# Patient Record
Sex: MALE | Race: WHITE | NOT HISPANIC OR LATINO | Employment: OTHER | URBAN - METROPOLITAN AREA
[De-identification: names, ages, dates, MRNs, and addresses within clinical notes are randomized per-mention and may not be internally consistent; named-entity substitution may affect disease eponyms.]

---

## 2020-02-18 ENCOUNTER — APPOINTMENT (EMERGENCY)
Dept: RADIOLOGY | Facility: HOSPITAL | Age: 65
DRG: 190 | End: 2020-02-18
Payer: MEDICARE

## 2020-02-18 ENCOUNTER — HOSPITAL ENCOUNTER (INPATIENT)
Facility: HOSPITAL | Age: 65
LOS: 6 days | Discharge: HOME | DRG: 190 | End: 2020-02-24
Attending: EMERGENCY MEDICINE | Admitting: INTERNAL MEDICINE
Payer: MEDICARE

## 2020-02-18 DIAGNOSIS — E78.5 HLD (HYPERLIPIDEMIA): ICD-10-CM

## 2020-02-18 DIAGNOSIS — J44.1 COPD WITH ACUTE EXACERBATION (HCC): Primary | ICD-10-CM

## 2020-02-18 PROBLEM — R07.9 CHEST PAIN: Status: ACTIVE | Noted: 2020-02-18

## 2020-02-18 PROBLEM — J20.9 ACUTE TRACHEOBRONCHITIS: Status: ACTIVE | Noted: 2020-02-18

## 2020-02-18 LAB
ALBUMIN SERPL BCP-MCNC: 3.8 G/DL (ref 3.5–5)
ALP SERPL-CCNC: 66 U/L (ref 46–116)
ALT SERPL W P-5'-P-CCNC: 29 U/L (ref 12–78)
ANION GAP SERPL CALCULATED.3IONS-SCNC: 3 MMOL/L (ref 4–13)
AST SERPL W P-5'-P-CCNC: 20 U/L (ref 5–45)
ATRIAL RATE: 99 BPM
BASOPHILS # BLD AUTO: 0.06 THOUSANDS/ΜL (ref 0–0.1)
BASOPHILS NFR BLD AUTO: 1 % (ref 0–1)
BILIRUB SERPL-MCNC: 0.4 MG/DL (ref 0.2–1)
BUN SERPL-MCNC: 10 MG/DL (ref 5–25)
CALCIUM SERPL-MCNC: 9 MG/DL (ref 8.3–10.1)
CHLORIDE SERPL-SCNC: 102 MMOL/L (ref 100–108)
CO2 SERPL-SCNC: 33 MMOL/L (ref 21–32)
CREAT SERPL-MCNC: 0.76 MG/DL (ref 0.6–1.3)
EOSINOPHIL # BLD AUTO: 0.03 THOUSAND/ΜL (ref 0–0.61)
EOSINOPHIL NFR BLD AUTO: 1 % (ref 0–6)
ERYTHROCYTE [DISTWIDTH] IN BLOOD BY AUTOMATED COUNT: 12.9 % (ref 11.6–15.1)
GFR SERPL CREATININE-BSD FRML MDRD: 96 ML/MIN/1.73SQ M
GLUCOSE SERPL-MCNC: 124 MG/DL (ref 65–140)
HCT VFR BLD AUTO: 46.5 % (ref 36.5–49.3)
HGB BLD-MCNC: 15.3 G/DL (ref 12–17)
IMM GRANULOCYTES # BLD AUTO: 0.03 THOUSAND/UL (ref 0–0.2)
IMM GRANULOCYTES NFR BLD AUTO: 1 % (ref 0–2)
LYMPHOCYTES # BLD AUTO: 0.66 THOUSANDS/ΜL (ref 0.6–4.47)
LYMPHOCYTES NFR BLD AUTO: 10 % (ref 14–44)
MCH RBC QN AUTO: 32.7 PG (ref 26.8–34.3)
MCHC RBC AUTO-ENTMCNC: 32.9 G/DL (ref 31.4–37.4)
MCV RBC AUTO: 99 FL (ref 82–98)
MONOCYTES # BLD AUTO: 0.68 THOUSAND/ΜL (ref 0.17–1.22)
MONOCYTES NFR BLD AUTO: 11 % (ref 4–12)
NEUTROPHILS # BLD AUTO: 5.03 THOUSANDS/ΜL (ref 1.85–7.62)
NEUTS SEG NFR BLD AUTO: 76 % (ref 43–75)
NRBC BLD AUTO-RTO: 0 /100 WBCS
P AXIS: 89 DEGREES
PLATELET # BLD AUTO: 327 THOUSANDS/UL (ref 149–390)
PMV BLD AUTO: 8.7 FL (ref 8.9–12.7)
POTASSIUM SERPL-SCNC: 4.3 MMOL/L (ref 3.5–5.3)
PR INTERVAL: 140 MS
PROT SERPL-MCNC: 7.3 G/DL (ref 6.4–8.2)
QRS AXIS: 100 DEGREES
QRSD INTERVAL: 76 MS
QT INTERVAL: 330 MS
QTC INTERVAL: 423 MS
RBC # BLD AUTO: 4.68 MILLION/UL (ref 3.88–5.62)
SODIUM SERPL-SCNC: 138 MMOL/L (ref 136–145)
T WAVE AXIS: 84 DEGREES
TROPONIN I SERPL-MCNC: <0.02 NG/ML
VENTRICULAR RATE: 99 BPM
WBC # BLD AUTO: 6.49 THOUSAND/UL (ref 4.31–10.16)

## 2020-02-18 PROCEDURE — 99223 1ST HOSP IP/OBS HIGH 75: CPT | Performed by: INTERNAL MEDICINE

## 2020-02-18 PROCEDURE — 94640 AIRWAY INHALATION TREATMENT: CPT | Performed by: EMERGENCY MEDICINE

## 2020-02-18 PROCEDURE — 99291 CRITICAL CARE FIRST HOUR: CPT | Performed by: EMERGENCY MEDICINE

## 2020-02-18 PROCEDURE — 94640 AIRWAY INHALATION TREATMENT: CPT

## 2020-02-18 PROCEDURE — 99285 EMERGENCY DEPT VISIT HI MDM: CPT

## 2020-02-18 PROCEDURE — 93005 ELECTROCARDIOGRAM TRACING: CPT

## 2020-02-18 PROCEDURE — 71046 X-RAY EXAM CHEST 2 VIEWS: CPT

## 2020-02-18 PROCEDURE — 94760 N-INVAS EAR/PLS OXIMETRY 1: CPT

## 2020-02-18 PROCEDURE — 93010 ELECTROCARDIOGRAM REPORT: CPT | Performed by: INTERNAL MEDICINE

## 2020-02-18 PROCEDURE — 96365 THER/PROPH/DIAG IV INF INIT: CPT

## 2020-02-18 PROCEDURE — 84484 ASSAY OF TROPONIN QUANT: CPT | Performed by: EMERGENCY MEDICINE

## 2020-02-18 PROCEDURE — 96375 TX/PRO/DX INJ NEW DRUG ADDON: CPT

## 2020-02-18 PROCEDURE — 85025 COMPLETE CBC W/AUTO DIFF WBC: CPT | Performed by: EMERGENCY MEDICINE

## 2020-02-18 PROCEDURE — 84484 ASSAY OF TROPONIN QUANT: CPT | Performed by: INTERNAL MEDICINE

## 2020-02-18 PROCEDURE — 80053 COMPREHEN METABOLIC PANEL: CPT | Performed by: EMERGENCY MEDICINE

## 2020-02-18 PROCEDURE — 36415 COLL VENOUS BLD VENIPUNCTURE: CPT

## 2020-02-18 RX ORDER — ALBUTEROL SULFATE 90 UG/1
2 AEROSOL, METERED RESPIRATORY (INHALATION) 4 TIMES DAILY
Status: DISCONTINUED | OUTPATIENT
Start: 2020-02-18 | End: 2020-02-19

## 2020-02-18 RX ORDER — ONDANSETRON 2 MG/ML
4 INJECTION INTRAMUSCULAR; INTRAVENOUS ONCE
Status: COMPLETED | OUTPATIENT
Start: 2020-02-18 | End: 2020-02-18

## 2020-02-18 RX ORDER — IPRATROPIUM BROMIDE AND ALBUTEROL SULFATE 2.5; .5 MG/3ML; MG/3ML
3 SOLUTION RESPIRATORY (INHALATION)
Status: DISCONTINUED | OUTPATIENT
Start: 2020-02-18 | End: 2020-02-24 | Stop reason: HOSPADM

## 2020-02-18 RX ORDER — FLUTICASONE FUROATE AND VILANTEROL 200; 25 UG/1; UG/1
1 POWDER RESPIRATORY (INHALATION) DAILY
Status: DISCONTINUED | OUTPATIENT
Start: 2020-02-18 | End: 2020-02-24 | Stop reason: HOSPADM

## 2020-02-18 RX ORDER — ATORVASTATIN CALCIUM 40 MG/1
40 TABLET, FILM COATED ORAL
Status: DISCONTINUED | OUTPATIENT
Start: 2020-02-18 | End: 2020-02-24 | Stop reason: HOSPADM

## 2020-02-18 RX ORDER — ASPIRIN 325 MG
325 TABLET ORAL DAILY
Status: DISCONTINUED | OUTPATIENT
Start: 2020-02-19 | End: 2020-02-24 | Stop reason: HOSPADM

## 2020-02-18 RX ORDER — SODIUM CHLORIDE 9 MG/ML
75 INJECTION, SOLUTION INTRAVENOUS CONTINUOUS
Status: DISCONTINUED | OUTPATIENT
Start: 2020-02-18 | End: 2020-02-19

## 2020-02-18 RX ORDER — GUAIFENESIN 600 MG
600 TABLET, EXTENDED RELEASE 12 HR ORAL ONCE
Status: COMPLETED | OUTPATIENT
Start: 2020-02-18 | End: 2020-02-18

## 2020-02-18 RX ORDER — METHYLPREDNISOLONE SODIUM SUCCINATE 40 MG/ML
40 INJECTION, POWDER, LYOPHILIZED, FOR SOLUTION INTRAMUSCULAR; INTRAVENOUS EVERY 8 HOURS SCHEDULED
Status: DISCONTINUED | OUTPATIENT
Start: 2020-02-18 | End: 2020-02-19

## 2020-02-18 RX ORDER — SODIUM CHLORIDE FOR INHALATION 0.9 %
12 VIAL, NEBULIZER (ML) INHALATION ONCE
Status: COMPLETED | OUTPATIENT
Start: 2020-02-18 | End: 2020-02-18

## 2020-02-18 RX ORDER — ACETAMINOPHEN 325 MG/1
650 TABLET ORAL ONCE
Status: COMPLETED | OUTPATIENT
Start: 2020-02-18 | End: 2020-02-18

## 2020-02-18 RX ORDER — ONDANSETRON 2 MG/ML
4 INJECTION INTRAMUSCULAR; INTRAVENOUS EVERY 6 HOURS PRN
Status: DISCONTINUED | OUTPATIENT
Start: 2020-02-18 | End: 2020-02-24 | Stop reason: HOSPADM

## 2020-02-18 RX ORDER — IPRATROPIUM BROMIDE AND ALBUTEROL SULFATE 2.5; .5 MG/3ML; MG/3ML
3 SOLUTION RESPIRATORY (INHALATION) ONCE
Status: COMPLETED | OUTPATIENT
Start: 2020-02-18 | End: 2020-02-18

## 2020-02-18 RX ORDER — METHYLPREDNISOLONE SODIUM SUCCINATE 125 MG/2ML
80 INJECTION, POWDER, LYOPHILIZED, FOR SOLUTION INTRAMUSCULAR; INTRAVENOUS ONCE
Status: COMPLETED | OUTPATIENT
Start: 2020-02-18 | End: 2020-02-18

## 2020-02-18 RX ORDER — ACETAMINOPHEN 325 MG/1
650 TABLET ORAL EVERY 6 HOURS PRN
Status: DISCONTINUED | OUTPATIENT
Start: 2020-02-18 | End: 2020-02-24 | Stop reason: HOSPADM

## 2020-02-18 RX ADMIN — ISODIUM CHLORIDE 12 ML: 0.03 SOLUTION RESPIRATORY (INHALATION) at 10:01

## 2020-02-18 RX ADMIN — GUAIFENESIN 600 MG: 600 TABLET ORAL at 09:14

## 2020-02-18 RX ADMIN — IPRATROPIUM BROMIDE AND ALBUTEROL SULFATE 3 ML: 2.5; .5 SOLUTION RESPIRATORY (INHALATION) at 22:00

## 2020-02-18 RX ADMIN — METHYLPREDNISOLONE SODIUM SUCCINATE 80 MG: 125 INJECTION, POWDER, FOR SOLUTION INTRAMUSCULAR; INTRAVENOUS at 09:15

## 2020-02-18 RX ADMIN — IPRATROPIUM BROMIDE AND ALBUTEROL SULFATE 3 ML: .5; 3 SOLUTION RESPIRATORY (INHALATION) at 09:14

## 2020-02-18 RX ADMIN — METHYLPREDNISOLONE SODIUM SUCCINATE 40 MG: 40 INJECTION, POWDER, FOR SOLUTION INTRAMUSCULAR; INTRAVENOUS at 16:51

## 2020-02-18 RX ADMIN — ONDANSETRON 4 MG: 2 INJECTION INTRAMUSCULAR; INTRAVENOUS at 09:23

## 2020-02-18 RX ADMIN — ACETAMINOPHEN 650 MG: 325 TABLET, FILM COATED ORAL at 09:57

## 2020-02-18 RX ADMIN — IPRATROPIUM BROMIDE 0.5 MG: 0.5 SOLUTION RESPIRATORY (INHALATION) at 10:01

## 2020-02-18 RX ADMIN — IPRATROPIUM BROMIDE AND ALBUTEROL SULFATE 3 ML: 2.5; .5 SOLUTION RESPIRATORY (INHALATION) at 16:46

## 2020-02-18 RX ADMIN — METHYLPREDNISOLONE SODIUM SUCCINATE 40 MG: 40 INJECTION, POWDER, FOR SOLUTION INTRAMUSCULAR; INTRAVENOUS at 22:03

## 2020-02-18 RX ADMIN — ALBUTEROL SULFATE 10 MG: 2.5 SOLUTION RESPIRATORY (INHALATION) at 10:01

## 2020-02-18 RX ADMIN — SODIUM CHLORIDE 75 ML/HR: 0.9 INJECTION, SOLUTION INTRAVENOUS at 16:45

## 2020-02-18 RX ADMIN — ATORVASTATIN CALCIUM 40 MG: 40 TABLET, FILM COATED ORAL at 16:46

## 2020-02-18 RX ADMIN — FLUTICASONE FUROATE AND VILANTEROL TRIFENATATE 1 PUFF: 200; 25 POWDER RESPIRATORY (INHALATION) at 18:41

## 2020-02-18 RX ADMIN — AZITHROMYCIN MONOHYDRATE 500 MG: 500 INJECTION, POWDER, LYOPHILIZED, FOR SOLUTION INTRAVENOUS at 12:09

## 2020-02-18 RX ADMIN — ALBUTEROL SULFATE 2 PUFF: 90 AEROSOL, METERED RESPIRATORY (INHALATION) at 18:40

## 2020-02-18 NOTE — ED NOTES
Respiratory aware of need for heart neb     Armani Miramontes, BATOOL  02/18/20 32 William Puente RN  02/18/20 1497

## 2020-02-18 NOTE — ASSESSMENT & PLAN NOTE
Likely secondary to tracheobronchitis  Will start on Zithromax and Solu-Medrol  Oxygen supplementation and bronchodilators p r n    Trend procalcitonin  Will order sputum culture  Pulmonary consult  Encourage incentive spirometer use

## 2020-02-18 NOTE — ED PROVIDER NOTES
History  Chief Complaint   Patient presents with    Shortness of Breath     pt presents to ED states he has had cold symptoms for a few days but today is very SOB  58-year-old male presents for evaluation of shortness of breath that has been ongoing for several days  The patient describes associated cold/upper respiratory symptoms with congestion and cough however is unable to expectorates sputum  Patient denies any fevers  The patient has some associated sharp stabbing chest pain  His symptoms are worse with exertion  The patient denies any known sick contacts  None       Past Medical History:   Diagnosis Date    Emphysema lung ([address] )        Past Surgical History:   Procedure Laterality Date    APPENDECTOMY         History reviewed  No pertinent family history  I have reviewed and agree with the history as documented  Social History     Tobacco Use    Smoking status: Former Smoker    Smokeless tobacco: Never Used   Substance Use Topics    Alcohol use: Not Currently    Drug use: Never       Review of Systems   Constitutional: Positive for fatigue  Negative for chills and fever  HENT: Negative for sore throat  Respiratory: Positive for cough, chest tightness, shortness of breath and wheezing  Cardiovascular: Positive for chest pain  Negative for palpitations  Gastrointestinal: Negative for abdominal pain, constipation, diarrhea, nausea and vomiting  Genitourinary: Negative for difficulty urinating and dysuria  Musculoskeletal: Negative for back pain  Skin: Negative for rash  Neurological: Negative for dizziness, seizures, syncope, weakness and headaches  All other systems reviewed and are negative  Physical Exam  Physical Exam   Constitutional: He is oriented to person, place, and time  He appears well-developed and well-nourished  He appears cachectic  He appears distressed  HENT:   Head: Normocephalic and atraumatic     Right Ear: External ear normal    Left Ear: External ear normal    Mouth/Throat: No oropharyngeal exudate  Eyes: Pupils are equal, round, and reactive to light  EOM are normal  No scleral icterus  Neck: Normal range of motion  Neck supple  Cardiovascular: Regular rhythm and normal heart sounds  Tachycardia present  Pulmonary/Chest: Tachypnea noted  He is in respiratory distress  He has decreased breath sounds ( Diffuse)  He has wheezes (Diffuse)  Abdominal: Soft  Bowel sounds are normal  There is no tenderness  There is no rebound and no guarding  Musculoskeletal: Normal range of motion  Neurological: He is alert and oriented to person, place, and time  Skin: Skin is warm and dry  No rash noted  Psychiatric: He has a normal mood and affect  Nursing note and vitals reviewed        Vital Signs  ED Triage Vitals   Temperature Pulse Respirations Blood Pressure SpO2   02/18/20 0906 02/18/20 0851 02/18/20 0851 02/18/20 0851 02/18/20 0851   99 8 °F (37 7 °C) (!) 110 (!) 32 142/89 90 %      Temp Source Heart Rate Source Patient Position - Orthostatic VS BP Location FiO2 (%)   02/18/20 0906 02/18/20 0851 02/18/20 0851 02/18/20 0851 --   Tympanic Monitor Sitting Right arm       Pain Score       02/18/20 0957       7           Vitals:    02/18/20 1000 02/18/20 1030 02/18/20 1100 02/18/20 1130   BP: 108/65 111/66 98/61 108/58   Pulse: 94 93 93 81   Patient Position - Orthostatic VS:   Lying Lying         Visual Acuity      ED Medications  Medications   azithromycin (ZITHROMAX) 500 mg in sodium chloride 0 9% 250mL IVPB 500 mg (500 mg Intravenous New Bag 2/18/20 1209)   ipratropium-albuterol (DUO-NEB) 0 5-2 5 mg/3 mL inhalation solution 3 mL (3 mL Nebulization Given 2/18/20 0914)   methylPREDNISolone sodium succinate (Solu-MEDROL) injection 80 mg (80 mg Intravenous Given 2/18/20 0915)   guaiFENesin (MUCINEX) 12 hr tablet 600 mg (600 mg Oral Given 2/18/20 0914)   ondansetron (ZOFRAN) injection 4 mg (4 mg Intravenous Given 2/18/20 0923) acetaminophen (TYLENOL) tablet 650 mg (650 mg Oral Given 2/18/20 0957)   albuterol inhalation solution 10 mg (10 mg Nebulization Given 2/18/20 1001)   ipratropium (ATROVENT) 0 02 % inhalation solution 0 5 mg (0 5 mg Nebulization Given 2/18/20 1001)   sodium chloride 0 9 % inhalation solution 12 mL (12 mL Nebulization Given 2/18/20 1001)       Diagnostic Studies  Results Reviewed     Procedure Component Value Units Date/Time    Troponin I [[id number]]  (Normal) Collected:  02/18/20 0855    Lab Status:  Final result Specimen:  Blood from Arm, Right Updated:  02/18/20 0925     Troponin I <0 02 ng/mL     Comprehensive metabolic panel [[id number]]  (Abnormal) Collected:  02/18/20 0855    Lab Status:  Final result Specimen:  Blood from Arm, Right Updated:  02/18/20 0923     Sodium 138 mmol/L      Potassium 4 3 mmol/L      Chloride 102 mmol/L      CO2 33 mmol/L      ANION GAP 3 mmol/L      BUN 10 mg/dL      Creatinine 0 76 mg/dL      Glucose 124 mg/dL      Calcium 9 0 mg/dL      AST 20 U/L      ALT 29 U/L      Alkaline Phosphatase 66 U/L      Total Protein 7 3 g/dL      Albumin 3 8 g/dL      Total Bilirubin 0 40 mg/dL      eGFR 96 ml/min/1 73sq m     Narrative:       Ashley guidelines for Chronic Kidney Disease (CKD):     Stage 1 with normal or high GFR (GFR > 90 mL/min/1 73 square meters)    Stage 2 Mild CKD (GFR = 60-89 mL/min/1 73 square meters)    Stage 3A Moderate CKD (GFR = 45-59 mL/min/1 73 square meters)    Stage 3B Moderate CKD (GFR = 30-44 mL/min/1 73 square meters)    Stage 4 Severe CKD (GFR = 15-29 mL/min/1 73 square meters)    Stage 5 End Stage CKD (GFR <15 mL/min/1 73 square meters)  Note: GFR calculation is accurate only with a steady state creatinine    CBC and differential [[id number]]  (Abnormal) Collected:  02/18/20 0855    Lab Status:  Final result Specimen:  Blood from Arm, Right Updated:  02/18/20 0915     WBC 6 49 Thousand/uL      RBC 4 68 Million/uL      Hemoglobin 15 3 g/dL      Hematocrit 46 5 %      MCV 99 fL      MCH 32 7 pg      MCHC 32 9 g/dL      RDW 12 9 %      MPV 8 7 fL      Platelets 703 Thousands/uL      nRBC 0 /100 WBCs      Neutrophils Relative 76 %      Immat GRANS % 1 %      Lymphocytes Relative 10 %      Monocytes Relative 11 %      Eosinophils Relative 1 %      Basophils Relative 1 %      Neutrophils Absolute 5 03 Thousands/µL      Immature Grans Absolute 0 03 Thousand/uL      Lymphocytes Absolute 0 66 Thousands/µL      Monocytes Absolute 0 68 Thousand/µL      Eosinophils Absolute 0 03 Thousand/µL      Basophils Absolute 0 06 Thousands/µL                  XR chest 2 views   Final Result by Giana Hitchcock MD (02/18 2154)      Emphysematous changes are noted  No focal consolidation, pleural effusion, or pneumothorax  Workstation performed: [id number]                    Procedures  ECG 12 Lead Documentation Only  Date/Time: 2/18/2020 12:08 PM  Performed by: Luz Gonzalez DO  Authorized by: Luz Gonzalez DO     Indications / Diagnosis:  SOB  ECG reviewed by me, the ED Provider: yes    Patient location:  ED  Previous ECG:     Previous ECG:  Unavailable  Interpretation:     Interpretation: normal    Rate:     ECG rate:  99    ECG rate assessment: normal    Rhythm:     Rhythm: sinus rhythm    Ectopy:     Ectopy: none    QRS:     QRS axis:  Normal    QRS intervals:  Normal  Conduction:     Conduction: normal    ST segments:     ST segments:  Normal  T waves:     T waves: normal    CriticalCare Time  Performed by: Luz Gonzalez DO  Authorized by:  Luz Gonzalez DO     Critical care provider statement:     Critical care time (minutes):  33    Critical care time was exclusive of:  Separately billable procedures and treating other patients and teaching time    Critical care was necessary to treat or prevent imminent or life-threatening deterioration of the following conditions:  Respiratory failure    Critical care was time spent personally by me on the following activities:  Blood draw for specimens, obtaining history from patient or surrogate, development of treatment plan with patient or surrogate, discussions with consultants, evaluation of patient's response to treatment, examination of patient, ordering and performing treatments and interventions, ordering and review of laboratory studies, ordering and review of radiographic studies, re-evaluation of patient's condition, review of old charts and interpretation of cardiac output measurements    I assumed direction of critical care for this patient from another provider in my specialty: no    Comments:      IV steroids, IV antibiotics an hour long nebulization treatment for respiratory distress secondary to COPD exacerbation             ED Course  ED Course as of Feb 18 1231   Tue Feb 18, 2020   0946 Patient with continued work of breathing and diffuse wheezes and decreased air movement upon re-evaluation  The patient also complains of a headache at this time  The patient will give be given Tylenol  He states that nausea is improved after the Zofran  1155 Patient still with tachypnea, increased work of breathing and decreased air movement with wheezing after ALCAZAR neb  The plan is for admission      1228 I discussed the case with the hospitalist  We reviewed the HPI, pertinent PMH, ED course and workup   Hospitalist agreed with plan and will admit the patient to the hospital                                   MDM  Number of Diagnoses or Management Options  COPD with acute exacerbation [facility]): new and requires workup  Diagnosis management comments: Differential diagnosis:  COPD exacerbation, bronchitis, pneumonia, pleural effusion, anemia, myocardial infarction, dehydration, other  Plan is for nebulization, EKG, chest x-ray, laboratories       Amount and/or Complexity of Data Reviewed  Clinical lab tests: reviewed and ordered  Tests in the radiology section of CPT®: ordered and reviewed  Tests in the medicine section of CPT®: reviewed and ordered  Review and summarize past medical records: yes  Discuss the patient with other providers: yes  Independent visualization of images, tracings, or specimens: yes          Disposition  Final diagnoses:   COPD with acute exacerbation ([facility] [address] )     Time reflects when diagnosis was documented in both MDM as applicable and the Disposition within this note     Time User Action Codes Description Comment    2/18/2020 12:09 PM Osiel Braun Add [J44 1] COPD with acute exacerbation [facility])       ED Disposition     ED Disposition Condition Date/Time Comment    Admit Stable Tu Feb 18, 2020 12:30 PM Case was discussed with Zak Bose and the patient's admission status was agreed to be Admission Status: inpatient status to the service of Dr Zak Bose   Follow-up Information    None         Patient's Medications    No medications on file     No discharge procedures on file      PDMP Review     None          ED Provider  Electronically Signed by           Fabiana Burnett DO  02/18/20 9169

## 2020-02-18 NOTE — H&P
H&P- Lynwood Brunner 1955, 71 y o  male MRN: [id number]    Unit/Bed#: ED 03 Encounter: [id number]    Primary Care Provider: No primary care provider on file  Date and time admitted to hospital: 2/18/2020  8:46 AM        Chest pain  Assessment & Plan  Will trend troponins  Denies any chest pain at this time  Serial EKG  Start on aspirin and Lipitor  Lipid profile in a m  Acute tracheobronchitis  Assessment & Plan  Continue Zithromax  Trend procalcitonin  Gentle IV fluids  Monitor labs in a m     * COPD with acute exacerbation (HCC)  Assessment & Plan  Likely secondary to tracheobronchitis  Will start on Zithromax and Solu-Medrol  Oxygen supplementation and bronchodilators p r n  Trend procalcitonin  Will order sputum culture  Pulmonary consult  Encourage incentive spirometer use    Anticipated length of stay greater than 2 midnights    Chief Complaint   Patient presents with    Shortness of Breath     pt presents to ED states he has had cold symptoms for a few days but today is very SOB  HPI:  Lynwood Brunner is a 71 y o  male with history of COPD/emphysema presented to the emergency department with complain of worsening shortness of breath  Patient states that he has been having upper respiratory symptoms for the past 3 days  He has been having cough which is nonproductive associated with low-grade fever and shortness of breath  Patient has history of COPD and emphysema and uses albuterol and Symbicort inhaler at home  Patient has previous history of smoking and quit 6 years ago  Patient has been having worsening shortness of breath and associated wheezing since last evening  He also complains of having stabbing chest pain which is worse with breathing  As dizzy and exertion  Denies any dizziness, nausea, vomiting, abdominal pain, palpitations or any other complaint  In ER patient was given Solu-Medrol and Zithromax      Historical Information   Past Medical History: Diagnosis Date    Emphysema lung ([facility] [address] )      Past Surgical History:   Procedure Laterality Date    APPENDECTOMY       Social History   Social History     Substance and Sexual Activity   Alcohol Use Not Currently     Social History     Substance and Sexual Activity   Drug Use Never     Social History     Tobacco Use   Smoking Status Former Smoker   Smokeless Tobacco Never Used     History reviewed  No pertinent family history  Meds/Allergies   No Known Allergies    Meds:  No current facility-administered medications for this encounter  No current outpatient medications on file  (Not in a hospital admission)      Review of Systems   Constitutional: Positive for activity change and fatigue  HENT: Negative  Eyes: Negative  Respiratory: Positive for cough, chest tightness, shortness of breath and wheezing  Cardiovascular: Negative  Gastrointestinal: Negative  Endocrine: Negative  Genitourinary: Negative  Musculoskeletal: Negative  Skin: Negative  Allergic/Immunologic: Negative  Neurological: Negative  Hematological: Negative  Psychiatric/Behavioral: Negative  Current Vitals:   Blood Pressure: 102/62 (02/18/20 1430)  Pulse: 86 (02/18/20 1430)  Temperature: 99 8 °F (37 7 °C) (02/18/20 0906)  Temp Source: Tympanic (02/18/20 0906)  Respirations: (!) 26 (02/18/20 1430)  Height: 5' 8" (172 7 cm) (02/18/20 0851)  Weight - Scale: 49 9 kg (110 lb) (02/18/20 0851)  SpO2: 92 % (02/18/20 1430)  SPO2 RA Rest      ED from 2/18/2020 in  [address] Emergency Department   SpO2  92 %   SpO2 Activity  At Rest   O2 Device  Nasal cannula   O2 Flow Rate  3 L/min        No intake or output data in the 24 hours ending 02/18/20 1536  Body mass index is 16 73 kg/m²  Physical Exam   Constitutional: He is oriented to person, place, and time  He appears well-developed and well-nourished  No distress  HENT:   Head: Normocephalic and atraumatic     Mouth/Throat: Oropharynx is clear and moist    Eyes: Pupils are equal, round, and reactive to light  Conjunctivae and EOM are normal  No scleral icterus  Neck: Normal range of motion  Neck supple  No JVD present  Cardiovascular: Normal rate, regular rhythm, normal heart sounds and intact distal pulses  Pulmonary/Chest: Effort normal  He has wheezes  He has no rales  He exhibits no tenderness  Decreased breath sounds bilaterally with scattered expiratory wheezing   Abdominal: Soft  Bowel sounds are normal  He exhibits no mass  There is no tenderness  There is no rebound and no guarding  Musculoskeletal: Normal range of motion  He exhibits no edema, tenderness or deformity  Lymphadenopathy:     He has no cervical adenopathy  Neurological: He is alert and oriented to person, place, and time  No cranial nerve deficit  No focal deficits   Skin: Skin is warm  No rash noted  No erythema  No pallor  Psychiatric: He has a normal mood and affect  His behavior is normal  Judgment normal    Nursing note and vitals reviewed        Lab Results:   CBC:   Lab Results   Component Value Date    WBC 6 49 02/18/2020    HGB 15 3 02/18/2020    HCT 46 5 02/18/2020    MCV 99 (H) 02/18/2020     02/18/2020    MCH 32 7 02/18/2020    MCHC 32 9 02/18/2020    RDW 12 9 02/18/2020    MPV 8 7 (L) 02/18/2020    NRBC 0 02/18/2020     CMP:  Lab Results   Component Value Date     02/18/2020    CO2 33 (H) 02/18/2020    BUN 10 02/18/2020    CREATININE 0 76 02/18/2020    CALCIUM 9 0 02/18/2020    AST 20 02/18/2020    ALT 29 02/18/2020    ALKPHOS 66 02/18/2020    EGFR 96 02/18/2020     Lab Results   Component Value Date    TROPONINI <0 02 02/18/2020     Coagulation: No results found for: PT, INR, APTT Urinalysis:No results found for: [place], SPECGRAV, PHUR, LEUKOCYTESUR, NITRITE, PROTEINUA, GLUCOSEU, KETONESU, BILIRUBINUR, BLOODU   Amylase: No results found for: AMYLASE  Lipase: No results found for: LIPASE     Imaging: Xr Chest 2 Views    Result Date: 2/18/2020  Narrative: CHEST INDICATION:   Chest Pain  COMPARISON:  None EXAM PERFORMED/VIEWS:  XR CHEST PA & LATERAL FINDINGS: Cardiomediastinal silhouette appears unremarkable  Emphysematous changes are noted consistent with chronic obstructive pulmonary disease  No airspace opacity to suggest focal pneumonia  No pneumothorax or pleural effusion  Osseous structures appear within normal limits for patient age  Impression: Emphysematous changes are noted  No focal consolidation, pleural effusion, or pneumothorax  Workstation performed: [id number]     EKG, Pathology, and Other Studies: I have personally reviewed the results  VTE Pharmacologic Prophylaxis: Enoxaparin (Lovenox)  VTE Mechanical Prophylaxis: sequential compression device    Code Status: No Order    Counseling / Coordination of Care  Total floor / unit time spent today 75 minutes  Greater than 50% of total time was spent with the patient and / or family counseling and / or coordination of care       "This note has been constructed using a voice recognition system"      Bee Bermudez MD  2/18/2020, 3:36 PM

## 2020-02-18 NOTE — ASSESSMENT & PLAN NOTE
Will trend troponins  Denies any chest pain at this time  Serial EKG  Start on aspirin and Lipitor  Lipid profile in a m

## 2020-02-19 PROBLEM — R63.6 UNDERWEIGHT: Status: ACTIVE | Noted: 2020-02-19

## 2020-02-19 PROBLEM — J96.01 ACUTE RESPIRATORY FAILURE WITH HYPOXIA (HCC): Status: ACTIVE | Noted: 2020-02-19

## 2020-02-19 LAB
ALBUMIN SERPL BCP-MCNC: 3.1 G/DL (ref 3.5–5)
ALP SERPL-CCNC: 60 U/L (ref 46–116)
ALT SERPL W P-5'-P-CCNC: 23 U/L (ref 12–78)
ANION GAP SERPL CALCULATED.3IONS-SCNC: 1 MMOL/L (ref 4–13)
AST SERPL W P-5'-P-CCNC: 15 U/L (ref 5–45)
BASOPHILS # BLD AUTO: 0.01 THOUSANDS/ΜL (ref 0–0.1)
BASOPHILS NFR BLD AUTO: 0 % (ref 0–1)
BILIRUB SERPL-MCNC: 0.4 MG/DL (ref 0.2–1)
BUN SERPL-MCNC: 17 MG/DL (ref 5–25)
CALCIUM SERPL-MCNC: 8.5 MG/DL (ref 8.3–10.1)
CHLORIDE SERPL-SCNC: 104 MMOL/L (ref 100–108)
CHOLEST SERPL-MCNC: 171 MG/DL (ref 50–200)
CO2 SERPL-SCNC: 33 MMOL/L (ref 21–32)
CREAT SERPL-MCNC: 0.63 MG/DL (ref 0.6–1.3)
EOSINOPHIL # BLD AUTO: 0 THOUSAND/ΜL (ref 0–0.61)
EOSINOPHIL NFR BLD AUTO: 0 % (ref 0–6)
ERYTHROCYTE [DISTWIDTH] IN BLOOD BY AUTOMATED COUNT: 12.7 % (ref 11.6–15.1)
GFR SERPL CREATININE-BSD FRML MDRD: 103 ML/MIN/1.73SQ M
GLUCOSE SERPL-MCNC: 161 MG/DL (ref 65–140)
HCT VFR BLD AUTO: 40.3 % (ref 36.5–49.3)
HDLC SERPL-MCNC: 83 MG/DL
HGB BLD-MCNC: 13.2 G/DL (ref 12–17)
IMM GRANULOCYTES # BLD AUTO: 0.02 THOUSAND/UL (ref 0–0.2)
IMM GRANULOCYTES NFR BLD AUTO: 1 % (ref 0–2)
LDLC SERPL CALC-MCNC: 81 MG/DL (ref 0–100)
LYMPHOCYTES # BLD AUTO: 0.36 THOUSANDS/ΜL (ref 0.6–4.47)
LYMPHOCYTES NFR BLD AUTO: 9 % (ref 14–44)
MAGNESIUM SERPL-MCNC: 2.1 MG/DL (ref 1.6–2.6)
MCH RBC QN AUTO: 32.7 PG (ref 26.8–34.3)
MCHC RBC AUTO-ENTMCNC: 32.8 G/DL (ref 31.4–37.4)
MCV RBC AUTO: 100 FL (ref 82–98)
MONOCYTES # BLD AUTO: 0.26 THOUSAND/ΜL (ref 0.17–1.22)
MONOCYTES NFR BLD AUTO: 6 % (ref 4–12)
NEUTROPHILS # BLD AUTO: 3.47 THOUSANDS/ΜL (ref 1.85–7.62)
NEUTS SEG NFR BLD AUTO: 84 % (ref 43–75)
NONHDLC SERPL-MCNC: 88 MG/DL
NRBC BLD AUTO-RTO: 0 /100 WBCS
PHOSPHATE SERPL-MCNC: 3.9 MG/DL (ref 2.3–4.1)
PLATELET # BLD AUTO: 287 THOUSANDS/UL (ref 149–390)
PMV BLD AUTO: 8.8 FL (ref 8.9–12.7)
POTASSIUM SERPL-SCNC: 4.6 MMOL/L (ref 3.5–5.3)
PROCALCITONIN SERPL-MCNC: <0.05 NG/ML
PROT SERPL-MCNC: 6.3 G/DL (ref 6.4–8.2)
RBC # BLD AUTO: 4.04 MILLION/UL (ref 3.88–5.62)
SODIUM SERPL-SCNC: 138 MMOL/L (ref 136–145)
TRIGL SERPL-MCNC: 34 MG/DL
WBC # BLD AUTO: 4.12 THOUSAND/UL (ref 4.31–10.16)

## 2020-02-19 PROCEDURE — 99232 SBSQ HOSP IP/OBS MODERATE 35: CPT | Performed by: INTERNAL MEDICINE

## 2020-02-19 PROCEDURE — 87205 SMEAR GRAM STAIN: CPT | Performed by: INTERNAL MEDICINE

## 2020-02-19 PROCEDURE — 87070 CULTURE OTHR SPECIMN AEROBIC: CPT | Performed by: INTERNAL MEDICINE

## 2020-02-19 PROCEDURE — 80061 LIPID PANEL: CPT | Performed by: INTERNAL MEDICINE

## 2020-02-19 PROCEDURE — 84145 PROCALCITONIN (PCT): CPT | Performed by: INTERNAL MEDICINE

## 2020-02-19 PROCEDURE — 85025 COMPLETE CBC W/AUTO DIFF WBC: CPT | Performed by: INTERNAL MEDICINE

## 2020-02-19 PROCEDURE — 84100 ASSAY OF PHOSPHORUS: CPT | Performed by: INTERNAL MEDICINE

## 2020-02-19 PROCEDURE — 99221 1ST HOSP IP/OBS SF/LOW 40: CPT | Performed by: INTERNAL MEDICINE

## 2020-02-19 PROCEDURE — 83735 ASSAY OF MAGNESIUM: CPT | Performed by: INTERNAL MEDICINE

## 2020-02-19 PROCEDURE — 94640 AIRWAY INHALATION TREATMENT: CPT

## 2020-02-19 PROCEDURE — 94664 DEMO&/EVAL PT USE INHALER: CPT

## 2020-02-19 PROCEDURE — 36415 COLL VENOUS BLD VENIPUNCTURE: CPT | Performed by: INTERNAL MEDICINE

## 2020-02-19 PROCEDURE — 80053 COMPREHEN METABOLIC PANEL: CPT | Performed by: INTERNAL MEDICINE

## 2020-02-19 PROCEDURE — 94760 N-INVAS EAR/PLS OXIMETRY 1: CPT

## 2020-02-19 RX ORDER — METHYLPREDNISOLONE SODIUM SUCCINATE 40 MG/ML
40 INJECTION, POWDER, LYOPHILIZED, FOR SOLUTION INTRAMUSCULAR; INTRAVENOUS EVERY 12 HOURS SCHEDULED
Status: DISCONTINUED | OUTPATIENT
Start: 2020-02-19 | End: 2020-02-20

## 2020-02-19 RX ORDER — ALBUTEROL SULFATE 90 UG/1
2 AEROSOL, METERED RESPIRATORY (INHALATION) EVERY 4 HOURS PRN
Status: DISCONTINUED | OUTPATIENT
Start: 2020-02-19 | End: 2020-02-24 | Stop reason: HOSPADM

## 2020-02-19 RX ORDER — GUAIFENESIN 600 MG
600 TABLET, EXTENDED RELEASE 12 HR ORAL EVERY 12 HOURS SCHEDULED
Status: DISCONTINUED | OUTPATIENT
Start: 2020-02-19 | End: 2020-02-24 | Stop reason: HOSPADM

## 2020-02-19 RX ADMIN — ATORVASTATIN CALCIUM 40 MG: 40 TABLET, FILM COATED ORAL at 17:49

## 2020-02-19 RX ADMIN — ALBUTEROL SULFATE 2 PUFF: 90 AEROSOL, METERED RESPIRATORY (INHALATION) at 08:04

## 2020-02-19 RX ADMIN — GUAIFENESIN 600 MG: 600 TABLET ORAL at 11:23

## 2020-02-19 RX ADMIN — ASPIRIN 325 MG ORAL TABLET 325 MG: 325 PILL ORAL at 08:04

## 2020-02-19 RX ADMIN — FLUTICASONE FUROATE AND VILANTEROL TRIFENATATE 1 PUFF: 200; 25 POWDER RESPIRATORY (INHALATION) at 08:05

## 2020-02-19 RX ADMIN — IPRATROPIUM BROMIDE AND ALBUTEROL SULFATE 3 ML: 2.5; .5 SOLUTION RESPIRATORY (INHALATION) at 07:53

## 2020-02-19 RX ADMIN — METHYLPREDNISOLONE SODIUM SUCCINATE 40 MG: 40 INJECTION, POWDER, FOR SOLUTION INTRAMUSCULAR; INTRAVENOUS at 21:57

## 2020-02-19 RX ADMIN — AZITHROMYCIN MONOHYDRATE 500 MG: 500 INJECTION, POWDER, LYOPHILIZED, FOR SOLUTION INTRAVENOUS at 11:23

## 2020-02-19 RX ADMIN — METHYLPREDNISOLONE SODIUM SUCCINATE 40 MG: 40 INJECTION, POWDER, FOR SOLUTION INTRAMUSCULAR; INTRAVENOUS at 05:44

## 2020-02-19 RX ADMIN — GUAIFENESIN 600 MG: 600 TABLET ORAL at 21:57

## 2020-02-19 RX ADMIN — ALBUTEROL SULFATE 2 PUFF: 90 AEROSOL, METERED RESPIRATORY (INHALATION) at 11:23

## 2020-02-19 RX ADMIN — ALBUTEROL SULFATE 2 PUFF: 90 AEROSOL, METERED RESPIRATORY (INHALATION) at 01:22

## 2020-02-19 RX ADMIN — IPRATROPIUM BROMIDE AND ALBUTEROL SULFATE 3 ML: 2.5; .5 SOLUTION RESPIRATORY (INHALATION) at 15:13

## 2020-02-19 RX ADMIN — ALBUTEROL SULFATE 2 PUFF: 90 AEROSOL, METERED RESPIRATORY (INHALATION) at 22:03

## 2020-02-19 NOTE — PLAN OF CARE
Problem: PAIN - ADULT  Goal: Verbalizes/displays adequate comfort level or baseline comfort level  Description  Interventions:  - Encourage patient to monitor pain and request assistance  - Assess pain using appropriate pain scale  - Administer analgesics based on type and severity of pain and evaluate response  - Implement non-pharmacological measures as appropriate and evaluate response  - Consider cultural and social influences on pain and pain management  - Notify physician/advanced practitioner if interventions unsuccessful or patient reports new pain  Outcome: Progressing     Problem: INFECTION - ADULT  Goal: Absence or prevention of progression during hospitalization  Description  INTERVENTIONS:  - Assess and monitor for signs and symptoms of infection  - Monitor lab/diagnostic results  - Monitor all insertion sites, i e  indwelling lines, tubes, and drains  - Monitor endotracheal if appropriate and nasal secretions for changes in amount and color  - [place] appropriate cooling/warming therapies per order  - Administer medications as ordered  - Instruct and encourage patient and family to use good hand hygiene technique  - Identify and instruct in appropriate isolation precautions for identified infection/condition  Outcome: Progressing  Goal: Absence of fever/infection during neutropenic period  Description  INTERVENTIONS:  - Monitor WBC    Outcome: Progressing     Problem: SAFETY ADULT  Goal: Patient will remain free of falls  Description  INTERVENTIONS:  - Assess patient frequently for physical needs  -  Identify cognitive and physical deficits and behaviors that affect risk of falls    -  [place] fall precautions as indicated by assessment   - Educate patient/family on patient safety including physical limitations  - Instruct patient to call for assistance with activity based on assessment  - Modify environment to reduce risk of injury  - Consider OT/PT consult to assist with strengthening/mobility  Outcome: Progressing  Goal: Maintain or return to baseline ADL function  Description  INTERVENTIONS:  -  Assess patient's ability to carry out ADLs; assess patient's baseline for ADL function and identify physical deficits which impact ability to perform ADLs (bathing, care of mouth/teeth, toileting, grooming, dressing, etc )  - Assess/evaluate cause of self-care deficits   - Assess range of motion  - Assess patient's mobility; develop plan if impaired  - Assess patient's need for assistive devices and provide as appropriate  - Encourage maximum independence but intervene and supervise when necessary  - Involve family in performance of ADLs  - Assess for home care needs following discharge   - Consider OT consult to assist with ADL evaluation and planning for discharge  - Provide patient education as appropriate  Outcome: Progressing  Goal: Maintain or return mobility status to optimal level  Description  INTERVENTIONS:  - Assess patient's baseline mobility status (ambulation, transfers, stairs, etc )    - Identify cognitive and physical deficits and behaviors that affect mobility  - Identify mobility aids required to assist with transfers and/or ambulation (gait belt, sit-to-stand, lift, walker, cane, etc )  - [place] fall precautions as indicated by assessment  - Record patient progress and toleration of activity level on Mobility SBAR; progress patient to next Phase/Stage  - Instruct patient to call for assistance with activity based on assessment  - Consider rehabilitation consult to assist with strengthening/weightbearing, etc   Outcome: Progressing     Problem: DISCHARGE PLANNING  Goal: Discharge to home or other facility with appropriate resources  Description  INTERVENTIONS:  - Identify barriers to discharge w/patient and caregiver  - Arrange for needed discharge resources and transportation as appropriate  - Identify discharge learning needs (meds, wound care, etc )  - Arrange for interpretive services to assist at discharge as needed  - Refer to Case Management Department for coordinating discharge planning if the patient needs post-hospital services based on physician/advanced practitioner order or complex needs related to functional status, cognitive ability, or social support system  Outcome: Progressing     Problem: Knowledge Deficit  Goal: Patient/family/caregiver demonstrates understanding of disease process, treatment plan, medications, and discharge instructions  Description  Complete learning assessment and assess knowledge base    Interventions:  - Provide teaching at level of understanding  - Provide teaching via preferred learning methods  Outcome: Progressing

## 2020-02-19 NOTE — ASSESSMENT & PLAN NOTE
· Likely secondary to tracheobronchitis  · Continue Zithromax and Solu-Medrol  · Oxygen supplementation and bronchodilators p r n  · Procalcitonin less than 0 05  · Patient reports he is having difficulty expectorating sputum will order guaifenesin and keep sputum culture order and for now    · Pulmonary consult completed; input appreciated  · Encourage incentive spirometer use

## 2020-02-19 NOTE — RESPIRATORY THERAPY NOTE
RT Protocol Note  Kaylie Moura 71 y o  male MRN: [id number]  Unit/Bed#: -01 Encounter: [id number]    Assessment    Principal Problem:    COPD with acute exacerbation ([facility] )  Active Problems:    Acute tracheobronchitis    Chest pain    Acute respiratory failure with hypoxia (HCC)    Underweight      Home Pulmonary Medications:  symbicort and albuterol inhalers       Past Medical History:   Diagnosis Date    Emphysema lung ([facility] )      Social History     Socioeconomic History    Marital status: Single     Spouse name: None    Number of children: None    Years of education: None    Highest education level: None   Occupational History    None   Social Needs    Financial resource strain: None    Food insecurity:     Worry: None     Inability: None    Transportation needs:     Medical: None     Non-medical: None   Tobacco Use    Smoking status: Former Smoker    Smokeless tobacco: Never Used   Substance and Sexual Activity    Alcohol use: Not Currently    Drug use: Never    Sexual activity: None   Lifestyle    Physical activity:     Days per week: None     Minutes per session: None    Stress: None   Relationships    Social connections:     Talks on phone: None     Gets together: None     Attends Yazidi service: None     Active member of club or organization: None     Attends meetings of clubs or organizations: None     Relationship status: None    Intimate partner violence:     Fear of current or ex partner: None     Emotionally abused: None     Physically abused: None     Forced sexual activity: None   Other Topics Concern    None   Social History Narrative    None       Subjective     Pt states breathing is better, but still not neat baseline      Objective  Mild increase work of breathing, with coughing spells  Physical Exam:   Assessment Type: (P) Pre-treatment  General Appearance: (P) Alert, Awake  Respiratory Pattern: (P) Dyspnea at rest  Chest Assessment: (P) Chest expansion symmetrical  Bilateral Breath Sounds: (P) Expiratory wheezes  Cough: (P) Strong, Congested, Non-productive    Vitals:  Blood pressure 121/75, pulse 82, temperature 97 8 °F (36 6 °C), resp  rate 17, height 5' 8" (1 727 m), weight 49 9 kg (110 lb), SpO2 95 %  Imaging and other studies: I have personally reviewed pertinent reports  Plan    Respiratory Plan: (P) Mild Distress pathway        Will change inhalers to prn and keep nebs tid

## 2020-02-19 NOTE — ASSESSMENT & PLAN NOTE
· Troponins negative x3; Denies any chest pain at this time  He did initially report chest pain on admission but endorse that coincided when he would cough    · Continue aspirin and Lipitor  · Panel with cholesterol 177 triglycerides of 34 HDL of 83 and LDL of 81

## 2020-02-19 NOTE — OCCUPATIONAL THERAPY NOTE
Occupational Therapy Screen Note        Patient Name: Jerardo Chan  Today's Date: 2/19/2020      OT orders received and chart reviewed  Per RN Emelyn & pt, pt walking around the room independent and with no concerns regarding ADLs/mobility  Pt endorses some SOB  Pt currently on O2  No OT needs identified at this time; please re-consult if OT needs arise during remainder of hospital stay          Dameon Barakat, OTR/L

## 2020-02-19 NOTE — ASSESSMENT & PLAN NOTE
· In setting of COPD exacerbation evidence by respiratory distress, tachypnea room air with oxygen saturation of 89%  · Continue albuterol nebulizers  · Continue Solu-Medrol IV 40 mg b i d   · IV azithromycin  · See plan under primary problem for further plan and workup

## 2020-02-19 NOTE — ASSESSMENT & PLAN NOTE
· Continue Zithromax  · Procalcitonin less than 0 05  · Discontinue IV fluids  · Continue steroids as noted above  · Monitor labs in a m    · Pulmonary following; also recommending continued IV steroids

## 2020-02-19 NOTE — PROGRESS NOTES
Progress Note - Giacomo Ayala 1955, 71 y o  male MRN: [id number]    Unit/Bed#: ED 07 Encounter: [id number]    Primary Care Provider: No primary care provider on file  Date and time admitted to hospital: 2/18/2020  8:46 AM    * COPD with acute exacerbation (HCC)  Assessment & Plan  · Likely secondary to tracheobronchitis  · Continue Zithromax and Solu-Medrol  · Oxygen supplementation and bronchodilators p r n  · Procalcitonin less than 0 05  · Patient reports he is having difficulty expectorating sputum will order guaifenesin and keep sputum culture order and for now  · Pulmonary consult completed; input appreciated  · Encourage incentive spirometer use    Acute respiratory failure with hypoxia (HCC)  Assessment & Plan  · In setting of COPD exacerbation evidence by respiratory distress, tachypnea room air with oxygen saturation of 89%  · Continue albuterol nebulizers  · Continue Solu-Medrol IV 40 mg b i d   · IV azithromycin  · See plan under primary problem for further plan and workup    Acute tracheobronchitis  Assessment & Plan  · Continue Zithromax  · Procalcitonin less than 0 05  · Discontinue IV fluids  · Continue steroids as noted above  · Monitor labs in a m  · Pulmonary following; also recommending continued IV steroids    Chest pain  Assessment & Plan  · Troponins negative x3; Denies any chest pain at this time  He did initially report chest pain on admission but endorse that coincided when he would cough  · Continue aspirin and Lipitor  · Panel with cholesterol 177 triglycerides of 34 HDL of 83 and LDL of 81    Underweight  Assessment & Plan  · In the setting of COPD evidenced by BMI 16 73 requiring nutritional consult    VTE Pharmacologic Prophylaxis:   Pharmacologic: Enoxaparin (Lovenox)  Mechanical VTE Prophylaxis in Place: Yes    Patient Centered Rounds: I have performed bedside rounds with nursing staff today      Discussions with Specialists or Other Care Team Provider: pulmonary and nursing staff     Education and Discussions with Family / Patient: education provided at the bedside regarding plan of care as noted above  Time Spent for Care: 30 minutes  More than 50% of total time spent on counseling and coordination of care as described above  Current Length of Stay: 1 day(s)    Current Patient Status: Inpatient   Certification Statement: The patient will continue to require additional inpatient hospital stay due to IV steroids    Discharge Plan: pending response to therapy; likely 24-48 more hours    Code Status: Level 1 - Full Code      Subjective:   Patient reports overall he feels like he is improving    Objective:     Vitals:   Temp (24hrs), Av 6 °F (37 °C), Min:98 5 °F (36 9 °C), Max:98 7 °F (37 1 °C)    Temp:  [98 5 °F (36 9 °C)-98 7 °F (37 1 °C)] 98 7 °F (37 1 °C)  HR:  [65-96] 73  Resp:  [17-37] 18  BP: (102-152)/(59-95) 111/59  SpO2:  [89 %-98 %] 98 %  Body mass index is 16 73 kg/m²  Input and Output Summary (last 24 hours): Intake/Output Summary (Last 24 hours) at 2020 1144  Last data filed at 2020 1052  Gross per 24 hour   Intake 450 ml   Output 200 ml   Net 250 ml       Physical Exam:     Physical Exam   Constitutional: He is oriented to person, place, and time  He appears cachectic  He is cooperative  He appears distressed  Nasal cannula in place  Cardiovascular: Normal rate, regular rhythm, normal heart sounds and intact distal pulses  No murmur heard  Pulses:       Radial pulses are 2+ on the right side, and 2+ on the left side  Dorsalis pedis pulses are 2+ on the right side, and 2+ on the left side  Posterior tibial pulses are 2+ on the right side, and 2+ on the left side  No peripheral edema noted  Pulmonary/Chest: Effort normal  No respiratory distress  He has wheezes  Abdominal: Soft  Bowel sounds are normal  He exhibits no distension  There is no tenderness  Musculoskeletal: He exhibits no edema  Neurological: He is alert and oriented to person, place, and time  Skin: Skin is warm and dry  Capillary refill takes less than 2 seconds  He is not diaphoretic  Psychiatric: He has a normal mood and affect  His speech is normal and behavior is normal  Judgment normal    Vitals reviewed  Additional Data:     Labs:    Results from last 7 days   Lab Units 02/19/20  0517   WBC Thousand/uL 4 12*   HEMOGLOBIN g/dL 13 2   HEMATOCRIT % 40 3   PLATELETS Thousands/uL 287   NEUTROS PCT % 84*   LYMPHS PCT % 9*   MONOS PCT % 6   EOS PCT % 0     Results from last 7 days   Lab Units 02/19/20  0517   SODIUM mmol/L 138   POTASSIUM mmol/L 4 6   CHLORIDE mmol/L 104   CO2 mmol/L 33*   BUN mg/dL 17   CREATININE mg/dL 0 63   ANION GAP mmol/L 1*   CALCIUM mg/dL 8 5   ALBUMIN g/dL 3 1*   TOTAL BILIRUBIN mg/dL 0 40   ALK PHOS U/L 60   ALT U/L 23   AST U/L 15   GLUCOSE RANDOM mg/dL 161*                 Results from last 7 days   Lab Units 02/19/20  0517   PROCALCITONIN ng/ml <0 05           * I Have Reviewed All Lab Data Listed Above  * Additional Pertinent Lab Tests Reviewed:  [facility] Admission Reviewed    Imaging:    Imaging Reports Reviewed Today Include: CXR  Imaging Personally Reviewed by Myself Includes:  none    Recent Cultures (last 7 days):           Last 24 Hours Medication List:     Current Facility-Administered Medications:  acetaminophen 650 mg Oral Q6H PRN Alberto Schuler MD   albuterol 2 puff Inhalation 4x Daily Alberto Schuler MD   aspirin 325 mg Oral Daily Alberto Schuler MD   atorvastatin 40 mg Oral Daily With Tamiko Lara MD   azithromycin 500 mg Intravenous Q24H Alberto Schuler MD   enoxaparin 40 mg Subcutaneous Daily Alberto Schuler MD   fluticasone-vilanterol 1 puff Inhalation Daily Alberto Schuler MD   guaiFENesin 600 mg Oral Q12H [address] ISA Guerra   ipratropium-albuterol 3 mL Nebulization TID Alberto Schuler MD   methylPREDNISolone sodium succinate 40 mg Intravenous Q12H [address] ISA Guerra   ondansetron 4 mg Intravenous Q6H PRN Purvi Oliver MD        Today, Patient Was Seen By: Gini Bernheim, CRNP    ** Please Note: Dictation voice to text software may have been used in the creation of this document   **

## 2020-02-20 LAB
ANION GAP SERPL CALCULATED.3IONS-SCNC: -1 MMOL/L (ref 4–13)
BASOPHILS # BLD AUTO: 0 THOUSANDS/ΜL (ref 0–0.1)
BASOPHILS NFR BLD AUTO: 0 % (ref 0–1)
BUN SERPL-MCNC: 21 MG/DL (ref 5–25)
CALCIUM SERPL-MCNC: 8.3 MG/DL (ref 8.3–10.1)
CHLORIDE SERPL-SCNC: 107 MMOL/L (ref 100–108)
CO2 SERPL-SCNC: 34 MMOL/L (ref 21–32)
CREAT SERPL-MCNC: 0.64 MG/DL (ref 0.6–1.3)
EOSINOPHIL # BLD AUTO: 0 THOUSAND/ΜL (ref 0–0.61)
EOSINOPHIL NFR BLD AUTO: 0 % (ref 0–6)
ERYTHROCYTE [DISTWIDTH] IN BLOOD BY AUTOMATED COUNT: 12.7 % (ref 11.6–15.1)
FLUAV RNA NPH QL NAA+PROBE: ABNORMAL
FLUBV RNA NPH QL NAA+PROBE: ABNORMAL
GFR SERPL CREATININE-BSD FRML MDRD: 103 ML/MIN/1.73SQ M
GLUCOSE SERPL-MCNC: 163 MG/DL (ref 65–140)
HCT VFR BLD AUTO: 39.7 % (ref 36.5–49.3)
HGB BLD-MCNC: 12.9 G/DL (ref 12–17)
IMM GRANULOCYTES # BLD AUTO: 0.02 THOUSAND/UL (ref 0–0.2)
IMM GRANULOCYTES NFR BLD AUTO: 0 % (ref 0–2)
LYMPHOCYTES # BLD AUTO: 0.49 THOUSANDS/ΜL (ref 0.6–4.47)
LYMPHOCYTES NFR BLD AUTO: 8 % (ref 14–44)
MCH RBC QN AUTO: 32.7 PG (ref 26.8–34.3)
MCHC RBC AUTO-ENTMCNC: 32.5 G/DL (ref 31.4–37.4)
MCV RBC AUTO: 101 FL (ref 82–98)
MONOCYTES # BLD AUTO: 0.26 THOUSAND/ΜL (ref 0.17–1.22)
MONOCYTES NFR BLD AUTO: 4 % (ref 4–12)
NEUTROPHILS # BLD AUTO: 5.74 THOUSANDS/ΜL (ref 1.85–7.62)
NEUTS SEG NFR BLD AUTO: 88 % (ref 43–75)
NRBC BLD AUTO-RTO: 0 /100 WBCS
PLATELET # BLD AUTO: 296 THOUSANDS/UL (ref 149–390)
PMV BLD AUTO: 8.7 FL (ref 8.9–12.7)
POTASSIUM SERPL-SCNC: 5.1 MMOL/L (ref 3.5–5.3)
PROCALCITONIN SERPL-MCNC: <0.05 NG/ML
RBC # BLD AUTO: 3.94 MILLION/UL (ref 3.88–5.62)
RSV RNA NPH QL NAA+PROBE: DETECTED
SODIUM SERPL-SCNC: 140 MMOL/L (ref 136–145)
WBC # BLD AUTO: 6.51 THOUSAND/UL (ref 4.31–10.16)

## 2020-02-20 PROCEDURE — 94760 N-INVAS EAR/PLS OXIMETRY 1: CPT

## 2020-02-20 PROCEDURE — 99231 SBSQ HOSP IP/OBS SF/LOW 25: CPT | Performed by: INTERNAL MEDICINE

## 2020-02-20 PROCEDURE — 80048 BASIC METABOLIC PNL TOTAL CA: CPT | Performed by: NURSE PRACTITIONER

## 2020-02-20 PROCEDURE — 99232 SBSQ HOSP IP/OBS MODERATE 35: CPT | Performed by: INTERNAL MEDICINE

## 2020-02-20 PROCEDURE — 94640 AIRWAY INHALATION TREATMENT: CPT

## 2020-02-20 PROCEDURE — 84145 PROCALCITONIN (PCT): CPT | Performed by: INTERNAL MEDICINE

## 2020-02-20 PROCEDURE — 87631 RESP VIRUS 3-5 TARGETS: CPT | Performed by: INTERNAL MEDICINE

## 2020-02-20 PROCEDURE — 85025 COMPLETE CBC W/AUTO DIFF WBC: CPT | Performed by: NURSE PRACTITIONER

## 2020-02-20 RX ORDER — METHYLPREDNISOLONE SODIUM SUCCINATE 40 MG/ML
40 INJECTION, POWDER, LYOPHILIZED, FOR SOLUTION INTRAMUSCULAR; INTRAVENOUS EVERY 8 HOURS SCHEDULED
Status: DISCONTINUED | OUTPATIENT
Start: 2020-02-20 | End: 2020-02-22

## 2020-02-20 RX ADMIN — METHYLPREDNISOLONE SODIUM SUCCINATE 40 MG: 40 INJECTION, POWDER, FOR SOLUTION INTRAMUSCULAR; INTRAVENOUS at 10:02

## 2020-02-20 RX ADMIN — IPRATROPIUM BROMIDE AND ALBUTEROL SULFATE 3 ML: 2.5; .5 SOLUTION RESPIRATORY (INHALATION) at 20:12

## 2020-02-20 RX ADMIN — FLUTICASONE FUROATE AND VILANTEROL TRIFENATATE 1 PUFF: 200; 25 POWDER RESPIRATORY (INHALATION) at 08:03

## 2020-02-20 RX ADMIN — GUAIFENESIN 600 MG: 600 TABLET ORAL at 22:00

## 2020-02-20 RX ADMIN — IPRATROPIUM BROMIDE AND ALBUTEROL SULFATE 3 ML: 2.5; .5 SOLUTION RESPIRATORY (INHALATION) at 13:48

## 2020-02-20 RX ADMIN — METHYLPREDNISOLONE SODIUM SUCCINATE 40 MG: 40 INJECTION, POWDER, FOR SOLUTION INTRAMUSCULAR; INTRAVENOUS at 22:28

## 2020-02-20 RX ADMIN — GUAIFENESIN 600 MG: 600 TABLET ORAL at 10:02

## 2020-02-20 RX ADMIN — IPRATROPIUM BROMIDE AND ALBUTEROL SULFATE 3 ML: 2.5; .5 SOLUTION RESPIRATORY (INHALATION) at 08:03

## 2020-02-20 RX ADMIN — ASPIRIN 325 MG ORAL TABLET 325 MG: 325 PILL ORAL at 10:02

## 2020-02-20 RX ADMIN — AZITHROMYCIN MONOHYDRATE 500 MG: 500 INJECTION, POWDER, LYOPHILIZED, FOR SOLUTION INTRAVENOUS at 14:09

## 2020-02-20 RX ADMIN — ATORVASTATIN CALCIUM 40 MG: 40 TABLET, FILM COATED ORAL at 17:01

## 2020-02-20 NOTE — PROGRESS NOTES
Pastoral Care Progress Note    2020  Patient: Blanche Elliott : 1955  Admission Date & Time: 2020 0846  MRN: [id number] [facility]: [id number]                     Chaplaincy Interventions Utilized:   Empowerment: Clarified, confirmed, or reviewed information from treatment team , Provided chaplaincy education and Provided grief counseling    Exploration: Explored hope, Explored emotional needs & resources, Explored relational needs & resources, Explored spiritual needs & resources and Facilitated life review        Relationship Building: Cultivated a relationship of care and support, Listened empathically, Hospitality and Provided silent and supportive presence        Chaplaincy Outcomes Achieved:  Expressed gratitude, Identified meaningful connections and Identified priorities          Spiritual Coping Strategies Utilized:   Spiritual meaning       [address] [facility] Affiliation None at present   Spiritual Beliefs/Perceptions   Concept of God Other (Comment)  (Believes in Marcel as guidinf principle )   Stress Factors   Patient Stress Factors Health changes   Coping Responses   Patient Coping Accepting;Open/discussion   Patient Spiritual Assessment   Feelings of Well Being   ("I've been cody " )   Plan of Care   Assessment Completed by: Unit visit

## 2020-02-20 NOTE — NUTRITION
02/20/20 1105   Assessment   Timepoint Initial  (malnutrition consult)   Labs   List Completed Labs   (2/20/20 Glucose 163 meds: solumedrol, lipitor)   Feeding Route   PO Independent   Adequacy of Intake   Nutrition Modality PO   Intake Meals %;50-75%   Estimated Calorie Intake 50-75%   Estimated Protein Intake  50-75%   Estimated Fluid Intake %   Estimated calorie intake compared to estimated need po intake fair-good currently, likely suboptimal to meet estimated needs  Nutrition Prognosis   Nutrition Concerns   (skin care plan reviewed  )   Comorbid Concerns   (per chart review: acute respiratory insuffiency, acute broncitis, COPD  )   Nutrition Considerations   (diet ed: encouraged adequate po intake and po supplentation use at home  )   PES Statement   Problem Intake   Nutrient (5) Malnutrition NI-5 2   Related to Inadequate Intake   As evidenced by: BMI; Temporal Wasting; Loss of muscle mass   Recommendations/Interventions   Summary No wt encounters, no previous records  Pt reports highest wt in # and UBW of 102#  Says PCP has been monitoring wts and it has been going down, can't qauntify  Reports taking 1-3 Chocolate Protein shakes at home and increasing meats at home  He reports he gets his own food and denies food access as an issue  Reports not wanting to take any po supplements currently in hospital as he wants a break for now and will resume at home  Malnutrition/BMI Present Yes   Malnutrition type Chronic illness   Degree of Malnutrition Other severe protein calorie malnutrition   Malnutrition Characteristics Fat loss;Muscle loss  (Pt presents with severe malnutrition as evidenced by oribtals-hollow look with dark circles, temple hollowing, triceps with little space between the folds and BMI  15 5   Treat with diet and supplements TID, pt currently refusing supplements  )   BMI Classifications Underweight < 18 5   Interventions Diet: continued as ordered   Nutrition Recommendations Continue diet as ordered  (Consider Ensure Adamaris TID, pt currently refusing   Can consider Remeron as appetite stimulant  )   Nutrition Complexity Risk   Nutrition complexity level High risk   Follow up date 02/24/20  (po intake)

## 2020-02-20 NOTE — PROGRESS NOTES
Progress Note - Marianna Salinas 1955, 71 y o  male MRN: [id number]    Unit/Bed#: -01 Encounter: [id number]    Primary Care Provider: No primary care provider on file  Date and time admitted to hospital: 2/18/2020  8:46 AM        Underweight  Assessment & Plan  · In the setting of COPD evidenced by BMI 16 73 requiring nutritional consult    Acute respiratory failure with hypoxia (HCC)  Assessment & Plan  · In setting of COPD exacerbation evidence by respiratory distress, tachypnea room air with oxygen saturation of 89%  · Wean oxygen as tolerated  · Pulmonary follow-up  · Pulmonary increased patient Solu-Medrol to t i d   · Continue albuterol nebulizers  · IV azithromycin  · See plan under primary problem for further plan and workup    Chest pain  Assessment & Plan  · Troponins negative x3; Denies any chest pain at this time  He did initially report chest pain on admission but endorse that coincided when he would cough  · Continue aspirin and Lipitor  · Panel with cholesterol 177 triglycerides of 34 HDL of 83 and LDL of 81    Acute tracheobronchitis  Assessment & Plan  · Continue Zithromax  · Trend procalcitonin  · Continue steroids  · Monitor labs in a m  · Pulmonary follow-up noted    * COPD with acute exacerbation (HCC)  Assessment & Plan  · Likely secondary to tracheobronchitis  · Continue Zithromax and Solu-Medrol  · Oxygen supplementation and bronchodilators p r n  · Procalcitonin less than 0 05  · Patient reports he is having difficulty expectorating sputum  Will order guaifenesin and keep sputum culture order  · Pulmonary consult completed; input appreciated  · Encourage incentive spirometer use        Labs & Imaging: I have personally reviewed pertinent reports        VTE Pharmacologic Prophylaxis: Enoxaparin (Lovenox)  VTE Mechanical Prophylaxis: sequential compression device    Code Status:   Level 1 - Full Code    Patient Centered Rounds: I have performed bedside rounds with nursing staff today  Discussions with Specialists or Other Care Team Provider:  Pulmonary    Education and Discussions with Family / Patient:     Current Length of Stay: 2 day(s)    Current Patient Status: Inpatient   Certification Statement: The patient will continue to require additional inpatient hospital stay due to see my assessment and plan  Subjective:   Patient is seen and examined at bedside  Still complains of shortness of breath and wheezing  Afebrile  Denies nausea, vomiting, chest pain, abdominal pain or any other issues  All other ROS are negative  Objective:    Vitals: Blood pressure 111/65, pulse 61, temperature 98 2 °F (36 8 °C), resp  rate 18, height 5' 8" (1 727 m), weight 46 3 kg (102 lb), SpO2 92 %  ,Body mass index is 15 51 kg/m²  SPO2 RA Rest      ED to Hosp-Admission (Current) from 2/18/2020 in [address] Med Surg Unit   SpO2  92 %   SpO2 Activity  At Rest   O2 Device  Nasal cannula   O2 Flow Rate  2 L/min        I&O:     Intake/Output Summary (Last 24 hours) at 2/20/2020 1216  Last data filed at 2/19/2020 1227  Gross per 24 hour   Intake 250 ml   Output --   Net 250 ml       Physical Exam:    General- Alert, lying comfortably in bed  Not in any acute distress  HEENT- ROSA, EOM intact  Neck- Supple, No JVD  CVS- regular, S1 and S2 normal  Chest- Bilateral Air entry, bilateral expiratory wheezing  Abdomen- soft, nontender, not distended, no guarding or rigidity, BS+  Extremities-  No pedal edema, No calf tenderness                         Normal ROM in all extremities  CNS-   Alert, awake and orientedx3  No focal deficits present  Invasive Devices     Peripheral Intravenous Line            Peripheral IV 02/18/20 Right Antecubital 2 days                      Social History  reviewed  History reviewed  No pertinent family history   reviewed    Meds:  Current Facility-Administered Medications   Medication Dose Route Frequency Provider Last Rate Last Dose  acetaminophen (TYLENOL) tablet 650 mg  650 mg Oral Q6H PRN Abdiel Mccall MD        albuterol (PROVENTIL HFA,VENTOLIN HFA) inhaler 2 puff  2 puff Inhalation Q4H PRN Abdiel Mccall MD   2 puff at 02/19/20 2203    aspirin tablet 325 mg  325 mg Oral Daily Abdiel Mccall MD   325 mg at 02/20/20 1002    atorvastatin (LIPITOR) tablet 40 mg  40 mg Oral Daily With Stephanie Harrington MD   40 mg at 02/19/20 1749    azithromycin (ZITHROMAX) 500 mg in sodium chloride 0 9% 250mL IVPB 500 mg  500 mg Intravenous Q24H Abdiel Mccall MD   Stopped at 02/19/20 1227    enoxaparin (LOVENOX) subcutaneous injection 40 mg  40 mg Subcutaneous Daily Abdiel Mccall MD        fluticasone-vilanterol (BREO ELLIPTA) 200-25 MCG/INH inhaler 1 puff  1 puff Inhalation Daily Abdiel Mccall MD   1 puff at 02/20/20 0803    guaiFENesin (MUCINEX) 12 hr tablet 600 mg  600 mg Oral Q12H [address] [facility]   600 mg at 02/20/20 1002    ipratropium-albuterol (DUO-NEB) 0 5-2 5 mg/3 mL inhalation solution 3 mL  3 mL Nebulization TID Abdiel Mccall MD   3 mL at 02/20/20 0803    methylPREDNISolone sodium succinate (Solu-MEDROL) injection 40 mg  40 mg Intravenous Q8H [facility],         ondansetron (ZOFRAN) injection 4 mg  4 mg Intravenous Q6H PRN Abdiel Mccall MD          No medications prior to admission         Labs:  Results from last 7 days   Lab Units 02/20/20  0517 02/19/20  0517 02/18/20  0855   WBC Thousand/uL 6 51 4 12* 6 49   HEMOGLOBIN g/dL 12 9 13 2 15 3   HEMATOCRIT % 39 7 40 3 46 5   PLATELETS Thousands/uL 296 287 327   NEUTROS PCT % 88* 84* 76*   LYMPHS PCT % 8* 9* 10*   MONOS PCT % 4 6 11   EOS PCT % 0 0 1     Results from last 7 days   Lab Units 02/20/20  0517 02/19/20  0517 02/18/20  0855   POTASSIUM mmol/L 5 1 4 6 4 3   CHLORIDE mmol/L 107 104 102   CO2 mmol/L 34* 33* 33*   BUN mg/dL 21 17 10   CREATININE mg/dL 0 64 0 63 0 76   CALCIUM mg/dL 8 3 8 5 9 0   ALK PHOS U/L  --  60 66   ALT U/L  --  23 29   AST U/L  --  15 20     Lab Results   Component Value Date    TROPONINI <0 02 02/18/2020    TROPONINI <0 02 02/18/2020    TROPONINI <0 02 02/18/2020         Lab Results   Component Value Date    SPUTUMCULTUR Culture too young- will reincubate 02/19/2020         Imaging:  No results found for this or any previous visit  Results for orders placed during the hospital encounter of 02/18/20   XR chest 2 views    Narrative CHEST     INDICATION:   Chest Pain  COMPARISON:  None    EXAM PERFORMED/VIEWS:  XR CHEST PA & LATERAL      FINDINGS:    Cardiomediastinal silhouette appears unremarkable  Emphysematous changes are noted consistent with chronic obstructive pulmonary disease  No airspace opacity to suggest focal pneumonia  No pneumothorax or pleural effusion  Osseous structures appear within normal limits for patient age  Impression Emphysematous changes are noted  No focal consolidation, pleural effusion, or pneumothorax          Workstation performed: [id number]         Last 24 Hours Medication List:     Current Facility-Administered Medications:  acetaminophen 650 mg Oral Q6H PRN Gordo Barber MD    albuterol 2 puff Inhalation Q4H PRN Gordo Barber MD    aspirin 325 mg Oral Daily Gordo Barber MD    atorvastatin 40 mg Oral Daily With Selma Hanson MD    azithromycin 500 mg Intravenous Q24H Gordo Barber MD Last Rate: Stopped (02/19/20 1227)   enoxaparin 40 mg Subcutaneous Daily Gordo Barber MD    fluticasone-vilanterol 1 puff Inhalation Daily Gordo Barber MD    guaiFENesin 600 mg Oral Q12H [address] ISA Guerra    ipratropium-albuterol 3 mL Nebulization TID Gordo Barber MD    methylPREDNISolone sodium succinate 40 mg Intravenous Q8H [address] Rafiq Corral DO    ondansetron 4 mg Intravenous Q6H PRN Gordo Barber MD         Today, Patient Was Seen By: Gordo Barber MD    ** Please Note: Dictation voice to text software may have been used in the creation of this document   **

## 2020-02-20 NOTE — ASSESSMENT & PLAN NOTE
· Likely secondary to tracheobronchitis  · Continue Zithromax and Solu-Medrol  · Oxygen supplementation and bronchodilators p r n  · Procalcitonin less than 0 05  · Patient reports he is having difficulty expectorating sputum  Will order guaifenesin and keep sputum culture order    · Pulmonary consult completed; input appreciated  · Encourage incentive spirometer use

## 2020-02-20 NOTE — MALNUTRITION/BMI
This medical record reflects one or more clinical indicators suggestive of malnutrition and/or morbid obesity  Malnutrition Findings:   Malnutrition type: Chronic illness  Degree of Malnutrition: Other severe protein calorie malnutrition  Malnutrition Characteristics: Fat loss, Muscle loss(Pt presents with severe malnutrition as evidenced by oribtals-hollow look with dark circles, temple hollowing, triceps with little space between the folds and BMI  15 5  Treat with diet and supplements TID, pt currently refusing supplements  )    BMI Findings:  BMI Classifications: Underweight < 18 5     Body mass index is 15 51 kg/m²  See Nutrition note dated 2/20/2020 for additional details  Completed nutrition assessment is viewable in the nutrition documentation

## 2020-02-20 NOTE — ASSESSMENT & PLAN NOTE
· Continue Zithromax  · Trend procalcitonin  · Continue steroids  · Monitor labs in a m    · Pulmonary follow-up noted

## 2020-02-20 NOTE — PROGRESS NOTES
Very receptive to  and engaged in extensive processing of important life events  Reports having adequate support from family and friends  Presented in good spirits  Very active mind, "I like to read a lot " No distress or concerns related to admission       02/20/20 Maribeth Moreira Affiliation None at present   Spiritual Beliefs/Perceptions   Concept of God Other (Comment)  (Believes in Marcel as guidinf principle )   Stress Factors   Patient Stress Factors Health changes   Coping Responses   Patient Coping Accepting;Open/discussion   Patient Spiritual Assessment   Feelings of Well Being   ("I've been cody " )   Plan of Care   Assessment Completed by: Unit visit

## 2020-02-20 NOTE — ASSESSMENT & PLAN NOTE
· In setting of COPD exacerbation evidence by respiratory distress, tachypnea room air with oxygen saturation of 89%  · Wean oxygen as tolerated  · Pulmonary follow-up  · Pulmonary increased patient Solu-Medrol to t i d   · Continue albuterol nebulizers  · IV azithromycin  · See plan under primary problem for further plan and workup

## 2020-02-20 NOTE — PROGRESS NOTES
Progress Note - Pulmonary   Eleonore Neither 71 y o  male MRN: [id number]  Unit/Bed#: -01 Encounter: [id number]      Assessment:   1  Acute respiratory insufficiency  2  Acute exacerbation of COPD  3  Acute tracheobronchitis     Plan:  1  He is still not feeling well, will increase Solumedrol 40mg IV to q8  2  Continue duonebs TID  3  Continue Breo Daily  4  Continue Mucinex 600 mg q12  5  Continue Zithromax 500mg Daily  May need to consider broadening antibiotic coverage  Subjective:   Patient seen and examined  He states that he is still coughing and wheezing  He denies night sweats or fatigue  Objective:   Vitals: Blood pressure 111/65, pulse 61, temperature 98 2 °F (36 8 °C), resp  rate 18, height 5' 8" (1 727 m), weight 46 3 kg (102 lb), SpO2 92 %  , 2L NC , Body mass index is 15 51 kg/m²  Intake/Output Summary (Last 24 hours) at 2/20/2020 1128  Last data filed at 2/19/2020 1227  Gross per 24 hour   Intake 250 ml   Output --   Net 250 ml         Physical Exam  Gen: Awake, alert, oriented x 3, no acute distress  HEENT: Mucous membranes moist, no oral lesions, no thrush  NECK: No accessory muscle use, JVP not elevated  Cardiac: Regular, single S1, single S2, no murmurs, no rubs, no gallops  Lungs: Decreased breath sounds, mild expiratory wheezing  No rhonchi, rales   Abdomen: normoactive bowel sounds, soft nontender, nondistended, no rebound or rigidity, no guarding  Extremities: no cyanosis, no clubbing, no edema    Labs: I have personally reviewed pertinent lab results    Results from last 7 days   Lab Units 02/20/20 0517 02/19/20  0517 02/18/20  0855   WBC Thousand/uL 6 51 4 12* 6 49   HEMOGLOBIN g/dL 12 9 13 2 15 3   HEMATOCRIT % 39 7 40 3 46 5   PLATELETS Thousands/uL 296 287 327   NEUTROS PCT % 88* 84* 76*   MONOS PCT % 4 6 11      Results from last 7 days   Lab Units 02/20/20  0517 02/19/20  0517 02/18/20  0855   POTASSIUM mmol/L 5 1 4 6 4 3   CHLORIDE mmol/L 107 104 102 CO2 mmol/L 34* 33* 33*   BUN mg/dL 21 17 10   CREATININE mg/dL 0 64 0 63 0 76   CALCIUM mg/dL 8 3 8 5 9 0   ALK PHOS U/L  --  60 66   ALT U/L  --  23 29   AST U/L  --  15 20     Results from last 7 days   Lab Units 02/19/20  0517   MAGNESIUM mg/dL 2 1     Results from last 7 days   Lab Units 02/19/20  0517   PHOSPHORUS mg/dL 3 9              0   Lab Value Date/Time    TROPONINI <0 02 02/18/2020 1840    TROPONINI <0 02 02/18/2020 1550    TROPONINI <0 02 02/18/2020 0855         Meds/Allergies   Current Facility-Administered Medications   Medication Dose Route Frequency    acetaminophen (TYLENOL) tablet 650 mg  650 mg Oral Q6H PRN    albuterol (PROVENTIL HFA,VENTOLIN HFA) inhaler 2 puff  2 puff Inhalation Q4H PRN    aspirin tablet 325 mg  325 mg Oral Daily    atorvastatin (LIPITOR) tablet 40 mg  40 mg Oral Daily With Dinner    azithromycin (ZITHROMAX) 500 mg in sodium chloride 0 9% 250mL IVPB 500 mg  500 mg Intravenous Q24H    enoxaparin (LOVENOX) subcutaneous injection 40 mg  40 mg Subcutaneous Daily    fluticasone-vilanterol (BREO ELLIPTA) 200-25 MCG/INH inhaler 1 puff  1 puff Inhalation Daily    guaiFENesin (MUCINEX) 12 hr tablet 600 mg  600 mg Oral Q12H [address]    ipratropium-albuterol (DUO-NEB) 0 5-2 5 mg/3 mL inhalation solution 3 mL  3 mL Nebulization TID    methylPREDNISolone sodium succinate (Solu-MEDROL) injection 40 mg  40 mg Intravenous Q8H [address]    ondansetron (ZOFRAN) injection 4 mg  4 mg Intravenous Q6H PRN     No medications prior to admission  Microbiology:  Lab Results   Component Value Date    SPUTUMCULTUR Culture too young- will reincubate 02/19/2020       Imaging and other studies: I have personally reviewed pertinent reports      None    Triny Khan DO  [address] [place] [address] [facility]

## 2020-02-20 NOTE — CONSULTS
Pulmonary Consultation   Sunny Stein 71 y o  male MRN: [id number]  Unit/Bed#: -01 Encounter: [id number]      Reason for consultation: COPD exacerbation    Requesting physician: Dr Codey Colindres    Impressions:   1  Acute respiratory insufficiency  2  Acute exacerbation of COPD  3  Acute tracheobronchitis    Recommendations:  1  Continue Solumedrol 40mg IV q12  2  Continue duonebs TID  3  Continue Breo Daily  4  Continue Mucinex 600 mg q12  5  Continue Zithromax 500mg Daily    History of Present Illness   HPI:  Sunny Stein is a 71 y o  male who comes in for worsening shortness of breath that has bene occurring in the past 3 days  He notes a cough that is nonproductive  He also had low grade fever  Despite the use of Symbicort and albuterol twice daily, the symptoms progressed  He smoked for many years but quit  4 years ago  He had even used marijuana in the past but it too has been many years  He has not been hospitalized for breathing issues in nearly 3 years  However, he does not follow with a pulmonologist and has not had additional testing for his COPD  He denies fever, chills, night sweats, fatigue or generalized weakness  Prior to these 3 days, he had been doing well with his breathing  ROS:   Review of Systems   Constitutional: Negative for appetite change, diaphoresis and fatigue  HENT: Negative  Eyes: Negative  Respiratory: Positive for chest tightness, shortness of breath and wheezing  Cardiovascular: Negative  Gastrointestinal: Negative  Endocrine: Negative  Genitourinary: Negative  Musculoskeletal: Negative  Skin: Negative  Allergic/Immunologic: Negative  Neurological: Negative  Hematological: Negative  Psychiatric/Behavioral: Negative  Historical Information   Past Medical History:   Diagnosis Date    Emphysema lung ([facility] [address] )      Past Surgical History:   Procedure Laterality Date    APPENDECTOMY       History reviewed  No pertinent family history      Occupational History: Guitar repair/ processing    Social History     Socioeconomic History    Marital status: Single     Spouse name: None    Number of children: None    Years of education: None    Highest education level: None   Occupational History    None   Social Needs    Financial resource strain: None    Food insecurity:     Worry: None     Inability: None    Transportation needs:     Medical: None     Non-medical: None   Tobacco Use    Smoking status: Former Smoker    Smokeless tobacco: Never Used   Substance and Sexual Activity    Alcohol use: Not Currently    Drug use: Never    Sexual activity: None   Lifestyle    Physical activity:     Days per week: None     Minutes per session: None    Stress: None   Relationships    Social connections:     Talks on phone: None     Gets together: None     Attends [employer] service: None     Active member of club or organization: None     Attends meetings of clubs or organizations: None     Relationship status: None    Intimate partner violence:     Fear of current or ex partner: None     Emotionally abused: None     Physically abused: None     Forced sexual activity: None   Other Topics Concern    None   Social History Narrative    None         Meds/Allergies   Current Facility-Administered Medications   Medication Dose Route Frequency    acetaminophen (TYLENOL) tablet 650 mg  650 mg Oral Q6H PRN    albuterol (PROVENTIL HFA,VENTOLIN HFA) inhaler 2 puff  2 puff Inhalation Q4H PRN    aspirin tablet 325 mg  325 mg Oral Daily    atorvastatin (LIPITOR) tablet 40 mg  40 mg Oral Daily With Dinner    azithromycin (ZITHROMAX) 500 mg in sodium chloride 0 9% 250mL IVPB 500 mg  500 mg Intravenous Q24H    enoxaparin (LOVENOX) subcutaneous injection 40 mg  40 mg Subcutaneous Daily    fluticasone-vilanterol (BREO ELLIPTA) 200-25 MCG/INH inhaler 1 puff  1 puff Inhalation Daily    guaiFENesin (MUCINEX) 12 hr tablet 600 mg  600 mg Oral Q12H [facility]    ipratropium-albuterol (DUO-NEB) 0 5-2 5 mg/3 mL inhalation solution 3 mL  3 mL Nebulization TID    methylPREDNISolone sodium succinate (Solu-MEDROL) injection 40 mg  40 mg Intravenous Q12H [facility]    ondansetron (ZOFRAN) injection 4 mg  4 mg Intravenous Q6H PRN     No medications prior to admission  No Known Allergies    Home medications:  none          Vitals: Tmax Temp (24hrs), Av 4 °F (36 9 °C), Min:97 8 °F (36 6 °C), Max:98 7 °F (37 1 °C)    Blood pressure 121/75, pulse 82, temperature 97 8 °F (36 6 °C), resp  rate 17, height 5' 8" (1 727 m), weight 49 9 kg (110 lb), SpO2 95 %  ,2L NC , Body mass index is 16 73 kg/m²  Intake/Output Summary (Last 24 hours) at 2020 2233  Last data filed at 2020 1227  Gross per 24 hour   Intake 450 ml   Output 200 ml   Net 250 ml       Physical Exam  General: Tall, thin, disheveled, Awake alert and oriented x 3, conversant without conversational dyspnea, NAD, normal affect  HEENT:  PERRL, Sclera noninjected, nonicteric OU, Nares patent, no nasal flaring, no nasal drainage, Mucous membranes, moist, no oral lesions, normal dentition  NECK: Trachea midline, no accessory muscle use, no stridor, no cervical or supraclavicular adenopathy, JVP not elevated  CARDIAC: Reg, single s1/S2, no m/r/g  PULM: Decreased breath sounds, mild expiratory wheezing  CHEST: No gross deformities, equal chest expansion on inspiration bilaterally  ABD: Normoactive bowel sounds, soft nontender, nondistended, no rebound, no rigidity, no guarding  EXT: No cyanosis, no clubbing, no edema, normal capillary refill  SKIN:  No rashes, no lesions  NEURO: no focal neurologic deficits, AAOx3, moving all extremities appropriately    Labs: I have personally reviewed pertinent lab results    Results from last 7 days   Lab Units 20  0517 20  0855   WBC Thousand/uL 4 12* 6 49   HEMOGLOBIN g/dL 13 2 15 3   HEMATOCRIT % 40 3 46 5   PLATELETS Thousands/uL 287 327   NEUTROS PCT % 84* 76*   MONOS PCT % 6 11      Results from last 7 days   Lab Units 02/19/20  0517 02/18/20  0855   POTASSIUM mmol/L 4 6 4 3   CHLORIDE mmol/L 104 102   CO2 mmol/L 33* 33*   BUN mg/dL 17 10   CREATININE mg/dL 0 63 0 76   CALCIUM mg/dL 8 5 9 0   ALK PHOS U/L 60 66   ALT U/L 23 29   AST U/L 15 20     Results from last 7 days   Lab Units 02/19/20  0517   MAGNESIUM mg/dL 2 1     Results from last 7 days   Lab Units 02/19/20  0517   PHOSPHORUS mg/dL 3 9              0   Lab Value Date/Time    TROPONINI <0 02 02/18/2020 1840    TROPONINI <0 02 02/18/2020 1550    TROPONINI <0 02 02/18/2020 0855       Micro:  No results found for: Bess Dotter, SPUTUMCULTUR    Imaging and other studies: I have personally reviewed pertinent reports  CXR - IMPRESSION:  Emphysematous changes are noted  No focal consolidation, pleural effusion, or pneumothorax  Pulmonary function testing: None    EKG, Pathology, and Other Studies: I have personally reviewed pertinent reports      Normal sinus rhythm  Poor anterior R wave progression is noted  Abnormal ECG  No previous ECGs available    Code Status: Level 1 - Full Code      DO Lyn Tran [facility]

## 2020-02-21 LAB
ANION GAP SERPL CALCULATED.3IONS-SCNC: 3 MMOL/L (ref 4–13)
BACTERIA SPT RESP CULT: ABNORMAL
BACTERIA SPT RESP CULT: ABNORMAL
BASOPHILS # BLD AUTO: 0.01 THOUSANDS/ΜL (ref 0–0.1)
BASOPHILS NFR BLD AUTO: 0 % (ref 0–1)
BUN SERPL-MCNC: 15 MG/DL (ref 5–25)
CALCIUM SERPL-MCNC: 8.2 MG/DL (ref 8.3–10.1)
CHLORIDE SERPL-SCNC: 104 MMOL/L (ref 100–108)
CO2 SERPL-SCNC: 33 MMOL/L (ref 21–32)
CREAT SERPL-MCNC: 0.49 MG/DL (ref 0.6–1.3)
EOSINOPHIL # BLD AUTO: 0 THOUSAND/ΜL (ref 0–0.61)
EOSINOPHIL NFR BLD AUTO: 0 % (ref 0–6)
ERYTHROCYTE [DISTWIDTH] IN BLOOD BY AUTOMATED COUNT: 12.5 % (ref 11.6–15.1)
GFR SERPL CREATININE-BSD FRML MDRD: 115 ML/MIN/1.73SQ M
GLUCOSE SERPL-MCNC: 166 MG/DL (ref 65–140)
GRAM STN SPEC: ABNORMAL
HCT VFR BLD AUTO: 38.6 % (ref 36.5–49.3)
HGB BLD-MCNC: 12.9 G/DL (ref 12–17)
IMM GRANULOCYTES # BLD AUTO: 0.02 THOUSAND/UL (ref 0–0.2)
IMM GRANULOCYTES NFR BLD AUTO: 0 % (ref 0–2)
LYMPHOCYTES # BLD AUTO: 0.6 THOUSANDS/ΜL (ref 0.6–4.47)
LYMPHOCYTES NFR BLD AUTO: 10 % (ref 14–44)
MAGNESIUM SERPL-MCNC: 2.1 MG/DL (ref 1.6–2.6)
MCH RBC QN AUTO: 33.2 PG (ref 26.8–34.3)
MCHC RBC AUTO-ENTMCNC: 33.4 G/DL (ref 31.4–37.4)
MCV RBC AUTO: 100 FL (ref 82–98)
MONOCYTES # BLD AUTO: 0.29 THOUSAND/ΜL (ref 0.17–1.22)
MONOCYTES NFR BLD AUTO: 5 % (ref 4–12)
NEUTROPHILS # BLD AUTO: 5.22 THOUSANDS/ΜL (ref 1.85–7.62)
NEUTS SEG NFR BLD AUTO: 85 % (ref 43–75)
NRBC BLD AUTO-RTO: 0 /100 WBCS
PLATELET # BLD AUTO: 295 THOUSANDS/UL (ref 149–390)
PMV BLD AUTO: 8.9 FL (ref 8.9–12.7)
POTASSIUM SERPL-SCNC: 4.6 MMOL/L (ref 3.5–5.3)
PROCALCITONIN SERPL-MCNC: <0.05 NG/ML
RBC # BLD AUTO: 3.88 MILLION/UL (ref 3.88–5.62)
SODIUM SERPL-SCNC: 140 MMOL/L (ref 136–145)
WBC # BLD AUTO: 6.14 THOUSAND/UL (ref 4.31–10.16)

## 2020-02-21 PROCEDURE — 80048 BASIC METABOLIC PNL TOTAL CA: CPT | Performed by: INTERNAL MEDICINE

## 2020-02-21 PROCEDURE — 94640 AIRWAY INHALATION TREATMENT: CPT

## 2020-02-21 PROCEDURE — 99232 SBSQ HOSP IP/OBS MODERATE 35: CPT | Performed by: INTERNAL MEDICINE

## 2020-02-21 PROCEDURE — 99231 SBSQ HOSP IP/OBS SF/LOW 25: CPT | Performed by: INTERNAL MEDICINE

## 2020-02-21 PROCEDURE — 84145 PROCALCITONIN (PCT): CPT | Performed by: INTERNAL MEDICINE

## 2020-02-21 PROCEDURE — 94760 N-INVAS EAR/PLS OXIMETRY 1: CPT

## 2020-02-21 PROCEDURE — 85025 COMPLETE CBC W/AUTO DIFF WBC: CPT | Performed by: INTERNAL MEDICINE

## 2020-02-21 PROCEDURE — 83735 ASSAY OF MAGNESIUM: CPT | Performed by: INTERNAL MEDICINE

## 2020-02-21 RX ORDER — AMOXICILLIN AND CLAVULANATE POTASSIUM 875; 125 MG/1; MG/1
1 TABLET, FILM COATED ORAL EVERY 12 HOURS SCHEDULED
Status: DISCONTINUED | OUTPATIENT
Start: 2020-02-21 | End: 2020-02-24 | Stop reason: HOSPADM

## 2020-02-21 RX ADMIN — IPRATROPIUM BROMIDE AND ALBUTEROL SULFATE 3 ML: 2.5; .5 SOLUTION RESPIRATORY (INHALATION) at 21:32

## 2020-02-21 RX ADMIN — METHYLPREDNISOLONE SODIUM SUCCINATE 40 MG: 40 INJECTION, POWDER, FOR SOLUTION INTRAMUSCULAR; INTRAVENOUS at 23:09

## 2020-02-21 RX ADMIN — METHYLPREDNISOLONE SODIUM SUCCINATE 40 MG: 40 INJECTION, POWDER, FOR SOLUTION INTRAMUSCULAR; INTRAVENOUS at 06:44

## 2020-02-21 RX ADMIN — GUAIFENESIN 600 MG: 600 TABLET ORAL at 11:28

## 2020-02-21 RX ADMIN — ASPIRIN 325 MG ORAL TABLET 325 MG: 325 PILL ORAL at 11:28

## 2020-02-21 RX ADMIN — METHYLPREDNISOLONE SODIUM SUCCINATE 40 MG: 40 INJECTION, POWDER, FOR SOLUTION INTRAMUSCULAR; INTRAVENOUS at 15:13

## 2020-02-21 RX ADMIN — FLUTICASONE FUROATE AND VILANTEROL TRIFENATATE 1 PUFF: 200; 25 POWDER RESPIRATORY (INHALATION) at 07:47

## 2020-02-21 RX ADMIN — IPRATROPIUM BROMIDE AND ALBUTEROL SULFATE 3 ML: 2.5; .5 SOLUTION RESPIRATORY (INHALATION) at 14:06

## 2020-02-21 RX ADMIN — IPRATROPIUM BROMIDE AND ALBUTEROL SULFATE 3 ML: 2.5; .5 SOLUTION RESPIRATORY (INHALATION) at 07:48

## 2020-02-21 RX ADMIN — GUAIFENESIN 600 MG: 600 TABLET ORAL at 21:33

## 2020-02-21 RX ADMIN — AMOXICILLIN AND CLAVULANATE POTASSIUM 1 TABLET: 875; 125 TABLET, FILM COATED ORAL at 11:28

## 2020-02-21 RX ADMIN — ATORVASTATIN CALCIUM 40 MG: 40 TABLET, FILM COATED ORAL at 17:36

## 2020-02-21 RX ADMIN — AMOXICILLIN AND CLAVULANATE POTASSIUM 1 TABLET: 875; 125 TABLET, FILM COATED ORAL at 21:33

## 2020-02-21 NOTE — ASSESSMENT & PLAN NOTE
· Likely secondary to tracheobronchitis  · Continue Augmentin and Solu-Medrol  Taper steroids per Pulmonary  · Oxygen supplementation and bronchodilators p r n  · Procalcitonin less than 0 05  · Patient reports he is having difficulty expectorating sputum    Follow-up culture results  · Encourage incentive spirometer use

## 2020-02-21 NOTE — PROGRESS NOTES
Progress Note - Lonza Graver 1955, 71 y o  male MRN: [id number]    Unit/Bed#: -01 Encounter: [id number]    Primary Care Provider: No primary care provider on file  Date and time admitted to hospital: 2/18/2020  8:46 AM        Underweight  Assessment & Plan  · In the setting of COPD evidenced by BMI 16 73 requiring nutritional consult    Acute respiratory failure with hypoxia (HCC)  Assessment & Plan  · In setting of COPD exacerbation evidence by respiratory distress, tachypnea room air with oxygen saturation of 89%  · Patient is on 2 L of oxygen  · Wean oxygen as tolerated  · Pulmonary on board  · Continue on Solu-Medrol  · Continue albuterol nebulizers  · Pulmonologist switched patient to Augmentin from Zithromax  · See plan under primary problem for further plan and workup    Chest pain  Assessment & Plan  · Troponins negative x3; Denies any chest pain at this time  He did initially report chest pain on admission but endorse that coincided when he would cough  · Continue aspirin and Lipitor  · Panel with cholesterol 177 triglycerides of 34 HDL of 83 and LDL of 81    Acute tracheobronchitis  Assessment & Plan  · Switched to Augmentin  · Continue steroids  · Monitor labs in a m  · Pulmonary follow-up noted    * COPD with acute exacerbation (HCC)  Assessment & Plan  · Likely secondary to tracheobronchitis  · Continue Augmentin and Solu-Medrol  Taper steroids per Pulmonary  · Oxygen supplementation and bronchodilators p r n  · Procalcitonin less than 0 05  · Patient reports he is having difficulty expectorating sputum  Follow-up culture results  · Encourage incentive spirometer use        Labs & Imaging: I have personally reviewed pertinent reports        VTE Pharmacologic Prophylaxis: Enoxaparin (Lovenox)  VTE Mechanical Prophylaxis: sequential compression device    Code Status:   Level 1 - Full Code    Patient Centered Rounds: I have performed bedside rounds with nursing staff today     Discussions with Specialists or Other Care Team Provider:  Pulmonary    Education and Discussions with Family / Patient:  Patient states he is updating his family members    Current Length of Stay: 3 day(s)    Current Patient Status: Inpatient   Certification Statement: The patient will continue to require additional inpatient hospital stay due to see my assessment and plan  Subjective:   Patient is seen and examined at bedside  Still feels the same  Has cough and shortness of breath  Complains of sinus symptoms  Afebrile  All other ROS are negative  Objective:    Vitals: Blood pressure 108/65, pulse 90, temperature 97 8 °F (36 6 °C), resp  rate 18, height 5' 8" (1 727 m), weight 46 kg (101 lb 8 oz), SpO2 92 %  ,Body mass index is 15 43 kg/m²  SPO2 RA Rest      ED to Hosp-Admission (Current) from 2/18/2020 in [address] Med Surg [facility]   SpO2  92 %   SpO2 Activity  At Rest   O2 Device  Nasal cannula   O2 Flow Rate  2 L/min        I&O:     Intake/Output Summary (Last 24 hours) at 2/21/2020 1040  Last data filed at 2/21/2020 2507  Gross per 24 hour   Intake --   Output 900 ml   Net -900 ml       Physical Exam:    General- Alert, lying comfortably in bed  Not in any acute distress  HEENT- ROSA, EOM intact  Neck- Supple, No JVD  CVS- regular, S1 and S2 normal   Chest- Bilateral Air entry, decreased bilaterally with expiratory wheezing present  Abdomen- soft, nontender, not distended, no guarding or rigidity, BS+  Extremities-  No pedal edema, No calf tenderness                         Normal ROM in all extremities  CNS-   Alert, awake and orientedx3  No focal deficits present  Invasive Devices     Peripheral Intravenous Line            Peripheral IV 02/18/20 Right Antecubital 3 days                      Social History  reviewed  History reviewed  No pertinent family history   reviewed    Meds:  Current Facility-Administered Medications   Medication Dose Route Frequency Provider Last Rate Last Dose    acetaminophen (TYLENOL) tablet 650 mg  650 mg Oral Q6H PRN Sveta Mandujano MD        albuterol (PROVENTIL HFA,VENTOLIN HFA) inhaler 2 puff  2 puff Inhalation Q4H PRN Sveta Mandujano MD   2 puff at 02/19/20 2203    amoxicillin-clavulanate (AUGMENTIN) 875-125 mg per tablet 1 tablet  1 tablet Oral Q12H [facility]        aspirin tablet 325 mg  325 mg Oral Daily Sveta Mandujano MD   325 mg at 02/20/20 1002    atorvastatin (LIPITOR) tablet 40 mg  40 mg Oral Daily With Farideh Pepper MD   40 mg at 02/20/20 1701    enoxaparin (LOVENOX) subcutaneous injection 40 mg  40 mg Subcutaneous Daily Sveta Mandujano MD        fluticasone-vilanterol (BREO ELLIPTA) 200-25 MCG/INH inhaler 1 puff  1 puff Inhalation Daily Sveta Mandujano MD   1 puff at 02/21/20 0747    guaiFENesin (MUCINEX) 12 hr tablet 600 mg  600 mg Oral Q12H [address] [facility]   600 mg at 02/20/20 2200    ipratropium-albuterol (DUO-NEB) 0 5-2 5 mg/3 mL inhalation solution 3 mL  3 mL Nebulization TID Sveta Mandujano MD   3 mL at 02/21/20 0748    methylPREDNISolone sodium succinate (Solu-MEDROL) injection 40 mg  40 mg Intravenous Q8H [facility]   40 mg at 02/21/20 0644    ondansetron (ZOFRAN) injection 4 mg  4 mg Intravenous Q6H PRN Sveta Mandujano MD          No medications prior to admission         Labs:  Results from last 7 days   Lab Units 02/21/20  0459 02/20/20  0517 02/19/20  0517   WBC Thousand/uL 6 14 6 51 4 12*   HEMOGLOBIN g/dL 12 9 12 9 13 2   HEMATOCRIT % 38 6 39 7 40 3   PLATELETS Thousands/uL 295 296 287   NEUTROS PCT % 85* 88* 84*   LYMPHS PCT % 10* 8* 9*   MONOS PCT % 5 4 6   EOS PCT % 0 0 0     Results from last 7 days   Lab Units 02/21/20  0459 02/20/20  0517 02/19/20  0517 02/18/20  0855   POTASSIUM mmol/L 4 6 5 1 4 6 4 3   CHLORIDE mmol/L 104 107 104 102   CO2 mmol/L 33* 34* 33* 33*   BUN mg/dL 15 21 17 10   CREATININE mg/dL 0 49* 0 64 0 63 0 76 CALCIUM mg/dL 8 2* 8 3 8 5 9 0   ALK PHOS U/L  --   --  60 66   ALT U/L  --   --  23 29   AST U/L  --   --  15 20     Lab Results   Component Value Date    TROPONINI <0 02 02/18/2020    TROPONINI <0 02 02/18/2020    TROPONINI <0 02 02/18/2020         Lab Results   Component Value Date    SPUTUMCULTUR 3+ Growth of  02/19/2020    SPUTUMCULTUR  02/19/2020     Commensal respiratory yee only; No significant growth of Staph aureus/MRSA or Pseudomonas aeruginosa  Imaging:  No results found for this or any previous visit  Results for orders placed during the hospital encounter of 02/18/20   XR chest 2 views    Narrative CHEST     INDICATION:   Chest Pain  COMPARISON:  None    EXAM PERFORMED/VIEWS:  XR CHEST PA & LATERAL      FINDINGS:    Cardiomediastinal silhouette appears unremarkable  Emphysematous changes are noted consistent with chronic obstructive pulmonary disease  No airspace opacity to suggest focal pneumonia  No pneumothorax or pleural effusion  Osseous structures appear within normal limits for patient age  Impression Emphysematous changes are noted  No focal consolidation, pleural effusion, or pneumothorax          Workstation performed: [id number]         Last 24 Hours Medication List:     Current Facility-Administered Medications:  acetaminophen 650 mg Oral Q6H PRN Nancy Hill MD   albuterol 2 puff Inhalation Q4H PRN Nancy Hill MD   amoxicillin-clavulanate 1 tablet Oral Q12H [address] Nunu Tello DO   aspirin 325 mg Oral Daily Nancy Hill MD   atorvastatin 40 mg Oral Daily With Patty Flanagan MD   enoxaparin 40 mg Subcutaneous Daily Nancy Hill MD   fluticasone-vilanterol 1 puff Inhalation Daily Nancy Hill MD   guaiFENesin 600 mg Oral Q12H [address] ISA Guerra   ipratropium-albuterol 3 mL Nebulization TID Nancy Hill MD   methylPREDNISolone sodium succinate 40 mg Intravenous [facility] Nunu Tello DO ondansetron 4 mg Intravenous Q6H PRN Kenneth Kaba MD        Today, Patient Was Seen By: Kenneth Kaba MD    ** Please Note: Dictation voice to text software may have been used in the creation of this document   **

## 2020-02-21 NOTE — PROGRESS NOTES
Progress Note - Pulmonary   [place] Mao 71 y o  male MRN: [id number]  Unit/Bed#: -01 Encounter: [id number]      Assessment:  1   Acute respiratory insufficiency  2   Acute exacerbation of COPD  3   Acute tracheobronchitis  4  Possible bacterial sinusitis/URI    Plan:  1  Will switch Zithromax to Augmentin today to see if he feels any better  2  Continue Duonebs TID   3  Continue Breo Daily  4  Continue Mucinex 600mg q12  5  Continue solumedrol at 40mg IV q8    Subjective:   Patient seen and examined  He is still coughing and not feeling well overall  Objective:   Vitals: Blood pressure 108/65, pulse 90, temperature 97 8 °F (36 6 °C), resp  rate 18, height 5' 8" (1 727 m), weight 46 kg (101 lb 8 oz), SpO2 92 % , RA, Body mass index is 15 43 kg/m²  Intake/Output Summary (Last 24 hours) at 2/21/2020 1124  Last data filed at 2/21/2020 0339  Gross per 24 hour   Intake --   Output 900 ml   Net -900 ml     Physical Exam  Gen: Awake, alert, oriented x 3, no acute distress  HEENT: Mucous membranes moist, no oral lesions, no thrush  NECK: No accessory muscle use, JVP not elevated  Cardiac: Regular, single S1, single S2, no murmurs, no rubs, no gallops  Lungs: Decreased breath sounds, no wheezing or rhonchi  Abdomen: normoactive bowel sounds, soft nontender, nondistended, no rebound or rigidity, no guarding  Extremities: no cyanosis, no clubbing, no edema    Labs: I have personally reviewed pertinent lab results    Results from last 7 days   Lab Units 02/21/20 0459 02/20/20 0517 02/19/20  0517   WBC Thousand/uL 6 14 6 51 4 12*   HEMOGLOBIN g/dL 12 9 12 9 13 2   HEMATOCRIT % 38 6 39 7 40 3   PLATELETS Thousands/uL 295 296 287   NEUTROS PCT % 85* 88* 84*   MONOS PCT % 5 4 6      Results from last 7 days   Lab Units 02/21/20 0459 02/20/20  0517 02/19/20  0517 02/18/20  0855   POTASSIUM mmol/L 4 6 5 1 4 6 4 3   CHLORIDE mmol/L 104 107 104 102   CO2 mmol/L 33* 34* 33* 33*   BUN mg/dL 15 21 17 10 CREATININE mg/dL 0 49* 0 64 0 63 0 76   CALCIUM mg/dL 8 2* 8 3 8 5 9 0   ALK PHOS U/L  --   --  60 66   ALT U/L  --   --  23 29   AST U/L  --   --  15 20     Results from last 7 days   Lab Units 02/21/20  0459 02/19/20  0517   MAGNESIUM mg/dL 2 1 2 1     Results from last 7 days   Lab Units 02/19/20  0517   PHOSPHORUS mg/dL 3 9              0   Lab Value Date/Time    TROPONINI <0 02 02/18/2020 1840    TROPONINI <0 02 02/18/2020 1550    TROPONINI <0 02 02/18/2020 0855         Meds/Allergies   Current Facility-Administered Medications   Medication Dose Route Frequency    acetaminophen (TYLENOL) tablet 650 mg  650 mg Oral Q6H PRN    albuterol (PROVENTIL HFA,VENTOLIN HFA) inhaler 2 puff  2 puff Inhalation Q4H PRN    amoxicillin-clavulanate (AUGMENTIN) 875-125 mg per tablet 1 tablet  1 tablet Oral Q12H [address]    aspirin tablet 325 mg  325 mg Oral Daily    atorvastatin (LIPITOR) tablet 40 mg  40 mg Oral Daily With Dinner    enoxaparin (LOVENOX) subcutaneous injection 40 mg  40 mg Subcutaneous Daily    fluticasone-vilanterol (BREO ELLIPTA) 200-25 MCG/INH inhaler 1 puff  1 puff Inhalation Daily    guaiFENesin (MUCINEX) 12 hr tablet 600 mg  600 mg Oral Q12H [address]    ipratropium-albuterol (DUO-NEB) 0 5-2 5 mg/3 mL inhalation solution 3 mL  3 mL Nebulization TID    methylPREDNISolone sodium succinate (Solu-MEDROL) injection 40 mg  40 mg Intravenous Q8H [address]    ondansetron (ZOFRAN) injection 4 mg  4 mg Intravenous Q6H PRN     No medications prior to admission  Microbiology:  Lab Results   Component Value Date    SPUTUMCULTUR 3+ Growth of  02/19/2020    SPUTUMCULTUR  02/19/2020     Commensal respiratory eye only; No significant growth of Staph aureus/MRSA or Pseudomonas aeruginosa  Imaging and other studies: I have personally reviewed pertinent reports      None    Payton Bloch, DO  [address] [place] [address] [facility]

## 2020-02-21 NOTE — ASSESSMENT & PLAN NOTE
· In setting of COPD exacerbation evidence by respiratory distress, tachypnea room air with oxygen saturation of 89%  · Patient is on 2 L of oxygen  · Wean oxygen as tolerated  · Pulmonary on board  · Continue on Solu-Medrol  · Continue albuterol nebulizers  · Pulmonologist switched patient to Augmentin from Zithromax  · See plan under primary problem for further plan and workup

## 2020-02-22 LAB
ANION GAP SERPL CALCULATED.3IONS-SCNC: 3 MMOL/L (ref 4–13)
BUN SERPL-MCNC: 16 MG/DL (ref 5–25)
CALCIUM SERPL-MCNC: 8.4 MG/DL (ref 8.3–10.1)
CHLORIDE SERPL-SCNC: 104 MMOL/L (ref 100–108)
CO2 SERPL-SCNC: 34 MMOL/L (ref 21–32)
CREAT SERPL-MCNC: 0.61 MG/DL (ref 0.6–1.3)
GFR SERPL CREATININE-BSD FRML MDRD: 105 ML/MIN/1.73SQ M
GLUCOSE SERPL-MCNC: 162 MG/DL (ref 65–140)
POTASSIUM SERPL-SCNC: 4.8 MMOL/L (ref 3.5–5.3)
SODIUM SERPL-SCNC: 141 MMOL/L (ref 136–145)

## 2020-02-22 PROCEDURE — 99232 SBSQ HOSP IP/OBS MODERATE 35: CPT | Performed by: INTERNAL MEDICINE

## 2020-02-22 PROCEDURE — 94664 DEMO&/EVAL PT USE INHALER: CPT

## 2020-02-22 PROCEDURE — 94760 N-INVAS EAR/PLS OXIMETRY 1: CPT

## 2020-02-22 PROCEDURE — 99231 SBSQ HOSP IP/OBS SF/LOW 25: CPT | Performed by: INTERNAL MEDICINE

## 2020-02-22 PROCEDURE — 80048 BASIC METABOLIC PNL TOTAL CA: CPT | Performed by: INTERNAL MEDICINE

## 2020-02-22 PROCEDURE — 94640 AIRWAY INHALATION TREATMENT: CPT

## 2020-02-22 RX ORDER — METHYLPREDNISOLONE SODIUM SUCCINATE 40 MG/ML
40 INJECTION, POWDER, LYOPHILIZED, FOR SOLUTION INTRAMUSCULAR; INTRAVENOUS EVERY 12 HOURS SCHEDULED
Status: DISCONTINUED | OUTPATIENT
Start: 2020-02-22 | End: 2020-02-24 | Stop reason: HOSPADM

## 2020-02-22 RX ADMIN — IPRATROPIUM BROMIDE AND ALBUTEROL SULFATE 3 ML: 2.5; .5 SOLUTION RESPIRATORY (INHALATION) at 10:08

## 2020-02-22 RX ADMIN — AMOXICILLIN AND CLAVULANATE POTASSIUM 1 TABLET: 875; 125 TABLET, FILM COATED ORAL at 09:27

## 2020-02-22 RX ADMIN — FLUTICASONE FUROATE AND VILANTEROL TRIFENATATE 1 PUFF: 200; 25 POWDER RESPIRATORY (INHALATION) at 10:08

## 2020-02-22 RX ADMIN — ASPIRIN 325 MG ORAL TABLET 325 MG: 325 PILL ORAL at 09:27

## 2020-02-22 RX ADMIN — METHYLPREDNISOLONE SODIUM SUCCINATE 40 MG: 40 INJECTION, POWDER, FOR SOLUTION INTRAMUSCULAR; INTRAVENOUS at 05:47

## 2020-02-22 RX ADMIN — GUAIFENESIN 600 MG: 600 TABLET ORAL at 22:00

## 2020-02-22 RX ADMIN — METHYLPREDNISOLONE SODIUM SUCCINATE 40 MG: 40 INJECTION, POWDER, FOR SOLUTION INTRAMUSCULAR; INTRAVENOUS at 22:00

## 2020-02-22 RX ADMIN — ATORVASTATIN CALCIUM 40 MG: 40 TABLET, FILM COATED ORAL at 15:42

## 2020-02-22 RX ADMIN — IPRATROPIUM BROMIDE AND ALBUTEROL SULFATE 3 ML: 2.5; .5 SOLUTION RESPIRATORY (INHALATION) at 15:11

## 2020-02-22 RX ADMIN — IPRATROPIUM BROMIDE AND ALBUTEROL SULFATE 3 ML: 2.5; .5 SOLUTION RESPIRATORY (INHALATION) at 22:29

## 2020-02-22 RX ADMIN — AMOXICILLIN AND CLAVULANATE POTASSIUM 1 TABLET: 875; 125 TABLET, FILM COATED ORAL at 22:00

## 2020-02-22 RX ADMIN — GUAIFENESIN 600 MG: 600 TABLET ORAL at 09:27

## 2020-02-22 NOTE — PROGRESS NOTES
Progress Note - Nezperce Kris 1955, 71 y o  male MRN: [id number]    Unit/Bed#: -01 Encounter: [id number]    Primary Care Provider: No primary care provider on file  Date and time admitted to hospital: 2/18/2020  8:46 AM        Underweight  Assessment & Plan  · In the setting of COPD evidenced by BMI 16 73 requiring nutritional consult    Acute respiratory failure with hypoxia (HCC)  Assessment & Plan  · In setting of COPD exacerbation evidence by respiratory distress, tachypnea room air with oxygen saturation of 89%  · Patient is on 2 L of oxygen  · Wean oxygen as tolerated  · Pulmonary on board  · Continue on Solu-Medrol  Taper per pulmonology  · Continue albuterol nebulizers  · Continue on Augmentin  · See plan under primary problem for further plan and workup    Chest pain  Assessment & Plan  · Troponins negative x3; Denies any chest pain at this time  He did initially report chest pain on admission but endorse that coincided when he would cough  · Resolved  · Continue aspirin and Lipitor  · Panel with cholesterol 177 triglycerides of 34 HDL of 83 and LDL of 81    Acute tracheobronchitis  Assessment & Plan  · Patient is positive for RSV  · Continue on Augmentin  · Continue steroids  · Pulmonary on board  · See above    * COPD with acute exacerbation ([address] )  Assessment & Plan  · Likely secondary to tracheobronchitis  · Continue Augmentin and Solu-Medrol  Taper steroids per Pulmonary  · Oxygen supplementation and bronchodilators p r n  · Patient reports he is having difficulty expectorating sputum  Follow-up culture results  · Encourage incentive spirometer use  · Wean oxygen as tolerated  · Patient will need pulse ox with ambulation prior to discharge        Labs & Imaging: I have personally reviewed pertinent reports        VTE Pharmacologic Prophylaxis: Enoxaparin (Lovenox)  VTE Mechanical Prophylaxis: sequential compression device    Code Status:   Level 1 - Full Code    Patient Centered Rounds: I have performed bedside rounds with nursing staff today  Discussions with Specialists or Other Care Team Provider:  Pulmonary    Education and Discussions with Family / Patient:  Patient wants to update his family on his own  Current Length of Stay: 4 day(s)    Current Patient Status: Inpatient   Certification Statement: The patient will continue to require additional inpatient hospital stay due to see my assessment and plan  Subjective:   Patient is seen and examined at bedside  States he feels a little better  Still has chest tightness and wheezing  Denies any new complaints  Afebrile  All other ROS are negative  Objective:    Vitals: Blood pressure 99/59, pulse 66, temperature 97 7 °F (36 5 °C), resp  rate 20, height 5' 8" (1 727 m), weight 46 kg (101 lb 8 oz), SpO2 92 %  ,Body mass index is 15 43 kg/m²  SPO2 RA Rest      ED to Hosp-Admission (Current) from 2/18/2020 in [address] Med Surg Unit   SpO2  92 %   SpO2 Activity  At Rest   O2 Device  None (Room air)   O2 Flow Rate  0 L/min        I&O:     Intake/Output Summary (Last 24 hours) at 2/22/2020 0649  Last data filed at 2/21/2020 1722  Gross per 24 hour   Intake 480 ml   Output --   Net 480 ml       Physical Exam:    General- Alert, sitting comfortably in bed  Not in any acute distress  HEENT- ROSA, EOM intact  Neck- Supple, No JVD  CVS- regular, S1 and S2 normal   Chest- Bilateral Air entry, decreased bilaterally with expiratory wheezing present  Abdomen- soft, nontender, not distended, no guarding or rigidity, BS+  Extremities-  No pedal edema, No calf tenderness                         Normal ROM in all extremities  CNS-   Alert, awake and orientedx3  No focal deficits present  Invasive Devices     Peripheral Intravenous Line            Peripheral IV 02/22/20 Left;Upper Forearm less than 1 day                      Social History  reviewed  History reviewed  No pertinent family history  reviewed    Meds:  Current Facility-Administered Medications   Medication Dose Route Frequency Provider Last Rate Last Dose    acetaminophen (TYLENOL) tablet 650 mg  650 mg Oral Q6H PRN Rachel Cruz MD        albuterol (PROVENTIL HFA,VENTOLIN HFA) inhaler 2 puff  2 puff Inhalation Q4H PRN Rachel Cruz MD   2 puff at 02/19/20 2203    amoxicillin-clavulanate (AUGMENTIN) 875-125 mg per tablet 1 tablet  1 tablet Oral Q12H [facility], DO   1 tablet at 02/21/20 2133    aspirin tablet 325 mg  325 mg Oral Daily Rachel Cruz MD   325 mg at 02/21/20 1128    atorvastatin (LIPITOR) tablet 40 mg  40 mg Oral Daily With Yovana Olson MD   40 mg at 02/21/20 1736    enoxaparin (LOVENOX) subcutaneous injection 40 mg  40 mg Subcutaneous Daily Rachel Cruz MD        fluticasone-vilanterol (BREO ELLIPTA) 200-25 MCG/INH inhaler 1 puff  1 puff Inhalation Daily Rachel Cruz MD   1 puff at 02/21/20 0747    guaiFENesin (MUCINEX) 12 hr tablet 600 mg  600 mg Oral Q12H [facility] ISA Hopkins   600 mg at 02/21/20 2133    ipratropium-albuterol (DUO-NEB) 0 5-2 5 mg/3 mL inhalation solution 3 mL  3 mL Nebulization TID Rachel Cruz MD   3 mL at 02/21/20 2132    methylPREDNISolone sodium succinate (Solu-MEDROL) injection 40 mg  40 mg Intravenous [facility] Rafiq Corral,    40 mg at 02/22/20 0547    ondansetron (ZOFRAN) injection 4 mg  4 mg Intravenous Q6H PRN Rachel Cruz MD          No medications prior to admission         Labs:  Results from last 7 days   Lab Units 02/21/20  0459 02/20/20  0517 02/19/20  0517   WBC Thousand/uL 6 14 6 51 4 12*   HEMOGLOBIN g/dL 12 9 12 9 13 2   HEMATOCRIT % 38 6 39 7 40 3   PLATELETS Thousands/uL 295 296 287   NEUTROS PCT % 85* 88* 84*   LYMPHS PCT % 10* 8* 9*   MONOS PCT % 5 4 6   EOS PCT % 0 0 0     Results from last 7 days   Lab Units 02/22/20  0546 02/21/20  0459 02/20/20  0517 02/19/20  0517 02/18/20  0855   POTASSIUM mmol/L 4 8 4 6 5 1 4 6 4 3   CHLORIDE mmol/L 104 104 107 104 102   CO2 mmol/L 34* 33* 34* 33* 33*   BUN mg/dL 16 15 21 17 10   CREATININE mg/dL 0 61 0 49* 0 64 0 63 0 76   CALCIUM mg/dL 8 4 8 2* 8 3 8 5 9 0   ALK PHOS U/L  --   --   --  60 66   ALT U/L  --   --   --  23 29   AST U/L  --   --   --  15 20     Lab Results   Component Value Date    TROPONINI <0 02 02/18/2020    TROPONINI <0 02 02/18/2020    TROPONINI <0 02 02/18/2020         Lab Results   Component Value Date    SPUTUMCULTUR 3+ Growth of  02/19/2020    SPUTUMCULTUR  02/19/2020     Commensal respiratory yee only; No significant growth of Staph aureus/MRSA or Pseudomonas aeruginosa  Imaging:  No results found for this or any previous visit  Results for orders placed during the hospital encounter of 02/18/20   XR chest 2 views    Narrative CHEST     INDICATION:   Chest Pain  COMPARISON:  None    EXAM PERFORMED/VIEWS:  XR CHEST PA & LATERAL      FINDINGS:    Cardiomediastinal silhouette appears unremarkable  Emphysematous changes are noted consistent with chronic obstructive pulmonary disease  No airspace opacity to suggest focal pneumonia  No pneumothorax or pleural effusion  Osseous structures appear within normal limits for patient age  Impression Emphysematous changes are noted  No focal consolidation, pleural effusion, or pneumothorax          Workstation performed: [id number]         Last 24 Hours Medication List:     Current Facility-Administered Medications:  acetaminophen 650 mg Oral Q6H PRN Mayo Santana MD   albuterol 2 puff Inhalation Q4H PRN Mayo Santana MD   amoxicillin-clavulanate 1 tablet Oral Q12H [address] Dennys Mesa DO   aspirin 325 mg Oral Daily Mayo Santana MD   atorvastatin 40 mg Oral Daily With Arline Hardin MD   enoxaparin 40 mg Subcutaneous Daily Mayo Santana MD   fluticasone-vilanterol 1 puff Inhalation Daily Mayo Santana MD   guaiFENesin 600 mg Oral [address] ISA Hopkins   ipratropium-albuterol 3 mL Nebulization TID Shauna Raines MD   methylPREDNISolone sodium succinate 40 mg Intravenous Q8H [address] Rafiq Corral DO   ondansetron 4 mg Intravenous Q6H PRN Shauna Raines MD        Today, Patient Was Seen By: Shauna Raines MD    ** Please Note: Dictation voice to text software may have been used in the creation of this document   **

## 2020-02-22 NOTE — ASSESSMENT & PLAN NOTE
· Likely secondary to tracheobronchitis  · Continue Augmentin and Solu-Medrol  Taper steroids per Pulmonary  · Oxygen supplementation and bronchodilators p r n  · Patient reports he is having difficulty expectorating sputum    Follow-up culture results  · Encourage incentive spirometer use  · Wean oxygen as tolerated  · Patient will need pulse ox with ambulation prior to discharge

## 2020-02-22 NOTE — ASSESSMENT & PLAN NOTE
· In setting of COPD exacerbation evidence by respiratory distress, tachypnea room air with oxygen saturation of 89%  · Patient is on 2 L of oxygen  · Wean oxygen as tolerated  · Pulmonary on board  · Continue on Solu-Medrol    Taper per pulmonology  · Continue albuterol nebulizers  · Continue on Augmentin  · See plan under primary problem for further plan and workup

## 2020-02-22 NOTE — ASSESSMENT & PLAN NOTE
· Troponins negative x3; Denies any chest pain at this time  He did initially report chest pain on admission but endorse that coincided when he would cough    · Resolved  · Continue aspirin and Lipitor  · Panel with cholesterol 177 triglycerides of 34 HDL of 83 and LDL of 81

## 2020-02-22 NOTE — PROGRESS NOTES
Progress Note - Pulmonary   Ketane Bellis 71 y o  male MRN: [id number]  Unit/Bed#: -01 Encounter: [id number]      Assessment:  1   Acute respiratory insufficiency  2   Acute exacerbation of COPD  3   Acute tracheobronchitis  4  Possible bacterial sinusitis/URI     Plan:  1  Cointinue Augmentin today  2  Continue Duonebs TID   3  Continue Breo Daily  4  Continue Mucinex 600mg q12  5  Wean solumedrol at 40mg IV q12    Subjective:   Patient seen and examined  No new events overnight  He is feeling better today  Objective:   Vitals: Blood pressure 126/78, pulse 66, temperature 97 8 °F (36 6 °C), resp  rate 18, height 5' 8" (1 727 m), weight 46 kg (101 lb 6 6 oz), SpO2 93 % , RA, Body mass index is 15 42 kg/m²  Intake/Output Summary (Last 24 hours) at 2/22/2020 1246  Last data filed at 2/21/2020 1722  Gross per 24 hour   Intake 480 ml   Output --   Net 480 ml         Physical Exam  Gen: Awake, alert, oriented x 3, no acute distress  HEENT: Mucous membranes moist, no oral lesions, no thrush  NECK: No accessory muscle use, JVP not elevated  Cardiac: Regular, single S1, single S2, no murmurs, no rubs, no gallops  Lungs: Decreased breath sounds, no wheezing   Abdomen: normoactive bowel sounds, soft nontender, nondistended, no rebound or rigidity, no guarding  Extremities: no cyanosis, no clubbing, no edema    Labs: I have personally reviewed pertinent lab results    Results from last 7 days   Lab Units 02/21/20  0459 02/20/20  0517 02/19/20  0517   WBC Thousand/uL 6 14 6 51 4 12*   HEMOGLOBIN g/dL 12 9 12 9 13 2   HEMATOCRIT % 38 6 39 7 40 3   PLATELETS Thousands/uL 295 296 287   NEUTROS PCT % 85* 88* 84*   MONOS PCT % 5 4 6      Results from last 7 days   Lab Units 02/22/20  0546 02/21/20  0459 02/20/20  0517 02/19/20  0517 02/18/20  0855   POTASSIUM mmol/L 4 8 4 6 5 1 4 6 4 3   CHLORIDE mmol/L 104 104 107 104 102   CO2 mmol/L 34* 33* 34* 33* 33*   BUN mg/dL 16 15 21 17 10   CREATININE mg/dL 0  61 0 49* 0 64 0 63 0 76   CALCIUM mg/dL 8 4 8 2* 8 3 8 5 9 0   ALK PHOS U/L  --   --   --  60 66   ALT U/L  --   --   --  23 29   AST U/L  --   --   --  15 20     Results from last 7 days   Lab Units 02/21/20  0459 02/19/20  0517   MAGNESIUM mg/dL 2 1 2 1     Results from last 7 days   Lab Units 02/19/20  0517   PHOSPHORUS mg/dL 3 9              0   Lab Value Date/Time    TROPONINI <0 02 02/18/2020 1840    TROPONINI <0 02 02/18/2020 1550    TROPONINI <0 02 02/18/2020 0855         Meds/Allergies   Current Facility-Administered Medications   Medication Dose Route Frequency    acetaminophen (TYLENOL) tablet 650 mg  650 mg Oral Q6H PRN    albuterol (PROVENTIL HFA,VENTOLIN HFA) inhaler 2 puff  2 puff Inhalation Q4H PRN    amoxicillin-clavulanate (AUGMENTIN) 875-125 mg per tablet 1 tablet  1 tablet Oral Q12H [facility]    aspirin tablet 325 mg  325 mg Oral Daily    atorvastatin (LIPITOR) tablet 40 mg  40 mg Oral Daily With Dinner    enoxaparin (LOVENOX) subcutaneous injection 40 mg  40 mg Subcutaneous Daily    fluticasone-vilanterol (BREO ELLIPTA) 200-25 MCG/INH inhaler 1 puff  1 puff Inhalation Daily    guaiFENesin (MUCINEX) 12 hr tablet 600 mg  600 mg Oral Q12H [facility]    ipratropium-albuterol (DUO-NEB) 0 5-2 5 mg/3 mL inhalation solution 3 mL  3 mL Nebulization TID    methylPREDNISolone sodium succinate (Solu-MEDROL) injection 40 mg  40 mg Intravenous Q8H [facility]    ondansetron (ZOFRAN) injection 4 mg  4 mg Intravenous Q6H PRN     No medications prior to admission  Microbiology:  Lab Results   Component Value Date    SPUTUMCULTUR 3+ Growth of  02/19/2020    SPUTUMCULTUR  02/19/2020     Commensal respiratory yee only; No significant growth of Staph aureus/MRSA or Pseudomonas aeruginosa  Imaging and other studies: I have personally reviewed pertinent reports  IMPRESSION:  Emphysematous changes are noted    No focal consolidation, pleural effusion, or pneumothorax        DO Deonte Parrish  [facility]

## 2020-02-22 NOTE — ASSESSMENT & PLAN NOTE
· Patient is positive for RSV  · Continue on Augmentin  · Continue steroids  · Pulmonary on board    · See above

## 2020-02-23 LAB
ANION GAP SERPL CALCULATED.3IONS-SCNC: 6 MMOL/L (ref 4–13)
BUN SERPL-MCNC: 16 MG/DL (ref 5–25)
CALCIUM SERPL-MCNC: 8.4 MG/DL (ref 8.3–10.1)
CHLORIDE SERPL-SCNC: 104 MMOL/L (ref 100–108)
CO2 SERPL-SCNC: 30 MMOL/L (ref 21–32)
CREAT SERPL-MCNC: 0.55 MG/DL (ref 0.6–1.3)
GFR SERPL CREATININE-BSD FRML MDRD: 109 ML/MIN/1.73SQ M
GLUCOSE SERPL-MCNC: 157 MG/DL (ref 65–140)
POTASSIUM SERPL-SCNC: 4.2 MMOL/L (ref 3.5–5.3)
SODIUM SERPL-SCNC: 140 MMOL/L (ref 136–145)

## 2020-02-23 PROCEDURE — 94760 N-INVAS EAR/PLS OXIMETRY 1: CPT

## 2020-02-23 PROCEDURE — 99232 SBSQ HOSP IP/OBS MODERATE 35: CPT | Performed by: INTERNAL MEDICINE

## 2020-02-23 PROCEDURE — 94640 AIRWAY INHALATION TREATMENT: CPT

## 2020-02-23 PROCEDURE — 80048 BASIC METABOLIC PNL TOTAL CA: CPT | Performed by: INTERNAL MEDICINE

## 2020-02-23 RX ADMIN — ASPIRIN 325 MG ORAL TABLET 325 MG: 325 PILL ORAL at 09:12

## 2020-02-23 RX ADMIN — AMOXICILLIN AND CLAVULANATE POTASSIUM 1 TABLET: 875; 125 TABLET, FILM COATED ORAL at 09:12

## 2020-02-23 RX ADMIN — AMOXICILLIN AND CLAVULANATE POTASSIUM 1 TABLET: 875; 125 TABLET, FILM COATED ORAL at 21:34

## 2020-02-23 RX ADMIN — METHYLPREDNISOLONE SODIUM SUCCINATE 40 MG: 40 INJECTION, POWDER, FOR SOLUTION INTRAMUSCULAR; INTRAVENOUS at 21:33

## 2020-02-23 RX ADMIN — IPRATROPIUM BROMIDE AND ALBUTEROL SULFATE 3 ML: 2.5; .5 SOLUTION RESPIRATORY (INHALATION) at 20:08

## 2020-02-23 RX ADMIN — GUAIFENESIN 600 MG: 600 TABLET ORAL at 21:34

## 2020-02-23 RX ADMIN — FLUTICASONE FUROATE AND VILANTEROL TRIFENATATE 1 PUFF: 200; 25 POWDER RESPIRATORY (INHALATION) at 07:26

## 2020-02-23 RX ADMIN — IPRATROPIUM BROMIDE AND ALBUTEROL SULFATE 3 ML: 2.5; .5 SOLUTION RESPIRATORY (INHALATION) at 07:26

## 2020-02-23 RX ADMIN — GUAIFENESIN 600 MG: 600 TABLET ORAL at 09:12

## 2020-02-23 RX ADMIN — IPRATROPIUM BROMIDE AND ALBUTEROL SULFATE 3 ML: 2.5; .5 SOLUTION RESPIRATORY (INHALATION) at 14:15

## 2020-02-23 RX ADMIN — METHYLPREDNISOLONE SODIUM SUCCINATE 40 MG: 40 INJECTION, POWDER, FOR SOLUTION INTRAMUSCULAR; INTRAVENOUS at 09:12

## 2020-02-23 RX ADMIN — ATORVASTATIN CALCIUM 40 MG: 40 TABLET, FILM COATED ORAL at 16:55

## 2020-02-23 NOTE — ASSESSMENT & PLAN NOTE
· In setting of COPD exacerbation evidence by respiratory distress, tachypnea room air with oxygen saturation of 89%  · Oxygen supplementation p r n    · Pulmonary on board  · Continue on Solu-Medrol Q12 hrs  · Continue albuterol nebulizers  · Continue on Augmentin  · See plan under primary problem for further plan and workup

## 2020-02-23 NOTE — ASSESSMENT & PLAN NOTE
· Likely secondary to tracheobronchitis  · Continue Augmentin and Solu-Medrol  · Oxygen supplementation and bronchodilators p r n    · Cultures are negative to date  · Continue Breo  · Encourage incentive spirometer use  · Wean oxygen as tolerated  · Patient will need pulse ox with ambulation prior to discharge

## 2020-02-23 NOTE — PROGRESS NOTES
Progress Note - Kaylie Moura 1955, 71 y o  male MRN: [id number]    Unit/Bed#: -01 Encounter: [id number]    Primary Care Provider: No primary care provider on file  Date and time admitted to hospital: 2/18/2020  8:46 AM        Underweight  Assessment & Plan  · In the setting of COPD evidenced by BMI 16 73 requiring nutritional consult    Acute respiratory failure with hypoxia (HCC)  Assessment & Plan  · In setting of COPD exacerbation evidence by respiratory distress, tachypnea room air with oxygen saturation of 89%  · Oxygen supplementation p r n  · Pulmonary on board  · Continue on Solu-Medrol Q12 hrs  · Continue albuterol nebulizers  · Continue on Augmentin  · See plan under primary problem for further plan and workup    Chest pain  Assessment & Plan  · Troponins negative x3; Denies any chest pain at this time  He did initially report chest pain on admission but endorse that coincided when he would cough  · Resolved  · Continue aspirin and Lipitor  · Panel with cholesterol 177 triglycerides of 34 HDL of 83 and LDL of 81    Acute tracheobronchitis  Assessment & Plan  · Patient is positive for RSV  · Continue on Augmentin  · Continue steroids  · Pulmonary on board  · See above    * COPD with acute exacerbation ([address] )  Assessment & Plan  · Likely secondary to tracheobronchitis  · Continue Augmentin and Solu-Medrol  · Oxygen supplementation and bronchodilators p r n  · Cultures are negative to date  · Continue Breo  · Encourage incentive spirometer use  · Wean oxygen as tolerated  · Patient will need pulse ox with ambulation prior to discharge        Labs & Imaging: I have personally reviewed pertinent reports  VTE Pharmacologic Prophylaxis: Enoxaparin (Lovenox)  VTE Mechanical Prophylaxis: sequential compression device    Code Status:   Level 1 - Full Code    Patient Centered Rounds: I have performed bedside rounds with nursing staff today      Discussions with Specialists or Other Care Team Provider:  Pulmonary    Education and Discussions with Family / Patient:     Current Length of Stay: 5 day(s)    Current Patient Status: Inpatient   Certification Statement: The patient will continue to require additional inpatient hospital stay due to see my assessment and plan  Subjective:   Patient is seen and examined at bedside  Still feels the same  Has cough, shortness of breath, chest tightness and feels fatigued  Afebrile  All other ROS are negative  Objective:    Vitals: Blood pressure 109/67, pulse 85, temperature (!) 97 3 °F (36 3 °C), temperature source Oral, resp  rate 16, height 5' 8" (1 727 m), weight 45 5 kg (100 lb 5 oz), SpO2 95 %  ,Body mass index is 15 25 kg/m²  SPO2 RA Rest      ED to Hosp-Admission (Current) from 2/18/2020 in [address] Med Surg Unit   SpO2  95 %   SpO2 Activity  At Rest   O2 Device  None (Room air)   O2 Flow Rate  0 L/min        I&O: No intake or output data in the 24 hours ending 02/23/20 1125    Physical Exam:    General- Alert, sitting comfortably in bed  Not in any acute distress  HEENT- ROSA, EOM intact  Neck- Supple, No JVD  CVS- regular, S1 and S2 normal   Chest- Bilateral Air entry, decreased air entry, bilateral expiratory wheezing  Abdomen- soft, nontender, not distended, no guarding or rigidity, BS+  Extremities-  No pedal edema, No calf tenderness  CNS-   Alert, awake and orientedx3  No focal deficits present  Invasive Devices     Peripheral Intravenous Line            Peripheral IV 02/22/20 Left;Upper Forearm 1 day                      Social History  reviewed  History reviewed  No pertinent family history   reviewed    Meds:  Current Facility-Administered Medications   Medication Dose Route Frequency Provider Last Rate Last Dose    acetaminophen (TYLENOL) tablet 650 mg  650 mg Oral Q6H PRN Namita Childress MD        albuterol (PROVENTIL HFA,VENTOLIN HFA) inhaler 2 puff  2 puff Inhalation Q4H PRN Namita Childress MD 2 puff at 02/19/20 2203    amoxicillin-clavulanate (AUGMENTIN) 875-125 mg per tablet 1 tablet  1 tablet Oral Q12H [facility],    1 tablet at 02/23/20 4823    aspirin tablet 325 mg  325 mg Oral Daily Josue Soulier, MD   325 mg at 02/23/20 0912    atorvastatin (LIPITOR) tablet 40 mg  40 mg Oral Daily With Acosta Vaughan MD   40 mg at 02/22/20 1542    enoxaparin (LOVENOX) subcutaneous injection 40 mg  40 mg Subcutaneous Daily Josue Soulier, MD        fluticasone-vilanterol (BREO ELLIPTA) 200-25 MCG/INH inhaler 1 puff  1 puff Inhalation Daily Josue Soulier, MD   1 puff at 02/23/20 0726    guaiFENesin (MUCINEX) 12 hr tablet 600 mg  600 mg Oral Q12H [facility]   600 mg at 02/23/20 0912    ipratropium-albuterol (DUO-NEB) 0 5-2 5 mg/3 mL inhalation solution 3 mL  3 mL Nebulization TID Josue Soulier, MD   3 mL at 02/23/20 0726    methylPREDNISolone sodium succinate (Solu-MEDROL) injection 40 mg  40 mg Intravenous Q12H [facility] Rafiq GuDO becca   40 mg at 02/23/20 0912    ondansetron (ZOFRAN) injection 4 mg  4 mg Intravenous Q6H PRN Josue Soulier, MD          No medications prior to admission         Labs:  Results from last 7 days   Lab Units 02/21/20  0459 02/20/20  0517 02/19/20  0517   WBC Thousand/uL 6 14 6 51 4 12*   HEMOGLOBIN g/dL 12 9 12 9 13 2   HEMATOCRIT % 38 6 39 7 40 3   PLATELETS Thousands/uL 295 296 287   NEUTROS PCT % 85* 88* 84*   LYMPHS PCT % 10* 8* 9*   MONOS PCT % 5 4 6   EOS PCT % 0 0 0     Results from last 7 days   Lab Units 02/23/20  0512 02/22/20  0546 02/21/20  0459  02/19/20  0517 02/18/20  0855   POTASSIUM mmol/L 4 2 4 8 4 6   < > 4 6 4 3   CHLORIDE mmol/L 104 104 104   < > 104 102   CO2 mmol/L 30 34* 33*   < > 33* 33*   BUN mg/dL 16 16 15   < > 17 10   CREATININE mg/dL 0 55* 0 61 0 49*   < > 0 63 0 76   CALCIUM mg/dL 8 4 8 4 8 2*   < > 8 5 9 0   ALK PHOS U/L  --   --   --   --  60 66   ALT U/L  --   --   --   --  23 29 AST U/L  --   --   --   --  15 20    < > = values in this interval not displayed  Lab Results   Component Value Date    TROPONINI <0 02 02/18/2020    TROPONINI <0 02 02/18/2020    TROPONINI <0 02 02/18/2020         Lab Results   Component Value Date    SPUTUMCULTUR 3+ Growth of  02/19/2020    SPUTUMCULTUR  02/19/2020     Commensal respiratory yee only; No significant growth of Staph aureus/MRSA or Pseudomonas aeruginosa  Imaging:  No results found for this or any previous visit  Results for orders placed during the hospital encounter of 02/18/20   XR chest 2 views    Narrative CHEST     INDICATION:   Chest Pain  COMPARISON:  None    EXAM PERFORMED/VIEWS:  XR CHEST PA & LATERAL      FINDINGS:    Cardiomediastinal silhouette appears unremarkable  Emphysematous changes are noted consistent with chronic obstructive pulmonary disease  No airspace opacity to suggest focal pneumonia  No pneumothorax or pleural effusion  Osseous structures appear within normal limits for patient age  Impression Emphysematous changes are noted  No focal consolidation, pleural effusion, or pneumothorax          Workstation performed: [id number]         Last 24 Hours Medication List:     Current Facility-Administered Medications:  acetaminophen 650 mg Oral Q6H PRN Macie Carranza MD   albuterol 2 puff Inhalation Q4H PRN Macie Carranza MD   amoxicillin-clavulanate 1 tablet Oral Q12H [facility] Darolyn Dancer,    aspirin 325 mg Oral Daily Macie Carranza MD   atorvastatin 40 mg Oral Daily With Emerald Russ MD   enoxaparin 40 mg Subcutaneous Daily Macie Carranza MD   fluticasone-vilanterol 1 puff Inhalation Daily Macie Carranza MD   guaiFENesin 600 mg Oral Q12H [facility] ISA Hopkins   ipratropium-albuterol 3 mL Nebulization TID Macie Carranza MD   methylPREDNISolone sodium succinate 40 mg Intravenous Q12H [facility] Rafiq Corral,    ondansetron 4 mg Intravenous Q6H PRN Martina Olson MD        Today, Patient Was Seen By: Martina Olson MD    ** Please Note: Dictation voice to text software may have been used in the creation of this document   **

## 2020-02-24 VITALS
WEIGHT: 99.87 LBS | BODY MASS INDEX: 15.14 KG/M2 | DIASTOLIC BLOOD PRESSURE: 75 MMHG | TEMPERATURE: 98.7 F | HEIGHT: 68 IN | OXYGEN SATURATION: 97 % | RESPIRATION RATE: 18 BRPM | SYSTOLIC BLOOD PRESSURE: 124 MMHG | HEART RATE: 94 BPM

## 2020-02-24 PROBLEM — J96.01 ACUTE RESPIRATORY FAILURE WITH HYPOXIA (HCC): Status: RESOLVED | Noted: 2020-02-19 | Resolved: 2020-02-24

## 2020-02-24 PROBLEM — R07.9 CHEST PAIN: Status: RESOLVED | Noted: 2020-02-18 | Resolved: 2020-02-24

## 2020-02-24 PROBLEM — E78.5 HLD (HYPERLIPIDEMIA): Status: ACTIVE | Noted: 2020-02-24

## 2020-02-24 LAB
BASOPHILS # BLD AUTO: 0.01 THOUSANDS/ΜL (ref 0–0.1)
BASOPHILS NFR BLD AUTO: 0 % (ref 0–1)
EOSINOPHIL # BLD AUTO: 0 THOUSAND/ΜL (ref 0–0.61)
EOSINOPHIL NFR BLD AUTO: 0 % (ref 0–6)
ERYTHROCYTE [DISTWIDTH] IN BLOOD BY AUTOMATED COUNT: 12.5 % (ref 11.6–15.1)
HCT VFR BLD AUTO: 40.8 % (ref 36.5–49.3)
HGB BLD-MCNC: 13.6 G/DL (ref 12–17)
IMM GRANULOCYTES # BLD AUTO: 0.06 THOUSAND/UL (ref 0–0.2)
IMM GRANULOCYTES NFR BLD AUTO: 1 % (ref 0–2)
LYMPHOCYTES # BLD AUTO: 0.68 THOUSANDS/ΜL (ref 0.6–4.47)
LYMPHOCYTES NFR BLD AUTO: 9 % (ref 14–44)
MCH RBC QN AUTO: 32.6 PG (ref 26.8–34.3)
MCHC RBC AUTO-ENTMCNC: 33.3 G/DL (ref 31.4–37.4)
MCV RBC AUTO: 98 FL (ref 82–98)
MONOCYTES # BLD AUTO: 0.47 THOUSAND/ΜL (ref 0.17–1.22)
MONOCYTES NFR BLD AUTO: 6 % (ref 4–12)
NEUTROPHILS # BLD AUTO: 6.15 THOUSANDS/ΜL (ref 1.85–7.62)
NEUTS SEG NFR BLD AUTO: 84 % (ref 43–75)
NRBC BLD AUTO-RTO: 0 /100 WBCS
PLATELET # BLD AUTO: 367 THOUSANDS/UL (ref 149–390)
PMV BLD AUTO: 8.5 FL (ref 8.9–12.7)
RBC # BLD AUTO: 4.17 MILLION/UL (ref 3.88–5.62)
WBC # BLD AUTO: 7.37 THOUSAND/UL (ref 4.31–10.16)

## 2020-02-24 PROCEDURE — 94760 N-INVAS EAR/PLS OXIMETRY 1: CPT

## 2020-02-24 PROCEDURE — 99239 HOSP IP/OBS DSCHRG MGMT >30: CPT | Performed by: PHYSICIAN ASSISTANT

## 2020-02-24 PROCEDURE — 94664 DEMO&/EVAL PT USE INHALER: CPT

## 2020-02-24 PROCEDURE — 94640 AIRWAY INHALATION TREATMENT: CPT

## 2020-02-24 PROCEDURE — 85025 COMPLETE CBC W/AUTO DIFF WBC: CPT | Performed by: INTERNAL MEDICINE

## 2020-02-24 RX ORDER — AMOXICILLIN AND CLAVULANATE POTASSIUM 875; 125 MG/1; MG/1
1 TABLET, FILM COATED ORAL EVERY 12 HOURS SCHEDULED
Qty: 7 TABLET | Refills: 0 | Status: SHIPPED | OUTPATIENT
Start: 2020-02-24 | End: 2020-02-28

## 2020-02-24 RX ORDER — FLUTICASONE FUROATE AND VILANTEROL 200; 25 UG/1; UG/1
1 POWDER RESPIRATORY (INHALATION) DAILY
Qty: 1 INHALER | Refills: 0 | Status: SHIPPED | OUTPATIENT
Start: 2020-02-25 | End: 2022-05-09 | Stop reason: HOSPADM

## 2020-02-24 RX ORDER — ATORVASTATIN CALCIUM 40 MG/1
40 TABLET, FILM COATED ORAL
Qty: 30 TABLET | Refills: 0 | Status: SHIPPED | OUTPATIENT
Start: 2020-02-24

## 2020-02-24 RX ORDER — IPRATROPIUM BROMIDE AND ALBUTEROL SULFATE 2.5; .5 MG/3ML; MG/3ML
3 SOLUTION RESPIRATORY (INHALATION)
Qty: 60 VIAL | Refills: 0 | Status: SHIPPED | OUTPATIENT
Start: 2020-02-24 | End: 2022-05-09 | Stop reason: HOSPADM

## 2020-02-24 RX ORDER — ASPIRIN 325 MG
325 TABLET ORAL DAILY
Qty: 30 TABLET | Refills: 0 | Status: SHIPPED | OUTPATIENT
Start: 2020-02-25 | End: 2021-12-01

## 2020-02-24 RX ORDER — PREDNISONE 10 MG/1
TABLET ORAL
Qty: 30 TABLET | Refills: 0 | Status: SHIPPED | OUTPATIENT
Start: 2020-02-24 | End: 2020-03-07

## 2020-02-24 RX ORDER — ALBUTEROL SULFATE 90 UG/1
2 AEROSOL, METERED RESPIRATORY (INHALATION) EVERY 4 HOURS PRN
Qty: 1 INHALER | Refills: 0 | Status: SHIPPED | OUTPATIENT
Start: 2020-02-24 | End: 2022-04-28 | Stop reason: SDUPTHER

## 2020-02-24 RX ADMIN — FLUTICASONE FUROATE AND VILANTEROL TRIFENATATE 1 PUFF: 200; 25 POWDER RESPIRATORY (INHALATION) at 08:31

## 2020-02-24 RX ADMIN — AMOXICILLIN AND CLAVULANATE POTASSIUM 1 TABLET: 875; 125 TABLET, FILM COATED ORAL at 08:23

## 2020-02-24 RX ADMIN — ASPIRIN 325 MG ORAL TABLET 325 MG: 325 PILL ORAL at 08:23

## 2020-02-24 RX ADMIN — GUAIFENESIN 600 MG: 600 TABLET ORAL at 08:23

## 2020-02-24 RX ADMIN — METHYLPREDNISOLONE SODIUM SUCCINATE 40 MG: 40 INJECTION, POWDER, FOR SOLUTION INTRAMUSCULAR; INTRAVENOUS at 08:23

## 2020-02-24 RX ADMIN — IPRATROPIUM BROMIDE AND ALBUTEROL SULFATE 3 ML: 2.5; .5 SOLUTION RESPIRATORY (INHALATION) at 08:31

## 2020-02-24 NOTE — DISCHARGE SUMMARY
[facility]  Discharge- Bibi Andre 1955, 71 y o  male MRN: [id number]  Unit/Bed#: -Leah Encounter: [id number]  Primary Care Provider: No primary care provider on file  Date and time admitted to hospital: 2/18/2020  8:46 AM    * COPD with acute exacerbation (HCC)  Assessment & Plan  · Likely secondary to tracheobronchitis  · Continue Augmentin and Solu-Medrol  · Transition to prednisone taper on discharge  · Oxygen supplementation and bronchodilators p r n  · RSV positive, Sputum culture negative  · Continue Breo  · Encourage incentive spirometer use  · Wean oxygen as tolerated  · He has been saturating appropriately on room air, will defer ambulatory pulse ox    Severe protein-calorie malnutrition (HCC)  Assessment & Plan  · In the setting of COPD evidenced by BMI 15 5 requiring nutritional consult  · Evidenced by orbital is a-follow local dark circles, temple hauling, triceps with little space between the folds  · Patient refused nutrition supplements    Acute tracheobronchitis  Assessment & Plan  · Patient is positive for RSV  · Continue on Augmentin  · Continue steroids  · Pulmonary on board  · See above    Discharging Physician / Practitioner: Brown Michelle PA-C  PCP: No primary care provider on file  Admission Date:   Admission Orders (From admission, onward)     Ordered        02/18/20 1230  Inpatient Admission (expected length of stay for this patient Order details is greater than two midnights)  Once                   Discharge Date: 02/24/20    Resolved Problems  Date Reviewed: 2/24/2020          Resolved    Chest pain 2/24/2020     Resolved by  Brown Michelle PA-C    Acute respiratory failure with hypoxia ([address] ) 2/24/2020     Resolved by  Brown Michelle PA-C          Consultations During Hospital Stay:  · Pulmonary    Procedures Performed:   · None    Significant Findings / Test Results:   · CXR 2/18:  Emphysematous changes are noted    No focal consolidation, pleural effusion or pneumothorax  · Procalcitonin negative  · Flu negative, RSV positive    Incidental Findings:   · None    Test Results Pending at Discharge (will require follow up): · None     Outpatient Tests Requested:  · PFT with pulmonology    Complications:  None    Reason for Admission: SOB, COPD exacerbation    Hospital Course:     Marianela Vincent is a 71 y o  male patient with past medical history of COPD/emphysema who quit smoking years ago who originally presented to the hospital on 2/18/2020 due to cold symptoms with shortness of breath  Patient reports having upper respiratory symptoms x3 days prior to admission  He reported a nonproductive cough associated with low-grade fever as well  RSV positive swab  He has utilized his usual inhalers and nebulizer treatments at home without significant improvement which led to him coming to the emergency department  Suspect patient had acute exacerbation of COPD, acute tracheobronchitis and respiratory insufficiency secondary to RSV  Pulmonary was consulted  Patient was started on Solu-Medrol and Zithromax in the ED  Pulmonary medicine recommended continuing patient on Augmentin  Will discharge on DuoNebs t i d  And steroid taper  Patient is to follow-up with pulmonology in patient  Please see above list of diagnoses and related plan for additional information  Condition at Discharge: stable     Discharge Day Visit / Exam:     Subjective:  Patient reports he is very eager to go home  Discussed using DuoNebs at home as well as a steroid taper      Vitals: Blood Pressure: 124/75 (02/24/20 1332)  Pulse: 94 (02/23/20 1550)  Temperature: 98 7 °F (37 1 °C) (02/24/20 1332)  Temp Source: Oral (02/22/20 2304)  Respirations: 18 (02/24/20 1332)  Height: 5' 8" (172 7 cm) (02/18/20 1916)  Weight - Scale: 45 3 kg (99 lb 13 9 oz) (02/24/20 0600)  SpO2: 97 % (02/24/20 0900)  Exam:   Physical Exam   Constitutional: He appears well-developed and well-nourished  No distress  HENT:   Head: Normocephalic and atraumatic  Eyes: Conjunctivae are normal  No scleral icterus  Cardiovascular: Normal rate and regular rhythm  No murmur heard  Pulmonary/Chest: Effort normal  No respiratory distress  He has decreased breath sounds  He has no wheezes  He has no rales  Abdominal: Soft  He exhibits no distension  There is no tenderness  Musculoskeletal: He exhibits no edema  Neurological: He is alert  Skin: Skin is warm and dry  No erythema  Psychiatric: His mood appears anxious  Nursing note and vitals reviewed  Discussion with Family:  Discussed discharge plan with patient at bedside  Patient reported he will update his family and just wants to leave  Discharge instructions/Information to patient and family:   See after visit summary for information provided to patient and family  Provisions for Follow-Up Care:  See after visit summary for information related to follow-up care and any pertinent home health orders  Disposition:     Home    For Discharges to [facility]:   · Not Applicable to this Patient - Not Applicable to this Patient    Planned Readmission: None     Discharge Statement:  I spent 65 minutes discharging the patient  This time was spent on the day of discharge  I had direct contact with the patient on the day of discharge  Greater than 50% of the total time was spent examining patient, answering all patient questions, arranging and discussing plan of care with patient as well as directly providing post-discharge instructions  Additional time then spent on discharge activities  Discharge Medications:  See after visit summary for reconciled discharge medications provided to patient and family        ** Please Note: This note has been constructed using a voice recognition system **

## 2020-02-24 NOTE — DISCHARGE INSTR - AVS FIRST PAGE
Discharge Instructions    · Follow-up with primary care provider within 1 week  · Follow-up with pulmonary medicine for further testing  · Continue steroid taper  · DuoNebs as needed

## 2020-02-24 NOTE — ASSESSMENT & PLAN NOTE
· Likely secondary to tracheobronchitis  · Continue Augmentin and Solu-Medrol  · Transition to prednisone taper on discharge  · Oxygen supplementation and bronchodilators p r n    · RSV positive, Sputum culture negative  · Continue Breo  · Encourage incentive spirometer use  · Wean oxygen as tolerated  · He has been saturating appropriately on room air, will defer ambulatory pulse ox

## 2020-02-24 NOTE — ASSESSMENT & PLAN NOTE
· In setting of COPD exacerbation evidence by respiratory distress, tachypnea room air with oxygen saturation of 89%  · In setting of RSV  · Oxygen supplementation p r n    · Pulmonary on board  · Continue on Solu-Medrol Q12 hrs  · PO steroid taper on discharge  · Continue albuterol nebulizers  · Continue on Augmentin  · See plan under primary problem for further plan and workup

## 2020-02-24 NOTE — PLAN OF CARE
Problem: PAIN - ADULT  Goal: Verbalizes/displays adequate comfort level or baseline comfort level  Description  Interventions:  - Encourage patient to monitor pain and request assistance  - Assess pain using appropriate pain scale  - Administer analgesics based on type and severity of pain and evaluate response  - Implement non-pharmacological measures as appropriate and evaluate response  - Consider cultural and social influences on pain and pain management  - Notify physician/advanced practitioner if interventions unsuccessful or patient reports new pain  2/24/2020 1114 by Ana Cook RN  Outcome: Adequate for Discharge  2/24/2020 0736 by Ana Cook RN  Outcome: Progressing

## 2020-02-26 PROBLEM — E43 SEVERE PROTEIN-CALORIE MALNUTRITION (HCC): Status: ACTIVE | Noted: 2020-02-19

## 2020-02-26 NOTE — ASSESSMENT & PLAN NOTE
· In the setting of COPD evidenced by BMI 15 5 requiring nutritional consult  · Evidenced by orbital is a-follow local dark circles, temple hauling, triceps with little space between the folds  · Patient refused nutrition supplements

## 2020-03-16 DIAGNOSIS — E78.5 HLD (HYPERLIPIDEMIA): ICD-10-CM

## 2020-03-16 RX ORDER — ATORVASTATIN CALCIUM 40 MG/1
TABLET, FILM COATED ORAL
Qty: 30 TABLET | Refills: 0 | OUTPATIENT
Start: 2020-03-16

## 2020-03-16 RX ORDER — CALCIUM CARBONATE/VITAMIN D3 600 MG-20
TABLET ORAL
Qty: 30 TABLET | Refills: 0 | OUTPATIENT
Start: 2020-03-16

## 2020-04-18 DIAGNOSIS — E78.5 HLD (HYPERLIPIDEMIA): ICD-10-CM

## 2020-04-22 RX ORDER — ATORVASTATIN CALCIUM 40 MG/1
TABLET, FILM COATED ORAL
Qty: 30 TABLET | Refills: 0 | OUTPATIENT
Start: 2020-04-22

## 2020-09-07 ENCOUNTER — HOSPITAL ENCOUNTER (EMERGENCY)
Facility: HOSPITAL | Age: 65
Discharge: HOME | End: 2020-09-07
Attending: EMERGENCY MEDICINE | Admitting: EMERGENCY MEDICINE
Payer: MEDICARE

## 2020-09-07 VITALS
SYSTOLIC BLOOD PRESSURE: 135 MMHG | OXYGEN SATURATION: 100 % | DIASTOLIC BLOOD PRESSURE: 70 MMHG | BODY MASS INDEX: 15.05 KG/M2 | HEART RATE: 75 BPM | WEIGHT: 99 LBS | RESPIRATION RATE: 20 BRPM | TEMPERATURE: 97.3 F

## 2020-09-07 DIAGNOSIS — B02.9 HERPES ZOSTER: Primary | ICD-10-CM

## 2020-09-07 PROCEDURE — 99282 EMERGENCY DEPT VISIT SF MDM: CPT

## 2020-09-07 PROCEDURE — 99284 EMERGENCY DEPT VISIT MOD MDM: CPT | Performed by: PHYSICIAN ASSISTANT

## 2020-09-07 RX ORDER — VALACYCLOVIR HYDROCHLORIDE 1 G/1
1000 TABLET, FILM COATED ORAL 2 TIMES DAILY
COMMUNITY
End: 2022-05-09 | Stop reason: HOSPADM

## 2020-09-07 RX ADMIN — FLUORESCEIN SODIUM 1 STRIP: 1 STRIP OPHTHALMIC at 16:50

## 2020-09-07 NOTE — ED PROVIDER NOTES
History  Chief Complaint   Patient presents with    Rash     To ED with c/o right sided eye and ear irritation for 2 days  Was seen by Urgent care for same and DX shingles  Was instructed to go to ED  65-year-old male with history of COPD presents the emergency department for right eye examination  The patient states he developed painful rash to the right ear as well as the right forehead 2 days ago, yesterday was seen at urgent care at which time he was diagnosed with herpes zoster and started on valacyclovir  He states that he was advised to seek ED evaluation for formal eye exam to rule out ocular zoster  Patient does report mild eye discomfort but denies any blurry vision eye redness, tearing, or discharge  He denies photophobia  Rash has not spread since starting the antivirals          Prior to Admission Medications   Prescriptions Last Dose Informant Patient Reported? Taking? albuterol (PROVENTIL HFA,VENTOLIN HFA) 90 mcg/act inhaler   No No   Sig: Inhale 2 puffs every 4 (four) hours as needed for wheezing   aspirin 325 mg tablet   No No   Sig: Take 1 tablet (325 mg total) by mouth daily   atorvastatin (LIPITOR) 40 mg tablet   No No   Sig: Take 1 tablet (40 mg total) by mouth daily with dinner   fluticasone-vilanterol (BREO ELLIPTA) 200-25 MCG/INH inhaler   No No   Sig: Inhale 1 puff daily Rinse mouth after use  ipratropium-albuterol (DUO-NEB) 0 5-2 5 mg/3 mL nebulizer solution   No No   Sig: Take 1 vial (3 mL total) by nebulization 3 (three) times a day   valACYclovir (VALTREX) 1,000 mg tablet  Self Yes Yes   Sig: Take 1,000 mg by mouth 2 (two) times a day      Facility-Administered Medications: None       Past Medical History:   Diagnosis Date    Emphysema lung ([address] )        Past Surgical History:   Procedure Laterality Date    APPENDECTOMY         History reviewed  No pertinent family history  I have reviewed and agree with the history as documented      E-Cigarette/Vaping    E-Cigarette Use Never User      E-Cigarette/Vaping Substances    Nicotine No     Flavoring No      Social History     Tobacco Use    Smoking status: Former Smoker    Smokeless tobacco: Never Used   Substance Use Topics    Alcohol use: Not Currently    Drug use: Never       Review of Systems   Constitutional: Negative for chills, diaphoresis and fever  Eyes: Positive for pain  Negative for photophobia, redness and visual disturbance  Respiratory: Negative for shortness of breath  Cardiovascular: Negative for chest pain  Gastrointestinal: Negative for nausea and vomiting  Musculoskeletal: Negative for arthralgias and myalgias  Skin: Positive for rash  Negative for color change  Allergic/Immunologic: Negative for environmental allergies and immunocompromised state  Neurological: Negative for weakness and numbness  Physical Exam  Physical Exam  Vitals signs reviewed  Constitutional:       General: He is not in acute distress  Appearance: He is well-developed  He is not diaphoretic  HENT:      Head: Normocephalic and atraumatic  Eyes:      General: No scleral icterus  Pupils: Pupils are equal, round, and reactive to light  Comments: Fluorescein stain reveals no dye uptake of the right eye, negative Cristina sign, no hyphema or hypopyon   Neck:      Vascular: No JVD  Cardiovascular:      Rate and Rhythm: Normal rate and regular rhythm  Heart sounds: No murmur  No friction rub  No gallop  Pulmonary:      Effort: No respiratory distress  Breath sounds: No wheezing or rales  Skin:     General: Skin is warm and dry  Comments: Scant vesicular lesions to right eyebrow and right auricle, no lesions in external auditory canal or on tympanic membrane   Neurological:      Mental Status: He is alert and oriented to person, place, and time           Vital Signs  ED Triage Vitals [09/07/20 1643]   Temperature Pulse Respirations Blood Pressure SpO2   (!) 97 3 °F (36 3 °C) 75 20 135/70 100 %      Temp Source Heart Rate Source Patient Position - Orthostatic VS BP Location FiO2 (%)   Tympanic Monitor Sitting Right arm --      Pain Score       --           Vitals:    09/07/20 1643   BP: 135/70   Pulse: 75   Patient Position - Orthostatic VS: Sitting         Visual Acuity      ED Medications  Medications   fluorescein sodium sterile ophthalmic strip 1 strip (1 strip Right Eye Given 9/7/20 1650)       Diagnostic Studies  Results Reviewed     None                 No orders to display              Procedures  Procedures         ED Course       US AUDIT      Most Recent Value   Initial Alcohol Screen: US AUDIT-C    1  How often do you have a drink containing alcohol?  0 Filed at: 09/07/2020 1644   2  How many drinks containing alcohol do you have on a typical day you are drinking? 0 Filed at: 09/07/2020 1644   3a  Male UNDER 65: How often do you have five or more drinks on one occasion? 0 Filed at: 09/07/2020 1644   3b  FEMALE Any Age, or MALE 65+: How often do you have 4 or more drinks on one occassion? 0 Filed at: 09/07/2020 1644   Audit-C Score  0 Filed at: 09/07/2020 1644                  YOUSIF/DAST-10      Most Recent Value   How many times in the past year have you    Used an illegal drug or used a prescription medication for non-medical reasons? Never Filed at: 09/07/2020 1644                                [facility]  Number of Diagnoses or Management Options  Herpes zoster: new and does not require workup  Diagnosis management comments: Exam reveals no evidence of ocular zoster    Continue antivirals and follow up outpatient with Ophthalmology should symptoms worsen or should photophobia/blurry vision/severe eye pain develop       Amount and/or Complexity of Data Reviewed  Review and summarize past medical records: yes          Disposition  Final diagnoses:   Herpes zoster     Time reflects when diagnosis was documented in both MDM as applicable and the Disposition within this note     Time User Action Codes Description Comment    9/7/2020  4:54 PM Sintia Mcmahon Add [B02 9] Herpes zoster       ED Disposition     ED Disposition Condition Date/Time Comment    Discharge Stable Mon Sep 7, 2020  4:54 PM Blade Cates discharge to home/self care  Follow-up Information     Follow up With Specialties Details Why Vera Dailey MD Ophthalmology Schedule an appointment as soon as possible for a visit in 2 days If symptoms worsen [address]  [phone]            Discharge Medication List as of 9/7/2020  4:55 PM      CONTINUE these medications which have NOT CHANGED    Details   valACYclovir (VALTREX) 1,000 mg tablet Take 1,000 mg by mouth 2 (two) times a day, Historical Med      albuterol (PROVENTIL HFA,VENTOLIN HFA) 90 mcg/act inhaler Inhale 2 puffs every 4 (four) hours as needed for wheezing, Starting Mon 2/24/2020, Normal      aspirin 325 mg tablet Take 1 tablet (325 mg total) by mouth daily, Starting Tue 2/25/2020, Normal      atorvastatin (LIPITOR) 40 mg tablet Take 1 tablet (40 mg total) by mouth daily with dinner, Starting Mon 2/24/2020, Normal      fluticasone-vilanterol (BREO ELLIPTA) 200-25 MCG/INH inhaler Inhale 1 puff daily Rinse mouth after use , Starting Tue 2/25/2020, Normal      ipratropium-albuterol (DUO-NEB) 0 5-2 5 mg/3 mL nebulizer solution Take 1 vial (3 mL total) by nebulization 3 (three) times a day, Starting Mon 2/24/2020, Normal           No discharge procedures on file      PDMP Review     None          ED Provider  Electronically Signed by           Dale Bailey PA-C  09/07/20 1997

## 2020-09-09 ENCOUNTER — HOSPITAL ENCOUNTER (EMERGENCY)
Facility: HOSPITAL | Age: 65
Discharge: HOME | End: 2020-09-09
Attending: EMERGENCY MEDICINE | Admitting: EMERGENCY MEDICINE
Payer: MEDICARE

## 2020-09-09 VITALS
HEIGHT: 68 IN | RESPIRATION RATE: 18 BRPM | BODY MASS INDEX: 16.67 KG/M2 | WEIGHT: 110 LBS | DIASTOLIC BLOOD PRESSURE: 76 MMHG | SYSTOLIC BLOOD PRESSURE: 128 MMHG | OXYGEN SATURATION: 98 % | HEART RATE: 76 BPM

## 2020-09-09 DIAGNOSIS — H20.9 UVEITIS OF RIGHT EYE: ICD-10-CM

## 2020-09-09 DIAGNOSIS — B02.30 HERPES ZOSTER OPHTHALMICUS OF RIGHT EYE: Primary | ICD-10-CM

## 2020-09-09 LAB
ALBUMIN SERPL BCP-MCNC: 3.7 G/DL (ref 3.5–5)
ALP SERPL-CCNC: 62 U/L (ref 46–116)
ALT SERPL W P-5'-P-CCNC: 20 U/L (ref 12–78)
ANION GAP SERPL CALCULATED.3IONS-SCNC: 6 MMOL/L (ref 4–13)
APAP SERPL-MCNC: <2 UG/ML (ref 10–20)
AST SERPL W P-5'-P-CCNC: 21 U/L (ref 5–45)
BASOPHILS # BLD AUTO: 0.06 THOUSANDS/ΜL (ref 0–0.1)
BASOPHILS NFR BLD AUTO: 2 % (ref 0–1)
BILIRUB SERPL-MCNC: 0.2 MG/DL (ref 0.2–1)
BUN SERPL-MCNC: 11 MG/DL (ref 5–25)
CALCIUM SERPL-MCNC: 8.5 MG/DL (ref 8.3–10.1)
CHLORIDE SERPL-SCNC: 107 MMOL/L (ref 100–108)
CO2 SERPL-SCNC: 31 MMOL/L (ref 21–32)
CREAT SERPL-MCNC: 0.92 MG/DL (ref 0.6–1.3)
EOSINOPHIL # BLD AUTO: 0.08 THOUSAND/ΜL (ref 0–0.61)
EOSINOPHIL NFR BLD AUTO: 2 % (ref 0–6)
ERYTHROCYTE [DISTWIDTH] IN BLOOD BY AUTOMATED COUNT: 12.6 % (ref 11.6–15.1)
ETHANOL SERPL-MCNC: <3 MG/DL (ref 0–3)
GFR SERPL CREATININE-BSD FRML MDRD: 86 ML/MIN/1.73SQ M
GLUCOSE SERPL-MCNC: 84 MG/DL (ref 65–140)
HCT VFR BLD AUTO: 43.5 % (ref 36.5–49.3)
HGB BLD-MCNC: 14.6 G/DL (ref 12–17)
IMM GRANULOCYTES # BLD AUTO: 0.01 THOUSAND/UL (ref 0–0.2)
IMM GRANULOCYTES NFR BLD AUTO: 0 % (ref 0–2)
LYMPHOCYTES # BLD AUTO: 1 THOUSANDS/ΜL (ref 0.6–4.47)
LYMPHOCYTES NFR BLD AUTO: 25 % (ref 14–44)
MCH RBC QN AUTO: 32.9 PG (ref 26.8–34.3)
MCHC RBC AUTO-ENTMCNC: 33.6 G/DL (ref 31.4–37.4)
MCV RBC AUTO: 98 FL (ref 82–98)
MONOCYTES # BLD AUTO: 0.56 THOUSAND/ΜL (ref 0.17–1.22)
MONOCYTES NFR BLD AUTO: 14 % (ref 4–12)
NEUTROPHILS # BLD AUTO: 2.27 THOUSANDS/ΜL (ref 1.85–7.62)
NEUTS SEG NFR BLD AUTO: 57 % (ref 43–75)
NRBC BLD AUTO-RTO: 0 /100 WBCS
PLATELET # BLD AUTO: 288 THOUSANDS/UL (ref 149–390)
PMV BLD AUTO: 8.6 FL (ref 8.9–12.7)
POTASSIUM SERPL-SCNC: 3.9 MMOL/L (ref 3.5–5.3)
PROT SERPL-MCNC: 6.9 G/DL (ref 6.4–8.2)
RBC # BLD AUTO: 4.44 MILLION/UL (ref 3.88–5.62)
SALICYLATES SERPL-MCNC: <3 MG/DL (ref 3–20)
SODIUM SERPL-SCNC: 144 MMOL/L (ref 136–145)
WBC # BLD AUTO: 3.98 THOUSAND/UL (ref 4.31–10.16)

## 2020-09-09 PROCEDURE — 96374 THER/PROPH/DIAG INJ IV PUSH: CPT

## 2020-09-09 PROCEDURE — 96361 HYDRATE IV INFUSION ADD-ON: CPT

## 2020-09-09 PROCEDURE — 85025 COMPLETE CBC W/AUTO DIFF WBC: CPT | Performed by: EMERGENCY MEDICINE

## 2020-09-09 PROCEDURE — 99283 EMERGENCY DEPT VISIT LOW MDM: CPT

## 2020-09-09 PROCEDURE — 80320 DRUG SCREEN QUANTALCOHOLS: CPT | Performed by: EMERGENCY MEDICINE

## 2020-09-09 PROCEDURE — 36415 COLL VENOUS BLD VENIPUNCTURE: CPT | Performed by: EMERGENCY MEDICINE

## 2020-09-09 PROCEDURE — 80053 COMPREHEN METABOLIC PANEL: CPT | Performed by: EMERGENCY MEDICINE

## 2020-09-09 PROCEDURE — 99284 EMERGENCY DEPT VISIT MOD MDM: CPT | Performed by: EMERGENCY MEDICINE

## 2020-09-09 PROCEDURE — 80329 ANALGESICS NON-OPIOID 1 OR 2: CPT | Performed by: EMERGENCY MEDICINE

## 2020-09-09 RX ORDER — PHENYLEPHRINE HCL 2.5 %
1 DROPS OPHTHALMIC (EYE) ONCE
Status: COMPLETED | OUTPATIENT
Start: 2020-09-09 | End: 2020-09-09

## 2020-09-09 RX ORDER — TOBRAMYCIN AND DEXAMETHASONE 3; 1 MG/ML; MG/ML
1 SUSPENSION/ DROPS OPHTHALMIC ONCE
Status: COMPLETED | OUTPATIENT
Start: 2020-09-09 | End: 2020-09-09

## 2020-09-09 RX ORDER — TROPICAMIDE 10 MG/ML
1 SOLUTION/ DROPS OPHTHALMIC ONCE
Status: COMPLETED | OUTPATIENT
Start: 2020-09-09 | End: 2020-09-09

## 2020-09-09 RX ORDER — CYCLOPENTOLATE HYDROCHLORIDE 10 MG/ML
1 SOLUTION/ DROPS OPHTHALMIC 3 TIMES DAILY
Qty: 5 ML | Refills: 0 | Status: SHIPPED | OUTPATIENT
Start: 2020-09-09 | End: 2022-05-09 | Stop reason: HOSPADM

## 2020-09-09 RX ORDER — TETRACAINE HYDROCHLORIDE 5 MG/ML
1 SOLUTION OPHTHALMIC ONCE
Status: COMPLETED | OUTPATIENT
Start: 2020-09-09 | End: 2020-09-09

## 2020-09-09 RX ORDER — TOBRAMYCIN AND DEXAMETHASONE 3; 1 MG/ML; MG/ML
1 SUSPENSION/ DROPS OPHTHALMIC ONCE
Status: DISCONTINUED | OUTPATIENT
Start: 2020-09-09 | End: 2020-09-09

## 2020-09-09 RX ORDER — TOBRAMYCIN AND DEXAMETHASONE 3; 1 MG/ML; MG/ML
1 SUSPENSION/ DROPS OPHTHALMIC 4 TIMES DAILY
Qty: 5 ML | Refills: 0 | Status: SHIPPED | OUTPATIENT
Start: 2020-09-09 | End: 2022-05-09 | Stop reason: HOSPADM

## 2020-09-09 RX ORDER — GABAPENTIN 300 MG/1
300 CAPSULE ORAL 3 TIMES DAILY
Qty: 21 CAPSULE | Refills: 0 | Status: SHIPPED | OUTPATIENT
Start: 2020-09-09 | End: 2022-05-09 | Stop reason: HOSPADM

## 2020-09-09 RX ORDER — KETOROLAC TROMETHAMINE 30 MG/ML
15 INJECTION, SOLUTION INTRAMUSCULAR; INTRAVENOUS ONCE
Status: COMPLETED | OUTPATIENT
Start: 2020-09-09 | End: 2020-09-09

## 2020-09-09 RX ORDER — GABAPENTIN 300 MG/1
300 CAPSULE ORAL ONCE
Status: COMPLETED | OUTPATIENT
Start: 2020-09-09 | End: 2020-09-09

## 2020-09-09 RX ADMIN — PHENYLEPHRINE HYDROCHLORIDE 1 DROP: 25 SOLUTION/ DROPS OPHTHALMIC at 14:58

## 2020-09-09 RX ADMIN — FLUORESCEIN SODIUM 1 STRIP: 1 STRIP OPHTHALMIC at 13:40

## 2020-09-09 RX ADMIN — KETOROLAC TROMETHAMINE 15 MG: 30 INJECTION, SOLUTION INTRAMUSCULAR at 13:40

## 2020-09-09 RX ADMIN — GABAPENTIN 300 MG: 300 CAPSULE ORAL at 13:25

## 2020-09-09 RX ADMIN — TOBRAMYCIN AND DEXAMETHASONE 1 DROP: 3; 1 SUSPENSION/ DROPS OPHTHALMIC at 15:21

## 2020-09-09 RX ADMIN — TETRACAINE HYDROCHLORIDE 1 DROP: 5 SOLUTION OPHTHALMIC at 13:40

## 2020-09-09 RX ADMIN — TROPICAMIDE 1 DROP: 10 SOLUTION/ DROPS OPHTHALMIC at 14:58

## 2020-09-09 RX ADMIN — SODIUM CHLORIDE 1000 ML: 0.9 INJECTION, SOLUTION INTRAVENOUS at 13:36

## 2020-09-09 NOTE — DISCHARGE INSTRUCTIONS
Shingles   WHAT YOU NEED TO KNOW:   Shingles is a painful infection caused by the same virus that causes chickenpox (varicella-zoster virus)  After you get chickenpox, the virus stays in your body for several years without causing any symptoms  Shingles occurs when the virus becomes active again  Once active, the virus will travel along a nerve to your skin and cause a rash  DISCHARGE INSTRUCTIONS:   Return to the emergency department if:   · You have painful, red, warm skin around the blisters, or the blisters drain pus  · Your neck is stiff or you have trouble moving it  · You have trouble moving your arms, legs, or face  · You have a seizure  · You have weakness in an arm or leg  · You become confused, or have difficulty speaking  · You have dizziness, a severe headache, hearing or vision loss  Contact your healthcare provider if:   · You feel weak or have a headache  · You have a cough, chills, or a fever  · You have abdominal pain, nausea, or vomiting  · Your rash becomes more itchy or painful  · Your rash spreads to other parts of your body  · Your pain worsens and does not go away even after taking medicine  · You have questions or concerns about your condition or care  Medicines:   · Antiviral medicine  helps decrease symptoms and healing time  They may also decrease your risk of developing nerve pain  You will need to start taking them within 3 days of the start of symptoms to prevent nerve pain  · Pain medicine  may be prescribed or suggested by your healthcare provider  You may need NSAIDs, acetaminophen, or opioid medicine depending on how much pain you are in  Do not wait until the pain is severe before you take more pain medicine  · Topical anesthetics  are used to numb the skin and decrease pain  They can be a cream, gel, spray, or patch  · Anticonvulsants  decrease nerve pain and may help you sleep at night      · Antidepressants  may be used to decrease nerve pain  · Take your medicine as directed  Contact your healthcare provider if you think your medicine is not helping or if you have side effects  Tell him of her if you are allergic to any medicine  Keep a list of the medicines, vitamins, and herbs you take  Include the amounts, and when and why you take them  Bring the list or the pill bottles to follow-up visits  Carry your medicine list with you in case of an emergency  Follow up with your healthcare provider as directed:  Write down your questions so you remember to ask them during your visits  Self-care:  Keep your rash clean and dry  Cover your rash with a bandage or clothing  Do not use bandages that stick to your skin  The sticky part may irritate your skin and make your rash last longer  Prevent the spread of shingles: The virus can be passed to a person who has never had chickenpox  This person may get chickenpox, but not shingles  You may pass the virus to others as long as you have a rash  The virus is spread by direct contact with the fluid from the blisters  Usually, you cannot spread the virus once the blisters dry up  Prevent shingles or another shingles outbreak:  A vaccine may be given to help prevent shingles  Ask for more information about this vaccine  © 2017 [facility] Information is for End User's use only and may not be sold, redistributed or otherwise used for commercial purposes  All illustrations and images included in CareNotes® are the copyrighted property of [employer]  or [employer]  The above information is an  only  It is not intended as medical advice for individual conditions or treatments  Talk to your doctor, nurse or pharmacist before following any medical regimen to see if it is safe and effective for you

## 2020-09-09 NOTE — ED PROVIDER NOTES
History  Chief Complaint   Patient presents with    Eye Pain     Patient states that he has shingles and now has blasing right eye pain     76year old male presents for evaluation of uncontrolled pain from zoster effecting his right temple and right eye  Pain has been worsening for the past 3 days  He was started on valacyclovir by urgent care 2 days ago and was evaluated in the ED on 9/7/20  Per notes from his ED visit, there were no signs of herpes ophthalmicus on fluorescein exam  Patient had been advised to follow up with ophthalmology, but has not yet done so as he was informed that he would not be able to be seen for 2 weeks  Patient denies discharge from the eye or change in vision  No fevers or chills  He reports nausea with single episode of emesis last night due to the pain  Patient states he has been taking 1500 mg tylenol every 3 hours for 2 days up until last night with no relief of the pain; however states he believes he only took a total of 5 tablets of 650 mg  Patient states he realized this amount was potentially dangerous and stopped taking the tylenol last night  He has also been taking ibuprofen migraine with last dose last night with no improvement in the pain         History provided by:  Patient  Eye Pain   Location:  Right eye and temple  Quality:  "blasting"  Severity:  Severe  Onset quality:  Gradual  Duration:  3 days  Timing:  Constant  Progression:  Worsening  Chronicity:  New  Context:  Zoster  Relieved by:  Nothing  Worsened by:  Nothing  Ineffective treatments:  Tylenol, ibuprofen  Associated symptoms: headaches, nausea, rash and vomiting    Associated symptoms: no abdominal pain, no chest pain, no congestion, no cough, no diarrhea, no fatigue, no fever, no myalgias, no shortness of breath and no sore throat    Rash:     Location:  Face (right forehead, nose)    Quality: painful      Severity:  Moderate    Onset quality:  Gradual    Duration:  3 days    Timing:  Constant Progression:  Unchanged  Risk factors:  Zoster      Prior to Admission Medications   Prescriptions Last Dose Informant Patient Reported? Taking? albuterol (PROVENTIL HFA,VENTOLIN HFA) 90 mcg/act inhaler   No No   Sig: Inhale 2 puffs every 4 (four) hours as needed for wheezing   aspirin 325 mg tablet   No No   Sig: Take 1 tablet (325 mg total) by mouth daily   atorvastatin (LIPITOR) 40 mg tablet   No No   Sig: Take 1 tablet (40 mg total) by mouth daily with dinner   fluticasone-vilanterol (BREO ELLIPTA) 200-25 MCG/INH inhaler   No No   Sig: Inhale 1 puff daily Rinse mouth after use  ipratropium-albuterol (DUO-NEB) 0 5-2 5 mg/3 mL nebulizer solution   No No   Sig: Take 1 vial (3 mL total) by nebulization 3 (three) times a day   valACYclovir (VALTREX) 1,000 mg tablet  Self Yes No   Sig: Take 1,000 mg by mouth 2 (two) times a day      Facility-Administered Medications: None       Past Medical History:   Diagnosis Date    Emphysema lung ([facility] [address] )        Past Surgical History:   Procedure Laterality Date    APPENDECTOMY         History reviewed  No pertinent family history  I have reviewed and agree with the history as documented  E-Cigarette/Vaping    E-Cigarette Use Never User      E-Cigarette/Vaping Substances    Nicotine No     Flavoring No      Social History     Tobacco Use    Smoking status: Former Smoker    Smokeless tobacco: Never Used   Substance Use Topics    Alcohol use: Not Currently    Drug use: Never       Review of Systems   Constitutional: Negative for appetite change, diaphoresis, fatigue and fever  HENT: Negative for congestion and sore throat  Eyes: Positive for pain  Negative for discharge and visual disturbance  Respiratory: Negative for cough, chest tightness and shortness of breath  Cardiovascular: Negative for chest pain, palpitations and leg swelling  Gastrointestinal: Positive for nausea and vomiting  Negative for abdominal pain, constipation and diarrhea  Musculoskeletal: Negative for myalgias, neck pain and neck stiffness  Skin: Positive for rash  Negative for pallor  Neurological: Positive for headaches  Negative for syncope and weakness  All other systems reviewed and are negative  Physical Exam  Physical Exam  Vitals signs and nursing note reviewed  Constitutional:       General: He is not in acute distress  Appearance: He is well-developed  He is not toxic-appearing or diaphoretic  HENT:      Head: Normocephalic and atraumatic  Right Ear: Tympanic membrane normal       Left Ear: Tympanic membrane normal       Mouth/Throat:      Mouth: Mucous membranes are dry  Pharynx: Oropharynx is clear  Uvula midline  Eyes:      General:         Right eye: No discharge  Extraocular Movements: Extraocular movements intact  Conjunctiva/sclera:      Right eye: Right conjunctiva is injected  No chemosis or exudate  Pupils: Pupils are equal, round, and reactive to light  Right eye: Fluorescein uptake (pseudodendrites present) present  No corneal abrasion  Cristina exam negative  Comments: Mild periorbital edema right eye  Neck:      Musculoskeletal: Normal range of motion  Cardiovascular:      Rate and Rhythm: Normal rate and regular rhythm  Heart sounds: Normal heart sounds  Pulmonary:      Effort: Pulmonary effort is normal       Breath sounds: Normal breath sounds  Abdominal:      Palpations: Abdomen is soft  Tenderness: There is no abdominal tenderness  Lymphadenopathy:      Cervical: No cervical adenopathy  Skin:     General: Skin is warm and dry  Comments: Rash with ruptured vesicular lesions right forehead and nose           Vital Signs  ED Triage Vitals   Temp Pulse Respirations Blood Pressure SpO2   -- 09/09/20 1314 09/09/20 1314 09/09/20 1314 09/09/20 1314    (!) 111 20 140/85 94 %      Temp src Heart Rate Source Patient Position - Orthostatic VS BP Location FiO2 (%)   -- 09/09/20 1330 09/09/20 1314 09/09/20 1314 --    Monitor Sitting Left arm       Pain Score       09/09/20 1314       8           Vitals:    09/09/20 1314 09/09/20 1330 09/09/20 1400 09/09/20 1430   BP: 140/85 133/77 145/75 128/76   Pulse: (!) 111 100 87 76   Patient Position - Orthostatic VS: Sitting Sitting Sitting Sitting         Visual Acuity  Visual Acuity      Most Recent Value   Visual acuity R eye is  20/50   Visual acuity Left eye is  20/20   Wearing corrective eyewear/lenses?   No          ED Medications  Medications   gabapentin (NEURONTIN) capsule 300 mg (300 mg Oral Given 9/9/20 1325)   fluorescein sodium sterile ophthalmic strip 1 strip (1 strip Right Eye Given 9/9/20 1340)   tetracaine 0 5 % ophthalmic solution 1 drop (1 drop Right Eye Given 9/9/20 1340)   sodium chloride 0 9 % bolus 1,000 mL (0 mL Intravenous Stopped 9/9/20 1521)   ketorolac (TORADOL) injection 15 mg (15 mg Intravenous Given 9/9/20 1340)   tropicamide (MYDRIACYL) 1 % ophthalmic solution 1 drop (1 drop Ophthalmic Given 9/9/20 1458)   phenylephrine (RADHA-SYNEPHRINE) 2 5 % ophthalmic solution 1 drop (1 drop Right Eye Given 9/9/20 1458)   tobramycin-dexamethasone (TOBRADEX) 0 3-0 1 % ophthalmic suspension 1 drop (1 drop Right Eye Given 9/9/20 1521)       Diagnostic Studies  Results Reviewed     Procedure Component Value Units Date/Time    Comprehensive metabolic panel [[id number]] Collected:  09/09/20 1330    Lab Status:  Final result Specimen:  Blood from Arm, Right Updated:  09/09/20 1515     Sodium 144 mmol/L      Potassium 3 9 mmol/L      Chloride 107 mmol/L      CO2 31 mmol/L      ANION GAP 6 mmol/L      BUN 11 mg/dL      Creatinine 0 92 mg/dL      Glucose 84 mg/dL      Calcium 8 5 mg/dL      AST 21 U/L      ALT 20 U/L      Alkaline Phosphatase 62 U/L      Total Protein 6 9 g/dL      Albumin 3 7 g/dL      Total Bilirubin 0 20 mg/dL      eGFR 86 ml/min/1 73sq m     Narrative:       [place] guidelines for Chronic Kidney Disease (CKD):     Stage 1 with normal or high GFR (GFR > 90 mL/min/1 73 square meters)    Stage 2 Mild CKD (GFR = 60-89 mL/min/1 73 square meters)    Stage 3A Moderate CKD (GFR = 45-59 mL/min/1 73 square meters)    Stage 3B Moderate CKD (GFR = 30-44 mL/min/1 73 square meters)    Stage 4 Severe CKD (GFR = 15-29 mL/min/1 73 square meters)    Stage 5 End Stage CKD (GFR <15 mL/min/1 73 square meters)  Note: GFR calculation is accurate only with a steady state creatinine    Acetaminophen level-If concentration is detectable, please discuss with medical  on call   [[id number]]  (Abnormal) Collected:  09/09/20 1330    Lab Status:  Final result Specimen:  Blood from Arm, Right Updated:  09/09/20 1515     Acetaminophen Level <1 3 ug/mL     Salicylate level [[id number]]  (Abnormal) Collected:  09/09/20 1330    Lab Status:  Final result Specimen:  Blood from Arm, Right Updated:  92/12/47 5352     Salicylate Lvl <3 mg/dL     Ethanol [[id number]]  (Normal) Collected:  09/09/20 1330    Lab Status:  Final result Specimen:  Blood from Arm, Right Updated:  09/09/20 1417     Ethanol Lvl <3 mg/dL     CBC and differential [[id number]]  (Abnormal) Collected:  09/09/20 1330    Lab Status:  Final result Specimen:  Blood from Arm, Right Updated:  09/09/20 1352     WBC 3 98 Thousand/uL      RBC 4 44 Million/uL      Hemoglobin 14 6 g/dL      Hematocrit 43 5 %      MCV 98 fL      MCH 32 9 pg      MCHC 33 6 g/dL      RDW 12 6 %      MPV 8 6 fL      Platelets 092 Thousands/uL      nRBC 0 /100 WBCs      Neutrophils Relative 57 %      Immat GRANS % 0 %      Lymphocytes Relative 25 %      Monocytes Relative 14 %      Eosinophils Relative 2 %      Basophils Relative 2 %      Neutrophils Absolute 2 27 Thousands/µL      Immature Grans Absolute 0 01 Thousand/uL      Lymphocytes Absolute 1 00 Thousands/µL      Monocytes Absolute 0 56 Thousand/µL      Eosinophils Absolute 0 08 Thousand/µL      Basophils Absolute 0 06 Thousands/µL No orders to display              Procedures  Procedures         ED Course  ED Course as of Sep 09 1539   Wed Sep 09, 2020   1411 Case discussed with Dr Tony Hawkins from ophthalmology via [place] text who recommends dilating the eye with tropicamide 1% and neosynephrine 2 5%, 5 minutes apart, repeat x 1 which should help with the pain      1503 Tobradex QID, cyclogyl 1% TID  Follow up with Dr Tony Hawkins tomorrow or as soon as discharged if admission required  Uveitis                                           MDM  Number of Diagnoses or Management Options  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  Uveitis of right eye: new and requires workup  Diagnosis management comments: 76year old male presents for evaluation of uncontrolled pain due to zoster  Patient has been taking a large amount of tylenol for the pain, but does not believe he exceeded 3,250 mg in a 24 hour period  LFTs normal with undetectable tylenol level  pseudodentrites present on exam  Vision 20/50 in right eye which patient states is chronic after cataract surgery  Case discussed with Dr Tony Hawkins from ophthalmology  Pain improved with dilation of the eye which was performed under direction from ophthalmology  Given patient's improvement, suspect uveitis  Patient prescribed tobradex and cyclogyl with ophthalmology follow up tomorrow  Gabapentin prescribed for pain over the forehead and temple due to zoster  PCP follow up as needed  Discussed return precautions with patient          Amount and/or Complexity of Data Reviewed  Clinical lab tests: ordered and reviewed  Discuss the patient with other providers: yes    Patient Progress  Patient progress: stable      Disposition  Final diagnoses:   Herpes zoster ophthalmicus of right eye   Uveitis of right eye     Time reflects when diagnosis was documented in both MDM as applicable and the Disposition within this note     Time User Action Codes Description Comment    9/9/2020  1:56 PM Deboraha Cornea Add [B02 30] Herpes zoster ophthalmicus of right eye     9/9/2020  3:07 PM Sha BLAKE Add [H20 9] Uveitis of right eye       ED Disposition     ED Disposition Condition Date/Time Comment    Discharge Stable Wed Sep 9, 2020  3:18 PM Leon Velazquez discharge to home/self care  Follow-up Information     Follow up With Specialties Details Why Contact Info Additional Information    Jeremiah Hodgkin, MD Ophthalmology Schedule an appointment as soon as possible for a visit in 1 day for re-evaluation and further management [address]  [phone]       Warden Carly MD   As needed [address] [facility]  [phone]        [address] Emergency Department Emergency Medicine Go to  If symptoms worsen [address] [address]  [phone]  ED, [address] [facility], [facility] [address]          Discharge Medication List as of 9/9/2020  3:27 PM      START taking these medications    Details   cyclopentolate (Cyclogyl) 1 % ophthalmic solution Administer 1 drop to the right eye 3 (three) times a day, Starting Wed 9/9/2020, Normal      gabapentin (NEURONTIN) 300 mg capsule Take 1 capsule (300 mg total) by mouth 3 (three) times a day for 7 days For post-herpetic neuralgia:  Take 1 tablet on day 1,  Then take 2 tablets on day 2, Then take 3 tablets on day 3 and every day after that as instructed by your doctor , Starting Wed 9/9/2020, Until Wed 9/16/2020, Normal      tobramycin-dexamethasone (TOBRADEX) ophthalmic suspension Administer 1 drop to the right eye 4 (four) times a day, Starting Wed 9/9/2020, Print         CONTINUE these medications which have NOT CHANGED    Details   albuterol (PROVENTIL HFA,VENTOLIN HFA) 90 mcg/act inhaler Inhale 2 puffs every 4 (four) hours as needed for wheezing, Starting Mon 2/24/2020, Normal      aspirin 325 mg tablet Take 1 tablet (325 mg total) by mouth daily, Starting Tue 2/25/2020, Normal      atorvastatin (LIPITOR) 40 mg tablet Take 1 tablet (40 mg total) by mouth daily with dinner, Starting Mon 2/24/2020, Normal      fluticasone-vilanterol (BREO ELLIPTA) 200-25 MCG/INH inhaler Inhale 1 puff daily Rinse mouth after use , Starting Tue 2/25/2020, Normal      ipratropium-albuterol (DUO-NEB) 0 5-2 5 mg/3 mL nebulizer solution Take 1 vial (3 mL total) by nebulization 3 (three) times a day, Starting Mon 2/24/2020, Normal      valACYclovir (VALTREX) 1,000 mg tablet Take 1,000 mg by mouth 2 (two) times a day, Historical Med           No discharge procedures on file      PDMP Review     None          ED Provider  Electronically Signed by           Graciela Ramos MD  09/09/20 26 [id number]

## 2021-12-01 ENCOUNTER — LAB (OUTPATIENT)
Dept: LAB | Facility: HOSPITAL | Age: 66
End: 2021-12-01
Attending: INTERNAL MEDICINE
Payer: MEDICARE

## 2021-12-01 ENCOUNTER — HOSPITAL ENCOUNTER (OUTPATIENT)
Dept: CT IMAGING | Facility: HOSPITAL | Age: 66
Discharge: HOME | End: 2021-12-01
Attending: INTERNAL MEDICINE
Payer: MEDICARE

## 2021-12-01 ENCOUNTER — TELEPHONE (OUTPATIENT)
Dept: GASTROENTEROLOGY | Facility: CLINIC | Age: 66
End: 2021-12-01

## 2021-12-01 ENCOUNTER — OFFICE VISIT (OUTPATIENT)
Dept: GASTROENTEROLOGY | Facility: CLINIC | Age: 66
End: 2021-12-01
Payer: MEDICARE

## 2021-12-01 VITALS
DIASTOLIC BLOOD PRESSURE: 68 MMHG | SYSTOLIC BLOOD PRESSURE: 116 MMHG | BODY MASS INDEX: 16.97 KG/M2 | WEIGHT: 112 LBS | HEART RATE: 83 BPM | HEIGHT: 68 IN

## 2021-12-01 DIAGNOSIS — N42.9 PROSTATE IRREGULARITY: Primary | ICD-10-CM

## 2021-12-01 DIAGNOSIS — R10.32 LEFT LOWER QUADRANT ABDOMINAL PAIN: ICD-10-CM

## 2021-12-01 DIAGNOSIS — R93.5 ABNORMAL COMPUTED TOMOGRAPHY ANGIOGRAPHY (CTA) OF ABDOMEN AND PELVIS: ICD-10-CM

## 2021-12-01 DIAGNOSIS — J43.9 PULMONARY EMPHYSEMA, UNSPECIFIED EMPHYSEMA TYPE (HCC): ICD-10-CM

## 2021-12-01 DIAGNOSIS — Z79.82 ASPIRIN LONG-TERM USE: ICD-10-CM

## 2021-12-01 DIAGNOSIS — R10.13 DYSPEPSIA: ICD-10-CM

## 2021-12-01 DIAGNOSIS — R93.5 ABNORMAL CT OF THE ABDOMEN: ICD-10-CM

## 2021-12-01 DIAGNOSIS — Z86.010 PERSONAL HISTORY OF COLONIC POLYPS: ICD-10-CM

## 2021-12-01 DIAGNOSIS — R10.32 LEFT LOWER QUADRANT ABDOMINAL PAIN: Primary | ICD-10-CM

## 2021-12-01 PROBLEM — Z86.0100 PERSONAL HISTORY OF COLONIC POLYPS: Status: ACTIVE | Noted: 2021-12-01

## 2021-12-01 LAB
ALBUMIN SERPL BCP-MCNC: 3.8 G/DL (ref 3.5–5)
ALP SERPL-CCNC: 95 U/L (ref 46–116)
ALT SERPL W P-5'-P-CCNC: 27 U/L (ref 12–78)
ANION GAP SERPL CALCULATED.3IONS-SCNC: 9 MMOL/L (ref 4–13)
AST SERPL W P-5'-P-CCNC: 9 U/L (ref 5–45)
BASOPHILS # BLD AUTO: 0.08 THOUSANDS/ΜL (ref 0–0.1)
BASOPHILS NFR BLD AUTO: 2 % (ref 0–1)
BILIRUB SERPL-MCNC: 0.6 MG/DL (ref 0.2–1)
BILIRUB UR QL STRIP: NEGATIVE
BUN SERPL-MCNC: 12 MG/DL (ref 5–25)
CALCIUM SERPL-MCNC: 9.1 MG/DL (ref 8.3–10.1)
CHLORIDE SERPL-SCNC: 104 MMOL/L (ref 100–108)
CLARITY UR: CLEAR
CO2 SERPL-SCNC: 30 MMOL/L (ref 21–32)
COLOR UR: YELLOW
CREAT SERPL-MCNC: 0.77 MG/DL (ref 0.6–1.3)
CRP SERPL QL: <0.5 MG/L
EOSINOPHIL # BLD AUTO: 0.06 THOUSAND/ΜL (ref 0–0.61)
EOSINOPHIL NFR BLD AUTO: 1 % (ref 0–6)
ERYTHROCYTE [DISTWIDTH] IN BLOOD BY AUTOMATED COUNT: 13.2 % (ref 11.6–15.1)
GFR SERPL CREATININE-BSD FRML MDRD: 94 ML/MIN/1.73SQ M
GLUCOSE SERPL-MCNC: 104 MG/DL (ref 65–140)
GLUCOSE UR STRIP-MCNC: NEGATIVE MG/DL
HCT VFR BLD AUTO: 46.3 % (ref 36.5–49.3)
HGB BLD-MCNC: 15 G/DL (ref 12–17)
HGB UR QL STRIP.AUTO: NEGATIVE
IMM GRANULOCYTES # BLD AUTO: 0 THOUSAND/UL (ref 0–0.2)
IMM GRANULOCYTES NFR BLD AUTO: 0 % (ref 0–2)
KETONES UR STRIP-MCNC: ABNORMAL MG/DL
LEUKOCYTE ESTERASE UR QL STRIP: NEGATIVE
LYMPHOCYTES # BLD AUTO: 1.12 THOUSANDS/ΜL (ref 0.6–4.47)
LYMPHOCYTES NFR BLD AUTO: 22 % (ref 14–44)
MCH RBC QN AUTO: 32 PG (ref 26.8–34.3)
MCHC RBC AUTO-ENTMCNC: 32.4 G/DL (ref 31.4–37.4)
MCV RBC AUTO: 99 FL (ref 82–98)
MONOCYTES # BLD AUTO: 0.5 THOUSAND/ΜL (ref 0.17–1.22)
MONOCYTES NFR BLD AUTO: 10 % (ref 4–12)
NEUTROPHILS # BLD AUTO: 3.31 THOUSANDS/ΜL (ref 1.85–7.62)
NEUTS SEG NFR BLD AUTO: 65 % (ref 43–75)
NITRITE UR QL STRIP: NEGATIVE
PH UR STRIP.AUTO: 6 [PH]
PLATELET # BLD AUTO: 383 THOUSANDS/UL (ref 149–390)
PMV BLD AUTO: 8.6 FL (ref 8.9–12.7)
POTASSIUM SERPL-SCNC: 4.2 MMOL/L (ref 3.5–5.3)
PROT SERPL-MCNC: 6.9 G/DL (ref 6.4–8.2)
PROT UR STRIP-MCNC: NEGATIVE MG/DL
RBC # BLD AUTO: 4.69 MILLION/UL (ref 3.88–5.62)
SODIUM SERPL-SCNC: 143 MMOL/L (ref 136–145)
SP GR UR STRIP.AUTO: 1.02 (ref 1–1.03)
UROBILINOGEN UR QL STRIP.AUTO: 1 E.U./DL
WBC # BLD AUTO: 5.07 THOUSAND/UL (ref 4.31–10.16)

## 2021-12-01 PROCEDURE — 85025 COMPLETE CBC W/AUTO DIFF WBC: CPT

## 2021-12-01 PROCEDURE — 80053 COMPREHEN METABOLIC PANEL: CPT

## 2021-12-01 PROCEDURE — 86140 C-REACTIVE PROTEIN: CPT

## 2021-12-01 PROCEDURE — 99204 OFFICE O/P NEW MOD 45 MIN: CPT | Performed by: INTERNAL MEDICINE

## 2021-12-01 PROCEDURE — 81003 URINALYSIS AUTO W/O SCOPE: CPT | Performed by: INTERNAL MEDICINE

## 2021-12-01 PROCEDURE — 36415 COLL VENOUS BLD VENIPUNCTURE: CPT

## 2021-12-01 PROCEDURE — 74177 CT ABD & PELVIS W/CONTRAST: CPT

## 2021-12-01 RX ORDER — BUDESONIDE AND FORMOTEROL FUMARATE DIHYDRATE 160; 4.5 UG/1; UG/1
2 AEROSOL RESPIRATORY (INHALATION) 2 TIMES DAILY
COMMUNITY
End: 2022-06-08 | Stop reason: SDUPTHER

## 2021-12-01 RX ORDER — MONTELUKAST SODIUM 10 MG/1
10 TABLET ORAL
COMMUNITY
End: 2022-04-28 | Stop reason: SDUPTHER

## 2021-12-01 RX ORDER — PANTOPRAZOLE SODIUM 40 MG/1
40 TABLET, DELAYED RELEASE ORAL DAILY
Qty: 30 TABLET | Refills: 2 | Status: SHIPPED | OUTPATIENT
Start: 2021-12-01 | End: 2022-03-30

## 2021-12-01 RX ADMIN — IOHEXOL 100 ML: 350 INJECTION, SOLUTION INTRAVENOUS at 17:12

## 2021-12-01 RX ADMIN — IOHEXOL 50 ML: 240 INJECTION, SOLUTION INTRATHECAL; INTRAVASCULAR; INTRAVENOUS; ORAL at 17:12

## 2021-12-03 ENCOUNTER — LAB (OUTPATIENT)
Dept: LAB | Facility: HOSPITAL | Age: 66
End: 2021-12-03
Attending: INTERNAL MEDICINE
Payer: MEDICARE

## 2021-12-03 ENCOUNTER — TELEPHONE (OUTPATIENT)
Dept: GASTROENTEROLOGY | Facility: CLINIC | Age: 66
End: 2021-12-03

## 2021-12-03 DIAGNOSIS — N42.9 PROSTATE IRREGULARITY: ICD-10-CM

## 2021-12-03 PROCEDURE — 36415 COLL VENOUS BLD VENIPUNCTURE: CPT

## 2021-12-03 PROCEDURE — 84154 ASSAY OF PSA FREE: CPT

## 2021-12-03 PROCEDURE — 84153 ASSAY OF PSA TOTAL: CPT

## 2021-12-07 LAB
PSA FREE MFR SERPL: >5.9 %
PSA FREE SERPL-MCNC: >50 NG/ML
PSA SERPL-MCNC: 850 NG/ML (ref 0–4)

## 2021-12-09 ENCOUNTER — TELEPHONE (OUTPATIENT)
Dept: UROLOGY | Facility: CLINIC | Age: 66
End: 2021-12-09

## 2021-12-09 ENCOUNTER — OFFICE VISIT (OUTPATIENT)
Dept: UROLOGY | Facility: CLINIC | Age: 66
End: 2021-12-09
Payer: MEDICARE

## 2021-12-09 VITALS
SYSTOLIC BLOOD PRESSURE: 118 MMHG | HEIGHT: 68 IN | WEIGHT: 109 LBS | DIASTOLIC BLOOD PRESSURE: 70 MMHG | BODY MASS INDEX: 16.52 KG/M2 | HEART RATE: 82 BPM

## 2021-12-09 DIAGNOSIS — M89.9 BONE LESION: Primary | ICD-10-CM

## 2021-12-09 DIAGNOSIS — R97.20 ELEVATED PSA, GREATER THAN OR EQUAL TO 20 NG/ML: ICD-10-CM

## 2021-12-09 PROCEDURE — 99205 OFFICE O/P NEW HI 60 MIN: CPT | Performed by: UROLOGY

## 2021-12-10 ENCOUNTER — TELEPHONE (OUTPATIENT)
Dept: SURGERY | Facility: HOSPITAL | Age: 66
End: 2021-12-10

## 2021-12-10 DIAGNOSIS — C79.51 BONE METASTASES (HCC): Primary | ICD-10-CM

## 2021-12-10 RX ORDER — SODIUM CHLORIDE 9 MG/ML
75 INJECTION, SOLUTION INTRAVENOUS CONTINUOUS
Status: CANCELLED | OUTPATIENT
Start: 2021-12-10

## 2021-12-13 ENCOUNTER — ANESTHESIA (OUTPATIENT)
Dept: GASTROENTEROLOGY | Facility: HOSPITAL | Age: 66
End: 2021-12-13

## 2021-12-13 ENCOUNTER — HOSPITAL ENCOUNTER (OUTPATIENT)
Dept: GASTROENTEROLOGY | Facility: HOSPITAL | Age: 66
Setting detail: OUTPATIENT SURGERY
Discharge: HOME | End: 2021-12-13
Attending: INTERNAL MEDICINE | Admitting: INTERNAL MEDICINE
Payer: MEDICARE

## 2021-12-13 ENCOUNTER — TELEPHONE (OUTPATIENT)
Dept: HEMATOLOGY ONCOLOGY | Facility: CLINIC | Age: 66
End: 2021-12-13

## 2021-12-13 ENCOUNTER — ANESTHESIA EVENT (OUTPATIENT)
Dept: GASTROENTEROLOGY | Facility: HOSPITAL | Age: 66
End: 2021-12-13

## 2021-12-13 VITALS
TEMPERATURE: 97.9 F | RESPIRATION RATE: 18 BRPM | DIASTOLIC BLOOD PRESSURE: 72 MMHG | SYSTOLIC BLOOD PRESSURE: 118 MMHG | OXYGEN SATURATION: 97 % | HEART RATE: 72 BPM

## 2021-12-13 DIAGNOSIS — R10.32 LEFT LOWER QUADRANT ABDOMINAL PAIN: ICD-10-CM

## 2021-12-13 DIAGNOSIS — Z86.010 PERSONAL HISTORY OF COLONIC POLYPS: ICD-10-CM

## 2021-12-13 DIAGNOSIS — R93.5 ABNORMAL COMPUTED TOMOGRAPHY ANGIOGRAPHY (CTA) OF ABDOMEN AND PELVIS: ICD-10-CM

## 2021-12-13 PROCEDURE — 88305 TISSUE EXAM BY PATHOLOGIST: CPT | Performed by: PATHOLOGY

## 2021-12-13 PROCEDURE — 45385 COLONOSCOPY W/LESION REMOVAL: CPT | Performed by: INTERNAL MEDICINE

## 2021-12-13 PROCEDURE — 43239 EGD BIOPSY SINGLE/MULTIPLE: CPT | Performed by: INTERNAL MEDICINE

## 2021-12-13 RX ORDER — PROPOFOL 10 MG/ML
INJECTION, EMULSION INTRAVENOUS AS NEEDED
Status: DISCONTINUED | OUTPATIENT
Start: 2021-12-13 | End: 2021-12-13

## 2021-12-13 RX ORDER — SODIUM CHLORIDE 9 MG/ML
100 INJECTION, SOLUTION INTRAVENOUS CONTINUOUS
Status: CANCELLED | OUTPATIENT
Start: 2021-12-13

## 2021-12-13 RX ORDER — SODIUM CHLORIDE 9 MG/ML
INJECTION, SOLUTION INTRAVENOUS CONTINUOUS PRN
Status: DISCONTINUED | OUTPATIENT
Start: 2021-12-13 | End: 2021-12-13

## 2021-12-13 RX ORDER — LIDOCAINE HYDROCHLORIDE 10 MG/ML
INJECTION, SOLUTION EPIDURAL; INFILTRATION; INTRACAUDAL; PERINEURAL AS NEEDED
Status: DISCONTINUED | OUTPATIENT
Start: 2021-12-13 | End: 2021-12-13

## 2021-12-13 RX ADMIN — SODIUM CHLORIDE: 0.9 INJECTION, SOLUTION INTRAVENOUS at 12:08

## 2021-12-13 RX ADMIN — LIDOCAINE HYDROCHLORIDE 100 MG: 10 INJECTION, SOLUTION EPIDURAL; INFILTRATION; INTRACAUDAL; PERINEURAL at 12:41

## 2021-12-13 RX ADMIN — PROPOFOL 50 MG: 10 INJECTION, EMULSION INTRAVENOUS at 13:11

## 2021-12-13 RX ADMIN — PROPOFOL 50 MG: 10 INJECTION, EMULSION INTRAVENOUS at 12:50

## 2021-12-13 RX ADMIN — PROPOFOL 50 MG: 10 INJECTION, EMULSION INTRAVENOUS at 13:05

## 2021-12-13 RX ADMIN — PROPOFOL 50 MG: 10 INJECTION, EMULSION INTRAVENOUS at 12:45

## 2021-12-13 RX ADMIN — PROPOFOL 90 MG: 10 INJECTION, EMULSION INTRAVENOUS at 12:43

## 2021-12-13 RX ADMIN — PROPOFOL 50 MG: 10 INJECTION, EMULSION INTRAVENOUS at 12:53

## 2021-12-13 RX ADMIN — PROPOFOL 50 MG: 10 INJECTION, EMULSION INTRAVENOUS at 12:59

## 2021-12-13 RX ADMIN — SODIUM CHLORIDE: 0.9 INJECTION, SOLUTION INTRAVENOUS at 13:12

## 2021-12-13 RX ADMIN — PROPOFOL 110 MG: 10 INJECTION, EMULSION INTRAVENOUS at 12:41

## 2021-12-21 ENCOUNTER — HOSPITAL ENCOUNTER (OUTPATIENT)
Dept: CT IMAGING | Facility: HOSPITAL | Age: 66
Discharge: HOME | End: 2021-12-21
Payer: MEDICARE

## 2021-12-21 ENCOUNTER — HOSPITAL ENCOUNTER (OUTPATIENT)
Dept: NUCLEAR MEDICINE | Facility: HOSPITAL | Age: 66
Discharge: HOME | End: 2021-12-21
Attending: UROLOGY
Payer: MEDICARE

## 2021-12-21 DIAGNOSIS — M89.9 BONE LESION: ICD-10-CM

## 2021-12-21 DIAGNOSIS — R97.20 ELEVATED PSA, GREATER THAN OR EQUAL TO 20 NG/ML: ICD-10-CM

## 2021-12-21 PROCEDURE — A9503 TC99M MEDRONATE: HCPCS

## 2021-12-21 PROCEDURE — 71250 CT THORAX DX C-: CPT

## 2021-12-21 PROCEDURE — 78306 BONE IMAGING WHOLE BODY: CPT

## 2021-12-22 ENCOUNTER — TELEPHONE (OUTPATIENT)
Dept: UROLOGY | Facility: CLINIC | Age: 66
End: 2021-12-22

## 2021-12-27 ENCOUNTER — TELEPHONE (OUTPATIENT)
Dept: HEMATOLOGY ONCOLOGY | Facility: CLINIC | Age: 66
End: 2021-12-27

## 2021-12-28 ENCOUNTER — TELEPHONE (OUTPATIENT)
Dept: UROLOGY | Facility: AMBULATORY SURGERY CENTER | Age: 66
End: 2021-12-28

## 2021-12-30 ENCOUNTER — HOSPITAL ENCOUNTER (OUTPATIENT)
Dept: CT IMAGING | Facility: HOSPITAL | Age: 66
Discharge: HOME | End: 2021-12-30
Attending: RADIOLOGY | Admitting: INTERNAL MEDICINE
Payer: MEDICARE

## 2021-12-30 VITALS
OXYGEN SATURATION: 95 % | TEMPERATURE: 98 F | RESPIRATION RATE: 18 BRPM | HEART RATE: 66 BPM | DIASTOLIC BLOOD PRESSURE: 51 MMHG | SYSTOLIC BLOOD PRESSURE: 92 MMHG

## 2021-12-30 DIAGNOSIS — C79.51 BONE METASTASES (HCC): ICD-10-CM

## 2021-12-30 PROCEDURE — 99153 MOD SED SAME PHYS/QHP EA: CPT

## 2021-12-30 PROCEDURE — 88305 TISSUE EXAM BY PATHOLOGIST: CPT | Performed by: PATHOLOGY

## 2021-12-30 PROCEDURE — 99152 MOD SED SAME PHYS/QHP 5/>YRS: CPT | Performed by: INTERNAL MEDICINE

## 2021-12-30 PROCEDURE — 88341 IMHCHEM/IMCYTCHM EA ADD ANTB: CPT | Performed by: PATHOLOGY

## 2021-12-30 PROCEDURE — 20220 BONE BIOPSY TROCAR/NDL SUPFC: CPT | Performed by: INTERNAL MEDICINE

## 2021-12-30 PROCEDURE — 88342 IMHCHEM/IMCYTCHM 1ST ANTB: CPT | Performed by: PATHOLOGY

## 2021-12-30 PROCEDURE — 99152 MOD SED SAME PHYS/QHP 5/>YRS: CPT

## 2021-12-30 PROCEDURE — 88333 PATH CONSLTJ SURG CYTO XM 1: CPT | Performed by: PATHOLOGY

## 2021-12-30 PROCEDURE — 77012 CT SCAN FOR NEEDLE BIOPSY: CPT

## 2021-12-30 PROCEDURE — 20240 BONE BIOPSY OPEN SUPERFICIAL: CPT

## 2021-12-30 PROCEDURE — 77012 CT SCAN FOR NEEDLE BIOPSY: CPT | Performed by: INTERNAL MEDICINE

## 2021-12-30 RX ORDER — MIDAZOLAM HYDROCHLORIDE 2 MG/2ML
INJECTION, SOLUTION INTRAMUSCULAR; INTRAVENOUS CODE/TRAUMA/SEDATION MEDICATION
Status: COMPLETED | OUTPATIENT
Start: 2021-12-30 | End: 2021-12-30

## 2021-12-30 RX ORDER — SODIUM CHLORIDE 9 MG/ML
75 INJECTION, SOLUTION INTRAVENOUS CONTINUOUS
Status: DISCONTINUED | OUTPATIENT
Start: 2021-12-30 | End: 2021-12-31 | Stop reason: HOSPADM

## 2021-12-30 RX ORDER — DIPHENHYDRAMINE HYDROCHLORIDE 50 MG/ML
INJECTION INTRAMUSCULAR; INTRAVENOUS CODE/TRAUMA/SEDATION MEDICATION
Status: COMPLETED | OUTPATIENT
Start: 2021-12-30 | End: 2021-12-30

## 2021-12-30 RX ORDER — FENTANYL CITRATE 50 UG/ML
INJECTION, SOLUTION INTRAMUSCULAR; INTRAVENOUS CODE/TRAUMA/SEDATION MEDICATION
Status: COMPLETED | OUTPATIENT
Start: 2021-12-30 | End: 2021-12-30

## 2021-12-30 RX ORDER — ACETAMINOPHEN 325 MG/1
650 TABLET ORAL EVERY 4 HOURS PRN
Status: DISCONTINUED | OUTPATIENT
Start: 2021-12-30 | End: 2021-12-31 | Stop reason: HOSPADM

## 2021-12-30 RX ADMIN — FENTANYL CITRATE 25 MCG: 50 INJECTION INTRAMUSCULAR; INTRAVENOUS at 11:05

## 2021-12-30 RX ADMIN — MIDAZOLAM 0.5 MG: 1 INJECTION INTRAMUSCULAR; INTRAVENOUS at 11:05

## 2021-12-30 RX ADMIN — FENTANYL CITRATE 25 MCG: 50 INJECTION INTRAMUSCULAR; INTRAVENOUS at 10:45

## 2021-12-30 RX ADMIN — DIPHENHYDRAMINE HYDROCHLORIDE 50 MG: 50 INJECTION INTRAMUSCULAR; INTRAVENOUS at 10:54

## 2021-12-30 RX ADMIN — FENTANYL CITRATE 50 MCG: 50 INJECTION INTRAMUSCULAR; INTRAVENOUS at 10:43

## 2021-12-30 RX ADMIN — SODIUM CHLORIDE 75 ML/HR: 0.9 INJECTION, SOLUTION INTRAVENOUS at 10:21

## 2021-12-30 RX ADMIN — FENTANYL CITRATE 25 MCG: 50 INJECTION INTRAMUSCULAR; INTRAVENOUS at 10:49

## 2021-12-30 RX ADMIN — MIDAZOLAM 1 MG: 1 INJECTION INTRAMUSCULAR; INTRAVENOUS at 10:42

## 2021-12-30 RX ADMIN — MIDAZOLAM 0.5 MG: 1 INJECTION INTRAMUSCULAR; INTRAVENOUS at 10:49

## 2021-12-30 RX ADMIN — MIDAZOLAM 0.5 MG: 1 INJECTION INTRAMUSCULAR; INTRAVENOUS at 10:45

## 2022-01-04 ENCOUNTER — TELEPHONE (OUTPATIENT)
Dept: UROLOGY | Facility: CLINIC | Age: 67
End: 2022-01-04

## 2022-01-04 ENCOUNTER — TELEPHONE (OUTPATIENT)
Dept: UROLOGY | Facility: HOSPITAL | Age: 67
End: 2022-01-04

## 2022-01-04 NOTE — TELEPHONE ENCOUNTER
----- Message from Poppy Hernandez MD sent at 1/4/2022  2:43 PM EST -----  Called patient to review diagnosis  Voicemail left  Patient can be given a copy of this pathology at tomorrow's androgen deprivation injection visit

## 2022-01-04 NOTE — TELEPHONE ENCOUNTER
Contacted patient  Interventional Radiology bone biopsy has returned as expected, unfortunately metastatic prostate cancer  Patient will be seeing our nursing staff in the office tomorrow for induction androgen deprivation therapy and will be undergoing a follow-up visit with the medical oncology team on January 14th  I left a voicemail at his home number with call back number

## 2022-01-04 NOTE — TELEPHONE ENCOUNTER
Please see note from Dr Chase Alcala in previous encounter and give a copy of his pathology to him on 1/5/2022

## 2022-01-05 ENCOUNTER — PROCEDURE VISIT (OUTPATIENT)
Dept: UROLOGY | Facility: HOSPITAL | Age: 67
End: 2022-01-05
Payer: MEDICARE

## 2022-01-05 VITALS
HEIGHT: 66 IN | HEART RATE: 80 BPM | WEIGHT: 109.4 LBS | SYSTOLIC BLOOD PRESSURE: 120 MMHG | BODY MASS INDEX: 17.58 KG/M2 | DIASTOLIC BLOOD PRESSURE: 70 MMHG

## 2022-01-05 DIAGNOSIS — C61 PROSTATE CANCER (HCC): Primary | ICD-10-CM

## 2022-01-05 PROCEDURE — 96402 CHEMO HORMON ANTINEOPL SQ/IM: CPT

## 2022-01-05 NOTE — PROGRESS NOTES
1/5/2022  Kera Fung is a 78 y o  male  [id number]    Diagnosis:  Chief Complaint     Prostate Cancer; firmagon injection          Patient presents for Firmagon Injection managed by Dr Inder Steve:  Patient has follow up with Oncology      Medication administration:    Patient was administered Firmagon 120 mg in right abdomen tissues and Firmagon 120 mg in left abdominal tissue    Administrations This Visit     degarelix acetate [facility]) injection 240 mg     Admin Date  01/05/2022 Action  Given Dose  240 mg Route  Subcutaneous Administered By  Gelacio Venegas RN                Vitals:    01/05/22 1428   BP: 120/70   Pulse: 80   Weight: 49 6 kg (109 lb 6 4 oz)   Height: 5' 6" (1 676 m)         Gelacio Venegas RN

## 2022-01-07 ENCOUNTER — TELEPHONE (OUTPATIENT)
Dept: OTHER | Facility: OTHER | Age: 67
End: 2022-01-07

## 2022-01-07 NOTE — TELEPHONE ENCOUNTER
Ibuprofen and heating pad safe to use  Was a larger volume subQ injection   The next ones will be much less volume and less uncomfortable

## 2022-01-07 NOTE — TELEPHONE ENCOUNTER
Patient has had a lot of pain since his procedure on 1/5/2022  He would like to speak with someone as possible

## 2022-01-07 NOTE — TELEPHONE ENCOUNTER
Patient has pain on his abdomen after his firmagon shot on Wednesday He states that it is really bad and is on the right side of ribs as well  Patient has just been diagnosised metastatic prostate cancer   Please advise if there is something we can proscribe for him

## 2022-01-10 NOTE — TELEPHONE ENCOUNTER
ENRIQUE for Marcio that he can us ibuprofen or a heating pad   His next firmagon shot will only 80 mg instead of 2 shots of 120 mg

## 2022-01-14 ENCOUNTER — TELEPHONE (OUTPATIENT)
Dept: HEMATOLOGY ONCOLOGY | Facility: HOSPITAL | Age: 67
End: 2022-01-14

## 2022-01-14 ENCOUNTER — CONSULT (OUTPATIENT)
Dept: HEMATOLOGY ONCOLOGY | Facility: HOSPITAL | Age: 67
End: 2022-01-14
Payer: MEDICARE

## 2022-01-14 VITALS
SYSTOLIC BLOOD PRESSURE: 100 MMHG | RESPIRATION RATE: 16 BRPM | HEART RATE: 83 BPM | TEMPERATURE: 97.6 F | WEIGHT: 105 LBS | HEIGHT: 66 IN | OXYGEN SATURATION: 94 % | BODY MASS INDEX: 16.88 KG/M2 | DIASTOLIC BLOOD PRESSURE: 70 MMHG

## 2022-01-14 DIAGNOSIS — R97.20 ELEVATED PSA, GREATER THAN OR EQUAL TO 20 NG/ML: ICD-10-CM

## 2022-01-14 DIAGNOSIS — M89.9 BONE LESION: ICD-10-CM

## 2022-01-14 PROCEDURE — 99205 OFFICE O/P NEW HI 60 MIN: CPT | Performed by: INTERNAL MEDICINE

## 2022-01-14 NOTE — PROGRESS NOTES
Oncology Outpatient Consult Note  Domo Gillis 78 y o  male MRN: @ Encounter: [id number]        Date:  1/14/2022        CC:  Metastatic prostate cancer  HPI:  Domo Gillis is seen for initial consultation 1/14/2022 regarding newly diagnosed prostate cancer which appears to be metastatic to the bone  This was biopsy proven  The patient was referred from his primary care physician to urology in December for PSA of 850  CT scan of the abdomen pelvis demonstrated multiple sclerotic lesions  He had a biopsy of 1 of these lesions and it came back as metastatic prostate cancer  He was started on anti androgen therapy by Urology in the form of [place] 86  He states it her tremendously for the 1st 2 days after he got it  Per the notes he was prescribed ibuprofen and told that his next shot would cause next pain as the volume would be lower  Patient states he has done well since then  Denies any complaints today  Is taking a lot of calcium according to him  Denies any nausea denies any vomiting denies any diarrhea  The rest of his 14 point review of systems today was negative  Test Results:    Imaging: CT chest wo contrast    Result Date: 12/28/2021  Narrative: CT CHEST WITHOUT IV CONTRAST INDICATION:   M89 9: Disorder of bone, unspecified R97 20: Elevated prostate specific antigen (PSA)  18-year-old male with elevated PSA level and multiple osteoblastic lesions, consistent with skeletal metastases  Small nodules in the lung bases noted on prior CT of the abdomen and pelvis  Follow-up  COMPARISON:  CT of the abdomen and pelvis from December 1, 2021  Radionuclide bone scan from December 21, 2021  TECHNIQUE: CT examination of the chest was performed without intravenous contrast   Axial, sagittal, and coronal 2D reformatted images were created from the source data and submitted for interpretation  Radiation dose length product (DLP) for this visit:  238 36 mGy-cm     This examination, like all CT scans performed in the [facility], was performed utilizing techniques to minimize radiation dose exposure, including the use of iterative  reconstruction and automated exposure control  FINDINGS: LUNGS:  Emphysema  Two adjacent nodules in the lateral aspect of the left lower lobe, measuring 3 mm and 7 mm (series 3, images 104 and 110), increased in size since the CT from 12/20/2021 3 x 9 mm somewhat tubular, branching nodule in the right lower lobe (series 3, image 88; series 603, image 46)  Similar opacities in the right lower lobe (series 3, image 106) and left upper lobe (series 3, image 56)  Several additional small subpleural nodules in the right lower lobe, measuring 1 to 2 mm  Perifissural nodule in the left lower lobe (series 3, image 65), most likely an intrapulmonary lymph node  Similar left upper lobe nodule (image 70)  No mass or consolidation  PLEURA:  Unremarkable  HEART/GREAT VESSELS: Heart is unremarkable for patient's age  No thoracic aortic aneurysm  MEDIASTINUM AND KELLY: No lymphadenopathy or mass  Esophagus unremarkable  Trachea and main stem bronchi normal  CHEST WALL AND LOWER NECK:   Unremarkable  VISUALIZED STRUCTURES IN THE UPPER ABDOMEN:  Several small bilateral renal calculi, as demonstrated on the last CT  OSSEOUS STRUCTURES:  Diffuse sclerotic foci, consistent with osteoblastic metastasis involving virtually every included vertebra  Additional osteoblastic metastasis in the ribs, more numerous on the right, and probably the sternum  Additional metastasis in the bony thorax, demonstrated on bone scan, not identified on this CT  Impression: 1  Emphysema  2   Multiple small pulmonary nodules as described in detail above  Two adjacent nodules in the left lower lobe, measuring 3 mm and 7 mm have increased in size since a CT from 12/1/2021 and are suspicious for metastasis   3   Several tubular and branching pulmonary nodules are likely areas of endobronchial mucoid impaction  4   Extensive osteoblastic metastases  The study was marked in EPIC for significant notification  Workstation performed: [id number]     NM bone scan whole body    Result Date: 12/22/2021  Narrative: BONE SCAN  WHOLE BODY INDICATION: M89 9: Disorder of bone, unspecified R97 20: Elevated prostate specific antigen (PSA) PREVIOUS FILM CORRELATION:    Comparison is made to the skeletal structures of CT of chest dated 12/21/2021 and CT of abdomen and pelvis dated 12/1/2021 TECHNIQUE:   This study was performed following the intravenous administration of 26 9 mCi Tc-99m labeled MDP  Delayed, anterior and posterior whole body images were acquired, 2-3 hours after radiopharmaceutical administration  FINDINGS:  There is fairly extensive multifocal tracer activity involving the axial and appendicular skeletal system consistent with osseous metastatic disease  Specifically, there is involvement of the calvarium, sternum, bilateral ribs, possibly proximal right humerus, and possibly right clavicle, possibly bilateral scapula, possibly cervical spine, thoracolumbar spine, sacrum, bilateral aspects of the pelvis,  and bilateral proximal femurs  Both kidneys are visualized  Impression: 1  Fairly extensive multifocal osseous metastatic disease involving the axial and appendicular skeletal system as detailed above  Workstation performed: [id number]     IR biopsy bone    Result Date: 12/30/2021  Narrative: CT-scan guided bone lesion biopsy History: 30-year-old male with history of prostate cancer, now with new skeletal lesions  Conscious sedation time: 30 minutes of sedation Technique: The patient was brought to the CT scanner and placed prone on the table  Prior images were reviewed which showed a bone lesion in the right ileum, seen on the prior CT of the abdomen and pelvis and nuclear medicine bone scan   After axial images were obtained through the pelvis, an area of the skin was then marked, prepped, and draped in usual sterile fashion  Lidocaine was administered to the skin, and subcutaneous tissues down to the periosteum of the right ileum, and a small skin incision was made  Under CT guidance, a [employer] bone lesion introducer needle was advanced percutaneously through the cortex, and placed adjacent to the bone lesion within the right ileum  The biopsy needle was then placed through the introducer needle and 4 core biopsy specimens were obtained and placed in formalin  The specimen was reviewed with the pathologist  The patient tolerated the procedure well and suffered no complications  Impression: Impression: Successful percutaneous bone lesion core biopsy at the right ileum  A full pathology report will follow  Workstation performed: [id number]       Labs:   Lab Results   Component Value Date    WBC 5 07 12/01/2021    HGB 15 0 12/01/2021    HCT 46 3 12/01/2021    MCV 99 (H) 12/01/2021     12/01/2021     Lab Results   Component Value Date    K 4 2 12/01/2021     12/01/2021    CO2 30 12/01/2021    BUN 12 12/01/2021    CREATININE 0 77 12/01/2021    CALCIUM 9 1 12/01/2021    AST 9 12/01/2021    ALT 27 12/01/2021    ALKPHOS 95 12/01/2021    EGFR 94 12/01/2021       Lab Results   Component Value Date     0 (H) 12/03/2021       ROS: As stated in history of present illness otherwise her 14 point review of systems today was negative          Active Problems:   Patient Active Problem List   Diagnosis    COPD with acute exacerbation ([facility] )    Acute tracheobronchitis    Severe protein-calorie malnutrition (HCC)    HLD (hyperlipidemia)    Left lower quadrant abdominal pain    Personal history of colonic polyps       Past Medical History:   Past Medical History:   Diagnosis Date    Colon polyp     Emphysema lung ([facility] )     Hyperlipidemia        Surgical History:   Past Surgical History:   Procedure Laterality Date    APPENDECTOMY      COLONOSCOPY      IR BIOPSY BONE  12/30/2021    UPPER GASTROINTESTINAL ENDOSCOPY         Family History:    Family History   Problem Relation Age of Onset    Colon polyps Brother     Colon cancer Neg Hx        Cancer-related family history is negative for Colon cancer  Social History:   Social History     Socioeconomic History    Marital status: Single     Spouse name: Not on file    Number of children: Not on file    Years of education: Not on file    Highest education level: Not on file   Occupational History    Not on file   Tobacco Use    Smoking status: Former Smoker    Smokeless tobacco: Never Used   Vaping Use    Vaping Use: Never used   Substance and Sexual Activity    Alcohol use: Not Currently    Drug use: Never    Sexual activity: Not on file   Other Topics Concern    Not on file   Social History Narrative    Not on file     Social Determinants of Health     Financial Resource Strain: Not on file   Food Insecurity: Not on file   Transportation Needs: Not on file   Physical Activity: Not on file   Stress: Not on file   Social Connections: Not on file   Intimate Partner Violence: Not on file   Housing Stability: Not on file       Current Medications:   Current Outpatient Medications   Medication Sig Dispense Refill    albuterol (PROVENTIL HFA,VENTOLIN HFA) 90 mcg/act inhaler Inhale 2 puffs every 4 (four) hours as needed for wheezing 1 Inhaler 0    atorvastatin (LIPITOR) 40 mg tablet Take 1 tablet (40 mg total) by mouth daily with dinner 30 tablet 0    budesonide-formoterol (SYMBICORT) 160-4 5 mcg/act inhaler Inhale 2 puffs 2 (two) times a day Rinse mouth after use   montelukast (SINGULAIR) 10 mg tablet Take 10 mg by mouth daily at bedtime      cyclopentolate (Cyclogyl) 1 % ophthalmic solution Administer 1 drop to the right eye 3 (three) times a day (Patient not taking: Reported on 12/1/2021 ) 5 mL 0    fluticasone-vilanterol (BREO ELLIPTA) 200-25 MCG/INH inhaler Inhale 1 puff daily Rinse mouth after use   (Patient not taking: Reported on 12/1/2021 ) 1 Inhaler 0    gabapentin (NEURONTIN) 300 mg capsule Take 1 capsule (300 mg total) by mouth 3 (three) times a day for 7 days For post-herpetic neuralgia: Take 1 tablet on day 1,  Then take 2 tablets on day 2, Then take 3 tablets on day 3 and every day after that as instructed by your doctor  (Patient not taking: Reported on 12/1/2021 ) 21 capsule 0    ipratropium-albuterol (DUO-NEB) 0 5-2 5 mg/3 mL nebulizer solution Take 1 vial (3 mL total) by nebulization 3 (three) times a day (Patient not taking: Reported on 12/1/2021 ) 60 vial 0    pantoprazole (PROTONIX) 40 mg tablet Take 1 tablet (40 mg total) by mouth daily (Patient not taking: Reported on 12/9/2021 ) 30 tablet 2    tobramycin-dexamethasone (TOBRADEX) ophthalmic suspension Administer 1 drop to the right eye 4 (four) times a day (Patient not taking: Reported on 12/1/2021 ) 5 mL 0    valACYclovir (VALTREX) 1,000 mg tablet Take 1,000 mg by mouth 2 (two) times a day (Patient not taking: Reported on 12/1/2021 )       No current facility-administered medications for this visit  Allergies: No Known Allergies      Physical Exam:    Body surface area is 1 52 meters squared  Wt Readings from Last 3 Encounters:   01/14/22 47 6 kg (105 lb)   01/05/22 49 6 kg (109 lb 6 4 oz)   12/09/21 49 4 kg (109 lb)        Temp Readings from Last 3 Encounters:   01/14/22 97 6 °F (36 4 °C) (Temporal)   12/30/21 98 °F (36 7 °C) (Temporal)   12/13/21 97 9 °F (36 6 °C) (Temporal)        BP Readings from Last 3 Encounters:   01/14/22 100/70   01/05/22 120/70   12/30/21 92/51         Pulse Readings from Last 3 Encounters:   01/14/22 83   01/05/22 80   12/30/21 66       Physical Exam     Constitutional   General appearance: No acute distress, well appearing and well nourished  Eyes   Conjunctiva and lids: No swelling, erythema or discharge  Pupils and irises: Equal, round and reactive to light      Ears, Nose, Mouth, and Throat   External inspection of ears and nose: Normal     Nasal mucosa, septum, and turbinates: Normal without edema or erythema  Oropharynx: Normal with no erythema, edema, exudate or lesions  Pulmonary   Respiratory effort: No increased work of breathing or signs of respiratory distress  Auscultation of lungs: Clear to auscultation  Cardiovascular   Palpation of heart: Normal PMI, no thrills  Auscultation of heart: Normal rate and rhythm, normal S1 and S2, without murmurs  Examination of extremities for edema and/or varicosities: Normal     Carotid pulses: Normal     Abdomen   Abdomen: Non-tender, no masses  Liver and spleen: No hepatomegaly or splenomegaly  Lymphatic   Palpation of lymph nodes in neck: No lymphadenopathy  Musculoskeletal   Gait and station: Normal     Digits and nails: Normal without clubbing or cyanosis  Inspection/palpation of joints, bones, and muscles: Normal     Skin   Skin and subcutaneous tissue: Normal without rashes or lesions  Neurologic   Cranial nerves: Cranial nerves 2-12 intact  Sensation: No sensory loss  Psychiatric   Orientation to person, place, and time: Normal     Mood and affect: Normal           Assessment/ Plan:    The patient is pleasant 48-year-old male with newly diagnosed metastatic prostate cancer to the bone  He was started on Firmagon with Urology  We had a discussion about adding on Xtandi  I explained the survival benefit  The patient is agreeable to proceeding  I went over the risks benefits and alternatives of treatment  I explained the possible side effects to include but not limited to diarrhea, abdominal pain, abnormal liver function tests, drug interactions, androgen deprivation side effects like hot flashes, osteoporosis and even death  The patient is agreeable to proceeding  I will set him up to start as soon as possible  He will stay on his Faith Cleo through his urologist   We discussed Obed Correia but he wishes to think about this    He is on calcium  I will see him back in 2 months  He will get blood work every 2 weeks to make sure his counts including his liver function run in a stable range  I will see him back in 2 months  If he has any questions he will call our office  Goals and Barriers:  Current Goal:  Prolong Survival from prostate Cancer  Barriers: None  Patient's Capacity to Self Care:  Patient able to self care  Portions of the record may have been created with voice recognition software  Occasional wrong word or "sound a like" substitutions may have occurred due to the inherent limitations of voice recognition software  Read the chart carefully and recognize, using context, where substitutions have occurred

## 2022-01-14 NOTE — TELEPHONE ENCOUNTER
Called patient and LVM that he missed his consult visit today and LVM to call [place]  to reschedule

## 2022-02-01 ENCOUNTER — TELEPHONE (OUTPATIENT)
Dept: SURGICAL ONCOLOGY | Facility: CLINIC | Age: 67
End: 2022-02-01

## 2022-02-01 ENCOUNTER — TELEPHONE (OUTPATIENT)
Dept: HEMATOLOGY ONCOLOGY | Facility: CLINIC | Age: 67
End: 2022-02-01

## 2022-02-01 DIAGNOSIS — R30.0 BURNING WITH URINATION: Primary | ICD-10-CM

## 2022-02-01 DIAGNOSIS — R97.20 ELEVATED PSA, GREATER THAN OR EQUAL TO 20 NG/ML: Primary | ICD-10-CM

## 2022-02-01 NOTE — TELEPHONE ENCOUNTER
Patient aware medication is processing  He will hear from us once processed   Patient verbalized understanding

## 2022-02-01 NOTE — TELEPHONE ENCOUNTER
Called Mary healy- [facility] had had 120 mg of Firmagon on right side  and left side  Over a month ago  follow up in would be only 80 mg  [address] said he can't pay $1000 a month for medication , and she wants to know how he can get this cheaper ( not sure if this is for [employer]  - patient is in a lot of pain and has night sweats she wants to know who he can talk to and if he is he on pallitive care

## 2022-02-01 NOTE — TELEPHONE ENCOUNTER
Patient's sister Steven Rory would like a call from [facility] regarding patient and some questions Steven Valadez has regarding situations patient is having    Please call Steven Valadez at [phone]

## 2022-02-02 ENCOUNTER — DOCUMENTATION (OUTPATIENT)
Dept: HEMATOLOGY ONCOLOGY | Facility: CLINIC | Age: 67
End: 2022-02-02

## 2022-02-02 NOTE — PROGRESS NOTES
2-1-22  Rcvd new oral chemo start-xtandi // Shai Mendez required //  Completed Xtandi rhina forwarded to provider for signature    2-2-22  Call to patient regarding copay amount and free drug program  Spoke with Mr Teresa Lopez who was very grateful for the help  He will take documents to providers office to have them forward to me as this is easiest for him  Once all documents are in place  I will fax application  Informed patient normal turnaround time for processing is 7-10 business days  Patient acknowledged understanding    2-7-22  Income information rcvd from patient  All signatures and required documents on file  Application faxed to xtandi    2-17-22  Rcvd notification that benefits investigation has been completed and patients application was forwarded for free drug  Call to patient to advise and I will update once final determination has been received  2-22-22  Follow up call to [facility], spoke with [place], who stated they have tried to contact the patient to complete a medicare assessment screening over the phone with the patient  Once they speak with the patient  Final determination is made  Called patient, notified him of final step required  Provided patient with [facility] phone number and requested he call them to complete this questionnaire  Patient acknowledge understanding and stated he would call right now  Will follow up Friday 3-25 if no response received

## 2022-02-02 NOTE — PROGRESS NOTES
Received request from clinical for patient to start on Xtandi  Auth has been submitted via cover my meds  PATIENT HAS [facility] Key: [place]    [phone] ([place] Help desk)                    Jerrell Bahstone:       [id number]                  SHERRIN:      Inez Cross                  ID:          [id number]                  GRP:      [id number]    UPDATE:  This has been approved for one year 2/2/2022-2/2/2023  [facility] is able to fill but has a large co pay

## 2022-02-02 NOTE — TELEPHONE ENCOUNTER
Called patients sister to address her concerns about her brother  He is having all over bone pain and not sleeping well  We discussed sending a referral to Palliative Care for his pain management  She agreed to this  Referral was entered  She is aware that the office will be reaching out to patient to schedule an appointment  He also has been having burning with urination for 1-2 weeks  She does not know about discoloration in the urine  She did state that burning happens every time he urinates  I let her know that I will be sending a message to the Urology nurse to address this situation  We also talked about the $1,000 co-pay for the Mary Ellen Burrows  I let her know that this message was already sent to the oncology finance dept  I spoke with the finance dept and they will be reaching out to the patient to discuss this further  Patients sister voiced understanding

## 2022-02-02 NOTE — TELEPHONE ENCOUNTER
ENRIQUE for Marcio that urine studies were put into system since he is having burning with urination  He can go to any [email or web address]  Left number if he needs to contact us   Will also notify sister EC

## 2022-02-02 NOTE — PROGRESS NOTES
Lucia Jasmine, patients sister, to address her concerns about her brother  He is having all over bone pain and not sleeping well  We discussed sending a referral to Palliative Care for his pain management  She agreed to this  Referral was entered for [facility]  She is aware that the office will be reaching out to patient to schedule an appointment  He also has been having burning with urination for 1-2 weeks  She does not know about discoloration in the urine  She did state that burning happens every time he urinates  I let her know that I will be sending a message to Chris Loyola Rn with Urology to address this situation  She stated that Tim Chavarria owes $1,000 co-pay for his [place]  I let her know that this message was already sent to the oncology finance dept by Med Onc office  I spoke with Savannah Kathleen in the oncology finance dept and they are working on an [employer] for this medication  She will be reaching out to the patient to discuss this further  Patients sister voiced understanding

## 2022-02-18 ENCOUNTER — TELEPHONE (OUTPATIENT)
Dept: UROLOGY | Facility: AMBULATORY SURGERY CENTER | Age: 67
End: 2022-02-18

## 2022-02-18 DIAGNOSIS — C61 PROSTATE CANCER (HCC): Primary | ICD-10-CM

## 2022-02-18 NOTE — TELEPHONE ENCOUNTER
Patient managed by Dr Tamara Doherty  Received 240 mg Firmagon on 1/5/22  Was to return in one month for Lupron 45 mg  Patient overdue for Lupron  Contacted patient, appointment scheduled in the [facility] office, per patient request on 3/2/22 at 10:30 for Lupron with nurse  Per Dr Tamara Doherty, patient should then be scheduled for 6 months with AP for next Lupron, with PSA PTV

## 2022-03-02 ENCOUNTER — TELEPHONE (OUTPATIENT)
Dept: HEMATOLOGY ONCOLOGY | Facility: CLINIC | Age: 67
End: 2022-03-02

## 2022-03-02 ENCOUNTER — PROCEDURE VISIT (OUTPATIENT)
Dept: UROLOGY | Facility: HOSPITAL | Age: 67
End: 2022-03-02
Payer: MEDICARE

## 2022-03-02 ENCOUNTER — DOCUMENTATION (OUTPATIENT)
Dept: HEMATOLOGY ONCOLOGY | Facility: CLINIC | Age: 67
End: 2022-03-02

## 2022-03-02 ENCOUNTER — TELEPHONE (OUTPATIENT)
Dept: UROLOGY | Facility: AMBULATORY SURGERY CENTER | Age: 67
End: 2022-03-02

## 2022-03-02 VITALS
HEART RATE: 106 BPM | BODY MASS INDEX: 16.97 KG/M2 | HEIGHT: 66 IN | DIASTOLIC BLOOD PRESSURE: 70 MMHG | WEIGHT: 105.6 LBS | SYSTOLIC BLOOD PRESSURE: 110 MMHG

## 2022-03-02 DIAGNOSIS — C61 PROSTATE CANCER (HCC): Primary | ICD-10-CM

## 2022-03-02 PROCEDURE — 96402 CHEMO HORMON ANTINEOPL SQ/IM: CPT

## 2022-03-02 NOTE — PROGRESS NOTES
Ramiro Boogie RN  You; [facility] Provider 3 hours ago (11:02 AM)     Patient may need support as he is having a hard time with his diagnosis  [facility] was very anxious at his appointment today    BATOOL Kennedy 3 hours ago (11:02 AM)          Patient presented today for his first Lupron injection  He was very anxious stating that the [place] medication was so painful he couldn't function for 4 days  He states he cannot sleep  And it is wearing him down  He has tried benadryl and melatonin  Patient's weight was down 5 lbs from last visit and looks very frail  Patient was not steady on his feet and stated because he is unable to sleep  We discussed making sure he is hydrating and that his diet included protein such as eggs and fish to assist with healing  Told him I would contact doctor to see if medication can be proscribed  His PCP is out of [facility] [place]  I called Patient and LM asking him to return my call to discuss the difficulties that he is having

## 2022-03-02 NOTE — TELEPHONE ENCOUNTER
Patient presented today for his first Lupron injection  He was very anxious stating that the [place] medication was so painful he couldn't function for 4 days  He states he cannot sleep  And it is wearing him down  He has tried benadryl and melatonin  Patient's weight was down 5 lbs from last visit and looks very frail  Patient was not steady on his feet and stated because he is unable to sleep  We discussed making sure he is hydrating and that his diet included protein such as eggs and fish to assist with healing  Told him I would contact doctor to see if medication can be proscribed  His PCP is out of [facility] [employer] [facility] [place]

## 2022-03-02 NOTE — TELEPHONE ENCOUNTER
LM for patient to return my call  Message will be sent to Dr Curly Bumpers team to address sleeping issue

## 2022-03-02 NOTE — PROGRESS NOTES
3/2/2022  Karla Laguerre is a 78 y o  male  [id number]    Diagnosis:      Patient presents for l;upron injection managed by Dr Yessica Jackson:  Patient will follow up in 6 months with a PSA and another Lupron injection      Medication administration:    Patient was given lupron in left quadrant    He had no complaints  With injection    Administrations This Visit     leuprolide (LUPRON DEPOT 6 MONTH KIT) IM injection kit 45 mg     Admin Date  03/02/2022 Action  Given Dose  45 mg Route  Intramuscular Administered By  Jannie Villalpando RN                Vitals:    03/02/22 1028   BP: 110/70   Pulse: (!) 106   Weight: 47 9 kg (105 lb 9 6 oz)   Height: 5' 6" (1 676 m)         Jannie Villalpando RN

## 2022-03-02 NOTE — TELEPHONE ENCOUNTER
Cliff Acosta, RN 3 hours ago (11:02 AM)          Patient presented today for his first Lupron injection  He was very anxious stating that the [place] medication was so painful he couldn't function for 4 days  He states he cannot sleep  And it is wearing him down  He has tried benadryl and melatonin  Patient's weight was down 5 lbs from last visit and looks very frail  Patient was not steady on his feet and stated because he is unable to sleep  We discussed making sure he is hydrating and that his diet included protein such as eggs and fish to assist with healing  Told him I would contact doctor to see if medication can be proscribed  His PCP is out of [facility] [id number]  Received message from Lore Das with Urology office  I spoke with patient  He is complaining of severe hot flashes that he gets drenching sweats from  This keeps him up at night  He stated that he gets about an hour of sleep per night  He gets hot flashes during the day as well but not as based as at night  Patient feels that this is coming from ADT injections  He did state he has lost some weight as well  He does have a small appetite, eating and drinking daily, he drinks at least one protein shake per day as well  I am placing nutritionist referral, patient is aware  Patient has no other complaints besides some minor aches and pains daily  I will be sending message to Med Onc office to address sleep/night sweats issue

## 2022-03-03 ENCOUNTER — DOCUMENTATION (OUTPATIENT)
Dept: HEMATOLOGY ONCOLOGY | Facility: CLINIC | Age: 67
End: 2022-03-03

## 2022-03-03 ENCOUNTER — TELEPHONE (OUTPATIENT)
Dept: NUTRITION | Facility: CLINIC | Age: 67
End: 2022-03-03

## 2022-03-03 NOTE — PROGRESS NOTES
Called Agustin Carroll to let him know that Med Onc feels that his anxiety and sleep issue should be addressed by Urology  I spoke with Colt Sierra RN from urology and she ENRIQUE for patients PCP [facility] to address patients symptoms  Patient voiced understanding

## 2022-03-03 NOTE — TELEPHONE ENCOUNTER
Received a notification from [place], [place] on 3/2/22 that Adilene Gerard has triggered  For oncology nutrition services  Reason for referral; pt undergoes treatment for metastatic prostate cancer and experiences wt loss, poor appetite, and weakness  Called and spoke with Adilene Gerard  Introduced self and explained reason for call, and nutrition services available for pt  Adilene Gerard stated he is not ready to schedule an appt with RD at this time  I provided general information on nutrition before/during and after cancer treatments, and some ideas on how to include high calorie/high protein foods into pt's diet  Adilene Gerard was appreciative of the call  Provided RD's contact information and encouraged pt to reach out if he has any questions/concerns or would like to schedule an appt

## 2022-03-03 NOTE — TELEPHONE ENCOUNTER
Received a call from Edwige Victoria - oncology coordinator and Oncology felt that Urology should be taking care of his anxiety about cancer and inability to sleep  Called and LM for his PCP Naida Allred in [place] [phone] to see if she could possible assist Marcio with medication to help with his anxiety and sleeplessness

## 2022-03-10 ENCOUNTER — APPOINTMENT (OUTPATIENT)
Dept: LAB | Facility: HOSPITAL | Age: 67
End: 2022-03-10
Attending: INTERNAL MEDICINE
Payer: MEDICARE

## 2022-03-10 DIAGNOSIS — R97.20 ELEVATED PSA, GREATER THAN OR EQUAL TO 20 NG/ML: ICD-10-CM

## 2022-03-10 DIAGNOSIS — M89.9 BONE LESION: ICD-10-CM

## 2022-03-10 LAB
ALBUMIN SERPL BCP-MCNC: 3.7 G/DL (ref 3.5–5)
ALP SERPL-CCNC: 75 U/L (ref 46–116)
ALT SERPL W P-5'-P-CCNC: 31 U/L (ref 12–78)
ANION GAP SERPL CALCULATED.3IONS-SCNC: 7 MMOL/L (ref 4–13)
AST SERPL W P-5'-P-CCNC: 19 U/L (ref 5–45)
BASOPHILS # BLD AUTO: 0.13 THOUSANDS/ΜL (ref 0–0.1)
BASOPHILS NFR BLD AUTO: 2 % (ref 0–1)
BILIRUB SERPL-MCNC: 0.4 MG/DL (ref 0.2–1)
BUN SERPL-MCNC: 13 MG/DL (ref 5–25)
CALCIUM SERPL-MCNC: 9.1 MG/DL (ref 8.3–10.1)
CHLORIDE SERPL-SCNC: 102 MMOL/L (ref 100–108)
CO2 SERPL-SCNC: 33 MMOL/L (ref 21–32)
CREAT SERPL-MCNC: 0.66 MG/DL (ref 0.6–1.3)
EOSINOPHIL # BLD AUTO: 0.3 THOUSAND/ΜL (ref 0–0.61)
EOSINOPHIL NFR BLD AUTO: 5 % (ref 0–6)
ERYTHROCYTE [DISTWIDTH] IN BLOOD BY AUTOMATED COUNT: 13.3 % (ref 11.6–15.1)
GFR SERPL CREATININE-BSD FRML MDRD: 100 ML/MIN/1.73SQ M
GLUCOSE P FAST SERPL-MCNC: 93 MG/DL (ref 65–99)
HCT VFR BLD AUTO: 44.5 % (ref 36.5–49.3)
HGB BLD-MCNC: 14.1 G/DL (ref 12–17)
IMM GRANULOCYTES # BLD AUTO: 0.02 THOUSAND/UL (ref 0–0.2)
IMM GRANULOCYTES NFR BLD AUTO: 0 % (ref 0–2)
LYMPHOCYTES # BLD AUTO: 1.46 THOUSANDS/ΜL (ref 0.6–4.47)
LYMPHOCYTES NFR BLD AUTO: 26 % (ref 14–44)
MCH RBC QN AUTO: 31.8 PG (ref 26.8–34.3)
MCHC RBC AUTO-ENTMCNC: 31.7 G/DL (ref 31.4–37.4)
MCV RBC AUTO: 100 FL (ref 82–98)
MONOCYTES # BLD AUTO: 0.68 THOUSAND/ΜL (ref 0.17–1.22)
MONOCYTES NFR BLD AUTO: 12 % (ref 4–12)
NEUTROPHILS # BLD AUTO: 2.93 THOUSANDS/ΜL (ref 1.85–7.62)
NEUTS SEG NFR BLD AUTO: 55 % (ref 43–75)
NRBC BLD AUTO-RTO: 0 /100 WBCS
PLATELET # BLD AUTO: 437 THOUSANDS/UL (ref 149–390)
PMV BLD AUTO: 8.1 FL (ref 8.9–12.7)
POTASSIUM SERPL-SCNC: 3.8 MMOL/L (ref 3.5–5.3)
PROT SERPL-MCNC: 6.8 G/DL (ref 6.4–8.2)
PSA SERPL-MCNC: 131 NG/ML (ref 0–4)
RBC # BLD AUTO: 4.44 MILLION/UL (ref 3.88–5.62)
SODIUM SERPL-SCNC: 142 MMOL/L (ref 136–145)
WBC # BLD AUTO: 5.52 THOUSAND/UL (ref 4.31–10.16)

## 2022-03-10 PROCEDURE — 84153 ASSAY OF PSA TOTAL: CPT

## 2022-03-10 PROCEDURE — 80053 COMPREHEN METABOLIC PANEL: CPT

## 2022-03-10 PROCEDURE — 36415 COLL VENOUS BLD VENIPUNCTURE: CPT

## 2022-03-10 PROCEDURE — 85025 COMPLETE CBC W/AUTO DIFF WBC: CPT

## 2022-03-14 ENCOUNTER — OFFICE VISIT (OUTPATIENT)
Dept: HEMATOLOGY ONCOLOGY | Facility: HOSPITAL | Age: 67
End: 2022-03-14
Payer: MEDICARE

## 2022-03-14 VITALS
SYSTOLIC BLOOD PRESSURE: 114 MMHG | TEMPERATURE: 98.5 F | BODY MASS INDEX: 18 KG/M2 | OXYGEN SATURATION: 95 % | DIASTOLIC BLOOD PRESSURE: 62 MMHG | RESPIRATION RATE: 18 BRPM | WEIGHT: 112 LBS | HEIGHT: 66 IN | HEART RATE: 79 BPM

## 2022-03-14 DIAGNOSIS — R97.20 ELEVATED PSA, GREATER THAN OR EQUAL TO 20 NG/ML: ICD-10-CM

## 2022-03-14 DIAGNOSIS — C79.51 MALIGNANT NEOPLASM OF PROSTATE METASTATIC TO BONE (HCC): Primary | ICD-10-CM

## 2022-03-14 DIAGNOSIS — C61 MALIGNANT NEOPLASM OF PROSTATE METASTATIC TO BONE (HCC): Primary | ICD-10-CM

## 2022-03-14 PROCEDURE — 99214 OFFICE O/P EST MOD 30 MIN: CPT | Performed by: INTERNAL MEDICINE

## 2022-03-14 NOTE — PROGRESS NOTES
Hematology/Oncology Outpatient Follow- up Note  Darci Rojas 78 y o  male MRN: @ Encounter: [id number]        Date:  3/14/2022    Presenting Complaint/Diagnosis :  Metastatic prostate cancer  HPI:      Darci Rojas is seen for initial consultation 1/14/2022 regarding newly diagnosed prostate cancer which appears to be metastatic to the bone  This was biopsy proven  The patient was referred from his primary care physician to urology in December for PSA of 850  CT scan of the abdomen pelvis demonstrated multiple sclerotic lesions  He had a biopsy of 1 of these lesions and it came back as metastatic prostate cancer  He was started on anti androgen therapy by Urology in the form of [address]  He states it her tremendously for the 1st 2 days after he got it  Per the notes he was prescribed ibuprofen and told that his next shot would cause next pain as the volume would be lower  Previous Hematologic/ Oncologic History:      Workup    Current Hematologic/ Oncologic Treatment:      Lupron with Urology, Charito Camacho with our office  Interval History:    The patient returns for follow-up visit  He states he is doing well  He has hot flashes and does not sleep well because of those  He is seeing palliative Care in the next week and I advised him to discuss this with them so they could consider a sleep aid along with medication for hot flashes  We talked about this today but he states he will rather discuss this with palliative care  His PSA has come down very nicely from the 850 range to 130  He is doing quite well otherwise  His main complaint is fatigue and hot flashes  Denies any nausea denies any vomiting denies any diarrhea  His liver function tests are within acceptable limits  The rest of his 14 point review of systems today was negative  Test Results:    Imaging: No results found      Labs:   Lab Results   Component Value Date    WBC 5 52 03/10/2022    HGB 14 1 03/10/2022 HCT 44 5 03/10/2022     (H) 03/10/2022     (H) 03/10/2022     Lab Results   Component Value Date    K 3 8 03/10/2022     03/10/2022    CO2 33 (H) 03/10/2022    BUN 13 03/10/2022    CREATININE 0 66 03/10/2022    GLUF 93 03/10/2022    CALCIUM 9 1 03/10/2022    AST 19 03/10/2022    ALT 31 03/10/2022    ALKPHOS 75 03/10/2022    EGFR 100 03/10/2022       Lab Results   Component Value Date     0 (H) 03/10/2022     0 (H) 12/03/2021     ROS: As stated in the history of present illness otherwise his 14 point review of systems today was negative  Active Problems:   Patient Active Problem List   Diagnosis    COPD with acute exacerbation (HCC)    Acute tracheobronchitis    Severe protein-calorie malnutrition (HCC)    HLD (hyperlipidemia)    Left lower quadrant abdominal pain    Personal history of colonic polyps       Past Medical History:   Past Medical History:   Diagnosis Date    Colon polyp     Emphysema lung ([address] )     Hyperlipidemia        Surgical History:   Past Surgical History:   Procedure Laterality Date    APPENDECTOMY      COLONOSCOPY      IR BIOPSY BONE  12/30/2021    UPPER GASTROINTESTINAL ENDOSCOPY         Family History:    Family History   Problem Relation Age of Onset    Colon polyps Brother     Colon cancer Neg Hx        Cancer-related family history is negative for Colon cancer      Social History:   Social History     Socioeconomic History    Marital status: Single     Spouse name: Not on file    Number of children: Not on file    Years of education: Not on file    Highest education level: Not on file   Occupational History    Not on file   Tobacco Use    Smoking status: Former Smoker    Smokeless tobacco: Never Used   Vaping Use    Vaping Use: Never used   Substance and Sexual Activity    Alcohol use: Not Currently    Drug use: Never    Sexual activity: Not on file   Other Topics Concern    Not on file   Social History Narrative    Not on file     Social Determinants of Health     Financial Resource Strain: Not on file   Food Insecurity: Not on file   Transportation Needs: Not on file   Physical Activity: Not on file   Stress: Not on file   Social Connections: Not on file   Intimate Partner Violence: Not on file   Housing Stability: Not on file       Current Medications:   Current Outpatient Medications   Medication Sig Dispense Refill    albuterol (PROVENTIL HFA,VENTOLIN HFA) 90 mcg/act inhaler Inhale 2 puffs every 4 (four) hours as needed for wheezing 1 Inhaler 0    atorvastatin (LIPITOR) 40 mg tablet Take 1 tablet (40 mg total) by mouth daily with dinner 30 tablet 0    budesonide-formoterol (SYMBICORT) 160-4 5 mcg/act inhaler Inhale 2 puffs 2 (two) times a day Rinse mouth after use   cyclopentolate (Cyclogyl) 1 % ophthalmic solution Administer 1 drop to the right eye 3 (three) times a day 5 mL 0    enzalutamide (Xtandi) 40 mg capsule Take 4 capsules (160 mg total) by mouth daily 120 capsule 11    fluticasone-vilanterol (BREO ELLIPTA) 200-25 MCG/INH inhaler Inhale 1 puff daily Rinse mouth after use  1 Inhaler 0    ipratropium-albuterol (DUO-NEB) 0 5-2 5 mg/3 mL nebulizer solution Take 1 vial (3 mL total) by nebulization 3 (three) times a day 60 vial 0    montelukast (SINGULAIR) 10 mg tablet Take 10 mg by mouth daily at bedtime      tobramycin-dexamethasone (TOBRADEX) ophthalmic suspension Administer 1 drop to the right eye 4 (four) times a day 5 mL 0    valACYclovir (VALTREX) 1,000 mg tablet Take 1,000 mg by mouth 2 (two) times a day        gabapentin (NEURONTIN) 300 mg capsule Take 1 capsule (300 mg total) by mouth 3 (three) times a day for 7 days For post-herpetic neuralgia: Take 1 tablet on day 1,  Then take 2 tablets on day 2, Then take 3 tablets on day 3 and every day after that as instructed by your doctor   (Patient not taking: Reported on 12/1/2021 ) 21 capsule 0    pantoprazole (PROTONIX) 40 mg tablet Take 1 tablet (40 mg total) by mouth daily (Patient not taking: Reported on 12/9/2021 ) 30 tablet 2     No current facility-administered medications for this visit  Allergies: No Known Allergies    Physical Exam:    Body surface area is 1 56 meters squared  Wt Readings from Last 3 Encounters:   03/14/22 50 8 kg (112 lb)   03/02/22 47 9 kg (105 lb 9 6 oz)   01/14/22 47 6 kg (105 lb)        Temp Readings from Last 3 Encounters:   03/14/22 98 5 °F (36 9 °C) (Tympanic Core)   01/14/22 97 6 °F (36 4 °C) (Temporal)   12/30/21 98 °F (36 7 °C) (Temporal)        BP Readings from Last 3 Encounters:   03/14/22 114/62   03/02/22 110/70   01/14/22 100/70         Pulse Readings from Last 3 Encounters:   03/14/22 79   03/02/22 (!) 106   01/14/22 83         Physical Exam     Constitutional   General appearance: No acute distress, well appearing and well nourished  Eyes   Conjunctiva and lids: No swelling, erythema or discharge  Pupils and irises: Equal, round and reactive to light  Ears, Nose, Mouth, and Throat   External inspection of ears and nose: Normal     Nasal mucosa, septum, and turbinates: Normal without edema or erythema  Oropharynx: Normal with no erythema, edema, exudate or lesions  Pulmonary   Respiratory effort: No increased work of breathing or signs of respiratory distress  Auscultation of lungs: Clear to auscultation  Cardiovascular   Palpation of heart: Normal PMI, no thrills  Auscultation of heart: Normal rate and rhythm, normal S1 and S2, without murmurs  Examination of extremities for edema and/or varicosities: Normal     Carotid pulses: Normal     Abdomen   Abdomen: Non-tender, no masses  Liver and spleen: No hepatomegaly or splenomegaly  Lymphatic   Palpation of lymph nodes in neck: No lymphadenopathy  Musculoskeletal   Gait and station: Normal     Digits and nails: Normal without clubbing or cyanosis      Inspection/palpation of joints, bones, and muscles: Normal     Skin   Skin and subcutaneous tissue: Normal without rashes or lesions  Neurologic   Cranial nerves: Cranial nerves 2-12 intact  Sensation: No sensory loss  Psychiatric   Orientation to person, place, and time: Normal     Mood and affect: Normal         Assessment / Plan:      The patient is pleasant 54-year-old male with newly diagnosed metastatic prostate cancer to the bone  He was started on [place] with Urology  We had a discussion about adding on Xtandi  I explained the survival benefit  The patient is agreeable to proceeding  I went over the risks benefits and alternatives of treatment  I explained the possible side effects to include but not limited to diarrhea, abdominal pain, abnormal liver function tests, drug interactions, androgen deprivation side effects like hot flashes, osteoporosis and even death  He agreed to proceed and is doing reasonably well on this  He will stay on his Neisha Leal through his urologist  His PSA has come down to 131 from 850 just 3 months ago  Goals and Barriers:  Current Goal:  Prolong Survival from metastatic prostate cancer  Barriers: None  Patient's Capacity to Self Care:  Patient  able to self care  Portions of the record may have been created with voice recognition software  Occasional wrong word or "sound a like" substitutions may have occurred due to the inherent limitations of voice recognition software  Read the chart carefully and recognize, using context, where substitutions have occurred

## 2022-03-17 ENCOUNTER — CONSULT (OUTPATIENT)
Dept: PALLIATIVE MEDICINE | Age: 67
End: 2022-03-17
Payer: MEDICARE

## 2022-03-17 VITALS
OXYGEN SATURATION: 95 % | BODY MASS INDEX: 17.46 KG/M2 | WEIGHT: 108.2 LBS | DIASTOLIC BLOOD PRESSURE: 70 MMHG | SYSTOLIC BLOOD PRESSURE: 100 MMHG | TEMPERATURE: 98 F | HEART RATE: 94 BPM | RESPIRATION RATE: 18 BRPM

## 2022-03-17 DIAGNOSIS — R23.2 HOT FLASHES: ICD-10-CM

## 2022-03-17 DIAGNOSIS — C61 PROSTATE CANCER (HCC): ICD-10-CM

## 2022-03-17 DIAGNOSIS — C79.51 PROSTATE CANCER METASTATIC TO BONE (HCC): ICD-10-CM

## 2022-03-17 DIAGNOSIS — Z51.5 PALLIATIVE CARE PATIENT: ICD-10-CM

## 2022-03-17 DIAGNOSIS — G47.00 INSOMNIA: ICD-10-CM

## 2022-03-17 DIAGNOSIS — E43 SEVERE PROTEIN-CALORIE MALNUTRITION (HCC): Primary | ICD-10-CM

## 2022-03-17 DIAGNOSIS — C61 PROSTATE CANCER METASTATIC TO BONE (HCC): ICD-10-CM

## 2022-03-17 PROBLEM — T45.1X5A HOT FLASHES RELATED TO AROMATASE INHIBITOR THERAPY: Status: ACTIVE | Noted: 2022-03-17

## 2022-03-17 PROCEDURE — 99205 OFFICE O/P NEW HI 60 MIN: CPT | Performed by: PHYSICIAN ASSISTANT

## 2022-03-17 RX ORDER — ALPRAZOLAM 0.5 MG/1
0.5 TABLET ORAL 4 TIMES DAILY PRN
Qty: 120 TABLET | Refills: 0 | Status: SHIPPED | OUTPATIENT
Start: 2022-03-17 | End: 2022-03-31 | Stop reason: SDUPTHER

## 2022-03-17 RX ORDER — TRAZODONE HYDROCHLORIDE 50 MG/1
50 TABLET ORAL
Qty: 30 TABLET | Refills: 0 | Status: SHIPPED | OUTPATIENT
Start: 2022-03-17 | End: 2022-04-08

## 2022-03-17 RX ORDER — SERTRALINE HYDROCHLORIDE 25 MG/1
25 TABLET, FILM COATED ORAL
Qty: 30 TABLET | Refills: 0 | Status: SHIPPED | OUTPATIENT
Start: 2022-03-17 | End: 2022-04-08

## 2022-03-17 RX ORDER — SENNOSIDES 8.6 MG
650 CAPSULE ORAL EVERY 8 HOURS
Qty: 30 TABLET | Refills: 0 | Status: SHIPPED | OUTPATIENT
Start: 2022-03-17 | End: 2022-05-09 | Stop reason: HOSPADM

## 2022-03-17 NOTE — PROGRESS NOTES
Outpatient Consultation - Palliative and Supportive Care   Domonique Lines 78 y o  male [id number]    Assessment & Plan  Problem List Items Addressed This Visit        Cardiovascular and Mediastinum    Hot flashes    Relevant Medications    sertraline (Zoloft) 25 mg tablet       Musculoskeletal and Integument    Prostate cancer metastatic to bone (HCC)    Relevant Medications    acetaminophen (TYLENOL) 650 mg CR tablet       Other    Severe protein-calorie malnutrition (HCC) - Primary    Relevant Medications    acetaminophen (TYLENOL) 650 mg CR tablet    Insomnia    Relevant Medications    sertraline (Zoloft) 25 mg tablet    ALPRAZolam (XANAX) 0 5 mg tablet    traZODone (DESYREL) 50 mg tablet      Other Visit Diagnoses     Prostate cancer ([facility] [address] )        Relevant Medications    sertraline (Zoloft) 25 mg tablet    Palliative care patient        Relevant Medications    sertraline (Zoloft) 25 mg tablet    acetaminophen (TYLENOL) 650 mg CR tablet        PLAN:    SYMPTOM MANAGEMENT:  Insomnia:  · Start Trazodone 50mg QHS  · Start Xanax 0 5mg Q6 PRN (Agustin Carroll reports good relief from this in the past)   Hot flashes:  · Start Zoloft 25mg QHS, plan to increase to 50mg  · Consider alternate SSRI/SNRI if no relief  · Consider oxybutynin if no relief  Headaches/pain:  · Start tylenol 650mg Q8H   · Refusing opioids today  Notes:  · Agustin Carroll not interested narcotics at this time  · Agustin Carroll reports hallucinations with Restoril in the past  · Agustin Carroll reports discontinuing Seroquel in the past due to side effects  GOALS/ACP:  · Optimize sleep in order to continue treatments with Oncology  · Agustin Carroll has a Living Will  Asked him to bring this to next appointment  · Plan to discuss 5 Wishes/POA at next visit and other ACP  RTC: close follow up  See again in 2 weeks  · Instructions printed for Agustin Carroll to bring home with him      Medications adjusted this encounter:  Requested Prescriptions     Signed Prescriptions Disp Refills  sertraline (Zoloft) 25 mg tablet 30 tablet 0     Sig: Take 1 tablet (25 mg total) by mouth daily at bedtime    ALPRAZolam (XANAX) 0 5 mg tablet 120 tablet 0     Sig: Take 1 tablet (0 5 mg total) by mouth 4 (four) times a day as needed for anxiety or sleep    traZODone (DESYREL) 50 mg tablet 30 tablet 0     Sig: Take 1 tablet (50 mg total) by mouth daily at bedtime    acetaminophen (TYLENOL) 650 mg CR tablet 30 tablet 0     Sig: Take 1 tablet (650 mg total) by mouth every 8 (eight) hours     No orders of the defined types were placed in this encounter  There are no discontinued medications  Prep refer back to referring      Darci Rojas was seen today for symptoms and planning cares related to above illnesses  Above orders were sent electronically, or provided in hardcopy in clinic  I have reviewed the patient's controlled substance dispensing history in the Prescription Drug Monitoring Program in compliance with the [facility] regulations before prescribing any controlled substances  PDMP reviewed - no current prescriptions  We appreciate the referral, and wish for him to continue to follow with us  If there are questions or concerns, please contact us through our clinic/answering service 24 hours a day, seven days a week  Urmila Bonds PA-C  [facility] Palliative and Supportive [facility]      Visit Information    Accompanied By: No one    Source of History: Self    History Limitations: None    History of Present Illness      Darci Rojas is a 78 y o  male who presents as a referral from Dr Aris Moore of urology for primary palliative diagnosis of metastatic prostate cancer  Consultation is requested for: symptom management, goal of care assessment and decisional support, disease process education and discussion of prognosis, advance care planning, emotional support in the setting of serious illness      Linda Weinstein established care with Oncology in January 2022 for newly diagnosed prostate cancer with mets to bone as confirmed by biopsy  Hilda Jiang had initially been referred to Urology by his PCP due to elevated PSA  CT imaging done 12/21/2021 revealed extensive multifocal osseous metastatic disease including axial and appendicular skeletal system:  mets noted at calvarium, sternum, ribs, right humerus and clavicle, bilateral scapulae, cervical spine, thoracolumbar spine, sacrum, pelvis, and bilateral femurs  He was initiated on Firmagon anti androgen therapy by Urology, with plan for Lupron every 6 months  Hilda Jiang currently follows with Dr Arianna Bellamy of oncology  He has also been started on [facility] with oncology  He has experienced insomnia due to hot flashes due to the hormone therapy  He has been referred to palliative and supportive care for further symptom management  Hilda Jiang presents unaccompanied to his palliative care visit today  We reviewed the role of palliative care  We reviewed Rishabh's symptoms  He reports that severe insomnia is his biggest complaint today  He states he has gone "months" without restful sleep as he is up almost every hour of the night with hot flashes  He states, "I know I cannot go on like this  I know I need sleep " We reviewed his hot flashes  He states they are worse at night, however he does experience them during the day and did even experience a mild one during our visit  [place] also endorses shortness of breath  He reports he has emphysema and attributes this to a lifelong smoking history  He notes his shortness of breath is worse in humid whether such as today  Hilda Jiang denies significant pain  He reports occasional headache  He does not want to take medicine for this and would like to try to optimize his sleep regimen  We reviewed prior medications which Hilda Jiang has tried for insomnia    He notes adverse outcomes with Restoril in the past   He notes that he discontinued Seroquel because it had many listed side effects and he refers to as a "dangerous" medication  Tiago Craig does not believe he has ever tried Ambien  Tiago Craig also reports he has had Xanax in the past and notes this helped him achieve 8 hours of sleep anight  Tiago Craig has is familiar with marijuana in the past and would prefer not to pursue medical marijuana at this time  He adamantly refuses narcotics at this time  Besides this, Tiago Craig is open to trying any option that would be helpful to him  We discussed initiating trazodone for insomnia and he is agreeable to trying this  For short-term relief, I will also send Xanax to his pharmacy as he had good response to this in the past   Long-term plan would be to wean from Xanax  We discussed initiating an SSRI for possible relief with hot flashes and he is agreeable  We discussed advance care planning in Richard's goals of care  He states he does have a living will  We discussed Richard's living situation  He states he would be willing to consider assisted living/short-term rehab if needed as he does not have close friends or family who would take care of him  His short-term goal is to improve sleep and thereafter, he believes to he will be in a better state of mind to think about long-term goals  At this time, he hopes to continue cancer treatments to prolong life  Given severity of Richard's insomnia and hot flashes, I recommended close follow-up and he is agreeable  Plan to revisit in approximately 2 weeks to discuss symptoms more comprehensively as well advanced care  He is welcome to call sooner if needed     Social:  Lives alone  Never   No children  Enjoys antique gun collecting  Enjoys antique guitar collecting  Was career musician, 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  Pertinent Palliative Care Domains    Physical Symptoms:ambulates    Psychological Symptoms:mild anxiety, good insight, coping well    Social Aspects: hobbies/joys collecting antiques    Unfortunately, currently selling some of his possessions help with financial situation  Spiritual Aspects:  Not asked this time      Advance Directive and Living Will:     [place] reports that he does have a living well  I asked him to bring it in  Power of :   [place] does not a POA  POLST:      Historical Data  Past Medical History:   Diagnosis Date    Colon polyp     Emphysema lung ([address] )     Hyperlipidemia      Past Surgical History:   Procedure Laterality Date    APPENDECTOMY      COLONOSCOPY      IR BIOPSY BONE  12/30/2021    UPPER GASTROINTESTINAL ENDOSCOPY       Social History     Socioeconomic History    Marital status: Single     Spouse name: Not on file    Number of children: Not on file    Years of education: Not on file    Highest education level: Not on file   Occupational History    Not on file   Tobacco Use    Smoking status: Former Smoker    Smokeless tobacco: Never Used   Vaping Use    Vaping Use: Never used   Substance and Sexual Activity    Alcohol use: Not Currently    Drug use: Never    Sexual activity: Not on file   Other Topics Concern    Not on file   Social History Narrative    Not on file     Social Determinants of Health     Financial Resource Strain: Not on file   Food Insecurity: Not on file   Transportation Needs: Not on file   Physical Activity: Not on file   Stress: Not on file   Social Connections: Not on file   Intimate Partner Violence: Not on file   Housing Stability: Not on file     Family History   Problem Relation Age of Onset    Colon polyps Brother     Colon cancer Neg Hx      No Known Allergies  Current Outpatient Medications   Medication Sig Dispense Refill    albuterol (PROVENTIL HFA,VENTOLIN HFA) 90 mcg/act inhaler Inhale 2 puffs every 4 (four) hours as needed for wheezing 1 Inhaler 0    budesonide-formoterol (SYMBICORT) 160-4 5 mcg/act inhaler Inhale 2 puffs 2 (two) times a day Rinse mouth after use        enzalutamide (Xtandi) 40 mg capsule Take 4 capsules (160 mg total) by mouth daily 120 capsule 11    montelukast (SINGULAIR) 10 mg tablet Take 10 mg by mouth daily at bedtime      acetaminophen (TYLENOL) 650 mg CR tablet Take 1 tablet (650 mg total) by mouth every 8 (eight) hours 30 tablet 0    ALPRAZolam (XANAX) 0 5 mg tablet Take 1 tablet (0 5 mg total) by mouth 4 (four) times a day as needed for anxiety or sleep 120 tablet 0    atorvastatin (LIPITOR) 40 mg tablet Take 1 tablet (40 mg total) by mouth daily with dinner (Patient not taking: Reported on 3/17/2022 ) 30 tablet 0    cyclopentolate (Cyclogyl) 1 % ophthalmic solution Administer 1 drop to the right eye 3 (three) times a day (Patient not taking: Reported on 3/17/2022 ) 5 mL 0    fluticasone-vilanterol (BREO ELLIPTA) 200-25 MCG/INH inhaler Inhale 1 puff daily Rinse mouth after use  (Patient not taking: Reported on 3/17/2022 ) 1 Inhaler 0    gabapentin (NEURONTIN) 300 mg capsule Take 1 capsule (300 mg total) by mouth 3 (three) times a day for 7 days For post-herpetic neuralgia: Take 1 tablet on day 1,  Then take 2 tablets on day 2, Then take 3 tablets on day 3 and every day after that as instructed by your doctor   (Patient not taking: Reported on 12/1/2021 ) 21 capsule 0    ipratropium-albuterol (DUO-NEB) 0 5-2 5 mg/3 mL nebulizer solution Take 1 vial (3 mL total) by nebulization 3 (three) times a day (Patient not taking: Reported on 3/17/2022 ) 60 vial 0    pantoprazole (PROTONIX) 40 mg tablet Take 1 tablet (40 mg total) by mouth daily (Patient not taking: Reported on 12/9/2021 ) 30 tablet 2    sertraline (Zoloft) 25 mg tablet Take 1 tablet (25 mg total) by mouth daily at bedtime 30 tablet 0    tobramycin-dexamethasone (TOBRADEX) ophthalmic suspension Administer 1 drop to the right eye 4 (four) times a day (Patient not taking: Reported on 3/17/2022 ) 5 mL 0    traZODone (DESYREL) 50 mg tablet Take 1 tablet (50 mg total) by mouth daily at bedtime 30 tablet 0    valACYclovir (VALTREX) 1,000 mg tablet Take 1,000 mg by mouth 2 (two) times a day   (Patient not taking: Reported on 3/17/2022 )       No current facility-administered medications for this visit  Review of Systems   Constitutional: Positive for decreased appetite, malaise/fatigue, night sweats and weight loss  Negative for fever  HENT: Negative for congestion and sore throat  Eyes: Positive for visual halos (iseeing spots at times, attributes to insomnia)  Negative for blurred vision  Cardiovascular: Positive for dyspnea on exertion  Negative for cyanosis  Respiratory: Positive for cough, shortness of breath and sleep disturbances due to breathing  Musculoskeletal: Negative for arthritis, back pain and falls  Gastrointestinal: Positive for anorexia  Negative for abdominal pain, change in bowel habit, bowel incontinence, nausea and vomiting  Genitourinary: Negative for bladder incontinence and dysuria  Neurological: Positive for disturbances in coordination, excessive daytime sleepiness and headaches  Psychiatric/Behavioral: Positive for altered mental status ( attributes to insomnia)  The patient has insomnia  Vital Signs    /70 (BP Location: Left arm, Patient Position: Sitting, Cuff Size: Standard)   Pulse 94   Temp 98 °F (36 7 °C) (Temporal)   Resp 18   Wt 49 1 kg (108 lb 3 2 oz)   SpO2 95%   BMI 17 46 kg/m²     Physical Exam and Objective Data  Physical Exam  Vitals and nursing note reviewed  Constitutional:       General: He is awake  Appearance: He is cachectic  He is ill-appearing (Chronically)  Comments: Fully conversational, extremely fatigued appearing and somewhat disheveled   HENT:      Head: Normocephalic and atraumatic  Eyes:      Conjunctiva/sclera: Conjunctivae normal    Cardiovascular:      Rate and Rhythm: Normal rate and regular rhythm  Pulmonary:      Effort: Pulmonary effort is normal  No respiratory distress  Musculoskeletal:      Cervical back: Neck supple  Right lower leg: No edema  Left lower leg: No edema  Comments: Significant muscle wasting   Skin:     General: Skin is warm and dry  Coloration: Skin is pale  Neurological:      Mental Status: He is alert and oriented to person, place, and time  Radiology and Laboratory:  I personally reviewed and interpreted the following results:  CT imaging, hospital reports    45 minutes was spent face to face with Mayra Richards with greater than 50% of the time spent in counseling or coordination of care including discussions of etiology of diagnosis, prognosis of diagnosis, follow up requirements, risk factors and risk reduction of disease, patient and family counseling/involvement in care, compliance with treatment regimen and symptom management  All of the patient's questions were answered during this discussion

## 2022-03-17 NOTE — LETTER
March 17, 2022     Rao Krueger MD  [address]  [address]    Patient: Kaylie Holder   YOB: 1955   Date of Visit: 3/17/2022       Dear Dr Nikki Prieto:    Thank you for referring Kaylie Holder to me for evaluation  Below are my notes for this consultation  If you have questions, please do not hesitate to call me  I look forward to following your patient along with you  Sincerely,        Areatha Crigler, PA-C        CC: No Recipients  Marilu Maya  3/17/2022  3:49 PM  Sign when Signing Visit      Outpatient Consultation - Palliative and Supportive Care   Kaylie Holder 78 y o  male [id number]    Assessment & Plan  Problem List Items Addressed This Visit        Cardiovascular and Mediastinum    Hot flashes    Relevant Medications    sertraline (Zoloft) 25 mg tablet       Musculoskeletal and Integument    Prostate cancer metastatic to bone (HCC)    Relevant Medications    acetaminophen (TYLENOL) 650 mg CR tablet       Other    Severe protein-calorie malnutrition (HCC) - Primary    Relevant Medications    acetaminophen (TYLENOL) 650 mg CR tablet    Insomnia    Relevant Medications    sertraline (Zoloft) 25 mg tablet    ALPRAZolam (XANAX) 0 5 mg tablet    traZODone (DESYREL) 50 mg tablet      Other Visit Diagnoses     Prostate cancer ([facility] [address] )        Relevant Medications    sertraline (Zoloft) 25 mg tablet    Palliative care patient        Relevant Medications    sertraline (Zoloft) 25 mg tablet    acetaminophen (TYLENOL) 650 mg CR tablet        PLAN:    SYMPTOM MANAGEMENT:  Insomnia:  · Start Trazodone 50mg QHS  · Start Xanax 0 5mg Q6 PRN (Ivelisse Alvarez reports good relief from this in the past)   Hot flashes:  · Start Zoloft 25mg QHS, plan to increase to 50mg  · Consider alternate SSRI/SNRI if no relief  · Consider oxybutynin if no relief  Headaches/pain:  · Start tylenol 650mg Q8H   · Refusing opioids today     Notes:  · Ivelisse Alvarez not interested narcotics at this time  · Astrid Fitch reports hallucinations with Restoril in the past  · Astrid Fitch reports discontinuing Seroquel in the past due to side effects  GOALS/ACP:  · Optimize sleep in order to continue treatments with Oncology  · Astrid Fitch has a Living Will  Asked him to bring this to next appointment  · Plan to discuss 5 Wishes/POA at next visit and other ACP  RTC: close follow up  See again in 2 weeks  · Instructions printed for Astrid Fitch to bring home with him  Medications adjusted this encounter:  Requested Prescriptions     Signed Prescriptions Disp Refills    sertraline (Zoloft) 25 mg tablet 30 tablet 0     Sig: Take 1 tablet (25 mg total) by mouth daily at bedtime    ALPRAZolam (XANAX) 0 5 mg tablet 120 tablet 0     Sig: Take 1 tablet (0 5 mg total) by mouth 4 (four) times a day as needed for anxiety or sleep    traZODone (DESYREL) 50 mg tablet 30 tablet 0     Sig: Take 1 tablet (50 mg total) by mouth daily at bedtime    acetaminophen (TYLENOL) 650 mg CR tablet 30 tablet 0     Sig: Take 1 tablet (650 mg total) by mouth every 8 (eight) hours     No orders of the defined types were placed in this encounter  There are no discontinued medications  Prep refer back to referring      Karla Carlotta was seen today for symptoms and planning cares related to above illnesses  Above orders were sent electronically, or provided in hardcopy in clinic  I have reviewed the patient's controlled substance dispensing history in the Prescription Drug Monitoring Program in compliance with the [facility] regulations before prescribing any controlled substances  PDMP reviewed - no current prescriptions  We appreciate the referral, and wish for him to continue to follow with us  If there are questions or concerns, please contact us through our clinic/answering service 24 hours a day, seven days a week      Obie Staff, DE[facility] [place] [phone]    Visit Information    Accompanied By: No one    Source of History: Self    History Limitations: None    History of Present Illness      Anila Oliveira is a 78 y o  male who presents as a referral from Dr Vince Alvarez of urology for primary palliative diagnosis of metastatic prostate cancer  Consultation is requested for: symptom management, goal of care assessment and decisional support, disease process education and discussion of prognosis, advance care planning, emotional support in the setting of serious illness  Erika Zambrano established care with Oncology in January 2022 for newly diagnosed prostate cancer with mets to bone as confirmed by biopsy  Erika Zambrano had initially been referred to Urology by his PCP due to elevated PSA  CT imaging done 12/21/2021 revealed extensive multifocal osseous metastatic disease including axial and appendicular skeletal system:  mets noted at calvarium, sternum, ribs, right humerus and clavicle, bilateral scapulae, cervical spine, thoracolumbar spine, sacrum, pelvis, and bilateral femurs  He was initiated on Firmagon anti androgen therapy by Urology, with plan for Lupron every 6 months  Erika Zambrano currently follows with Dr Anamaria Telles of oncology  He has also been started on [facility] with oncology  He has experienced insomnia due to hot flashes due to the hormone therapy  He has been referred to palliative and supportive care for further symptom management  Erika Zambrano presents unaccompanied to his palliative care visit today  We reviewed the role of palliative care  We reviewed Rishabh's symptoms  He reports that severe insomnia is his biggest complaint today  He states he has gone "months" without restful sleep as he is up almost every hour of the night with hot flashes  He states, "I know I cannot go on like this  I know I need sleep " We reviewed his hot flashes    He states they are worse at night, however he does experience them during the day and did even experience a mild one during our visit  Gauri Powell also endorses shortness of breath  He reports he has emphysema and attributes this to a lifelong smoking history  He notes his shortness of breath is worse in humid whether such as today  Tiagomohinder Craig denies significant pain  He reports occasional headache  He does not want to take medicine for this and would like to try to optimize his sleep regimen  We reviewed prior medications which Tiago Craig has tried for insomnia  He notes adverse outcomes with Restoril in the past   He notes that he discontinued Seroquel because it had many listed side effects and he refers to as a "dangerous" medication  Tiagomohinder Craig does not believe he has ever tried Ambien  Tiagomohinder Craig also reports he has had Xanax in the past and notes this helped him achieve 8 hours of sleep anight  Tiago Craig has is familiar with marijuana in the past and would prefer not to pursue medical marijuana at this time  He adamantly refuses narcotics at this time  Besides this, Tiago Craig is open to trying any option that would be helpful to him  We discussed initiating trazodone for insomnia and he is agreeable to trying this  For short-term relief, I will also send Xanax to his pharmacy as he had good response to this in the past   Long-term plan would be to wean from Xanax  We discussed initiating an SSRI for possible relief with hot flashes and he is agreeable  We discussed advance care planning in Marciostanley's goals of care  He states he does have a living will  We discussed Richard's living situation  He states he would be willing to consider assisted living/short-term rehab if needed as he does not have close friends or family who would take care of him  His short-term goal is to improve sleep and thereafter, he believes to he will be in a better state of mind to think about long-term goals  At this time, he hopes to continue cancer treatments to prolong life       Given severity of Marciomargauxcoleen's insomnia and hot flashes, I recommended close follow-up and he is agreeable  Plan to revisit in approximately 2 weeks to discuss symptoms more comprehensively as well advanced care  He is welcome to call sooner if needed     Social:  Lives alone  Never   No children  Enjoys antique gun collecting  Enjoys antique guitar collecting  Was career musician, 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  Pertinent Palliative Care Domains    Physical Symptoms:ambulates    Psychological Symptoms:mild anxiety, good insight, coping well    Social Aspects: hobbies/joys collecting antiques  Unfortunately, currently selling some of his possessions help with financial situation  Spiritual Aspects:  Not asked this time      Advance Directive and Living Will:     Gwen Rossi reports that he does have a living well  I asked him to bring it in  Power of :   Gwen Rossi does not a POA    POLST:      Historical Data  Past Medical History:   Diagnosis Date    Colon polyp     Emphysema lung ([facility] [address] )     Hyperlipidemia      Past Surgical History:   Procedure Laterality Date    APPENDECTOMY      COLONOSCOPY      IR BIOPSY BONE  12/30/2021    UPPER GASTROINTESTINAL ENDOSCOPY       Social History     Socioeconomic History    Marital status: Single     Spouse name: Not on file    Number of children: Not on file    Years of education: Not on file    Highest education level: Not on file   Occupational History    Not on file   Tobacco Use    Smoking status: Former Smoker    Smokeless tobacco: Never Used   Vaping Use    Vaping Use: Never used   Substance and Sexual Activity    Alcohol use: Not Currently    Drug use: Never    Sexual activity: Not on file   Other Topics Concern    Not on file   Social History Narrative    Not on file     Social Determinants of Health     Financial Resource Strain: Not on file   Food Insecurity: Not on file   Transportation Needs: Not on file   Physical Activity: Not on file   Stress: Not on file   Social Connections: Not on file   Intimate Partner Violence: Not on file   Housing Stability: Not on file     Family History   Problem Relation Age of Onset    Colon polyps Brother     Colon cancer Neg Hx      No Known Allergies  Current Outpatient Medications   Medication Sig Dispense Refill    albuterol (PROVENTIL HFA,VENTOLIN HFA) 90 mcg/act inhaler Inhale 2 puffs every 4 (four) hours as needed for wheezing 1 Inhaler 0    budesonide-formoterol (SYMBICORT) 160-4 5 mcg/act inhaler Inhale 2 puffs 2 (two) times a day Rinse mouth after use   enzalutamide (Xtandi) 40 mg capsule Take 4 capsules (160 mg total) by mouth daily 120 capsule 11    montelukast (SINGULAIR) 10 mg tablet Take 10 mg by mouth daily at bedtime      acetaminophen (TYLENOL) 650 mg CR tablet Take 1 tablet (650 mg total) by mouth every 8 (eight) hours 30 tablet 0    ALPRAZolam (XANAX) 0 5 mg tablet Take 1 tablet (0 5 mg total) by mouth 4 (four) times a day as needed for anxiety or sleep 120 tablet 0    atorvastatin (LIPITOR) 40 mg tablet Take 1 tablet (40 mg total) by mouth daily with dinner (Patient not taking: Reported on 3/17/2022 ) 30 tablet 0    cyclopentolate (Cyclogyl) 1 % ophthalmic solution Administer 1 drop to the right eye 3 (three) times a day (Patient not taking: Reported on 3/17/2022 ) 5 mL 0    fluticasone-vilanterol (BREO ELLIPTA) 200-25 MCG/INH inhaler Inhale 1 puff daily Rinse mouth after use  (Patient not taking: Reported on 3/17/2022 ) 1 Inhaler 0    gabapentin (NEURONTIN) 300 mg capsule Take 1 capsule (300 mg total) by mouth 3 (three) times a day for 7 days For post-herpetic neuralgia: Take 1 tablet on day 1,  Then take 2 tablets on day 2, Then take 3 tablets on day 3 and every day after that as instructed by your doctor   (Patient not taking: Reported on 12/1/2021 ) 21 capsule 0    ipratropium-albuterol (DUO-NEB) 0 5-2 5 mg/3 mL nebulizer solution Take 1 vial (3 mL total) by nebulization 3 (three) times a day (Patient not taking: Reported on 3/17/2022 ) 60 vial 0    pantoprazole (PROTONIX) 40 mg tablet Take 1 tablet (40 mg total) by mouth daily (Patient not taking: Reported on 12/9/2021 ) 30 tablet 2    sertraline (Zoloft) 25 mg tablet Take 1 tablet (25 mg total) by mouth daily at bedtime 30 tablet 0    tobramycin-dexamethasone (TOBRADEX) ophthalmic suspension Administer 1 drop to the right eye 4 (four) times a day (Patient not taking: Reported on 3/17/2022 ) 5 mL 0    traZODone (DESYREL) 50 mg tablet Take 1 tablet (50 mg total) by mouth daily at bedtime 30 tablet 0    valACYclovir (VALTREX) 1,000 mg tablet Take 1,000 mg by mouth 2 (two) times a day   (Patient not taking: Reported on 3/17/2022 )       No current facility-administered medications for this visit  Review of Systems   Constitutional: Positive for decreased appetite, malaise/fatigue, night sweats and weight loss  Negative for fever  HENT: Negative for congestion and sore throat  Eyes: Positive for visual halos (iseeing spots at times, attributes to insomnia)  Negative for blurred vision  Cardiovascular: Positive for dyspnea on exertion  Negative for cyanosis  Respiratory: Positive for cough, shortness of breath and sleep disturbances due to breathing  Musculoskeletal: Negative for arthritis, back pain and falls  Gastrointestinal: Positive for anorexia  Negative for abdominal pain, change in bowel habit, bowel incontinence, nausea and vomiting  Genitourinary: Negative for bladder incontinence and dysuria  Neurological: Positive for disturbances in coordination, excessive daytime sleepiness and headaches  Psychiatric/Behavioral: Positive for altered mental status ( attributes to insomnia)  The patient has insomnia         Vital Signs    /70 (BP Location: Left arm, Patient Position: Sitting, Cuff Size: Standard)   Pulse 94   Temp 98 °F (36 7 °C) (Temporal)   Resp 18   Wt 49 1 kg (108 lb 3 2 oz)   SpO2 95%   BMI 17 46 kg/m²     Physical Exam and Objective Data  Physical Exam  Vitals and nursing note reviewed  Constitutional:       General: He is awake  Appearance: He is cachectic  He is ill-appearing (Chronically)  Comments: Fully conversational, extremely fatigued appearing and somewhat disheveled   HENT:      Head: Normocephalic and atraumatic  Eyes:      Conjunctiva/sclera: Conjunctivae normal    Cardiovascular:      Rate and Rhythm: Normal rate and regular rhythm  Pulmonary:      Effort: Pulmonary effort is normal  No respiratory distress  Musculoskeletal:      Cervical back: Neck supple  Right lower leg: No edema  Left lower leg: No edema  Comments: Significant muscle wasting   Skin:     General: Skin is warm and dry  Coloration: Skin is pale  Neurological:      Mental Status: He is alert and oriented to person, place, and time  Radiology and Laboratory:  I personally reviewed and interpreted the following results:  CT imaging, hospital reports    45 minutes was spent face to face with Dottie Holbrook with greater than 50% of the time spent in counseling or coordination of care including discussions of etiology of diagnosis, prognosis of diagnosis, follow up requirements, risk factors and risk reduction of disease, patient and family counseling/involvement in care, compliance with treatment regimen and symptom management  All of the patient's questions were answered during this discussion

## 2022-03-17 NOTE — PATIENT INSTRUCTIONS
Bety,    It was a pleasure taking care of you today! For sleep, please take a trazodone pill every night  After this, if you need additional medication for sleep, take Xanax  For hot flashes, please take Zoloft every day before bed, around dinner time  Consider taking tylenol 3 - 4 times a day for headaches and general relief  Please call our office if these medications due not improve your symptoms after 5 days--we can make adjustments  Please bring your Living Will to your next appointment      Nikole Rabago

## 2022-03-30 DIAGNOSIS — R10.13 DYSPEPSIA: ICD-10-CM

## 2022-03-30 RX ORDER — PANTOPRAZOLE SODIUM 40 MG/1
TABLET, DELAYED RELEASE ORAL
Qty: 90 TABLET | Refills: 0 | Status: SHIPPED | OUTPATIENT
Start: 2022-03-30 | End: 2022-05-09 | Stop reason: HOSPADM
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
## 2022-03-31 ENCOUNTER — OFFICE VISIT (OUTPATIENT)
Dept: PALLIATIVE MEDICINE | Age: 67
End: 2022-03-31
Payer: MEDICARE

## 2022-03-31 VITALS
RESPIRATION RATE: 18 BRPM | DIASTOLIC BLOOD PRESSURE: 70 MMHG | OXYGEN SATURATION: 94 % | HEART RATE: 91 BPM | SYSTOLIC BLOOD PRESSURE: 120 MMHG | TEMPERATURE: 96.6 F

## 2022-03-31 DIAGNOSIS — G47.00 INSOMNIA: ICD-10-CM

## 2022-03-31 DIAGNOSIS — R23.2 HOT FLASHES: ICD-10-CM

## 2022-03-31 DIAGNOSIS — E43 SEVERE PROTEIN-CALORIE MALNUTRITION (HCC): ICD-10-CM

## 2022-03-31 DIAGNOSIS — C79.51 PROSTATE CANCER METASTATIC TO BONE (HCC): Primary | ICD-10-CM

## 2022-03-31 DIAGNOSIS — R64 CACHEXIA (HCC): ICD-10-CM

## 2022-03-31 DIAGNOSIS — C61 PROSTATE CANCER METASTATIC TO BONE (HCC): Primary | ICD-10-CM

## 2022-03-31 DIAGNOSIS — R63.0 ANOREXIA: ICD-10-CM

## 2022-03-31 DIAGNOSIS — J44.9 CHRONIC OBSTRUCTIVE PULMONARY DISEASE, UNSPECIFIED COPD TYPE (HCC): ICD-10-CM

## 2022-03-31 DIAGNOSIS — Z51.5 PALLIATIVE CARE PATIENT: ICD-10-CM

## 2022-03-31 PROCEDURE — 99215 OFFICE O/P EST HI 40 MIN: CPT | Performed by: PHYSICIAN ASSISTANT

## 2022-03-31 RX ORDER — PREDNISONE 2.5 MG
2.5 TABLET ORAL 2 TIMES DAILY WITH MEALS
Qty: 60 TABLET | Refills: 0 | Status: SHIPPED | OUTPATIENT
Start: 2022-03-31 | End: 2022-04-28

## 2022-03-31 RX ORDER — ALPRAZOLAM 0.5 MG/1
0.5 TABLET ORAL 4 TIMES DAILY PRN
Qty: 120 TABLET | Refills: 0 | Status: SHIPPED | OUTPATIENT
Start: 2022-03-31 | End: 2022-04-04 | Stop reason: SDUPTHER

## 2022-03-31 NOTE — PATIENT INSTRUCTIONS
Dary Muniz,    We will send prednisone to your pharmacy to stimulate appetite  Please continue Xanax as prescribed  Please let us know if you are interested in medical marijuana and we can set you up for it  Please speak with Dr Keren Wilson about the dosing Xtandi  I will send him a message  Please complete X-rays of cervical spine the evaluate neck pain,       Call with any questions,  [address] [facility]
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
## 2022-04-04 ENCOUNTER — TELEPHONE (OUTPATIENT)
Dept: PALLIATIVE MEDICINE | Facility: CLINIC | Age: 67
End: 2022-04-04

## 2022-04-04 ENCOUNTER — TELEPHONE (OUTPATIENT)
Dept: HEMATOLOGY ONCOLOGY | Facility: HOSPITAL | Age: 67
End: 2022-04-04

## 2022-04-04 ENCOUNTER — TELEPHONE (OUTPATIENT)
Dept: HEMATOLOGY ONCOLOGY | Facility: CLINIC | Age: 67
End: 2022-04-04

## 2022-04-04 DIAGNOSIS — G47.00 INSOMNIA: ICD-10-CM

## 2022-04-04 RX ORDER — ALPRAZOLAM 0.5 MG/1
1 TABLET ORAL 4 TIMES DAILY PRN
Qty: 120 TABLET | Refills: 0 | Status: SHIPPED | OUTPATIENT
Start: 2022-04-04 | End: 2022-04-04

## 2022-04-04 RX ORDER — ALPRAZOLAM 0.5 MG/1
1 TABLET ORAL 4 TIMES DAILY PRN
Qty: 240 TABLET | Refills: 0 | Status: SHIPPED | OUTPATIENT
Start: 2022-04-04 | End: 2022-04-04

## 2022-04-04 RX ORDER — ALPRAZOLAM 1 MG/1
1 TABLET ORAL 4 TIMES DAILY PRN
Qty: 120 TABLET | Refills: 0 | Status: SHIPPED | OUTPATIENT
Start: 2022-04-04 | End: 2022-05-30 | Stop reason: SDUPTHER

## 2022-04-04 NOTE — TELEPHONE ENCOUNTER
Phone call routed from [facility]  Pt called asking if he could discuss new treatment options, wondering if he could have an orchidectomy to cut of the testosterone source feeding his prostate ca  Advised pt he could discuss this plan with Dr Melva Sever at upcoming appt in May on 5/20 but wondering if he could be sooner  Was last seen 3/14 and continues on firmagon per urology  Can surgery be an option for him?

## 2022-04-04 NOTE — TELEPHONE ENCOUNTER
CALL RETURN FORM   Reason for patient call? Medical questions regarding disease    Patient's primary oncologist?  Dr Pippa Barnett   Name of person the patient was calling for? Dr Pippa Barnett   Any additional information to add, if applicable? Please call back on [phone]   Informed patient that the message will be forwarded to the team and someone will get back to them as soon as possible    Did you relay this information to the patient?  Yes

## 2022-04-04 NOTE — TELEPHONE ENCOUNTER
Pt called to have procedure scheduled to remove testosterone removed   Pt is very nervous about his diagnosis     Pt call [phone] please leave VM if pt doesn't answer

## 2022-04-04 NOTE — TELEPHONE ENCOUNTER
Lydia Estrada called office stating he is going to run out of his Xanax faster than he thought  Patient has 8 tablets left  Patient stated he is taking 5-6 tablets of the Xanax 0 5mg at bedtime  Phone call to [facility] pharmacy and pharmacist stated patient picked up Xanax script dated 3/17/22 qty 120 tablets  Per pharmacy script dated 3/31/2022 cannot be filled till 4/13/2022 per insurance       Please advise

## 2022-04-04 NOTE — TELEPHONE ENCOUNTER
Called pt back and relayed information  Pt agreed to go the surgical route and will contact urology and make an appt to discuss orchiectomy   Pt verbalized goo understanding

## 2022-04-04 NOTE — TELEPHONE ENCOUNTER
I am adjusting the script and sending it back to the pharmacy  Can we see if they will fill it with the changed dose?

## 2022-04-04 NOTE — TELEPHONE ENCOUNTER
Called and spoke with patient  He was very nervous and talking very fast  He said he wants to have an orchiectomy done because it is producing the testosterone that is "fueling the fire" of his prostate cancer  He said if he has them removed, then he won't have testosterone anymore  Advised that the Lupron he is getting would do the same thing  He said he understood but still wants the surgery done and he kept saying he needs the surgery done ASAP  Advised that he needs to have an appointment with a provider to discuss this, but he kept saying "I need this done ASAP, I can't wait around"

## 2022-04-04 NOTE — TELEPHONE ENCOUNTER
Jaylan Stock- we received notification that the insurance is asking if you would consider this script being for a 1 mg pill, or should we just get the Prior auth for the 0 5 mg  Let us know- thank you!

## 2022-04-06 NOTE — TELEPHONE ENCOUNTER
Delfina Valencia and [facility] 8[facility]15 [facility] discuss or[facility]ctomy

## 2022-04-08 DIAGNOSIS — G47.00 INSOMNIA: ICD-10-CM

## 2022-04-08 DIAGNOSIS — Z51.5 PALLIATIVE CARE PATIENT: ICD-10-CM

## 2022-04-08 DIAGNOSIS — R23.2 HOT FLASHES: ICD-10-CM

## 2022-04-08 DIAGNOSIS — C61 PROSTATE CANCER (HCC): ICD-10-CM

## 2022-04-08 RX ORDER — SERTRALINE HYDROCHLORIDE 25 MG/1
TABLET, FILM COATED ORAL
Qty: 30 TABLET | Refills: 0 | Status: SHIPPED | OUTPATIENT
Start: 2022-04-08 | End: 2022-04-11

## 2022-04-08 RX ORDER — TRAZODONE HYDROCHLORIDE 50 MG/1
TABLET ORAL
Qty: 30 TABLET | Refills: 0 | Status: SHIPPED | OUTPATIENT
Start: 2022-04-08 | End: 2022-04-11

## 2022-04-09 DIAGNOSIS — C61 PROSTATE CANCER (HCC): ICD-10-CM

## 2022-04-09 DIAGNOSIS — Z51.5 PALLIATIVE CARE PATIENT: ICD-10-CM

## 2022-04-09 DIAGNOSIS — R23.2 HOT FLASHES: ICD-10-CM

## 2022-04-09 DIAGNOSIS — G47.00 INSOMNIA: ICD-10-CM

## 2022-04-11 ENCOUNTER — TELEPHONE (OUTPATIENT)
Dept: PALLIATIVE MEDICINE | Facility: CLINIC | Age: 67
End: 2022-04-11

## 2022-04-11 RX ORDER — TRAZODONE HYDROCHLORIDE 50 MG/1
TABLET ORAL
Qty: 90 TABLET | Refills: 1 | Status: SHIPPED | OUTPATIENT
Start: 2022-04-11 | End: 2022-05-09 | Stop reason: HOSPADM

## 2022-04-11 RX ORDER — SERTRALINE HYDROCHLORIDE 25 MG/1
TABLET, FILM COATED ORAL
Qty: 90 TABLET | Refills: 1 | Status: SHIPPED | OUTPATIENT
Start: 2022-04-11 | End: 2022-05-09 | Stop reason: HOSPADM

## 2022-04-11 NOTE — TELEPHONE ENCOUNTER
Patient called office requesting refill of his Albuterol inhaler and Symbicort inhaler  He stated he has been working out more and is going to run out of this medication  Asked patient who prescribes medication  He stated his PCP Dr Brigida Brooks  I advised patient to reach out to his PCP for refill since they manage this medication  Patient stated per pharmacy it was too early to fill

## 2022-04-11 NOTE — TELEPHONE ENCOUNTER
Thanks for update  I agree that [facility] should address this with his PCP given the severe nature of his pulmonary disease

## 2022-04-15 ENCOUNTER — APPOINTMENT (OUTPATIENT)
Dept: LAB | Facility: HOSPITAL | Age: 67
End: 2022-04-15
Attending: UROLOGY
Payer: MEDICARE

## 2022-04-15 ENCOUNTER — OFFICE VISIT (OUTPATIENT)
Dept: UROLOGY | Facility: HOSPITAL | Age: 67
End: 2022-04-15
Payer: MEDICARE

## 2022-04-15 VITALS
BODY MASS INDEX: 17.04 KG/M2 | SYSTOLIC BLOOD PRESSURE: 118 MMHG | HEIGHT: 66 IN | HEART RATE: 85 BPM | DIASTOLIC BLOOD PRESSURE: 78 MMHG | WEIGHT: 106 LBS | OXYGEN SATURATION: 95 %

## 2022-04-15 DIAGNOSIS — C61 PROSTATE CANCER METASTATIC TO BONE (HCC): Primary | ICD-10-CM

## 2022-04-15 DIAGNOSIS — C79.51 PROSTATE CANCER METASTATIC TO BONE (HCC): ICD-10-CM

## 2022-04-15 DIAGNOSIS — C79.51 PROSTATE CANCER METASTATIC TO BONE (HCC): Primary | ICD-10-CM

## 2022-04-15 DIAGNOSIS — C61 PROSTATE CANCER METASTATIC TO BONE (HCC): ICD-10-CM

## 2022-04-15 LAB
ALBUMIN SERPL BCP-MCNC: 3.5 G/DL (ref 3.5–5)
ALP SERPL-CCNC: 63 U/L (ref 46–116)
ALT SERPL W P-5'-P-CCNC: 26 U/L (ref 12–78)
ANION GAP SERPL CALCULATED.3IONS-SCNC: 6 MMOL/L (ref 4–13)
AST SERPL W P-5'-P-CCNC: 14 U/L (ref 5–45)
BASOPHILS # BLD AUTO: 0.06 THOUSANDS/ΜL (ref 0–0.1)
BASOPHILS NFR BLD AUTO: 1 % (ref 0–1)
BILIRUB SERPL-MCNC: 0.4 MG/DL (ref 0.2–1)
BUN SERPL-MCNC: 16 MG/DL (ref 5–25)
CALCIUM SERPL-MCNC: 8.8 MG/DL (ref 8.3–10.1)
CHLORIDE SERPL-SCNC: 102 MMOL/L (ref 100–108)
CO2 SERPL-SCNC: 32 MMOL/L (ref 21–32)
CREAT SERPL-MCNC: 0.62 MG/DL (ref 0.6–1.3)
EOSINOPHIL # BLD AUTO: 0.05 THOUSAND/ΜL (ref 0–0.61)
EOSINOPHIL NFR BLD AUTO: 1 % (ref 0–6)
ERYTHROCYTE [DISTWIDTH] IN BLOOD BY AUTOMATED COUNT: 13.6 % (ref 11.6–15.1)
GFR SERPL CREATININE-BSD FRML MDRD: 102 ML/MIN/1.73SQ M
GLUCOSE P FAST SERPL-MCNC: 100 MG/DL (ref 65–99)
HCT VFR BLD AUTO: 42.3 % (ref 36.5–49.3)
HGB BLD-MCNC: 13.7 G/DL (ref 12–17)
IMM GRANULOCYTES # BLD AUTO: 0.03 THOUSAND/UL (ref 0–0.2)
IMM GRANULOCYTES NFR BLD AUTO: 1 % (ref 0–2)
LYMPHOCYTES # BLD AUTO: 0.96 THOUSANDS/ΜL (ref 0.6–4.47)
LYMPHOCYTES NFR BLD AUTO: 16 % (ref 14–44)
MCH RBC QN AUTO: 31.9 PG (ref 26.8–34.3)
MCHC RBC AUTO-ENTMCNC: 32.4 G/DL (ref 31.4–37.4)
MCV RBC AUTO: 99 FL (ref 82–98)
MONOCYTES # BLD AUTO: 0.52 THOUSAND/ΜL (ref 0.17–1.22)
MONOCYTES NFR BLD AUTO: 8 % (ref 4–12)
NEUTROPHILS # BLD AUTO: 4.57 THOUSANDS/ΜL (ref 1.85–7.62)
NEUTS SEG NFR BLD AUTO: 73 % (ref 43–75)
NRBC BLD AUTO-RTO: 0 /100 WBCS
PLATELET # BLD AUTO: 437 THOUSANDS/UL (ref 149–390)
PMV BLD AUTO: 8.9 FL (ref 8.9–12.7)
POTASSIUM SERPL-SCNC: 4.5 MMOL/L (ref 3.5–5.3)
PROT SERPL-MCNC: 6.1 G/DL (ref 6.4–8.2)
PSA SERPL-MCNC: 14 NG/ML (ref 0–4)
RBC # BLD AUTO: 4.29 MILLION/UL (ref 3.88–5.62)
SODIUM SERPL-SCNC: 140 MMOL/L (ref 136–145)
TESTOST SERPL-MCNC: <8 NG/DL (ref 95–948)
WBC # BLD AUTO: 6.19 THOUSAND/UL (ref 4.31–10.16)

## 2022-04-15 PROCEDURE — 99214 OFFICE O/P EST MOD 30 MIN: CPT | Performed by: UROLOGY

## 2022-04-15 PROCEDURE — 80053 COMPREHEN METABOLIC PANEL: CPT | Performed by: INTERNAL MEDICINE

## 2022-04-15 PROCEDURE — 85025 COMPLETE CBC W/AUTO DIFF WBC: CPT | Performed by: INTERNAL MEDICINE

## 2022-04-15 PROCEDURE — 84403 ASSAY OF TOTAL TESTOSTERONE: CPT

## 2022-04-15 PROCEDURE — 84153 ASSAY OF PSA TOTAL: CPT | Performed by: INTERNAL MEDICINE

## 2022-04-15 PROCEDURE — 36415 COLL VENOUS BLD VENIPUNCTURE: CPT | Performed by: INTERNAL MEDICINE

## 2022-04-15 NOTE — PROGRESS NOTES
HISTORY:    Follow-up prostate cancer, see prior notes regarding widely metastatic prostate cancer with PSA over 800 presentation  Start hormone therapy late 2021  Most recent PSA was in December 2021, 135    Patient denies any side effects of the hormone therapy    Denies any urinary complaints at all    He says he thinks orchiectomy would be better for him than continue Lupron, he is not sure why  He has lost a lot of weight, now 106 lb  Says he is still working out very hard to build muscle  Also says he has a good appetite, each lots at every meal          ASSESSMENT / PLAN:    1  Check PSA and testosterone  2  I told him I do not think there is any advantage to orchiectomy over continue Lupron in his case  3  His next Lupron is late August 2022   4  I told him he should cut back his exercise program somewhat, to allow himself to put on some weight  The following portions of the patient's history were reviewed and updated as appropriate: allergies, current medications, past family history, past medical history, past social history, past surgical history and problem list     Review of Systems   All other systems reviewed and are negative  Objective:     Physical Exam  Constitutional:       General: He is not in acute distress  Appearance: He is well-developed  He is not diaphoretic  HENT:      Head: Normocephalic and atraumatic  Eyes:      General: No scleral icterus  Pulmonary:      Effort: Pulmonary effort is normal    Genitourinary:     Comments: Penis testes normal    Prostate mildly enlarged, slightly firm left lateral edge  Skin:     Coloration: Skin is not pale  Neurological:      Mental Status: He is alert and oriented to person, place, and time  Psychiatric:         Behavior: Behavior normal          Thought Content:  Thought content normal          Judgment: Judgment normal            0   Lab Value Date/Time     0 (H) 03/10/2022 1142     0 (H) 12/03/2021 1504   ]  BUN   Date Value Ref Range Status   03/10/2022 13 5 - 25 mg/dL Final     Creatinine   Date Value Ref Range Status   03/10/2022 0 66 0 60 - 1 30 mg/dL Final     Comment:     Standardized to IDMS reference method     No components found for: CBC      Patient Active Problem List   Diagnosis    Chronic obstructive pulmonary disease ([place] [address] )    Acute tracheobronchitis    Severe protein-calorie malnutrition ([place] [address] )    HLD (hyperlipidemia)    Left lower quadrant abdominal pain    Personal history of colonic polyps    Prostate cancer metastatic to bone ([place] [address] )    Insomnia    Hot flashes    Anorexia    Cachexia ([place] [address] )    Palliative care patient        Diagnoses and all orders for this visit:    Prostate cancer metastatic to bone ([place] [address] )  -     Testosterone; Future  -     PSA Total, Diagnostic; Future  -     CBC; Future  -     Comprehensive metabolic panel; Future           Patient ID: Marianela Vincent is a 78 y o  male  Current Outpatient Medications:     albuterol (PROVENTIL HFA,VENTOLIN HFA) 90 mcg/act inhaler, Inhale 2 puffs every 4 (four) hours as needed for wheezing, Disp: 1 Inhaler, Rfl: 0    ALPRAZolam (XANAX) 1 mg tablet, Take 1 tablet (1 mg total) by mouth 4 (four) times a day as needed for anxiety or sleep, Disp: 120 tablet, Rfl: 0    budesonide-formoterol (SYMBICORT) 160-4 5 mcg/act inhaler, Inhale 2 puffs 2 (two) times a day Rinse mouth after use , Disp: , Rfl:     enzalutamide (Xtandi) 40 mg capsule, Take 4 capsules (160 mg total) by mouth daily, Disp: 120 capsule, Rfl: 11    montelukast (SINGULAIR) 10 mg tablet, Take 10 mg by mouth daily at bedtime, Disp: , Rfl:     predniSONE 2 5 mg tablet, Take 1 tablet (2 5 mg total) by mouth 2 (two) times a day with meals Take with breakfast and lunch   Do not take on empty stomach , Disp: 60 tablet, Rfl: 0    sertraline (ZOLOFT) 25 mg tablet, TAKE 1 TABLET BY MOUTH EVERYDAY AT BEDTIME (Patient taking differently: Not taking much per pt ), Disp: 90 tablet, Rfl: 1    acetaminophen (TYLENOL) 650 mg CR tablet, Take 1 tablet (650 mg total) by mouth every 8 (eight) hours (Patient not taking: Reported on 4/15/2022 ), Disp: 30 tablet, Rfl: 0    atorvastatin (LIPITOR) 40 mg tablet, Take 1 tablet (40 mg total) by mouth daily with dinner (Patient not taking: Reported on 3/17/2022 ), Disp: 30 tablet, Rfl: 0    cyclopentolate (Cyclogyl) 1 % ophthalmic solution, Administer 1 drop to the right eye 3 (three) times a day (Patient not taking: Reported on 3/17/2022 ), Disp: 5 mL, Rfl: 0    fluticasone-vilanterol (BREO ELLIPTA) 200-25 MCG/INH inhaler, Inhale 1 puff daily Rinse mouth after use  (Patient not taking: Reported on 3/17/2022 ), Disp: 1 Inhaler, Rfl: 0    gabapentin (NEURONTIN) 300 mg capsule, Take 1 capsule (300 mg total) by mouth 3 (three) times a day for 7 days For post-herpetic neuralgia: Take 1 tablet on day 1,  Then take 2 tablets on day 2, Then take 3 tablets on day 3 and every day after that as instructed by your doctor   (Patient not taking: Reported on 12/1/2021 ), Disp: 21 capsule, Rfl: 0    ipratropium-albuterol (DUO-NEB) 0 5-2 5 mg/3 mL nebulizer solution, Take 1 vial (3 mL total) by nebulization 3 (three) times a day (Patient not taking: Reported on 3/17/2022 ), Disp: 60 vial, Rfl: 0    pantoprazole (PROTONIX) 40 mg tablet, TAKE 1 TABLET BY MOUTH EVERY DAY (Patient not taking: Reported on 4/15/2022), Disp: 90 tablet, Rfl: 0    tobramycin-dexamethasone (TOBRADEX) ophthalmic suspension, Administer 1 drop to the right eye 4 (four) times a day (Patient not taking: Reported on 3/17/2022 ), Disp: 5 mL, Rfl: 0    traZODone (DESYREL) 50 mg tablet, TAKE 1 TABLET BY MOUTH EVERYDAY AT BEDTIME (Patient not taking: Reported on 4/15/2022), Disp: 90 tablet, Rfl: 1    valACYclovir (VALTREX) 1,000 mg tablet, Take 1,000 mg by mouth 2 (two) times a day   (Patient not taking: Reported on 3/17/2022 ), Disp: , Rfl:     Past Medical History:   Diagnosis Date    Colon polyp     Emphysema lung ([facility] [address] )     Hyperlipidemia        Past Surgical History:   Procedure Laterality Date    APPENDECTOMY      COLONOSCOPY      IR BIOPSY BONE  12/30/2021    UPPER GASTROINTESTINAL ENDOSCOPY         Social History

## 2022-04-19 ENCOUNTER — OFFICE VISIT (OUTPATIENT)
Dept: PALLIATIVE MEDICINE | Age: 67
End: 2022-04-19
Payer: MEDICARE

## 2022-04-19 ENCOUNTER — SOCIAL WORK (OUTPATIENT)
Dept: PALLIATIVE MEDICINE | Age: 67
End: 2022-04-19
Payer: MEDICARE

## 2022-04-19 VITALS
OXYGEN SATURATION: 95 % | TEMPERATURE: 96.6 F | WEIGHT: 106.4 LBS | RESPIRATION RATE: 18 BRPM | SYSTOLIC BLOOD PRESSURE: 118 MMHG | HEART RATE: 77 BPM | BODY MASS INDEX: 17.17 KG/M2 | DIASTOLIC BLOOD PRESSURE: 80 MMHG

## 2022-04-19 DIAGNOSIS — R23.2 HOT FLASHES: ICD-10-CM

## 2022-04-19 DIAGNOSIS — J44.9 CHRONIC OBSTRUCTIVE PULMONARY DISEASE, UNSPECIFIED COPD TYPE (HCC): ICD-10-CM

## 2022-04-19 DIAGNOSIS — C79.51 PROSTATE CANCER METASTATIC TO BONE (HCC): ICD-10-CM

## 2022-04-19 DIAGNOSIS — Z71.89 COUNSELING AND COORDINATION OF CARE: Primary | ICD-10-CM

## 2022-04-19 DIAGNOSIS — G47.00 INSOMNIA, UNSPECIFIED TYPE: ICD-10-CM

## 2022-04-19 DIAGNOSIS — C61 PROSTATE CANCER METASTATIC TO BONE (HCC): ICD-10-CM

## 2022-04-19 DIAGNOSIS — R64 CACHEXIA (HCC): ICD-10-CM

## 2022-04-19 DIAGNOSIS — E43 SEVERE PROTEIN-CALORIE MALNUTRITION (HCC): Primary | ICD-10-CM

## 2022-04-19 DIAGNOSIS — Z51.5 PALLIATIVE CARE PATIENT: ICD-10-CM

## 2022-04-19 PROCEDURE — 99214 OFFICE O/P EST MOD 30 MIN: CPT | Performed by: PHYSICIAN ASSISTANT

## 2022-04-19 PROCEDURE — NC001 PR NO CHARGE

## 2022-04-19 NOTE — PROGRESS NOTES
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, plays the [employer]  Disability:    Spirituality/ [employer]:    Patient's strengths, social supports, and resources: Patient has his brother and friends  Currently his brother has cancer as well and he reports he isn't doing great  Cultural information:   Mental Health current or previous: Anxiety- uses Xanax  Substance use or history: Smoking history/ currently using medical marijuana that his friends give him  We encouraged him to get his won medical marijuana card, that way he can meet with the pharmacist at the dispensary  He does not own, or use a computer  We encouraged him to ask his brother to help him register and assured him it only takes a few minutes  He was provided with the steps to be certified  Sleep: Improving, but still up off and on   Exercise: Has been exercising to build muscle, but states his doctors do not want him to overdo it because he needs to gain weight  Diet/nutrition: reports his appetite is good- he makes himself eat  Durable Medical Equipment needs: None  Transportation: Self  Financial concerns:  Advanced Directive: Has ACP, but has not brought in a copy  Other medical or social work providers involved: None  Patient/caregiver current level of coping: Pt is understanding of his dx  Patient/family concerns and areas of need: Pts concern was how he makes rhymes in his head and when he speaks, 

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
## 2022-04-22 ENCOUNTER — TELEPHONE (OUTPATIENT)
Dept: PALLIATIVE MEDICINE | Facility: CLINIC | Age: 67
End: 2022-04-22

## 2022-04-22 ENCOUNTER — OFFICE VISIT (OUTPATIENT)
Dept: FAMILY MEDICINE CLINIC | Facility: CLINIC | Age: 67
End: 2022-04-22
Payer: MEDICARE

## 2022-04-22 VITALS
OXYGEN SATURATION: 95 % | HEART RATE: 92 BPM | TEMPERATURE: 98.7 F | RESPIRATION RATE: 18 BRPM | BODY MASS INDEX: 16.97 KG/M2 | WEIGHT: 105.6 LBS | DIASTOLIC BLOOD PRESSURE: 70 MMHG | HEIGHT: 66 IN | SYSTOLIC BLOOD PRESSURE: 100 MMHG

## 2022-04-22 DIAGNOSIS — Z00.00 ANNUAL PHYSICAL EXAM: Primary | ICD-10-CM

## 2022-04-22 DIAGNOSIS — J44.9 CHRONIC OBSTRUCTIVE PULMONARY DISEASE, UNSPECIFIED COPD TYPE (HCC): ICD-10-CM

## 2022-04-22 DIAGNOSIS — Z13.1 SCREENING FOR DIABETES MELLITUS: ICD-10-CM

## 2022-04-22 DIAGNOSIS — Z13.6 SCREENING FOR CARDIOVASCULAR CONDITION: ICD-10-CM

## 2022-04-22 DIAGNOSIS — Z13.29 SCREENING FOR THYROID DISORDER: ICD-10-CM

## 2022-04-22 DIAGNOSIS — Z23 NEED FOR PROPHYLACTIC VACCINATION WITH STREPTOCOCCUS PNEUMONIAE (PNEUMOCOCCUS) AND INFLUENZA VACCINES: ICD-10-CM

## 2022-04-22 PROCEDURE — 90677 PCV20 VACCINE IM: CPT

## 2022-04-22 PROCEDURE — 99204 OFFICE O/P NEW MOD 45 MIN: CPT | Performed by: NURSE PRACTITIONER

## 2022-04-22 NOTE — TELEPHONE ENCOUNTER
Thank you for update  That is moving in the right direction  We can discuss further at our next appointment

## 2022-04-22 NOTE — PROGRESS NOTES
[facility]    NAME: Carlos Gunter  AGE: 78 y o  SEX: male  : 1955     DATE: 2022     Assessment and Plan:     Problem List Items Addressed This Visit        Respiratory    Chronic obstructive pulmonary disease ([address] )      Other Visit Diagnoses     Annual physical exam    -  Primary    Relevant Orders    Lipid panel    TSH, 3rd generation with Free T4 reflex    UA (URINE) with reflex to Scope    Screening for cardiovascular condition        Relevant Orders    Lipid panel    Screening for thyroid disorder        Relevant Orders    TSH, 3rd generation with Free T4 reflex    Screening for diabetes mellitus        Relevant Orders    UA (URINE) with reflex to Scope    Need for prophylactic vaccination with Streptococcus pneumoniae (Pneumococcus) and Influenza vaccines        Relevant Orders    Pneumococcal Conjugate Vaccine 20-valent (Pcv20)          Immunizations and preventive care screenings were discussed with patient today  Appropriate education was printed on patient's after visit summary  Counseling:  Alcohol/drug use: discussed moderation in alcohol intake, the recommendations for healthy alcohol use, and avoidance of illicit drug use  Dental Health: discussed importance of regular tooth brushing, flossing, and dental visits  Injury prevention: discussed safety/seat belts, safety helmets, smoke detectors, carbon dioxide detectors, and smoking near bedding or upholstery  Sexual health: discussed sexually transmitted diseases, partner selection, use of condoms, avoidance of unintended pregnancy, and contraceptive alternatives  · Exercise: the importance of regular exercise/physical activity was discussed  Recommend exercise 3-5 times per week for at least 30 minutes  BMI Counseling: Body mass index is 17 04 kg/m²  The BMI is below normal  Patient advised to gain weight   Rationale for BMI follow-up plan is due to patient being underweight  BMI Counseling: Body mass index is 17 04 kg/m²  Follow-up plan was not completed due to patient receiving palliative care  Depression Screening and Follow-up Plan: Patient was screened for depression during today's encounter  They screened negative with a PHQ-2 score of 0  Return in about 3 months (around 7/22/2022) for Recheck  Chief Complaint:     Chief Complaint   Patient presents with   [address] patient Stage 4 Bone Cancer/COPD      History of Present Illness:     Adult Annual Physical   Patient here for a comprehensive physical exam  The patient reports problems - has problems eating and gaining weight   The patient is seeing palliative care for metastatic prostate cancer that has metastasized to his bone  Patient reports overall he is doing well and is not having any pain  Patient requesting referral to doctor for medical marijuana  Patient already using marijuana nightly but would like to obtain a card  Diet and Physical Activity  · Diet/Nutrition: well balanced diet and consuming 3-5 servings of fruits/vegetables daily  · Exercise: moderate cardiovascular exercise and less than 30 minutes on average  Depression Screening  PHQ-2/9 Depression Screening    Little interest or pleasure in doing things: 0 - not at all  Feeling down, depressed, or hopeless: 0 - not at all  PHQ-2 Score: 0  PHQ-2 Interpretation: Negative depression screen       General Health  · Sleep: sleeps poorly and gets 4-6 hours of sleep on average  · Hearing: normal - bilateral   · Vision: vision problems: blurry vision in right eye, photophobia in right eye  Patient does not see eye doctor  · Dental: no dental visits for >1 year and doesn't have many teeth left    Health  · Symptoms include: urinary frequency     Review of Systems:     Review of Systems   Constitutional: Negative for chills, fatigue and fever          Hot flashes at night from Lupron injection  HENT: Negative  Negative for congestion, ear pain and sore throat  Eyes: Positive for photophobia (baseline in right eye)  Negative for pain, discharge and visual disturbance  Respiratory: Positive for shortness of breath (daily sob/torres- hx of COPD ) and wheezing (occasional wheezing- hx of COPD)  Negative for cough and chest tightness  Cardiovascular: Negative  Negative for chest pain, palpitations and leg swelling  Gastrointestinal: Negative  Negative for abdominal pain, diarrhea, nausea and vomiting  Endocrine: Negative  Negative for polydipsia and polyuria  Genitourinary: Positive for frequency (sometimes has urinary frequency )  Negative for difficulty urinating, dysuria and hematuria  Musculoskeletal: Positive for arthralgias (various joint pain from arthritis- mentioned hands )  Negative for back pain, joint swelling, myalgias, neck pain and neck stiffness  Skin: Negative  Negative for rash  Allergic/Immunologic: Positive for environmental allergies  Neurological: Negative  Negative for dizziness, seizures, syncope, light-headedness and headaches  Hematological: Negative  Does not bruise/bleed easily  Psychiatric/Behavioral: Negative  The patient is not nervous/anxious  All other systems reviewed and are negative       Past Medical History:     Past Medical History:   Diagnosis Date    Colon polyp     Emphysema lung ([address] )     Hyperlipidemia       Past Surgical History:     Past Surgical History:   Procedure Laterality Date    APPENDECTOMY      COLONOSCOPY      IR BIOPSY BONE  12/30/2021    UPPER GASTROINTESTINAL ENDOSCOPY        Family History:     Family History   Problem Relation Age of Onset    Lung cancer Mother     Breast cancer Sister     Colon polyps Brother     Colon cancer Neg Hx       Social History:     Social History     Socioeconomic History    Marital status: Single     Spouse name: None    Number of children: None    Years of education: None    Highest education level: None   Occupational History    None   Tobacco Use    Smoking status: Former Smoker    Smokeless tobacco: Never Used   Vaping Use    Vaping Use: Never used   Substance and Sexual Activity    Alcohol use: Not Currently    Drug use: Never    Sexual activity: None   Other Topics Concern    None   Social History Narrative    None     Social Determinants of Health     Financial Resource Strain: Not on file   Food Insecurity: Not on file   Transportation Needs: Not on file   Physical Activity: Not on file   Stress: Not on file   Social Connections: Not on file   Intimate Partner Violence: Not At Risk    Fear of Current or Ex-Partner: No    Emotionally Abused: No    Physically Abused: No    Sexually Abused: No   Housing Stability: Not on file      Current Medications:     Current Outpatient Medications   Medication Sig Dispense Refill    albuterol (PROVENTIL HFA,VENTOLIN HFA) 90 mcg/act inhaler Inhale 2 puffs every 4 (four) hours as needed for wheezing 1 Inhaler 0    ALPRAZolam (XANAX) 1 mg tablet Take 1 tablet (1 mg total) by mouth 4 (four) times a day as needed for anxiety or sleep 120 tablet 0    budesonide-formoterol (SYMBICORT) 160-4 5 mcg/act inhaler Inhale 2 puffs 2 (two) times a day Rinse mouth after use   enzalutamide (Xtandi) 40 mg capsule Take 4 capsules (160 mg total) by mouth daily 120 capsule 11    montelukast (SINGULAIR) 10 mg tablet Take 10 mg by mouth daily at bedtime      predniSONE 2 5 mg tablet Take 1 tablet (2 5 mg total) by mouth 2 (two) times a day with meals Take with breakfast and lunch  Do not take on empty stomach   60 tablet 0    sertraline (ZOLOFT) 25 mg tablet TAKE 1 TABLET BY MOUTH EVERYDAY AT BEDTIME (Patient taking differently: Not taking much per pt ) 90 tablet 1    acetaminophen (TYLENOL) 650 mg CR tablet Take 1 tablet (650 mg total) by mouth every 8 (eight) hours (Patient not taking: Reported on 4/15/2022 ) 30 tablet 0    atorvastatin (LIPITOR) 40 mg tablet Take 1 tablet (40 mg total) by mouth daily with dinner (Patient not taking: Reported on 3/17/2022 ) 30 tablet 0    cyclopentolate (Cyclogyl) 1 % ophthalmic solution Administer 1 drop to the right eye 3 (three) times a day (Patient not taking: Reported on 3/17/2022 ) 5 mL 0    fluticasone-vilanterol (BREO ELLIPTA) 200-25 MCG/INH inhaler Inhale 1 puff daily Rinse mouth after use  (Patient not taking: Reported on 3/17/2022 ) 1 Inhaler 0    gabapentin (NEURONTIN) 300 mg capsule Take 1 capsule (300 mg total) by mouth 3 (three) times a day for 7 days For post-herpetic neuralgia: Take 1 tablet on day 1,  Then take 2 tablets on day 2, Then take 3 tablets on day 3 and every day after that as instructed by your doctor  (Patient not taking: Reported on 12/1/2021 ) 21 capsule 0    ipratropium-albuterol (DUO-NEB) 0 5-2 5 mg/3 mL nebulizer solution Take 1 vial (3 mL total) by nebulization 3 (three) times a day (Patient not taking: Reported on 4/22/2022 ) 60 vial 0    pantoprazole (PROTONIX) 40 mg tablet TAKE 1 TABLET BY MOUTH EVERY DAY (Patient not taking: Reported on 4/15/2022) 90 tablet 0    tobramycin-dexamethasone (TOBRADEX) ophthalmic suspension Administer 1 drop to the right eye 4 (four) times a day (Patient not taking: Reported on 3/17/2022 ) 5 mL 0    traZODone (DESYREL) 50 mg tablet TAKE 1 TABLET BY MOUTH EVERYDAY AT BEDTIME (Patient not taking: Reported on 4/15/2022) 90 tablet 1    valACYclovir (VALTREX) 1,000 mg tablet Take 1,000 mg by mouth 2 (two) times a day   (Patient not taking: Reported on 3/17/2022 )       No current facility-administered medications for this visit        Allergies:     No Known Allergies   Physical Exam:     /70 (BP Location: Left arm, Patient Position: Sitting, Cuff Size: Standard)   Pulse 92   Temp 98 7 °F (37 1 °C) (Tympanic)   Resp 18   Ht 5' 6" (1 676 m)   Wt 47 9 kg (105 lb 9 6 oz)   SpO2 95%   BMI 17 04 kg/m²     Physical Exam  Vitals and nursing note reviewed  Constitutional:       Appearance: He is underweight  HENT:      Head: Normocephalic and atraumatic  Right Ear: Tympanic membrane, ear canal and external ear normal  There is no impacted cerumen  Left Ear: Tympanic membrane, ear canal and external ear normal  There is no impacted cerumen  Nose: Nose normal  No congestion or rhinorrhea  Mouth/Throat:      Mouth: Mucous membranes are moist       Pharynx: Oropharynx is clear  No oropharyngeal exudate or posterior oropharyngeal erythema  Eyes:      Extraocular Movements: Extraocular movements intact  Conjunctiva/sclera: Conjunctivae normal    Neck:      Vascular: No carotid bruit  Cardiovascular:      Rate and Rhythm: Normal rate and regular rhythm  Pulses: Normal pulses  Heart sounds: Normal heart sounds  No murmur heard  Pulmonary:      Effort: Pulmonary effort is normal  No respiratory distress (mild expiratory wheeze heard in bilateral upper lobes)  Breath sounds: Wheezing (faint bilateral upper lobe expiratory wheeze ) present  Abdominal:      General: Abdomen is flat  Bowel sounds are normal       Palpations: Abdomen is soft  Tenderness: There is no abdominal tenderness  Musculoskeletal:         General: Normal range of motion  Cervical back: Normal range of motion  Right lower leg: No edema  Left lower leg: No edema  Lymphadenopathy:      Cervical: No cervical adenopathy  Skin:     General: Skin is warm and dry  Capillary Refill: Capillary refill takes less than 2 seconds  Neurological:      General: No focal deficit present  Mental Status: He is alert and oriented to person, place, and time  Psychiatric:         Mood and Affect: Mood normal          Behavior: Behavior normal          Thought Content:  Thought content normal          Judgment: Judgment normal           ISA Harrell  [facility]

## 2022-04-22 NOTE — PATIENT INSTRUCTIONS

## 2022-04-22 NOTE — TELEPHONE ENCOUNTER
Patient called to paula Arzola our PA know he is trying to cut back the Xanax and has been trying one 2 mg before bed and it has been working

## 2022-04-26 ENCOUNTER — TELEPHONE (OUTPATIENT)
Dept: UROLOGY | Facility: AMBULATORY SURGERY CENTER | Age: 67
End: 2022-04-26

## 2022-04-26 NOTE — TELEPHONE ENCOUNTER
Notified Scarlett Lee of PSA level and testosterone level and that the Lupron was working  He was much appreciative of the call    And is aware of his appointment in August

## 2022-04-26 NOTE — TELEPHONE ENCOUNTER
----- Message from Sharron Butterfield MD sent at 4/21/2022  4:19 PM EDT -----  Tell pt:  Good, PSA much lower than before  Testosterone at the 0 level, the Lupron is working, he does not need orchiectomy

## 2022-04-28 DIAGNOSIS — R63.0 ANOREXIA: ICD-10-CM

## 2022-04-28 DIAGNOSIS — E43 SEVERE PROTEIN-CALORIE MALNUTRITION (HCC): ICD-10-CM

## 2022-04-28 DIAGNOSIS — J44.1 COPD WITH ACUTE EXACERBATION (HCC): ICD-10-CM

## 2022-04-28 DIAGNOSIS — Z51.5 PALLIATIVE CARE PATIENT: ICD-10-CM

## 2022-04-28 RX ORDER — ALBUTEROL SULFATE 90 UG/1
2 AEROSOL, METERED RESPIRATORY (INHALATION) EVERY 4 HOURS PRN
Qty: 18 G | Refills: 2 | Status: SHIPPED | OUTPATIENT
Start: 2022-04-28 | End: 2022-05-30 | Stop reason: SDUPTHER

## 2022-04-28 RX ORDER — MONTELUKAST SODIUM 10 MG/1
10 TABLET ORAL
Qty: 30 TABLET | Refills: 2 | Status: SHIPPED | OUTPATIENT
Start: 2022-04-28

## 2022-04-28 RX ORDER — PREDNISONE 2.5 MG
TABLET ORAL
Qty: 60 TABLET | Refills: 0 | Status: SHIPPED | OUTPATIENT
Start: 2022-04-28 | End: 2022-05-09 | Stop reason: HOSPADM

## 2022-04-28 NOTE — TELEPHONE ENCOUNTER
Please fill these for  Samreen Pena, he said these are very important for him  Also Samreen Pena stated he will not get the Lipid panel done due to him feeling as it is not necessary and will mess with his med schedule    Thank you

## 2022-05-05 ENCOUNTER — APPOINTMENT (EMERGENCY)
Dept: CT IMAGING | Facility: HOSPITAL | Age: 67
DRG: 070 | End: 2022-05-05
Payer: MEDICARE

## 2022-05-05 ENCOUNTER — OFFICE VISIT (OUTPATIENT)
Dept: PALLIATIVE MEDICINE | Age: 67
End: 2022-05-05
Payer: MEDICARE

## 2022-05-05 ENCOUNTER — HOSPITAL ENCOUNTER (INPATIENT)
Facility: HOSPITAL | Age: 67
LOS: 4 days | Discharge: HOME | DRG: 070 | End: 2022-05-09
Attending: EMERGENCY MEDICINE | Admitting: INTERNAL MEDICINE
Payer: MEDICARE

## 2022-05-05 VITALS
BODY MASS INDEX: 17.08 KG/M2 | WEIGHT: 105.8 LBS | SYSTOLIC BLOOD PRESSURE: 110 MMHG | DIASTOLIC BLOOD PRESSURE: 70 MMHG | RESPIRATION RATE: 18 BRPM | TEMPERATURE: 97.2 F | OXYGEN SATURATION: 93 % | HEART RATE: 93 BPM

## 2022-05-05 DIAGNOSIS — C79.51 PROSTATE CANCER METASTATIC TO BONE (HCC): ICD-10-CM

## 2022-05-05 DIAGNOSIS — R44.3 HALLUCINATION: ICD-10-CM

## 2022-05-05 DIAGNOSIS — F41.9 ANXIETY: ICD-10-CM

## 2022-05-05 DIAGNOSIS — Z51.5 PALLIATIVE CARE PATIENT: ICD-10-CM

## 2022-05-05 DIAGNOSIS — C79.51 PROSTATE CANCER METASTATIC TO BONE (HCC): Primary | ICD-10-CM

## 2022-05-05 DIAGNOSIS — R53.1 GENERALIZED WEAKNESS: ICD-10-CM

## 2022-05-05 DIAGNOSIS — E43 SEVERE PROTEIN-CALORIE MALNUTRITION (HCC): ICD-10-CM

## 2022-05-05 DIAGNOSIS — R63.0 ANOREXIA: ICD-10-CM

## 2022-05-05 DIAGNOSIS — J44.9 CHRONIC OBSTRUCTIVE PULMONARY DISEASE, UNSPECIFIED COPD TYPE (HCC): ICD-10-CM

## 2022-05-05 DIAGNOSIS — C61 PROSTATE CANCER METASTATIC TO BONE (HCC): Primary | ICD-10-CM

## 2022-05-05 DIAGNOSIS — R41.82 ALTERED MENTAL STATUS: Primary | ICD-10-CM

## 2022-05-05 DIAGNOSIS — F32.A DEPRESSION, UNSPECIFIED DEPRESSION TYPE: ICD-10-CM

## 2022-05-05 DIAGNOSIS — R64 CACHEXIA (HCC): ICD-10-CM

## 2022-05-05 DIAGNOSIS — G89.3 CANCER-RELATED PAIN: ICD-10-CM

## 2022-05-05 DIAGNOSIS — R23.2 HOT FLASHES: ICD-10-CM

## 2022-05-05 DIAGNOSIS — R44.1 VISUAL HALLUCINATION: ICD-10-CM

## 2022-05-05 DIAGNOSIS — G47.00 INSOMNIA, UNSPECIFIED TYPE: ICD-10-CM

## 2022-05-05 DIAGNOSIS — C61 PROSTATE CANCER METASTATIC TO BONE (HCC): ICD-10-CM

## 2022-05-05 PROBLEM — G93.40 ENCEPHALOPATHY ACUTE: Status: ACTIVE | Noted: 2022-05-05

## 2022-05-05 LAB
ALBUMIN SERPL BCP-MCNC: 3.5 G/DL (ref 3.5–5)
ALP SERPL-CCNC: 76 U/L (ref 46–116)
ALT SERPL W P-5'-P-CCNC: 25 U/L (ref 12–78)
ANION GAP SERPL CALCULATED.3IONS-SCNC: 7 MMOL/L (ref 4–13)
APTT PPP: 32 SECONDS (ref 23–37)
AST SERPL W P-5'-P-CCNC: 19 U/L (ref 5–45)
ATRIAL RATE: 79 BPM
BASOPHILS # BLD AUTO: 0.08 THOUSANDS/ΜL (ref 0–0.1)
BASOPHILS NFR BLD AUTO: 1 % (ref 0–1)
BILIRUB SERPL-MCNC: 0.3 MG/DL (ref 0.2–1)
BILIRUB UR QL STRIP: NEGATIVE
BUN SERPL-MCNC: 21 MG/DL (ref 5–25)
CALCIUM SERPL-MCNC: 8.7 MG/DL (ref 8.3–10.1)
CHLORIDE SERPL-SCNC: 104 MMOL/L (ref 100–108)
CLARITY UR: CLEAR
CO2 SERPL-SCNC: 32 MMOL/L (ref 21–32)
COLOR UR: YELLOW
CREAT SERPL-MCNC: 0.76 MG/DL (ref 0.6–1.3)
EOSINOPHIL # BLD AUTO: 0.13 THOUSAND/ΜL (ref 0–0.61)
EOSINOPHIL NFR BLD AUTO: 2 % (ref 0–6)
ERYTHROCYTE [DISTWIDTH] IN BLOOD BY AUTOMATED COUNT: 13.8 % (ref 11.6–15.1)
GFR SERPL CREATININE-BSD FRML MDRD: 94 ML/MIN/1.73SQ M
GLUCOSE SERPL-MCNC: 102 MG/DL (ref 65–140)
GLUCOSE UR STRIP-MCNC: NEGATIVE MG/DL
HCT VFR BLD AUTO: 42.5 % (ref 36.5–49.3)
HGB BLD-MCNC: 13.9 G/DL (ref 12–17)
HGB UR QL STRIP.AUTO: NEGATIVE
IMM GRANULOCYTES # BLD AUTO: 0.01 THOUSAND/UL (ref 0–0.2)
IMM GRANULOCYTES NFR BLD AUTO: 0 % (ref 0–2)
INR PPP: 0.98 (ref 0.84–1.19)
KETONES UR STRIP-MCNC: NEGATIVE MG/DL
LEUKOCYTE ESTERASE UR QL STRIP: NEGATIVE
LIPASE SERPL-CCNC: 99 U/L (ref 73–393)
LYMPHOCYTES # BLD AUTO: 1.17 THOUSANDS/ΜL (ref 0.6–4.47)
LYMPHOCYTES NFR BLD AUTO: 20 % (ref 14–44)
MAGNESIUM SERPL-MCNC: 2 MG/DL (ref 1.6–2.6)
MCH RBC QN AUTO: 33.3 PG (ref 26.8–34.3)
MCHC RBC AUTO-ENTMCNC: 32.7 G/DL (ref 31.4–37.4)
MCV RBC AUTO: 102 FL (ref 82–98)
MONOCYTES # BLD AUTO: 0.61 THOUSAND/ΜL (ref 0.17–1.22)
MONOCYTES NFR BLD AUTO: 10 % (ref 4–12)
NEUTROPHILS # BLD AUTO: 3.93 THOUSANDS/ΜL (ref 1.85–7.62)
NEUTS SEG NFR BLD AUTO: 67 % (ref 43–75)
NITRITE UR QL STRIP: NEGATIVE
NRBC BLD AUTO-RTO: 0 /100 WBCS
P AXIS: 82 DEGREES
PH UR STRIP.AUTO: 6.5 [PH]
PLATELET # BLD AUTO: 321 THOUSANDS/UL (ref 149–390)
PLATELET # BLD AUTO: 394 THOUSANDS/UL (ref 149–390)
PMV BLD AUTO: 8.5 FL (ref 8.9–12.7)
PMV BLD AUTO: 8.6 FL (ref 8.9–12.7)
POTASSIUM SERPL-SCNC: 4.2 MMOL/L (ref 3.5–5.3)
PR INTERVAL: 140 MS
PROT SERPL-MCNC: 6.6 G/DL (ref 6.4–8.2)
PROT UR STRIP-MCNC: NEGATIVE MG/DL
PROTHROMBIN TIME: 12.9 SECONDS (ref 11.6–14.5)
QRS AXIS: 127 DEGREES
QRSD INTERVAL: 78 MS
QT INTERVAL: 398 MS
QTC INTERVAL: 456 MS
RBC # BLD AUTO: 4.17 MILLION/UL (ref 3.88–5.62)
SODIUM SERPL-SCNC: 143 MMOL/L (ref 136–145)
SP GR UR STRIP.AUTO: 1.02 (ref 1–1.03)
T WAVE AXIS: 68 DEGREES
TSH SERPL DL<=0.05 MIU/L-ACNC: 1.14 UIU/ML (ref 0.45–4.5)
UROBILINOGEN UR QL STRIP.AUTO: 0.2 E.U./DL
VENTRICULAR RATE: 79 BPM
WBC # BLD AUTO: 5.93 THOUSAND/UL (ref 4.31–10.16)

## 2022-05-05 PROCEDURE — 83690 ASSAY OF LIPASE: CPT | Performed by: EMERGENCY MEDICINE

## 2022-05-05 PROCEDURE — 85730 THROMBOPLASTIN TIME PARTIAL: CPT | Performed by: EMERGENCY MEDICINE

## 2022-05-05 PROCEDURE — 93005 ELECTROCARDIOGRAM TRACING: CPT

## 2022-05-05 PROCEDURE — 99214 OFFICE O/P EST MOD 30 MIN: CPT | Performed by: PHYSICIAN ASSISTANT

## 2022-05-05 PROCEDURE — 99222 1ST HOSP IP/OBS MODERATE 55: CPT | Performed by: INTERNAL MEDICINE

## 2022-05-05 PROCEDURE — 96375 TX/PRO/DX INJ NEW DRUG ADDON: CPT

## 2022-05-05 PROCEDURE — 96374 THER/PROPH/DIAG INJ IV PUSH: CPT

## 2022-05-05 PROCEDURE — 70496 CT ANGIOGRAPHY HEAD: CPT

## 2022-05-05 PROCEDURE — G1004 CDSM NDSC: HCPCS

## 2022-05-05 PROCEDURE — 85610 PROTHROMBIN TIME: CPT | Performed by: EMERGENCY MEDICINE

## 2022-05-05 PROCEDURE — 85049 AUTOMATED PLATELET COUNT: CPT | Performed by: INTERNAL MEDICINE

## 2022-05-05 PROCEDURE — 80053 COMPREHEN METABOLIC PANEL: CPT | Performed by: EMERGENCY MEDICINE

## 2022-05-05 PROCEDURE — 70498 CT ANGIOGRAPHY NECK: CPT

## 2022-05-05 PROCEDURE — 99285 EMERGENCY DEPT VISIT HI MDM: CPT | Performed by: EMERGENCY MEDICINE

## 2022-05-05 PROCEDURE — 84443 ASSAY THYROID STIM HORMONE: CPT | Performed by: EMERGENCY MEDICINE

## 2022-05-05 PROCEDURE — 83735 ASSAY OF MAGNESIUM: CPT | Performed by: EMERGENCY MEDICINE

## 2022-05-05 PROCEDURE — 99285 EMERGENCY DEPT VISIT HI MDM: CPT

## 2022-05-05 PROCEDURE — 96361 HYDRATE IV INFUSION ADD-ON: CPT

## 2022-05-05 PROCEDURE — 85025 COMPLETE CBC W/AUTO DIFF WBC: CPT | Performed by: EMERGENCY MEDICINE

## 2022-05-05 PROCEDURE — 81003 URINALYSIS AUTO W/O SCOPE: CPT | Performed by: EMERGENCY MEDICINE

## 2022-05-05 PROCEDURE — 36415 COLL VENOUS BLD VENIPUNCTURE: CPT

## 2022-05-05 PROCEDURE — 93010 ELECTROCARDIOGRAM REPORT: CPT | Performed by: INTERNAL MEDICINE

## 2022-05-05 RX ORDER — FLUTICASONE FUROATE AND VILANTEROL 200; 25 UG/1; UG/1
1 POWDER RESPIRATORY (INHALATION) DAILY
Status: DISCONTINUED | OUTPATIENT
Start: 2022-05-06 | End: 2022-05-06

## 2022-05-05 RX ORDER — MONTELUKAST SODIUM 10 MG/1
10 TABLET ORAL
Status: DISCONTINUED | OUTPATIENT
Start: 2022-05-06 | End: 2022-05-09 | Stop reason: HOSPADM

## 2022-05-05 RX ORDER — TRAZODONE HYDROCHLORIDE 50 MG/1
50 TABLET ORAL
Status: DISCONTINUED | OUTPATIENT
Start: 2022-05-05 | End: 2022-05-06

## 2022-05-05 RX ORDER — ALPRAZOLAM 0.5 MG/1
1 TABLET ORAL 4 TIMES DAILY PRN
Status: DISCONTINUED | OUTPATIENT
Start: 2022-05-05 | End: 2022-05-05

## 2022-05-05 RX ORDER — ATORVASTATIN CALCIUM 40 MG/1
40 TABLET, FILM COATED ORAL
Status: DISCONTINUED | OUTPATIENT
Start: 2022-05-06 | End: 2022-05-09 | Stop reason: HOSPADM

## 2022-05-05 RX ORDER — SERTRALINE HYDROCHLORIDE 25 MG/1
25 TABLET, FILM COATED ORAL
Status: DISCONTINUED | OUTPATIENT
Start: 2022-05-05 | End: 2022-05-06

## 2022-05-05 RX ORDER — VALACYCLOVIR HYDROCHLORIDE 500 MG/1
1000 TABLET, FILM COATED ORAL 2 TIMES DAILY
Status: DISCONTINUED | OUTPATIENT
Start: 2022-05-05 | End: 2022-05-05 | Stop reason: ALTCHOICE

## 2022-05-05 RX ORDER — HYDROMORPHONE HCL/PF 1 MG/ML
1 SYRINGE (ML) INJECTION EVERY 4 HOURS PRN
Status: DISCONTINUED | OUTPATIENT
Start: 2022-05-05 | End: 2022-05-06

## 2022-05-05 RX ORDER — IPRATROPIUM BROMIDE AND ALBUTEROL SULFATE 2.5; .5 MG/3ML; MG/3ML
3 SOLUTION RESPIRATORY (INHALATION)
Status: DISCONTINUED | OUTPATIENT
Start: 2022-05-05 | End: 2022-05-06

## 2022-05-05 RX ORDER — ACETAMINOPHEN 325 MG/1
650 TABLET ORAL EVERY 6 HOURS PRN
Status: DISCONTINUED | OUTPATIENT
Start: 2022-05-05 | End: 2022-05-06

## 2022-05-05 RX ORDER — PREDNISONE 2.5 MG
2.5 TABLET ORAL DAILY
Status: DISCONTINUED | OUTPATIENT
Start: 2022-05-06 | End: 2022-05-06

## 2022-05-05 RX ORDER — GABAPENTIN 300 MG/1
300 CAPSULE ORAL 3 TIMES DAILY
Status: DISCONTINUED | OUTPATIENT
Start: 2022-05-05 | End: 2022-05-06

## 2022-05-05 RX ORDER — PANTOPRAZOLE SODIUM 40 MG/1
40 TABLET, DELAYED RELEASE ORAL DAILY
Status: DISCONTINUED | OUTPATIENT
Start: 2022-05-06 | End: 2022-05-06

## 2022-05-05 RX ORDER — HEPARIN SODIUM 5000 [USP'U]/ML
5000 INJECTION, SOLUTION INTRAVENOUS; SUBCUTANEOUS EVERY 8 HOURS SCHEDULED
Status: DISCONTINUED | OUTPATIENT
Start: 2022-05-05 | End: 2022-05-09 | Stop reason: HOSPADM

## 2022-05-05 RX ORDER — LORAZEPAM 2 MG/ML
0.5 INJECTION INTRAMUSCULAR ONCE
Status: COMPLETED | OUTPATIENT
Start: 2022-05-05 | End: 2022-05-05

## 2022-05-05 RX ORDER — ALBUTEROL SULFATE 90 UG/1
2 AEROSOL, METERED RESPIRATORY (INHALATION) EVERY 4 HOURS PRN
Status: DISCONTINUED | OUTPATIENT
Start: 2022-05-05 | End: 2022-05-09 | Stop reason: HOSPADM

## 2022-05-05 RX ORDER — ACETAMINOPHEN 325 MG/1
650 TABLET ORAL EVERY 6 HOURS PRN
Status: DISCONTINUED | OUTPATIENT
Start: 2022-05-05 | End: 2022-05-05 | Stop reason: SDUPTHER

## 2022-05-05 RX ORDER — BUDESONIDE AND FORMOTEROL FUMARATE DIHYDRATE 160; 4.5 UG/1; UG/1
2 AEROSOL RESPIRATORY (INHALATION) 2 TIMES DAILY
Status: DISCONTINUED | OUTPATIENT
Start: 2022-05-05 | End: 2022-05-09 | Stop reason: HOSPADM

## 2022-05-05 RX ORDER — DOCUSATE SODIUM 100 MG/1
100 CAPSULE, LIQUID FILLED ORAL 2 TIMES DAILY
Status: DISCONTINUED | OUTPATIENT
Start: 2022-05-05 | End: 2022-05-06

## 2022-05-05 RX ORDER — HYDROMORPHONE HCL/PF 1 MG/ML
0.5 SYRINGE (ML) INJECTION ONCE
Status: COMPLETED | OUTPATIENT
Start: 2022-05-05 | End: 2022-05-05

## 2022-05-05 RX ORDER — TOBRAMYCIN AND DEXAMETHASONE 3; 1 MG/ML; MG/ML
1 SUSPENSION/ DROPS OPHTHALMIC 4 TIMES DAILY
Status: DISCONTINUED | OUTPATIENT
Start: 2022-05-05 | End: 2022-05-05 | Stop reason: CLARIF

## 2022-05-05 RX ORDER — ALPRAZOLAM 0.5 MG/1
1 TABLET ORAL 4 TIMES DAILY PRN
Status: DISCONTINUED | OUTPATIENT
Start: 2022-05-05 | End: 2022-05-06

## 2022-05-05 RX ADMIN — HYDROMORPHONE HYDROCHLORIDE 0.5 MG: 1 INJECTION, SOLUTION INTRAMUSCULAR; INTRAVENOUS; SUBCUTANEOUS at 16:59

## 2022-05-05 RX ADMIN — HYDROMORPHONE HYDROCHLORIDE 1 MG: 1 INJECTION, SOLUTION INTRAMUSCULAR; INTRAVENOUS; SUBCUTANEOUS at 20:33

## 2022-05-05 RX ADMIN — SODIUM CHLORIDE 1000 ML: 0.9 INJECTION, SOLUTION INTRAVENOUS at 16:13

## 2022-05-05 RX ADMIN — LORAZEPAM 0.5 MG: 2 INJECTION INTRAMUSCULAR; INTRAVENOUS at 16:12

## 2022-05-05 RX ADMIN — IOHEXOL 85 ML: 350 INJECTION, SOLUTION INTRAVENOUS at 16:24

## 2022-05-05 RX ADMIN — BUDESONIDE AND FORMOTEROL FUMARATE DIHYDRATE 2 PUFF: 160; 4.5 AEROSOL RESPIRATORY (INHALATION) at 21:57
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
## 2022-05-05 NOTE — ED PROVIDER NOTES
History  Chief Complaint   Patient presents with    Altered Mental Status     pt to er after being told by his doctor that he should come in after some medication changes and some confusion  patient is accompanied by niece who states that he does not remeber much of time or speaking with the palliative care doctor  pt has prostate cancer that has mets to the bone  67-year-old male with past history of prostate cancer with metastasis to bone, Severe protein-calorie malnutrition, Palliative care patient, Anorexia, Hot flashes, COPD presents to the ED from his palliative care physician's appointment for evaluation of altered mental status, visual hallucination, anxiety, and overall generalized weakness  Patient is currently undergoing therapy for his prostate cancer  Patient states that he has been extremely agitated and weak over the past few days  Patient lives alone  Patient's niece brought patient to his appointment today  Niece noted patient to be more confused this morning  She had a long conversation with the patient yesterday however patient cannot recall any of the conversation  Patient lives alone and uses not sure whether patient is taking all of his medications and eating properly  Patient has chronic bone pain secondary to his metastatic disease  Patient has been very disagreeable to any narcotics  Patient does have Xanax at home for anxiety however it is unclear whether patient is taking these medications  Patient was seen by his palliative care physician this morning who became concerned patient's overall worsening status and sent patient to the ED for further evaluation  In the emergency department patient appears to be extremely anxious and agitated  Patient is alert and oriented x3  Patient denies any other focal neuro deficits  Patient complains of generalized paresthesia and all of his extremities            History provided by:  Patient  Altered Mental Status  Associated symptoms: hallucinations and weakness    Associated symptoms: no abdominal pain, no agitation, no fever, no headaches, no nausea and no vomiting        Prior to Admission Medications   Prescriptions Last Dose Informant Patient Reported? Taking? ALPRAZolam (XANAX) 1 mg tablet  Self No No   Sig: Take 1 tablet (1 mg total) by mouth 4 (four) times a day as needed for anxiety or sleep   acetaminophen (TYLENOL) 650 mg CR tablet  Self No No   Sig: Take 1 tablet (650 mg total) by mouth every 8 (eight) hours   Patient not taking: Reported on 4/15/2022    albuterol (PROVENTIL HFA,VENTOLIN HFA) 90 mcg/act inhaler   No No   Sig: Inhale 2 puffs every 4 (four) hours as needed for wheezing   atorvastatin (LIPITOR) 40 mg tablet  Self No No   Sig: Take 1 tablet (40 mg total) by mouth daily with dinner   Patient not taking: Reported on 3/17/2022    budesonide-formoterol (SYMBICORT) 160-4 5 mcg/act inhaler  Self Yes No   Sig: Inhale 2 puffs 2 (two) times a day Rinse mouth after use  cyclopentolate (Cyclogyl) 1 % ophthalmic solution  Self No No   Sig: Administer 1 drop to the right eye 3 (three) times a day   Patient not taking: Reported on 3/17/2022    enzalutamide (Xtandi) 40 mg capsule  Self No No   Sig: Take 4 capsules (160 mg total) by mouth daily   fluticasone-vilanterol (BREO ELLIPTA) 200-25 MCG/INH inhaler  Self No No   Sig: Inhale 1 puff daily Rinse mouth after use  Patient not taking: Reported on 3/17/2022    gabapentin (NEURONTIN) 300 mg capsule   No No   Sig: Take 1 capsule (300 mg total) by mouth 3 (three) times a day for 7 days For post-herpetic neuralgia: Take 1 tablet on day 1,  Then take 2 tablets on day 2, Then take 3 tablets on day 3 and every day after that as instructed by your doctor     Patient not taking: Reported on 12/1/2021    ipratropium-albuterol (DUO-NEB) 0 5-2 5 mg/3 mL nebulizer solution  Self No No   Sig: Take 1 vial (3 mL total) by nebulization 3 (three) times a day   Patient not taking: Reported on 4/22/2022    montelukast (SINGULAIR) 10 mg tablet   No No   Sig: Take 1 tablet (10 mg total) by mouth daily at bedtime   pantoprazole (PROTONIX) 40 mg tablet  Self No No   Sig: TAKE 1 TABLET BY MOUTH EVERY DAY   Patient not taking: Reported on 4/15/2022   predniSONE 2 5 mg tablet   No No   Sig: TAKE 1 TAB BY MOUTH 2X A DAY WITH MEALS TAKE WITH BREAKFAST AND LUNCH  DO NOT TAKE ON EMPTY STOMACH    sertraline (ZOLOFT) 25 mg tablet  Self No No   Sig: TAKE 1 TABLET BY MOUTH EVERYDAY AT BEDTIME   Patient taking differently: Not taking much per pt    tobramycin-dexamethasone (TOBRADEX) ophthalmic suspension  Self No No   Sig: Administer 1 drop to the right eye 4 (four) times a day   Patient not taking: Reported on 3/17/2022    traZODone (DESYREL) 50 mg tablet  Self No No   Sig: TAKE 1 TABLET BY MOUTH EVERYDAY AT BEDTIME   Patient not taking: Reported on 4/15/2022   valACYclovir (VALTREX) 1,000 mg tablet  Self Yes No   Sig: Take 1,000 mg by mouth 2 (two) times a day     Patient not taking: Reported on 3/17/2022       Facility-Administered Medications: None       Past Medical History:   Diagnosis Date    [facility] [address] [facility]     Colon polyp     Emphysema lung [facility] [address] [facility]     Hyperlipidemia     Prostate cancer ([facility] [address] )        Past Surgical History:   Procedure Laterality Date    APPENDECTOMY      COLONOSCOPY      IR BIOPSY BONE  12/30/2021    UPPER GASTROINTESTINAL ENDOSCOPY         Family History   Problem Relation Age of Onset    Lung cancer Mother     Breast cancer Sister     Colon polyps Brother     Colon cancer Neg Hx      I have reviewed and agree with the history as documented      E-Cigarette/Vaping    E-Cigarette Use Never User      E-Cigarette/Vaping Substances    Nicotine No     THC No     CBD No     Flavoring No     Other No     Unknown No      Social History     Tobacco Use    Smoking status: Former Smoker    Smokeless tobacco: Never Used   Vaping Use    Vaping Use: Never used   Substance Use Topics  Alcohol use: Not Currently    Drug use: Never       Review of Systems   Constitutional: Negative for activity change, fatigue and fever  HENT: Negative for congestion, ear discharge and sore throat  Eyes: Negative for pain and redness  Respiratory: Negative for cough, chest tightness, shortness of breath and wheezing  Cardiovascular: Negative for chest pain  Gastrointestinal: Negative for abdominal pain, diarrhea, nausea and vomiting  Endocrine: Negative for cold intolerance  Genitourinary: Negative for dysuria and urgency  Musculoskeletal: Negative for arthralgias and back pain  Neurological: Positive for weakness  Negative for dizziness and headaches  Paresthesia   Psychiatric/Behavioral: Positive for hallucinations  Negative for agitation and behavioral problems  Physical Exam  Physical Exam  Vitals and nursing note reviewed  Constitutional:       Appearance: He is ill-appearing  Comments: Cachectic appearance   HENT:      Head: Normocephalic and atraumatic  Nose: Nose normal       Mouth/Throat:      Mouth: Mucous membranes are dry  Eyes:      Extraocular Movements: Extraocular movements intact  Conjunctiva/sclera: Conjunctivae normal    Cardiovascular:      Rate and Rhythm: Normal rate and regular rhythm  Heart sounds: Normal heart sounds  Pulmonary:      Effort: Pulmonary effort is normal       Breath sounds: Normal breath sounds  Abdominal:      General: Bowel sounds are normal  There is no distension  Palpations: Abdomen is soft  Tenderness: There is no abdominal tenderness  Musculoskeletal:         General: Normal range of motion  Cervical back: Normal range of motion and neck supple  Comments: Generalized weakness on her bilateral   Skin:     General: Skin is warm  Neurological:      General: No focal deficit present  Mental Status: He is alert and oriented to person, place, and time        Comments: Generalized weakness noted without any focal neuro deficits  Full neuro exam could not be performed as patient was extremely agitated and could not cooperate  Patient did walk from waiting room to the exam room on his own  Psychiatric:         Mood and Affect: Mood normal          Behavior: Behavior normal          Thought Content:  Thought content normal          Judgment: Judgment normal          Vital Signs  ED Triage Vitals   Temperature Pulse Respirations Blood Pressure SpO2   05/05/22 1417 05/05/22 1416 05/05/22 1416 05/05/22 1416 05/05/22 1416   98 4 °F (36 9 °C) 88 16 130/73 94 %      Temp Source Heart Rate Source Patient Position - Orthostatic VS BP Location FiO2 (%)   05/05/22 1417 05/05/22 1416 05/05/22 1416 05/05/22 1416 --   Temporal Monitor Sitting Left arm       Pain Score       05/05/22 1659       10 - Worst Possible Pain           Vitals:    05/05/22 1416 05/05/22 1700   BP: 130/73 137/63   Pulse: 88 80   Patient Position - Orthostatic VS: Sitting Lying         Visual Acuity      ED Medications  Medications   sodium chloride 0 9 % bolus 1,000 mL (0 mL Intravenous Stopped 5/5/22 1713)   LORazepam (ATIVAN) injection 0 5 mg (0 5 mg Intravenous Given 5/5/22 1612)   iohexol (OMNIPAQUE) 350 MG/ML injection (SINGLE-DOSE) 100 mL (85 mL Intravenous Given 5/5/22 1624)   HYDROmorphone (DILAUDID) injection 0 5 mg (0 5 mg Intravenous Given 5/5/22 1659)       Diagnostic Studies  Results Reviewed     Procedure Component Value Units Date/Time    Protime-INR [[id number]]  (Normal) Collected: 05/05/22 1618    Lab Status: Final result Specimen: Blood from Arm, Right Updated: 05/05/22 1653     Protime 12 9 seconds      INR 0 98    APTT [[id number]]  (Normal) Collected: 05/05/22 1618    Lab Status: Final result Specimen: Blood from Arm, Right Updated: 05/05/22 1653     PTT 32 seconds     TSH, 3rd generation with Free T4 reflex [[id number]]  (Normal) Collected: 05/05/22 1419    Lab Status: Final result Specimen: Blood from Arm, Left Updated: 05/05/22 1635     TSH 3RD GENERATON 1 140 uIU/mL     Narrative:      Patients undergoing fluorescein dye angiography may retain small amounts of fluorescein in the body for 48-72 hours post procedure  Samples containing fluorescein can produce falsely depressed TSH values  If the patient had this procedure,a specimen should be resubmitted post fluorescein clearance        Magnesium [[id number]]  (Normal) Collected: 05/05/22 1419    Lab Status: Final result Specimen: Blood from Arm, Left Updated: 05/05/22 1635     Magnesium 2 0 mg/dL     UA w Reflex to Microscopic w Reflex to Culture [[id number]] Collected: 05/05/22 1421    Lab Status: Final result Specimen: Urine, Clean Catch Updated: 05/05/22 1528     Color, UA Yellow     Clarity, UA Clear     Specific Gravity, UA 1 025     pH, UA 6 5     Leukocytes, UA Negative     Nitrite, UA Negative     Protein, UA Negative mg/dl      Glucose, UA Negative mg/dl      Ketones, UA Negative mg/dl      Urobilinogen, UA 0 2 E U /dl      Bilirubin, UA Negative     Blood, UA Negative    Comprehensive metabolic panel [[id number]] Collected: 05/05/22 1419    Lab Status: Final result Specimen: Blood from Arm, Left Updated: 05/05/22 1515     Sodium 143 mmol/L      Potassium 4 2 mmol/L      Chloride 104 mmol/L      CO2 32 mmol/L      ANION GAP 7 mmol/L      BUN 21 mg/dL      Creatinine 0 76 mg/dL      Glucose 102 mg/dL      Calcium 8 7 mg/dL      AST 19 U/L      ALT 25 U/L      Alkaline Phosphatase 76 U/L      Total Protein 6 6 g/dL      Albumin 3 5 g/dL      Total Bilirubin 0 30 mg/dL      eGFR 94 ml/min/1 73sq m     Narrative:      [place] guidelines for Chronic Kidney Disease (CKD):     Stage 1 with normal or high GFR (GFR > 90 mL/min/1 73 square meters)    Stage 2 Mild CKD (GFR = 60-89 mL/min/1 73 square meters)    Stage 3A Moderate CKD (GFR = 45-59 mL/min/1 73 square meters)    Stage 3B Moderate CKD (GFR = 30-44 mL/min/1 73 square meters)    Stage 4 Severe CKD (GFR = 15-29 mL/min/1 73 square meters)    Stage 5 End Stage CKD (GFR <15 mL/min/1 73 square meters)  Note: GFR calculation is accurate only with a steady state creatinine    Lipase [[id number]]  (Normal) Collected: 05/05/22 1419    Lab Status: Final result Specimen: Blood from Arm, Left Updated: 05/05/22 1515     Lipase 99 u/L     CBC and differential [[id number]]  (Abnormal) Collected: 05/05/22 1419    Lab Status: Final result Specimen: Blood from Arm, Left Updated: 05/05/22 1427     WBC 5 93 Thousand/uL      RBC 4 17 Million/uL      Hemoglobin 13 9 g/dL      Hematocrit 42 5 %       fL      MCH 33 3 pg      MCHC 32 7 g/dL      RDW 13 8 %      MPV 8 5 fL      Platelets 838 Thousands/uL      nRBC 0 /100 WBCs      Neutrophils Relative 67 %      Immat GRANS % 0 %      Lymphocytes Relative 20 %      Monocytes Relative 10 %      Eosinophils Relative 2 %      Basophils Relative 1 %      Neutrophils Absolute 3 93 Thousands/µL      Immature Grans Absolute 0 01 Thousand/uL      Lymphocytes Absolute 1 17 Thousands/µL      Monocytes Absolute 0 61 Thousand/µL      Eosinophils Absolute 0 13 Thousand/µL      Basophils Absolute 0 08 Thousands/µL                  CTA head and neck with and without contrast   Final Result by Loly Salinas MD (05/05 1708)      No evidence of acute intracranial hemorrhage  No evidence of hemodynamic significant stenosis, aneurysm or dissection     Diffuse bony metastatic disease               Workstation performed: [id number]                    Procedures  ECG 12 Lead Documentation Only    Date/Time: 5/5/2022 4:17 PM  Performed by: Yoli Smith DO  Authorized by: Yoli Smith DO     Indications / Diagnosis:  Weakness  ECG reviewed by me, the ED Provider: yes    Patient location:  ED  Previous ECG:     Previous ECG:  Compared to current    Similarity:  No change    Comparison to cardiac monitor: Yes    Interpretation:     Interpretation: abnormal    Comments:      Sinus rhythm rate 79, right axis deviation, no acute ST/T-wave abnormalities noted, white P waves noted suggesting possible left atrial enlargement, unchanged from baseline  ED Course  ED Course as of 05/05/22 1757   Thu May 05, 2022   1500 Pt will be admitted after ct study is resulted  SBIRT 22yo+      Most Recent Value   SBIRT (24 yo +)    In order to provide better care to our patients, we are screening all of our patients for alcohol and drug use  Would it be okay to ask you these screening questions? Yes Filed at: 05/05/2022 1703   Initial Alcohol Screen: US AUDIT-C     1  How often do you have a drink containing alcohol? 0 Filed at: 05/05/2022 1703   2  How many drinks containing alcohol do you have on a typical day you are drinking? 0 Filed at: 05/05/2022 1703   3b  FEMALE Any Age, or MALE 65+: How often do you have 4 or more drinks on one occassion? 0 Filed at: 05/05/2022 1703   Audit-C Score 0 Filed at: 05/05/2022 1703   YOUSIF: How many times in the past year have you    Used an illegal drug or used a prescription medication for non-medical reasons? Never Filed at: 05/05/2022 1703                    MDM  Number of Diagnoses or Management Options  Diagnosis management comments: Obtain blood work, EKG, CTA head/neck  Give IV fluids, plan for admission       Amount and/or Complexity of Data Reviewed  Clinical lab tests: ordered and reviewed  Tests in the radiology section of CPT®: ordered and reviewed  Tests in the medicine section of CPT®: ordered and reviewed  Review and summarize past medical records: yes  Independent visualization of images, tracings, or specimens: yes    Risk of Complications, Morbidity, and/or Mortality  General comments: Lab and radiology results were unremarkable  Patient was calmer after taking Ativan in the emergency department  Patient sort of worsening generalized bone pain in the ED    Patient was subsequently given Dilaudid which helped with his pain   At this time patient will be admitted for further management        Disposition  Final diagnoses: Altered mental status   Prostate cancer metastatic to bone [facility])   Visual hallucination   Generalized weakness     Time reflects when diagnosis was documented in both MDM as applicable and the Disposition within this note     Time User Action Codes Description Comment    5/5/2022  5:52 PM Sarah Amabile Add [R41 82] Altered mental status     5/5/2022  5:52 PM Daren Lenz Add [C61,  C79 51] Prostate cancer metastatic to bone ([facility] [address] )     5/5/2022  5:52 PM Sarah Amabile Add [R44 1] Visual hallucination     5/5/2022  5:52 PM Sarah Amabile Add [R53 1] Generalized weakness       ED Disposition     ED Disposition Condition Date/Time Comment    Admit Stable Thu May 5, 2022  5:57 PM Case was discussed with Dr Karina Shearer and the patient's admission status was agreed to be Admission Status: inpatient status to the service of Dr Karina Shearer  Follow-up Information    None         Patient's Medications   Discharge Prescriptions    No medications on file       No discharge procedures on file      PDMP Review     None          ED Provider  Electronically Signed by           Odessa Valentine DO  05/05/22 8939

## 2022-05-05 NOTE — H&P
[facility]  [facility]- Tiburcio Dvaila 1955, 78 y o  male MRN: [id number]  Unit/Bed#: -01 Encounter: [id number]  Primary Care Provider: ISA Glasgow   Date and time admitted to hospital: 5/5/2022  3:10 PM    Encephalopathy acute  Assessment & Plan  Patient brought to er with complaints of hallucinations and feeling that there was rippling in his vision and he was seeing objects  he did not feel safe to drive and called his niece to take him to his palliative care appointment today,   He was referred to er by palliative care team and had ctA and ct of brain in er   Patient lives alone and cares for own adl  But more difficult with metastatic  [rostate and bone pain issues   serial neuro checks   neurology evaluation      Prostate cancer metastatic to bone [facility])  Assessment & Plan  Diagnosed approx 4 months ago with metastatic disease to bone and ongoing back , pelvis  And hip apins - also has calvarium involvement and some head pain( nonspecific)  Will consult oncology and palliative care team   continue dilaudid as pain still 510 in back and pelvis    HLD (hyperlipidemia)  Assessment & Plan  Continue on atorvastatin    Chronic obstructive pulmonary disease ([facility] )  Assessment & Plan  Continue usual inhalers- no signs of decompensation    Severe protein-calorie malnutrition ([facility] )  Assessment & Plan  Malnutrition Findings:          nutrition consult   supplements as tolerated                       BMI Findings: Body mass index is 17 47 kg/m²  VTE Prophylaxis: ordered    Code Status: as above    Anticipated Length of Stay: as above    Justification for Hospital Stay: see assessment and plan        Chief Complaint:    hallucinations    History of Present Illness:    Tiburico Davila is a 78 y o  male who presents with above chief compaint   He reports vison changes and hallucinations like LSD noted  Yesterday afternoon and again this am  He called His niece to drive him to palliative care appointment and was referred to Er  Patient has diagnosis of  Metastatic prostate cnacer and is treated by Dr Mónica Riley  He has had increased back, plevic and hip pain as well as rib pain in past few days which have accelerated     CTA of brain was done in Er and patient her for evlauation of altered mental status and pain control  - had extensive metastatic disease  Review of Systems:    Review of Systems   Constitutional: Positive for appetite change and fatigue  Negative for chills and fever  HENT: Negative for congestion  Eyes: Positive for visual disturbance  Negative for discharge  Cardiovascular: Negative for chest pain  Gastrointestinal: Positive for constipation  Musculoskeletal: Positive for arthralgias and back pain  Bilateral rib pain   All other systems reviewed and are negative  Past Medical and Surgical History:     Past Medical History:   Diagnosis Date    [facility] cancer ([facility] )     Colon polyp     COPD (chronic obstructive pulmonary disease) ([facility] )     Emphysema lung ([facility] )     Hyperlipidemia     Prostate cancer ([place] [address] )        Past Surgical History:   Procedure Laterality Date    APPENDECTOMY      COLONOSCOPY      IR BIOPSY BONE  12/30/2021    JOINT REPLACEMENT      UPPER GASTROINTESTINAL ENDOSCOPY         Meds/Allergies:    Prior to Admission Medications   Prescriptions Last Dose Informant Patient Reported? Taking?    ALPRAZolam (XANAX) 1 mg tablet 5/4/2022 at Unknown time Self No Yes   Sig: Take 1 tablet (1 mg total) by mouth 4 (four) times a day as needed for anxiety or sleep   acetaminophen (TYLENOL) 650 mg CR tablet Not Taking at Unknown time Self No No   Sig: Take 1 tablet (650 mg total) by mouth every 8 (eight) hours   Patient not taking: Reported on 4/15/2022    albuterol (PROVENTIL HFA,VENTOLIN HFA) 90 mcg/act inhaler 5/5/2022 at Unknown time  No Yes   Sig: Inhale 2 puffs every 4 (four) hours as needed for wheezing atorvastatin (LIPITOR) 40 mg tablet 5/4/2022 at Unknown time Self No Yes   Sig: Take 1 tablet (40 mg total) by mouth daily with dinner   budesonide-formoterol (SYMBICORT) 160-4 5 mcg/act inhaler 5/5/2022 at Unknown time Self Yes Yes   Sig: Inhale 2 puffs 2 (two) times a day Rinse mouth after use  cyclopentolate (Cyclogyl) 1 % ophthalmic solution Not Taking at Unknown time Self No No   Sig: Administer 1 drop to the right eye 3 (three) times a day   Patient not taking: Reported on 3/17/2022    enzalutamide (Xtandi) 40 mg capsule 5/5/2022 at Unknown time Self No Yes   Sig: Take 4 capsules (160 mg total) by mouth daily   fluticasone-vilanterol (BREO ELLIPTA) 200-25 MCG/INH inhaler Not Taking at Unknown time Self No No   Sig: Inhale 1 puff daily Rinse mouth after use  Patient not taking: Reported on 3/17/2022    gabapentin (NEURONTIN) 300 mg capsule   No No   Sig: Take 1 capsule (300 mg total) by mouth 3 (three) times a day for 7 days For post-herpetic neuralgia: Take 1 tablet on day 1,  Then take 2 tablets on day 2, Then take 3 tablets on day 3 and every day after that as instructed by your doctor  Patient not taking: Reported on 12/1/2021    ipratropium-albuterol (DUO-NEB) 0 5-2 5 mg/3 mL nebulizer solution  Self No No   Sig: Take 1 vial (3 mL total) by nebulization 3 (three) times a day   Patient not taking: Reported on 4/22/2022    montelukast (SINGULAIR) 10 mg tablet 5/5/2022 at Unknown time  No Yes   Sig: Take 1 tablet (10 mg total) by mouth daily at bedtime   pantoprazole (PROTONIX) 40 mg tablet Not Taking at Unknown time Self No No   Sig: TAKE 1 TABLET BY MOUTH EVERY DAY   Patient not taking: Reported on 4/15/2022   predniSONE 2 5 mg tablet 5/4/2022 at Unknown time  No Yes   Sig: TAKE 1 TAB BY MOUTH 2X A DAY WITH MEALS TAKE WITH BREAKFAST AND LUNCH   DO NOT TAKE ON EMPTY STOMACH    sertraline (ZOLOFT) 25 mg tablet Not Taking at Unknown time Self No No   Sig: TAKE 1 TABLET BY MOUTH EVERYDAY AT BEDTIME Patient not taking: Reported on 5/5/2022   tobramycin-dexamethasone (TOBRADEX) ophthalmic suspension Not Taking at Unknown time Self No No   Sig: Administer 1 drop to the right eye 4 (four) times a day   Patient not taking: Reported on 3/17/2022    traZODone (DESYREL) 50 mg tablet Not Taking at Unknown time Self No No   Sig: TAKE 1 TABLET BY MOUTH EVERYDAY AT BEDTIME   Patient not taking: Reported on 4/15/2022   valACYclovir (VALTREX) 1,000 mg tablet Not Taking at Unknown time Self Yes No   Sig: Take 1,000 mg by mouth 2 (two) times a day     Patient not taking: Reported on 3/17/2022       Facility-Administered Medications: None       Allergies: No Known Allergies    Social History:     Social History     Substance and Sexual Activity   Alcohol Use Not Currently     Social History     Tobacco Use   Smoking Status Former Smoker   Smokeless Tobacco Never Used     Social History     Substance and Sexual Activity   Drug Use Never       Family History:    Family History   Problem Relation Age of Onset    Lung cancer Mother     Breast cancer Sister     Colon polyps Brother     Colon cancer Neg Hx        Physical Exam:     Vitals:   Blood Pressure: 151/81 (05/05/22 1934)  Pulse: 71 (05/05/22 1934)  Temperature: 97 9 °F (36 6 °C) (05/05/22 1934)  Temp Source: Oral (05/05/22 1934)  Respirations: 18 (05/05/22 1934)  Height: 5' 6" (167 6 cm) (05/05/22 1934)  Weight - Scale: 49 1 kg (108 lb 3 9 oz) (05/05/22 1934)  SpO2: 97 % (05/05/22 1934)    Physical Exam  Vitals and nursing note reviewed  Constitutional:       General: He is in acute distress  Appearance: He is ill-appearing  Comments: Has pain with movement   HENT:      Nose: No congestion  Eyes:      General: No scleral icterus  Right eye: No discharge  Left eye: No discharge  Cardiovascular:      Rate and Rhythm: Normal rate and regular rhythm  Pulmonary:      Breath sounds: No wheezing        Comments: Decreased brah sounds in general  Abdominal:      General: Abdomen is flat  Palpations: Abdomen is soft  Tenderness: There is no abdominal tenderness  There is no rebound  Musculoskeletal:         General: Tenderness present  No swelling  Cervical back: No rigidity  Comments: Pelvic  Rim  And bilateral hip tenderness - some lateral rib tenderness with movement   Lymphadenopathy:      Cervical: No cervical adenopathy  Neurological:      Motor: Weakness present  Additional Data:     Lab Results: I personally reviewed them    Results from last 7 days   Lab Units 05/05/22  1419   WBC Thousand/uL 5 93   HEMOGLOBIN g/dL 13 9   HEMATOCRIT % 42 5   PLATELETS Thousands/uL 394*   NEUTROS PCT % 67   LYMPHS PCT % 20   MONOS PCT % 10   EOS PCT % 2     Results from last 7 days   Lab Units 05/05/22  1419   POTASSIUM mmol/L 4 2   CHLORIDE mmol/L 104   CO2 mmol/L 32   BUN mg/dL 21   CREATININE mg/dL 0 76   CALCIUM mg/dL 8 7   ALK PHOS U/L 76   ALT U/L 25   AST U/L 19     Results from last 7 days   Lab Units 05/05/22  1618   INR  0 98       Imaging: I personally reviewed them    CTA head and neck with and without contrast   Final Result by Kenji Dawn MD (05/05 1708)      No evidence of acute intracranial hemorrhage  No evidence of hemodynamic significant stenosis, aneurysm or dissection  Diffuse bony metastatic disease               Workstation performed: [id number]             EKG : I personally reviewed    Niece is at bedside  Robin Mittal DO    ** Please Note: This note has been constructed using a voice recognition system   **

## 2022-05-05 NOTE — ASSESSMENT & PLAN NOTE
Malnutrition Findings:          nutrition consult   supplements as tolerated                       BMI Findings: Body mass index is 17 47 kg/m²

## 2022-05-05 NOTE — ASSESSMENT & PLAN NOTE
Patient brought to er with complaints of hallucinations and feeling that there was rippling in his vision and he was seeing objects     he did not feel safe to drive and called his niece to take him to his palliative care appointment today,   He was referred to er by palliative care team and had ctA and ct of brain in er   Patient lives alone and cares for own adl  But more difficult with metastatic  [rostate and bone pain issues   serial neuro checks   neurology evaluation

## 2022-05-06 ENCOUNTER — APPOINTMENT (INPATIENT)
Dept: NEUROLOGY | Facility: HOSPITAL | Age: 67
DRG: 070 | End: 2022-05-06
Attending: INTERNAL MEDICINE
Payer: MEDICARE

## 2022-05-06 LAB
ALBUMIN SERPL BCP-MCNC: 2.9 G/DL (ref 3.5–5)
ALP SERPL-CCNC: 56 U/L (ref 46–116)
ALT SERPL W P-5'-P-CCNC: 20 U/L (ref 12–78)
ANION GAP SERPL CALCULATED.3IONS-SCNC: 3 MMOL/L (ref 4–13)
AST SERPL W P-5'-P-CCNC: 16 U/L (ref 5–45)
BILIRUB SERPL-MCNC: 0.3 MG/DL (ref 0.2–1)
BUN SERPL-MCNC: 18 MG/DL (ref 5–25)
CALCIUM ALBUM COR SERPL-MCNC: 9.5 MG/DL (ref 8.3–10.1)
CALCIUM SERPL-MCNC: 8.6 MG/DL (ref 8.3–10.1)
CHLORIDE SERPL-SCNC: 105 MMOL/L (ref 100–108)
CO2 SERPL-SCNC: 31 MMOL/L (ref 21–32)
CREAT SERPL-MCNC: 0.64 MG/DL (ref 0.6–1.3)
ERYTHROCYTE [DISTWIDTH] IN BLOOD BY AUTOMATED COUNT: 14.1 % (ref 11.6–15.1)
GFR SERPL CREATININE-BSD FRML MDRD: 101 ML/MIN/1.73SQ M
GLUCOSE SERPL-MCNC: 109 MG/DL (ref 65–140)
HCT VFR BLD AUTO: 39 % (ref 36.5–49.3)
HGB BLD-MCNC: 12.6 G/DL (ref 12–17)
MAGNESIUM SERPL-MCNC: 2.1 MG/DL (ref 1.6–2.6)
MCH RBC QN AUTO: 32.8 PG (ref 26.8–34.3)
MCHC RBC AUTO-ENTMCNC: 32.3 G/DL (ref 31.4–37.4)
MCV RBC AUTO: 102 FL (ref 82–98)
PLATELET # BLD AUTO: 338 THOUSANDS/UL (ref 149–390)
PMV BLD AUTO: 8.6 FL (ref 8.9–12.7)
POTASSIUM SERPL-SCNC: 3.8 MMOL/L (ref 3.5–5.3)
PROT SERPL-MCNC: 5.6 G/DL (ref 6.4–8.2)
RBC # BLD AUTO: 3.84 MILLION/UL (ref 3.88–5.62)
SODIUM SERPL-SCNC: 139 MMOL/L (ref 136–145)
WBC # BLD AUTO: 5.23 THOUSAND/UL (ref 4.31–10.16)

## 2022-05-06 PROCEDURE — 99223 1ST HOSP IP/OBS HIGH 75: CPT | Performed by: PSYCHIATRY & NEUROLOGY

## 2022-05-06 PROCEDURE — 99232 SBSQ HOSP IP/OBS MODERATE 35: CPT | Performed by: NURSE PRACTITIONER

## 2022-05-06 PROCEDURE — 99221 1ST HOSP IP/OBS SF/LOW 40: CPT | Performed by: NURSE PRACTITIONER

## 2022-05-06 PROCEDURE — 83735 ASSAY OF MAGNESIUM: CPT | Performed by: INTERNAL MEDICINE

## 2022-05-06 PROCEDURE — 94640 AIRWAY INHALATION TREATMENT: CPT

## 2022-05-06 PROCEDURE — 80053 COMPREHEN METABOLIC PANEL: CPT | Performed by: INTERNAL MEDICINE

## 2022-05-06 PROCEDURE — 85027 COMPLETE CBC AUTOMATED: CPT | Performed by: INTERNAL MEDICINE

## 2022-05-06 PROCEDURE — 99221 1ST HOSP IP/OBS SF/LOW 40: CPT | Performed by: PHYSICIAN ASSISTANT

## 2022-05-06 PROCEDURE — 94760 N-INVAS EAR/PLS OXIMETRY 1: CPT

## 2022-05-06 RX ORDER — PANTOPRAZOLE SODIUM 40 MG/1
40 TABLET, DELAYED RELEASE ORAL
Status: DISCONTINUED | OUTPATIENT
Start: 2022-05-06 | End: 2022-05-06

## 2022-05-06 RX ORDER — ALPRAZOLAM 0.5 MG/1
0.5 TABLET ORAL 4 TIMES DAILY PRN
Status: DISCONTINUED | OUTPATIENT
Start: 2022-05-06 | End: 2022-05-09 | Stop reason: HOSPADM

## 2022-05-06 RX ORDER — SODIUM CHLORIDE 9 MG/ML
50 INJECTION, SOLUTION INTRAVENOUS CONTINUOUS
Status: DISCONTINUED | OUTPATIENT
Start: 2022-05-06 | End: 2022-05-09 | Stop reason: HOSPADM

## 2022-05-06 RX ORDER — IPRATROPIUM BROMIDE AND ALBUTEROL SULFATE 2.5; .5 MG/3ML; MG/3ML
3 SOLUTION RESPIRATORY (INHALATION)
Status: DISCONTINUED | OUTPATIENT
Start: 2022-05-06 | End: 2022-05-06

## 2022-05-06 RX ORDER — CALCIUM CARBONATE 200(500)MG
500 TABLET,CHEWABLE ORAL DAILY PRN
Status: DISCONTINUED | OUTPATIENT
Start: 2022-05-06 | End: 2022-05-09 | Stop reason: HOSPADM

## 2022-05-06 RX ORDER — SENNOSIDES 8.6 MG
2 TABLET ORAL
Status: DISCONTINUED | OUTPATIENT
Start: 2022-05-06 | End: 2022-05-09 | Stop reason: HOSPADM

## 2022-05-06 RX ORDER — OLANZAPINE 5 MG/1
5 TABLET ORAL
Status: DISCONTINUED | OUTPATIENT
Start: 2022-05-06 | End: 2022-05-09 | Stop reason: HOSPADM

## 2022-05-06 RX ORDER — HYDROMORPHONE HYDROCHLORIDE 2 MG/1
2 TABLET ORAL EVERY 4 HOURS PRN
Status: DISCONTINUED | OUTPATIENT
Start: 2022-05-06 | End: 2022-05-09 | Stop reason: HOSPADM

## 2022-05-06 RX ORDER — LEVALBUTEROL 1.25 MG/.5ML
1.25 SOLUTION, CONCENTRATE RESPIRATORY (INHALATION)
Status: DISCONTINUED | OUTPATIENT
Start: 2022-05-06 | End: 2022-05-06

## 2022-05-06 RX ORDER — HYDROMORPHONE HCL/PF 1 MG/ML
1 SYRINGE (ML) INJECTION ONCE
Status: COMPLETED | OUTPATIENT
Start: 2022-05-06 | End: 2022-05-06

## 2022-05-06 RX ORDER — LORAZEPAM 2 MG/ML
1 INJECTION INTRAMUSCULAR EVERY 4 HOURS PRN
Status: DISCONTINUED | OUTPATIENT
Start: 2022-05-06 | End: 2022-05-09 | Stop reason: HOSPADM

## 2022-05-06 RX ORDER — ALPRAZOLAM 0.5 MG/1
1 TABLET ORAL ONCE
Status: COMPLETED | OUTPATIENT
Start: 2022-05-06 | End: 2022-05-06

## 2022-05-06 RX ORDER — ALPRAZOLAM 0.5 MG/1
2 TABLET ORAL
Status: DISCONTINUED | OUTPATIENT
Start: 2022-05-06 | End: 2022-05-06

## 2022-05-06 RX ORDER — HYDROMORPHONE HYDROCHLORIDE 2 MG/1
1 TABLET ORAL EVERY 4 HOURS PRN
Status: DISCONTINUED | OUTPATIENT
Start: 2022-05-06 | End: 2022-05-09 | Stop reason: HOSPADM

## 2022-05-06 RX ORDER — ALPRAZOLAM 0.5 MG/1
1 TABLET ORAL
Status: DISCONTINUED | OUTPATIENT
Start: 2022-05-06 | End: 2022-05-09 | Stop reason: HOSPADM

## 2022-05-06 RX ORDER — CLONAZEPAM 0.5 MG/1
0.5 TABLET ORAL
Status: DISCONTINUED | OUTPATIENT
Start: 2022-05-06 | End: 2022-05-09 | Stop reason: HOSPADM

## 2022-05-06 RX ORDER — HYDROMORPHONE HCL/PF 1 MG/ML
1 SYRINGE (ML) INJECTION EVERY 4 HOURS PRN
Status: DISCONTINUED | OUTPATIENT
Start: 2022-05-06 | End: 2022-05-09 | Stop reason: HOSPADM

## 2022-05-06 RX ORDER — FLUTICASONE FUROATE AND VILANTEROL 200; 25 UG/1; UG/1
1 POWDER RESPIRATORY (INHALATION) DAILY
Status: DISCONTINUED | OUTPATIENT
Start: 2022-05-06 | End: 2022-05-06

## 2022-05-06 RX ORDER — LIDOCAINE 50 MG/G
1 PATCH TOPICAL DAILY
Status: DISCONTINUED | OUTPATIENT
Start: 2022-05-06 | End: 2022-05-09 | Stop reason: HOSPADM

## 2022-05-06 RX ORDER — GABAPENTIN 300 MG/1
300 CAPSULE ORAL 3 TIMES DAILY
Status: DISCONTINUED | OUTPATIENT
Start: 2022-05-06 | End: 2022-05-06

## 2022-05-06 RX ORDER — IBUPROFEN 600 MG/1
600 TABLET ORAL EVERY 6 HOURS PRN
Status: DISCONTINUED | OUTPATIENT
Start: 2022-05-06 | End: 2022-05-09 | Stop reason: HOSPADM

## 2022-05-06 RX ORDER — PREDNISONE 1 MG/1
1 TABLET ORAL DAILY
Status: DISCONTINUED | OUTPATIENT
Start: 2022-05-07 | End: 2022-05-09 | Stop reason: HOSPADM

## 2022-05-06 RX ORDER — ALPRAZOLAM 0.5 MG/1
0.5 TABLET ORAL 4 TIMES DAILY PRN
Status: DISCONTINUED | OUTPATIENT
Start: 2022-05-06 | End: 2022-05-06

## 2022-05-06 RX ORDER — SERTRALINE HYDROCHLORIDE 25 MG/1
25 TABLET, FILM COATED ORAL DAILY
Status: DISCONTINUED | OUTPATIENT
Start: 2022-05-06 | End: 2022-05-06

## 2022-05-06 RX ADMIN — ALPRAZOLAM 1 MG: 0.5 TABLET ORAL at 01:26

## 2022-05-06 RX ADMIN — ALPRAZOLAM 0.5 MG: 0.5 TABLET ORAL at 19:47

## 2022-05-06 RX ADMIN — ALBUTEROL SULFATE 2 PUFF: 90 AEROSOL, METERED RESPIRATORY (INHALATION) at 16:45

## 2022-05-06 RX ADMIN — BUDESONIDE AND FORMOTEROL FUMARATE DIHYDRATE 2 PUFF: 160; 4.5 AEROSOL RESPIRATORY (INHALATION) at 08:41

## 2022-05-06 RX ADMIN — HYDROMORPHONE HYDROCHLORIDE 1 MG: 1 INJECTION, SOLUTION INTRAMUSCULAR; INTRAVENOUS; SUBCUTANEOUS at 22:18

## 2022-05-06 RX ADMIN — MONTELUKAST 10 MG: 10 TABLET, FILM COATED ORAL at 21:21

## 2022-05-06 RX ADMIN — HYDROMORPHONE HYDROCHLORIDE 1 MG: 1 INJECTION, SOLUTION INTRAMUSCULAR; INTRAVENOUS; SUBCUTANEOUS at 09:57

## 2022-05-06 RX ADMIN — LIDOCAINE 1 PATCH: 50 PATCH TOPICAL at 09:59

## 2022-05-06 RX ADMIN — ALPRAZOLAM 0.5 MG: 0.5 TABLET ORAL at 13:42

## 2022-05-06 RX ADMIN — OLANZAPINE 5 MG: 5 TABLET, FILM COATED ORAL at 21:21

## 2022-05-06 RX ADMIN — ATORVASTATIN CALCIUM 40 MG: 40 TABLET, FILM COATED ORAL at 16:52

## 2022-05-06 RX ADMIN — LORAZEPAM 1 MG: 2 INJECTION INTRAMUSCULAR; INTRAVENOUS at 11:46

## 2022-05-06 RX ADMIN — ALBUTEROL SULFATE 2 PUFF: 90 AEROSOL, METERED RESPIRATORY (INHALATION) at 07:05

## 2022-05-06 RX ADMIN — PREDNISONE 1.25 MG: 2.5 TABLET ORAL at 08:40

## 2022-05-06 RX ADMIN — HEPARIN SODIUM 5000 UNITS: 5000 INJECTION INTRAVENOUS; SUBCUTANEOUS at 13:43

## 2022-05-06 RX ADMIN — CLONAZEPAM 0.5 MG: 0.5 TABLET ORAL at 19:47

## 2022-05-06 RX ADMIN — IPRATROPIUM BROMIDE 0.5 MG: 0.5 SOLUTION RESPIRATORY (INHALATION) at 07:15

## 2022-05-06 RX ADMIN — ALPRAZOLAM 1 MG: 0.5 TABLET ORAL at 04:33

## 2022-05-06 RX ADMIN — HYDROMORPHONE HYDROCHLORIDE 2 MG: 2 TABLET ORAL at 19:46

## 2022-05-06 RX ADMIN — ENZALUTAMIDE 160 MG: 40 TABLET ORAL at 08:36

## 2022-05-06 RX ADMIN — HYDROMORPHONE HYDROCHLORIDE 1 MG: 1 INJECTION, SOLUTION INTRAMUSCULAR; INTRAVENOUS; SUBCUTANEOUS at 01:26

## 2022-05-06 RX ADMIN — BUDESONIDE AND FORMOTEROL FUMARATE DIHYDRATE 2 PUFF: 160; 4.5 AEROSOL RESPIRATORY (INHALATION) at 16:45

## 2022-05-06 RX ADMIN — SODIUM CHLORIDE 50 ML/HR: 0.9 INJECTION, SOLUTION INTRAVENOUS at 13:34

## 2022-05-06 RX ADMIN — LEVALBUTEROL 1.25 MG: 1.25 SOLUTION, CONCENTRATE RESPIRATORY (INHALATION) at 07:15

## 2022-05-06 RX ADMIN — Medication 1 TABLET: at 14:03

## 2022-05-06 RX ADMIN — HYDROMORPHONE HYDROCHLORIDE 1 MG: 1 INJECTION, SOLUTION INTRAMUSCULAR; INTRAVENOUS; SUBCUTANEOUS at 14:02

## 2022-05-06 NOTE — ASSESSMENT & PLAN NOTE
· Without exacerbation   · C/w PRN albuterol   · Respiratory protocol  · On Symbicort at home- substituted for breo while hospitalized

## 2022-05-06 NOTE — CONSULTS
Consultation - Palliative and Supportive Care   Lino Jordan 78 y o  male [id number]    Assessment/Problems Actively Addressed:  Goals of care counseling  Prostate cancer with bone mets  Severe protein calorie malnutrition  Hallucinations  Anorexia  Insomnia  COPD    Goals:  · Level 1 code status, code status not specifically addressed on this visit  · Key Christine attributes mental status change to combination of prednisone 2 5MG + enzalutamide  Full workup ongoing to rule out organic cause  · For now, Key Christine would like to pursue disease-directed therapy; further [address] discussions pending workup, oncology recs, and better symptom control  · For now, family is helping Key Christine obtain more help in the home, home health aides, [facility] transport  · Medication adjustments as below  · Discussed 5 wishes  Key Christine will review to name a surrogate decision-maker  · Chiara Beauchamp lives locally and is willing to be primary contact  Rishabh agreeable  Plan:  Symptom management:    Pain:  Key Christine is opioid naive, had excellent relief with 1MG IV hydromorphone   -Will provide 1 - 2 MG PO hydromorphone for mod - severe pain   -Continue 1MG IV hydromorphone for breakthrough pain   -Of note, has restless legs from acetaminophen   -Discontinue gabapentin  Anorexia:  Alma Delia Butler notes improvement with 1MG PO prednisone daily  Reports side effects from 2 5MG therefore will diminish this to 1MG daily  - Will discontinue sertraline Kelly Demetrio has already weaned himself) and start olanzapine 5MG QHS  - requesting Ensure supplements  - requesting multivitamin     Anxiety:  - Alprazolam 0 5 mg Q6PRN  - Discontinuing sertraline in favor of olanzapine  Insomnia:  - Initiate olanzapine 5MG QHS  - For now, can continue 2MG alprazolam QHS with plan to wean as able  Alma Delia Butler obtained MMJ card and has not yet initiated this, may improve sleep      Constipation prophylaxis on opioids:  - senna QHS PRN    Social support:   Time spent providing supportive listening   Patient is finding comfort in good family support   Validated family experience               I have reviewed the patient's controlled substance dispensing history in the Prescription Drug Monitoring Program in compliance with the [facility] regulations before prescribing any controlled substances  Last refills - N/A    Decisional apparatus:  Patient is competent on exam today  If competence is lost, patient's substitute decision maker would default to adult siblings by [place] 169  POA Forms:  Keanu Mccartney has not named a medical POA  He is interested in 5 wishes document  We appreciate the invitation to be involved in this patient's care  We will continue to follow throughout this hospitalization  Please do not hesitate to reach our on call provider through our clinic answering service at  should you have acute symptom control concerns  CAMELIA Arroyo  Palliative and Supportive Care  Clinic/Answering Service: [phone]  You can find me on [employer]! IDENTIFICATION:  Inpatient consult to Palliative Care  Consult performed by: Lisa Sharif PA-C  Consult ordered by: Kobi Wright DO        Physician Requesting Consult: Kobi Wright DO  Reason for Consult / Principal Problem: GOC counseling and SM secondary to mental status change  History of Present Illness:  Delvis Diamond is a 78 y o  male, presenting with a palliative diagnosis of metastatic prostate cancer, who was brought into the ED at [address] on 5/5/2022 secondary to hallucinations and mental status change  Keanu Mccartney has been under the care of Dr Angelita Blevins for newly diagnosed prostate cancer with mets to bone, confirmed by biopsy    A CT scan done 12/21 revealed extensive multifocal osseous metastatic disease including axial and appendicular skeletal system, as well as calvarium, sternum, ribs, right humerus and clavicle, bilateral scapulae, cervical spine, thoracolumbar spine, sacrum, pelvis, and bilateral femurs  He has been treated with enzalutamide and experienced severe hot flashes due to hormone therapy  He was referred to palliative care for symptom management  He has been treated for insomnia, anxiety, and 5 hot flashes by palliative care  Of note, he has been offered narcotic analgesic medications however refused these  He recently received his MMJ card and is in the process of starting medical marijuana; in the meantime, he endorses taking 1G of raw cannabis QHS which his friends provide him  Yelena Wright presented to his palliative care follow-up appointment on 05/05/2022; he reported visual hallucinations and being "out of it"  He also reported increased rib and hip pain  He had the insight to call his niece who transport him to his appointment as he knew he was not appropriate to drive  Yelena Wright reported large memory gaps and he could not express how he was taking his medications  He was recommended for ED evaluation  In the ED, Rishabh's basic labs did not reveal significant abnormality  CTA H/N without mass or hemorrhage  Given ongoing hallucinations and uncontrolled pain, he was admitted for further workup and care  I met with Yelena Wright and his niece Nila Valencia at bedside on 05/06/2022  We reviewed Rishabh's medications and simplified his med list   Per Yelena Wright, taking 2 5MG of prednisone is too much" when combined with his enzalutamide and causes him to "speed trip"  At home, he typically only takes 1MG prednisone however he reports taking the full 2 5MG tablet yesterday and subsequently the "hallucinations got on top of me"  He requests DuoNebs, gabapentin, Protonix, Colace, and trazodone to be discontinued as he does not take these at home  We discussed his symptoms  He endorses insomnia which is typically helped by Xanax 2MG QHS  However he was not allowed to take this last night  He also endorses panic attacks throughout the day    He was not aware Xanax was indicated for these   He has not been taking sertraline consistently  Sathish Rosario endorses anorexia  He notes, without the prednisone, his appetite is impaired and therefore would like to continue this medication  He is hopeful, after he begins medical marijuana, it may be able to stimulate his appetite  We discussed transitioning to olanzapine QHS which may assist with sleep and appetite  Sathish Rosario endorses significant pain and his neck, especially after lying still for the CT scan without had support  He has pain in his hips and ribs as well  At this time, he is agreeable to narcotic medications for pain control but does not want them to be scheduled around the clock at this time  He reports restless legs from Tylenol and therefore would prefer to avoid this  He does report minimal relief from anti-inflammatories however mostly for occasional headache pain only  We reviewed preliminary goals of care  For now, Sathish Rosario is interested in continuing workup  He is reluctantly agreeable to conversations about increased help in the home and limiting his driving  He is agreeable to reviewing the 5 Wishes document to name a surrogate decision maker  I encouraged him to begin to clarify his wishes to his family; he endorses having kept them at a distance prior to this although he is agreeable to their help now  Review of Systems:   Review of Systems   Constitutional: Positive for decreased appetite, malaise/fatigue and weight loss  Negative for fever  HENT: Negative for congestion and hearing loss  Eyes: Positive for visual disturbance  Cardiovascular: Negative for chest pain  Musculoskeletal: Positive for back pain, muscle weakness, neck pain and stiffness  Gastrointestinal: Negative for bloating, abdominal pain, bowel incontinence, diarrhea, dysphagia, excessive appetite, nausea and vomiting  Genitourinary: Negative for flank pain     Neurological: Positive for difficulty with concentration, headaches (Occasional, not currently) and weakness  Negative for disturbances in coordination  Psychiatric/Behavioral: Positive for altered mental status, hallucinations and memory loss  Negative for depression and suicidal ideas  The patient has insomnia and is nervous/anxious            Past Medical History:   Diagnosis Date    Bone cancer ([facility] )     Colon polyp     COPD (chronic obstructive pulmonary disease) ([facility] )     Emphysema lung (HCC)     Hyperlipidemia     Prostate cancer (HCC)      Past Surgical History:   Procedure Laterality Date    APPENDECTOMY      COLONOSCOPY      IR BIOPSY BONE  12/30/2021    JOINT REPLACEMENT      UPPER GASTROINTESTINAL ENDOSCOPY       Social History     Socioeconomic History    Marital status: Single     Spouse name: Not on file    Number of children: Not on file    Years of education: Not on file    Highest education level: Not on file   Occupational History    Not on file   Tobacco Use    Smoking status: Former Smoker     Types: Cigarettes     Quit date: 2012     Years since quitting: 10 3    Smokeless tobacco: Never Used   Vaping Use    Vaping Use: Never used   Substance and Sexual Activity    Alcohol use: Not Currently    Drug use: Never    Sexual activity: Not on file   Other Topics Concern    Not on file   Social History Narrative    Not on file     Social Determinants of Health     Financial Resource Strain: Not on file   Food Insecurity: Not on file   Transportation Needs: Not on file   Physical Activity: Not on file   Stress: Not on file   Social Connections: Not on file   Intimate Partner Violence: Not At Risk    Fear of Current or Ex-Partner: No    Emotionally Abused: No    Physically Abused: No    Sexually Abused: No   Housing Stability: Not on file     Family History   Problem Relation Age of Onset    Lung cancer Mother     Breast cancer Sister     Colon polyps Brother     Colon cancer Neg Hx        Medications:  current meds:   Current Facility-Administered Medications   Medication Dose Route Frequency    albuterol (PROVENTIL HFA,VENTOLIN HFA) inhaler 2 puff  2 puff Inhalation Q4H PRN    ALPRAZolam (XANAX) tablet 2 mg  2 mg Oral HS PRN    And    ALPRAZolam (XANAX) tablet 0 5 mg  0 5 mg Oral 4x Daily PRN    atorvastatin (LIPITOR) tablet 40 mg  40 mg Oral Daily With Dinner    budesonide-formoterol (SYMBICORT) 160-4 5 mcg/act inhaler 2 puff  2 puff Inhalation BID    heparin (porcine) subcutaneous injection 5,000 Units  5,000 Units Subcutaneous Q8H [address]    HYDROmorphone (DILAUDID) injection 1 mg  1 mg Intravenous Q4H PRN    HYDROmorphone (DILAUDID) tablet 1 mg  1 mg Oral Q4H PRN    Or    HYDROmorphone (DILAUDID) tablet 2 mg  2 mg Oral Q4H PRN    ibuprofen (MOTRIN) tablet 600 mg  600 mg Oral Q6H PRN    lidocaine (LIDODERM) 5 % patch 1 patch  1 patch Topical Daily    montelukast (SINGULAIR) tablet 10 mg  10 mg Oral HS    OLANZapine (ZyPREXA) tablet 5 mg  5 mg Oral HS    [START ON 5/7/2022] predniSONE tablet 1 mg  1 mg Oral Daily    senna (SENOKOT) tablet 17 2 mg  2 tablet Oral HS PRN       No Known Allergies      Medications    Current Facility-Administered Medications:     acetaminophen (TYLENOL) tablet 650 mg, 650 mg, Oral, Q6H PRN, Fani Fly, DO    albuterol (PROVENTIL HFA,VENTOLIN HFA) inhaler 2 puff, 2 puff, Inhalation, Q4H PRN, Kemi Fast, DO    ALPRAZolam (XANAX) tablet 1 mg, 1 mg, Oral, 4x Daily PRN, Mary Ann Gil PA-C, 1 mg at 05/06/22 0126    atorvastatin (LIPITOR) tablet 40 mg, 40 mg, Oral, Daily With Dinner, Fain Fly, DO    budesonide-formoterol (SYMBICORT) 160-4 5 mcg/act inhaler 2 puff, 2 puff, Inhalation, BID, Fani Fly, DO, 2 puff at 05/05/22 2157    docusate sodium (COLACE) capsule 100 mg, 100 mg, Oral, BID, Fani Fly, DO    Enzalutamide TABS 160 mg, 160 mg, Oral, Daily, Fani Fly, DO    fluticasone-vilanterol (BREO ELLIPTA) 200-25 MCG/INH inhaler 1 puff, 1 puff, Inhalation, Daily, Fani Fly, DO    gabapentin (NEURONTIN) capsule 300 mg, 300 mg, Oral, TID, Fani Fly, DO    heparin (porcine) subcutaneous injection 5,000 Units, 5,000 Units, Subcutaneous, Q8H [address] **AND** [COMPLETED] Platelet count, , , Once, Fani Fly, DO    HYDROmorphone (DILAUDID) injection 1 mg, 1 mg, Intravenous, Q4H PRN, Fani Fly, DO, 1 mg at 05/06/22 0126    levalbuterol (Jonelle Class) inhalation solution 1 25 mg, 1 25 mg, Nebulization, TID, 1 25 mg at 05/06/22 0715 **AND** ipratropium (ATROVENT) 0 02 % inhalation solution 0 5 mg, 0 5 mg, Nebulization, TID, Fani Fly, DO, 0 5 mg at 05/06/22 0715    montelukast (SINGULAIR) tablet 10 mg, 10 mg, Oral, HS, Fani Fly, DO    pantoprazole (PROTONIX) EC tablet 40 mg, 40 mg, Oral, Daily, Fani Fly, DO    predniSONE tablet 2 5 mg, 2 5 mg, Oral, Daily, Fani Fly, DO    sertraline (ZOLOFT) tablet 25 mg, 25 mg, Oral, HS, Fani Fly, DO    traZODone (DESYREL) tablet 50 mg, 50 mg, Oral, HS, Fani Fly, DO    Objective  /65 (BP Location: Right arm)   Pulse 69   Temp 97 8 °F (36 6 °C) (Oral)   Resp (!) 10   Ht 5' 6" (1 676 m)   Wt 48 3 kg (106 lb 7 7 oz)   SpO2 97%   BMI 17 19 kg/m²     Physical Exam:   Physical Exam  Vitals and nursing note reviewed  Constitutional:       Appearance: He is cachectic  He is ill-appearing  HENT:      Head: Normocephalic and atraumatic  Eyes:      Conjunctiva/sclera: Conjunctivae normal    Pulmonary:      Effort: Pulmonary effort is normal  No respiratory distress  Musculoskeletal:      Cervical back: Neck supple  Skin:     General: Skin is warm and dry  Coloration: Skin is pale  Neurological:      Mental Status: He is alert and oriented to person, place, and time  Mental status is at baseline  Psychiatric:         Behavior: Behavior is cooperative  Lab Results:   I have personally reviewed pertinent labs  , CBC:   Lab Results   Component Value Date    WBC 5 23 05/06/2022    HGB 12 6 05/06/2022    HCT 39 0 05/06/2022     (H) 05/06/2022     05/06/2022    MCH 32 8 05/06/2022    MCHC 32 3 05/06/2022    RDW 14 1 05/06/2022    MPV 8 6 (L) 05/06/2022    NRBC 0 05/05/2022   , CMP:   Lab Results   Component Value Date    SODIUM 139 05/06/2022    K 3 8 05/06/2022     05/06/2022    CO2 31 05/06/2022    BUN 18 05/06/2022    CREATININE 0 64 05/06/2022    CALCIUM 8 6 05/06/2022    AST 16 05/06/2022    ALT 20 05/06/2022    ALKPHOS 56 05/06/2022    EGFR 101 05/06/2022     Imaging Studies: I have personally reviewed pertinent reports  EKG, Pathology, and Other Studies: I have personally reviewed pertinent reports  Counseling / Coordination of Care  Total floor / unit time spent today 90 minutes  Greater than 50% of total time was spent with the patient and / or family counseling and / or coordination of care  A description of the counseling / coordination of care: Reviewed chart, provided medical updates, determined goals of care, discussed palliative care and symptom management, discussed comfort care and hospice care, determined competency and POA/HCA, determined social/family support, provided psychosocial support  Reviewed with SL team, neurology, RN and CM  Portions of this document may have been created using dictation software and as such some "sound alike" terms may have been generated by the system  Do not hesitate to contact me with any questions or clarifications

## 2022-05-06 NOTE — OCCUPATIONAL THERAPY NOTE
Occupational Therapy Cx Note     Patient Name: Brandon Vyas  Today's Date: 5/6/2022  Problem List  Principal Problem:    Encephalopathy acute  Active Problems:    Chronic obstructive pulmonary disease ([facility] [address] )    Severe protein-calorie malnutrition ([facility] [address] )    HLD (hyperlipidemia)    Prostate cancer metastatic to bone [facility])    Palliative care patient          05/06/22 1513   OT Last Visit   OT Visit Date 05/06/22   Note Type   Note type Cancelled Session   Additional Comments Attempted to see pt for OT evalution  Pt lethargic and having difficulty mainitaining arousal  Will hold and follow at later time               RADHA Soto/L

## 2022-05-06 NOTE — ASSESSMENT & PLAN NOTE
Diagnosed approx 4 months ago with metastatic disease to bone and ongoing back , pelvis  And hip pains - also has calvarium involvement and some head pain( nonspecific)  · consult oncology and palliative care team pending  · C/w pain control

## 2022-05-06 NOTE — ASSESSMENT & PLAN NOTE
Presented with hallucinations and feeling that there was rippling in his vision and he was seeing objects  he did not feel safe to drive and called his niece to take him to his palliative care  He was referred to er by palliative care team  Pt and niece report the patient had lost a "large chunk of time" where he didn't remember driving or fixing a guitar  · Neurology, palliative care and oncology consulted  · Pt is concerned this is secondary to enzalutamide and prednisone combination   · CTA of the head and neck 5/5- no acute abnormality + for diffuse bony metastatic disease   · C/w neuro checks   · MRI reveals osseous mets but no intracranial abnormality

## 2022-05-06 NOTE — NURSING NOTE
Bedside report at 07:05, pt was self administering albuterol inhaler  When asked where he obtained his inhaler, pt pointed at his home medications in a bag  Educated patient and family on policy to forward all home medications to pharmacy that was brought from home to hospital and possible over or double medicating  Patient stated he took 3 puffs of inhaler, charted with hospital label as administered  Asked when the last time he took any other home medications and patient stated he does not recall when, how much or if took any at all  Medication was taken to pharmacy that included an empty bottle of Xanax  During the same bedside report at 07:05, family visitor (yissel) who spent the night requested nebulizer's to be administered now as patient was having difficulty breathing  Nebulizer's were due at 08:00, respiratory was called who administered immediately as requested  While performing morning assessment later in the morning, yissel was speaking outside the room with palliative care  Gingerece entered room during assessment when patient was explained hallucinations he was experiencing  Yissel began questioning patient about what he was seeing as he explained during assessment  Yissel questioning continued as she stated to patient "I cannot believe you cannot remmember us talking last night or what we said", at that time patient began to cry that he cannot believe he is going through this  At that time, yissel stated "this is bad, when he begins to cry like this, he gets bad"  I Delroy Parrish responded, we need to keep happy thoughts and try not to upset him and bring up last night  I continued assessment with asking to describe hallucinations that he explained as the lines in the wall moving and seeing dirt trails across the wall  At 11:35 while providing patient care in room 232, yissel ran outside the door of room 232 while I was providing care to that patient    Yissel was physically jumping up and down crying "he needs you, it is an emergency, he cannot breath, he is having a panic attack,  I need you now"  I immediately left 232, went to his room (230) to find patient breathing heavy in bed  Nephrology doctor Flash Conway witnessed yissel crying in hallway and joined me entering room 230  Flash Conway was present to witness all activity relating to treating patient panic attack  I used redirection and calming methods with comforting his hand and arm  After patient breathing improved, O2 sat was remaining at 84, 4 L oxygen placed  With coaching breathing techniques, placing oxygen with calming methods, patient O2 increased to 92  I stated I would get ativan for his anxiety and administered PRN dose  Yissel stated, I have questions  I responded, I need to return to the room I was providing care to complete care and will return as soon as I can  Approximately 5 minutes later I returned to check on patient when yissel stated, I would like to request another nurse  I asked what was wrong when she responded "I have the right to request another nurse and I'm not required to tell you why"  I stated I'm not sure what else I could have done and I think it is fair to say I have provided excellent care to Rin Randolph with assessments, answering call bells , rounding about every 30 minutes and calmed his panic attack by dropping care to immediately come to the room  I'm not sure why you feel this way  Gingerstefan stated "I'm sure your a good nurse but I do not think you are a good nurse for him"  I said I will advise management of your wish but feel this is a little unfair not knowing why  Call was placed to charge nurse who transferred patient to third floor  I did not share with yissel the 55 orders that were placed between 09:00 and 12:00 that were current  Q 4 neuro check was completed at 09:00 but not performed at 13:00 to honor request of not continuing his care

## 2022-05-06 NOTE — CONSULTS
Medical Oncology/Hematology Consult Note  Giacomo Ayala, 78 y o , 1955  /-[birth year], [id number]  05/06/22    Assessment and Plan:    1  Metastatic prostate cancer  Patient presented with  in 12/2021  Imaging demonstrated evidence of extensive multifocal osseous metastatic disease involving the axial and appendicular skeletal system   IR Bone biopsy completed on 12/30/21  Pathology demonstrated metastatic adenocarcinoma, immunohistochemically consistent with metastatic prostatic adenocarcinoma  He is androgen deprivation therapy  He receives Lupron from his urologist every 6 months, last given on 3/2/2022  He follows with Dr Chantel Yousif, medical oncology, and is also on Xtandi 160 mg daily as this has shown to have a survival benefit based on more recent data  His most recent PSA has come down to 14, indicating treatment response  Of note, patient has had multiple complaints of intolerance of Lupron  He was recently seen by his urologist and discussed possible orchiectomy as an alternative to Lupron therapy  However, per his urologist's note he was informed that there was no advantage to orchiectomy over continue Lupron in his case  2  Acute encephalopathy   Patient admitted with acute mental status changes, hallucinations  CTA head and neck was negative for intracranial mass, mass effect or midline shift  No CT signs of acute infarction  No acute parenchymal hemorrhage  Neurology consulted  Appreciate recs  · Agree with recommended work up to include MRI Brain to r/o metastatic disease  · Karla Karel can cause altered mental status in 4-6% of patients taking this medication  Less than 1% have developed reversible posterior leukoencephalopathy syndrome  · Discontinue Xtandi  · Will consider alternative therapy with Zytiga and prednisone  Patient will also benefit from [facility] given extent of bony disease         Outpatient follow up plan: Patient is scheduled for an appointment with Dr Chantel Yousif on 5/20/22  I will attempt to arrange a sooner outpatient follow-up appointment  Patient should continue to hold [place] until he sees Ruffus Buerger  Communication with patient/family: I did update the patient, as well as his sister  Please do not hesitate to contact me if you have any questions or need additional information  Thank you for this consult  Arabella Meza MSN, [facility], [address]  Hematology Oncology Nurse Practitioner      Reason for Consultation: Prostate cancer metastatic to bone, altered mental status         History of present illness:   Sadie Tate is a 78 y o  male who presents with altered mental status, confusion, hallucinations and anxiety  He had a CTA of the head and neck which showed no acute intracranial pathology, vascular disease, or evidence of metastatic disease  He was evaluated by neurology who are recommending an MRI of the brain to rule out metastatic disease  He was also seen by palliative who is managing his symptoms including pain and anxiety  He is currently taking prednisone, Xanax, Ativan, and Dilaudid  Garett Gray is a known patient of Dr Nela Leong with prostate cancer metastatic to the bone  He was last seen on 3/14/22 where he was started on Xtandi  He has a follow-up appointment on 5/20/22  Sadie Tate current outpatient oncology regimen is:  Lupron, Xtandi, last dose Lupron given on 3/2/2022  Xtandi 160 mg is taken daily  Primary Outpatient Hematology/Oncology Provider: JEEVAN Mccormick  Interval history: Patient is seen with his sister  She states that Garett Gray has mentioned "brain fogginess," and changes in memory that he thinks are related to [place]  She states that he does not have a lot of memory from yesterday  She reports that he has also had some increased anxiety, and she thinks some of this may be related to the prednisone he was on  She states that he has been "self-medicating," by taking more of his PRN medications  He was asleep in bed but was able to answer a few questions  When ask about the "brain fogginess," he said, "this is an understatement " He is endorsing indigestion which started after drinking a glass of water  He denies chest pain and SOB  He was unable to answer any further questions as he was in and out of sleep  Review of Systems   Reason unable to perform ROS: ROS limited due to patient's lethargy  Sister present to answer some questions  Respiratory: Negative for shortness of breath  Cardiovascular: Negative for chest pain  Gastrointestinal:        "Indigestion"   Psychiatric/Behavioral: Positive for confusion and hallucinations  The patient is nervous/anxious and is hyperactive  Noted by sister and niece     All other systems reviewed and are negative          Past medical history:   Past Medical History:   Diagnosis Date    [facility] cancer ([facility] )     Colon polyp     COPD (chronic obstructive pulmonary disease) ([facility] )     Emphysema lung ([facility] )     Hyperlipidemia     Prostate cancer (HCC)        Past surgical history:   Past Surgical History:   Procedure Laterality Date    APPENDECTOMY      COLONOSCOPY      IR BIOPSY BONE  12/30/2021    JOINT REPLACEMENT      UPPER GASTROINTESTINAL ENDOSCOPY         Allergies: No Known Allergies    Home medications:   Medications Prior to Admission   Medication    albuterol (PROVENTIL HFA,VENTOLIN HFA) 90 mcg/act inhaler    ALPRAZolam (XANAX) 1 mg tablet    atorvastatin (LIPITOR) 40 mg tablet    budesonide-formoterol (SYMBICORT) 160-4 5 mcg/act inhaler    enzalutamide (Xtandi) 40 mg capsule    montelukast (SINGULAIR) 10 mg tablet    predniSONE 2 5 mg tablet    acetaminophen (TYLENOL) 650 mg CR tablet    cyclopentolate (Cyclogyl) 1 % ophthalmic solution    fluticasone-vilanterol (BREO ELLIPTA) 200-25 MCG/INH inhaler    gabapentin (NEURONTIN) 300 mg capsule    ipratropium-albuterol (DUO-NEB) 0 5-2 5 mg/3 mL nebulizer solution    pantoprazole (PROTONIX) 40 mg tablet    sertraline (ZOLOFT) 25 mg tablet    tobramycin-dexamethasone (TOBRADEX) ophthalmic suspension    traZODone (DESYREL) 50 mg tablet    valACYclovir (VALTREX) 1,000 mg tablet       Hospital medications:   Current Facility-Administered Medications:     acetaminophen (TYLENOL) tablet 650 mg, 650 mg, Oral, Q6H PRN, Fani Fly, DO    albuterol (PROVENTIL HFA,VENTOLIN HFA) inhaler 2 puff, 2 puff, Inhalation, Q4H PRN, Demi Tracy,     ALPRAZolam (XANAX) tablet 1 mg, 1 mg, Oral, 4x Daily PRN, Olinda Turner PA-C, 1 mg at 05/06/22 0126    atorvastatin (LIPITOR) tablet 40 mg, 40 mg, Oral, Daily With Dinner, Fani Fly, DO    budesonide-formoterol (SYMBICORT) 160-4 5 mcg/act inhaler 2 puff, 2 puff, Inhalation, BID, Fani Fly, DO, 2 puff at 05/05/22 2157    docusate sodium (COLACE) capsule 100 mg, 100 mg, Oral, BID, Fani Fly, DO    Enzalutamide TABS 160 mg, 160 mg, Oral, Daily, Fani Fly, DO    fluticasone-vilanterol (BREO ELLIPTA) 200-25 MCG/INH inhaler 1 puff, 1 puff, Inhalation, Daily, Fani Fly, DO    gabapentin (NEURONTIN) capsule 300 mg, 300 mg, Oral, TID, Fani Fly, DO    heparin (porcine) subcutaneous injection 5,000 Units, 5,000 Units, Subcutaneous, Q8H [address] **AND** [COMPLETED] Platelet count, , , Once, Demi Tracy,     HYDROmorphone (DILAUDID) injection 1 mg, 1 mg, Intravenous, Q4H PRN, Fani Fly, DO, 1 mg at 05/06/22 0126    levalbuterol (XOPENEX) inhalation solution 1 25 mg, 1 25 mg, Nebulization, TID, 1 25 mg at 05/06/22 0715 **AND** ipratropium (ATROVENT) 0 02 % inhalation solution 0 5 mg, 0 5 mg, Nebulization, TID, Fani Fly, DO, 0 5 mg at 05/06/22 0715    montelukast (SINGULAIR) tablet 10 mg, 10 mg, Oral, HS, Fani Fly, DO    pantoprazole (PROTONIX) EC tablet 40 mg, 40 mg, Oral, Daily, Fani Fly, DO    predniSONE tablet 2 5 mg, 2 5 mg, Oral, Daily, Fani Fly, DO    sertraline (ZOLOFT) tablet 25 mg, 25 mg, Oral, HS, Fani Fly, DO    traZODone (DESYREL) tablet 50 mg, 50 mg, Oral, HS, Doreatha Layer, DO    Social history:   Social History     Tobacco Use    Smoking status: Former Smoker     Types: Cigarettes     Quit date: 2012     Years since quitting: 10 3    Smokeless tobacco: Never Used   Vaping Use    Vaping Use: Never used   Substance Use Topics    Alcohol use: Not Currently    Drug use: Never       Family history:   Family History   Problem Relation Age of Onset    Lung cancer Mother     Breast cancer Sister     Colon polyps Brother     Colon cancer Neg Hx        Vitals:  Vitals:    05/06/22 0715   BP:    Pulse:    Resp:    Temp:    SpO2: 97%       Physical Exam  Constitutional:       General: He is not in acute distress  Appearance: He is ill-appearing  Comments: cachetic   HENT:      Head: Normocephalic and atraumatic  Nose: Nose normal    Eyes:      General: No scleral icterus  Conjunctiva/sclera: Conjunctivae normal    Pulmonary:      Effort: Pulmonary effort is normal  No respiratory distress  Skin:     General: Skin is warm and dry  Neurological:      Mental Status: He is lethargic  Psychiatric:         Thought Content:  Thought content normal          Recent Results (from the past 48 hour(s))   CBC and differential    Collection Time: 05/05/22  2:19 PM   Result Value Ref Range    WBC 5 93 4 31 - 10 16 Thousand/uL    RBC 4 17 3 88 - 5 62 Million/uL    Hemoglobin 13 9 12 0 - 17 0 g/dL    Hematocrit 42 5 36 5 - 49 3 %     (H) 82 - 98 fL    MCH 33 3 26 8 - 34 3 pg    MCHC 32 7 31 4 - 37 4 g/dL    RDW 13 8 11 6 - 15 1 %    MPV 8 5 (L) 8 9 - 12 7 fL    Platelets 816 (H) 907 - 390 Thousands/uL    nRBC 0 /100 WBCs    Neutrophils Relative 67 43 - 75 %    Immat GRANS % 0 0 - 2 %    Lymphocytes Relative 20 14 - 44 %    Monocytes Relative 10 4 - 12 %    Eosinophils Relative 2 0 - 6 %    Basophils Relative 1 0 - 1 %    Neutrophils Absolute 3 93 1 85 - 7 62 Thousands/µL    Immature Grans Absolute 0 01 0 00 - 0 20 Thousand/uL    Lymphocytes Absolute 1 17 0 60 - 4 47 Thousands/µL    Monocytes Absolute 0 61 0 17 - 1 22 Thousand/µL    Eosinophils Absolute 0 13 0 00 - 0 61 Thousand/µL    Basophils Absolute 0 08 0 00 - 0 10 Thousands/µL   Comprehensive metabolic panel    Collection Time: 05/05/22  2:19 PM   Result Value Ref Range    Sodium 143 136 - 145 mmol/L    Potassium 4 2 3 5 - 5 3 mmol/L    Chloride 104 100 - 108 mmol/L    CO2 32 21 - 32 mmol/L    ANION GAP 7 4 - 13 mmol/L    BUN 21 5 - 25 mg/dL    Creatinine 0 76 0 60 - 1 30 mg/dL    Glucose 102 65 - 140 mg/dL    Calcium 8 7 8 3 - 10 1 mg/dL    AST 19 5 - 45 U/L    ALT 25 12 - 78 U/L    Alkaline Phosphatase 76 46 - 116 U/L    Total Protein 6 6 6 4 - 8 2 g/dL    Albumin 3 5 3 5 - 5 0 g/dL    Total Bilirubin 0 30 0 20 - 1 00 mg/dL    eGFR 94 ml/min/1 73sq m   Lipase    Collection Time: 05/05/22  2:19 PM   Result Value Ref Range    Lipase 99 73 - 393 u/L   TSH, 3rd generation with Free T4 reflex    Collection Time: 05/05/22  2:19 PM   Result Value Ref Range    TSH 3RD GENERATON 1 140 0 450 - 4 500 uIU/mL   Magnesium    Collection Time: 05/05/22  2:19 PM   Result Value Ref Range    Magnesium 2 0 1 6 - 2 6 mg/dL   UA w Reflex to Microscopic w Reflex to Culture    Collection Time: 05/05/22  2:21 PM    Specimen: Urine, Clean Catch   Result Value Ref Range    Color, UA Yellow     Clarity, UA Clear     Specific Gravity, UA 1 025 1 003 - 1 030    pH, UA 6 5 4 5, 5 0, 5 5, 6 0, 6 5, 7 0, 7 5, 8 0    Leukocytes, UA Negative Negative    Nitrite, UA Negative Negative    Protein, UA Negative Negative mg/dl    Glucose, UA Negative Negative mg/dl    Ketones, UA Negative Negative mg/dl    Urobilinogen, UA 0 2 0 2, 1 0 E U /dl E U /dl    Bilirubin, UA Negative Negative    Blood, UA Negative Negative   ECG 12 lead    Collection Time: 05/05/22  4:17 PM   Result Value Ref Range    Ventricular Rate 79 BPM    Atrial Rate 79 BPM    UT Interval 140 ms    QRSD Interval 78 ms    QT Interval 398 ms    QTC Interval 456 ms    P Axis 82 degrees    QRS Axis 127 degrees    T Wave Axis 68 degrees   Protime-INR    Collection Time: 05/05/22  4:18 PM   Result Value Ref Range    Protime 12 9 11 6 - 14 5 seconds    INR 0 98 0 84 - 1 19   APTT    Collection Time: 05/05/22  4:18 PM   Result Value Ref Range    PTT 32 23 - 37 seconds   Platelet count    Collection Time: 05/05/22  8:40 PM   Result Value Ref Range    Platelets 880 690 - 672 Thousands/uL    MPV 8 6 (L) 8 9 - 12 7 fL   Comprehensive metabolic panel    Collection Time: 05/06/22  4:41 AM   Result Value Ref Range    Sodium 139 136 - 145 mmol/L    Potassium 3 8 3 5 - 5 3 mmol/L    Chloride 105 100 - 108 mmol/L    CO2 31 21 - 32 mmol/L    ANION GAP 3 (L) 4 - 13 mmol/L    BUN 18 5 - 25 mg/dL    Creatinine 0 64 0 60 - 1 30 mg/dL    Glucose 109 65 - 140 mg/dL    Calcium 8 6 8 3 - 10 1 mg/dL    Corrected Calcium 9 5 8 3 - 10 1 mg/dL    AST 16 5 - 45 U/L    ALT 20 12 - 78 U/L    Alkaline Phosphatase 56 46 - 116 U/L    Total Protein 5 6 (L) 6 4 - 8 2 g/dL    Albumin 2 9 (L) 3 5 - 5 0 g/dL    Total Bilirubin 0 30 0 20 - 1 00 mg/dL    eGFR 101 ml/min/1 73sq m   Magnesium    Collection Time: 05/06/22  4:41 AM   Result Value Ref Range    Magnesium 2 1 1 6 - 2 6 mg/dL   CBC (With Platelets)    Collection Time: 05/06/22  4:41 AM   Result Value Ref Range    WBC 5 23 4 31 - 10 16 Thousand/uL    RBC 3 84 (L) 3 88 - 5 62 Million/uL    Hemoglobin 12 6 12 0 - 17 0 g/dL    Hematocrit 39 0 36 5 - 49 3 %     (H) 82 - 98 fL    MCH 32 8 26 8 - 34 3 pg    MCHC 32 3 31 4 - 37 4 g/dL    RDW 14 1 11 6 - 15 1 %    Platelets 445 188 - 635 Thousands/uL    MPV 8 6 (L) 8 9 - 12 7 fL       Imaging Studies:   CTA head and neck with and without contrast    Result Date: 5/5/2022  Narrative: CTA NECK AND BRAIN WITH AND WITHOUT CONTRAST INDICATION: ams, visual hallucination COMPARISON:   None   TECHNIQUE:  Routine CT imaging of the Brain without contrast   Post contrast imaging was performed after administration of iodinated contrast through the neck and brain  Post contrast axial 0 625 mm images timed to opacify the arterial system  3D rendering was performed on an independent workstation  MIP reconstructions performed  Coronal reconstructions were performed of the noncontrast portion of the brain  Radiation dose length product (DLP) for this visit:  1281 94 mGy-cm   This examination, like all CT scans performed in the [facility], was performed utilizing techniques to minimize radiation dose exposure, including the use of iterative reconstruction and automated exposure control  IV Contrast:  85 mL of iohexol (OMNIPAQUE)  IMAGE QUALITY:   Diagnostic FINDINGS: NONCONTRAST BRAIN PARENCHYMA:  No intracranial mass, mass effect or midline shift  No CT signs of acute infarction  No acute parenchymal hemorrhage  VENTRICLES AND EXTRA-AXIAL SPACES:  Normal for the patient's age  VISUALIZED ORBITS AND PARANASAL SINUSES:  Unremarkable  CERVICAL VASCULATURE AORTIC ARCH AND GREAT VESSELS:  Mild atherosclerotic disease of the arch, proximal great vessels and visualized subclavian vessels  No significant stenosis  RIGHT VERTEBRAL ARTERY CERVICAL SEGMENT:  Mild stenosis at the origin  The vessel is normal in caliber throughout the neck  LEFT VERTEBRAL ARTERY CERVICAL SEGMENT:  Mild stenosis at the origin  The vessel is normal in caliber throughout the neck  RIGHT EXTRACRANIAL CAROTID SEGMENT:  Mild atherosclerotic disease of the distal common carotid artery and proximal cervical internal carotid artery without significant stenosis compared to the more distal ICA  LEFT EXTRACRANIAL CAROTID SEGMENT:  Mild atherosclerotic disease of the distal common carotid artery and proximal cervical internal carotid artery without significant stenosis compared to the more distal ICA  NASCET criteria was used to determine the degree of internal carotid artery diameter stenosis   INTRACRANIAL VASCULATURE INTERNAL CAROTID ARTERIES:  Normal enhancement of the intracranial portions of the internal carotid arteries  Normal ophthalmic artery origins  Normal ICA terminus  ANTERIOR CIRCULATION:  Symmetric A1 segments and anterior cerebral arteries with normal enhancement  Normal anterior communicating artery  MIDDLE CEREBRAL ARTERY CIRCULATION:  M1 segment and middle cerebral artery branches demonstrate normal enhancement bilaterally  DISTAL VERTEBRAL ARTERIES:  Normal distal vertebral arteries  Posterior inferior cerebellar artery origins are normal  Normal vertebral basilar junction  BASILAR ARTERY:  Basilar artery is normal in caliber  Normal superior cerebellar arteries  POSTERIOR CEREBRAL ARTERIES: Both posterior cerebral arteries arises from the basilar tip  Both arteries demonstrate normal enhancement  Normal posterior communicating arteries  VENOUS STRUCTURES:  Normal  NON VASCULAR ANATOMY BONY STRUCTURES:  Fairly extensive multifocal osseous metastatic disease involving the axial and appendicular skeletal system SOFT TISSUES OF THE NECK:  Unremarkable  THORACIC INLET:  Emphysematous changes     Impression: No evidence of acute intracranial hemorrhage  No evidence of hemodynamic significant stenosis, aneurysm or dissection  Diffuse bony metastatic disease Workstation performed: [id number]       Counseling / Coordination of Care  Total floor / unit time spent today 90 minutes  Greater than 50% of total time was spent with the patient and / or family counseling and / or coordination of care  A description of the counseling / coordination of care:  Review of chart, imaging studies, discussion with patient's family at bedside, assessment of patient, coordination of outpatient follow-up    ISA Herrera    Please note: This report has been generated by voice recognition software system  Therefore, there may be syntax, spelling and/or grammatical errors  Please call if you have any questions

## 2022-05-06 NOTE — ASSESSMENT & PLAN NOTE
78year old male with history of prostate cancer with extensive osseous metastatic disease (following with urology/oncology/palliative care, on hormone therapy), COPD, HLD  Patient with recent mental status changes of subacute time period per family (hallucinations, memory lapses/amnestic states), unclear the accuracy of self-medication administration at home (he does live alone)  Given advanced age metastatic prostate cancer, would rule brain/cerebral metastatic disease as contributor to the above symptoms  Following discussion on neurodiagnostics/imaging this morning, patient became quite anxious/tearful, niece concerned patient was having panic attack  Attended to patient with nursing: patient appeared clinically stable, minimal increased work of breathing  Improved with reassurance/deep breathing techniques  Supplemental O2 applied via NC by nursing       Plan:  -CTA head/neck yesterday with no acute intracranial pathology nor significant vascular abnormalities/stenosis (does note extensive osseous metastatic disease)  -recommend MRI brain with/without contrast to look for brain/cerebral metastatic disease  -consider routine EEG given amnesia/memory lapses and hallucinations  -palliative care, oncology following:   -discontinued enzalutamide here, prednisone decreased from 2 5 mg daily to 1 mg  daily   -PRN dilaudid for osseous pain   -PRN Xanax for anxiety/insomnia   -discontinued sertraline and trazodone   -low-dose Zyprexa at night for insomnia  -other medical/oncologic management in multidisciplinary fashion with primary team, Oncology, palliative Care

## 2022-05-06 NOTE — CASE MANAGEMENT
Case Management Assessment & Discharge Planning Note    Patient name Bibi Andre  Location [address] 230/-01 MRN [id number]  : 1955 Date 2022       Current Admission Date: 2022  Current Admission Diagnosis:Encephalopathy acute   Patient Active Problem List    Diagnosis Date Noted    Encephalopathy acute 2022    Anorexia 2022    [facility]xia ([facility] ) 2022    Palliative care patient 2022    Prostate cancer metastatic to bone ([facility] ) 2022    Insomnia 2022    Hot flashes 2022    Left lower quadrant abdominal pain 2021    Personal history of colonic polyps 2021    HLD (hyperlipidemia) 2020    Severe protein-calorie malnutrition ([facility] ) 2020    Chronic obstructive pulmonary disease ([facility] ) 2020    Acute tracheobronchitis 2020      LOS (days): 1  Geometric Mean LOS (GMLOS) (days): 4 50  Days to GMLOS:3 7     OBJECTIVE:    Risk of Unplanned Readmission Score: 13         Current admission status: Inpatient       Preferred Pharmacy:   [id number] [place] [address] [place] [address] Ave  [address] [place] [address]  Phone: [phone] Fax: [phone] [facility], [address]  [address] [facility] [address]  Phone: [phone] Fax: [phone]    [address] [place] [address] [address]  [facility] 3  [address]  [address] [place] [address]  Phone: [phone] Fax: [phone]    Primary Care Provider: ISA Segundo    Primary Insurance: MEDICARE  Secondary Insurance:     ASSESSMENT:  Gricel Koenig Proxies    There are no active Health Care Proxies on file         Advance Directives  Does patient have a [address] [place] [address] Avenue?: No  Was patient offered paperwork?: Yes (declined)  Does patient currently have a Health Care decision maker?: Yes, please see Health Care Proxy section  Does patient have Advance Directives?: No  Was patient offered paperwork?: Yes (declined offer)         Readmission Root Cause  30 Day Readmission: No    Patient Information  Admitted from[de-identified] Home  Mental Status: Alert  During Assessment patient was accompanied by: Other-Comment (Nistefan)  Assessment information provided by[de-identified] Lenore Laboy - please comment (yissel)  Support Systems: [address] [facility] of Residence: [address] [place] [address] do you live in?: [facility] Financial entry access options  Select all that apply : Stairs  Number of steps to enter home  : 1  Do the steps have railings?: No  Type of Current Residence: Ranch  In the last 12 months, was there a time when you were not able to pay the mortgage or rent on time?: No  In the last 12 months, how many places have you lived?: 1  In the last 12 months, was there a time when you did not have a steady place to sleep or slept in a shelter (including now)?: No  Living Arrangements: Lives Alone  Is patient a ?: No    Activities of Daily Living Prior to Admission  Functional Status: Independent  Completes ADLs independently?: Yes  Ambulates independently?: Yes  Does patient use assisted devices?: No  Does patient currently own DME?: No  Does patient have a history of Outpatient Therapy (PT/OT)?: No  Does the patient have a history of Short-Term Rehab?: No  Does patient have a history of HHC?: No  Does patient currently have [address]?: No         Patient Information Continued  Income Source: Pension/longterm  Does patient have prescription coverage?: Yes  Within the past 12 months, you worried that your food would run out before you got the money to buy more : Never true  Within the past 12 months, the food you bought just didnt last and you didnt have money to get more : Never true  Does patient receive dialysis treatments?: No  Does patient have a history of substance abuse?: No  Does patient have a history of Mental Health Diagnosis?: No         Means of Transportation  Means of Transport to [facility][de-identified] Drives Self  In the past 12 months, has lack of transportation kept you from medical appointments or from getting medications?: Yes  In the past 12 months, has lack of transportation kept you from meetings, work, or from getting things needed for daily living?: Yes        DISCHARGE DETAILS:    Discharge planning discussed with[de-identified] patient and niece Amanda Anglin of Choice: Yes     CM contacted family/caregiver?: Yes  Were Treatment Team discharge recommendations reviewed with patient/caregiver?: Yes     Met with patient and his niece Marsha Back to discuss the role of CM and possible discharge needs  Patient resides alone in a ranch home with 1 ALBA  His niece reports patient is a very private man  Patient was dx in Dec with prostrate CA with mets  and she reports a decline since then  Wait PT/OT consult  Discharge needs to be determine once seen by PT/OT, oncology  CM to follow

## 2022-05-06 NOTE — ASSESSMENT & PLAN NOTE
Malnutrition Findings:          nutrition consult   supplements as tolerated                       BMI Findings: Body mass index is 17 19 kg/m²     Nutrition consult  Encourage PO intake

## 2022-05-06 NOTE — CASE MANAGEMENT
Case Management Discharge Planning Note    Patient name Mariana Liu  Location [address] 322/-23 MRN [id number]  : 1955 Date 2022       Current Admission Date: 2022  Current Admission Diagnosis:Encephalopathy acute   Patient Active Problem List    Diagnosis Date Noted    Encephalopathy acute 2022    Anorexia 2022    Cachexia ([facility] ) 2022    Palliative care patient 2022    Prostate cancer metastatic to bone ([facility] ) 2022    Insomnia 2022    Hot flashes 2022    Left lower quadrant abdominal pain 2021    Personal history of colonic polyps 2021    HLD (hyperlipidemia) 2020    Severe protein-calorie malnutrition ([facility] ) 2020    Chronic obstructive pulmonary disease ([facility] ) 2020    Acute tracheobronchitis 2020      LOS (days): 1  Geometric Mean LOS (GMLOS) (days): 4 50  Days to GMLOS:3 6     OBJECTIVE:  Risk of Unplanned Readmission Score: 13         Current admission status: Inpatient   Preferred Pharmacy:   [id number]- [place] [address] [place] [address] Ave  [address] [facility] [address]  Phone: [phone] Fax: [phone] [facility], [address]  [address] [place] [address]  [address] [facility] [address]  Phone: [phone] Fax: [phone]    [address] [place] [address] [address]  [facility] 3  [address]  [address] [place] [address]  Phone: [phone] Fax: [phone]    Primary Care Provider: ISA Gavin    Primary Insurance: MEDICARE  Secondary Insurance:     DISCHARGE DETAILS: Continuing to follow patient  Met with patient's sister, Cecy Mccarty to discuss discharge options  Patient resides alone in a one level home  She hopes he is able to return there  Wait PT/OT  Discuss VNA, private duty HHA, SNF and Hospice as options as patient is pending MRI, oncology and Neurology consults

## 2022-05-06 NOTE — PLAN OF CARE
Problem: PAIN - ADULT  Goal: Verbalizes/displays adequate comfort level or baseline comfort level  Description: Interventions:  - Encourage patient to monitor pain and request assistance  - Assess pain using appropriate pain scale  - Administer analgesics based on type and severity of pain and evaluate response  - Implement non-pharmacological measures as appropriate and evaluate response  - Consider cultural and social influences on pain and pain management  - Notify physician/advanced practitioner if interventions unsuccessful or patient reports new pain  Outcome: Progressing     Problem: INFECTION - ADULT  Goal: Absence or prevention of progression during hospitalization  Description: INTERVENTIONS:  - Assess and monitor for signs and symptoms of infection  - Monitor lab/diagnostic results  - Monitor all insertion sites, i e  indwelling lines, tubes, and drains  - Monitor endotracheal if appropriate and nasal secretions for changes in amount and color  - [place] appropriate cooling/warming therapies per order  - Administer medications as ordered  - Instruct and encourage patient and family to use good hand hygiene technique  - Identify and instruct in appropriate isolation precautions for identified infection/condition  Outcome: Progressing  Goal: Absence of fever/infection during neutropenic period  Description: INTERVENTIONS:  - Monitor WBC    Outcome: Progressing     Problem: SAFETY ADULT  Goal: Patient will remain free of falls  Description: INTERVENTIONS:  - Educate patient/family on patient safety including physical limitations  - Instruct patient to call for assistance with activity   - Consult OT/PT to assist with strengthening/mobility   - Keep Call bell within reach  - Keep bed low and locked with side rails adjusted as appropriate  - Keep care items and personal belongings within reach  - Initiate and maintain comfort rounds  - Make Fall Risk Sign visible to staff  - Apply yellow socks and bracelet for high fall risk patients  - Consider moving patient to room near nurses station  Outcome: Progressing  Goal: Maintain or return to baseline ADL function  Description: INTERVENTIONS:  -  Assess patient's ability to carry out ADLs; assess patient's baseline for ADL function and identify physical deficits which impact ability to perform ADLs (bathing, care of mouth/teeth, toileting, grooming, dressing, etc )  - Assess/evaluate cause of self-care deficits   - Assess range of motion  - Assess patient's mobility; develop plan if impaired  - Assess patient's need for assistive devices and provide as appropriate  - Encourage maximum independence but intervene and supervise when necessary  - Involve family in performance of ADLs  - Assess for home care needs following discharge   - Consider OT consult to assist with ADL evaluation and planning for discharge  - Provide patient education as appropriate  Outcome: Progressing  Goal: Maintains/Returns to pre admission functional level  Description: INTERVENTIONS:  - Perform BMAT or MOVE assessment daily    - Set and communicate daily mobility goal to care team and patient/family/caregiver     - Collaborate with rehabilitation services on mobility goals if consulted  - Out of bed for toileting  - Record patient progress and toleration of activity level   Outcome: Progressing

## 2022-05-06 NOTE — PHYSICAL THERAPY NOTE
PT cancel     05/06/22 1500   PT Last Visit   PT Visit Date 05/06/22   Note Type   Note type Cancelled Session   Cancel Reasons Medical status   Additional Comments attempted to see for eval; lethargic and unable to stay alert for session will monitor for appropriateness   Tova Oglesby, PT

## 2022-05-06 NOTE — PROGRESS NOTES
[place]  Progress Note - Jerardo Chan 1955, 78 y o  male MRN: [id number]  Unit/Bed#: -Leah Encounter: [id number]  Primary Care Provider: ISA Robison   Date and time admitted to hospital: 5/5/2022  3:10 PM    * Encephalopathy acute  Assessment & Plan  Presented with hallucinations and feeling that there was rippling in his vision and he was seeing objects  he did not feel safe to drive and called his niece to take him to his palliative care  He was referred to er by palliative care team  Pt and niece report the patient had lost a "large chunk of time" where he didn't remember driving or fixing a guitar  · Neurology, palliative care and oncology consulted  · Pt is concerned this is secondary to enzalutamide and prednisone combination   · CTA of the head and neck 5/5- no acute abnormality + for diffuse bony metastatic disease   · C/w neuro checks       Prostate cancer metastatic to bone [facility])  Assessment & Plan  Diagnosed approx 4 months ago with metastatic disease to bone and ongoing back , pelvis  And hip pains - also has calvarium involvement and some head pain( nonspecific)  · consult oncology and palliative care team pending  · C/w pain control    Chronic obstructive pulmonary disease ([facility] )  Assessment & Plan  · Without exacerbation   · C/w PRN albuterol   · Respiratory protocol  · On Symbicort at home- substituted for breo while hospitalized     Palliative care patient  Assessment & Plan  · Consult placed for palliative care     HLD (hyperlipidemia)  Assessment & Plan  · Continue on atorvastatin    Severe protein-calorie malnutrition ([facility] )  Assessment & Plan  Malnutrition Findings:          nutrition consult   supplements as tolerated                       BMI Findings: Body mass index is 17 19 kg/m²     Nutrition consult  Encourage PO intake         VTE Pharmacologic Prophylaxis: VTE Score: 5 High Risk (Score >/= 5) - Pharmacological DVT Prophylaxis Ordered: heparin  Sequential Compression Devices Ordered  Patient Centered Rounds: I performed bedside rounds with nursing staff today  Discussions with Specialists or Other Care Team Provider: d/w RN, helio/w Billy OLIVARES with palliative care, helio/w Flash OLIVARES with neurology     Education and Discussions with Family / Patient: Updated  (niece) at bedside  Time Spent for Care: 30 minutes  More than 50% of total time spent on counseling and coordination of care as described above  Current Length of Stay: 1 day(s)  Current Patient Status: Inpatient   Certification Statement: The patient will continue to require additional inpatient hospital stay due to continued workup for encephalopathy   Discharge Plan: Anticipate discharge in >72 hrs to discharge location to be determined pending rehab evaluations  Code Status: Level 1 - Full Code    Subjective:   pt lying in bed  Reports he is doing ok this am  Reports he is not having any hallucinations at this time but does report once he takes his am meds enzalutamide and prednisone together cause him to have hallucinations  Per niece at bedside she is concerned because he is missing a "large chunk of time" were he doesn't remember driving, fixing a guitar, etc and the patients agrees with this  Pt was reporting some SOB this am after walking back from the bathroom which was relieved with his albuterol inhaler  Per RN pt has his home medication at bedside and was self medicating with the albuterol before he entered the room, the patient was instructed that he is not allowed to self administer meds while hospitalized  Pt denies all other complaints, just reports some generalized aches and pains in his neck, hips and ribs which is his chronic cancer related pain       Objective:     Vitals:   Temp (24hrs), Av 8 °F (36 6 °C), Min:97 2 °F (36 2 °C), Max:98 4 °F (36 9 °C)    Temp:  [97 2 °F (36 2 °C)-98 4 °F (36 9 °C)] 97 8 °F (36 6 °C)  HR:  [66-93] 69  Resp:  [13-22] 16  BP: (106-147)/(63-93) 106/65  SpO2:  [91 %-97 %] 97 %  Body mass index is 17 19 kg/m²  Input and Output Summary (last 24 hours): Intake/Output Summary (Last 24 hours) at 5/6/2022 1103  Last data filed at 5/5/2022 1713  Gross per 24 hour   Intake 1000 ml   Output --   Net 1000 ml       Physical Exam:   Physical Exam  Constitutional:       General: He is not in acute distress  Appearance: He is cachectic  HENT:      Head: Normocephalic and atraumatic  Mouth/Throat:      Mouth: Mucous membranes are dry  Eyes:      Conjunctiva/sclera: Conjunctivae normal       Pupils: Pupils are equal, round, and reactive to light  Cardiovascular:      Rate and Rhythm: Normal rate and regular rhythm  Pulses: Normal pulses  Heart sounds: Normal heart sounds  No murmur heard  Pulmonary:      Effort: No respiratory distress  Breath sounds: Normal breath sounds  No wheezing or rales  Abdominal:      General: Bowel sounds are normal  There is no distension  Palpations: Abdomen is soft  Tenderness: There is no abdominal tenderness  Musculoskeletal:         General: No swelling or tenderness  Skin:     General: Skin is warm and dry  Findings: No erythema or rash  Neurological:      General: No focal deficit present  Mental Status: He is alert and oriented to person, place, and time  Mental status is at baseline  Cranial Nerves: No cranial nerve deficit  Motor: No weakness     Psychiatric:         Attention and Perception: Attention normal          Mood and Affect: Mood normal           Additional Data:     Labs:  Results from last 7 days   Lab Units 05/06/22  0441 05/05/22  2040 05/05/22  1419   WBC Thousand/uL 5 23  --  5 93   HEMOGLOBIN g/dL 12 6  --  13 9   HEMATOCRIT % 39 0  --  42 5   PLATELETS Thousands/uL 338   < > 394*   NEUTROS PCT %  --   --  67   LYMPHS PCT %  --   --  20   MONOS PCT %  --   --  10   EOS PCT %  --   --  2    < > = values in this interval not displayed       Results from last 7 days   Lab Units 05/06/22  0441   SODIUM mmol/L 139   POTASSIUM mmol/L 3 8   CHLORIDE mmol/L 105   CO2 mmol/L 31   BUN mg/dL 18   CREATININE mg/dL 0 64   ANION GAP mmol/L 3*   CALCIUM mg/dL 8 6   ALBUMIN g/dL 2 9*   TOTAL BILIRUBIN mg/dL 0 30   ALK PHOS U/L 56   ALT U/L 20   AST U/L 16   GLUCOSE RANDOM mg/dL 109     Results from last 7 days   Lab Units 05/05/22  1618   INR  0 98                   Lines/Drains:  Invasive Devices  Report    Peripheral Intravenous Line            Peripheral IV 05/05/22 Right Antecubital <1 day                      Imaging: Reviewed radiology reports from this admission including: CT head    Recent Cultures (last 7 days):         Last 24 Hours Medication List:   Current Facility-Administered Medications   Medication Dose Route Frequency Provider Last Rate    albuterol  2 puff Inhalation Q4H PRN Basia Rosana, DO      ALPRAZolam  2 mg Oral HS PRN Rhonda Raymond PA-C      And    ALPRAZolam  0 5 mg Oral 4x Daily PRN Rhonda Raymond PA-C      atorvastatin  40 mg Oral Daily With [employer], DO      budesonide-formoterol  2 puff Inhalation BID Basia [place], DO      heparin (porcine)  5,000 Units Subcutaneous Q8H [address] Basia Rosana, DO      HYDROmorphone  1 mg Intravenous Q4H PRN Rhonda Raymond PA-C      HYDROmorphone  1 mg Oral Q4H PRN Rhonda Raymond PA-C      Or    HYDROmorphone  2 mg Oral Q4H PRN Rhonda Raymond PA-C      ibuprofen  600 mg Oral Q6H PRN Rhonda Raymond PA-C      lidocaine  1 patch Topical Daily Rhonda Raymond PA-C      montelukast  10 mg Oral HS Basia Wayne, DO      multivitamin-minerals  1 tablet Oral Daily Rhonda Raymond PA-C      OLANZapine  5 mg Oral HS Rhonda Raymond PA-C      [START ON 5/7/2022] predniSONE  1 mg Oral Daily Rhonda Raymond PA-C      senna  2 tablet Oral HS PRN Rhonda Roller Sangeetha Olson PA-C          Today, Patient Was Seen By: ISA Medrano    **Please Note: This note may have been constructed using a voice recognition system  **

## 2022-05-06 NOTE — CONSULTS
Consultation - Neurology   Enriquedallas Jenae 78 y o  male MRN: [id number]  Unit/Bed#: -01 Encounter: [id number]      Assessment/Plan   * Encephalopathy acute  Assessment & Plan  78year old male with history of prostate cancer with extensive osseous metastatic disease (following with urology/oncology/palliative care, on hormone therapy), COPD, HLD  Patient with recent mental status changes of subacute time period per family (hallucinations, memory lapses/amnestic states), unclear the accuracy of self-medication administration at home (he does live alone)  Given advanced age metastatic prostate cancer, would rule brain/cerebral metastatic disease as contributor to the above symptoms  Following discussion on neurodiagnostics/imaging this morning, patient became quite anxious/tearful, niece concerned patient was having panic attack  Attended to patient with nursing: patient appeared clinically stable, minimal increased work of breathing  Improved with reassurance/deep breathing techniques  Supplemental O2 applied via NC by nursing  Plan:  -CTA head/neck yesterday with no acute intracranial pathology nor significant vascular abnormalities/stenosis (does note extensive osseous metastatic disease)  -recommend MRI brain with/without contrast to look for brain/cerebral metastatic disease  -consider routine EEG given amnesia/memory lapses and hallucinations  -palliative care, oncology following:   -discontinued enzalutamide here, prednisone decreased from 2 5 mg daily to 1 mg  daily   -PRN dilaudid for osseous pain   -PRN Xanax for anxiety/insomnia   -discontinued sertraline and trazodone   -low-dose Zyprexa at night for insomnia  -other medical/oncologic management in multidisciplinary fashion with primary team, Oncology, palliative Care     Will further discuss plan of care with attending neurologist      Recommendations for outpatient neurological follow up have yet to be determined      History of Present Illness     Reason for Consult / Principal Problem:  Hallucinations, altered mental status, memory impairment/lapses, metastatic prostate cancer  Hx and PE limited by:  Patient poor historian  HPI: Brandon Vyas is a 78 y o  male with history as mentioned above in assessment who neurology is asked to evaluate in regards to the above  Patient known to the outpatient palliative team:  Per review of notes he was diagnosed with with prostate cancer in late 2021 with diffuse osseous metastatic disease  He was initially started on anti androgen therapy with Urology, and ultimately started on Poli Liban by Oncology  Given normal therapy experiences side effects including hot flashes, insomnia, also has significant pain given his extensive bony/osseous disease  In addition to the above hormonal therapy he is also taking trazodone at night for sleep, Xanax as needed, split doses of sertraline throughout the day, he also takes prednisone at home  Patient lives along, niece is at bedside and stays in frequent communication with the patient: she notes a subacute time period of decline in patient from cognitive/memory standpoint, also noted he has been having auditory and visual hallu    During palliative care appointment yesterday, patient and family member (niece) voiced and above concerns for visual hallucinations, short-term memory impairment, lapses in memory/amnestic state  Patient's niece provide examples of performing tasks such as repairing a guitar that he has no recollection of doing  Patient attributes the above hallucinations and behavioral abnormalities to his hormonal therapy and prednisone  Given concern for the above symptoms and patient's safety with living alone, ED evaluation was recommended  Workup beginning last night includes normal vitals, no significant electrolyte derangements, no leukocytosis, UA negative, CTA head/neck as mentioned above       In reviewing medicines since admission yesterday, he has been receiving several doses of PRN dilaudid, Ativan/xanax, prednisone dose decreased to 1 mg daily starting tomorrow    Patient today denies any specific speech changes, focal weakness/numbness in an extremity, no recent issues with vertiginous symptoms  He currently endorses a "moving sensation" with his vision, but denies any specific diplopia, visual field loss nor current visual hallucinations of objects/people/animals  Inpatient consult to Neurology  Consult performed by: Payam Shearer PA-C  Consult ordered by: Janie Shirley DO          Review of Systems   Constitutional: Positive for activity change, appetite change, fatigue and unexpected weight change (Weight loss)  Negative for chills, diaphoresis and fever  HENT: Positive for trouble swallowing  Negative for hearing loss, tinnitus and voice change  Eyes: Positive for visual disturbance  Negative for photophobia  Respiratory: Negative for chest tightness and shortness of breath  Cardiovascular: Negative for chest pain and palpitations  Gastrointestinal: Negative for abdominal pain, nausea and vomiting  Musculoskeletal: Positive for arthralgias, back pain and gait problem  Skin: Negative  Neurological: Positive for tremors, weakness and headaches  Negative for dizziness, seizures, syncope, facial asymmetry, speech difficulty, light-headedness and numbness  Psychiatric/Behavioral: Positive for agitation, behavioral problems, confusion, decreased concentration, hallucinations and sleep disturbance  The patient is nervous/anxious  The patient is not hyperactive  All other ROS reviewed and negative       Historical Information   Past Medical History:   Diagnosis Date    [facility] cancer [facility] )     Colon polyp     COPD (chronic obstructive pulmonary disease) ([facility] )     Emphysema lung ([facility] )     Hyperlipidemia     Prostate cancer [facility])      Past Surgical History:   Procedure Laterality Date    APPENDECTOMY  COLONOSCOPY      IR BIOPSY BONE  12/30/2021    JOINT REPLACEMENT      UPPER GASTROINTESTINAL ENDOSCOPY       Social History   Social History     Substance and Sexual Activity   Alcohol Use Not Currently     Social History     Substance and Sexual Activity   Drug Use Never     E-Cigarette/Vaping    E-Cigarette Use Never User      E-Cigarette/Vaping Substances    Nicotine No     THC No     CBD No     Flavoring No     Other No     Unknown No      Social History     Tobacco Use   Smoking Status Former Smoker    Types: Cigarettes    Quit date: 2012    Years since quitting: 10 3   Smokeless Tobacco Never Used     Family History:   Family History   Problem Relation Age of Onset    Lung cancer Mother     Breast cancer Sister     Colon polyps Brother     Colon cancer Neg Hx        Review of previous medical records was completed      Meds/Allergies   current meds:   Current Facility-Administered Medications   Medication Dose Route Frequency    albuterol (PROVENTIL HFA,VENTOLIN HFA) inhaler 2 puff  2 puff Inhalation Q4H PRN    ALPRAZolam (XANAX) tablet 2 mg  2 mg Oral HS PRN    And    ALPRAZolam (XANAX) tablet 0 5 mg  0 5 mg Oral 4x Daily PRN    atorvastatin (LIPITOR) tablet 40 mg  40 mg Oral Daily With Dinner    budesonide-formoterol (SYMBICORT) 160-4 5 mcg/act inhaler 2 puff  2 puff Inhalation BID    heparin (porcine) subcutaneous injection 5,000 Units  5,000 Units Subcutaneous Q8H [address]    HYDROmorphone (DILAUDID) injection 1 mg  1 mg Intravenous Q4H PRN    HYDROmorphone (DILAUDID) tablet 1 mg  1 mg Oral Q4H PRN    Or    HYDROmorphone (DILAUDID) tablet 2 mg  2 mg Oral Q4H PRN    ibuprofen (MOTRIN) tablet 600 mg  600 mg Oral Q6H PRN    lidocaine (LIDODERM) 5 % patch 1 patch  1 patch Topical Daily    LORazepam (ATIVAN) injection 1 mg  1 mg Intravenous Q4H PRN    montelukast (SINGULAIR) tablet 10 mg  10 mg Oral HS    multivitamin-minerals (CENTRUM) tablet 1 tablet  1 tablet Oral Daily    OLANZapine (ZyPREXA) tablet 5 mg  5 mg Oral HS    [START ON 5/7/2022] predniSONE tablet 1 mg  1 mg Oral Daily    senna (SENOKOT) tablet 17 2 mg  2 tablet Oral HS PRN    and PTA meds:   Prior to Admission Medications   Prescriptions Last Dose Informant Patient Reported? Taking? ALPRAZolam (XANAX) 1 mg tablet 5/5/2022 at Unknown time Self No Yes   Sig: Take 1 tablet (1 mg total) by mouth 4 (four) times a day as needed for anxiety or sleep   acetaminophen (TYLENOL) 650 mg CR tablet Not Taking at Unknown time Self No No   Sig: Take 1 tablet (650 mg total) by mouth every 8 (eight) hours   Patient not taking: Reported on 4/15/2022    albuterol (PROVENTIL HFA,VENTOLIN HFA) 90 mcg/act inhaler 5/5/2022 at Unknown time  No Yes   Sig: Inhale 2 puffs every 4 (four) hours as needed for wheezing   atorvastatin (LIPITOR) 40 mg tablet 5/5/2022 at Unknown time Self No Yes   Sig: Take 1 tablet (40 mg total) by mouth daily with dinner   budesonide-formoterol (SYMBICORT) 160-4 5 mcg/act inhaler 5/5/2022 at Unknown time Self Yes Yes   Sig: Inhale 2 puffs 2 (two) times a day Rinse mouth after use  cyclopentolate (Cyclogyl) 1 % ophthalmic solution Not Taking at Unknown time Self No No   Sig: Administer 1 drop to the right eye 3 (three) times a day   Patient not taking: Reported on 3/17/2022    enzalutamide (Xtandi) 40 mg capsule 5/5/2022 at Unknown time Self No Yes   Sig: Take 4 capsules (160 mg total) by mouth daily   fluticasone-vilanterol (BREO ELLIPTA) 200-25 MCG/INH inhaler Not Taking at Unknown time Self No No   Sig: Inhale 1 puff daily Rinse mouth after use  Patient not taking: Reported on 3/17/2022    gabapentin (NEURONTIN) 300 mg capsule   No No   Sig: Take 1 capsule (300 mg total) by mouth 3 (three) times a day for 7 days For post-herpetic neuralgia: Take 1 tablet on day 1,  Then take 2 tablets on day 2, Then take 3 tablets on day 3 and every day after that as instructed by your doctor     Patient not taking: Reported on 12/1/2021    ipratropium-albuterol (DUO-NEB) 0 5-2 5 mg/3 mL nebulizer solution  Self No No   Sig: Take 1 vial (3 mL total) by nebulization 3 (three) times a day   Patient not taking: Reported on 4/22/2022    montelukast (SINGULAIR) 10 mg tablet 5/5/2022 at Unknown time  No Yes   Sig: Take 1 tablet (10 mg total) by mouth daily at bedtime   pantoprazole (PROTONIX) 40 mg tablet Not Taking at Unknown time Self No No   Sig: TAKE 1 TABLET BY MOUTH EVERY DAY   Patient not taking: Reported on 4/15/2022   predniSONE 2 5 mg tablet 5/5/2022 at Unknown time  No Yes   Sig: TAKE 1 TAB BY MOUTH 2X A DAY WITH MEALS TAKE WITH BREAKFAST AND LUNCH  DO NOT TAKE ON EMPTY STOMACH    sertraline (ZOLOFT) 25 mg tablet Not Taking at Unknown time Self No No   Sig: TAKE 1 TABLET BY MOUTH EVERYDAY AT BEDTIME   Patient not taking: Reported on 5/5/2022   tobramycin-dexamethasone (TOBRADEX) ophthalmic suspension Not Taking at Unknown time Self No No   Sig: Administer 1 drop to the right eye 4 (four) times a day   Patient not taking: Reported on 3/17/2022    traZODone (DESYREL) 50 mg tablet Not Taking at Unknown time Self No No   Sig: TAKE 1 TABLET BY MOUTH EVERYDAY AT BEDTIME   Patient not taking: Reported on 4/15/2022   valACYclovir (VALTREX) 1,000 mg tablet Not Taking at Unknown time Self Yes No   Sig: Take 1,000 mg by mouth 2 (two) times a day     Patient not taking: Reported on 3/17/2022       Facility-Administered Medications: None       No Known Allergies    Objective   Vitals:Blood pressure 106/65, pulse 69, temperature 97 8 °F (36 6 °C), temperature source Oral, resp  rate (!) 10, height 5' 6" (1 676 m), weight 48 3 kg (106 lb 7 7 oz), SpO2 97 %  ,Body mass index is 17 19 kg/m²  Intake/Output Summary (Last 24 hours) at 5/6/2022 1338  Last data filed at 5/5/2022 1713  Gross per 24 hour   Intake 1000 ml   Output --   Net 1000 ml       Invasive Devices:    Invasive Devices  Report    Peripheral Intravenous Line            Peripheral IV 05/05/22 Right Antecubital <1 day                Physical Exam  Constitutional:       Comments: Frail/somewhat malnourished appearance, appears slightly order than stated age  HENT:      Head: Normocephalic and atraumatic  Eyes:      Extraocular Movements: EOM normal       Pupils: Pupils are equal, round, and reactive to light  Cardiovascular:      Rate and Rhythm: Normal rate and regular rhythm  Pulmonary:      Effort: Pulmonary effort is normal    Abdominal:      General: There is no distension  Musculoskeletal:         General: Normal range of motion  Cervical back: Normal range of motion  Skin:     General: Skin is warm and dry  Neurologic Exam     Mental Status   Awake, alert, dulled/spaced affect  Oriented to self, location, oriented to location, date, birthday, names niece at bedside  Can name president  Can spell his name forwards, not back, can perform simple and slightly more complex calculations (although sometimes delayed)  Does obtain 3/3 object recall after 5 minutes (although correct answer was delayed after asking)  Speech clear without dysarthria/aphasia, does follow commands  Cranial Nerves     CN II   Visual fields full to confrontation  CN III, IV, VI   Pupils are equal, round, and reactive to light  Extraocular motions are normal      CN V   Facial sensation intact  Notes non-specific vision disturbance during exam, gaze conjugate, pupils reactive and symmetric appearing  No visual field deficit  No clear facial asymmetry, midline tongue protrusion, light touch sensation diminished on the R face compared to L  Motor Exam   Full strength throughout the UE, proximal LE pain/hip pain with bilateral LE leg testing, but with maximal effort full strength throughout  No focal deficit/laterality on exam       Sensory Exam   Light touch normal      Slightly diminished vibration/temperature sensation in the R UE and LE compared to L        Gait, Coordination, and Reflexes   Tremulous with action/intention, specifically finger to nose  L patellar reflex slightly brisker than R, diminished achilles, 2+ throughout UE  Became tearful and anxious after discussing plan for MRI brain, no seizure like activity observed throughout exam             Lab Results:   CBC:   Results from last 7 days   Lab Units 05/06/22  0441 05/05/22  2040 05/05/22  1419   WBC Thousand/uL 5 23  --  5 93   RBC Million/uL 3 84*  --  4 17   HEMOGLOBIN g/dL 12 6  --  13 9   HEMATOCRIT % 39 0  --  42 5   MCV fL 102*  --  102*   PLATELETS Thousands/uL 338 321 394*   , BMP/CMP:   Results from last 7 days   Lab Units 05/06/22  0441 05/05/22  1419   SODIUM mmol/L 139 143   POTASSIUM mmol/L 3 8 4 2   CHLORIDE mmol/L 105 104   CO2 mmol/L 31 32   BUN mg/dL 18 21   CREATININE mg/dL 0 64 0 76   CALCIUM mg/dL 8 6 8 7   AST U/L 16 19   ALT U/L 20 25   ALK PHOS U/L 56 76   EGFR ml/min/1 73sq m 101 94   , Vitamin B12:   , HgBA1C:   , TSH:   Results from last 7 days   Lab Units 05/05/22  1419   TSH 3RD GENERATON uIU/mL 1 140   , Coagulation:   Results from last 7 days   Lab Units 05/05/22  1618   INR  0 98   , Lipid Profile:   , Ammonia:   , Urinalysis:   Results from last 7 days   Lab Units 05/05/22  1421   COLOR UA  Yellow   CLARITY UA  Clear   SPEC GRAV UA  1 025   PH UA  6 5   LEUKOCYTES UA  Negative   NITRITE UA  Negative   GLUCOSE UA mg/dl Negative   KETONES UA mg/dl Negative   BILIRUBIN UA  Negative   BLOOD UA  Negative   , Drug Screen:   , Medication Drug Levels:       Invalid input(s): CARBAMAZEPINE,  PHENOBARB, LACOSAMIDE, OXCARBAZEPINE  Imaging Studies: I have personally reviewed pertinent films in PACS   CTA head and neck with and without contrast   Final Result by Carlos Hernandez MD (05/05 8668)      No evidence of acute intracranial hemorrhage  No evidence of hemodynamic significant stenosis, aneurysm or dissection     Diffuse bony metastatic disease               Workstation performed: [id number]         MRI inpatient order    (Results Pending)       EKG, Pathology, and Other Studies: I have personally reviewed pertinent reports  VTE Prophylaxis: Sequential compression device (Venodyne)  and Heparin    Code Status: Level 1 - Full Code      Total time spent today 55 minutes  Discussed plan of care with patient/niece and primary team/palliative care: obtain MRI brain and EEG if able to rule out primary neurologic causes for altered mental status/encephalopathy/hallucinations

## 2022-05-06 NOTE — ASSESSMENT & PLAN NOTE
Presented with hallucinations and feeling that there was rippling in his vision and he was seeing objects  he did not feel safe to drive and called his niece to take him to his palliative care  He was referred to er by palliative care team  Pt and niece report the patient had lost a "large chunk of time" where he didn't remember driving or fixing a guitar  · Neurology, palliative care and oncology consulted     · Pt is concerned this is secondary to enzalutamide and prednisone combination   · CTA of the head and neck 5/5- no acute abnormality + for diffuse bony metastatic disease   · C/w neuro checks

## 2022-05-07 ENCOUNTER — APPOINTMENT (INPATIENT)
Dept: MRI IMAGING | Facility: HOSPITAL | Age: 67
DRG: 070 | End: 2022-05-07
Payer: MEDICARE

## 2022-05-07 PROCEDURE — 99232 SBSQ HOSP IP/OBS MODERATE 35: CPT | Performed by: PHYSICIAN ASSISTANT

## 2022-05-07 PROCEDURE — 70553 MRI BRAIN STEM W/O & W/DYE: CPT

## 2022-05-07 PROCEDURE — G1004 CDSM NDSC: HCPCS

## 2022-05-07 PROCEDURE — A9585 GADOBUTROL INJECTION: HCPCS | Performed by: PHYSICIAN ASSISTANT

## 2022-05-07 RX ADMIN — PREDNISONE 1 MG: 1 TABLET ORAL at 09:19

## 2022-05-07 RX ADMIN — BUDESONIDE AND FORMOTEROL FUMARATE DIHYDRATE 2 PUFF: 160; 4.5 AEROSOL RESPIRATORY (INHALATION) at 09:19

## 2022-05-07 RX ADMIN — HYDROMORPHONE HYDROCHLORIDE 1 MG: 1 INJECTION, SOLUTION INTRAMUSCULAR; INTRAVENOUS; SUBCUTANEOUS at 22:51

## 2022-05-07 RX ADMIN — LIDOCAINE 1 PATCH: 50 PATCH TOPICAL at 09:20

## 2022-05-07 RX ADMIN — GADOBUTROL 7.5 ML: 604.72 INJECTION INTRAVENOUS at 10:46

## 2022-05-07 RX ADMIN — ALPRAZOLAM 1 MG: 0.5 TABLET ORAL at 00:04

## 2022-05-07 RX ADMIN — ATORVASTATIN CALCIUM 40 MG: 40 TABLET, FILM COATED ORAL at 18:42

## 2022-05-07 RX ADMIN — MONTELUKAST 10 MG: 10 TABLET, FILM COATED ORAL at 21:28

## 2022-05-07 RX ADMIN — ALPRAZOLAM 0.5 MG: 0.5 TABLET ORAL at 14:13

## 2022-05-07 RX ADMIN — SODIUM CHLORIDE 50 ML/HR: 0.9 INJECTION, SOLUTION INTRAVENOUS at 08:18

## 2022-05-07 RX ADMIN — ALPRAZOLAM 0.5 MG: 0.5 TABLET ORAL at 09:25

## 2022-05-07 RX ADMIN — HYDROMORPHONE HYDROCHLORIDE 2 MG: 2 TABLET ORAL at 18:42

## 2022-05-07 RX ADMIN — HYDROMORPHONE HYDROCHLORIDE 2 MG: 2 TABLET ORAL at 14:13

## 2022-05-07 RX ADMIN — ALBUTEROL SULFATE 1 PUFF: 90 AEROSOL, METERED RESPIRATORY (INHALATION) at 21:31

## 2022-05-07 RX ADMIN — OLANZAPINE 5 MG: 5 TABLET, FILM COATED ORAL at 21:28

## 2022-05-07 RX ADMIN — Medication 1 TABLET: at 09:19

## 2022-05-07 RX ADMIN — BUDESONIDE AND FORMOTEROL FUMARATE DIHYDRATE 2 PUFF: 160; 4.5 AEROSOL RESPIRATORY (INHALATION) at 18:43

## 2022-05-07 RX ADMIN — HYDROMORPHONE HYDROCHLORIDE 1 MG: 1 INJECTION, SOLUTION INTRAMUSCULAR; INTRAVENOUS; SUBCUTANEOUS at 10:11

## 2022-05-07 RX ADMIN — CLONAZEPAM 0.5 MG: 0.5 TABLET ORAL at 21:28

## 2022-05-07 NOTE — PROGRESS NOTES
[place]  Progress Note - Sarkis Calvillo 1955, 78 y o  male MRN: [id number]  Unit/Bed#: -01 Encounter: [id number]  Primary Care Provider: ISA Espinoza   Date and time admitted to hospital: 5/5/2022  3:10 PM    Chronic obstructive pulmonary disease ([facility] [address] )  Assessment & Plan  · Without exacerbation   · C/w PRN albuterol   · Respiratory protocol  · On Symbicort at home- substituted for breo while hospitalized     * Encephalopathy acute  Assessment & Plan  Presented with hallucinations and feeling that there was rippling in his vision and he was seeing objects  he did not feel safe to drive and called his niece to take him to his palliative care  He was referred to er by palliative care team  Pt and niece report the patient had lost a "large chunk of time" where he didn't remember driving or fixing a guitar  · Neurology, palliative care and oncology consulted  · Pt is concerned this is secondary to enzalutamide and prednisone combination   · CTA of the head and neck 5/5- no acute abnormality + for diffuse bony metastatic disease   · C/w neuro checks   · MRI reveals osseous mets but no intracranial abnormality        Prostate cancer metastatic to bone [facility])  Assessment & Plan  Diagnosed approx 4 months ago with metastatic disease to bone and ongoing back , pelvis  And hip pains - also has calvarium involvement and some head pain( nonspecific)  · Consulted oncology and palliative care team  · Continue pain control  · Consider treatment with Zytiga and prednisone, possibly Xgeva per Oncology- family can call oncology office Monday to initiate ordering from specialty pharmacy    Palliative care patient  Assessment & Plan  · Consult placed for palliative care  · Continue oral Dilaudid  · Also [facility] V[facility] outpatient    HLD (hyperlipidemia)  Assessment & Plan  · Continue on atorvastatin    Severe protein-calorie malnutrition ([facility] [address] )  Assessment & Plan  Malnutrition Findings:   Adult Malnutrition type: Chronic illness  Adult Degree of Malnutrition: Other severe protein calorie malnutrition  Malnutrition Characteristics: Fat loss,Muscle loss,Inadequate energy,Weight lossnutrition consult  · Supplements as tolerated                  360 Statement: Pt presents with severe protein calorie malnutrition as evidenced by BMI 17 19, prominent clavicle bone protrusion, sunken and darken orbitals, temporal scooping and < 75% po intake > 1 month  BMI Findings:  Adult BMI Classifications: Underweight < 18 5        Body mass index is 17 19 kg/m²  Nutrition consult  Encourage PO intake       VTE Pharmacologic Prophylaxis: VTE Score: 5 High Risk (Score >/= 5) - Pharmacological DVT Prophylaxis Ordered: heparin  Sequential Compression Devices Ordered  Patient Centered Rounds: I performed bedside rounds with nursing staff today  Discussions with Specialists or Other Care Team Provider: Appreciate most recent Neurology and Oncology notes  Discussed with on-call oncologist, patient could reach out to outpatient office to expedite new chemotherapy prescriptions    Education and Discussions with Family / Patient: Updated  (nephew) at bedside  Time Spent for Care: 30 minutes  More than 50% of total time spent on counseling and coordination of care as described above  Current Length of Stay: 2 day(s)  Current Patient Status: Inpatient   Certification Statement: The patient will continue to require additional inpatient hospital stay due to Pending EEG, possible LP  Discharge Plan: Anticipate discharge in 24-48 hrs to home  Code Status: Level 1 - Full Code    Subjective:   Patient feels at baseline today, no further episodes of confusion reported    He questions chemotherapy regimen to be established prior to discharge    Objective:     Vitals:   Temp (24hrs), Av 6 °F (36 4 °C), Min:97 4 °F (36 3 °C), Max:97 7 °F (36 5 °C)    Temp:  [97 4 °F (36 3 °C)-97 7 °F (36 5 °C)] 97 4 °F (36 3 °C)  HR:  [69-75] 69  Resp:  [19-21] 21  BP: (107-119)/(74) 107/74  SpO2:  [95 %-99 %] 99 %  Body mass index is 17 19 kg/m²  Input and Output Summary (last 24 hours): Intake/Output Summary (Last 24 hours) at 5/7/2022 1315  Last data filed at 5/6/2022 1801  Gross per 24 hour   Intake 540 ml   Output --   Net 540 ml       Physical Exam:   Physical Exam  Vitals and nursing note reviewed  Constitutional:       General: He is not in acute distress  Appearance: Normal appearance  He is well-developed  He is cachectic  HENT:      Head: Normocephalic and atraumatic  Eyes:      General: No scleral icterus  Conjunctiva/sclera: Conjunctivae normal    Cardiovascular:      Rate and Rhythm: Normal rate and regular rhythm  Heart sounds: No murmur heard  Pulmonary:      Effort: Pulmonary effort is normal       Breath sounds: No wheezing, rhonchi or rales  Abdominal:      General: There is no distension  Palpations: Abdomen is soft  Skin:     General: Skin is warm and dry  Neurological:      General: No focal deficit present  Mental Status: He is alert  Psychiatric:         Mood and Affect: Mood normal         Additional Data:     Labs:  Results from last 7 days   Lab Units 05/06/22  0441 05/05/22 2040 05/05/22  1419   WBC Thousand/uL 5 23  --  5 93   HEMOGLOBIN g/dL 12 6  --  13 9   HEMATOCRIT % 39 0  --  42 5   PLATELETS Thousands/uL 338   < > 394*   NEUTROS PCT %  --   --  67   LYMPHS PCT %  --   --  20   MONOS PCT %  --   --  10   EOS PCT %  --   --  2    < > = values in this interval not displayed       Results from last 7 days   Lab Units 05/06/22  0441   SODIUM mmol/L 139   POTASSIUM mmol/L 3 8   CHLORIDE mmol/L 105   CO2 mmol/L 31   BUN mg/dL 18   CREATININE mg/dL 0 64   ANION GAP mmol/L 3*   CALCIUM mg/dL 8 6   ALBUMIN g/dL 2 9*   TOTAL BILIRUBIN mg/dL 0 30   ALK PHOS U/L 56   ALT U/L 20   AST U/L 16   GLUCOSE RANDOM mg/dL 109     Results from last 7 days   Lab Units 05/05/22  1618   INR  0 98                   Lines/Drains:  Invasive Devices  Report    Peripheral Intravenous Line            Peripheral IV 05/06/22 Left;Ventral (anterior) Forearm <1 day                      Imaging: Reviewed radiology reports from this admission including: MRI brain    Recent Cultures (last 7 days):         Last 24 Hours Medication List:   Current Facility-Administered Medications   Medication Dose Route Frequency Provider Last Rate    albuterol  2 puff Inhalation Q4H PRN Doreatha Layer, DO      ALPRAZolam  1 mg Oral HS PRN Rick Parmar, DO      And    ALPRAZolam  0 5 mg Oral 4x Daily PRN Rick Parmar, DO      atorvastatin  40 mg Oral Daily With [employer], DO      budesonide-formoterol  2 puff Inhalation BID Doreatha Layer, DO      calcium carbonate  500 mg Oral Daily PRN BRAULIO GonsalesNP      clonazePAM  0 5 mg Oral HS Rick Parmar, DO      heparin (porcine)  5,000 Units Subcutaneous Q8H [address] Fani Fly, DO      HYDROmorphone  1 mg Intravenous Q4H PRN Regina Raymond, PA-C      HYDROmorphone  1 mg Oral Q4H PRN Regina Raymond, PA-ERIK      Or    HYDROmorphone  2 mg Oral Q4H PRN Regina Raymond, PA-C      ibuprofen  600 mg Oral Q6H PRN Regina Raymond, PA-C      lidocaine  1 patch Topical Daily Regina Raymond, PA-C      LORazepam  1 mg Intravenous Q4H PRN Regina Raymond, PA-C      montelukast  10 mg Oral HS Doreatha Layer, DO      multivitamin-minerals  1 tablet Oral Daily Regina Raymond, PA-C      OLANZapine  5 mg Oral HS Regina Raymond, PA-C      predniSONE  1 mg Oral Daily Regina Raymond, PA-C      senna  2 tablet Oral HS PRN Regina Raymond, PA-C      sodium chloride  50 mL/hr Intravenous Continuous ISA Gonsales 50 mL/hr (05/07/22 0818)        Today, Patient Was Seen By: Marisol Pop PA-C    **Please Note: This note may have been constructed using a voice recognition system  **

## 2022-05-07 NOTE — ASSESSMENT & PLAN NOTE
Malnutrition Findings:   Adult Malnutrition type: Chronic illness  Adult Degree of Malnutrition: Other severe protein calorie malnutrition  Malnutrition Characteristics: Fat loss,Muscle loss,Inadequate energy,Weight lossnutrition consult  · Supplements as tolerated                  360 Statement: Pt presents with severe protein calorie malnutrition as evidenced by BMI 17 19, prominent clavicle bone protrusion, sunken and darken orbitals, temporal scooping and < 75% po intake > 1 month  BMI Findings:  Adult BMI Classifications: Underweight < 18 5        Body mass index is 17 19 kg/m²     Nutrition consult  Encourage PO intake

## 2022-05-07 NOTE — ASSESSMENT & PLAN NOTE
Diagnosed approx 4 months ago with metastatic disease to bone and ongoing back , pelvis  And hip pains - also has calvarium involvement and some head pain( nonspecific)  · Consulted oncology and palliative care team  · Continue pain control  · Consider treatment with Zytiga and prednisone, possibly Xgeva per Oncology- family can call oncology office Monday to initiate ordering from specialty pharmacy

## 2022-05-07 NOTE — PLAN OF CARE
Problem: PAIN - ADULT  Goal: Verbalizes/displays adequate comfort level or baseline comfort level  Description: Interventions:  - Encourage patient to monitor pain and request assistance  - Assess pain using appropriate pain scale  - Administer analgesics based on type and severity of pain and evaluate response  - Implement non-pharmacological measures as appropriate and evaluate response  - Consider cultural and social influences on pain and pain management  - Notify physician/advanced practitioner if interventions unsuccessful or patient reports new pain  Outcome: Progressing     Problem: INFECTION - ADULT  Goal: Absence or prevention of progression during hospitalization  Description: INTERVENTIONS:  - Assess and monitor for signs and symptoms of infection  - Monitor lab/diagnostic results  - Monitor all insertion sites, i e  indwelling lines, tubes, and drains  - Monitor endotracheal if appropriate and nasal secretions for changes in amount and color  - [place] appropriate cooling/warming therapies per order  - Administer medications as ordered  - Instruct and encourage patient and family to use good hand hygiene technique  - Identify and instruct in appropriate isolation precautions for identified infection/condition  Outcome: Progressing  Goal: Absence of fever/infection during neutropenic period  Description: INTERVENTIONS:  - Monitor WBC    Outcome: Progressing     Problem: SAFETY ADULT  Goal: Patient will remain free of falls  Description: INTERVENTIONS:  - Educate patient/family on patient safety including physical limitations  - Instruct patient to call for assistance with activity   - Consult OT/PT to assist with strengthening/mobility   - Keep Call bell within reach  - Keep bed low and locked with side rails adjusted as appropriate  - Keep care items and personal belongings within reach  - Initiate and maintain comfort rounds  - Make Fall Risk Sign visible to staff  - Offer Toileting every 2 Hours, in advance of need  - Initiate/Maintain bed alarm  - Obtain necessary fall risk management equipment: room near nurses station, frequent rounding, yellow socks, call bell within reach, bed alarm, fall risk sign visible  - Apply yellow socks and bracelet for high fall risk patients  - Consider moving patient to room near nurses station  Outcome: Progressing  Goal: Maintain or return to baseline ADL function  Description: INTERVENTIONS:  -  Assess patient's ability to carry out ADLs; assess patient's baseline for ADL function and identify physical deficits which impact ability to perform ADLs (bathing, care of mouth/teeth, toileting, grooming, dressing, etc )  - Assess/evaluate cause of self-care deficits   - Assess range of motion  - Assess patient's mobility; develop plan if impaired  - Assess patient's need for assistive devices and provide as appropriate  - Encourage maximum independence but intervene and supervise when necessary  - Involve family in performance of ADLs  - Assess for home care needs following discharge   - Consider OT consult to assist with ADL evaluation and planning for discharge  - Provide patient education as appropriate  Outcome: Progressing  Goal: Maintains/Returns to pre admission functional level  Description: INTERVENTIONS:  - Perform BMAT or MOVE assessment daily    - Set and communicate daily mobility goal to care team and patient/family/caregiver  - Collaborate with rehabilitation services on mobility goals if consulted  - Perform Range of Motion 2 times a day  - Reposition patient every 2 hours    - Dangle patient 2 times a day  - Stand patient 2 times a day  - Ambulate patient 2 times a day  - Out of bed to chair 2 times a day   - Out of bed for meals 2 times a day  - Out of bed for toileting  - Record patient progress and toleration of activity level   Outcome: Progressing     Problem: DISCHARGE PLANNING  Goal: Discharge to home or other facility with appropriate resources  Description: INTERVENTIONS:  - Identify barriers to discharge w/patient and caregiver  - Arrange for needed discharge resources and transportation as appropriate  - Identify discharge learning needs (meds, wound care, etc )  - Arrange for interpretive services to assist at discharge as needed  - Refer to Case Management Department for coordinating discharge planning if the patient needs post-hospital services based on physician/advanced practitioner order or complex needs related to functional status, cognitive ability, or social support system  Outcome: Progressing     Problem: Knowledge Deficit  Goal: Patient/family/caregiver demonstrates understanding of disease process, treatment plan, medications, and discharge instructions  Description: Complete learning assessment and assess knowledge base    Interventions:  - Provide teaching at level of understanding  - Provide teaching via preferred learning methods  Outcome: Progressing     Problem: Prexisting or High Potential for Compromised Skin Integrity  Goal: Skin integrity is maintained or improved  Description: INTERVENTIONS:  - Identify patients at risk for skin breakdown  - Assess and monitor skin integrity  - Assess and monitor nutrition and hydration status  - Monitor labs   - Assess for incontinence   - Turn and reposition patient  - Assist with mobility/ambulation  - Relieve pressure over bony prominences  - Avoid friction and shearing  - Provide appropriate hygiene as needed including keeping skin clean and dry  - Evaluate need for skin moisturizer/barrier cream  - Collaborate with interdisciplinary team   - Patient/family teaching  - Consider wound care consult   Outcome: Progressing     Problem: Potential for Falls  Goal: Patient will remain free of falls  Description: INTERVENTIONS:  - Educate patient/family on patient safety including physical limitations  - Instruct patient to call for assistance with activity   - Consult OT/PT to assist with strengthening/mobility   - Keep Call bell within reach  - Keep bed low and locked with side rails adjusted as appropriate  - Keep care items and personal belongings within reach  - Initiate and maintain comfort rounds  - Make Fall Risk Sign visible to staff  - Offer Toileting every 2 Hours, in advance of need  - Initiate/Maintain bedalarm  - Obtain necessary fall risk management equipment: room near nurses station, frequent rounding, yellow socks, call bell within reach, bed alarm, fall risk sign visible  - Apply yellow socks and bracelet for high fall risk patients  - Consider moving patient to room near nurses station  Outcome: Progressing     Problem: Nutrition/Hydration-ADULT  Goal: Nutrient/Hydration intake appropriate for improving, restoring or maintaining nutritional needs  Description: Monitor and assess patient's nutrition/hydration status for malnutrition  Collaborate with interdisciplinary team and initiate plan and interventions as ordered  Monitor patient's weight and dietary intake as ordered or per policy  Utilize nutrition screening tool and intervene as necessary  Determine patient's food preferences and provide high-protein, high-caloric foods as appropriate       INTERVENTIONS:  - Monitor oral intake, urinary output, labs, and treatment plans  - Assess nutrition and hydration status and recommend course of action  - Evaluate amount of meals eaten  - Assist patient with eating if necessary   - Allow adequate time for meals  - Recommend/ encourage appropriate diets, oral nutritional supplements, and vitamin/mineral supplements  - Order, calculate, and assess calorie counts as needed  - Recommend, monitor, and adjust tube feedings and TPN/PPN based on assessed needs  - Assess need for intravenous fluids  - Provide specific nutrition/hydration education as appropriate  - Include patient/family/caregiver in decisions related to nutrition  Outcome: Progressing

## 2022-05-07 NOTE — PLAN OF CARE
Problem: PAIN - ADULT  Goal: Verbalizes/displays adequate comfort level or baseline comfort level  Description: Interventions:  - Encourage patient to monitor pain and request assistance  - Assess pain using appropriate pain scale  - Administer analgesics based on type and severity of pain and evaluate response  - Implement non-pharmacological measures as appropriate and evaluate response  - Consider cultural and social influences on pain and pain management  - Notify physician/advanced practitioner if interventions unsuccessful or patient reports new pain  Outcome: Progressing     Problem: INFECTION - ADULT  Goal: Absence or prevention of progression during hospitalization  Description: INTERVENTIONS:  - Assess and monitor for signs and symptoms of infection  - Monitor lab/diagnostic results  - Monitor all insertion sites, i e  indwelling lines, tubes, and drains  - Monitor endotracheal if appropriate and nasal secretions for changes in amount and color  - [place] appropriate cooling/warming therapies per order  - Administer medications as ordered  - Instruct and encourage patient and family to use good hand hygiene technique  - Identify and instruct in appropriate isolation precautions for identified infection/condition  Outcome: Progressing  Goal: Absence of fever/infection during neutropenic period  Description: INTERVENTIONS:  - Monitor WBC    Outcome: Progressing     Problem: SAFETY ADULT  Goal: Patient will remain free of falls  Description: INTERVENTIONS:  - Educate patient/family on patient safety including physical limitations  - Instruct patient to call for assistance with activity   - Consult OT/PT to assist with strengthening/mobility   - Keep Call bell within reach  - Keep bed low and locked with side rails adjusted as appropriate  - Keep care items and personal belongings within reach  - Initiate and maintain comfort rounds  - Make Fall Risk Sign visible to staff  - Offer Toileting every 2 Hours, in advance of need  - Initiate/Maintain bed alarm  - Apply yellow socks and bracelet for high fall risk patients  - Consider moving patient to room near nurses station  Outcome: Progressing  Goal: Maintain or return to baseline ADL function  Description: INTERVENTIONS:  -  Assess patient's ability to carry out ADLs; assess patient's baseline for ADL function and identify physical deficits which impact ability to perform ADLs (bathing, care of mouth/teeth, toileting, grooming, dressing, etc )  - Assess/evaluate cause of self-care deficits   - Assess range of motion  - Assess patient's mobility; develop plan if impaired  - Assess patient's need for assistive devices and provide as appropriate  - Encourage maximum independence but intervene and supervise when necessary  - Involve family in performance of ADLs  - Assess for home care needs following discharge   - Consider OT consult to assist with ADL evaluation and planning for discharge  - Provide patient education as appropriate  Outcome: Progressing  Goal: Maintains/Returns to pre admission functional level  Description: INTERVENTIONS:  - Perform BMAT or MOVE assessment daily    - Set and communicate daily mobility goal to care team and patient/family/caregiver     - Collaborate with rehabilitation services on mobility goals if consulted  - Out of bed for toileting  - Record patient progress and toleration of activity level   Outcome: Progressing     Problem: DISCHARGE PLANNING  Goal: Discharge to home or other facility with appropriate resources  Description: INTERVENTIONS:  - Identify barriers to discharge w/patient and caregiver  - Arrange for needed discharge resources and transportation as appropriate  - Identify discharge learning needs (meds, wound care, etc )  - Arrange for interpretive services to assist at discharge as needed  - Refer to Case Management Department for coordinating discharge planning if the patient needs post-hospital services based on physician/advanced practitioner order or complex needs related to functional status, cognitive ability, or social support system  Outcome: Progressing     Problem: Knowledge Deficit  Goal: Patient/family/caregiver demonstrates understanding of disease process, treatment plan, medications, and discharge instructions  Description: Complete learning assessment and assess knowledge base    Interventions:  - Provide teaching at level of understanding  - Provide teaching via preferred learning methods  Outcome: Progressing     Problem: Prexisting or High Potential for Compromised Skin Integrity  Goal: Skin integrity is maintained or improved  Description: INTERVENTIONS:  - Identify patients at risk for skin breakdown  - Assess and monitor skin integrity  - Assess and monitor nutrition and hydration status  - Monitor labs   - Assess for incontinence   - Turn and reposition patient  - Assist with mobility/ambulation  - Relieve pressure over bony prominences  - Avoid friction and shearing  - Provide appropriate hygiene as needed including keeping skin clean and dry  - Evaluate need for skin moisturizer/barrier cream  - Collaborate with interdisciplinary team   - Patient/family teaching  - Consider wound care consult   Outcome: Progressing     Problem: Potential for Falls  Goal: Patient will remain free of falls  Description: INTERVENTIONS:  - Educate patient/family on patient safety including physical limitations  - Instruct patient to call for assistance with activity   - Consult OT/PT to assist with strengthening/mobility   - Keep Call bell within reach  - Keep bed low and locked with side rails adjusted as appropriate  - Keep care items and personal belongings within reach  - Initiate and maintain comfort rounds  - Make Fall Risk Sign visible to staff  - Apply yellow socks and bracelet for high fall risk patients  - Consider moving patient to room near nurses station  Outcome: Progressing     Problem: Nutrition/Hydration-ADULT  Goal: Nutrient/Hydration intake appropriate for improving, restoring or maintaining nutritional needs  Description: Monitor and assess patient's nutrition/hydration status for malnutrition  Collaborate with interdisciplinary team and initiate plan and interventions as ordered  Monitor patient's weight and dietary intake as ordered or per policy  Utilize nutrition screening tool and intervene as necessary  Determine patient's food preferences and provide high-protein, high-caloric foods as appropriate       INTERVENTIONS:  - Monitor oral intake, urinary output, labs, and treatment plans  - Assess nutrition and hydration status and recommend course of action  - Evaluate amount of meals eaten  - Assist patient with eating if necessary   - Allow adequate time for meals  - Recommend/ encourage appropriate diets, oral nutritional supplements, and vitamin/mineral supplements  - Order, calculate, and assess calorie counts as needed  - Recommend, monitor, and adjust tube feedings and TPN/PPN based on assessed needs  - Assess need for intravenous fluids  - Provide specific nutrition/hydration education as appropriate  - Include patient/family/caregiver in decisions related to nutrition  Outcome: Progressing     Problem: MOBILITY - ADULT  Goal: Maintain or return to baseline ADL function  Description: INTERVENTIONS:  -  Assess patient's ability to carry out ADLs; assess patient's baseline for ADL function and identify physical deficits which impact ability to perform ADLs (bathing, care of mouth/teeth, toileting, grooming, dressing, etc )  - Assess/evaluate cause of self-care deficits   - Assess range of motion  - Assess patient's mobility; develop plan if impaired  - Assess patient's need for assistive devices and provide as appropriate  - Encourage maximum independence but intervene and supervise when necessary  - Involve family in performance of ADLs  - Assess for home care needs following discharge   - Consider OT consult to assist with ADL evaluation and planning for discharge  - Provide patient education as appropriate  Outcome: Progressing  Goal: Maintains/Returns to pre admission functional level  Description: INTERVENTIONS:  - Perform BMAT or MOVE assessment daily    - Set and communicate daily mobility goal to care team and patient/family/caregiver     - Collaborate with rehabilitation services on mobility goals if consulted  - Out of bed for toileting  - Record patient progress and toleration of activity level   Outcome: Progressing

## 2022-05-07 NOTE — MALNUTRITION/BMI
This medical record reflects one or more clinical indicators suggestive of malnutrition and/or morbid obesity  Malnutrition Findings:   Adult Malnutrition type: Chronic illness  Adult Degree of Malnutrition: Other severe protein calorie malnutrition  Malnutrition Characteristics: Fat loss,Muscle loss,Inadequate energy,Weight loss                  360 Statement: Pt presents with severe protein calorie malnutrition as evidenced by BMI 17 19, prominent clavicle bone protrusion, sunken and darken orbitals, temporal scooping and < 75% po intake > 1 month  Treat with Regular diet and Ensure Plant Based TID  BMI Findings:  Adult BMI Classifications: Underweight < 18 5        Body mass index is 17 19 kg/m²  See Nutrition note dated 5/6/22 for additional details  Completed nutrition assessment is viewable in the nutrition documentation

## 2022-05-08 LAB
ANION GAP SERPL CALCULATED.3IONS-SCNC: 4 MMOL/L (ref 4–13)
BASOPHILS # BLD AUTO: 0.04 THOUSANDS/ΜL (ref 0–0.1)
BASOPHILS NFR BLD AUTO: 1 % (ref 0–1)
BUN SERPL-MCNC: 14 MG/DL (ref 5–25)
CALCIUM SERPL-MCNC: 8.2 MG/DL (ref 8.3–10.1)
CHLORIDE SERPL-SCNC: 104 MMOL/L (ref 100–108)
CO2 SERPL-SCNC: 32 MMOL/L (ref 21–32)
CREAT SERPL-MCNC: 0.51 MG/DL (ref 0.6–1.3)
EOSINOPHIL # BLD AUTO: 0.19 THOUSAND/ΜL (ref 0–0.61)
EOSINOPHIL NFR BLD AUTO: 3 % (ref 0–6)
ERYTHROCYTE [DISTWIDTH] IN BLOOD BY AUTOMATED COUNT: 13.6 % (ref 11.6–15.1)
FOLATE SERPL-MCNC: 13.3 NG/ML (ref 3.1–17.5)
GFR SERPL CREATININE-BSD FRML MDRD: 111 ML/MIN/1.73SQ M
GLUCOSE SERPL-MCNC: 120 MG/DL (ref 65–140)
HCT VFR BLD AUTO: 37.6 % (ref 36.5–49.3)
HGB BLD-MCNC: 12.2 G/DL (ref 12–17)
IMM GRANULOCYTES # BLD AUTO: 0.02 THOUSAND/UL (ref 0–0.2)
IMM GRANULOCYTES NFR BLD AUTO: 0 % (ref 0–2)
LYMPHOCYTES # BLD AUTO: 0.89 THOUSANDS/ΜL (ref 0.6–4.47)
LYMPHOCYTES NFR BLD AUTO: 14 % (ref 14–44)
MCH RBC QN AUTO: 33.3 PG (ref 26.8–34.3)
MCHC RBC AUTO-ENTMCNC: 32.4 G/DL (ref 31.4–37.4)
MCV RBC AUTO: 103 FL (ref 82–98)
MONOCYTES # BLD AUTO: 0.8 THOUSAND/ΜL (ref 0.17–1.22)
MONOCYTES NFR BLD AUTO: 13 % (ref 4–12)
NEUTROPHILS # BLD AUTO: 4.36 THOUSANDS/ΜL (ref 1.85–7.62)
NEUTS SEG NFR BLD AUTO: 69 % (ref 43–75)
NRBC BLD AUTO-RTO: 0 /100 WBCS
PLATELET # BLD AUTO: 295 THOUSANDS/UL (ref 149–390)
PMV BLD AUTO: 8.6 FL (ref 8.9–12.7)
POTASSIUM SERPL-SCNC: 4.1 MMOL/L (ref 3.5–5.3)
PSA SERPL-MCNC: 8.4 NG/ML (ref 0–4)
RBC # BLD AUTO: 3.66 MILLION/UL (ref 3.88–5.62)
SODIUM SERPL-SCNC: 140 MMOL/L (ref 136–145)
VIT B12 SERPL-MCNC: 334 PG/ML (ref 100–900)
WBC # BLD AUTO: 6.3 THOUSAND/UL (ref 4.31–10.16)

## 2022-05-08 PROCEDURE — 82746 ASSAY OF FOLIC ACID SERUM: CPT | Performed by: PHYSICIAN ASSISTANT

## 2022-05-08 PROCEDURE — 82607 VITAMIN B-12: CPT | Performed by: PHYSICIAN ASSISTANT

## 2022-05-08 PROCEDURE — 99239 HOSP IP/OBS DSCHRG MGMT >30: CPT | Performed by: PHYSICIAN ASSISTANT

## 2022-05-08 PROCEDURE — 84207 ASSAY OF VITAMIN B-6: CPT | Performed by: PHYSICIAN ASSISTANT

## 2022-05-08 PROCEDURE — 84425 ASSAY OF VITAMIN B-1: CPT | Performed by: PHYSICIAN ASSISTANT

## 2022-05-08 PROCEDURE — 84153 ASSAY OF PSA TOTAL: CPT | Performed by: NURSE PRACTITIONER

## 2022-05-08 PROCEDURE — 86592 SYPHILIS TEST NON-TREP QUAL: CPT | Performed by: PHYSICIAN ASSISTANT

## 2022-05-08 PROCEDURE — 85025 COMPLETE CBC W/AUTO DIFF WBC: CPT | Performed by: PHYSICIAN ASSISTANT

## 2022-05-08 PROCEDURE — 80048 BASIC METABOLIC PNL TOTAL CA: CPT | Performed by: PHYSICIAN ASSISTANT

## 2022-05-08 RX ADMIN — HYDROMORPHONE HYDROCHLORIDE 2 MG: 2 TABLET ORAL at 15:55

## 2022-05-08 RX ADMIN — CLONAZEPAM 0.5 MG: 0.5 TABLET ORAL at 22:36

## 2022-05-08 RX ADMIN — HEPARIN SODIUM 5000 UNITS: 5000 INJECTION INTRAVENOUS; SUBCUTANEOUS at 22:36

## 2022-05-08 RX ADMIN — BUDESONIDE AND FORMOTEROL FUMARATE DIHYDRATE 2 PUFF: 160; 4.5 AEROSOL RESPIRATORY (INHALATION) at 17:49

## 2022-05-08 RX ADMIN — HYDROMORPHONE HYDROCHLORIDE 2 MG: 2 TABLET ORAL at 05:06

## 2022-05-08 RX ADMIN — BUDESONIDE AND FORMOTEROL FUMARATE DIHYDRATE 2 PUFF: 160; 4.5 AEROSOL RESPIRATORY (INHALATION) at 08:30

## 2022-05-08 RX ADMIN — SODIUM CHLORIDE 50 ML/HR: 0.9 INJECTION, SOLUTION INTRAVENOUS at 06:02

## 2022-05-08 RX ADMIN — HYDROMORPHONE HYDROCHLORIDE 2 MG: 2 TABLET ORAL at 20:11

## 2022-05-08 RX ADMIN — HYDROMORPHONE HYDROCHLORIDE 1 MG: 1 INJECTION, SOLUTION INTRAMUSCULAR; INTRAVENOUS; SUBCUTANEOUS at 22:39

## 2022-05-08 RX ADMIN — Medication 1 TABLET: at 08:30

## 2022-05-08 RX ADMIN — ALPRAZOLAM 0.5 MG: 0.5 TABLET ORAL at 05:06

## 2022-05-08 RX ADMIN — OLANZAPINE 5 MG: 5 TABLET, FILM COATED ORAL at 22:36

## 2022-05-08 RX ADMIN — ATORVASTATIN CALCIUM 40 MG: 40 TABLET, FILM COATED ORAL at 15:55

## 2022-05-08 RX ADMIN — IBUPROFEN 600 MG: 600 TABLET ORAL at 08:30

## 2022-05-08 RX ADMIN — PREDNISONE 1 MG: 1 TABLET ORAL at 08:30

## 2022-05-08 RX ADMIN — LIDOCAINE 1 PATCH: 50 PATCH TOPICAL at 08:30

## 2022-05-08 RX ADMIN — MONTELUKAST 10 MG: 10 TABLET, FILM COATED ORAL at 22:36

## 2022-05-08 NOTE — PLAN OF CARE
Problem: PAIN - ADULT  Goal: Verbalizes/displays adequate comfort level or baseline comfort level  Description: Interventions:  - Encourage patient to monitor pain and request assistance  - Assess pain using appropriate pain scale  - Administer analgesics based on type and severity of pain and evaluate response  - Implement non-pharmacological measures as appropriate and evaluate response  - Consider cultural and social influences on pain and pain management  - Notify physician/advanced practitioner if interventions unsuccessful or patient reports new pain  Outcome: Progressing     Problem: INFECTION - ADULT  Goal: Absence or prevention of progression during hospitalization  Description: INTERVENTIONS:  - Assess and monitor for signs and symptoms of infection  - Monitor lab/diagnostic results  - Monitor all insertion sites, i e  indwelling lines, tubes, and drains  - Monitor endotracheal if appropriate and nasal secretions for changes in amount and color  - [place] appropriate cooling/warming therapies per order  - Administer medications as ordered  - Instruct and encourage patient and family to use good hand hygiene technique  - Identify and instruct in appropriate isolation precautions for identified infection/condition  Outcome: Progressing  Goal: Absence of fever/infection during neutropenic period  Description: INTERVENTIONS:  - Monitor WBC    Outcome: Progressing     Problem: SAFETY ADULT  Goal: Patient will remain free of falls  Description: INTERVENTIONS:  - Educate patient/family on patient safety including physical limitations  - Instruct patient to call for assistance with activity   - Consult OT/PT to assist with strengthening/mobility   - Keep Call bell within reach  - Keep bed low and locked with side rails adjusted as appropriate  - Keep care items and personal belongings within reach  - Initiate and maintain comfort rounds  - Make Fall Risk Sign visible to staff  - Offer Toileting every 2 Hours, in advance of need  - Initiate/Maintain bed alarm  - Obtain necessary fall risk management equipment: yellow socks, bracelet, signs  - Apply yellow socks and bracelet for high fall risk patients  - Consider moving patient to room near nurses station  Outcome: Progressing  Goal: Maintain or return to baseline ADL function  Description: INTERVENTIONS:  -  Assess patient's ability to carry out ADLs; assess patient's baseline for ADL function and identify physical deficits which impact ability to perform ADLs (bathing, care of mouth/teeth, toileting, grooming, dressing, etc )  - Assess/evaluate cause of self-care deficits   - Assess range of motion  - Assess patient's mobility; develop plan if impaired  - Assess patient's need for assistive devices and provide as appropriate  - Encourage maximum independence but intervene and supervise when necessary  - Involve family in performance of ADLs  - Assess for home care needs following discharge   - Consider OT consult to assist with ADL evaluation and planning for discharge  - Provide patient education as appropriate  Outcome: Progressing  Goal: Maintains/Returns to pre admission functional level  Description: INTERVENTIONS:  - Perform BMAT or MOVE assessment daily    - Set and communicate daily mobility goal to care team and patient/family/caregiver  - Collaborate with rehabilitation services on mobility goals if consulted  - Perform Range of Motion 5 times a day  - Reposition patient every 2 hours    - Dangle patient 3 times a day  - Stand patient 3 times a day  - Ambulate patient 3 times a day  - Out of bed to chair 3 times a day   - Out of bed for meals 3 times a day  - Out of bed for toileting  - Record patient progress and toleration of activity level   Outcome: Progressing     Problem: DISCHARGE PLANNING  Goal: Discharge to home or other facility with appropriate resources  Description: INTERVENTIONS:  - Identify barriers to discharge w/patient and caregiver  - Arrange for needed discharge resources and transportation as appropriate  - Identify discharge learning needs (meds, wound care, etc )  - Arrange for interpretive services to assist at discharge as needed  - Refer to Case Management Department for coordinating discharge planning if the patient needs post-hospital services based on physician/advanced practitioner order or complex needs related to functional status, cognitive ability, or social support system  Outcome: Progressing     Problem: Knowledge Deficit  Goal: Patient/family/caregiver demonstrates understanding of disease process, treatment plan, medications, and discharge instructions  Description: Complete learning assessment and assess knowledge base    Interventions:  - Provide teaching at level of understanding  - Provide teaching via preferred learning methods  Outcome: Progressing     Problem: Prexisting or High Potential for Compromised Skin Integrity  Goal: Skin integrity is maintained or improved  Description: INTERVENTIONS:  - Identify patients at risk for skin breakdown  - Assess and monitor skin integrity  - Assess and monitor nutrition and hydration status  - Monitor labs   - Assess for incontinence   - Turn and reposition patient  - Assist with mobility/ambulation  - Relieve pressure over bony prominences  - Avoid friction and shearing  - Provide appropriate hygiene as needed including keeping skin clean and dry  - Evaluate need for skin moisturizer/barrier cream  - Collaborate with interdisciplinary team   - Patient/family teaching  - Consider wound care consult   Outcome: Progressing     Problem: Potential for Falls  Goal: Patient will remain free of falls  Description: INTERVENTIONS:  - Educate patient/family on patient safety including physical limitations  - Instruct patient to call for assistance with activity   - Consult OT/PT to assist with strengthening/mobility   - Keep Call bell within reach  - Keep bed low and locked with side rails adjusted as appropriate  - Keep care items and personal belongings within reach  - Initiate and maintain comfort rounds  - Make Fall Risk Sign visible to staff  - Offer Toileting every 2 Hours, in advance of need  - Initiate/Maintain bed alarm  - Obtain necessary fall risk management equipment: yellow socks, bracelet, signs  - Apply yellow socks and bracelet for high fall risk patients  - Consider moving patient to room near nurses station  Outcome: Progressing     Problem: Nutrition/Hydration-ADULT  Goal: Nutrient/Hydration intake appropriate for improving, restoring or maintaining nutritional needs  Description: Monitor and assess patient's nutrition/hydration status for malnutrition  Collaborate with interdisciplinary team and initiate plan and interventions as ordered  Monitor patient's weight and dietary intake as ordered or per policy  Utilize nutrition screening tool and intervene as necessary  Determine patient's food preferences and provide high-protein, high-caloric foods as appropriate       INTERVENTIONS:  - Monitor oral intake, urinary output, labs, and treatment plans  - Assess nutrition and hydration status and recommend course of action  - Evaluate amount of meals eaten  - Assist patient with eating if necessary   - Allow adequate time for meals  - Recommend/ encourage appropriate diets, oral nutritional supplements, and vitamin/mineral supplements  - Order, calculate, and assess calorie counts as needed  - Recommend, monitor, and adjust tube feedings and TPN/PPN based on assessed needs  - Assess need for intravenous fluids  - Provide specific nutrition/hydration education as appropriate  - Include patient/family/caregiver in decisions related to nutrition  Outcome: Progressing     Problem: MOBILITY - ADULT  Goal: Maintain or return to baseline ADL function  Description: INTERVENTIONS:  -  Assess patient's ability to carry out ADLs; assess patient's baseline for ADL function and identify physical deficits which impact ability to perform ADLs (bathing, care of mouth/teeth, toileting, grooming, dressing, etc )  - Assess/evaluate cause of self-care deficits   - Assess range of motion  - Assess patient's mobility; develop plan if impaired  - Assess patient's need for assistive devices and provide as appropriate  - Encourage maximum independence but intervene and supervise when necessary  - Involve family in performance of ADLs  - Assess for home care needs following discharge   - Consider OT consult to assist with ADL evaluation and planning for discharge  - Provide patient education as appropriate  Outcome: Progressing  Goal: Maintains/Returns to pre admission functional level  Description: INTERVENTIONS:  - Perform BMAT or MOVE assessment daily    - Set and communicate daily mobility goal to care team and patient/family/caregiver  - Collaborate with rehabilitation services on mobility goals if consulted  - Perform Range of Motion 5 times a day  - Reposition patient every 2 hours    - Dangle patient 3 times a day  - Stand patient 3 times a day  - Ambulate patient 3 times a day  - Out of bed to chair 3 times a day   - Out of bed for meals 3 times a day  - Out of bed for toileting  - Record patient progress and toleration of activity level   Outcome: Progressing

## 2022-05-08 NOTE — PROGRESS NOTES
[place]  Progress Note - Brandon Vyas 1955, 78 y o  male MRN: [id number]  Unit/Bed#: -01 Encounter: [id number]  Primary Care Provider: ISA Wang   Date and time admitted to hospital: 5/5/2022  3:10 PM    Chronic obstructive pulmonary disease ([facility] [address] )  Assessment & Plan  · Without exacerbation   · Continue PRN albuterol   · Respiratory protocol  · On Symbicort at home- substituted for breo while hospitalized     * Encephalopathy acute  Assessment & Plan  Presented with hallucinations and feeling that there was rippling in his vision and he was seeing objects  he did not feel safe to drive and called his niece to take him to his palliative care  He was referred to er by palliative care team  Pt and niece report the patient had lost a "large chunk of time" where he didn't remember driving or fixing a guitar  · Neurology, palliative care and oncology consulted  · Pt is concerned this is secondary to enzalutamide and prednisone combination   · CTA of the head and neck 5/5- no acute abnormality + for diffuse bony metastatic disease   · Continue neuro checks   · MRI reveals osseous mets but no intracranial abnormality  · Await B12, folate, thiamine, EGD      Prostate cancer metastatic to bone [facility])  Assessment & Plan  Diagnosed approx 4 months ago with metastatic disease to bone and ongoing back , pelvis  And hip pains - also has calvarium involvement and some head pain( nonspecific)  · Consulted oncology and palliative care team  · Continue pain control  · Consider treatment with Zytiga and prednisone, possibly Xgeva per Oncology- family can call oncology office Monday to initiate ordering from specialty pharmacy  · PT/OT evaluation pending, patient does not want to go to rehab, would move in with his niece, Marie Borges and participate in home rehab      Palliative care patient  Assessment & Plan  · Consult placed for palliative care  · Continue oral Jose E  · Also Sherrell Jin outpatient    HLD (hyperlipidemia)  Assessment & Plan  · Continue on atorvastatin    Severe protein-calorie malnutrition (HCC)  Assessment & Plan  Malnutrition Findings:   Adult Malnutrition type: Chronic illness  Adult Degree of Malnutrition: Other severe protein calorie malnutrition  Malnutrition Characteristics: Fat loss,Muscle loss,Inadequate energy,Weight lossnutrition consult  · Supplements as tolerated                  360 Statement: Pt presents with severe protein calorie malnutrition as evidenced by BMI 17 19, prominent clavicle bone protrusion, sunken and darken orbitals, temporal scooping and < 75% po intake > 1 month  BMI Findings:  Adult BMI Classifications: Underweight < 18 5        Body mass index is 17 93 kg/m²  Nutrition consult  Encourage PO intake       VTE Pharmacologic Prophylaxis: VTE Score: 5 High Risk (Score >/= 5) - Pharmacological DVT Prophylaxis Ordered: heparin  Sequential Compression Devices Ordered  Patient Centered Rounds: I performed bedside rounds with nursing staff today  Discussions with Specialists or Other Care Team Provider: None    Education and Discussions with Family / Patient: Updated  (niece) at bedside  Time Spent for Care: 45 minutes  More than 50% of total time spent on counseling and coordination of care as described above  Current Length of Stay: 3 day(s)  Current Patient Status: Inpatient   Certification Statement: The patient will continue to require additional inpatient hospital stay due to Pending PT/OT evaluation, neuro-ordered labs and EEG  Discharge Plan: Anticipate discharge in 24-48 hrs to home with home services  Code Status: Level 1 - Full Code    Subjective:   Patient feels a bit weak this morning  Says he would not want to go to rehab, he still does pushups and wants to go home  He is agreeable to move in with his niece, David Ornelas who he wishes to be his medical decision maker    He is having significant neck and hip pain today, giving him a headache  Objective:     Vitals:   Temp (24hrs), Av 9 °F (36 6 °C), Min:97 5 °F (36 4 °C), Max:98 3 °F (36 8 °C)    Temp:  [97 5 °F (36 4 °C)-98 3 °F (36 8 °C)] 98 3 °F (36 8 °C)  HR:  [72-81] 81  Resp:  [18-20] 18  BP: (105-109)/(71-73) 105/71  SpO2:  [93 %-94 %] 94 %  Body mass index is 17 93 kg/m²  Input and Output Summary (last 24 hours): Intake/Output Summary (Last 24 hours) at 2022 1003  Last data filed at 2022 0602  Gross per 24 hour   Intake  33 ml   Output --   Net  33 ml       Physical Exam:   Physical Exam  Vitals and nursing note reviewed  Constitutional:       General: He is not in acute distress  Appearance: Normal appearance  He is well-developed  He is cachectic  He is ill-appearing  HENT:      Head: Normocephalic and atraumatic  Eyes:      General: No scleral icterus  Conjunctiva/sclera: Conjunctivae normal    Cardiovascular:      Rate and Rhythm: Normal rate and regular rhythm  Heart sounds: No murmur heard  Pulmonary:      Effort: Pulmonary effort is normal       Breath sounds: No wheezing, rhonchi or rales  Abdominal:      General: There is no distension  Palpations: Abdomen is soft  Skin:     General: Skin is warm and dry  Neurological:      General: No focal deficit present  Mental Status: He is alert     Psychiatric:         Mood and Affect: Mood normal         Additional Data:     Labs:  Results from last 7 days   Lab Units 22  0547   WBC Thousand/uL 6 30   HEMOGLOBIN g/dL 12 2   HEMATOCRIT % 37 6   PLATELETS Thousands/uL 295   NEUTROS PCT % 69   LYMPHS PCT % 14   MONOS PCT % 13*   EOS PCT % 3     Results from last 7 days   Lab Units 22  0547 22  0441 22  0441   SODIUM mmol/L 140   < > 139   POTASSIUM mmol/L 4 1   < > 3 8   CHLORIDE mmol/L 104   < > 105   CO2 mmol/L 32   < > 31   BUN mg/dL 14   < > 18   CREATININE mg/dL 0 51*   < > 0 64   ANION GAP mmol/L 4   < > 3*   CALCIUM mg/dL 8 2*   < > 8 6   ALBUMIN g/dL  --   --  2 9*   TOTAL BILIRUBIN mg/dL  --   --  0 30   ALK PHOS U/L  --   --  56   ALT U/L  --   --  20   AST U/L  --   --  16   GLUCOSE RANDOM mg/dL 120   < > 109    < > = values in this interval not displayed  Results from last 7 days   Lab Units 05/05/22  1618   INR  0 98                   Lines/Drains:  Invasive Devices  Report    Peripheral Intravenous Line            Peripheral IV 05/06/22 Left;Ventral (anterior) Forearm 1 day                      Imaging: No pertinent imaging reviewed      Recent Cultures (last 7 days):         Last 24 Hours Medication List:   Current Facility-Administered Medications   Medication Dose Route Frequency Provider Last Rate    albuterol  2 puff Inhalation Q4H PRN Fani Fly, DO      ALPRAZolam  1 mg Oral HS PRN Rick Parmar, DO      And    ALPRAZolam  0 5 mg Oral 4x Daily PRN Rick Parmar DO      atorvastatin  40 mg Oral Daily With [employer], DO      budesonide-formoterol  2 puff Inhalation BID Balinda Sos, DO      calcium carbonate  500 mg Oral Daily PRN Theone Lisa, CRNP      clonazePAM  0 5 mg Oral HS Rick Parmar DO      heparin (porcine)  5,000 Units Subcutaneous Q8H [address] Fani Fly, DO      HYDROmorphone  1 mg Intravenous Q4H PRN Eliane Raymond PA-C      HYDROmorphone  1 mg Oral Q4H PRN Eliane Raymond PA-C      Or    HYDROmorphone  2 mg Oral Q4H PRN Eliane Raymond PA-C      ibuprofen  600 mg Oral Q6H PRN Eliane Raymond PA-C      lidocaine  1 patch Topical Daily Eliane Raymond PA-C      LORazepam  1 mg Intravenous Q4H PRN Eliane Raymond PA-C      montelukast  10 mg Oral HS Balinda Sos, DO      multivitamin-minerals  1 tablet Oral Daily MARSHALL Garber-ERIK      OLANZapine  5 mg Oral HS Eliane Raymond PA-C      predniSONE  1 mg Oral Daily Eliane Raymond PA-C      senna  2 tablet Oral HS PRN Jose Raymond PA-C      sodium chloride  50 mL/hr Intravenous Continuous ISA Cabrera 50 mL/hr (05/08/22 0602)        Today, Patient Was Seen By: Sbaa Cortez PA-C    **Please Note: This note may have been constructed using a voice recognition system  **

## 2022-05-08 NOTE — ASSESSMENT & PLAN NOTE
Diagnosed approx 4 months ago with metastatic disease to bone and ongoing back , pelvis  And hip pains - also has calvarium involvement and some head pain( nonspecific)  · Consulted oncology and palliative care team  · Continue pain control  · Consider treatment with Zytiga and prednisone, possibly Xgeva per Oncology- family can call oncology office Monday to initiate ordering from specialty pharmacy  · PT/OT evaluation pending, patient does not want to go to rehab, would move in with his niece, Ronald Gonzales and participate in home rehab    · PSA 8 4

## 2022-05-08 NOTE — QUICK NOTE
MRI brain without any clear acute pathology, specifically within the limbic system  - low clinical suspicion for paraneoplastic syndrome given that patient only recently developed symptoms despite chronic and now metastatic disease   - can defer LP for now  - onset of symptoms do seem to correlate with starting Enzalutamide,   - there are case reports of new onset hallucinations associated with androgen deprivation therapy  - would discuss with Oncology  Certainly would not risk tumor progression for the side effects however if prognosis is poor even with therapy, then may consider discontinuing medication or switching to another for palliative reasons    - will defer to slim and Oncology  -please call questions/concerns

## 2022-05-08 NOTE — ASSESSMENT & PLAN NOTE
Presented with hallucinations and feeling that there was rippling in his vision and he was seeing objects  he did not feel safe to drive and called his niece to take him to his palliative care  He was referred to er by palliative care team  Pt and niece report the patient had lost a "large chunk of time" where he didn't remember driving or fixing a guitar  · Neurology, palliative care and oncology consulted  · Pt is concerned this is secondary to enzalutamide and prednisone combination   · CTA of the head and neck 5/5- no acute abnormality + for diffuse bony metastatic disease   · Continue neuro checks   · MRI reveals osseous mets but no intracranial abnormality    · B12, folate, thiamine, RPR, TSH normal  · EEG interpretation pending

## 2022-05-08 NOTE — ASSESSMENT & PLAN NOTE
Presented with hallucinations and feeling that there was rippling in his vision and he was seeing objects  he did not feel safe to drive and called his niece to take him to his palliative care  He was referred to er by palliative care team  Pt and niece report the patient had lost a "large chunk of time" where he didn't remember driving or fixing a guitar  · Neurology, palliative care and oncology consulted  · Pt is concerned this is secondary to enzalutamide and prednisone combination   · CTA of the head and neck 5/5- no acute abnormality + for diffuse bony metastatic disease   · Continue neuro checks   · MRI reveals osseous mets but no intracranial abnormality    · Await B12, folate, thiamine, EGD

## 2022-05-08 NOTE — PLAN OF CARE
Problem: PAIN - ADULT  Goal: Verbalizes/displays adequate comfort level or baseline comfort level  Description: Interventions:  - Encourage patient to monitor pain and request assistance  - Assess pain using appropriate pain scale  - Administer analgesics based on type and severity of pain and evaluate response  - Implement non-pharmacological measures as appropriate and evaluate response  - Consider cultural and social influences on pain and pain management  - Notify physician/advanced practitioner if interventions unsuccessful or patient reports new pain  Outcome: Progressing     Problem: INFECTION - ADULT  Goal: Absence or prevention of progression during hospitalization  Description: INTERVENTIONS:  - Assess and monitor for signs and symptoms of infection  - Monitor lab/diagnostic results  - Monitor all insertion sites, i e  indwelling lines, tubes, and drains  - Monitor endotracheal if appropriate and nasal secretions for changes in amount and color  - Maljamar appropriate cooling/warming therapies per order  - Administer medications as ordered  - Instruct and encourage patient and family to use good hand hygiene technique  - Identify and instruct in appropriate isolation precautions for identified infection/condition  Outcome: Progressing  Goal: Absence of fever/infection during neutropenic period  Description: INTERVENTIONS:  - Monitor WBC    Outcome: Progressing     Problem: SAFETY ADULT  Goal: Patient will remain free of falls  Description: INTERVENTIONS:  - Educate patient/family on patient safety including physical limitations  - Instruct patient to call for assistance with activity   - Consult OT/PT to assist with strengthening/mobility   - Keep Call bell within reach  - Keep bed low and locked with side rails adjusted as appropriate  - Keep care items and personal belongings within reach  - Initiate and maintain comfort rounds  - Make Fall Risk Sign visible to staff  - Offer Toileting every 2 Hours, in advance of need  - Initiate/Maintain bed alarm  - Obtain necessary fall risk management equipment: bed alarm, call bell within reach, non ski foot wear, frequent rounding  - Apply yellow socks and bracelet for high fall risk patients  - Consider moving patient to room near nurses station  Outcome: Progressing  Goal: Maintain or return to baseline ADL function  Description: INTERVENTIONS:  -  Assess patient's ability to carry out ADLs; assess patient's baseline for ADL function and identify physical deficits which impact ability to perform ADLs (bathing, care of mouth/teeth, toileting, grooming, dressing, etc )  - Assess/evaluate cause of self-care deficits   - Assess range of motion  - Assess patient's mobility; develop plan if impaired  - Assess patient's need for assistive devices and provide as appropriate  - Encourage maximum independence but intervene and supervise when necessary  - Involve family in performance of ADLs  - Assess for home care needs following discharge   - Consider OT consult to assist with ADL evaluation and planning for discharge  - Provide patient education as appropriate  Outcome: Progressing  Goal: Maintains/Returns to pre admission functional level  Description: INTERVENTIONS:  - Perform BMAT or MOVE assessment daily    - Set and communicate daily mobility goal to care team and patient/family/caregiver  - Collaborate with rehabilitation services on mobility goals if consulted  - Perform Range of Motion 2 times a day  - Reposition patient every 2 hours    - Dangle patient 2 times a day  - Stand patient 2 times a day  - Ambulate patient 2 times a day  - Out of bed to chair 2 times a day   - Out of bed for meals 2 times a day  - Out of bed for toileting  - Record patient progress and toleration of activity level   Outcome: Progressing     Problem: DISCHARGE PLANNING  Goal: Discharge to home or other facility with appropriate resources  Description: INTERVENTIONS:  - Identify barriers to discharge w/patient and caregiver  - Arrange for needed discharge resources and transportation as appropriate  - Identify discharge learning needs (meds, wound care, etc )  - Arrange for interpretive services to assist at discharge as needed  - Refer to Case Management Department for coordinating discharge planning if the patient needs post-hospital services based on physician/advanced practitioner order or complex needs related to functional status, cognitive ability, or social support system  Outcome: Progressing     Problem: Knowledge Deficit  Goal: Patient/family/caregiver demonstrates understanding of disease process, treatment plan, medications, and discharge instructions  Description: Complete learning assessment and assess knowledge base    Interventions:  - Provide teaching at level of understanding  - Provide teaching via preferred learning methods  Outcome: Progressing     Problem: Prexisting or High Potential for Compromised Skin Integrity  Goal: Skin integrity is maintained or improved  Description: INTERVENTIONS:  - Identify patients at risk for skin breakdown  - Assess and monitor skin integrity  - Assess and monitor nutrition and hydration status  - Monitor labs   - Assess for incontinence   - Turn and reposition patient  - Assist with mobility/ambulation  - Relieve pressure over bony prominences  - Avoid friction and shearing  - Provide appropriate hygiene as needed including keeping skin clean and dry  - Evaluate need for skin moisturizer/barrier cream  - Collaborate with interdisciplinary team   - Patient/family teaching  - Consider wound care consult   Outcome: Progressing     Problem: Potential for Falls  Goal: Patient will remain free of falls  Description: INTERVENTIONS:  - Educate patient/family on patient safety including physical limitations  - Instruct patient to call for assistance with activity   - Consult OT/PT to assist with strengthening/mobility   - Keep Call bell within reach  - Keep bed low and locked with side rails adjusted as appropriate  - Keep care items and personal belongings within reach  - Initiate and maintain comfort rounds  - Make Fall Risk Sign visible to staff  - Offer Toileting every 2 Hours, in advance of need  - Initiate/Maintain bed alarm  - Obtain necessary fall risk management equipment: bed alarm, frequent rounding, nonskid foot wear  - Apply yellow socks and bracelet for high fall risk patients  - Consider moving patient to room near nurses station  Outcome: Progressing     Problem: Nutrition/Hydration-ADULT  Goal: Nutrient/Hydration intake appropriate for improving, restoring or maintaining nutritional needs  Description: Monitor and assess patient's nutrition/hydration status for malnutrition  Collaborate with interdisciplinary team and initiate plan and interventions as ordered  Monitor patient's weight and dietary intake as ordered or per policy  Utilize nutrition screening tool and intervene as necessary  Determine patient's food preferences and provide high-protein, high-caloric foods as appropriate       INTERVENTIONS:  - Monitor oral intake, urinary output, labs, and treatment plans  - Assess nutrition and hydration status and recommend course of action  - Evaluate amount of meals eaten  - Assist patient with eating if necessary   - Allow adequate time for meals  - Recommend/ encourage appropriate diets, oral nutritional supplements, and vitamin/mineral supplements  - Order, calculate, and assess calorie counts as needed  - Recommend, monitor, and adjust tube feedings and TPN/PPN based on assessed needs  - Assess need for intravenous fluids  - Provide specific nutrition/hydration education as appropriate  - Include patient/family/caregiver in decisions related to nutrition  Outcome: Progressing     Problem: MOBILITY - ADULT  Goal: Maintain or return to baseline ADL function  Description: INTERVENTIONS:  -  Assess patient's ability to carry out ADLs; assess patient's baseline for ADL function and identify physical deficits which impact ability to perform ADLs (bathing, care of mouth/teeth, toileting, grooming, dressing, etc )  - Assess/evaluate cause of self-care deficits   - Assess range of motion  - Assess patient's mobility; develop plan if impaired  - Assess patient's need for assistive devices and provide as appropriate  - Encourage maximum independence but intervene and supervise when necessary  - Involve family in performance of ADLs  - Assess for home care needs following discharge   - Consider OT consult to assist with ADL evaluation and planning for discharge  - Provide patient education as appropriate  Outcome: Progressing  Goal: Maintains/Returns to pre admission functional level  Description: INTERVENTIONS:  - Perform BMAT or MOVE assessment daily    - Set and communicate daily mobility goal to care team and patient/family/caregiver  - Collaborate with rehabilitation services on mobility goals if consulted  - Perform Range of Motion 2 times a day  - Reposition patient every 2 hours    - Dangle patient 2 times a day  - Stand patient 2 times a day  - Ambulate patient 2 times a day  - Out of bed to chair 2 times a day   - Out of bed for meals 2 times a day  - Out of bed for toileting  - Record patient progress and toleration of activity level   Outcome: Progressing

## 2022-05-08 NOTE — ASSESSMENT & PLAN NOTE
Malnutrition Findings:   Adult Malnutrition type: Chronic illness  Adult Degree of Malnutrition: Other severe protein calorie malnutrition  Malnutrition Characteristics: Fat loss,Muscle loss,Inadequate energy,Weight lossnutrition consult  · Supplements as tolerated                  360 Statement: Pt presents with severe protein calorie malnutrition as evidenced by BMI 17 19, prominent clavicle bone protrusion, sunken and darken orbitals, temporal scooping and < 75% po intake > 1 month  BMI Findings:  Adult BMI Classifications: Underweight < 18 5        Body mass index is 17 93 kg/m²     Nutrition consult  Encourage PO intake difficulty breathing

## 2022-05-08 NOTE — ASSESSMENT & PLAN NOTE
· Without exacerbation   · Continue PRN albuterol   · Respiratory protocol  · On Symbicort at home- substituted for breo while hospitalized

## 2022-05-08 NOTE — ASSESSMENT & PLAN NOTE
Malnutrition Findings:   Adult Malnutrition type: Chronic illness  Adult Degree of Malnutrition: Other severe protein calorie malnutrition  Malnutrition Characteristics: Fat loss,Muscle loss,Inadequate energy,Weight lossnutrition consult  · Supplements as tolerated                  360 Statement: Pt presents with severe protein calorie malnutrition as evidenced by BMI 17 19, prominent clavicle bone protrusion, sunken and darken orbitals, temporal scooping and < 75% po intake > 1 month  BMI Findings:  Adult BMI Classifications: Underweight < 18 5        Body mass index is 17 93 kg/m²     Nutrition consult  Encourage PO intake

## 2022-05-08 NOTE — ASSESSMENT & PLAN NOTE
Diagnosed approx 4 months ago with metastatic disease to bone and ongoing back , pelvis  And hip pains - also has calvarium involvement and some head pain( nonspecific)  · Consulted oncology and palliative care team  · Continue pain control  · Consider treatment with Zytiga and prednisone, possibly Xgeva per Oncology- family can call oncology office Monday to initiate ordering from specialty pharmacy  · PT/OT evaluation pending, patient does not want to go to rehab, would move in with his niece, Mike Hawkins and participate in home rehab

## 2022-05-08 NOTE — DISCHARGE SUMMARY
[place]  Discharge- Lonza Graver 1955, 78 y o  male MRN: [id number]  Unit/Bed#: -01 Encounter: [id number]  Primary Care Provider: ISA Palumbo   Date and time admitted to hospital: 5/5/2022  3:10 PM    Chronic obstructive pulmonary disease ([facility] [address] )  Assessment & Plan  · Without exacerbation   · Continue PRN albuterol   · Respiratory protocol  · On Symbicort at home- substituted for breo while hospitalized     * Encephalopathy acute  Assessment & Plan  Presented with hallucinations and feeling that there was rippling in his vision and he was seeing objects  he did not feel safe to drive and called his niece to take him to his palliative care  He was referred to er by palliative care team  Pt and niece report the patient had lost a "large chunk of time" where he didn't remember driving or fixing a guitar  · Neurology, palliative care and oncology consulted  · Pt is concerned this is secondary to enzalutamide and prednisone combination   · CTA of the head and neck 5/5- no acute abnormality + for diffuse bony metastatic disease   · Continue neuro checks   · MRI reveals osseous mets but no intracranial abnormality  · B12, folate, thiamine, RPR, TSH normal  · EEG interpretation pending      Prostate cancer metastatic to bone [facility])  Assessment & Plan  Diagnosed approx 4 months ago with metastatic disease to bone and ongoing back , pelvis  And hip pains - also has calvarium involvement and some head pain( nonspecific)  · Consulted oncology and palliative care team  · Continue pain control  · Consider treatment with Zytiga and prednisone, possibly Xgeva per Oncology- family can call oncology office Monday to initiate ordering from specialty pharmacy  · PT/OT evaluation pending, patient does not want to go to rehab, would move in with his niece, Erika [place] and participate in home rehab    · PSA 8 4    Palliative care patient  Assessment & Plan  · Consult placed for palliative care  · Continue oral Dilaudid  · Follows with  Augusto Jackman outpatient    HLD (hyperlipidemia)  Assessment & Plan  · Continue on atorvastatin    Severe protein-calorie malnutrition (HCC)  Assessment & Plan  Malnutrition Findings:   Adult Malnutrition type: Chronic illness  Adult Degree of Malnutrition: Other severe protein calorie malnutrition  Malnutrition Characteristics: Fat loss,Muscle loss,Inadequate energy,Weight lossnutrition consult  · Supplements as tolerated                  360 Statement: Pt presents with severe protein calorie malnutrition as evidenced by BMI 17 19, prominent clavicle bone protrusion, sunken and darken orbitals, temporal scooping and < 75% po intake > 1 month  BMI Findings:  Adult BMI Classifications: Underweight < 18 5        Body mass index is 17 93 kg/m²  Nutrition consult  Encourage PO intake     Medical Problems             Resolved Problems  Date Reviewed: 5/8/2022    None              Discharging Physician / Practitioner: Abdon Watters PA-C  PCP: [address] [place] [address]  Admission Date:   Admission Orders (From admission, onward)     Ordered        05/05/22 1757  Inpatient Admission  Once                      Discharge Date: 05/09/22    Consultations During Hospital Stay:  · Palliative Care  · Oncology  · Neurology  · PT/OT    Procedures Performed:   · MRI brain 5/7:  · No acute intracranial abnormality  No abnormal enhancement  · Sclerotic lesions within the calvarium and upper cervical spine consistent with patient's known osseous metastasis from prostate carcinoma  Significant Findings / Test Results:   · CTA head and neck 5/5:  · No evidence of acute intracranial hemorrhage  · No evidence of hemodynamic significant stenosis, aneurysm or dissection  · Diffuse bony metastatic disease    Incidental Findings:   · None     Test Results Pending at Discharge (will require follow up):    · None     Outpatient Tests Requested:  · None    Complications:  None    Reason for Admission: None    Hospital Course:   Blanche Elliott is a 78 y o  male patient with past medical history of prostate cancer with Mets to bone, hyperlipidemia, COPD who originally presented to the hospital on 5/5/2022 due to hallucinations at home  Imaging of brain was negative for any acute abnormality but did reveal osseous mets to calvarium  B12, folate, RPR, TSH all within normal limits  Suspect this is a side effect of Xtandi  Oncology recommended discontinuing Xtandi and consider alternative therapy with Zytiga and prednisone, possibly Xgeva  SOB discussed the patient's follow-up outpatient oncology appointment  PT/OT cleared patient for return home  Palliative Care continued to manage patient's pain which is currently requiring p o  Dilaudid  Hemodynamically stable for discharge, will require close outpatient follow-up to control symptoms and manage chemotherapy  Please see above list of diagnoses and related plan for additional information  Condition at Discharge: stable    Discharge Day Visit / Exam:   Subjective:  Patient in significant amount of pain reports I feel like I got run over by a truck " He is agreeable to live with discharge and does not visiting nurses or physical therapy  Vitals: Blood Pressure: (!) 84/60 (05/09/22 0704)  Pulse: 74 (05/09/22 0704)  Temperature: 97 5 °F (36 4 °C) (05/09/22 0704)  Temp Source: Oral (05/06/22 2211)  Respirations: 16 (05/09/22 0704)  Height: 5' 6" (167 6 cm) (05/05/22 1934)  Weight - Scale: 49 6 kg (109 lb 5 6 oz) (05/09/22 0500)  SpO2: 91 % (05/09/22 0704)  Exam:   Physical Exam  Vitals and nursing note reviewed  Constitutional:       General: He is not in acute distress  Appearance: Normal appearance  He is well-developed  He is cachectic  He is ill-appearing  HENT:      Head: Normocephalic and atraumatic  Eyes:      General: No scleral icterus       Conjunctiva/sclera: Conjunctivae normal    Cardiovascular:      Rate and Rhythm: Normal rate and regular rhythm  Heart sounds: No murmur heard  Pulmonary:      Effort: Pulmonary effort is normal       Breath sounds: No wheezing, rhonchi or rales  Abdominal:      General: There is no distension  Palpations: Abdomen is soft  Skin:     General: Skin is warm and dry  Neurological:      General: No focal deficit present  Mental Status: He is alert  Psychiatric:         Mood and Affect: Mood normal         Discussion with Family: Updated  (niece) at bedside  Discharge instructions/Information to patient and family:   See after visit summary for information provided to patient and family  Provisions for Follow-Up Care:  See after visit summary for information related to follow-up care and any pertinent home health orders  Disposition:   Home    Planned Readmission: None     Discharge Statement:  I spent 65 minutes discharging the patient  This time was spent on the day of discharge  I had direct contact with the patient on the day of discharge  Greater than 50% of the total time was spent examining patient, answering all patient questions, arranging and discussing plan of care with patient as well as directly providing post-discharge instructions  Additional time then spent on discharge activities  Discharge Medications:  See after visit summary for reconciled discharge medications provided to patient and/or family        **Please Note: This note may have been constructed using a voice recognition system**

## 2022-05-09 ENCOUNTER — TELEPHONE (OUTPATIENT)
Dept: HEMATOLOGY ONCOLOGY | Facility: HOSPITAL | Age: 67
End: 2022-05-09

## 2022-05-09 ENCOUNTER — TRANSITIONAL CARE MANAGEMENT (OUTPATIENT)
Dept: FAMILY MEDICINE CLINIC | Facility: CLINIC | Age: 67
End: 2022-05-09

## 2022-05-09 ENCOUNTER — TELEPHONE (OUTPATIENT)
Dept: PALLIATIVE MEDICINE | Facility: CLINIC | Age: 67
End: 2022-05-09

## 2022-05-09 ENCOUNTER — TELEPHONE (OUTPATIENT)
Dept: HEMATOLOGY ONCOLOGY | Facility: CLINIC | Age: 67
End: 2022-05-09

## 2022-05-09 VITALS
OXYGEN SATURATION: 91 % | BODY MASS INDEX: 17.57 KG/M2 | WEIGHT: 109.35 LBS | TEMPERATURE: 97.5 F | HEART RATE: 74 BPM | RESPIRATION RATE: 16 BRPM | DIASTOLIC BLOOD PRESSURE: 60 MMHG | HEIGHT: 66 IN | SYSTOLIC BLOOD PRESSURE: 84 MMHG

## 2022-05-09 LAB
ANION GAP SERPL CALCULATED.3IONS-SCNC: 6 MMOL/L (ref 4–13)
BASOPHILS # BLD AUTO: 0.03 THOUSANDS/ΜL (ref 0–0.1)
BASOPHILS NFR BLD AUTO: 1 % (ref 0–1)
BUN SERPL-MCNC: 13 MG/DL (ref 5–25)
CALCIUM SERPL-MCNC: 8.4 MG/DL (ref 8.3–10.1)
CHLORIDE SERPL-SCNC: 105 MMOL/L (ref 100–108)
CO2 SERPL-SCNC: 30 MMOL/L (ref 21–32)
CREAT SERPL-MCNC: 0.59 MG/DL (ref 0.6–1.3)
EOSINOPHIL # BLD AUTO: 0.23 THOUSAND/ΜL (ref 0–0.61)
EOSINOPHIL NFR BLD AUTO: 5 % (ref 0–6)
ERYTHROCYTE [DISTWIDTH] IN BLOOD BY AUTOMATED COUNT: 13.8 % (ref 11.6–15.1)
GFR SERPL CREATININE-BSD FRML MDRD: 104 ML/MIN/1.73SQ M
GLUCOSE SERPL-MCNC: 97 MG/DL (ref 65–140)
HCT VFR BLD AUTO: 37.1 % (ref 36.5–49.3)
HGB BLD-MCNC: 12 G/DL (ref 12–17)
IMM GRANULOCYTES # BLD AUTO: 0.01 THOUSAND/UL (ref 0–0.2)
IMM GRANULOCYTES NFR BLD AUTO: 0 % (ref 0–2)
LYMPHOCYTES # BLD AUTO: 1.35 THOUSANDS/ΜL (ref 0.6–4.47)
LYMPHOCYTES NFR BLD AUTO: 30 % (ref 14–44)
MCH RBC QN AUTO: 33.1 PG (ref 26.8–34.3)
MCHC RBC AUTO-ENTMCNC: 32.3 G/DL (ref 31.4–37.4)
MCV RBC AUTO: 103 FL (ref 82–98)
MONOCYTES # BLD AUTO: 0.67 THOUSAND/ΜL (ref 0.17–1.22)
MONOCYTES NFR BLD AUTO: 15 % (ref 4–12)
NEUTROPHILS # BLD AUTO: 2.21 THOUSANDS/ΜL (ref 1.85–7.62)
NEUTS SEG NFR BLD AUTO: 49 % (ref 43–75)
NRBC BLD AUTO-RTO: 0 /100 WBCS
PLATELET # BLD AUTO: 267 THOUSANDS/UL (ref 149–390)
PMV BLD AUTO: 8.5 FL (ref 8.9–12.7)
POTASSIUM SERPL-SCNC: 3.7 MMOL/L (ref 3.5–5.3)
RBC # BLD AUTO: 3.62 MILLION/UL (ref 3.88–5.62)
RPR SER QL: NORMAL
SODIUM SERPL-SCNC: 141 MMOL/L (ref 136–145)
WBC # BLD AUTO: 4.5 THOUSAND/UL (ref 4.31–10.16)

## 2022-05-09 PROCEDURE — 97163 PT EVAL HIGH COMPLEX 45 MIN: CPT

## 2022-05-09 PROCEDURE — 97165 OT EVAL LOW COMPLEX 30 MIN: CPT

## 2022-05-09 PROCEDURE — 80048 BASIC METABOLIC PNL TOTAL CA: CPT | Performed by: PHYSICIAN ASSISTANT

## 2022-05-09 PROCEDURE — 85025 COMPLETE CBC W/AUTO DIFF WBC: CPT | Performed by: PHYSICIAN ASSISTANT

## 2022-05-09 PROCEDURE — 99232 SBSQ HOSP IP/OBS MODERATE 35: CPT | Performed by: PHYSICIAN ASSISTANT

## 2022-05-09 RX ORDER — HYDROMORPHONE HYDROCHLORIDE 2 MG/1
2 TABLET ORAL EVERY 4 HOURS PRN
Qty: 180 TABLET | Refills: 0 | Status: SHIPPED | OUTPATIENT
Start: 2022-05-09 | End: 2022-07-14 | Stop reason: SDUPTHER

## 2022-05-09 RX ORDER — SENNOSIDES 8.6 MG
17.2 TABLET ORAL
Qty: 60 TABLET | Refills: 0 | Status: SHIPPED | OUTPATIENT
Start: 2022-05-09

## 2022-05-09 RX ORDER — PREDNISONE 1 MG/1
1 TABLET ORAL DAILY
Qty: 30 TABLET | Refills: 0 | Status: SHIPPED | OUTPATIENT
Start: 2022-05-10 | End: 2022-05-30 | Stop reason: SDUPTHER

## 2022-05-09 RX ORDER — OLANZAPINE 5 MG/1
5 TABLET ORAL
Qty: 30 TABLET | Refills: 0 | Status: SHIPPED | OUTPATIENT
Start: 2022-05-09 | End: 2022-05-19

## 2022-05-09 RX ORDER — LIDOCAINE 50 MG/G
3 PATCH TOPICAL DAILY
Qty: 90 PATCH | Refills: 0 | Status: SHIPPED | OUTPATIENT
Start: 2022-05-10

## 2022-05-09 RX ORDER — CLONAZEPAM 0.5 MG/1
0.5 TABLET ORAL
Qty: 30 TABLET | Refills: 0 | Status: SHIPPED | OUTPATIENT
Start: 2022-05-09 | End: 2022-06-16 | Stop reason: SDUPTHER

## 2022-05-09 RX ADMIN — HYDROMORPHONE HYDROCHLORIDE 2 MG: 2 TABLET ORAL at 08:38

## 2022-05-09 RX ADMIN — PREDNISONE 1 MG: 1 TABLET ORAL at 08:38

## 2022-05-09 RX ADMIN — Medication 1 TABLET: at 08:38

## 2022-05-09 RX ADMIN — BUDESONIDE AND FORMOTEROL FUMARATE DIHYDRATE 2 PUFF: 160; 4.5 AEROSOL RESPIRATORY (INHALATION) at 08:39

## 2022-05-09 RX ADMIN — LIDOCAINE 1 PATCH: 50 PATCH TOPICAL at 08:38

## 2022-05-09 NOTE — PLAN OF CARE
Problem: PAIN - ADULT  Goal: Verbalizes/displays adequate comfort level or baseline comfort level  Description: Interventions:  - Encourage patient to monitor pain and request assistance  - Assess pain using appropriate pain scale  - Administer analgesics based on type and severity of pain and evaluate response  - Implement non-pharmacological measures as appropriate and evaluate response  - Consider cultural and social influences on pain and pain management  - Notify physician/advanced practitioner if interventions unsuccessful or patient reports new pain  Outcome: Progressing     Problem: INFECTION - ADULT  Goal: Absence or prevention of progression during hospitalization  Description: INTERVENTIONS:  - Assess and monitor for signs and symptoms of infection  - Monitor lab/diagnostic results  - Monitor all insertion sites, i e  indwelling lines, tubes, and drains  - Monitor endotracheal if appropriate and nasal secretions for changes in amount and color  - [place] appropriate cooling/warming therapies per order  - Administer medications as ordered  - Instruct and encourage patient and family to use good hand hygiene technique  - Identify and instruct in appropriate isolation precautions for identified infection/condition  Outcome: Progressing  Goal: Absence of fever/infection during neutropenic period  Description: INTERVENTIONS:  - Monitor WBC    Outcome: Progressing     Problem: SAFETY ADULT  Goal: Patient will remain free of falls  Description: INTERVENTIONS:  - Educate patient/family on patient safety including physical limitations  - Instruct patient to call for assistance with activity   - Consult OT/PT to assist with strengthening/mobility   - Keep Call bell within reach  - Keep bed low and locked with side rails adjusted as appropriate  - Keep care items and personal belongings within reach  - Initiate and maintain comfort rounds  - Make Fall Risk Sign visible to staff  - Apply yellow socks and bracelet for high fall risk patients  - Consider moving patient to room near nurses station  Outcome: Progressing  Goal: Maintain or return to baseline ADL function  Description: INTERVENTIONS:  -  Assess patient's ability to carry out ADLs; assess patient's baseline for ADL function and identify physical deficits which impact ability to perform ADLs (bathing, care of mouth/teeth, toileting, grooming, dressing, etc )  - Assess/evaluate cause of self-care deficits   - Assess range of motion  - Assess patient's mobility; develop plan if impaired  - Assess patient's need for assistive devices and provide as appropriate  - Encourage maximum independence but intervene and supervise when necessary  - Involve family in performance of ADLs  - Assess for home care needs following discharge   - Consider OT consult to assist with ADL evaluation and planning for discharge  - Provide patient education as appropriate  Outcome: Progressing  Goal: Maintains/Returns to pre admission functional level  Description: INTERVENTIONS:  - Perform BMAT or MOVE assessment daily    - Set and communicate daily mobility goal to care team and patient/family/caregiver     - Collaborate with rehabilitation services on mobility goals if consulted  - Record patient progress and toleration of activity level   Outcome: Progressing     Problem: DISCHARGE PLANNING  Goal: Discharge to home or other facility with appropriate resources  Description: INTERVENTIONS:  - Identify barriers to discharge w/patient and caregiver  - Arrange for needed discharge resources and transportation as appropriate  - Identify discharge learning needs (meds, wound care, etc )  - Arrange for interpretive services to assist at discharge as needed  - Refer to Case Management Department for coordinating discharge planning if the patient needs post-hospital services based on physician/advanced practitioner order or complex needs related to functional status, cognitive ability, or social support system  Outcome: Progressing     Problem: Knowledge Deficit  Goal: Patient/family/caregiver demonstrates understanding of disease process, treatment plan, medications, and discharge instructions  Description: Complete learning assessment and assess knowledge base    Interventions:  - Provide teaching at level of understanding  - Provide teaching via preferred learning methods  Outcome: Progressing     Problem: Prexisting or High Potential for Compromised Skin Integrity  Goal: Skin integrity is maintained or improved  Description: INTERVENTIONS:  - Identify patients at risk for skin breakdown  - Assess and monitor skin integrity  - Assess and monitor nutrition and hydration status  - Monitor labs   - Assess for incontinence   - Turn and reposition patient  - Assist with mobility/ambulation  - Relieve pressure over bony prominences  - Avoid friction and shearing  - Provide appropriate hygiene as needed including keeping skin clean and dry  - Evaluate need for skin moisturizer/barrier cream  - Collaborate with interdisciplinary team   - Patient/family teaching  - Consider wound care consult   Outcome: Progressing     Problem: Potential for Falls  Goal: Patient will remain free of falls  Description: INTERVENTIONS:  - Educate patient/family on patient safety including physical limitations  - Instruct patient to call for assistance with activity   - Consult OT/PT to assist with strengthening/mobility   - Keep Call bell within reach  - Keep bed low and locked with side rails adjusted as appropriate  - Keep care items and personal belongings within reach  - Initiate and maintain comfort rounds  - Make Fall Risk Sign visible to staff  - Apply yellow socks and bracelet for high fall risk patients  - Consider moving patient to room near nurses station  Outcome: Progressing     Problem: Nutrition/Hydration-ADULT  Goal: Nutrient/Hydration intake appropriate for improving, restoring or maintaining nutritional needs  Description: Monitor and assess patient's nutrition/hydration status for malnutrition  Collaborate with interdisciplinary team and initiate plan and interventions as ordered  Monitor patient's weight and dietary intake as ordered or per policy  Utilize nutrition screening tool and intervene as necessary  Determine patient's food preferences and provide high-protein, high-caloric foods as appropriate       INTERVENTIONS:  - Monitor oral intake, urinary output, labs, and treatment plans  - Assess nutrition and hydration status and recommend course of action  - Evaluate amount of meals eaten  - Assist patient with eating if necessary   - Allow adequate time for meals  - Recommend/ encourage appropriate diets, oral nutritional supplements, and vitamin/mineral supplements  - Order, calculate, and assess calorie counts as needed  - Recommend, monitor, and adjust tube feedings and TPN/PPN based on assessed needs  - Assess need for intravenous fluids  - Provide specific nutrition/hydration education as appropriate  - Include patient/family/caregiver in decisions related to nutrition  Outcome: Progressing     Problem: MOBILITY - ADULT  Goal: Maintain or return to baseline ADL function  Description: INTERVENTIONS:  -  Assess patient's ability to carry out ADLs; assess patient's baseline for ADL function and identify physical deficits which impact ability to perform ADLs (bathing, care of mouth/teeth, toileting, grooming, dressing, etc )  - Assess/evaluate cause of self-care deficits   - Assess range of motion  - Assess patient's mobility; develop plan if impaired  - Assess patient's need for assistive devices and provide as appropriate  - Encourage maximum independence but intervene and supervise when necessary  - Involve family in performance of ADLs  - Assess for home care needs following discharge   - Consider OT consult to assist with ADL evaluation and planning for discharge  - Provide patient education as appropriate  Outcome: Progressing  Goal: Maintains/Returns to pre admission functional level  Description: INTERVENTIONS:  - Perform BMAT or MOVE assessment daily    - Set and communicate daily mobility goal to care team and patient/family/caregiver     - Collaborate with rehabilitation services on mobility goals if consulted  - Out of bed for toileting  - Record patient progress and toleration of activity level   Outcome: Progressing

## 2022-05-09 NOTE — OCCUPATIONAL THERAPY NOTE
Occupational Therapy Evaluation     Patient Name: Honey García  Today's Date: 5/9/2022  Problem List  Principal Problem:    Encephalopathy acute  Active Problems:    Chronic obstructive pulmonary disease (HCC)    Severe protein-calorie malnutrition ([facility] [address] )    HLD (hyperlipidemia)    Prostate cancer metastatic to bone [facility])    Palliative care patient    Past Medical History  Past Medical History:   Diagnosis Date    Bone cancer ([facility] )     Colon polyp     [facility]     Emphysema lung ([facility] )     Hyperlipidemia     Prostate cancer ([facility] )      Past Surgical History  Past Surgical History:   Procedure Laterality Date    APPENDECTOMY      COLONOSCOPY      IR BIOPSY BONE  12/30/2021    JOINT REPLACEMENT      UPPER GASTROINTESTINAL ENDOSCOPY           05/09/22 1007   OT Last Visit   OT Visit Date 05/09/22   Note Type   Note type Evaluation   Restrictions/Precautions   Other Precautions   (spine mets)   Pain Assessment   Pain Assessment Tool Barr-Baker FACES   Barr-Baker FACES Pain Rating 4   Pain Location/Orientation Orientation: Left; Location: Shoulder   Patient's Stated Pain Goal No pain   Hospital Pain Intervention(s) Rest   Home Living   Additional Comments Pt resides in a 1  house with steps to manage  Upon D/C will stay with yesy and her family in a [facility] with steps to manage  Home includes a tub shower with no current DME,however can obtain a shower chair if needed  Prior Function   Level of [place] Independent with ADLs and functional mobility   Lives With Alone   ADL Assistance Independent   IADLs Independent   Subjective   Subjective Pt states "I was doing 35 pushups before needing oxygen"  Pt went onto asking if he could continue doing various exercises  Pt instructed to f/u with MD regarding lifting limitations 2/2 current dx      ADL   Eating Assistance 7  Independent   Grooming Assistance 7  Independent   UB Bathing Assistance 7  Independent   LB Bathing Assistance 6  Modified Independent   UB Dressing Assistance 7  Independent   LB Dressing Assistance 6  Modified independent   [address]  7  Independent   Additional Comments Pt limited by SOB when performing LB ADL requiring bending for prolonged periods of time  Bed Mobility   Additional Comments Pt received sitting at EOB   Transfers   Sit to Stand 7  Independent   Stand to Sit 7  Independent   Functional Mobility   Functional Mobility 7  Independent   Additional Comments no AD   Balance   Static Sitting Normal   Dynamic Sitting Normal   Static Standing Normal   Dynamic Standing Normal   Activity Tolerance   Activity Tolerance Patient limited by fatigue  (SpO2 decreased to ~85% after performing LB ADLs requiring bending  Pt educated on taking adequate rest breaks to minimize episodes of SOB)   Medical Staff Made Aware PT [place]   Nurse Made Aware RN Carrie Hollis   RUE Assessment   RUE Assessment WFL   LUE Assessment   LUE Assessment WFL   Cognition   Overall Cognitive Status WFL   Arousal/Participation Alert   Attention Within functional limits   Orientation Level Oriented X4   Memory Within functional limits   Following Commands Follows all commands and directions without difficulty   Assessment   Limitation Decreased endurance   Assessment Pt is a 78 y o  male seen for OT evaluation at [facility] admitted 5/5/2022 w/ Encephalopathy acute  OT completed brief review of pt's medical and social history  Comorbidities affecting pt's functional performance at time of assessment include: prostate cancer, HLD, COPD, and malnutrition  Prior to admission, pt was living alone in a [facility] and was independent with ADLs and mobility  Upon evaluation, pt presents to OT at/close functional baseline  Noted some SOB when performing LB ADLs  Pt educated this session on energy conservation strategies  Based on findings, pt is of low complexity   The patient's raw score on the AM-PAC Daily Activity inpatient short form is 24, standardized score is 57 54, greater than 39 4  Patients at this level are likely to benefit from DC to home  Please refer to the recommendation of the Occupational Therapist for safe DC planning  At this time, OT recommendations at time of discharge are home with no OT needs  Pt plans to stay with niece and family will assist as needed  Recommend shower chair for increased safety during bathing  No further acute OT needs indicated at this time - Recommend pt continue to be OOB for meals, ambulation to/from BR, perform self care tasks, and mobility in hallway with nursing  D/C from OT caseload with above recommendations     Goals   Patient Goals Pt wishes to get back to doing pushups   Plan   Treatment Interventions Endurance training;Energy conservation  (Pt educated this session on energy conservation strategies)   OT Frequency Eval only   Recommendation   OT Discharge Recommendation No rehabilitation needs  (home with family support)   Equipment Recommended Shower/Tub chair with back ($)   AM-PAC Daily Activity Inpatient   Lower Body Dressing 4   Bathing 4   Toileting 4   Upper Body Dressing 4   Grooming 4   Eating 4   Daily Activity Raw Score 24   Daily Activity Standardized Score (Calc for Raw Score >=11) 57 54   AM-PAC Applied Cognition Inpatient   Following a Speech/Presentation 4   Understanding Ordinary Conversation 4   Taking Medications 4   Remembering Where Things Are Placed or Put Away 4   Remembering List of 4-5 Errands 4   Taking Care of Complicated Tasks 4   Applied Cognition Raw Score 24   Applied Cognition Standardized Score 62 21         Cheryl East OTR/L

## 2022-05-09 NOTE — CASE MANAGEMENT
Case Management Discharge Planning Note    Patient name Refugia Longest  Location [address] 322/-[id number] MRN [id number]  : 1955 Date 2022       Current Admission Date: 2022  Current Admission Diagnosis:Encephalopathy acute   Patient Active Problem List    Diagnosis Date Noted    Encephalopathy acute 2022    Anorexia 2022    Cachexia ([facility] [address] ) 2022    Palliative care patient 2022    [facility] 2022    Insomnia 2022    Hot flashes 2022    Left lower quadrant abdominal pain 2021    Personal history of colonic polyps 2021    HLD (hyperlipidemia) 2020    Severe protein-calorie malnutrition ([facility] [address] ) 2020    Chronic obstructive pulmonary disease ([facility] ) 2020    Acute tracheobronchitis 2020      LOS (days): 4  Geometric Mean LOS (GMLOS) (days): 4 50  Days to GMLOS:0 7     OBJECTIVE:  Risk of Unplanned Readmission Score: 14         Current admission status: Inpatient   Preferred Pharmacy:   [id number] [address], [address] [address]  Phone: [phone] Fax: [phone] [facility], [address] [place] [address]  Phone: [phone] Fax: [phone]    [address] [place] [address] [address]  [facility]  [facility] [address]  [address] [place] [address]  Phone: [phone] Fax: [phone]    [id number]- [place], [address] [facility] , [address]  [facility] , [address] [place] [address]  Phone: [phone] Fax: [phone]    Primary Care Provider: ISA Holt    Primary Insurance: MEDICARE  Secondary Insurance:     DISCHARGE DETAILS:    IMM Given (Date):: 22  IMM Given to[de-identified] Patient   IMM reviewed with patient, patient agrees with discharge determination        Additional Comments: CM was informed by PT/OT that pt has no needs  Met with pt and pt's niece to discuss dc plan; pt reports no needs at this time  Niece confirmed same

## 2022-05-09 NOTE — TELEPHONE ENCOUNTER
Prior Authorization needed for Lidocaine 5% patch    [address] 56:   [facility]    Has not been started on the website

## 2022-05-09 NOTE — TELEPHONE ENCOUNTER
05/09/22    [facility] [place] that [place] will need to see us earlier  Spoke with pt and rescheduled him to see Dr Guillermina Rodriguez this Friday  Pt verbalized understanding and agreement

## 2022-05-09 NOTE — ASSESSMENT & PLAN NOTE
· Consult placed for palliative care  · Continue oral Dilaudid  · Follows with  [facility] outpatient

## 2022-05-09 NOTE — PHYSICAL THERAPY NOTE
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   Family/Caregiver Present Yes   Cognition   Overall Cognitive Status WFL   Arousal/Participation Alert   Orientation Level Oriented X4   Memory Within functional limits   Following Commands Follows all commands and directions without difficulty   Subjective   Subjective I feel good   RUE Assessment   RUE Assessment WFL   LUE Assessment   LUE Assessment WFL   RLE Assessment   RLE Assessment WFL   LLE Assessment   LLE Assessment WFL   Coordination   Movements are Fluid and Coordinated 1   Sensation WFL   Proprioception   RLE Proprioception Grossly intact   LLE Proprioception Grossly Intact   Bed Mobility   Rolling R 7  Independent   Rolling L 7  Independent   Supine to Sit 7  Independent   Sit to Supine 7  Independent   Transfers   Sit to Stand 7  Independent   Stand to Sit 7  Independent   Stand pivot 7  Independent   Ambulation/Elevation   Gait pattern WNL   Gait Assistance 7  Independent   Assistive Device None   Distance 50'   Balance   Static Sitting Normal   Dynamic Sitting Normal   Static Standing Good   Dynamic Standing Good   Ambulatory Good   Endurance Deficit   Endurance Deficit Yes   Endurance Deficit Description slight 02 desat with seated bending but recovers with rest and repositioning   Activity Tolerance   Activity Tolerance Patient limited by fatigue   Medical Staff Made Aware OT Cee   Nurse Made Aware RN Indiana Rivas   Assessment   Prognosis Good   Assessment Pt presents with slight decrease in  activity toelracne with decreased 02 sat with forward bending  Recovers quickly  Cautioned against extreme resisted activity inlinght of bony mets  Pt will follow up with MD to determine safest exercise activity   No further PT needs d/c PT   Barriers to Discharge   (medical clearance)   Goals   Patient Goals go home   Plan   PT Frequency   (d/c PT)   Recommendation   PT Discharge Recommendation No rehabilitation needs   AM-PAC Basic Mobility Inpatient   Turning in Bed Without Bedrails 4   Lying on Back to Sitting on Edge of Flat Bed 4   Moving Bed to Chair 4   Standing Up From Chair 4   Walk in Room 4   Climb 3-5 Stairs 3   Basic Mobility Inpatient Raw Score 23   Basic Mobility Standardized Score 50 88   Highest Level Of Mobility   JH-HLM Goal 7: Walk 25 feet or more   JH-HLM Highest Level of Mobility 7: Walk 25 feet or more   JH-HLM Goal Achieved Yes   Modified [place] Scale   Modified [place] Scale 1   Kusum Mcallister, PT

## 2022-05-09 NOTE — PROGRESS NOTES
Progress Note - Palliative & Supportive Care  Mariana Liu  78 y o   male  /-01   MRN: [id number]  Encounter: [id number]     Assessment/Problems Actively Addressed this Visit:  Goals of care counseling  Prostate cancer with bone mets  Severe protein calorie malnutrition  Hallucinations  Anorexia  Insomnia  COPD    Goals of Care  · Level 1 code status  · Yelena Wright will follow with oncology, agreeable to stopping [place] and starting [place]  · Niece Nila Valencia arranging for Yelena Wright to move in with her  · Will have OP Palliative follow up  · Palliative meds sent to pharmacy  Plan  Symptom Management    Insomnia:  Clonazepam 0 5MH QHS    Olanzapine 5MG QHS    Anxiety:  Alprazolam 1MG Q6H PRN  Olanzapine 5MG QHS    Anorexia:  1MG PO prednisone QD (reported S/E from 2 5MG)  Olanzapine 5MG QHS  Multivitamin  Ensure supplements    Pain:  PO hydromorphone 2MG Q4H PRN  Lidoderm 5% patches Q12H [address]  Gabapentin discontinued  Of note, Yelena Wright has restless legs with acetaminophen     Constipation Prophylaxis:  Senna QHS PRN    24 History  Chart reviewed before visit  Yelena Wright has been evaluated by neuro and oncology  MRI did not reveal brain mets  Onc recommends discontinuing Xtandi as this likely caused hallucinations  They recommend switching to alternate agent  I met with Yelena Wright and Nila Valencia at bedside to discuss  They are agreeable to plan as stated and are interested in following with oncology closely  Nila Valencia is arranging for Yelena Wright to move in with her  We reviewed Rishabh's medications and symptoms  He states the Lidoderm patches are helping  He believes the Dilaudid is helping, but is less certain of this  He denies diarrhea or constipation  The olanzapine helped with insomnia as did the clonopin, which neurology recommended  Decisional apparatus:  Patient is competent on exam today    If competence is lost, patient's substitute decision maker would default to adult siblings by [place] 169     Review of Systems:    Review of Systems   Constitutional: Positive for decreased appetite and weight loss  Negative for fever  HENT: Negative for congestion and ear discharge  Eyes: Negative for blurred vision and discharge  Cardiovascular: Negative for dyspnea on exertion, irregular heartbeat and leg swelling  Skin: Negative for dry skin and nail changes  Musculoskeletal: Positive for back pain, neck pain and stiffness  Gastrointestinal: Positive for anorexia  Negative for bloating, abdominal pain, nausea and vomiting  Genitourinary: Negative for bladder incontinence and dysuria  Neurological: Positive for weakness  Negative for headaches, light-headedness and loss of balance  Psychiatric/Behavioral: Positive for hallucinations (improved )  Negative for altered mental status  The patient is nervous/anxious          Medications    Current Facility-Administered Medications:     albuterol (PROVENTIL HFA,VENTOLIN HFA) inhaler 2 puff, 2 puff, Inhalation, Q4H PRN, Fani Fly, DO, 1 puff at 05/07/22 2131    ALPRAZolam (XANAX) tablet 1 mg, 1 mg, Oral, HS PRN, 1 mg at 05/07/22 0004 **AND** ALPRAZolam (XANAX) tablet 0 5 mg, 0 5 mg, Oral, 4x Daily PRN, Rick Parmar, DO, 0 5 mg at 05/08/22 0506    atorvastatin (LIPITOR) tablet 40 mg, 40 mg, Oral, Daily With Dinner, Fani Fly, DO, 40 mg at 05/08/22 1555    budesonide-formoterol (SYMBICORT) 160-4 5 mcg/act inhaler 2 puff, 2 puff, Inhalation, BID, Fani Fly, DO, 2 puff at 05/08/22 1749    calcium carbonate (TUMS) chewable tablet 500 mg, 500 mg, Oral, Daily PRN, ISA Gonzalez    clonazePAM (KlonoPIN) tablet 0 5 mg, 0 5 mg, Oral, HS, Rick Parmar, DO, 0 5 mg at 05/08/22 2236    heparin (porcine) subcutaneous injection 5,000 Units, 5,000 Units, Subcutaneous, Q8H [address], 5,000 Units at 05/08/22 2236 **AND** [COMPLETED] Platelet count, , , Once, Fani Fly, DO    HYDROmorphone (DILAUDID) injection 1 mg, 1 mg, Intravenous, Q4H PRN, Rudine Minor Carmina Mcduffie, PA-C, 1 mg at 05/08/22 2239    HYDROmorphone (DILAUDID) tablet 1 mg, 1 mg, Oral, Q4H PRN **OR** HYDROmorphone (DILAUDID) tablet 2 mg, 2 mg, Oral, Q4H PRN, MARSHALL Coleman-C, 2 mg at 05/08/22 2011    ibuprofen (MOTRIN) tablet 600 mg, 600 mg, Oral, Q6H PRN, Gladystine Sandy Raymond PA-C, 600 mg at 05/08/22 0830    lidocaine (LIDODERM) 5 % patch 1 patch, 1 patch, Topical, Daily, MARSHALL Coleman-C, 1 patch at 05/08/22 0830    LORazepam (ATIVAN) injection 1 mg, 1 mg, Intravenous, Q4H PRN, MARSHALL Coleman-C, 1 mg at 05/06/22 1146    montelukast (SINGULAIR) tablet 10 mg, 10 mg, Oral, HS, Fani Fly, DO, 10 mg at 05/08/22 2236    multivitamin-minerals (CENTRUM) tablet 1 tablet, 1 tablet, Oral, Daily, Kevine GARRETT BatresC, 1 tablet at 05/08/22 0830    OLANZapine (ZyPREXA) tablet 5 mg, 5 mg, Oral, HS, Glastuartstine MARSHALL Batres-C, 5 mg at 05/08/22 2236    predniSONE tablet 1 mg, 1 mg, Oral, Daily, Devorahstine GARRETT BatresC, 1 mg at 05/08/22 0830    senna (SENOKOT) tablet 17 2 mg, 2 tablet, Oral, HS PRN, Glastuartstine GARRETT BatresC    sodium chloride 0 9 % infusion, 50 mL/hr, Intravenous, Continuous, ISA Rutherford, Last Rate: 50 mL/hr at 05/08/22 0602, 50 mL/hr at 05/08/22 0602    Objective  BP (!) 84/60   Pulse 74   Temp 97 5 °F (36 4 °C)   Resp 16   Ht 5' 6" (1 676 m)   Wt 49 6 kg (109 lb 5 6 oz)   SpO2 91%   BMI 17 65 kg/m²     Physical Exam:   Physical Exam  Vitals and nursing note reviewed  Exam conducted with a chaperone present  Constitutional:       Appearance: He is cachectic  He is ill-appearing (chronically ill)  HENT:      Head: Normocephalic and atraumatic  Eyes:      General:         Right eye: No discharge  Left eye: No discharge  Conjunctiva/sclera: Conjunctivae normal    Pulmonary:      Effort: Pulmonary effort is normal  No respiratory distress  Abdominal:      Palpations: Abdomen is soft        Tenderness: There is no guarding  Musculoskeletal:      Cervical back: Neck supple  Skin:     General: Skin is warm and dry  Coloration: Skin is pale  Neurological:      Mental Status: He is alert  Psychiatric:         Behavior: Behavior is cooperative  Lab Results:   I have personally reviewed pertinent labs  , CBC:   Lab Results   Component Value Date    WBC 4 50 05/09/2022    HGB 12 0 05/09/2022    HCT 37 1 05/09/2022     (H) 05/09/2022     05/09/2022    MCH 33 1 05/09/2022    MCHC 32 3 05/09/2022    RDW 13 8 05/09/2022    MPV 8 5 (L) 05/09/2022    NRBC 0 05/09/2022   , BMP:  Lab Results   Component Value Date    SODIUM 141 05/09/2022    K 3 7 05/09/2022     05/09/2022    CO2 30 05/09/2022    BUN 13 05/09/2022    CREATININE 0 59 (L) 05/09/2022    GLUC 97 05/09/2022    CALCIUM 8 4 05/09/2022    AGAP 6 05/09/2022    EGFR 104 05/09/2022     Imaging Studies: I have personally reviewed pertinent reports  EKG, Pathology, and Other Studies: I have personally reviewed pertinent reports  Counseling / Coordination of Care  Total floor / unit time spent today 35 minutes  Greater than 50% of total time was spent with the patient and / or family counseling and / or coordinating of care  A description of the counseling / coordination of care: Chart reviewed,  provided medical updates, determined goals of care, discussed palliative care and symptom management, determined competency and POA/HCA, determined social/family support, provided psychosocial support  Reviewed with SHARYN bright, RN and NARESH Chappell MFA-SHANTHI  Palliative & Supportive Care    Portions of this document may have been created using dictation software and as such some "sound alike" terms may have been generated by the system  Do not hesitate to contact me with any questions or clarifications

## 2022-05-09 NOTE — TELEPHONE ENCOUNTER
Patient's niece Hartman Colon called stating  Prednisone , Zytiga &  Sintia be ordered  Patient is being discharged from the hospital today    Please call Minnie Conway at [phone]

## 2022-05-10 ENCOUNTER — DOCUMENTATION (OUTPATIENT)
Dept: HEMATOLOGY ONCOLOGY | Facility: CLINIC | Age: 67
End: 2022-05-10

## 2022-05-10 NOTE — TELEPHONE ENCOUNTER
Prior authorization for pt's   Lidocaine 5 % patch has been completed     [facility]   ID [id number]    Awaiting determination  Received immediate  prior authorization approval for  Lidocaine 5% patch    Unknown end date  Await official approval letter  Pharmacy notified verbally has been asked to inform pt of outcome

## 2022-05-10 NOTE — PROGRESS NOTES
Received request from clinical for patient to start on Abiraterone Acetate 250 mg daily  Magda Angelo has been approved for one year 5/10/2022-5/10/2023  [facility] is able to fill with funding that was provided      Enrolled patient with [facility] ID# [id number]  CARD# [id number]  BIN# [id number]  GRP# [id number]  AMT $ 8000  EFF 4-10-22  THRU 4-9-23

## 2022-05-10 NOTE — PROGRESS NOTES
5-10-22  Rcvd new oral chemo start- VANESSA // Juan Poag is required    5-11-22  Enrolled patient with [facility] ID# [id number]  CARD# [id number]  BIN# [id number]  GRP# [id number]  AMT $ 8000  EFF 4-10-22  THRU 4-9-23    Epic noted

## 2022-05-12 ENCOUNTER — TELEPHONE (OUTPATIENT)
Dept: PALLIATIVE MEDICINE | Facility: CLINIC | Age: 67
End: 2022-05-12

## 2022-05-12 LAB — VIT B6 SERPL-MCNC: NORMAL UG/L

## 2022-05-12 NOTE — TELEPHONE ENCOUNTER
Call placed to niece Lyn Favre  summorized events over past week  Hallucinations last week, paranoia   onc felt related to Chemo and stopped   Hallucinations eventually stopped  Pt is to possibly  start new chemo tomorrow  Pt is currently staying with his niece  She reports he is not taking his meds as prescribed and is self medicating  Meds as follows  Hydromorphone    Stopped 2 nights ago  MMJ taking as per dispensary, capsules and a tincture  Seems to be helping with pain  Prednisone   Not taking  Xanax   Only at hs   Refuses to take during the day  Niece says he could use it to calm him down but will not take  Clonazepam and Olanzapine not taking  Self medicating with cannabis from local source(illegal)     Pt is verbally argumentative   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, insisting on returning home  Per niece there are weapons in her home ( spouse is marine) but all weapons and munition are securely locked away  ( toddler and teenager in home)  Pt want s to go home and get his own gun ( "I always have a gun under my pillow")     Per niece pt has always been a stubborn, independent man  Never , no children  She does not feel personality change but exaggerated  Instructed  to keep appointment with Dr Noah Booker tomorrow as she is hoping pt will listen to a voice of authority vs herself  Asking what meds can pt stop  Instructed  To let him do what he is doing for now but eliminate the local cannabis  Welcomes any input from providers  Can call her anytime tonight with instructions  Haven Lopez

## 2022-05-12 NOTE — TELEPHONE ENCOUNTER
Niece is having questions regarding these medications that he is taking  Im routing this to Antoine Interiano  Niece is afraid her uncle is overmedicating and he did fall last night in the night  Niece is asking for an urgent call back on help

## 2022-05-12 NOTE — TELEPHONE ENCOUNTER
I am glad Rishabh's hallucinations stopped after discontinuing the enzalutamide  We have repeatedly asked Keanu Mccartney to take all his medications as prescribed  If he is not willing to do so, he may wean himself off of them without ill-effect  Please keep appointment with Dr Angelita Blevins  I agree with Rayna's recommendations here

## 2022-05-12 NOTE — TELEPHONE ENCOUNTER
Patient's niece Millie Rinne called office asking for a call to get some education on patient's medication, per nistefan "I'm worry he is going to drive, he felt yesterday and I really need some guidance  "       Please advise

## 2022-05-13 ENCOUNTER — OFFICE VISIT (OUTPATIENT)
Dept: HEMATOLOGY ONCOLOGY | Facility: HOSPITAL | Age: 67
End: 2022-05-13
Payer: MEDICARE

## 2022-05-13 ENCOUNTER — TELEPHONE (OUTPATIENT)
Dept: PALLIATIVE MEDICINE | Facility: CLINIC | Age: 67
End: 2022-05-13

## 2022-05-13 VITALS
WEIGHT: 105 LBS | RESPIRATION RATE: 14 BRPM | HEIGHT: 66 IN | OXYGEN SATURATION: 92 % | TEMPERATURE: 97.6 F | DIASTOLIC BLOOD PRESSURE: 60 MMHG | BODY MASS INDEX: 16.88 KG/M2 | SYSTOLIC BLOOD PRESSURE: 100 MMHG | HEART RATE: 88 BPM

## 2022-05-13 DIAGNOSIS — C79.51 PROSTATE CANCER METASTATIC TO BONE (HCC): Primary | ICD-10-CM

## 2022-05-13 DIAGNOSIS — C61 PROSTATE CANCER METASTATIC TO BONE (HCC): Primary | ICD-10-CM

## 2022-05-13 PROCEDURE — 99214 OFFICE O/P EST MOD 30 MIN: CPT | Performed by: INTERNAL MEDICINE

## 2022-05-13 NOTE — PROGRESS NOTES
Hematology/Oncology Outpatient Follow- up Note  Lynwood Brunner 78 y o  male MRN: @ Encounter: [id number]        Date:  5/13/2022    Presenting Complaint/Diagnosis : Metastatic prostate cancer  HPI:    Sarah Carrasco seen for initial consultation 1/14/2022 regarding newly diagnosed prostate cancer which appears to be metastatic to the bone   This was biopsy proven   The patient was referred from his primary care physician to urology in December for PSA of 850  CT scan of the abdomen pelvis demonstrated multiple sclerotic lesions   He had a biopsy of 1 of these lesions and it came back as metastatic prostate cancer  Nikko Tellez was started on anti androgen therapy by Urology in the form of Zoraida  states it her tremendously for the 1st 2 days after he got it   Per the notes he was prescribed ibuprofen and told that his next shot would cause next pain as the volume would be lower          Previous Hematologic/ Oncologic History:    Workup    Current Hematologic/ Oncologic Treatment:    Lupron with Urology and Dereje Veda with our office  Dereje Veda has been discontinued because the patient was admitted with confusion and is not clear whether was related to the medication  The patient was on steroids, pain medication and medical marijuana which could have contributed  Patient also takes his own marijuana on top of the medical marijuana per report  Interval History:    Patient returns for follow-up visit  He was admitted to the hospital with confusion  There was a question whether this was related to Dereje Veda  Clinically my suspicion is low but Dereje Veda has been discontinued as there are reports of a doing this  The patient now will be switched to [facility]  He is already on prednisone and states he cannot take more than 2 5 mg of prednisone otherwise it makes him quite ill  He will stay on the current prednisone he is on with Zytiga  He will get blood work every 2 weeks    Denies any nausea denies any vomiting denies any diarrhea currently  Is staying with family  The rest of his 14 point review of systems today was negative  Test Results:    Imaging: CTA head and neck with and without contrast    Result Date: 5/5/2022  Narrative: CTA NECK AND BRAIN WITH AND WITHOUT CONTRAST INDICATION: ams, visual hallucination COMPARISON:   None  TECHNIQUE:  Routine CT imaging of the Brain without contrast   Post contrast imaging was performed after administration of iodinated contrast through the neck and brain  Post contrast axial 0 625 mm images timed to opacify the arterial system  3D rendering was performed on an independent workstation  MIP reconstructions performed  Coronal reconstructions were performed of the noncontrast portion of the brain  Radiation dose length product (DLP) for this visit:  1281 94 mGy-cm   This examination, like all CT scans performed in the [facility], was performed utilizing techniques to minimize radiation dose exposure, including the use of iterative reconstruction and automated exposure control  IV Contrast:  85 mL of iohexol (OMNIPAQUE)  IMAGE QUALITY:   Diagnostic FINDINGS: NONCONTRAST BRAIN PARENCHYMA:  No intracranial mass, mass effect or midline shift  No CT signs of acute infarction  No acute parenchymal hemorrhage  VENTRICLES AND EXTRA-AXIAL SPACES:  Normal for the patient's age  VISUALIZED ORBITS AND PARANASAL SINUSES:  Unremarkable  CERVICAL VASCULATURE AORTIC ARCH AND GREAT VESSELS:  Mild atherosclerotic disease of the arch, proximal great vessels and visualized subclavian vessels  No significant stenosis  RIGHT VERTEBRAL ARTERY CERVICAL SEGMENT:  Mild stenosis at the origin  The vessel is normal in caliber throughout the neck  LEFT VERTEBRAL ARTERY CERVICAL SEGMENT:  Mild stenosis at the origin  The vessel is normal in caliber throughout the neck   RIGHT EXTRACRANIAL CAROTID SEGMENT:  Mild atherosclerotic disease of the distal common carotid artery and proximal cervical internal carotid artery without significant stenosis compared to the more distal ICA  LEFT EXTRACRANIAL CAROTID SEGMENT:  Mild atherosclerotic disease of the distal common carotid artery and proximal cervical internal carotid artery without significant stenosis compared to the more distal ICA  NASCET criteria was used to determine the degree of internal carotid artery diameter stenosis  INTRACRANIAL VASCULATURE INTERNAL CAROTID ARTERIES:  Normal enhancement of the intracranial portions of the internal carotid arteries  Normal ophthalmic artery origins  Normal ICA terminus  ANTERIOR CIRCULATION:  Symmetric A1 segments and anterior cerebral arteries with normal enhancement  Normal anterior communicating artery  MIDDLE CEREBRAL ARTERY CIRCULATION:  M1 segment and middle cerebral artery branches demonstrate normal enhancement bilaterally  DISTAL VERTEBRAL ARTERIES:  Normal distal vertebral arteries  Posterior inferior cerebellar artery origins are normal  Normal vertebral basilar junction  BASILAR ARTERY:  Basilar artery is normal in caliber  Normal superior cerebellar arteries  POSTERIOR CEREBRAL ARTERIES: Both posterior cerebral arteries arises from the basilar tip  Both arteries demonstrate normal enhancement  Normal posterior communicating arteries  VENOUS STRUCTURES:  Normal  NON VASCULAR ANATOMY BONY STRUCTURES:  Fairly extensive multifocal osseous metastatic disease involving the axial and appendicular skeletal system SOFT TISSUES OF THE NECK:  Unremarkable  THORACIC INLET:  Emphysematous changes     Impression: No evidence of acute intracranial hemorrhage  No evidence of hemodynamic significant stenosis, aneurysm or dissection  Diffuse bony metastatic disease Workstation performed: [id number]     MRI brain w wo contrast    Result Date: 5/7/2022  Narrative: MRI BRAIN WITH AND WITHOUT CONTRAST INDICATION: hallucinations/mental status changes, rule out brain   METS  Mike Pina  COMPARISON:  CT and CT angiography dated 5/5/2022 TECHNIQUE: Sagittal T1, axial T2, axial FLAIR, axial T1, axial [place], axial diffusion  Sagittal, axial T1 postcontrast   Axial bravo postcontrast with coronal reconstructions  IV Contrast:  7 5 mL of Gadobutrol injection (SINGLE-DOSE)  IMAGE QUALITY:   Diagnostic  FINDINGS: BRAIN PARENCHYMA:  There is no discrete mass, mass effect or midline shift  There is no intracranial hemorrhage  Normal posterior fossa  Diffusion imaging is unremarkable  There is a small periventricular white matter hyperintensity on FLAIR imaging within the left periatrial white matter  Mild immediate periventricular white matter hyperintensity on T2 and FLAIR imaging  Postcontrast imaging of the brain demonstrates no abnormal enhancement  VENTRICLES:  Normal for the patient's age  SELLA AND PITUITARY GLAND:  Normal  ORBITS:  Normal  PARANASAL SINUSES:  Normal  VASCULATURE:  Evaluation of the major intracranial vasculature demonstrates appropriate flow voids  CALVARIUM AND SKULL BASE:  Sclerotic marrow lesion noted within the calvarium, within the right squamosal temporal bone anteriorly, consistent with osseous metastasis  There is an additional small sclerotic lesion within the right parietal bone  Upper cervical marrow changes are present  EXTRACRANIAL SOFT TISSUES:  Normal      Impression: No acute intracranial abnormality  No abnormal enhancement  Sclerotic lesions within the calvarium and upper cervical spine consistent with patient's known osseous metastasis from prostate carcinoma   Workstation performed: [id number]       Labs:   Lab Results   Component Value Date    WBC 4 50 05/09/2022    HGB 12 0 05/09/2022    HCT 37 1 05/09/2022     (H) 05/09/2022     05/09/2022     Lab Results   Component Value Date    K 3 7 05/09/2022     05/09/2022    CO2 30 05/09/2022    BUN 13 05/09/2022    CREATININE 0 59 (L) 05/09/2022    GLUF 100 (H) 04/15/2022    CALCIUM 8 4 05/09/2022    CORRECTEDCA 9 5 05/06/2022    AST 16 05/06/2022    ALT 20 05/06/2022    ALKPHOS 56 05/06/2022    EGFR 104 05/09/2022       Lab Results   Component Value Date    PSA 8 4 (H) 05/08/2022    PSA 14 0 (H) 04/15/2022     0 (H) 03/10/2022       Lab Results   Component Value Date    JUEHXXFQ10 334 05/08/2022         ROS: As stated in the history of present illness otherwise his 14 point review of systems today was negative  Active Problems:   Patient Active Problem List   Diagnosis    Chronic obstructive pulmonary disease (HCC)    Acute tracheobronchitis    Severe protein-calorie malnutrition (HCC)    HLD (hyperlipidemia)    Left lower quadrant abdominal pain    Personal history of colonic polyps    Prostate cancer metastatic to bone ([facility] [address] [facility]    Insomnia    Hot flashes    Anorexia    Cachexia ([address] )    Palliative care patient    Encephalopathy acute       Past Medical History:   Past Medical History:   Diagnosis Date    Bone cancer ([facility] [address] [facility]     Colon polyp     COPD (chronic obstructive pulmonary disease) (HCC)     Emphysema lung (HCC)     Hyperlipidemia     Prostate cancer ([facility] [address] )        Surgical History:   Past Surgical History:   Procedure Laterality Date    APPENDECTOMY      COLONOSCOPY      IR BIOPSY BONE  12/30/2021    JOINT REPLACEMENT      UPPER GASTROINTESTINAL ENDOSCOPY         Family History:    Family History   Problem Relation Age of Onset    Lung cancer Mother     Breast cancer Sister     Colon polyps Brother     Colon cancer Neg Hx        Cancer-related family history includes Breast cancer in his sister; Lung cancer in his mother  There is no history of Colon cancer      Social History:   Social History     Socioeconomic History    Marital status: Single     Spouse name: Not on file    Number of children: Not on file    Years of education: Not on file    Highest education level: Not on file   Occupational History    Not on file   Tobacco Use    Smoking status: Former Smoker     Types: Cigarettes     Quit date: 2012     Years since quitting: 10 3    Smokeless tobacco: Never Used   Vaping Use    Vaping Use: Never used   Substance and Sexual Activity    Alcohol use: Never    Drug use: Never    Sexual activity: Not on file   Other Topics Concern    Not on file   Social History Narrative    Not on file     Social Determinants of Health     Financial Resource Strain: Not on file   Food Insecurity: No Food Insecurity    Worried About Running Out of Food in the Last Year: Never true    [address] in the Last Year: Never true   Transportation Needs: Unmet Transportation Needs    Lack of Transportation (Medical): Yes    Lack of Transportation (Non-Medical): Yes   Physical Activity: Not on file   Stress: Not on file   Social Connections: Not on file   Intimate Partner Violence: Not At Risk    Fear of Current or Ex-Partner: No    Emotionally Abused: No    Physically Abused: No    Sexually Abused: No   Housing Stability: Low Risk     Unable to Pay for Housing in the Last Year: No    Number of Places Lived in the Last Year: 1    Unstable Housing in the Last Year: No       Current Medications:   Current Outpatient Medications   Medication Sig Dispense Refill    albuterol (PROVENTIL HFA,VENTOLIN HFA) 90 mcg/act inhaler Inhale 2 puffs every 4 (four) hours as needed for wheezing 18 g 2    ALPRAZolam (XANAX) 1 mg tablet Take 1 tablet (1 mg total) by mouth 4 (four) times a day as needed for anxiety or sleep (Patient taking differently: Take 1 mg by mouth if needed for anxiety or sleep) 120 tablet 0    atorvastatin (LIPITOR) 40 mg tablet Take 1 tablet (40 mg total) by mouth daily with dinner 30 tablet 0    budesonide-formoterol (SYMBICORT) 160-4 5 mcg/act inhaler Inhale 2 puffs 2 (two) times a day Rinse mouth after use        clonazePAM (KlonoPIN) 0 5 mg tablet Take 1 tablet (0 5 mg total) by mouth daily at bedtime 30 tablet 0    lidocaine (LIDODERM) 5 % Apply 3 patches topically daily Remove & Discard patch within 12 hours or as directed by MD 90 patch 0    montelukast (SINGULAIR) 10 mg tablet Take 1 tablet (10 mg total) by mouth daily at bedtime 30 tablet 2    NON FORMULARY Medical marijuana      OLANZapine (ZyPREXA) 5 mg tablet Take 1 tablet (5 mg total) by mouth daily at bedtime 30 tablet 0    predniSONE 1 mg tablet Take 1 tablet (1 mg total) by mouth daily 30 tablet 0    senna (SENOKOT) 8 6 mg Take 2 tablets (17 2 mg total) by mouth daily at bedtime as needed for constipation 60 tablet 0    HYDROmorphone (DILAUDID) 2 mg tablet Take 1 tablet (2 mg total) by mouth every 4 (four) hours as needed for moderate pain or severe pain Max Daily Amount: 12 mg (Patient not taking: Reported on 5/13/2022) 180 tablet 0     No current facility-administered medications for this visit  Allergies: No Known Allergies    Physical Exam:    Body surface area is 1 52 meters squared  Wt Readings from Last 3 Encounters:   05/13/22 47 6 kg (105 lb)   05/09/22 49 6 kg (109 lb 5 6 oz)   05/05/22 48 kg (105 lb 12 8 oz)        Temp Readings from Last 3 Encounters:   05/13/22 97 6 °F (36 4 °C) (Temporal)   05/09/22 97 5 °F (36 4 °C)   05/05/22 (!) 97 2 °F (36 2 °C) (Temporal)        BP Readings from Last 3 Encounters:   05/13/22 100/60   05/09/22 (!) 84/60   05/05/22 110/70         Pulse Readings from Last 3 Encounters:   05/13/22 88   05/09/22 74   05/05/22 93        Physical Exam     Constitutional   General appearance: No acute distress, well appearing and well nourished  Eyes   Conjunctiva and lids: No swelling, erythema or discharge  Pupils and irises: Equal, round and reactive to light  Ears, Nose, Mouth, and Throat   External inspection of ears and nose: Normal     Nasal mucosa, septum, and turbinates: Normal without edema or erythema  Oropharynx: Normal with no erythema, edema, exudate or lesions      Pulmonary   Respiratory effort: No increased work of breathing or signs of respiratory distress  Auscultation of lungs: Clear to auscultation  Cardiovascular   Palpation of heart: Normal PMI, no thrills  Auscultation of heart: Normal rate and rhythm, normal S1 and S2, without murmurs  Examination of extremities for edema and/or varicosities: Normal     Carotid pulses: Normal     Abdomen   Abdomen: Non-tender, no masses  Liver and spleen: No hepatomegaly or splenomegaly  Lymphatic   Palpation of lymph nodes in neck: No lymphadenopathy  Musculoskeletal   Gait and station: Normal     Digits and nails: Normal without clubbing or cyanosis  Inspection/palpation of joints, bones, and muscles: Normal     Skin   Skin and subcutaneous tissue: Normal without rashes or lesions  Neurologic   Cranial nerves: Cranial nerves 2-12 intact  Sensation: No sensory loss  Psychiatric   Orientation to person, place, and time: Normal     Mood and affect: Normal         Assessment / Plan:    The patient is pleasant 77-year-old male with newly diagnosed metastatic prostate cancer to the bone   He was started on Firmagon with Urology  He was started on Xtandi  He was admitted with confusion  Glennette Costa has been discontinued  We will now switch him to [address] explained the possible side effects to include but not limited to diarrhea, abdominal pain, abnormal liver function tests, drug interactions, androgen deprivation side effects like hot flashes, osteoporosis and even death  He agreed to proceed and will sign a consent form today    [facility] will stay on his Hardy Needle through his urologist   His PSA has come down to the 8 range at this point from a peak of 850  He also has bony metastases  I will add on Xgeva  I went over the risks benefits and alternatives of Xgeva  I explained he needs take calcium 1500 mg daily  He is agreeable to this  I explained the side effects to include osteonecrosis of the jaw, hypocalcemia and even death  The patient is agreeable to proceeding    We will start this also  I will see him back in 2 months  He will get blood work every 2 weeks  He will stay on his [address] through his urologist       Goals and Barriers:  Current Goal:  Prolong Survival from metastatic prostate cancer  Barriers: None  Patient's Capacity to Self Care:  Patient  able to self care  Portions of the record may have been created with voice recognition software  Occasional wrong word or "sound a like" substitutions may have occurred due to the inherent limitations of voice recognition software  Read the chart carefully and recognize, using context, where substitutions have occurred

## 2022-05-17 ENCOUNTER — NURSE TRIAGE (OUTPATIENT)
Dept: OTHER | Facility: OTHER | Age: 67
End: 2022-05-17

## 2022-05-17 ENCOUNTER — TELEPHONE (OUTPATIENT)
Dept: HEMATOLOGY ONCOLOGY | Facility: CLINIC | Age: 67
End: 2022-05-17

## 2022-05-17 DIAGNOSIS — C79.51 PROSTATE CANCER METASTATIC TO BONE (HCC): Primary | ICD-10-CM

## 2022-05-17 DIAGNOSIS — C79.51 MALIGNANT NEOPLASM OF PROSTATE METASTATIC TO BONE (HCC): ICD-10-CM

## 2022-05-17 DIAGNOSIS — J44.1 COPD WITH ACUTE EXACERBATION (HCC): ICD-10-CM

## 2022-05-17 DIAGNOSIS — C61 PROSTATE CANCER METASTATIC TO BONE (HCC): Primary | ICD-10-CM

## 2022-05-17 DIAGNOSIS — R97.20 ELEVATED PSA, GREATER THAN OR EQUAL TO 20 NG/ML: ICD-10-CM

## 2022-05-17 DIAGNOSIS — C61 MALIGNANT NEOPLASM OF PROSTATE METASTATIC TO BONE (HCC): ICD-10-CM

## 2022-05-17 RX ORDER — ABIRATERONE 500 MG/1
1000 TABLET ORAL DAILY
Qty: 120 TABLET | Refills: 11 | Status: SHIPPED | OUTPATIENT
Start: 2022-05-17 | End: 2022-05-18

## 2022-05-17 RX ORDER — PREDNISONE 1 MG/1
5 TABLET ORAL 2 TIMES DAILY WITH MEALS
Qty: 60 TABLET | Refills: 11 | Status: SHIPPED | OUTPATIENT
Start: 2022-05-17 | End: 2022-05-17

## 2022-05-17 NOTE — TELEPHONE ENCOUNTER
Called back pt nistefan and explained that [facility] will reach out with scheduled delivery day and time once order signed by Dr Estuardo Waggoner  Also advised prednisone will be taken in combination  Verbalized good understanding of this

## 2022-05-17 NOTE — TELEPHONE ENCOUNTER
CALL RETURN FORM   Reason for patient call? Patient's niece is calling to see if the office knows an estimated date of delivery for the patients chemo pills    Patient's primary oncologist? Dr Lori Espinoza   Name of person the patient was calling for? Mague/Wade   Any additional information to add, if applicable? Best call back number is [phone]   Informed patient that the message will be forwarded to the team and someone will get back to them as soon as possible    Did you relay this information to the patient?  yes

## 2022-05-18 DIAGNOSIS — C61 MALIGNANT NEOPLASM OF PROSTATE METASTATIC TO BONE (HCC): ICD-10-CM

## 2022-05-18 DIAGNOSIS — C79.51 MALIGNANT NEOPLASM OF PROSTATE METASTATIC TO BONE (HCC): ICD-10-CM

## 2022-05-18 DIAGNOSIS — C79.51 PROSTATE CANCER METASTATIC TO BONE (HCC): Primary | ICD-10-CM

## 2022-05-18 DIAGNOSIS — C61 PROSTATE CANCER METASTATIC TO BONE (HCC): Primary | ICD-10-CM

## 2022-05-18 NOTE — TELEPHONE ENCOUNTER
I returned call to Adrienne Peña  Will also discuss further when she brings Toshia Jerome to appointment tomorrow

## 2022-05-18 NOTE — TELEPHONE ENCOUNTER
Reason for Disposition   Patient has refills remaining on their prescription    Answer Assessment - Initial Assessment Questions  1  NAME of MEDICATION: "What medicine are you calling about?"      Albuterol     2  QUESTION: "What is your question?" (e g , medication refill, side effect)      All out of    3  PRESCRIBING HCP: "Who prescribed it?" Reason: if prescribed by specialist, call should be referred to that group  [facility]    4  SYMPTOMS: "Do you have any symptoms?"      SOB    5   SEVERITY: If symptoms are present, ask "Are they mild, moderate or severe?"      Mild    Protocols used: MEDICATION QUESTION CALL-ADULT-

## 2022-05-18 NOTE — TELEPHONE ENCOUNTER
Regarding: medication request urgent   ----- Message from Juventino Nickerson sent at 5/17/2022  7:51 PM EDT -----  "I am calling for my father he is currently out of his albuterol (PROVENTIL HFA,VENTOLIN HFA) 90 mcg/act inhaler and he needs this today    [id number] [place] - [id number] , [address]   Phone:  [phone]

## 2022-05-19 ENCOUNTER — OFFICE VISIT (OUTPATIENT)
Dept: PALLIATIVE MEDICINE | Age: 67
End: 2022-05-19
Payer: MEDICARE

## 2022-05-19 VITALS
SYSTOLIC BLOOD PRESSURE: 100 MMHG | RESPIRATION RATE: 16 BRPM | BODY MASS INDEX: 17.37 KG/M2 | WEIGHT: 107.6 LBS | OXYGEN SATURATION: 95 % | DIASTOLIC BLOOD PRESSURE: 68 MMHG | TEMPERATURE: 96.6 F | HEART RATE: 78 BPM

## 2022-05-19 DIAGNOSIS — E43 SEVERE PROTEIN-CALORIE MALNUTRITION (HCC): ICD-10-CM

## 2022-05-19 DIAGNOSIS — G47.00 INSOMNIA, UNSPECIFIED TYPE: ICD-10-CM

## 2022-05-19 DIAGNOSIS — C79.51 PROSTATE CANCER METASTATIC TO BONE (HCC): Primary | ICD-10-CM

## 2022-05-19 DIAGNOSIS — F41.9 ANXIETY: ICD-10-CM

## 2022-05-19 DIAGNOSIS — R64 CACHEXIA (HCC): ICD-10-CM

## 2022-05-19 DIAGNOSIS — R63.0 ANOREXIA: ICD-10-CM

## 2022-05-19 DIAGNOSIS — C61 PROSTATE CANCER METASTATIC TO BONE (HCC): Primary | ICD-10-CM

## 2022-05-19 DIAGNOSIS — Z51.5 PALLIATIVE CARE PATIENT: ICD-10-CM

## 2022-05-19 PROCEDURE — 1124F ACP DISCUSS-NO DSCNMKR DOCD: CPT | Performed by: PHYSICIAN ASSISTANT

## 2022-05-19 PROCEDURE — 99215 OFFICE O/P EST HI 40 MIN: CPT | Performed by: PHYSICIAN ASSISTANT

## 2022-05-19 RX ORDER — ABIRATERONE ACETATE 250 MG/1
1000 TABLET ORAL DAILY
Qty: 120 TABLET | Refills: 11 | Status: SHIPPED | OUTPATIENT
Start: 2022-05-19

## 2022-05-19 RX ORDER — OLANZAPINE 5 MG/1
7.5 TABLET ORAL
Qty: 30 TABLET | Refills: 0 | Status: SHIPPED | OUTPATIENT
Start: 2022-05-19 | End: 2022-07-10 | Stop reason: SDUPTHER

## 2022-05-19 NOTE — PROGRESS NOTES
Outpatient Follow-Up - Palliative and Supportive Care   Wally Van 78 y o  male [id number]    Assessment & Plan  1  Palliative care patient    2  Cachexia ([address] )    3  Anorexia    4  Insomnia, unspecified type    5  Anxiety        Plan:  · Will increase Zyprexa to 7 5MH QHS for mood  · Starting Zytiga and steroid, managed by oncology  · Continue MMJ  · Continue Dilaudid 2MG Q4H PRN; only taking occasionally  · Continue Klonopin 0 5MG QHS and xanax 1MG Q6H PRN anxiety  Medications adjusted this encounter:  Requested Prescriptions     Pending Prescriptions Disp Refills    OLANZapine (ZyPREXA) 5 mg tablet 30 tablet 0     Sig: Take 1 5 tablets (7 5 mg total) by mouth daily at bedtime     No orders of the defined types were placed in this encounter  There are no discontinued medications  Wally Van was seen today for symptoms and planning cares related to above illnesses  I have reviewed the patient's controlled substance dispensing history in the Prescription Drug Monitoring Program in compliance with the [facility] regulations before prescribing any controlled substances  They are invited to continue to follow with us  If there are questions or concerns, please contact us through our clinic/answering service 24 hours a day, seven days a week  [address] PA-C  [facility] Palliative and Supportive Care  [phone]      Visit Information    Accompanied By: Family member    Source of History: Self     History of Present Illness      Wally Van is a 78 y o  male who presents in follow up of symptoms related to metastatic prostate cancer  Pertinent issues include: symptom management, pain, neoplasm related, breathlessness, depression or anxiety, nausea, fatigue, assessment of goals of care, disease process education and discussion of prognosis, advance care planning      Sathish Rosario follows with Dr Guillermina Rodriguez and Gypsy Dean; he establish care with Oncology in January 2022 for newly diagnosed prostate cancer with Mets to bone, confirmed by biopsy  Initial concern discovered by PCP due to elevated PSA  A CT image done 12/2021 revealed extensive multifocal osseous metastatic disease including axial and appendicular skeletal system  Metastasis was also noted at calvarium, sternum, ribs, right humerus and clavicle, bilateral scapulae, cervical spine and thoracolumbar spine, sacrum, pelvis, and bilateral femurs  Adan Melendez was initiated on Atamaria 86 anti androgen therapy by Urology and plans with plan for Lupron every 6 months  Adan Melendez was initiated anti androgen therapy with Wesley Mcgee  He follows with palliative care for symptom management  From the prior palliative 6 appointment, Adan Melendez was referred to the ED as he was distressed by visual hallucinations which he attributed to prednisone and Xtandi  Work up did not reveal progression to brain Mets or other organic cause  He was seen by oncology and palliative care in the hospital   Decision made to discontinue Yue Quesada in favor of other antineoplastic agent  He was discharged from hospital and is temporary living with his niece who accompanies him to appointment today  Today in the office, Adan Melendez expresses that he is feeling well  He is concerned for upcoming transition to [facility] as he will need to take a higher dose of prednisone with this and anticipates the hallucinations returning  We discussed risks versus benefits and he prefers to proceed treatment  He notes that he should not drive when he is on steroid  Aura Downey endorses that Adan Melendez has had mood swings and Adan Melendez reports not being aware of this however he is willing to take this into consideration  He believes his pain is well controlled on medical marijuana and he rarely needs to take Dilaudid  He is not forthcoming in how he is taking the Xanax but refuses to take it during the day and implies he takes at night with the Klonopin    We discussed medications safety for this     We discussed advance care planning  Javier Heard states he does not want think of anything negative at the current time  From the way he is currently feeling, he says he believes he could live for another 5 years and he does not want to talk about end of life arrangements  He states he is working on a new living will with his   At this time, he is agreeable to increasing his olanzapine  We will follow closely with Javier Heard  Past medical, surgical, social, and family histories are reviewed and pertinent updates are made  Review of Systems   Constitutional: Positive for night sweats (improved)  Negative for decreased appetite and weight gain  HENT: Negative for congestion and hoarse voice  Cardiovascular: Positive for dyspnea on exertion (chronic)  Negative for chest pain  Musculoskeletal: Positive for neck pain and stiffness  Negative for arthritis, back pain and joint pain  Gastrointestinal: Negative for bloating, abdominal pain, dysphagia, excessive appetite, nausea and vomiting  Genitourinary: Negative for bladder incontinence and dysuria  Neurological: Positive for difficulty with concentration  Negative for disturbances in coordination and excessive daytime sleepiness  Psychiatric/Behavioral: Positive for hypervigilance  Negative for altered mental status and hallucinations (resolved)  The patient does not have insomnia  Vital Signs    Pulse 78   Temp (!) 96 6 °F (35 9 °C) (Temporal)   Resp 16   Wt 48 8 kg (107 lb 9 6 oz)   SpO2 95%   BMI 17 37 kg/m²      Physical Exam and Objective Data  Physical Exam  Vitals and nursing note reviewed  Constitutional:       Appearance: He is cachectic  He is ill-appearing  HENT:      Head: Normocephalic and atraumatic  Eyes:      Conjunctiva/sclera: Conjunctivae normal    Pulmonary:      Effort: Pulmonary effort is normal  No respiratory distress  Abdominal:      Palpations: Abdomen is soft  Tenderness:  There is no abdominal tenderness  Musculoskeletal:         General: No swelling or tenderness  Cervical back: Neck supple  Comments: Muscle wasting and fat loss present   Skin:     General: Skin is warm and dry  Neurological:      Mental Status: He is alert and oriented to person, place, and time  Mental status is at baseline  Psychiatric:         Attention and Perception: He does not perceive visual hallucinations  Mood and Affect: Mood is anxious  Behavior: Behavior is hyperactive  Judgment: Judgment is not impulsive or inappropriate  Radiology and Laboratory:  I personally reviewed and interpreted the following results: CBC, CMP, MRI b     60 minutes was spent face to face with his niece with greater than 50% of the time spent in counseling or coordination of care including discussions of etiology of diagnosis, instructions for disease self management, treatment instructions, follow up requirements, risk factors and risk reduction of disease, patient and family counseling/involvement in care and compliance with treatment regimen  All of the patient's or agent's questions were answered during this discussion

## 2022-05-20 ENCOUNTER — TELEPHONE (OUTPATIENT)
Dept: PALLIATIVE MEDICINE | Facility: CLINIC | Age: 67
End: 2022-05-20

## 2022-05-20 ENCOUNTER — SOCIAL WORK (OUTPATIENT)
Dept: PALLIATIVE MEDICINE | Facility: CLINIC | Age: 67
End: 2022-05-20

## 2022-05-20 DIAGNOSIS — Z71.89 COUNSELING AND COORDINATION OF CARE: Primary | ICD-10-CM

## 2022-05-20 PROCEDURE — NC001 PR NO CHARGE

## 2022-05-20 NOTE — TELEPHONE ENCOUNTER
Felipe Solomon (niece) called office regarding Krystle Rummage and episode that happened last night  She stated he will be living back home and to call his phone number first     This patient has social issues to be addressed  Grant Ferrera do you mind following up with yissel Solomon  She can be reached at [phone]

## 2022-05-20 NOTE — TELEPHONE ENCOUNTER
Pt's sister, Jacob Hernandez, called office requesting a call back from Zane Fontenot as soon as she can  She states pt is currently under Helen's (niece) care, but feels she is unable to continue caring properly for pt  She would like to know what can and should be done to ensure Piedad is the primary contact for the pt  She is also interested in being enlisted as a POA for pt  Please advise

## 2022-05-20 NOTE — PROGRESS NOTES
MSW returned pts yissel, Jozef Barcenas, [place]  David Ornelas states that she has been caring for pt the past 10 days, as she feels that he is not capable of doing so himself  She states that she does not feel he is safe to drive a car, therefore last night, after an argument, he took his keys and went and got into his car  David Ornelas states that she called the police because she was concerned about his safety and others, as she had just given him his dilaudid, Xanax and medical marijuana  David Ornelas states that the police arrived and deemed that he was competent and able to drive  She states that she had 22 weapons that belong to the pt, she informed the police and they were removed from her home and given back to the patient, along with his medications  The pt then left and later in the evening the pt called David Ornelas stating that he was lost  David Ornelas states that the police and other family members were out looking for him  She heard from the pt later and he stated that he had been arrested  David Ornelas is unsure of what happened to the weapons, as the pt told her the police confiscated them  Davidalejandro Ornelas is not sure whether to believe her Uncle, or not  She does request that she no longer be listed as his first contact  I did move her down to the third contact  She would like to continue to help him as she is able, however he can no longer stay with her and her family  She has children in her home and is fearful of him misusing the medications, or leaving them around her home  She states that she found 5 loose dilaudid tablets on the bedroom floor of where the pt was staying  This had previously been her toddlers room  I validated that she is doing what is right to protect her family  I expressed that unfortunately the pt is able to make his own decision, as he has not been deemed incapable of doing so  David Ornelas states that she spoke with the pts 2 sisters, Adams Allen and Juve Andersonz regarding his new chemo pill and instructions on how he is to take it   David Ornelas feels that pt should no longer be allowed to drive  I encouraged her to reach out to pts PCP  Francine Salinas was appreciative of the support

## 2022-05-20 NOTE — TELEPHONE ENCOUNTER
Jacob Hernandez should contact Jarret De Leon about these matters  Piedad is not currently on Rishabh's list of emergency contacts  She would need to have Jarret De Leon call the office and give us permission to talk with her  I will not be updating her until I receive consent from Jarret De Leon  She may speak with Jarret De Leon if she wishes to be his POA  I know he is working on advanced directives with his

## 2022-05-23 ENCOUNTER — APPOINTMENT (OUTPATIENT)
Dept: LAB | Facility: HOSPITAL | Age: 67
End: 2022-05-23
Attending: INTERNAL MEDICINE
Payer: MEDICARE

## 2022-05-23 LAB
ALBUMIN SERPL BCP-MCNC: 3.5 G/DL (ref 3.5–5)
ALP SERPL-CCNC: 68 U/L (ref 46–116)
ALT SERPL W P-5'-P-CCNC: 26 U/L (ref 12–78)
ANION GAP SERPL CALCULATED.3IONS-SCNC: 7 MMOL/L (ref 4–13)
AST SERPL W P-5'-P-CCNC: 16 U/L (ref 5–45)
BASOPHILS # BLD AUTO: 0.07 THOUSANDS/ΜL (ref 0–0.1)
BASOPHILS NFR BLD AUTO: 2 % (ref 0–1)
BILIRUB SERPL-MCNC: 0.3 MG/DL (ref 0.2–1)
BUN SERPL-MCNC: 10 MG/DL (ref 5–25)
CALCIUM SERPL-MCNC: 9.1 MG/DL (ref 8.3–10.1)
CHLORIDE SERPL-SCNC: 104 MMOL/L (ref 100–108)
CO2 SERPL-SCNC: 32 MMOL/L (ref 21–32)
CREAT SERPL-MCNC: 0.77 MG/DL (ref 0.6–1.3)
EOSINOPHIL # BLD AUTO: 0.17 THOUSAND/ΜL (ref 0–0.61)
EOSINOPHIL NFR BLD AUTO: 4 % (ref 0–6)
ERYTHROCYTE [DISTWIDTH] IN BLOOD BY AUTOMATED COUNT: 13.1 % (ref 11.6–15.1)
GFR SERPL CREATININE-BSD FRML MDRD: 93 ML/MIN/1.73SQ M
GLUCOSE P FAST SERPL-MCNC: 68 MG/DL (ref 65–99)
HCT VFR BLD AUTO: 41.2 % (ref 36.5–49.3)
HGB BLD-MCNC: 13.2 G/DL (ref 12–17)
IMM GRANULOCYTES # BLD AUTO: 0.01 THOUSAND/UL (ref 0–0.2)
IMM GRANULOCYTES NFR BLD AUTO: 0 % (ref 0–2)
LYMPHOCYTES # BLD AUTO: 1.07 THOUSANDS/ΜL (ref 0.6–4.47)
LYMPHOCYTES NFR BLD AUTO: 25 % (ref 14–44)
MCH RBC QN AUTO: 32.8 PG (ref 26.8–34.3)
MCHC RBC AUTO-ENTMCNC: 32 G/DL (ref 31.4–37.4)
MCV RBC AUTO: 102 FL (ref 82–98)
MONOCYTES # BLD AUTO: 0.74 THOUSAND/ΜL (ref 0.17–1.22)
MONOCYTES NFR BLD AUTO: 17 % (ref 4–12)
NEUTROPHILS # BLD AUTO: 2.29 THOUSANDS/ΜL (ref 1.85–7.62)
NEUTS SEG NFR BLD AUTO: 52 % (ref 43–75)
NRBC BLD AUTO-RTO: 0 /100 WBCS
PLATELET # BLD AUTO: 418 THOUSANDS/UL (ref 149–390)
PMV BLD AUTO: 8.6 FL (ref 8.9–12.7)
POTASSIUM SERPL-SCNC: 3.6 MMOL/L (ref 3.5–5.3)
PROT SERPL-MCNC: 6.5 G/DL (ref 6.4–8.2)
RBC # BLD AUTO: 4.03 MILLION/UL (ref 3.88–5.62)
SODIUM SERPL-SCNC: 143 MMOL/L (ref 136–145)
WBC # BLD AUTO: 4.35 THOUSAND/UL (ref 4.31–10.16)

## 2022-05-23 PROCEDURE — 85025 COMPLETE CBC W/AUTO DIFF WBC: CPT | Performed by: INTERNAL MEDICINE

## 2022-05-23 PROCEDURE — 36415 COLL VENOUS BLD VENIPUNCTURE: CPT | Performed by: INTERNAL MEDICINE

## 2022-05-23 PROCEDURE — 80053 COMPREHEN METABOLIC PANEL: CPT | Performed by: INTERNAL MEDICINE

## 2022-05-24 ENCOUNTER — HOSPITAL ENCOUNTER (OUTPATIENT)
Dept: INFUSION CENTER | Facility: HOSPITAL | Age: 67
Discharge: HOME | End: 2022-05-24
Attending: INTERNAL MEDICINE
Payer: MEDICARE

## 2022-05-24 VITALS
HEART RATE: 67 BPM | SYSTOLIC BLOOD PRESSURE: 118 MMHG | OXYGEN SATURATION: 96 % | BODY MASS INDEX: 16.75 KG/M2 | DIASTOLIC BLOOD PRESSURE: 70 MMHG | TEMPERATURE: 95.9 F | RESPIRATION RATE: 16 BRPM | WEIGHT: 106.7 LBS | HEIGHT: 67 IN

## 2022-05-24 DIAGNOSIS — C79.51 PROSTATE CANCER METASTATIC TO BONE (HCC): Primary | ICD-10-CM

## 2022-05-24 DIAGNOSIS — C61 PROSTATE CANCER METASTATIC TO BONE (HCC): Primary | ICD-10-CM

## 2022-05-24 PROCEDURE — 96372 THER/PROPH/DIAG INJ SC/IM: CPT

## 2022-05-24 RX ADMIN — DENOSUMAB 120 MG: 120 INJECTION SUBCUTANEOUS at 13:06

## 2022-05-24 NOTE — PLAN OF CARE
Problem: Potential for Falls  Goal: Patient will remain free of falls  Description: INTERVENTIONS:  - Educate patient/family on patient safety including physical limitations  - Instruct patient to call for assistance with activity   - Consult OT/PT to assist with strengthening/mobility   - Keep Call bell within reach  - Keep bed low and locked with side rails adjusted as appropriate  - Keep care items and personal belongings within reach  - Initiate and maintain comfort rounds  - Make Fall Risk Sign visible to staff  - Offer Toileting every 0 Hours, in advance of need  - Initiate/Maintain 0alarm  - Obtain necessary fall risk management equipment: 0  - Apply yellow socks and bracelet for high fall risk patients  - Consider moving patient to room near nurses station  Outcome: Progressing     Problem: Knowledge Deficit  Goal: Patient/family/caregiver demonstrates understanding of disease process, treatment plan, medications, and discharge instructions  Description: Complete learning assessment and assess knowledge base    Interventions:  - Provide teaching at level of understanding  - Provide teaching via preferred learning methods  Outcome: Progressing

## 2022-05-26 LAB — VIT B1 BLD-SCNC: 125 NMOL/L (ref 66.5–200)

## 2022-05-28 DIAGNOSIS — J44.1 COPD WITH ACUTE EXACERBATION (HCC): ICD-10-CM

## 2022-05-30 DIAGNOSIS — Z51.5 PALLIATIVE CARE PATIENT: ICD-10-CM

## 2022-05-30 DIAGNOSIS — R63.0 ANOREXIA: ICD-10-CM

## 2022-05-30 DIAGNOSIS — R64 CACHEXIA (HCC): ICD-10-CM

## 2022-05-30 DIAGNOSIS — J44.1 COPD WITH ACUTE EXACERBATION (HCC): ICD-10-CM

## 2022-05-31 RX ORDER — ALBUTEROL SULFATE 90 UG/1
2 AEROSOL, METERED RESPIRATORY (INHALATION) EVERY 4 HOURS PRN
Qty: 18 G | Refills: 0 | Status: SHIPPED | OUTPATIENT
Start: 2022-05-31 | End: 2022-07-28 | Stop reason: SDUPTHER

## 2022-05-31 RX ORDER — ALBUTEROL SULFATE 90 UG/1
2 AEROSOL, METERED RESPIRATORY (INHALATION) EVERY 4 HOURS PRN
Qty: 18 G | Refills: 0 | Status: SHIPPED | OUTPATIENT
Start: 2022-05-31 | End: 2022-07-10 | Stop reason: SDUPTHER

## 2022-05-31 RX ORDER — PREDNISONE 1 MG/1
1 TABLET ORAL DAILY
Qty: 30 TABLET | Refills: 0 | Status: SHIPPED | OUTPATIENT
Start: 2022-05-31 | End: 2022-07-10 | Stop reason: SDUPTHER

## 2022-05-31 NOTE — TELEPHONE ENCOUNTER
I do not know if this appropriate to fill or not and [facility] is not here for the next 2 days  Can you look into this?

## 2022-06-02 DIAGNOSIS — Z51.5 PALLIATIVE CARE PATIENT: Primary | ICD-10-CM

## 2022-06-02 DIAGNOSIS — F11.20 OPIOID DEPENDENCE IN CONTROLLED ENVIRONMENT (HCC): ICD-10-CM

## 2022-06-02 RX ORDER — NALOXONE HYDROCHLORIDE 4 MG/.1ML
SPRAY NASAL
Qty: 1 EACH | Refills: 0 | Status: SHIPPED | OUTPATIENT
Start: 2022-06-02

## 2022-06-07 ENCOUNTER — TELEPHONE (OUTPATIENT)
Dept: HEMATOLOGY ONCOLOGY | Facility: HOSPITAL | Age: 67
End: 2022-06-07

## 2022-06-07 NOTE — TELEPHONE ENCOUNTER
Called patient and spoke to patients niece [facility]  Rescheduled F/U appt with [facility]  [facility] confirmed the appt, date and time  [facility] confirmed knowing the appt was rescheduled due to provider being away on vacation

## 2022-06-08 DIAGNOSIS — J44.9 CHRONIC OBSTRUCTIVE PULMONARY DISEASE, UNSPECIFIED COPD TYPE (HCC): Primary | ICD-10-CM

## 2022-06-08 RX ORDER — BUDESONIDE AND FORMOTEROL FUMARATE DIHYDRATE 160; 4.5 UG/1; UG/1
2 AEROSOL RESPIRATORY (INHALATION) 2 TIMES DAILY
Qty: 10.2 G | Refills: 2 | Status: SHIPPED | OUTPATIENT
Start: 2022-06-08 | End: 2022-07-28 | Stop reason: SDUPTHER

## 2022-06-13 ENCOUNTER — APPOINTMENT (OUTPATIENT)
Dept: LAB | Facility: HOSPITAL | Age: 67
End: 2022-06-13
Attending: INTERNAL MEDICINE
Payer: MEDICARE

## 2022-06-16 ENCOUNTER — TELEPHONE (OUTPATIENT)
Dept: PALLIATIVE MEDICINE | Facility: CLINIC | Age: 67
End: 2022-06-16

## 2022-06-16 ENCOUNTER — OFFICE VISIT (OUTPATIENT)
Dept: PALLIATIVE MEDICINE | Age: 67
End: 2022-06-16
Payer: MEDICARE

## 2022-06-16 VITALS
HEART RATE: 95 BPM | RESPIRATION RATE: 16 BRPM | TEMPERATURE: 97.5 F | OXYGEN SATURATION: 95 % | WEIGHT: 101 LBS | SYSTOLIC BLOOD PRESSURE: 108 MMHG | DIASTOLIC BLOOD PRESSURE: 60 MMHG | HEIGHT: 67 IN | BODY MASS INDEX: 15.85 KG/M2

## 2022-06-16 DIAGNOSIS — Z51.5 PALLIATIVE CARE PATIENT: ICD-10-CM

## 2022-06-16 DIAGNOSIS — E43 SEVERE PROTEIN-CALORIE MALNUTRITION (HCC): ICD-10-CM

## 2022-06-16 DIAGNOSIS — G47.00 INSOMNIA, UNSPECIFIED TYPE: ICD-10-CM

## 2022-06-16 DIAGNOSIS — F41.9 ANXIETY: ICD-10-CM

## 2022-06-16 DIAGNOSIS — C79.51 PROSTATE CANCER METASTATIC TO BONE (HCC): Primary | ICD-10-CM

## 2022-06-16 DIAGNOSIS — R63.0 ANOREXIA: ICD-10-CM

## 2022-06-16 DIAGNOSIS — C61 PROSTATE CANCER METASTATIC TO BONE (HCC): Primary | ICD-10-CM

## 2022-06-16 DIAGNOSIS — R64 CACHEXIA (HCC): ICD-10-CM

## 2022-06-16 DIAGNOSIS — R23.2 HOT FLASHES: ICD-10-CM

## 2022-06-16 DIAGNOSIS — J44.9 CHRONIC OBSTRUCTIVE PULMONARY DISEASE, UNSPECIFIED COPD TYPE (HCC): ICD-10-CM

## 2022-06-16 PROCEDURE — 99214 OFFICE O/P EST MOD 30 MIN: CPT | Performed by: PHYSICIAN ASSISTANT

## 2022-06-16 RX ORDER — CLONAZEPAM 0.5 MG/1
0.5 TABLET ORAL
Qty: 30 TABLET | Refills: 0 | Status: SHIPPED | OUTPATIENT
Start: 2022-06-16 | End: 2022-07-10 | Stop reason: SDUPTHER

## 2022-06-16 NOTE — PROGRESS NOTES
Outpatient Follow-Up - Palliative and Supportive Care   Kacy Akhtar 78 y o  male [id number]    Assessment & Plan  1  Prostate cancer metastatic to bone ([facility] )    2  Chronic obstructive pulmonary disease, unspecified COPD type ([facility] )    3  Hot flashes    4  Severe protein-calorie malnutrition ([facility] )    5  Palliative care patient    6  Cachexia ([place] [address] )    7  Anorexia    8  Insomnia, unspecified type    9  Anxiety      Plan:  · Refilled Klonopin 0 5 mg q h s   Can continue Xanax 0 5 mg q 6 hours p r n  anxiety  · Continue Zyprexa 7 5 mg q h s  · Continue Dilaudid 2 mg q 4h p r n; only taking occasionally  · Continue MMJ  · Was taking Zoloft for hot flashes  Continue oxybutynin if they worsen or persist   · Oncology managing dose of steroid with Zytiga at this time  · RTC-3 months  · Of note, Tatiana Peña does give permission for any of his listed family members to be updated including niece Vanessa Mckeon and also sister Darnell Altman  I added Piedad to contact list       Medications adjusted this encounter:  Requested Prescriptions     Signed Prescriptions Disp Refills    clonazePAM (KlonoPIN) 0 5 mg tablet 30 tablet 0     Sig: Take 1 tablet (0 5 mg total) by mouth daily at bedtime     No orders of the defined types were placed in this encounter  Medications Discontinued During This Encounter   Medication Reason    clonazePAM (KlonoPIN) 0 5 mg tablet Reorder         Kacy Akhtar was seen today for symptoms and planning cares related to above illnesses  I have reviewed the patient's controlled substance dispensing history in the Prescription Drug Monitoring Program in compliance with the [facility] regulations before prescribing any controlled substances    Fill Date ID   Written Drug Qty Days Prescriber Rx # Pharmacy Refill   Daily Dose* Pymt Type      06/02/2022  1   06/02/2022  Alprazolam 1 MG Tablet    60 00  30  [facility]   [id number]   Pen ([id number])     [place]   05/09/2022  1   05/09/2022  Hydromorphone 2 MG Tablet 120 00  20 [facility]   [id number]   Pen (7299) 63 32 MME  [place]       They are invited to continue to follow with us  If there are questions or concerns, please contact us through our clinic/answering service 24 hours a day, seven days a week  [address] LU[address]e PA-C  [facility] Palliative and Supportive Care  [phone]      Visit Information    Accompanied By: no one    Source of History: Self     History of Present Illness      Jennifer Ramirez is a 78 y o  male who presents in follow up of symptoms related to metastatic prostate cancer  Pertinent issues include: symptom management, pain, neoplasm related, breathlessness, depression or anxiety, nausea, fatigue, assessment of goals of care, disease process education and discussion of prognosis, advance care planning  Heather Meyers follows with Dr Faby Ibanez and Dru Nix; he established care with Oncology in January 2022 for newly diagnosed prostate cancer with mets to bone, confirmed by biopsy  A CT image done 12/2021 revealed extensive multifocal osseous metastatic disease including axial and appendicular skeletal system  Metastasis was also noted at calvarium, sternum, ribs, right humerus and clavicle, bilateral scapuluae, cervical spine and thoracolumbar spine, sacrum, pelvis, and bilateral femurs  Heather Meyers was initiated on Atamaria 86 anti androgen therapy by Urology and continues Lupron every 6 months  Heather Meyers was initiated on anti androgen therapy was Xtandi  This was discontinued due to side effects of hallucinations that led to a hospitalization in May  He follows with palliative care for symptom management  Heather Meyers presents unaccompanied to days palliative and supportive care visit  He states he is no longer living with his niece Felipe Solomon however they are on good terms and he is agreeable to her being updated about his care    We reviewed that his sister  had called the office asking to be added to his contact list and he is agreeable to this  We reviewed his symptoms  He states his pain has been extremely well controlled on the medical marijuana  Does not require Dilaudid unless he has severe breakthrough pain in his hip  He states Klonopin has been helpful for sleep  He notes 3 episodes last night where he had significant hot flashes and had to take a cold shower  He states this is not common for him  He attributes it to taking excess sugar and plans to cut sugar out of his diet  He also had more coffee than normal yesterday  Otherwise, Donya Colvin feels he is doing well and has no other acute complaints or concerns  Will plan to see him back in approximately 3 months for follow-up  He is welcome to call sooner if needed  Past medical, surgical, social, and family histories are reviewed and pertinent updates are made  Review of Systems   Constitutional: Positive for night sweats  Negative for decreased appetite and malaise/fatigue  HENT: Negative for congestion and ear pain  Eyes: Negative for blurred vision, double vision and visual disturbance  Cardiovascular: Negative for dyspnea on exertion, irregular heartbeat and leg swelling  Respiratory: Positive for shortness of breath ( chronic)  Negative for cough  Musculoskeletal: Positive for arthritis and neck pain  Negative for muscle weakness and myalgias  Gastrointestinal: Negative for bloating, abdominal pain, anorexia, change in bowel habit, nausea and vomiting  Genitourinary: Negative for bladder incontinence  Neurological: Negative for difficulty with concentration, disturbances in coordination, excessive daytime sleepiness and weakness  Psychiatric/Behavioral: Negative for altered mental status and depression  The patient does not have insomnia         Vital Signs    /60 (BP Location: Left arm, Patient Position: Sitting, Cuff Size: Standard)   Pulse 95   Temp 97 5 °F (36 4 °C) (Temporal)   Resp 16   Ht 5' 7 32" (1 71 m)   Wt 45 8 kg (101 lb)   SpO2 95%   BMI 15 67 kg/m²      Physical Exam and Objective Data  Physical Exam  Vitals and nursing note reviewed  Constitutional:       Appearance: He is cachectic  Comments: Pleasant, fully conversational   Alert and oriented  Fat loss present  HENT:      Head: Normocephalic and atraumatic  Eyes:      Conjunctiva/sclera: Conjunctivae normal    Pulmonary:      Effort: Pulmonary effort is normal  No respiratory distress  Breath sounds: Normal breath sounds  Musculoskeletal:      Cervical back: Neck supple  Skin:     General: Skin is warm and dry  Coloration: Skin is pale  Skin is not jaundiced  Neurological:      Mental Status: He is alert and oriented to person, place, and time  Psychiatric:         Mood and Affect: Mood normal          Behavior: Behavior normal          Radiology and Laboratory:  I personally reviewed and interpreted the following results: CBC, CMP, MRI b     40 minutes was spent face to face with Marlaine Siemens with greater than 50% of the time spent in counseling or coordination of care including discussions of etiology of diagnosis, instructions for disease self management, treatment instructions, follow up requirements, risk factors and risk reduction of disease, patient and family counseling/involvement in care and compliance with treatment regimen  All of the patient's or agent's questions were answered during this discussion

## 2022-06-16 NOTE — TELEPHONE ENCOUNTER
Left message for patient to return call to the office to schedule a 3 month follow up appointment with Palliative Care

## 2022-06-23 ENCOUNTER — APPOINTMENT (OUTPATIENT)
Dept: LAB | Facility: HOSPITAL | Age: 67
End: 2022-06-23
Attending: INTERNAL MEDICINE
Payer: MEDICARE

## 2022-06-23 DIAGNOSIS — C79.51 PROSTATE CANCER METASTATIC TO BONE (HCC): ICD-10-CM

## 2022-06-23 DIAGNOSIS — C61 PROSTATE CANCER METASTATIC TO BONE (HCC): ICD-10-CM

## 2022-06-23 LAB — PSA SERPL-MCNC: 9.8 NG/ML (ref 0–4)

## 2022-06-23 PROCEDURE — 84153 ASSAY OF PSA TOTAL: CPT

## 2022-07-08 ENCOUNTER — APPOINTMENT (OUTPATIENT)
Dept: LAB | Facility: HOSPITAL | Age: 67
End: 2022-07-08
Attending: INTERNAL MEDICINE
Payer: MEDICARE

## 2022-07-10 DIAGNOSIS — J44.1 COPD WITH ACUTE EXACERBATION (HCC): ICD-10-CM

## 2022-07-10 DIAGNOSIS — R63.0 ANOREXIA: ICD-10-CM

## 2022-07-10 DIAGNOSIS — Z51.5 PALLIATIVE CARE PATIENT: ICD-10-CM

## 2022-07-10 DIAGNOSIS — R64 CACHEXIA (HCC): ICD-10-CM

## 2022-07-10 DIAGNOSIS — F41.9 ANXIETY: ICD-10-CM

## 2022-07-10 DIAGNOSIS — G47.00 INSOMNIA, UNSPECIFIED TYPE: ICD-10-CM

## 2022-07-11 RX ORDER — PREDNISONE 1 MG/1
1 TABLET ORAL DAILY
Qty: 30 TABLET | Refills: 0 | Status: SHIPPED | OUTPATIENT
Start: 2022-07-11 | End: 2022-07-26 | Stop reason: SDUPTHER

## 2022-07-11 RX ORDER — ALBUTEROL SULFATE 90 UG/1
2 AEROSOL, METERED RESPIRATORY (INHALATION) EVERY 4 HOURS PRN
Qty: 18 G | Refills: 0 | Status: SHIPPED | OUTPATIENT
Start: 2022-07-11 | End: 2022-07-28 | Stop reason: SDUPTHER

## 2022-07-13 NOTE — TELEPHONE ENCOUNTER
Primary palliative medicine provider: Dixie Borges     Medication requested: Vane Boogie    If for pain, how has the patient been taking their pain medicine?      Last appointment: 6/16/22    Next scheduled appointment: PT HAS NO FOLLOW UP    PDMP review:  LAST FILLED 30 DAY SUPPLY 6/16/22

## 2022-07-14 DIAGNOSIS — C61 PROSTATE CANCER METASTATIC TO BONE (HCC): ICD-10-CM

## 2022-07-14 DIAGNOSIS — C79.51 PROSTATE CANCER METASTATIC TO BONE (HCC): ICD-10-CM

## 2022-07-14 DIAGNOSIS — G89.3 CANCER-RELATED PAIN: ICD-10-CM

## 2022-07-14 DIAGNOSIS — Z51.5 PALLIATIVE CARE PATIENT: ICD-10-CM

## 2022-07-14 RX ORDER — OLANZAPINE 5 MG/1
7.5 TABLET ORAL
Qty: 30 TABLET | Refills: 0 | Status: SHIPPED | OUTPATIENT
Start: 2022-07-14 | End: 2022-07-26

## 2022-07-14 RX ORDER — HYDROMORPHONE HYDROCHLORIDE 2 MG/1
2 TABLET ORAL EVERY 6 HOURS PRN
Qty: 120 TABLET | Refills: 0 | Status: SHIPPED | OUTPATIENT
Start: 2022-07-14 | End: 2022-10-19

## 2022-07-14 RX ORDER — CLONAZEPAM 0.5 MG/1
0.5 TABLET ORAL
Qty: 30 TABLET | Refills: 0 | Status: SHIPPED | OUTPATIENT
Start: 2022-07-14 | End: 2022-08-24 | Stop reason: SDUPTHER

## 2022-07-14 NOTE — TELEPHONE ENCOUNTER
Patient requesting refill of Dilaudid  Patient ran out of medication about 2 weeks ago      Pharmacy- [facility], Zandra Cooks  Next scheduled appointment 7/26/22

## 2022-07-22 ENCOUNTER — OFFICE VISIT (OUTPATIENT)
Dept: FAMILY MEDICINE CLINIC | Facility: CLINIC | Age: 67
End: 2022-07-22
Payer: MEDICARE

## 2022-07-22 VITALS
WEIGHT: 108 LBS | HEIGHT: 67 IN | TEMPERATURE: 97.1 F | SYSTOLIC BLOOD PRESSURE: 110 MMHG | RESPIRATION RATE: 16 BRPM | BODY MASS INDEX: 16.95 KG/M2 | OXYGEN SATURATION: 94 % | HEART RATE: 76 BPM | DIASTOLIC BLOOD PRESSURE: 76 MMHG

## 2022-07-22 DIAGNOSIS — R63.6 UNDERWEIGHT: Primary | ICD-10-CM

## 2022-07-22 PROCEDURE — 99213 OFFICE O/P EST LOW 20 MIN: CPT | Performed by: NURSE PRACTITIONER

## 2022-07-22 NOTE — PROGRESS NOTES
Assessment/Plan   Problem List Items Addressed This Visit    None     Visit Diagnoses     Underweight    -  Primary                Chief Complaint   Patient presents with    Follow-up     Follow up weight  Subjective   Patient ID: Du Hamilton is a 75 y o  male  Vitals:    07/22/22 1310   BP: 110/76   Pulse: 76   Resp: 16   Temp: (!) 97 1 °F (36 2 °C)   SpO2: 94%     Here today for a weight check - currently treating for prostate cancer  Has gained 6 pounds but remains under angelo-discussed adding ensure or carnation instant breakfast daily   No other issues to discuss today will follow u in 3-6 months   Currently palliative care       The following portions of the patient's history were reviewed and updated as appropriate: allergies, current medications, past family history, past social history, past surgical history and problem list     Review of Systems   Constitutional: Negative  HENT: Negative  Eyes: Negative  Respiratory: Negative  Cardiovascular: Negative  Gastrointestinal: Negative  Endocrine: Negative  Genitourinary: Negative  Musculoskeletal: Negative  Skin: Negative  Allergic/Immunologic: Negative  Neurological: Negative  Hematological: Negative  Psychiatric/Behavioral: Negative  Objective     Physical Exam  Vitals and nursing note reviewed  Constitutional:       General: He is not in acute distress  Appearance: He is not diaphoretic  HENT:      Head: Normocephalic and atraumatic  Nose: Nose normal  No congestion  Eyes:      General:         Right eye: No discharge  Left eye: No discharge  Extraocular Movements: Extraocular movements intact  Conjunctiva/sclera: Conjunctivae normal    Cardiovascular:      Rate and Rhythm: Normal rate and regular rhythm  Pulses: Normal pulses  Heart sounds: Normal heart sounds  No murmur heard    Pulmonary:      Effort: Pulmonary effort is normal  No respiratory distress  Breath sounds: Normal breath sounds  Abdominal:      General: Abdomen is flat  Bowel sounds are normal       Palpations: Abdomen is soft  Musculoskeletal:         General: No swelling  Normal range of motion  Cervical back: Normal range of motion and neck supple  Right lower leg: No edema  Left lower leg: No edema  Skin:     General: Skin is warm and dry  Capillary Refill: Capillary refill takes less than 2 seconds  Neurological:      Mental Status: He is alert and oriented to person, place, and time  Psychiatric:         Mood and Affect: Mood normal          Behavior: Behavior normal          Thought Content:  Thought content normal          Judgment: Judgment normal      No Known Allergies

## 2022-07-22 NOTE — PATIENT INSTRUCTIONS
Include ensure or carnation instant breakfast (with milk)   Follow up 6 months   Continued with Palliative care

## 2022-07-26 ENCOUNTER — OFFICE VISIT (OUTPATIENT)
Dept: PALLIATIVE MEDICINE | Age: 67
End: 2022-07-26
Payer: MEDICARE

## 2022-07-26 VITALS
RESPIRATION RATE: 16 BRPM | DIASTOLIC BLOOD PRESSURE: 72 MMHG | SYSTOLIC BLOOD PRESSURE: 112 MMHG | OXYGEN SATURATION: 92 % | BODY MASS INDEX: 17.28 KG/M2 | WEIGHT: 111.4 LBS | HEART RATE: 73 BPM | TEMPERATURE: 97.6 F

## 2022-07-26 DIAGNOSIS — E43 SEVERE PROTEIN-CALORIE MALNUTRITION (HCC): ICD-10-CM

## 2022-07-26 DIAGNOSIS — C61 PROSTATE CANCER METASTATIC TO BONE (HCC): Primary | ICD-10-CM

## 2022-07-26 DIAGNOSIS — R63.0 ANOREXIA: ICD-10-CM

## 2022-07-26 DIAGNOSIS — C79.51 PROSTATE CANCER METASTATIC TO BONE (HCC): Primary | ICD-10-CM

## 2022-07-26 DIAGNOSIS — J44.9 CHRONIC OBSTRUCTIVE PULMONARY DISEASE, UNSPECIFIED COPD TYPE (HCC): ICD-10-CM

## 2022-07-26 DIAGNOSIS — R64 CACHEXIA (HCC): ICD-10-CM

## 2022-07-26 DIAGNOSIS — G47.00 INSOMNIA: ICD-10-CM

## 2022-07-26 DIAGNOSIS — Z51.5 PALLIATIVE CARE PATIENT: ICD-10-CM

## 2022-07-26 DIAGNOSIS — R23.2 HOT FLASHES: ICD-10-CM

## 2022-07-26 DIAGNOSIS — G47.00 INSOMNIA, UNSPECIFIED TYPE: ICD-10-CM

## 2022-07-26 PROCEDURE — 99214 OFFICE O/P EST MOD 30 MIN: CPT | Performed by: PHYSICIAN ASSISTANT

## 2022-07-26 RX ORDER — ALPRAZOLAM 0.5 MG/1
0.5 TABLET ORAL
Qty: 30 TABLET | Refills: 0 | Status: SHIPPED | OUTPATIENT
Start: 2022-07-26 | End: 2022-08-24 | Stop reason: SDUPTHER

## 2022-07-26 RX ORDER — PREDNISONE 1 MG/1
2.5 TABLET ORAL DAILY
Qty: 75 TABLET | Refills: 0 | Status: SHIPPED | OUTPATIENT
Start: 2022-07-26 | End: 2022-08-23

## 2022-07-26 NOTE — PROGRESS NOTES
Outpatient Follow-Up - Palliative and Supportive Care   Kaylie Holder 75 y o  male [id number]    Assessment & Plan  1  Prostate cancer metastatic to bone ([facility] )    2  Hot flashes    3  Chronic obstructive pulmonary disease, unspecified COPD type ([facility] )    4  Severe protein-calorie malnutrition ([facility] )    5  Palliative care patient    6  Cachexia ([facility] )    7  Insomnia, unspecified type    8  Anorexia    9  Insomnia          PLAN  · Sent refill of Xanax  0 5MG QHS   · Continue Zyprexa 7 5MG QHS  · Refilled prednisone 2 5MG QD  · Continue Dilaudid 2MG Q4H PRN; only taking occasionally  · Continue Klonopin QHS  · Continue MMJ--seems to work well for Rishabh's neck pain, not so much for hip/back pain  · Continue Zoloft for hot flashes  · Continue Boost and Ensure - Ivelisse Ikm is gaining weight   · RTC 4 months   · Welcome to call for refills  Medications adjusted this encounter:  Requested Prescriptions     Signed Prescriptions Disp Refills    predniSONE 1 mg tablet 75 tablet 0     Sig: Take 2 5 tablets (2 5 mg total) by mouth daily    ALPRAZolam (XANAX) 0 5 mg tablet 30 tablet 0     Sig: Take 1 tablet (0 5 mg total) by mouth daily at bedtime as needed for anxiety or sleep     No orders of the defined types were placed in this encounter  Medications Discontinued During This Encounter   Medication Reason    ALPRAZolam (XANAX) 1 mg tablet Reorder    predniSONE 1 mg tablet Reorder         Kaylie Holder was seen today for symptoms and planning cares related to above illnesses  I have reviewed the patient's controlled substance dispensing history in the Prescription Drug Monitoring Program in compliance with the [facility] regulations before prescribing any controlled substances    Fill Date ID   Written Drug Qty Days Prescriber Rx # Pharmacy Refill   Daily Dose* Pymt Type      07/14/2022  1   07/14/2022  Clonazepam 0 5 MG Tablet    30 00  30 [facility]   [id number]   Pen ([id number])     [place]   07/14/2022 1   07/14/2022  Hydromorphone 2 MG Tablet    120 00  30 [facility]   [id number]   Pen ([id number])    32 [address]  [place]icare   [place]         They are invited to continue to follow with us  If there are questions or concerns, please contact us through our clinic/answering service 24 hours a day, seven days a week  [address] LU[address] Marte PA-C   [facility]  [phone]      Visit Information    Accompanied By: no one    Source of History: Self     History of Present Illness      Vishal Gutierrez is a 75 y o  male who presents in follow up of symptoms related to metastatic prostate cancer  Pertinent issues include: symptom management, pain, neoplasm related, breathlessness, depression or anxiety, nausea, fatigue, assessment of goals of care, disease process education and discussion of prognosis, advance care planning  Joss Ramirez follows with Dr Anna Epstein and Shahana Chaves; he established care with Oncology in January 2022 for newly diagnosed prostate cancer with mets to bone, confirmed by biopsy  A CT image done 12/2021 revealed extensive multifocal osseous metastatic disease including axial and appendicular skeletal system  Metastasis was also noted at calvarium, sternum, ribs, right humerus and clavicle, bilateral scapuluae, cervical spine and thoracolumbar spine, sacrum, pelvis, and bilateral femurs  Joss Ramirez was initiated on Atamaria 86 anti androgen therapy by Urology and continues Lupron every 6 months  Joss Ramirez was initiated on anti androgen therapy was Xtandi  This was discontinued due to side effects of hallucinations that led to a hospitalization in May  He follows with palliative care for symptom management  Today, Joss Ramirez presents unaccompanied to his follow up appointment  He reports stable symptoms except his neck, hip, and back are bothering him more often  He believes the medical marijuana is helping  At times he will take a Dilaudid when the pain is very severe    He denies insomnia as the 0 5 mg Xanax and the Zyprexa are helping  Hilda Jiang has been able to drink Boost and Ensure supplements and notes that he is gaining weight  He would like to continue gaining weight  He does gentle workup is to remain active and is cautious that he has to protect his bones  Will return to care in approximately 4 months  He is welcome to call sooner if needed  Past medical, surgical, social, and family histories are reviewed and pertinent updates are made  Review of Systems   Constitutional: Positive for weight gain  Negative for decreased appetite and malaise/fatigue  HENT: Negative for congestion, hearing loss and hoarse voice  Eyes: Negative for blurred vision and photophobia  Cardiovascular: Negative for chest pain, dyspnea on exertion and irregular heartbeat  Respiratory: Negative for shortness of breath and sleep disturbances due to breathing  Skin: Negative for nail changes and poor wound healing  Musculoskeletal: Positive for arthritis, back pain and neck pain  Negative for joint pain, joint swelling and muscle weakness  Gastrointestinal: Negative for bloating, abdominal pain, dysphagia and vomiting  Genitourinary: Negative for bladder incontinence, frequency and genital sores  Neurological: Negative for aphonia, difficulty with concentration, disturbances in coordination, dizziness and weakness  Psychiatric/Behavioral: Negative for altered mental status, hypervigilance and memory loss  The patient does not have insomnia and is not nervous/anxious  Vital Signs    /72 (BP Location: Left arm, Patient Position: Sitting, Cuff Size: Standard)   Pulse 73   Temp 97 6 °F (36 4 °C) (Temporal)   Resp 16   Wt 50 5 kg (111 lb 6 4 oz)   SpO2 92%   BMI 17 28 kg/m²      Physical Exam and Objective Data  Physical Exam  Vitals and nursing note reviewed  Exam conducted with a chaperone present  Constitutional:       Appearance: He is well-developed  He is cachectic   He is ill-appearing (chronically )  He is not toxic-appearing  HENT:      Head: Normocephalic and atraumatic  Eyes:      Conjunctiva/sclera: Conjunctivae normal    Pulmonary:      Effort: Pulmonary effort is normal  No respiratory distress  Abdominal:      Palpations: Abdomen is soft  Tenderness: There is no abdominal tenderness  Musculoskeletal:      Cervical back: Neck supple  Right lower leg: No edema  Left lower leg: No edema  Skin:     General: Skin is warm and dry  Neurological:      Mental Status: He is alert and oriented to person, place, and time  Mental status is at baseline  Psychiatric:         Mood and Affect: Mood normal          Behavior: Behavior normal      Radiology and Laboratory:  I personally reviewed and interpreted the following results: cbc, CMP     35 minutes was spent face to face with Matt Krause with greater than 50% of the time spent in counseling or coordination of care including discussions of etiology of diagnosis, instructions for disease self management, treatment instructions, follow up requirements, risk factors and risk reduction of disease, patient and family counseling/involvement in care and compliance with treatment regimen  All of the patient's or agent's questions were answered during this discussion

## 2022-07-28 DIAGNOSIS — J44.1 COPD WITH ACUTE EXACERBATION (HCC): ICD-10-CM

## 2022-07-28 DIAGNOSIS — J44.9 CHRONIC OBSTRUCTIVE PULMONARY DISEASE, UNSPECIFIED COPD TYPE (HCC): ICD-10-CM

## 2022-07-28 RX ORDER — BUDESONIDE AND FORMOTEROL FUMARATE DIHYDRATE 160; 4.5 UG/1; UG/1
2 AEROSOL RESPIRATORY (INHALATION) 2 TIMES DAILY
Qty: 10.2 G | Refills: 3 | Status: SHIPPED | OUTPATIENT
Start: 2022-07-28 | End: 2022-09-13 | Stop reason: SDUPTHER

## 2022-07-28 RX ORDER — BUDESONIDE AND FORMOTEROL FUMARATE DIHYDRATE 160; 4.5 UG/1; UG/1
2 AEROSOL RESPIRATORY (INHALATION) 2 TIMES DAILY
Qty: 10.2 G | Refills: 0 | Status: SHIPPED | OUTPATIENT
Start: 2022-07-28 | End: 2022-07-28 | Stop reason: SDUPTHER

## 2022-07-28 RX ORDER — ALBUTEROL SULFATE 90 UG/1
2 AEROSOL, METERED RESPIRATORY (INHALATION) EVERY 4 HOURS PRN
Qty: 18 G | Refills: 0 | Status: SHIPPED | OUTPATIENT
Start: 2022-07-28 | End: 2022-10-18 | Stop reason: SDUPTHER

## 2022-07-28 RX ORDER — ALBUTEROL SULFATE 90 UG/1
2 AEROSOL, METERED RESPIRATORY (INHALATION) EVERY 4 HOURS PRN
Qty: 18 G | Refills: 3 | Status: SHIPPED | OUTPATIENT
Start: 2022-07-28 | End: 2022-09-13 | Stop reason: SDUPTHER

## 2022-07-29 ENCOUNTER — OFFICE VISIT (OUTPATIENT)
Dept: HEMATOLOGY ONCOLOGY | Facility: HOSPITAL | Age: 67
End: 2022-07-29
Payer: MEDICARE

## 2022-07-29 VITALS
SYSTOLIC BLOOD PRESSURE: 130 MMHG | BODY MASS INDEX: 17.42 KG/M2 | WEIGHT: 111 LBS | HEIGHT: 67 IN | RESPIRATION RATE: 16 BRPM | DIASTOLIC BLOOD PRESSURE: 76 MMHG | OXYGEN SATURATION: 87 % | HEART RATE: 82 BPM | TEMPERATURE: 98.4 F

## 2022-07-29 DIAGNOSIS — C79.51 PROSTATE CANCER METASTATIC TO BONE (HCC): Primary | ICD-10-CM

## 2022-07-29 DIAGNOSIS — C61 PROSTATE CANCER METASTATIC TO BONE (HCC): Primary | ICD-10-CM

## 2022-07-29 PROCEDURE — 99214 OFFICE O/P EST MOD 30 MIN: CPT | Performed by: INTERNAL MEDICINE

## 2022-07-29 NOTE — PROGRESS NOTES
Hematology/Oncology Outpatient Follow- up Note  Kaylie Holder 75 y o  male MRN: @ Encounter: [id number]        Date:  7/29/2022    Presenting Complaint/Diagnosis : Metastatic prostate cancer  HPI:    Damien Tello seen for initial consultation 1/14/2022 regarding newly diagnosed prostate cancer which appears to be metastatic to the bone   This was biopsy proven   The patient was referred from his primary care physician to urology in December for PSA of 850  CT scan of the abdomen pelvis demonstrated multiple sclerotic lesions   He had a biopsy of 1 of these lesions and it came back as metastatic prostate cancer  Eleuterio Tabares was started on anti androgen therapy by Urology in the form of Firmagon       Previous Hematologic/ Oncologic History:    Workup  Patient was briefly on [address] [facility] [facility] but was admitted with confusion which was thought to be related although based on his family's account I suspect it was related to other issues  Current Hematologic/ Oncologic Treatment:    Lupron with his urologist  Addis through our office    Interval History:    Patient returns for follow-up visit  He is doing well on his Zytiga  PSA continues to come down is now 7 from a peak above 800  Denies any nausea denies any vomiting denies any diarrhea  Does have some shortness of breath and his oxygen saturation is slightly low today but he has COPD and has some wheezing  He is getting his Symbicort inhaler today and currently is only using rescue inhaler  He states he does not feel bad and is around baseline  Denies any nausea denies any vomiting the rest of his 14 point review of systems today was negative  Test Results:    Imaging: No results found      Labs:   Lab Results   Component Value Date    WBC 5 69 07/08/2022    HGB 13 2 07/08/2022    HCT 41 5 07/08/2022     (H) 07/08/2022     07/08/2022     Lab Results   Component Value Date    K 3 6 07/08/2022     07/08/2022    CO2 31 07/08/2022 BUN 17 07/08/2022    CREATININE 0 76 07/08/2022    GLUF 83 06/23/2022    CALCIUM 9 0 07/08/2022    CORRECTEDCA 9 4 06/23/2022    AST 16 07/08/2022    ALT 32 07/08/2022    ALKPHOS 68 07/08/2022    EGFR 93 07/08/2022       Lab Results   Component Value Date    PSA 7 0 (H) 07/08/2022    PSA 9 8 (H) 06/23/2022    PSA 14 5 (H) 05/23/2022         Lab Results   Component Value Date    OVFORWZN11 334 05/08/2022         ROS: As stated in the history of present illness otherwise his 14 point review of systems today was negative  Active Problems:   Patient Active Problem List   Diagnosis    Chronic obstructive pulmonary disease (HCC)    Acute tracheobronchitis    Severe protein-calorie malnutrition (HCC)    HLD (hyperlipidemia)    Left lower quadrant abdominal pain    Personal history of colonic polyps    Prostate cancer metastatic to bone ([address]    Insomnia    Hot flashes    Anorexia    Cachexia ([address]    Palliative care patient    Encephalopathy acute       Past Medical History:   Past Medical History:   Diagnosis Date    Bone cancer ([address]     Colon polyp     COPD (chronic obstructive pulmonary disease) (HCC)     Emphysema lung (HCC)     Hyperlipidemia     Prostate cancer ([address] )        Surgical History:   Past Surgical History:   Procedure Laterality Date    APPENDECTOMY      COLONOSCOPY      IR BIOPSY BONE  12/30/2021    JOINT REPLACEMENT      UPPER GASTROINTESTINAL ENDOSCOPY         Family History:    Family History   Problem Relation Age of Onset    Lung cancer Mother     Breast cancer Sister     Colon polyps Brother     Colon cancer Neg Hx        Cancer-related family history includes Breast cancer in his sister; Lung cancer in his mother  There is no history of Colon cancer      Social History:   Social History     Socioeconomic History    Marital status: Single     Spouse name: Not on file    Number of children: Not on file    Years of education: Not on file    Highest education level: Not on file   Occupational History    Not on file   Tobacco Use    Smoking status: Former Smoker     Types: Cigarettes     Quit date: 2012     Years since quitting: 10 5    Smokeless tobacco: Never Used   Vaping Use    Vaping Use: Never used   Substance and Sexual Activity    Alcohol use: Never    Drug use: Never    Sexual activity: Not on file   Other Topics Concern    Not on file   Social History Narrative    Not on file     Social Determinants of Health     Financial Resource Strain: Not on file   Food Insecurity: No Food Insecurity    Worried About Running Out of Food in the Last Year: Never true    Jesus of Food in the Last Year: Never true   Transportation Needs: Unmet Transportation Needs    Lack of Transportation (Medical): Yes    Lack of Transportation (Non-Medical): Yes   Physical Activity: Not on file   Stress: Not on file   Social Connections: Not on file   Intimate Partner Violence: Not At Risk    Fear of Current or Ex-Partner: No    Emotionally Abused: No    Physically Abused: No    Sexually Abused: No   Housing Stability: Low Risk     Unable to Pay for Housing in the Last Year: No    Number of Places Lived in the Last Year: 1    Unstable Housing in the Last Year: No       Current Medications:   Current Outpatient Medications   Medication Sig Dispense Refill    abiraterone (Zytiga) 250 mg tablet Take 4 tablets (1,000 mg total) by mouth in the morning  120 tablet 11    albuterol (PROVENTIL HFA,VENTOLIN HFA) 90 mcg/act inhaler Inhale 2 puffs every 4 (four) hours as needed for wheezing 18 g 0    albuterol (PROVENTIL HFA,VENTOLIN HFA) 90 mcg/act inhaler Inhale 2 puffs every 4 (four) hours as needed for wheezing 18 g 3    ALPRAZolam (XANAX) 0 5 mg tablet Take 1 tablet (0 5 mg total) by mouth daily at bedtime as needed for anxiety or sleep 30 tablet 0    budesonide-formoterol (SYMBICORT) 160-4 5 mcg/act inhaler Inhale 2 puffs 2 (two) times a day Rinse mouth after use   10 2 g 3    clonazePAM (KlonoPIN) 0 5 mg tablet Take 1 tablet (0 5 mg total) by mouth daily at bedtime 30 tablet 0    HYDROmorphone (DILAUDID) 2 mg tablet Take 1 tablet (2 mg total) by mouth every 6 (six) hours as needed for moderate pain or severe pain Max Daily Amount: 8 mg 120 tablet 0    lidocaine (LIDODERM) 5 % Apply 3 patches topically daily Remove & Discard patch within 12 hours or as directed by MD 90 patch 0    montelukast (SINGULAIR) 10 mg tablet Take 1 tablet (10 mg total) by mouth daily at bedtime 30 tablet 2    naloxone (NARCAN) 4 mg/0 1 mL nasal spray Administer 1 spray into a nostril  If no response after 2-3 minutes, give another dose in the other nostril using a new spray  1 each 0    NON FORMULARY Medical marijuana      OLANZapine (ZyPREXA) 7 5 mg tablet Take 1 tablet (7 5 mg total) by mouth daily at bedtime 90 tablet 0    predniSONE 1 mg tablet Take 2 5 tablets (2 5 mg total) by mouth daily 75 tablet 0    senna (SENOKOT) 8 6 mg Take 2 tablets (17 2 mg total) by mouth daily at bedtime as needed for constipation 60 tablet 0    atorvastatin (LIPITOR) 40 mg tablet Take 1 tablet (40 mg total) by mouth daily with dinner (Patient not taking: No sig reported) 30 tablet 0     No current facility-administered medications for this visit  Allergies: No Known Allergies    Physical Exam:    Body surface area is 1 58 meters squared      Wt Readings from Last 3 Encounters:   07/29/22 50 3 kg (111 lb)   07/26/22 50 5 kg (111 lb 6 4 oz)   07/22/22 49 kg (108 lb)        Temp Readings from Last 3 Encounters:   07/26/22 97 6 °F (36 4 °C) (Temporal)   07/22/22 (!) 97 1 °F (36 2 °C) (Tympanic)   06/16/22 97 5 °F (36 4 °C) (Temporal)        BP Readings from Last 3 Encounters:   07/26/22 112/72   07/22/22 110/76   06/16/22 108/60         Pulse Readings from Last 3 Encounters:   07/26/22 73   07/22/22 76   06/16/22 95         Physical Exam     Constitutional   General appearance: No acute distress, well appearing and well nourished  Eyes   Conjunctiva and lids: No swelling, erythema or discharge  Pupils and irises: Equal, round and reactive to light  Ears, Nose, Mouth, and Throat   External inspection of ears and nose: Normal     Nasal mucosa, septum, and turbinates: Normal without edema or erythema  Oropharynx: Normal with no erythema, edema, exudate or lesions  Pulmonary   Respiratory effort: No increased work of breathing or signs of respiratory distress  Auscultation of lungs: Clear to auscultation  Cardiovascular   Palpation of heart: Normal PMI, no thrills  Auscultation of heart: Normal rate and rhythm, normal S1 and S2, without murmurs  Examination of extremities for edema and/or varicosities: Normal     Carotid pulses: Normal     Abdomen   Abdomen: Non-tender, no masses  Liver and spleen: No hepatomegaly or splenomegaly  Lymphatic   Palpation of lymph nodes in neck: No lymphadenopathy  Musculoskeletal   Gait and station: Normal     Digits and nails: Normal without clubbing or cyanosis  Inspection/palpation of joints, bones, and muscles: Normal     Skin   Skin and subcutaneous tissue: Normal without rashes or lesions  Neurologic   Cranial nerves: Cranial nerves 2-12 intact  Sensation: No sensory loss  Psychiatric   Orientation to person, place, and time: Normal     Mood and affect: Normal         Assessment / Plan:      The patient is pleasant 44-year-old male with newly diagnosed metastatic prostate cancer to the bone   He was started on Firmagon with Urology  He was started on Xtandi  He was admitted with confusion  Based on his preference he was switched to [facility] as they thought the confusion may be related to his Xtandi although I thought it was less likely  He is getting Riri Neighbors through his urologist   PSA continues to come down and is now 7 0 compared to 850 in December of 2021   He is getting his long-term inhaler today as he had run out and was only using a rescue inhaler and has been more short of breath with an oxygen saturation which is running slightly below 90 today  At this point he will stay on his current medication with no changes  I will see him back in 3 months  He will have blood work repeated in 3 months  CMP is normal as are the liver function tests so I think it is reasonable change his blood work to every 3 months  Goals and Barriers:  Current Goal:  Prolong Survival from metastatic prostate cancer  Barriers: None  Patient's Capacity to Self Care:  Patient able to self care  Portions of the record may have been created with voice recognition software  Occasional wrong word or "sound a like" substitutions may have occurred due to the inherent limitations of voice recognition software  Read the chart carefully and recognize, using context, where substitutions have occurred

## 2022-08-15 ENCOUNTER — APPOINTMENT (OUTPATIENT)
Dept: LAB | Facility: HOSPITAL | Age: 67
End: 2022-08-15
Attending: INTERNAL MEDICINE
Payer: MEDICARE

## 2022-08-16 ENCOUNTER — HOSPITAL ENCOUNTER (OUTPATIENT)
Dept: INFUSION CENTER | Facility: HOSPITAL | Age: 67
Discharge: HOME | End: 2022-08-16
Attending: INTERNAL MEDICINE
Payer: MEDICARE

## 2022-08-16 VITALS
TEMPERATURE: 97 F | DIASTOLIC BLOOD PRESSURE: 73 MMHG | HEART RATE: 79 BPM | OXYGEN SATURATION: 91 % | RESPIRATION RATE: 16 BRPM | SYSTOLIC BLOOD PRESSURE: 125 MMHG

## 2022-08-16 DIAGNOSIS — C79.51 PROSTATE CANCER METASTATIC TO BONE (HCC): Primary | ICD-10-CM

## 2022-08-16 DIAGNOSIS — C61 PROSTATE CANCER METASTATIC TO BONE (HCC): Primary | ICD-10-CM

## 2022-08-16 PROCEDURE — 96372 THER/PROPH/DIAG INJ SC/IM: CPT

## 2022-08-16 RX ADMIN — DENOSUMAB 120 MG: 120 INJECTION SUBCUTANEOUS at 13:06

## 2022-08-16 NOTE — PROGRESS NOTES
Patient received Xgeva injection SQ right arm, no issues  Made next appointment and provided reminder card (printers down)  Patient left unit ambulatory with steady gait

## 2022-08-23 DIAGNOSIS — R64 CACHEXIA (HCC): ICD-10-CM

## 2022-08-23 DIAGNOSIS — R63.0 ANOREXIA: ICD-10-CM

## 2022-08-23 DIAGNOSIS — Z51.5 PALLIATIVE CARE PATIENT: ICD-10-CM

## 2022-08-23 RX ORDER — PREDNISONE 1 MG/1
2.5 TABLET ORAL DAILY
Qty: 75 TABLET | Refills: 0 | Status: SHIPPED | OUTPATIENT
Start: 2022-08-23

## 2022-08-24 DIAGNOSIS — G47.00 INSOMNIA, UNSPECIFIED TYPE: ICD-10-CM

## 2022-08-24 DIAGNOSIS — G47.00 INSOMNIA: ICD-10-CM

## 2022-08-24 DIAGNOSIS — F41.9 ANXIETY: ICD-10-CM

## 2022-08-24 DIAGNOSIS — Z51.5 PALLIATIVE CARE PATIENT: ICD-10-CM

## 2022-08-24 RX ORDER — ALPRAZOLAM 0.5 MG/1
0.5 TABLET ORAL
Qty: 30 TABLET | Refills: 0 | Status: SHIPPED | OUTPATIENT
Start: 2022-08-24 | End: 2022-09-27 | Stop reason: SDUPTHER

## 2022-08-24 RX ORDER — CLONAZEPAM 0.5 MG/1
0.5 TABLET ORAL
Qty: 30 TABLET | Refills: 0 | Status: SHIPPED | OUTPATIENT
Start: 2022-08-24 | End: 2022-09-27 | Stop reason: SDUPTHER

## 2022-08-24 NOTE — TELEPHONE ENCOUNTER
Primary palliative medicine provider:   Ayesha Schwab    Medication requested:  Clonazepam 0 5mg  Alprazolam 0 5mg     If for pain, how has the patient been taking their pain medicine? Last appointment: 7/26/22    Next scheduled appointment: 11/22    PDMP review:    07/26/2022 07/26/2022 ALPRAZolam (Tablet) 30 0 30 0 5 MG NA UVALDO QUIRO[facility]  Medicare 0 / 0 [place]    1 [id number] 07/14/2022 07/14/2022 clonazePAM (Tablet) 30 0 30 0 5 MG [facility]AY [facility]

## 2022-09-08 ENCOUNTER — OFFICE VISIT (OUTPATIENT)
Dept: UROLOGY | Facility: HOSPITAL | Age: 67
End: 2022-09-08
Payer: MEDICARE

## 2022-09-08 VITALS
SYSTOLIC BLOOD PRESSURE: 120 MMHG | BODY MASS INDEX: 16.91 KG/M2 | HEART RATE: 82 BPM | WEIGHT: 109 LBS | DIASTOLIC BLOOD PRESSURE: 80 MMHG | OXYGEN SATURATION: 95 %

## 2022-09-08 DIAGNOSIS — C61 PROSTATE CANCER (HCC): Primary | ICD-10-CM

## 2022-09-08 PROCEDURE — 96402 CHEMO HORMON ANTINEOPL SQ/IM: CPT

## 2022-09-08 PROCEDURE — 99213 OFFICE O/P EST LOW 20 MIN: CPT | Performed by: NURSE PRACTITIONER

## 2022-09-08 NOTE — PROGRESS NOTES
09/08/22    Timothy Leung   1955   [id number]     Assessment  1 Metastatic prostate cancer     Discussion/Plan  1 Metastatic prostate cancer     7/8/22 PSA 7 0    Follow up Heme-Onc 10/31/22    45 mg IM Lupron administered today     Follow up 6 months with PSA     Subjective  HPI   Timothy Leung is a 78year old male known to Dr Mireya Cristina with history of metastatic prostate cancer  He was originally seen in December 2021 by Dr Matt Rosario with a PSA of 850  CT abdomen demonstrated multiple sclerotic lesions  One of these lesions was biopsied confirming metastatic disease  He had not had prior PSA lab work however reported normal TAWANDA  Denies family history of prostate cancer  After he retired in his 45s, he experienced weight loss from 135 lbs to 110 lbs  He received 120 mg Firmagon on 1/5/22 with our nursing staff  He then received Lupron 45 mg on 3/2/22  He is following with Oncology  He met with Dr Mireya Cristina in April 2022 with a PSA of 14 0  He continued with weight loss and it was recommended to hold off on exercising to allow weight gain  He returns for next dose of Lupron  PSA trend below  He experienced severe hot flashes and states he decreased his prednisone dose from 2 5 mg to 1 5 mg  He states he did not tell his providers he was adjusting this but he could not tolerate the hot flashes  His pain is managed well with cannabis  He takes PRN Dilaudid rarely  He is doing well overall  He is following under Palliative care  Final Diagnosis    A  Iliac Crest, Right, bone lesion:  -Metastatic adenocarcinoma, immunohistochemically consistent with metastatic prostatic adenocarcinoma         Component      Latest Ref Rng & Units 12/3/2021 3/10/2022 4/15/2022 5/8/2022           3:04 PM 11:42 AM 11:04 AM  8:55 AM   PSA, Total      0 0 - 4 0 ng/mL 850 0 (H) 131 0 (H) 14 0 (H) 8 4 (H)     Component      Latest Ref Rng & Units 5/23/2022 6/23/2022 7/8/2022           2:20 PM 10:58 AM  1:20 PM   PSA, Total 0 0 - 4 0 ng/mL 14 5 (H) 9 8 (H) 7 0 (H)     Review of Systems - History obtained from chart review and the patient  General ROS: positive for - weight loss   Psychological ROS: negative  Endocrine ROS: positive for - hot flashes  Respiratory ROS: no cough, shortness of breath, or wheezing  Cardiovascular ROS: no chest pain or dyspnea on exertion  Gastrointestinal ROS: no abdominal pain, change in bowel habits, or black or bloody stools  Genito-Urinary ROS: negative  Musculoskeletal ROS: negative  Neurological ROS: negative  Dermatological ROS: negative       Objective   Physical Exam  Vitals and nursing note reviewed  Constitutional:       General: He is awake  He is not in acute distress  Appearance: Normal appearance  He is well-developed and well-groomed  He is cachectic  He is not ill-appearing, toxic-appearing or diaphoretic  Pulmonary:      Effort: Pulmonary effort is normal    Abdominal:      Tenderness: There is no right CVA tenderness or left CVA tenderness  Musculoskeletal:         General: Normal range of motion  Cervical back: Normal range of motion and neck supple  Skin:     General: Skin is warm  Neurological:      General: No focal deficit present  Mental Status: He is alert and oriented to person, place, and time  Mental status is at baseline  Psychiatric:         Attention and Perception: Attention normal          Mood and Affect: Mood normal          Speech: Speech normal          Behavior: Behavior normal  Behavior is cooperative  Thought Content: Thought content normal          Cognition and Memory: Cognition normal          Judgment: Judgment normal            ISA Iglesias     I have spent 15 minutes with Patient  today in which greater than 50% of this time was spent in counseling/coordination of care regarding Intructions for management

## 2022-09-13 DIAGNOSIS — J44.9 CHRONIC OBSTRUCTIVE PULMONARY DISEASE, UNSPECIFIED COPD TYPE (HCC): ICD-10-CM

## 2022-09-13 DIAGNOSIS — J44.1 COPD WITH ACUTE EXACERBATION (HCC): ICD-10-CM

## 2022-09-13 RX ORDER — MONTELUKAST SODIUM 10 MG/1
10 TABLET ORAL
Qty: 30 TABLET | Refills: 2 | Status: SHIPPED | OUTPATIENT
Start: 2022-09-13 | End: 2022-10-05

## 2022-09-13 RX ORDER — BUDESONIDE AND FORMOTEROL FUMARATE DIHYDRATE 160; 4.5 UG/1; UG/1
2 AEROSOL RESPIRATORY (INHALATION) 2 TIMES DAILY
Qty: 10.2 G | Refills: 3 | Status: SHIPPED | OUTPATIENT
Start: 2022-09-13 | End: 2022-09-28 | Stop reason: SDUPTHER

## 2022-09-13 RX ORDER — ALBUTEROL SULFATE 90 UG/1
2 AEROSOL, METERED RESPIRATORY (INHALATION) EVERY 4 HOURS PRN
Qty: 18 G | Refills: 3 | Status: SHIPPED | OUTPATIENT
Start: 2022-09-13 | End: 2022-09-28 | Stop reason: SDUPTHER

## 2022-09-27 DIAGNOSIS — F41.9 ANXIETY: ICD-10-CM

## 2022-09-27 DIAGNOSIS — G47.00 INSOMNIA: ICD-10-CM

## 2022-09-27 DIAGNOSIS — Z51.5 PALLIATIVE CARE PATIENT: ICD-10-CM

## 2022-09-27 DIAGNOSIS — G47.00 INSOMNIA, UNSPECIFIED TYPE: ICD-10-CM

## 2022-09-27 RX ORDER — ALPRAZOLAM 0.5 MG/1
0.5 TABLET ORAL
Qty: 30 TABLET | Refills: 0 | Status: SHIPPED | OUTPATIENT
Start: 2022-09-27 | End: 2022-09-28 | Stop reason: SDUPTHER

## 2022-09-27 RX ORDER — CLONAZEPAM 0.5 MG/1
0.5 TABLET ORAL
Qty: 30 TABLET | Refills: 0 | Status: SHIPPED | OUTPATIENT
Start: 2022-09-27 | End: 2022-09-28 | Stop reason: SDUPTHER

## 2022-09-27 NOTE — TELEPHONE ENCOUNTER
Primary palliative medicine provider: Cj Betancourt    Medication requested: Haily Nielsener AND CLONAZEPAM    If for pain, how has the patient been taking their pain medicine? Last appointment: 7/26/22    Next scheduled appointment:11/22/22    PDMP review:  1 [id number] 08/24/2022 08/24/2022 ALPRAZolam (Tablet) 30 0 30 0 5 [facility]  [facility] 0 / 0 [place]    1 [id number] 08/24/2022 08/24/2022 clonazePAM (Tablet) 30 0 30 0 5 MG NA [facility]  Medicare 0 / 0 [place]    1 [id number] 07/26/2022 07/26/2022 ALPRAZolam (Tablet) 30 0 30 0 5 MG NA [facility]   Medicare 0 / 0 PA    1 [id number] 07/14/2022 07/14/2022 clonazePAM (Tablet) 30 0 30 0 5 MG N

## 2022-09-28 DIAGNOSIS — J44.9 CHRONIC OBSTRUCTIVE PULMONARY DISEASE, UNSPECIFIED COPD TYPE (HCC): ICD-10-CM

## 2022-09-28 DIAGNOSIS — J44.1 COPD WITH ACUTE EXACERBATION (HCC): ICD-10-CM

## 2022-09-28 RX ORDER — BUDESONIDE AND FORMOTEROL FUMARATE DIHYDRATE 160; 4.5 UG/1; UG/1
2 AEROSOL RESPIRATORY (INHALATION) 2 TIMES DAILY
Qty: 10.2 G | Refills: 0 | Status: SHIPPED | OUTPATIENT
Start: 2022-09-28

## 2022-09-28 RX ORDER — ALBUTEROL SULFATE 90 UG/1
2 AEROSOL, METERED RESPIRATORY (INHALATION) EVERY 4 HOURS PRN
Qty: 18 G | Refills: 0 | Status: SHIPPED | OUTPATIENT
Start: 2022-09-28

## 2022-10-04 NOTE — ED NOTES
A (System Cardiac Pa Snsr Delivery Cardiomems Hrt) PA pressure sensor was implanted.  Pt given sputum collection container, instructed in how to provide sample         Alli Javier RN  02/18/20 [id number]

## 2022-10-05 DIAGNOSIS — J44.1 COPD WITH ACUTE EXACERBATION (HCC): ICD-10-CM

## 2022-10-05 RX ORDER — MONTELUKAST SODIUM 10 MG/1
TABLET ORAL
Qty: 90 TABLET | Refills: 1 | Status: SHIPPED | OUTPATIENT
Start: 2022-10-05

## 2022-10-07 ENCOUNTER — TELEPHONE (OUTPATIENT)
Dept: FAMILY MEDICINE CLINIC | Facility: CLINIC | Age: 67
End: 2022-10-07

## 2022-10-07 DIAGNOSIS — J44.9 CHRONIC OBSTRUCTIVE PULMONARY DISEASE, UNSPECIFIED COPD TYPE (HCC): Primary | ICD-10-CM

## 2022-10-07 NOTE — TELEPHONE ENCOUNTER
Patient called script line for refill of Symbicort  Per [facility] pt had 3 months supply given to him on 07/29  Pt made aware and admits to using Symbicort more than prescribed because it helps more than the albuterol  Advised patient that [facility] will not fill next inhaler until 10/18  Pt states he believes he can make it till then  Pulm referral placed for pt in the mean time  Patient verbalized understanding

## 2022-10-13 ENCOUNTER — TELEPHONE (OUTPATIENT)
Dept: PALLIATIVE MEDICINE | Facility: CLINIC | Age: 67
End: 2022-10-13

## 2022-10-13 LAB
DME PARACHUTE DELIVERY DATE REQUESTED: NORMAL
DME PARACHUTE ITEM DESCRIPTION: NORMAL
DME PARACHUTE ORDER STATUS: NORMAL
DME PARACHUTE SUPPLIER NAME: NORMAL
DME PARACHUTE SUPPLIER PHONE: NORMAL

## 2022-10-13 NOTE — TELEPHONE ENCOUNTER
Left message for patient to return call to the office to schedule a sooner appointment with Delmis Somers  Provider would like patient to be scheduled on a Tuesday

## 2022-10-17 ENCOUNTER — TELEPHONE (OUTPATIENT)
Dept: PALLIATIVE MEDICINE | Facility: CLINIC | Age: 67
End: 2022-10-17

## 2022-10-17 NOTE — TELEPHONE ENCOUNTER
I called Benigno back  It was not a good time for her to talk  We plan to talk on Wednesday  I am discussing with Jerel about the hospital bed as I just received the order

## 2022-10-17 NOTE — TELEPHONE ENCOUNTER
Patient sister Andre Torres called wanting information on when patients hospital bed will be delivered  Would like a call back to discuss other private medical issues   She can be reached at [phone]

## 2022-10-18 ENCOUNTER — HOSPITAL ENCOUNTER (EMERGENCY)
Facility: HOSPITAL | Age: 67
Discharge: HOME | End: 2022-10-18
Attending: EMERGENCY MEDICINE
Payer: MEDICARE

## 2022-10-18 ENCOUNTER — APPOINTMENT (EMERGENCY)
Dept: CT IMAGING | Facility: HOSPITAL | Age: 67
End: 2022-10-18
Payer: MEDICARE

## 2022-10-18 ENCOUNTER — APPOINTMENT (EMERGENCY)
Dept: RADIOLOGY | Facility: HOSPITAL | Age: 67
End: 2022-10-18
Payer: MEDICARE

## 2022-10-18 ENCOUNTER — TELEPHONE (OUTPATIENT)
Dept: PALLIATIVE MEDICINE | Facility: CLINIC | Age: 67
End: 2022-10-18

## 2022-10-18 VITALS
RESPIRATION RATE: 18 BRPM | DIASTOLIC BLOOD PRESSURE: 81 MMHG | HEART RATE: 88 BPM | BODY MASS INDEX: 16.67 KG/M2 | TEMPERATURE: 98.5 F | WEIGHT: 110 LBS | OXYGEN SATURATION: 90 % | HEIGHT: 68 IN | SYSTOLIC BLOOD PRESSURE: 141 MMHG

## 2022-10-18 DIAGNOSIS — R64 CACHEXIA (HCC): ICD-10-CM

## 2022-10-18 DIAGNOSIS — J44.1 COPD WITH ACUTE EXACERBATION (HCC): ICD-10-CM

## 2022-10-18 DIAGNOSIS — E43 SEVERE PROTEIN-CALORIE MALNUTRITION (HCC): ICD-10-CM

## 2022-10-18 DIAGNOSIS — G89.29 CHRONIC BACK PAIN: Primary | ICD-10-CM

## 2022-10-18 DIAGNOSIS — M54.9 CHRONIC BACK PAIN: Primary | ICD-10-CM

## 2022-10-18 DIAGNOSIS — Z51.5 PALLIATIVE CARE PATIENT: Primary | ICD-10-CM

## 2022-10-18 DIAGNOSIS — G89.29 CHRONIC LEFT HIP PAIN: ICD-10-CM

## 2022-10-18 DIAGNOSIS — M25.552 CHRONIC LEFT HIP PAIN: ICD-10-CM

## 2022-10-18 DIAGNOSIS — G89.3 CANCER-RELATED PAIN: ICD-10-CM

## 2022-10-18 DIAGNOSIS — C79.51 PROSTATE CANCER METASTATIC TO BONE (HCC): ICD-10-CM

## 2022-10-18 DIAGNOSIS — C61 PROSTATE CANCER METASTATIC TO BONE (HCC): ICD-10-CM

## 2022-10-18 LAB — GLUCOSE SERPL-MCNC: 99 MG/DL (ref 65–140)

## 2022-10-18 PROCEDURE — G1004 CDSM NDSC: HCPCS

## 2022-10-18 PROCEDURE — 72131 CT LUMBAR SPINE W/O DYE: CPT

## 2022-10-18 PROCEDURE — 96374 THER/PROPH/DIAG INJ IV PUSH: CPT

## 2022-10-18 PROCEDURE — 82948 REAGENT STRIP/BLOOD GLUCOSE: CPT

## 2022-10-18 PROCEDURE — 73502 X-RAY EXAM HIP UNI 2-3 VIEWS: CPT

## 2022-10-18 PROCEDURE — 96361 HYDRATE IV INFUSION ADD-ON: CPT

## 2022-10-18 PROCEDURE — 99284 EMERGENCY DEPT VISIT MOD MDM: CPT

## 2022-10-18 PROCEDURE — 99285 EMERGENCY DEPT VISIT HI MDM: CPT | Performed by: PHYSICIAN ASSISTANT

## 2022-10-18 RX ORDER — ONDANSETRON 2 MG/ML
4 INJECTION INTRAMUSCULAR; INTRAVENOUS ONCE
Status: DISCONTINUED | OUTPATIENT
Start: 2022-10-18 | End: 2022-10-18

## 2022-10-18 RX ORDER — ALBUTEROL SULFATE 90 UG/1
2 AEROSOL, METERED RESPIRATORY (INHALATION) EVERY 4 HOURS PRN
Qty: 18 G | Refills: 0 | Status: SHIPPED | OUTPATIENT
Start: 2022-10-18

## 2022-10-18 RX ORDER — HYDROMORPHONE HCL/PF 1 MG/ML
1 SYRINGE (ML) INJECTION ONCE
Status: COMPLETED | OUTPATIENT
Start: 2022-10-18 | End: 2022-10-18

## 2022-10-18 RX ADMIN — SODIUM CHLORIDE 1000 ML: 0.9 INJECTION, SOLUTION INTRAVENOUS at 12:46

## 2022-10-18 RX ADMIN — HYDROMORPHONE HYDROCHLORIDE 1 MG: 1 INJECTION, SOLUTION INTRAMUSCULAR; INTRAVENOUS; SUBCUTANEOUS at 12:46

## 2022-10-18 NOTE — ED PROVIDER NOTES
History  Chief Complaint   Patient presents with   • Back Pain     States has stage 4 cancer and has pain in low back into L hip  Took 2mg dilaudid @ 7334 with no relief     Patient is a 74 y/o M with h/o Stage 4 metastatic prostate cancer that presents to the ED with worsening back pain and left hip  Patient denies any recent falls  He did take Dilaudid this morning around 9:30AM, but it didn't help his pain  He states he was in so much pain he was unable to get out of bed  He lives at home alone  He denies numbness/tingling or bowel/bladder dysfunction  Pain radiates down his legs off and on  No fevers, chills  History provided by:  Patient  Back Pain  Location:  Lumbar spine  Quality:  Aching  Radiates to: left hip  Pain severity:  Moderate  Onset quality:  Gradual  Duration:  2 days  Timing:  Constant  Progression:  Worsening  Chronicity:  New  Context: not falling, not recent injury and not twisting    Relieved by:  Nothing  Worsened by:  Ambulation and movement  Ineffective treatments: dilaudid  Associated symptoms: leg pain    Associated symptoms: no abdominal pain, no bladder incontinence, no bowel incontinence, no chest pain, no dysuria, no fever, no numbness and no weakness    Risk factors: hx of cancer        Prior to Admission Medications   Prescriptions Last Dose Informant Patient Reported? Taking? ALPRAZolam (XANAX) 0 5 mg tablet   No No   Sig: Take 1 tablet (0 5 mg total) by mouth daily at bedtime as needed for anxiety or sleep Ongoing therapy     HYDROmorphone (DILAUDID) 2 mg tablet  Self No No   Sig: Take 1 tablet (2 mg total) by mouth every 6 (six) hours as needed for moderate pain or severe pain Max Daily Amount: 8 mg   NON FORMULARY  Self Yes No   Sig: Medical marijuana   OLANZapine (ZyPREXA) 7 5 mg tablet  Self No No   Sig: Take 1 tablet (7 5 mg total) by mouth daily at bedtime   abiraterone (Zytiga) 250 mg tablet  Self No No   Sig: Take 4 tablets (1,000 mg total) by mouth in the morning  albuterol (PROVENTIL HFA,VENTOLIN HFA) 90 mcg/act inhaler  Self No No   Sig: Inhale 2 puffs every 4 (four) hours as needed for wheezing   albuterol (PROVENTIL HFA,VENTOLIN HFA) 90 mcg/act inhaler   No No   Sig: Inhale 2 puffs every 4 (four) hours as needed for wheezing   albuterol (PROVENTIL HFA,VENTOLIN HFA) 90 mcg/act inhaler   No Yes   Sig: Inhale 2 puffs every 4 (four) hours as needed for wheezing   atorvastatin (LIPITOR) 40 mg tablet  Self No No   Sig: Take 1 tablet (40 mg total) by mouth daily with dinner   Patient not taking: No sig reported   budesonide-formoterol (SYMBICORT) 160-4 5 mcg/act inhaler   No No   Sig: Inhale 2 puffs 2 (two) times a day Rinse mouth after use  clonazePAM (KlonoPIN) 0 5 mg tablet   No No   Sig: Take 1 tablet (0 5 mg total) by mouth daily at bedtime Ongoing therapy  lidocaine (LIDODERM) 5 %  Self No No   Sig: Apply 3 patches topically daily Remove & Discard patch within 12 hours or as directed by MD   montelukast (SINGULAIR) 10 mg tablet   No No   Sig: TAKE 1 TABLET BY MOUTH DAILY AT BEDTIME   naloxone (NARCAN) 4 mg/0 1 mL nasal spray  Self No No   Sig: Administer 1 spray into a nostril  If no response after 2-3 minutes, give another dose in the other nostril using a new spray     predniSONE 1 mg tablet  Self No No   Sig: TAKE 2 5 TABLETS (2 5 MG TOTAL) BY MOUTH DAILY   senna (SENOKOT) 8 6 mg  Self No No   Sig: Take 2 tablets (17 2 mg total) by mouth daily at bedtime as needed for constipation      Facility-Administered Medications: None       Past Medical History:   Diagnosis Date   • Bone cancer ([facility] [address] )    • Colon polyp    • COPD (chronic obstructive pulmonary disease) ([facility] [address] )    • Emphysema lung (HCC)    • Hyperlipidemia    • Prostate cancer [facility])        Past Surgical History:   Procedure Laterality Date   • APPENDECTOMY     • COLONOSCOPY     • IR BIOPSY BONE  12/30/2021   • JOINT REPLACEMENT     • UPPER GASTROINTESTINAL ENDOSCOPY         Family History   Problem Relation Age of Onset   • Lung cancer Mother    • Breast cancer Sister    • Colon polyps Brother    • Colon cancer Neg Hx      I have reviewed and agree with the history as documented  E-Cigarette/Vaping   • E-Cigarette Use Never User      E-Cigarette/Vaping Substances   • Nicotine No    • THC No    • CBD No    • Flavoring No    • Other No    • Unknown No      Social History     Tobacco Use   • Smoking status: Former Smoker     Packs/day: 1 00     Years: 41 00     Pack years: 41 00     Types: Cigarettes     Quit date: 2012     Years since quitting: 10 8   • Smokeless tobacco: Never Used   Vaping Use   • Vaping Use: Never used   Substance Use Topics   • Alcohol use: Never   • Drug use: Never       Review of Systems   Constitutional: Negative for chills and fever  Respiratory: Negative for cough and shortness of breath  Cardiovascular: Negative for chest pain and leg swelling  Gastrointestinal: Negative for abdominal pain and bowel incontinence  Genitourinary: Negative for bladder incontinence and dysuria  Musculoskeletal: Positive for back pain  Negative for neck pain  Skin: Negative for color change and rash  Neurological: Negative for dizziness, weakness, light-headedness and numbness  Psychiatric/Behavioral: Negative for confusion  All other systems reviewed and are negative  Physical Exam  Physical Exam  Vitals and nursing note reviewed  Constitutional:       General: He is not in acute distress  Appearance: Normal appearance  He is well-developed and well-groomed  He is cachectic  He is not ill-appearing or diaphoretic  HENT:      Head: Normocephalic and atraumatic  Right Ear: External ear normal       Left Ear: External ear normal       Nose: Nose normal       Mouth/Throat:      Mouth: Mucous membranes are moist       Pharynx: Oropharynx is clear     Eyes:      Conjunctiva/sclera: Conjunctivae normal       Pupils: Pupils are equal    Cardiovascular:      Rate and Rhythm: Normal rate and regular rhythm  Heart sounds: Normal heart sounds  Pulmonary:      Effort: Pulmonary effort is normal       Breath sounds: Normal breath sounds  No wheezing, rhonchi or rales  Abdominal:      General: Abdomen is flat  Bowel sounds are normal       Palpations: Abdomen is soft  Tenderness: There is no abdominal tenderness  Musculoskeletal:      Cervical back: Normal, normal range of motion and neck supple  Thoracic back: Normal       Lumbar back: Tenderness and bony tenderness present  No swelling or deformity  Normal range of motion  Negative right straight leg raise test and negative left straight leg raise test       Left hip: Tenderness and bony tenderness present  Normal range of motion  Left upper leg: Normal    Skin:     General: Skin is warm and dry  Coloration: Skin is pale  Findings: No bruising, erythema or rash  Neurological:      Mental Status: He is alert and oriented to person, place, and time  Sensory: Sensation is intact  Motor: Motor function is intact  Psychiatric:         Mood and Affect: Mood normal          Speech: Speech normal          Behavior: Behavior is cooperative           Vital Signs  ED Triage Vitals [10/18/22 1213]   Temperature Pulse Respirations Blood Pressure SpO2   98 5 °F (36 9 °C) 88 18 141/81 90 %      Temp Source Heart Rate Source Patient Position - Orthostatic VS BP Location FiO2 (%)   Temporal Monitor Sitting Left arm --      Pain Score       7           Vitals:    10/18/22 1213   BP: 141/81   Pulse: 88   Patient Position - Orthostatic VS: Sitting         Visual Acuity      ED Medications  Medications   HYDROmorphone (DILAUDID) injection 1 mg (1 mg Intravenous Given 10/18/22 1246)   sodium chloride 0 9 % bolus 1,000 mL (0 mL Intravenous Stopped 10/18/22 1423)       Diagnostic Studies  Results Reviewed     Procedure Component Value Units Date/Time    Fingerstick Glucose (POCT) [[id number]]  (Normal) Collected: 10/18/22 1514    Lab Status: Final result Updated: 10/18/22 1514     POC Glucose 99 mg/dl                  CT lumbar spine without contrast   Final Result by Kailee Mckinney MD (10/18 1433)      No acute osseous abnormality of lumbar spine  Diffuse sclerotic osseous metastasis is more numerous than prior exam       Partially imaged 2 3 cm hypodense lesion appears to be in distal stomach or proximal duodenum, indeterminate  Differential includes ingested material, intraluminal polypoid cystic lesion lesion, among other differentials  Consider CT abdomen pelvis    with contrast for further evaluation  Multilevel degenerative changes of lumbar spine with chronic bilateral L5 pars defect with grade 1 anterolisthesis L5-S1, varying degrees of canal stenosis (mild L3-4) and foraminal narrowing (severe right L5-S1; moderate left L5-S1), as detailed above  Additional chronic/incidental findings as detailed above  The study was marked in [facility] for immediate notification  Workstation performed: [id number]         XR hip/pelv 2-3 vws left   ED Interpretation by Carlotta Wu PA-C (10/18 1423)   No acute fracture  Procedures  Procedures         ED Course  ED Course as of 10/18/22 1605   Tue Oct 18, 2022   1447 Spoke with patient about discharge to home vs nursing home, family unsure if he is safe to go home  Consult to case management  1517 Patient had brief episode of nausea, but it resolved without medication  Blood sugar 99  Patient states this happens once in a while to him  [address] from case management spoke with patient and sister  She was able to get his hospital bed delivered tomorrow  He will be going to sister's house tonight                                                MDM  Number of Diagnoses or Management Options  Chronic back pain: established and worsening  Chronic left hip pain: established and worsening  Diagnosis management comments: Patient with chronic back and hip pain, now worsening, states he has metastatic bone disease, will order Xray hip and ct back to r/o pathologic fracture  No new abnormalities on imaging, patient had relief with pain meds  Family concerned for patient's safety at home due to not having hospital bed, case management contacted, she was able to get patient's bed delivered tomorrow and patient will be staying with family overnight  Amount and/or Complexity of Data Reviewed  Tests in the radiology section of CPT®: ordered and reviewed    Patient Progress  Patient progress: improved      Disposition  Final diagnoses:   Chronic back pain   Chronic left hip pain     Time reflects when diagnosis was documented in both MDM as applicable and the Disposition within this note     Time User Action Codes Description Comment    10/18/2022  3:17 PM Doylene Arrow Add [M54 9,  G89 29] Chronic back pain     10/18/2022  3:17 PM Doylene Arrow Add [E92 272,  G89 29] Chronic left hip pain     10/18/2022  3:18 PM Doylene Arrow Add [J44 1] COPD with acute exacerbation [facility])       ED Disposition     ED Disposition   Discharge    Condition   Stable    Date/Time   Tue Oct 18, 2022  3:17 PM    Comment   [place] discharge to home/self care  Follow-up Information     Follow up With Specialties Details Why [address], [address], Nurse Practitioner Call in 1 day For recheck [address]  [facility]  [phone]            Discharge Medication List as of 10/18/2022  3:20 PM      CONTINUE these medications which have CHANGED    Details   !! albuterol (PROVENTIL HFA,VENTOLIN HFA) 90 mcg/act inhaler Inhale 2 puffs every 4 (four) hours as needed for wheezing, Starting Tue 10/18/2022, Normal       !! - Potential duplicate medications found  Please discuss with provider        CONTINUE these medications which have NOT CHANGED    Details abiraterone (Zytiga) 250 mg tablet Take 4 tablets (1,000 mg total) by mouth in the morning , Starting Thu 5/19/2022, Normal      !! albuterol (PROVENTIL HFA,VENTOLIN HFA) 90 mcg/act inhaler Inhale 2 puffs every 4 (four) hours as needed for wheezing, Starting Wed 9/28/2022, Normal      ALPRAZolam (XANAX) 0 5 mg tablet Take 1 tablet (0 5 mg total) by mouth daily at bedtime as needed for anxiety or sleep Ongoing therapy  , Starting Fri 9/30/2022, Normal      atorvastatin (LIPITOR) 40 mg tablet Take 1 tablet (40 mg total) by mouth daily with dinner, Starting Mon 2/24/2020, Normal      budesonide-formoterol (SYMBICORT) 160-4 5 mcg/act inhaler Inhale 2 puffs 2 (two) times a day Rinse mouth after use , Starting Wed 9/28/2022, Normal      clonazePAM (KlonoPIN) 0 5 mg tablet Take 1 tablet (0 5 mg total) by mouth daily at bedtime Ongoing therapy  , Starting Fri 9/30/2022, Normal      HYDROmorphone (DILAUDID) 2 mg tablet Take 1 tablet (2 mg total) by mouth every 6 (six) hours as needed for moderate pain or severe pain Max Daily Amount: 8 mg, Starting Thu 7/14/2022, Normal      lidocaine (LIDODERM) 5 % Apply 3 patches topically daily Remove & Discard patch within 12 hours or as directed by MD, Starting Tue 5/10/2022, Normal      montelukast (SINGULAIR) 10 mg tablet TAKE 1 TABLET BY MOUTH DAILY AT BEDTIME, Normal      naloxone (NARCAN) 4 mg/0 1 mL nasal spray Administer 1 spray into a nostril   If no response after 2-3 minutes, give another dose in the other nostril using a new spray , Normal      NON FORMULARY Medical marijuana, Historical Med      OLANZapine (ZyPREXA) 7 5 mg tablet Take 1 tablet (7 5 mg total) by mouth daily at bedtime, Starting Tue 7/26/2022, Normal      predniSONE 1 mg tablet TAKE 2 5 TABLETS (2 5 MG TOTAL) BY MOUTH DAILY, Starting Tue 8/23/2022, Normal      senna (SENOKOT) 8 6 mg Take 2 tablets (17 2 mg total) by mouth daily at bedtime as needed for constipation, Starting Mon 5/9/2022, Normal       !! - Potential duplicate medications found  Please discuss with provider  No discharge procedures on file      PDMP Review       Value Time User    PDMP Reviewed  Yes 7/26/2022  1:00 PM Kiko Greer PA-C          ED Provider  Electronically Signed by           Yu Turner PA-C  10/18/22 5812

## 2022-10-18 NOTE — ED NOTES
Patient transported to [facility], [address] [place] [address]  10/18/22 8918

## 2022-10-18 NOTE — TELEPHONE ENCOUNTER
I see patient is in the ED now  Sounds more appropriate to be evaluated in the hospital  Please check in again tomorrow if there is anything they need, if he ends up not being admitted

## 2022-10-18 NOTE — TELEPHONE ENCOUNTER
Patient lives alone and he having a hard time getting around and he cant get out of bed  She is calling to see if she could bring him to the hospital to get his mobility going and his ordered bed will arrive before discharge  She is not sure how to go about it or what to do  She isn't sure if she can even get him into car and out of bed but she will try  She can be reached at [phone]   She stated if she doesn't hear back in a hr she will still attempt to take him to the hospital

## 2022-10-18 NOTE — TELEPHONE ENCOUNTER
She doesn't think that he is going to be admitted is there anything we can do at our end for help her  Can you reach out to this patient, I dont know how to assist her

## 2022-10-19 ENCOUNTER — TELEPHONE (OUTPATIENT)
Dept: PALLIATIVE MEDICINE | Facility: CLINIC | Age: 67
End: 2022-10-19

## 2022-10-19 ENCOUNTER — TELEPHONE (OUTPATIENT)
Dept: PALLIATIVE MEDICINE | Facility: HOSPITAL | Age: 67
End: 2022-10-19

## 2022-10-19 DIAGNOSIS — C79.51 PROSTATE CANCER METASTATIC TO BONE (HCC): ICD-10-CM

## 2022-10-19 DIAGNOSIS — J44.9 CHRONIC OBSTRUCTIVE PULMONARY DISEASE, UNSPECIFIED COPD TYPE (HCC): Primary | ICD-10-CM

## 2022-10-19 DIAGNOSIS — E43 SEVERE PROTEIN-CALORIE MALNUTRITION (HCC): ICD-10-CM

## 2022-10-19 DIAGNOSIS — C61 PROSTATE CANCER METASTATIC TO BONE (HCC): ICD-10-CM

## 2022-10-19 RX ORDER — HYDROMORPHONE HYDROCHLORIDE 2 MG/1
6 TABLET ORAL EVERY 4 HOURS PRN
Qty: 120 TABLET | Refills: 0
Start: 2022-10-19

## 2022-10-19 RX ORDER — OXYCODONE HYDROCHLORIDE 5 MG/1
TABLET ORAL
Qty: 15 TABLET | Refills: 0 | Status: SHIPPED | OUTPATIENT
Start: 2022-10-19

## 2022-10-19 NOTE — TELEPHONE ENCOUNTER
----- Message from Navi Baltazar PA-C sent at 10/19/2022  9:26 AM EDT -----  Regarding: Fam with questions on hospice coverage  Edyta Alarcon,    I just had long conversation with patient's sister Sarah Ortega ([phone])  Lydia Yang is currently at her house while they await the hospital bed delivery to his house  Lydia Yang has metastatic lung cancer EVERYWHERE, malnutrition, and cancer associated bone pain that is getting worse  He is so weak, he's having trouble getting out of bed  I recommended hospice and, at the very least, 24/7 care  Debbi Jackman is curious about Lydia Yang' insurance product and what it might cover  I told them about private pay health aides but she had some questions I could not answer  I think they need a  ASAP  I may send an OP SW referral, but I want to run this by you       -C

## 2022-10-19 NOTE — TELEPHONE ENCOUNTER
I received call from [facility] to discuss Rishabh's acute pain  Joanne Montgomery puts me on speaker phone so I can talk with Bety directly  His hip pain and neck pain are severe  He has been prescribed 2 mg Dilaudid Q6H PRN  He states he took 4MG approximately an hour ago because the pain was so severe  He does not have any lightheadedness or drowsiness  Pain is still significant  I advised him to take an additional 2 mg of Dilaudid now  I advised him to increase his dosing to 6MG Q4H PRN  We discussed that Bety has been taking Dilaudid for a long time and he has gradually noticed it become less effective  We discussed that Bety is pain has become so severe that he will need a baseline agent and he has taken Dilaudid sparingly in the past   Will rotate to oxycodone 7 5-10 mg q 6 hours PRN  I discussed with Bety and Joanne Montgomery that this medication will replace Dilaudid  I am sending it to a pharmacy that is local to Joanne Montgomery so that it can be picked up today in case there are emergencies overnight  Pending his response to this, I would like to start a long-acting opioid medication  We discussed indications to present to the hospital   I agree with closer follow-up for Bety so that we can discuss symptomatic management and goals of care given his disease progression

## 2022-10-19 NOTE — TELEPHONE ENCOUNTER
This note was not shared with the patient due to privacy exception: note includes other individuals      LSW spoke extensively with patient's sister regarding her concerns and potential next steps  Patient was in the ED yesterday but was not admitted because he had no acute medical issue  Patient's largest issue is his inability to care for himself  Piedad was able to have patient stay with her for a short period of time following the ED  Hospital bed was ordered for patient's home  LSW explained that if patient is competent he can choose to reside within his home despite family/providers feeling that this is not the safest option  Patient does have financial resources that could be utilized for alternative placement and/or private care givers  Insurance does not cover either of these options if a patient has resources  Patient has an appointment on 10/31 with Oncology to discuss his current medical status  Strongly suggested that following that appointment that patient and family have a [address] conversation with Paul Guevara and LSW    Requested that office staff move appointment to November 1st

## 2022-10-19 NOTE — TELEPHONE ENCOUNTER
I received call from [facility] to discuss Rishabh's acute pain  Marianne Tee puts me on speaker phone so I can talk with Agustin Carroll directly  His hip pain and neck pain are severe  He has been prescribed 2 mg Dilaudid Q6H PRN  He states he took 4MG approximately an hour ago because the pain was so severe  He does not have any lightheadedness or drowsiness  Pain is still significant  I advised him to take an additional 2 mg of Dilaudid now  I advised him to increase his dosing to 6MG Q4H PRN  We discussed that Agustin Carroll has been taking Dilaudid for a long time and he has gradually noticed it become less effective  We discussed that Agustin Carroll is pain has become so severe that he will need a baseline agent and he has taken Dilaudid sparingly in the past   Will rotate to oxycodone 7 5-10 mg q 6 hours PRN  I discussed with Agustin Carroll and Marianne Tee that this medication will replace Dilaudid  I am sending it to a pharmacy that is local to Marianne Tee so that it can be picked up today in case there are emergencies overnight  Pending his response to this, I would like to start a long-acting opioid medication  We discussed indications to present to the hospital   I agree with closer follow-up for Agustin Carroll so that we can discuss symptomatic management and goals of care given his disease progression

## 2022-10-19 NOTE — TELEPHONE ENCOUNTER
I called  to follow up Linda Weinstein' ED visit  She has some questions about what care Linda Weinstein qualifies for with his insurance product  I will request SW input

## 2022-10-20 ENCOUNTER — TELEPHONE (OUTPATIENT)
Dept: PALLIATIVE MEDICINE | Facility: CLINIC | Age: 67
End: 2022-10-20

## 2022-10-20 NOTE — TELEPHONE ENCOUNTER
Patient sister Neel Hummel called wanting to know how far apart patient should take oxyCODONE and Dilaudid  She is also reporting that his anxiety is very high

## 2022-10-20 NOTE — TELEPHONE ENCOUNTER
I returned 's call  She states that Rishabh's pain seems better controlled with increased dose of Dilaudid 6MG Q4H PRN  She reports he had an episode of worsened pain and gurgling breathing in the middle of the night however he awakened quickly and, after discussion, endorsed that this was related to anxiety  He has been hesitant to take his Xanax  In case the Dilaudid is no longer effective, we discussed switching to oxycodone  I instructed them to wait 4-6 hours after taking prior dose of Dilaudid before taking oxycodone  Gilmer Galvin states that Domitila Tran is scarcely eating and he is extremely wasted and cachectic, which is consistent with his appearance on my prior office visit with him  He has severe protein calorie malnutrition which, per report, has only worsened in the meantime  Per Zaid Chamorro, he is essentially bed-bound due to weakness  He has worsened bone pain    Now that Domitila Tran is more comfortable on this medication regimen, I plan to call him tomorrow to assess his understanding of his medical status and his goals of care  At this time, given Richard's profound weakness and end-stage malnutrition with uncontrolled cancer-related pain, I recommend hospice  Will discuss with Dr Shala Buckley as well  Domitila Marc has an appointment with Dr Shala Buckley on 10/31/2022  Domitila Tran has an appointment with me 11/1/2022  Will follow closely   understands indications to call ambulance

## 2022-10-21 ENCOUNTER — TELEPHONE (OUTPATIENT)
Dept: PALLIATIVE MEDICINE | Facility: HOSPITAL | Age: 67
End: 2022-10-21

## 2022-10-21 LAB — EXT SARS-COV-2: NEGATIVE

## 2022-10-21 NOTE — TELEPHONE ENCOUNTER
I called  and recommended she bring Erika Zambrano to the ED for eval  I am willing to talk with him or the local doctors about his case  If he decides to pursue comfort cares, the ED will also be the fastest way for him to obtain the correct level of care, rather than him suffering at home  Right now, Erika Zambrano is at his sister 's house in the [place] area and is not local and therefore a local emergency room will be able to connect them with hospice agencies

## 2022-10-21 NOTE — TELEPHONE ENCOUNTER
Pt sister, Aida Dawson, called office c/o pain  She was asked the following questions to get a better understanding of the pt's concern  WHERE is the pain: ribs, neck, hips, back, and left arm (this morning)    WHAT kind of pain? Burning/Sharp/Aching/Dull    What relieves the pain? Hydromorphone for a short period    -----------------------------------------------------------------------------------    WHEN does N/V typically occur?: after eating     Are you EATING and/or LOSING WEIGHT?: Not eating well and losing weight    Have you vomited recently? How often?: Yes    Are you currently undergoing Chemo tx?: Yes    Are you taking anything to alleviate N/V?: No    -------------------------------------------------------------------------------------    Aida Dawson also stated pt experienced hallucinations this morning  He also urinated himself this morning  She would like to know if pt should be Hospitalized, if there is any Assisted living options/resources, or if pt should be considered for Hospice  Please advise

## 2022-10-21 NOTE — TELEPHONE ENCOUNTER
I called sister Delvin Ng to follow up our discussion from this morning  Joss Ramirez is being admitted on observation at [facility] [address]  Delvin Ng has provided hospital with my office contact # for continuity  So far, I have not received call  Delvin Ng is not certain if hospice or palliative has been consulted there  Will continue to follow to support Joss Ramirez and family

## 2022-10-24 ENCOUNTER — TELEPHONE (OUTPATIENT)
Dept: HEMATOLOGY ONCOLOGY | Facility: HOSPITAL | Age: 67
End: 2022-10-24

## 2022-10-24 ENCOUNTER — TELEPHONE (OUTPATIENT)
Dept: PALLIATIVE MEDICINE | Facility: CLINIC | Age: 67
End: 2022-10-24

## 2022-10-24 ENCOUNTER — TELEPHONE (OUTPATIENT)
Dept: HEMATOLOGY ONCOLOGY | Facility: CLINIC | Age: 67
End: 2022-10-24

## 2022-10-24 NOTE — TELEPHONE ENCOUNTER
Patient sister Adolfo Hogue calling in to inform Dr Rigo Cook that the patient is being admitted to the hospital via ambulance due to the pain  She was calling to inquire about Hospice for the patient and cancel his follow up on 10/31  After looking into the patient's consent, I informed Brittny that I am unable to disclose information about the patient due to her not being on his communication consent form       She states she is the only one taking care of him, I verbalized understanding  Informed Brittny that I will send the message in regards to the patient cure condition but until his consent is updated the office will not be able disclosed further information           request a call back at [phone], as she works on getting consent updated  Returned call of sister stating her brother is in the hospital at [facility]  Stated that she wants to transition patient to hospice servicespossibly  She would like Dr Rigo Cook to tell her over the phone what he wants medication and care wise for the patient for her to relay to [facility]  I did state that the hospital would be unable to take her word for things over the phone  The hospital has the ability to reach out to our office or request medical records on her brothers behalf  Brittny stated that she's not hearing what she wants and terminated the phone call

## 2022-10-24 NOTE — TELEPHONE ENCOUNTER
Patient sister Kerline Henderson calling in to inform Dr Odette Fermin that the patient is being admitted to the hospital via ambulance due to the pain  She was calling to inquire about Hospice for the patient and cancel his follow up on 10/31  After looking into the patient's consent, I informed  that I am unable to disclose information about the patient due to her not being on his communication consent form  She states she is the only one taking care of him, I verbalized understanding  Informed  that I will send the message in regards to the patient cure condition but until his consent is updated the office will not be able disclosed further information   request a call back at [phone], as she works on getting consent updated

## 2022-10-24 NOTE — TELEPHONE ENCOUNTER
I returned call to Dr Edie Garcia ([phone]) and we discussed Cat Betlran' case for continuity  She plans to arrange family meeting and to contact Dr Cas Art as well  We will maintain open line of communication for ongoing goals needs

## 2022-10-24 NOTE — TELEPHONE ENCOUNTER
Dr Delories Cushing is calling from [facility] and would like a call back from [address] [facility], [address] our [place]

## 2022-10-24 NOTE — TELEPHONE ENCOUNTER
Pt's sister, Ginger Vega, called office requesting a call back to discuss pt's Prognosis  She is very much concerned about pt's wellbeing and would appreciate transparency on pt's prognosis and care

## 2022-10-24 NOTE — TELEPHONE ENCOUNTER
I called Filipe Foster to check on him  He is still admitted at the [facility] in [facility]  I received answering machine and requested he call me back  I returned 's call  In the past, Filipe Foster has given me express permission to update  on all matters and he placed her on his emergency contact list  It appears official communication consent form has not been filed appropriately; therefore Filipe Foster and Austin Marcial are working on this  Form has been emailed to them  Austin Marcial is concerned that Filipe Foster is still too weak to live at home or to attend upcoming appointments  She and Filipe Foster will continue speaking with hospital staff about this  Emotional support given  I will gladly discuss goals and prognosis with Filipe Foster if he returns my call

## 2022-10-26 ENCOUNTER — TELEPHONE (OUTPATIENT)
Dept: HEMATOLOGY ONCOLOGY | Facility: CLINIC | Age: 67
End: 2022-10-26

## 2022-10-28 ENCOUNTER — TELEPHONE (OUTPATIENT)
Dept: HEMATOLOGY ONCOLOGY | Facility: HOSPITAL | Age: 67
End: 2022-10-28

## 2022-10-28 NOTE — TELEPHONE ENCOUNTER
10/28/22    LVM reminding pt for updated labs prior to the next appt  Repeat PSA test     Advising pt to get blood work done in any [employer] [employer] labs  [facility] number also provided

## 2022-10-31 ENCOUNTER — TELEPHONE (OUTPATIENT)
Dept: HEMATOLOGY ONCOLOGY | Facility: HOSPITAL | Age: 67
End: 2022-10-31

## 2022-10-31 ENCOUNTER — OFFICE VISIT (OUTPATIENT)
Dept: HEMATOLOGY ONCOLOGY | Facility: HOSPITAL | Age: 67
End: 2022-10-31

## 2022-10-31 VITALS
HEIGHT: 68 IN | RESPIRATION RATE: 14 BRPM | WEIGHT: 110 LBS | DIASTOLIC BLOOD PRESSURE: 70 MMHG | BODY MASS INDEX: 16.67 KG/M2 | OXYGEN SATURATION: 98 % | HEART RATE: 88 BPM | SYSTOLIC BLOOD PRESSURE: 116 MMHG | TEMPERATURE: 97.8 F

## 2022-10-31 DIAGNOSIS — C61 MALIGNANT NEOPLASM OF PROSTATE METASTATIC TO BONE (HCC): Primary | ICD-10-CM

## 2022-10-31 DIAGNOSIS — C79.51 PROSTATE CANCER METASTATIC TO BONE (HCC): ICD-10-CM

## 2022-10-31 DIAGNOSIS — C61 PROSTATE CANCER METASTATIC TO BONE (HCC): ICD-10-CM

## 2022-10-31 DIAGNOSIS — M89.9 BONE LESION: ICD-10-CM

## 2022-10-31 DIAGNOSIS — C79.51 MALIGNANT NEOPLASM OF PROSTATE METASTATIC TO BONE (HCC): Primary | ICD-10-CM

## 2022-10-31 RX ORDER — GABAPENTIN 300 MG/1
300 CAPSULE ORAL DAILY
COMMUNITY
Start: 2022-10-27

## 2022-10-31 RX ORDER — BUDESONIDE AND FORMOTEROL FUMARATE DIHYDRATE 160; 4.5 UG/1; UG/1
2 AEROSOL RESPIRATORY (INHALATION)
COMMUNITY

## 2022-10-31 NOTE — PROGRESS NOTES
Hematology/Oncology Outpatient Follow- up Note  Angelina Lee 75 y o  male MRN: @ Encounter: [id number]        Date:  10/31/2022    Presenting Complaint/Diagnosis : Metastatic prostate cancer    HPI:    Chris Leblanc seen for initial consultation 1/14/2022 regarding newly diagnosed prostate cancer which appears to be metastatic to the bone   This was biopsy proven   The patient was referred from his primary care physician to urology in December for PSA of 850  CT scan of the abdomen pelvis demonstrated multiple sclerotic lesions   He had a biopsy of 1 of these lesions and it came back as metastatic prostate cancer  Donita Juarez was started on anti androgen therapy by Urology in the form of Firmagon       Previous Hematologic/ Oncologic History:    Workup  Patient was briefly on [address] [facility] [facility] but was admitted with confusion which was thought to be related although based on his family's account I suspect it was related to other issues  Current Hematologic/ Oncologic Treatment:    Lupron with his urologist  Addis through our office  Xgeva every 3 months for bone metastases    Interval History:    Patient returns for follow-up visit  He is doing well on his Zytiga  PSA continues to come down from a peak above 800 to where now it is 3 19 with a free PSA of 0 4  CBC showed a white count of 7 3 with a hemoglobin of 11 9 and platelet count of 109  Sodium was 143 with a potassium of 4 0 creatinine was 0 59  He had a CT scan of the spine done which showed no acute osseous abnormality of lumbar spine with diffuse sclerotic osseous metastases which seemed more numerous than prior exam   2 3 cm partially imaged hypodense lesion appears to be in the distal stomach or proximal duodenum which was indeterminate  There is multi level degenerative changes of lumbar spine  Patient was admitted to an outside facility for pain control and at that point the possibility of hospice was discussed    We were also contacted but had explained that we had not seen the patient for a while  Based on his recent imaging of the spine it seems he may have more numerous osseous metastatic lesions but with the PSA that is very well controlled it is hard to say whether these are active  I would get a PET-CT with PSMA to determine extent of disease and activity if he wishes to pursue further therapy  He could just choose to stay on his Lupron and Zytiga also based on the PSA  The patient agreed to stay on his Lupron and Zytiga along with Xgeva for his bone metastases  Today his pain is controlled  He is on a long-acting morphine pill along with Dilaudid as needed and gabapentin  He is following up with palliative care and I advised him he needs to make these appointments and see them so they can adjust his pain medications as he was in the hospital with confusion related to his narcotics  Today denies any nausea denies any vomiting denies any diarrhea  States he still has pain in his lower back but it is controlled better  The rest of his 14 point review of systems today was negative  He is accompanied by his sister  Test Results:    Imaging: XR hip/pelv 2-3 vws left    Result Date: 10/18/2022  Narrative: LEFT HIP INDICATION:   hip pain, no h/o trauma  COMPARISON:  CT abdomen pelvis 12/1/2021 VIEWS:  XR HIP/PELV 2-3 VWS LEFT  W PELVIS IF PERFORMED FINDINGS: There is no acute fracture or dislocation  No significant hip degenerative changes  Scattered sclerotic lesions mainly in the bony pelvis better seen on prior cross-sectional compatible with known osseous metastasis  There is likely a small sclerotic lesion in the left hip intertrochanteric region  Soft tissues are unremarkable  Impression: No acute osseous abnormality  Multiple sclerotic osseous lesions compatible with known metastasis  There is likely a small sclerotic lesion in the left hip intertrochanteric region   Workstation performed: [id number]     CT lumbar spine without contrast    Result Date: 10/18/2022  Narrative: CT LUMBAR SPINE INDICATION:   Low back pain, cancer suspected h/o cancer, worsening back pain  COMPARISON: CT chest without contrast December 21, 2021  CT abdomen pelvis with contrast December 1, 2021  TECHNIQUE:  Contiguous axial images through the lumbar spine were obtained  Sagittal and coronal reconstructions were performed  Radiation dose length product (DLP) for this visit:  469 09 mGy-cm   This examination, like all CT scans performed in the [facility], was performed utilizing techniques to minimize radiation dose exposure, including the use of iterative  reconstruction and automated exposure control  IMAGE QUALITY:  Diagnostic  FINDINGS: ALIGNMENT:  There are 5 lumbar type vertebral bodies  Chronic bilateral L5 pars defect with grade 1 anterolisthesis L5-S1, unchanged  VERTEBRAL BODIES:  No fracture  Diffuse scattered sclerotic osseous metastasis involving visualized lower thoracic spine, lumbar spine, visualized bilateral ribs, and visualized pelvic bones is more numerous than prior exam  DEGENERATIVE CHANGES: Lower Thoracic spine:  Mild lower thoracic degenerative disc disease with mild annular bulging  L1-2:  No significant canal stenosis or foraminal narrowing  L2-3:  Diffuse disc bulge  No significant canal stenosis or foraminal narrowing  L3-4:  Diffuse disc bulge  Mild canal stenosis  No significant foraminal narrowing  L4-5:  Diffuse disc bulge  Facet arthropathy  No significant canal stenosis  Mild bilateral foraminal narrowing  L5-S1:  Diffuse disc bulge with mild uncoverage of posterior disc, compression of right L5 exiting nerve and contact of left L5 exiting nerve  No significant canal stenosis  Severe right and moderate left foraminal narrowing  PARASPINAL SOFT TISSUES:  Normal  OTHER: Emphysema  Partially imaged 2 3 cm hypodense lesion appears to be in the proximal 2nd portion of the duodenum (01:5)  Partially imaged bilateral nonobstructing renal calculi with largest in right kidney measuring 0 3 cm and multiple punctate calculi in left kidney  Left renal cyst   Mild scattered calcified atherosclerotic disease  Impression: No acute osseous abnormality of lumbar spine  Diffuse sclerotic osseous metastasis is more numerous than prior exam  Partially imaged 2 3 cm hypodense lesion appears to be in distal stomach or proximal duodenum, indeterminate  Differential includes ingested material, intraluminal polypoid cystic lesion lesion, among other differentials  Consider CT abdomen pelvis with contrast for further evaluation  Multilevel degenerative changes of lumbar spine with chronic bilateral L5 pars defect with grade 1 anterolisthesis L5-S1, varying degrees of canal stenosis (mild L3-4) and foraminal narrowing (severe right L5-S1; moderate left L5-S1), as detailed above  Additional chronic/incidental findings as detailed above  The study was marked in [facility] for immediate notification  Workstation performed: [id number]       Labs:   Lab Results   Component Value Date    WBC 5 11 08/15/2022    HGB 13 2 08/15/2022    HCT 41 5 08/15/2022     (H) 08/15/2022     08/15/2022     Lab Results   Component Value Date    K 4 4 08/15/2022     08/15/2022    CO2 34 (H) 08/15/2022    BUN 14 08/15/2022    CREATININE 0 56 (L) 08/15/2022    GLUF 106 (H) 08/15/2022    CALCIUM 8 8 08/15/2022    CORRECTEDCA 9 4 08/15/2022    AST 18 08/15/2022    ALT 26 08/15/2022    ALKPHOS 73 08/15/2022    EGFR 106 08/15/2022       Lab Results   Component Value Date    PSA 7 0 (H) 07/08/2022    PSA 9 8 (H) 06/23/2022    PSA 14 5 (H) 05/23/2022     Lab Results   Component Value Date    [id number] 334 05/08/2022         ROS: As stated in the history of present illness otherwise his 14 point review of systems today was negative        Active Problems:   Patient Active Problem List   Diagnosis   • Chronic obstructive pulmonary disease ([place] [address] )   • Acute tracheobronchitis   • Severe protein-calorie malnutrition ([place] [address] )   • HLD (hyperlipidemia)   • Left lower quadrant abdominal pain   • Personal history of colonic polyps   • Prostate cancer metastatic to bone (HCC)   • Insomnia   • Hot flashes   • Anorexia   • Cachexia (HCC)   • Palliative care patient   • Encephalopathy acute       Past Medical History:   Past Medical History:   Diagnosis Date   • Bone cancer ([place] [address] )    • Colon polyp    • COPD (chronic obstructive pulmonary disease) ([place] [address] )    • Emphysema lung (HCC)    • Hyperlipidemia    • Prostate cancer [facility]        Surgical History:   Past Surgical History:   Procedure Laterality Date   • APPENDECTOMY     • COLONOSCOPY     • IR BIOPSY BONE  12/30/2021   • JOINT REPLACEMENT     • UPPER GASTROINTESTINAL ENDOSCOPY         Family History:    Family History   Problem Relation Age of Onset   • Lung cancer Mother    • Breast cancer Sister    • Colon polyps Brother    • Colon cancer Neg Hx        Cancer-related family history includes Breast cancer in his sister; Lung cancer in his mother  There is no history of Colon cancer      Social History:   Social History     Socioeconomic History   • Marital status: Single     Spouse name: Not on file   • Number of children: Not on file   • Years of education: Not on file   • Highest education level: Not on file   Occupational History   • Not on file   Tobacco Use   • Smoking status: Former Smoker     Packs/day: 1 00     Years: 41 00     Pack years: 41 00     Types: Cigarettes     Quit date: 2012     Years since quitting: 10 8   • Smokeless tobacco: Never Used   Vaping Use   • Vaping Use: Never used   Substance and Sexual Activity   • Alcohol use: Never   • Drug use: Never   • Sexual activity: Not Currently   Other Topics Concern   • Not on file   Social History Narrative   • Not on file     Social Determinants of Health     Financial Resource Strain: Not on file   Food Insecurity: No Food Insecurity   • Worried About Running Out of Food in the Last Year: Never true   • Ran Out of Food in the Last Year: Never true   Transportation Needs: Unmet Transportation Needs   • Lack of Transportation (Medical): Yes   • Lack of Transportation (Non-Medical): Yes   Physical Activity: Not on file   Stress: Not on file   Social Connections: Not on file   Intimate Partner Violence: Not At Risk   • Fear of Current or Ex-Partner: No   • Emotionally Abused: No   • Physically Abused: No   • Sexually Abused: No   Housing Stability: Low Risk    • Unable to Pay for Housing in the Last Year: No   • Number of Places Lived in the Last Year: 1   • Unstable Housing in the Last Year: No       Current Medications:   Current Outpatient Medications   Medication Sig Dispense Refill   • abiraterone (Zytiga) 250 mg tablet Take 4 tablets (1,000 mg total) by mouth in the morning  120 tablet 11   • albuterol (PROVENTIL HFA,VENTOLIN HFA) 90 mcg/act inhaler Inhale 2 puffs every 4 (four) hours as needed for wheezing 18 g 0   • albuterol (PROVENTIL HFA,VENTOLIN HFA) 90 mcg/act inhaler Inhale 2 puffs every 4 (four) hours as needed for wheezing 18 g 0   • budesonide-formoterol (SYMBICORT) 160-4 5 mcg/act inhaler Inhale 2 puffs 2 (two) times a day Rinse mouth after use  10 2 g 0   • clonazePAM (KlonoPIN) 0 5 mg tablet Take 1 tablet (0 5 mg total) by mouth daily at bedtime Ongoing therapy  30 tablet 0   • gabapentin (NEURONTIN) 300 mg capsule Take 300 mg by mouth in the morning     • HYDROmorphone (DILAUDID) 2 mg tablet Take 3 tablets (6 mg total) by mouth every 4 (four) hours as needed for severe pain Max Daily Amount: 36 mg 120 tablet 0   • lidocaine (LIDODERM) 5 % Apply 3 patches topically daily Remove & Discard patch within 12 hours or as directed by MD 90 patch 0   • montelukast (SINGULAIR) 10 mg tablet TAKE 1 TABLET BY MOUTH DAILY AT BEDTIME 90 tablet 1   • naloxone (NARCAN) 4 mg/0 1 mL nasal spray Administer 1 spray into a nostril   If no response after 2-3 minutes, give another dose in the other nostril using a new spray  1 each 0   • NON FORMULARY Medical marijuana     • OLANZapine (ZyPREXA) 7 5 mg tablet Take 1 tablet (7 5 mg total) by mouth daily at bedtime 90 tablet 0   • predniSONE 1 mg tablet TAKE 2 5 TABLETS (2 5 MG TOTAL) BY MOUTH DAILY (Patient taking differently: Take 1 5 mg by mouth daily 1 5 mg- 10/31/22) 75 tablet 0   • senna (SENOKOT) 8 6 mg Take 2 tablets (17 2 mg total) by mouth daily at bedtime as needed for constipation 60 tablet 0   • ALPRAZolam (XANAX) 0 5 mg tablet Take 1 tablet (0 5 mg total) by mouth daily at bedtime as needed for anxiety or sleep Ongoing therapy  (Patient not taking: Reported on 10/31/2022) 30 tablet 0   • atorvastatin (LIPITOR) 40 mg tablet Take 1 tablet (40 mg total) by mouth daily with dinner (Patient not taking: Reported on 10/31/2022) 30 tablet 0   • budesonide-formoterol (SYMBICORT) 160-4 5 mcg/act inhaler Inhale 2 puffs (Patient not taking: Reported on 10/31/2022)     • oxyCODONE (Roxicodone) 5 immediate release tablet Take 7 5 - 10MG (1 5-2 tab) Q6H PRN mod-severe pain  Initial therapy  Palliative care patient  (Patient not taking: Reported on 10/31/2022) 15 tablet 0     No current facility-administered medications for this visit  Allergies: No Known Allergies    Physical Exam:    Body surface area is 1 59 meters squared  Wt Readings from Last 3 Encounters:   10/31/22 49 9 kg (110 lb)   10/18/22 49 9 kg (110 lb)   09/08/22 49 4 kg (109 lb)        Temp Readings from Last 3 Encounters:   10/31/22 97 8 °F (36 6 °C) (Temporal)   10/18/22 98 5 °F (36 9 °C) (Temporal)   08/16/22 (!) 97 °F (36 1 °C) (Tympanic)        BP Readings from Last 3 Encounters:   10/31/22 116/70   10/18/22 141/81   09/08/22 120/80         Pulse Readings from Last 3 Encounters:   10/31/22 88   10/18/22 88   09/08/22 82         Physical Exam     Constitutional   General appearance: No acute distress,       Eyes   Conjunctiva and lids: No swelling, erythema or discharge  Pupils and irises: Equal, round and reactive to light  Ears, Nose, Mouth, and Throat   External inspection of ears and nose: Normal     Nasal mucosa, septum, and turbinates: Normal without edema or erythema  Oropharynx: Normal with no erythema, edema, exudate or lesions  Pulmonary   Respiratory effort: No increased work of breathing or signs of respiratory distress  Auscultation of lungs: Clear to auscultation  Cardiovascular   Palpation of heart: Normal PMI, no thrills  Auscultation of heart: Normal rate and rhythm, normal S1 and S2, without murmurs  Examination of extremities for edema and/or varicosities: Normal     Carotid pulses: Normal     Abdomen   Abdomen: Non-tender, no masses  Liver and spleen: No hepatomegaly or splenomegaly  Lymphatic   Palpation of lymph nodes in neck: No lymphadenopathy  Musculoskeletal   Gait and station: Normal     Digits and nails: Normal without clubbing or cyanosis  Inspection/palpation of joints, bones, and muscles: Normal     Skin   Skin and subcutaneous tissue: Normal without rashes or lesions  Neurologic   Cranial nerves: Cranial nerves 2-12 intact  Sensation: No sensory loss  Psychiatric   Orientation to person, place, and time: Normal     Mood and affect: Normal         Assessment / Plan:      The patient is pleasant 41-year-old male with newly diagnosed metastatic prostate cancer to the bone   He was started on Firmagon with Urology   He was started on Dina Piedad was admitted with confusion  Based on his preference he was switched to [facility] as they thought the confusion may be related to his Xtandi although I thought it was less likely  He is getting Ritu [place] through his urologist   PSA continues to come down and is now 3 19 with a free PSA of 0 4 compared to 850 in December of 2021       He was admitted to the hospital in chest and health for confusion after he took his narcotic pain medications  These were ramped up quickly so he got quite confused  He also had pain which was then controlled at [facility] through long-acting morphine and p r n  Dilaudid along with gabapentin which he states is working quite well  He is alert and oriented x3 today  He is accompanied by his sister  He does wish to stay on active treatment at this point  We did have a discussion about considering moving to a facility for safety and he will discuss this with his family  At this point he will stay on his current medications with no changes  He will have a PSMA scan and see me back with results  He will have his narcotics and his pain medications adjusted by our colleagues in [facility] who he is seeing for this  I think his main issue that led to his hospitalization was his pain and ramping up his pain medication quickly  He will discuss this with them  He will see us in 10-12 weeks with results of imaging  Until then if he has any questions he will call our office  Goals and Barriers:  Current Goal:  Prolong Survival from metastatic prostate cancer  Barriers: None  Patient's Capacity to Self Care:  Patient  able to self care  Portions of the record may have been created with voice recognition software  Occasional wrong word or "sound a like" substitutions may have occurred due to the inherent limitations of voice recognition software  Read the chart carefully and recognize, using context, where substitutions have occurred

## 2022-11-08 ENCOUNTER — OFFICE VISIT (OUTPATIENT)
Dept: PALLIATIVE MEDICINE | Age: 67
End: 2022-11-08

## 2022-11-08 ENCOUNTER — SOCIAL WORK (OUTPATIENT)
Dept: PALLIATIVE MEDICINE | Age: 67
End: 2022-11-08

## 2022-11-08 ENCOUNTER — HOSPITAL ENCOUNTER (OUTPATIENT)
Dept: INFUSION CENTER | Facility: HOSPITAL | Age: 67
Discharge: HOME | End: 2022-11-08
Attending: INTERNAL MEDICINE

## 2022-11-08 VITALS
TEMPERATURE: 98.5 F | BODY MASS INDEX: 17.33 KG/M2 | HEART RATE: 68 BPM | DIASTOLIC BLOOD PRESSURE: 63 MMHG | WEIGHT: 107.81 LBS | HEIGHT: 66 IN | RESPIRATION RATE: 16 BRPM | SYSTOLIC BLOOD PRESSURE: 133 MMHG

## 2022-11-08 VITALS
WEIGHT: 108 LBS | HEART RATE: 67 BPM | OXYGEN SATURATION: 94 % | DIASTOLIC BLOOD PRESSURE: 70 MMHG | TEMPERATURE: 97.3 F | RESPIRATION RATE: 18 BRPM | SYSTOLIC BLOOD PRESSURE: 106 MMHG | BODY MASS INDEX: 16.42 KG/M2

## 2022-11-08 DIAGNOSIS — C61 PROSTATE CANCER METASTATIC TO BONE (HCC): Primary | ICD-10-CM

## 2022-11-08 DIAGNOSIS — Z51.5 PALLIATIVE CARE PATIENT: Primary | ICD-10-CM

## 2022-11-08 DIAGNOSIS — R64 CACHEXIA (HCC): ICD-10-CM

## 2022-11-08 DIAGNOSIS — C61 PROSTATE CANCER METASTATIC TO BONE (HCC): ICD-10-CM

## 2022-11-08 DIAGNOSIS — C79.51 PROSTATE CANCER METASTATIC TO BONE (HCC): Primary | ICD-10-CM

## 2022-11-08 DIAGNOSIS — F32.9 REACTIVE DEPRESSION: ICD-10-CM

## 2022-11-08 DIAGNOSIS — C79.51 PROSTATE CANCER METASTATIC TO BONE (HCC): ICD-10-CM

## 2022-11-08 DIAGNOSIS — R23.2 HOT FLASHES: ICD-10-CM

## 2022-11-08 RX ORDER — MORPHINE SULFATE 15 MG/1
15 TABLET, FILM COATED, EXTENDED RELEASE ORAL 3 TIMES DAILY
Qty: 42 TABLET | Refills: 0 | Status: SHIPPED | OUTPATIENT
Start: 2022-11-08

## 2022-11-08 RX ORDER — SERTRALINE HYDROCHLORIDE 25 MG/1
12.5 TABLET, FILM COATED ORAL DAILY
Qty: 14 TABLET | Refills: 0 | Status: SHIPPED | OUTPATIENT
Start: 2022-11-08

## 2022-11-08 RX ADMIN — DENOSUMAB 120 MG: 120 INJECTION SUBCUTANEOUS at 13:30

## 2022-11-08 NOTE — PROGRESS NOTES
Received Xgeva injection as per order  No more appointments to be made, unit clerk addressing  Left unit ambulatory with steady gait

## 2022-11-08 NOTE — PATIENT INSTRUCTIONS
Rishabh,    I am refilling a 2 week supply of long acting morphine 15MG  Please try taking it three times a day rather than 2  If this does not help the pain, please call me  Watch for excessive drowsiness  We will likely have to make adjustments  We will start sertraline 12  5MG every day  Let me know if this does not help with hot flashes and we can adjust      As always, please call me with any questions or concerns       [phone]

## 2022-11-08 NOTE — PROGRESS NOTES
Outpatient Follow-Up - Palliative and Supportive Care   Kaylie Moura 75 y o  male [id number]    Assessment & Plan  1  Palliative care patient    2  Cachexia ([address] )    3  Hot flashes    4  Reactive depression    5  Prostate cancer metastatic to bone [facility])      -Kita Peak still with significant BT pain despite morphine ER 15MG BID  He denies any drowsiness or associated side effects  -pain is located in his neck and hip  -Will try increasing MS Contin to 15MG TID    -He did not respond well with the addition of 2MG Dilaudid while taking MS Contin, however he responded well to Dilaudid alone in the past   -Continue gabapentin QHS  -Continue Clonazepam 0 5MG QHS   -Start Sertraline 12 5 MG QD given severe nocturnal hot flashes    -We discussed goals extensively  Kita Nunes maintains firm disease-directed mentality  We did discuss prognosis and next steps however we are focusing on symptom optimization at this time  -RTC 1 month for close symptom follow-up      Medications adjusted this encounter:  Requested Prescriptions     Signed Prescriptions Disp Refills   • morphine (MS CONTIN) 15 mg 12 hr tablet 42 tablet 0     Sig: Take 1 tablet (15 mg total) by mouth 3 (three) times a day Ongoing therapy in palliative care patient  Max Daily Amount: 45 mg   • sertraline (Zoloft) 25 mg tablet 14 tablet 0     Sig: Take 0 5 tablets (12 5 mg total) by mouth daily     No orders of the defined types were placed in this encounter  Medications Discontinued During This Encounter   Medication Reason   • oxyCODONE (Roxicodone) 5 immediate release tablet Alternate therapy   • OLANZapine (ZyPREXA) 7 5 mg tablet Alternate therapy   • HYDROmorphone (DILAUDID) 2 mg tablet Alternate therapy         Kaylie Moura was seen today for symptoms and planning cares related to above illnesses    I have reviewed the patient's controlled substance dispensing history in the Prescription Drug Monitoring Program in compliance with the [facility] regulations before prescribing any controlled substances  Filled  Written  Sold  ID  Drug  QTY  Days  Prescriber  RX #  Dispenser  Refill  Daily Dose*  Pymt Type       10/29/2022  10/29/2022   1  Morphine Sulf Er 15 Mg Tablet   20 00  10  [facility]  [id number]   Pen ([id number])   30 00 MME  [place]     10/27/2022  10/27/2022   1  Morphine Sulf Er 15 Mg Tablet   6 00  3  [facility]  [id number]   Pen ([id number])   30 00 MME  [place]     10/26/2022  09/30/2022   1  Alprazolam 0 5 Mg Tablet   30 00  30   [facility]  [id number]   Pen ([id number])   1 00 LME  [place]     10/26/2022  09/30/2022   1  Clonazepam 0 5 Mg Tablet   30 00  30   [facility]  [id number]   Pen ([id number])   1 00 E  [place]        They are invited to continue to follow with us  If there are questions or concerns, please contact us through our clinic/answering service 24 hours a day, seven days a week  [facility]        Visit Information    Accompanied By: yissel Scott of History: Self     History of Present Illness      Kacy Akhtar is a 75 y o  male who presents in follow up of symptoms related to metastatic prostate cancer  Pertinent issues include: symptom management, pain, neoplasm related, breathlessness, depression or anxiety, nausea, fatigue, assessment of goals of care, disease process education and discussion of prognosis, advance care planning  Tatiana Peña follows with Dr Rajani Arnold and Ezzard Claude  He establish care with Oncology in January 2022 for newly diagnosed prostate cancer with Mets to bone, confirmed by biopsy  CT image done 12/2021 revealed extensive multifocal osseous metastatic disease including axial and appendicular skeletal system  Metastasis was also noted at calvarium, sternum, ribs, right humerus and clavicle, bilateral scalp UA, cervical spine and thoracolumbar spine, sacrum, pelvis, and bilateral femurs    Tatiana Peña was initiated on Firmagon anti androgen therapy by Urology in continues Lupron every 6 months  Key Christine was initiated on anti androgen therapy with Xtandi  This was discontinued due to side effects of hallucinations that led to hospitalization in May 2022  He was switched to [employer] [place] [place] with Xgeva Q3 months for bone mets  Key Christine follows with palliative care for symptom management  In October of 2022, Key Christine was hospitalized at external agency [facility]) due to profound fatigue in intractable pain  He was rehydrated and his symptomatic regimen was adjusted by palliative care physcian Dr Wil Elliott ([phone])  Key Christine return to see Dr Michelle Haas on 10/31/2022  Per Dr Michelle Haas, it was difficult to determine if Key Christine has progression due to poor follow-up  He has stable PSA level but suggestion of worsening in imaging  Nanci Hyde is being arranged with follow-up PET-CT for staging  He has elected to stay on his Harlo Lennert, and Xgeva  Today, Key Christine presents accompanied by his niece Johann Robledokelly  He states he does not remember anything about his hospitalization in [facility]  He does remember the food was not good  He is clearly uncomfortable during this visit and reports significant neck pain  He states he has had this pain for years but it has worsened over the last several months  He also reports significant hip pain  He states they prescribed him MS Contin 15 mg twice a day at the prior hospital   He does not notice any relief from this but he is not sure because he does not know what feels to be off of this medication  He stated the other day he tried 2 mg of Dilaudid and he believes he almost overdosed  He reports his legs felt shaky and they gave way  He is not willing to try any Dilaudid while he is taking the MS Contin  Otherwise, he does not know any drowsiness, lightheadedness, or sleepiness from MS Contin  We discussed realistic expectations for pain control    Key Christine tends to be highly sensitive to medications and may not be able to balance his independence with optimal pain control  We discussed his personal goals for his health  He would like to focus on alleviation of his pain  He is very clear he would like to continue treatment because he believes it will prolong his life despite significant hot flashes and side effects  We discussed possible changes to his regimen  At this time, I agree that Payton Wiggins should be on a long-acting analgesic regimen but I would also like him to have breakthrough medicine  I believe starting him on a long-acting Dilaudid would be too strong for him, so we plan to keep the MS Contin  We will see how he responds to adding a 3rd dose during the day since he does not seem to have significant response with the twice daily dosing  After that, we will determine need to rotate opioid  I would like to follow with Payton Wiggins closely  We will plan to see him again in 1 month  Past medical, surgical, social, and family histories are reviewed and pertinent updates are made  Review of Systems   Constitutional: Negative for decreased appetite, night sweats and weight gain  HENT: Negative for congestion, ear discharge and hearing loss  Eyes: Negative for blurred vision and double vision  Cardiovascular: Negative for dyspnea on exertion, irregular heartbeat, leg swelling and near-syncope  Respiratory: Negative for cough, hemoptysis and shortness of breath  Skin: Negative for itching and poor wound healing  Musculoskeletal: Positive for neck pain  Negative for muscle cramps  Hip bone pain   Gastrointestinal: Negative for bloating, abdominal pain, anorexia, nausea and vomiting  Genitourinary: Negative for bladder incontinence and dysuria  Neurological: Positive for weakness  Negative for disturbances in coordination, excessive daytime sleepiness, dizziness, light-headedness and loss of balance     Psychiatric/Behavioral: Negative for altered mental status, hallucinations, memory loss, substance abuse and suicidal ideas  Vital Signs    /70 (BP Location: Left arm, Patient Position: Sitting, Cuff Size: Standard)   Pulse 67   Temp (!) 97 3 °F (36 3 °C) (Temporal)   Resp 18   Wt 49 kg (108 lb)   SpO2 94%   BMI 16 42 kg/m²      Physical Exam and Objective Data  Physical Exam  Vitals and nursing note reviewed  Exam conducted with a chaperone present  Constitutional:       Appearance: He is cachectic  He is ill-appearing  HENT:      Head: Normocephalic and atraumatic  Eyes:      Conjunctiva/sclera: Conjunctivae normal    Cardiovascular:      Rate and Rhythm: Normal rate  Pulmonary:      Effort: Pulmonary effort is normal  No respiratory distress  Abdominal:      Tenderness: There is no abdominal tenderness  Musculoskeletal:      Cervical back: Neck supple  Skin:     General: Skin is warm and dry  Neurological:      Mental Status: He is alert and oriented to person, place, and time  Psychiatric:         Mood and Affect: Mood normal          Behavior: Behavior normal  Behavior is cooperative  Radiology and Laboratory:  I personally reviewed and interpreted the following results: CBC, CMP, results from external hospital stay    45+ minutes was spent face to face with Lanita Cooks with greater than 50% of the time spent in counseling or coordination of care including discussions of etiology of diagnosis, instructions for disease self management, treatment instructions, follow up requirements, risk factors and risk reduction of disease, patient and family counseling/involvement in care and compliance with treatment regimen  All of the patient's or agent's questions were answered during this discussion

## 2022-11-08 NOTE — PROGRESS NOTES
Palliative Supportive Care  met with patient/family to continue to provide emotional support and guidance  Updated biopsychosocial information relevant to support:  Patient has returned to his personal residence due to difficultly residing with his sister following the last hospitalization  Patient's personal opinion is that he is able to complete his ADLs without assistance  He does require transportation assistance due to reduced mobility in his neck and pain  Identified areas of need include: assistance from Antoine Interiano (DEBI) to address pain (neck and hip) and hot flashes   Resources provided:  None at this time  Areas that need future follow-up include:  Continued supportive listening to assist patient with his physical discomforts and to continue an open conversation regarding positives/negatives of continuation of cancer directed treatments  I have spent 40 minutes with Patient and family today in which greater than 50% of this time was spent in counseling/coordination of care regarding supportive listening       Palliative  will follow-up as requested by patient, family, and primary team   Please contact with any specific requests

## 2022-11-14 DIAGNOSIS — R64 CACHEXIA (HCC): ICD-10-CM

## 2022-11-14 DIAGNOSIS — Z51.5 PALLIATIVE CARE PATIENT: ICD-10-CM

## 2022-11-14 DIAGNOSIS — R63.0 ANOREXIA: ICD-10-CM

## 2022-11-14 RX ORDER — PREDNISONE 1 MG/1
2.5 TABLET ORAL DAILY
Qty: 75 TABLET | Refills: 0 | Status: SHIPPED | OUTPATIENT
Start: 2022-11-14

## 2022-11-14 NOTE — TELEPHONE ENCOUNTER
Primary palliative medicine provider:   MARSHALL Bartlett       Medication requested: Prednisone 1 mg tablet     If for pain, how has the patient been taking their pain medicine?      Last appointment: 11/8     Next scheduled appointment: 12/1     PDMP review:    Pharmacy:

## 2022-11-24 DIAGNOSIS — F32.9 REACTIVE DEPRESSION: Primary | ICD-10-CM

## 2022-11-24 NOTE — TELEPHONE ENCOUNTER
Pt  Called for a refill on gabapentin  He doesn't have many left and will need them for tomorrow  I don't know what the quantity is, it wasn't listed and no provider, although the pt  Said Josefa George did his last refill

## 2022-11-24 NOTE — TELEPHONE ENCOUNTER
Medication Refill Request     Name gabapentin (NEURONTIN)  Dose/Frequency   300 mg capsule Take 300 mg by mouth in the morning       Quantity   Verified pharmacy   [x]  Verified ordering Provider   []  Does patient have enough for the next 3 days?  Yes [] No [x]

## 2022-11-25 NOTE — TELEPHONE ENCOUNTER
Primary palliative medicine provider:   Lilly Meza    Medication requested:  Gabapentin     If for pain, how has the patient been taking their pain medicine?      Last appointment:    Next scheduled appointment:    PDMP review:    Pharmacy:

## 2022-11-26 DIAGNOSIS — C61 PROSTATE CANCER METASTATIC TO BONE (HCC): ICD-10-CM

## 2022-11-26 DIAGNOSIS — R64 CACHEXIA (HCC): ICD-10-CM

## 2022-11-26 DIAGNOSIS — R23.2 HOT FLASHES: ICD-10-CM

## 2022-11-26 DIAGNOSIS — Z51.5 PALLIATIVE CARE PATIENT: ICD-10-CM

## 2022-11-26 DIAGNOSIS — C79.51 PROSTATE CANCER METASTATIC TO BONE (HCC): ICD-10-CM

## 2022-11-26 DIAGNOSIS — F32.9 REACTIVE DEPRESSION: ICD-10-CM

## 2022-11-26 RX ORDER — GABAPENTIN 300 MG/1
300 CAPSULE ORAL
Qty: 10 CAPSULE | Refills: 0 | Status: CANCELLED | OUTPATIENT
Start: 2022-11-26

## 2022-11-26 RX ORDER — SERTRALINE HYDROCHLORIDE 25 MG/1
12.5 TABLET, FILM COATED ORAL DAILY
Qty: 14 TABLET | Refills: 0 | Status: SHIPPED | OUTPATIENT
Start: 2022-11-26 | End: 2022-12-01 | Stop reason: SDUPTHER

## 2022-11-26 RX ORDER — SERTRALINE HYDROCHLORIDE 25 MG/1
12.5 TABLET, FILM COATED ORAL DAILY
Qty: 14 TABLET | Refills: 0 | Status: CANCELLED | OUTPATIENT
Start: 2022-11-26

## 2022-11-26 RX ORDER — GABAPENTIN 300 MG/1
300 CAPSULE ORAL
Qty: 10 CAPSULE | Refills: 0 | Status: SHIPPED | OUTPATIENT
Start: 2022-11-26 | End: 2022-12-01 | Stop reason: SDUPTHER

## 2022-11-26 RX ORDER — MORPHINE SULFATE 15 MG/1
15 TABLET, FILM COATED, EXTENDED RELEASE ORAL 3 TIMES DAILY
Qty: 42 TABLET | Refills: 0 | Status: SHIPPED | OUTPATIENT
Start: 2022-11-26 | End: 2022-12-01 | Stop reason: ALTCHOICE

## 2022-11-26 RX ORDER — MORPHINE SULFATE 15 MG/1
15 TABLET, FILM COATED, EXTENDED RELEASE ORAL 3 TIMES DAILY
Qty: 42 TABLET | Refills: 0 | Status: CANCELLED | OUTPATIENT
Start: 2022-11-26

## 2022-11-26 NOTE — TELEPHONE ENCOUNTER
Medication Refill Request     Name sertraline (Zoloft) 25 mg tablet  Dose/Frequency 12 5 daily  Quantity 14 tablets  Verified pharmacy   [x]  Verified ordering Provider   [x] Willian Hummel  Does patient have enough for the next 3 days? Yes [] No [x]    Medication Refill Request     Name gabapentin (NEURONTIN) 300 mg capsule  Dose/Frequency 300 mg daily  Quantity 30  Verified pharmacy   [x]  Verified ordering Provider   [x]  Does patient have enough for the next 3 days? Yes [] No [x]    Medication Refill Request     Name morphine (MS CONTIN) 15 mg 12 hr tablet  Dose/Frequency 3 times daily  Quantity 42 tablets  Verified pharmacy   [x]  Verified ordering Provider   [x]  Does patient have enough for the next 3 days?  Yes [x] No []

## 2022-11-27 ENCOUNTER — TELEPHONE (OUTPATIENT)
Dept: OTHER | Facility: OTHER | Age: 67
End: 2022-11-27

## 2022-11-27 ENCOUNTER — TELEPHONE (OUTPATIENT)
Dept: PALLIATIVE MEDICINE | Facility: CLINIC | Age: 67
End: 2022-11-27

## 2022-11-27 NOTE — TELEPHONE ENCOUNTER
Received phone call from patient's brother, Mario Saleem, approx [phone]- while on call this afternoon in regards to patient Martin Vazquez  Luz Odom reports concern for Christopher's escalating back pain, not responding to PO 15 mg morphine (although only taking BID, not TID as prescribed as patient is nervous that that would be too much per Luz Odom)  I advised that patient should go to the [facility] Emergency Department for further evaluation and pain medication as clinically indicated  Luz Odom said he would call his brother and tell him and was in agreement with this plan  Pierre Wright to call our office if he had any additional questions or concerns

## 2022-11-28 ENCOUNTER — TELEPHONE (OUTPATIENT)
Dept: PALLIATIVE MEDICINE | Facility: HOSPITAL | Age: 67
End: 2022-11-28

## 2022-11-28 RX ORDER — HYDROMORPHONE HYDROCHLORIDE 8 MG/1
8 TABLET, EXTENDED RELEASE ORAL DAILY
Qty: 7 TABLET | Refills: 0 | Status: CANCELLED | OUTPATIENT
Start: 2022-11-28

## 2022-11-28 NOTE — TELEPHONE ENCOUNTER
I attempted to speak with Angela Kasper brother, re: Rishabh's uncontrolled pain  I left a message requesting a call back  I also called Ishmael Sullivan and likewise left a message requesting he or his brother call us back  Will need to discuss options moving forward    ---Ishmael Sullivan is very sensitive to medications and is very cautious about them  He believes he "overdosed" with addition of 2MG of hydromorphone PRN BT while on a baseline of MS Contin 15MG BID  Per prior documentation, it appears he was not willing to increase MS Contin to TID   ---Ishmael Sullivan is not good a candidate for fentanyl patch due to uncontrolled pain and current daily OME is less than 60    ---rotation to oxycodone may be best option   ---long acting hydromorphone is not covered  By patient's insurance  ---Ishmael Sullivan is not ideal methadone candidate unless he is monitored by family member  So far, Ishmael Sullivan has been living alone  QTC acceptable  - of note, Ishmael Sullivan was recommended to present to the emergency room yesterday evening   If pain is uncontrolled and he is not supervised, would have to recommend presentation to ED again as options are limited for Randy Hawkins has a follow-up appointment scheduled with palliative care on Thursday December 1st     Filled  Written  Sold  ID  Drug  QTY  Days  Prescriber  RX #  Dispenser  Refill  Daily Dose*  Pymt Type       11/26/2022 11/26/2022   1  Morphine Sulf Er 15 Mg Tablet 42 00  14  Ti Raciel  [id number]   Pen ([id number])   45 00 MME  [place]     11/08/2022 11/08/2022   1  Morphine Sulf Er 15 Mg Tablet 42 00  14  [facility]

## 2022-12-01 ENCOUNTER — OFFICE VISIT (OUTPATIENT)
Dept: PALLIATIVE MEDICINE | Age: 67
End: 2022-12-01

## 2022-12-01 ENCOUNTER — TELEPHONE (OUTPATIENT)
Dept: PALLIATIVE MEDICINE | Facility: CLINIC | Age: 67
End: 2022-12-01

## 2022-12-01 VITALS
HEART RATE: 74 BPM | DIASTOLIC BLOOD PRESSURE: 60 MMHG | WEIGHT: 106 LBS | OXYGEN SATURATION: 94 % | BODY MASS INDEX: 17.04 KG/M2 | SYSTOLIC BLOOD PRESSURE: 118 MMHG | TEMPERATURE: 97.6 F | HEIGHT: 66 IN

## 2022-12-01 DIAGNOSIS — J44.9 CHRONIC OBSTRUCTIVE PULMONARY DISEASE, UNSPECIFIED COPD TYPE (HCC): ICD-10-CM

## 2022-12-01 DIAGNOSIS — G89.3 CANCER ASSOCIATED PAIN: ICD-10-CM

## 2022-12-01 DIAGNOSIS — J20.9 ACUTE TRACHEOBRONCHITIS: ICD-10-CM

## 2022-12-01 DIAGNOSIS — F32.9 REACTIVE DEPRESSION: ICD-10-CM

## 2022-12-01 DIAGNOSIS — R23.2 HOT FLASHES: ICD-10-CM

## 2022-12-01 DIAGNOSIS — Z51.5 PALLIATIVE CARE PATIENT: ICD-10-CM

## 2022-12-01 DIAGNOSIS — G47.00 INSOMNIA, UNSPECIFIED TYPE: ICD-10-CM

## 2022-12-01 DIAGNOSIS — E43 SEVERE PROTEIN-CALORIE MALNUTRITION (HCC): ICD-10-CM

## 2022-12-01 DIAGNOSIS — C61 PROSTATE CANCER METASTATIC TO BONE (HCC): Primary | ICD-10-CM

## 2022-12-01 DIAGNOSIS — C79.51 PROSTATE CANCER METASTATIC TO BONE (HCC): Primary | ICD-10-CM

## 2022-12-01 DIAGNOSIS — R64 CACHEXIA (HCC): ICD-10-CM

## 2022-12-01 RX ORDER — GABAPENTIN 300 MG/1
300 CAPSULE ORAL
Qty: 10 CAPSULE | Refills: 0 | Status: SHIPPED | OUTPATIENT
Start: 2022-12-01

## 2022-12-01 RX ORDER — BUPRENORPHINE 5 UG/H
1 PATCH TRANSDERMAL
Qty: 4 PATCH | Refills: 0 | Status: SHIPPED | OUTPATIENT
Start: 2022-12-01

## 2022-12-01 RX ORDER — OXYCODONE HYDROCHLORIDE 5 MG/1
2.5 TABLET ORAL EVERY 4 HOURS PRN
Qty: 360 TABLET | Refills: 0 | Status: SHIPPED | OUTPATIENT
Start: 2022-12-01

## 2022-12-01 RX ORDER — SERTRALINE HYDROCHLORIDE 25 MG/1
12.5 TABLET, FILM COATED ORAL DAILY
Qty: 14 TABLET | Refills: 0 | Status: SHIPPED | OUTPATIENT
Start: 2022-12-01

## 2022-12-01 NOTE — PATIENT INSTRUCTIONS
Stuart Keating,    Here are some notes from our visit:    The Butrans patches will REPLACE your long-acting medication (MS Contin)  Therefore, your MS Contin will be discontinued  You will still take short acting oxycodone 2 5MG as needed for breakthrough pain while on the patch  The Butrans patch may take 3 or more days to feel the maximum effects as levels build up in the body  Starting the [facility] patch:   -Stop any MS Contin 12 hours prior     Apply to clean, dry skin  The patch(es) are replaced every 7 days--place on a new local each week for at least 2 weeks so that your skin gets a break  Please keep me updated with your pain levels over the next few days  Hopefully, by day 3 of the patch, you should have better baseline control  If not, do not be discouraged  We will adjust      When wearing patches:    -It is fine to shower/bathe/swim however avoid exposing patch directly to hot water  -DO NOT apply heating pad over patch  Please call with any questions or concerns!

## 2022-12-01 NOTE — PROGRESS NOTES
Outpatient Follow-Up - Palliative and Supportive Care   Shawna Lewis 75 y o  male [id number]    Assessment & Plan  1  Prostate cancer metastatic to bone ([facility] )    2  Severe protein-calorie malnutrition ([facility] )    3  Chronic obstructive pulmonary disease, unspecified COPD type ([facility] )    4  Acute tracheobronchitis    5  Palliative care patient    6  Cachexia ([facility] )    7  Insomnia, unspecified type    8  Reactive depression    9  Hot flashes    10  Cancer associated pain       PLAN:  · Continuing gabapentin as Zelda Rockwell expresses significant relief from this  Otherwise, would recommend gradual wean  · Discussed weaning from Luite Marcos 87 Contin 15MG BID Brandyn Tao has been taking 1 5tablets = 22 5MG BID  We discussed that these tablets are not intended to be split ) Zelda Rockwell will go back down to MS Contin 15MG BID and will stop taking this when he fills buprenorphine patches, per written instructions  · Will start buprenorphine patches (Butrans) 5MG Q7 Days  Instructions discussed thoroughly and printed in AVS     · Will start 2 5MG oxycodone Q4H PRN BT pain while on Butrans patch  · Continue sertraline (Zoloft) 12 5 mg q h s for hot flashes  refill sent  · Continue Klonopin 0 5 mg q h s   Can take 0 5 mg Xanax p r n  · Zelda Rockwell has been experiencing mild tremors since he started treatment  He attributes this to anxiety however can review medications and wean as able  · ACP/goals- Zelda Rockwell states he plans to sell his home within the next 4 months and move closer to family so he has more support  Zelda Rockwell expresses that his disease is taking over his body and he knows he cannot live alone for much longer     · RTC - 1 month     Medications adjusted this encounter:  Requested Prescriptions     Signed Prescriptions Disp Refills   • transdermal buprenorphine (Butrans) 5 mcg/hr TD patch 4 patch 0     Sig: Place 1 patch on the skin every 7 days palliative care patient   • oxyCODONE (Roxicodone) 5 immediate release tablet 360 tablet 0 Sig: Take 0 5 tablets (2 5 mg total) by mouth every 4 (four) hours as needed (breakthough pain) Max Daily Amount: 15 mg   • gabapentin (NEURONTIN) 300 mg capsule 10 capsule 0     Sig: Take 1 capsule (300 mg total) by mouth daily at bedtime   • sertraline (Zoloft) 25 mg tablet 14 tablet 0     Sig: Take 0 5 tablets (12 5 mg total) by mouth daily     No orders of the defined types were placed in this encounter  Medications Discontinued During This Encounter   Medication Reason   • morphine (MS CONTIN) 15 mg 12 hr tablet Alternate therapy   • gabapentin (NEURONTIN) 300 mg capsule Reorder   • sertraline (Zoloft) 25 mg tablet Reorder         Tierney Tran was seen today for symptoms and planning cares related to above illnesses  I have reviewed the patient's controlled substance dispensing history in the Prescription Drug Monitoring Program in compliance with the [facility] regulations before prescribing any controlled substances  They are invited to continue to follow with us  If there are questions or concerns, please contact us through our clinic/answering service 24 hours a day, seven days a week  Patricia Manning [facility]      Visit Information    Accompanied By: brother Annalise Rogers of History: self, brother Suma Chavez      History of Present Illness      Tierney Tran is a 75 y o  male who presents in follow up of symptoms related to metastatic prostate cancer  Pertinent issues include: symptom management, pain, neoplasm related, breathlessness, depression or anxiety, nausea, fatigue, assessment of goals of care, disease process education and discussion of prognosis, advance care planning  Himanshu Putnam is under the care of Dr Vinita Carpio and Kirill Miles for prostate cancer, diagnosed 2022   CT image done 12/2021 revealed extensive multifocal osseous metastatic disease including axial and appendicular skeletal system, calvarium, sternum, ribs right humerus and clavicle, bilateral scapulae, cervical spine and thoracolumbar spine, sacrum, pelvis, and bilateral femurs  Rose Carter was initiated on Atamaria 86 anti androgen therapy by Urology and continues Lupron every 6 months  Rose Carter was initiated on Altaf  This was discontinued due to side effects of hallucinations that led to hospitalization in May 2022  He was switched to [employer] with Jenaro Ore every 3 months for bone metastasis  Rose Carter follows with palliative care for symptom management  In October 2022, Rose Carter was hospitalized at an external agency [facility] to to profound fatigue and intractable pain  He was rehydrated and is symptomatic regimen was adjusted by palliative care physician Dr Estelita Nava ([phone])  Rose Carter returned to see Dr Noah Booker later that month  Per Dr Noah Booker, it was difficult to determine any progression due to poor follow-up  Rose Carter had stable PSA level but suggestion of worsening on imaging  Rose Carter is being arranged with follow-up PET-CT scan for staging  He has elected to stay on his [employer], and Xgeva  Today, Rose Carter presents accompanied by his brother Mary Vazquez  He reports significant pain however he believes he can manage it with 1+1/2 pills of MS Contin twice a day and gabapentin at night  He does not take any short-acting medication because he could not tolerate this with the added MS Contin  He reports his pain is limiting his ADLs and making it more difficult to live at home and he plans to sell his house  He is sad that he may need to for for it his peace in [place] but he recognizes he is growing weaker  We discussed going back to 15 mg of MS Contin rather than 1 and half pills  We discussed initiating a Butrans patch and reviewed the risks and benefits of this  I printed instructions for Rose Carter in his after visit summary of how to apply the patch and how to safely transition from his MS Contin and signs to watch for    He should apply to the area with the most fat on his body  We discussed adding in a short-acting medication for breakthrough pain and he is agreeable to this  Willie Solano continues to experience it hot flashes however they are better on the Zoloft  He is also had resting tremors since he initiated treatment  We can review his medications side effects  He also plans to discuss this and other questions with Dr Nicole Hernandez  Willie Solano reports waking up in the night and not being able to fall asleep  He takes Klonopin before bed  When he is not able to fall sleep E often takes another Klonopin  He has Xanax at home  We discussed that he could try taking a Xanax if he is unable to fall back to sleep brother call than a Klonopin  At this time, Willie Solano expresses understanding of changes to medical regimen and I have type these out in his after visit summary for him and his brother to review  No questions at this time  Would like to follow-up closely with Mauedwin Solano in approximately 1 month however he is welcome to call sooner if needed  Documentation from 11/08/2022: Today, Willie Solano presents accompanied by his niece Quynh  He states he does not remember anything about his hospitalization in [facility]  He does remember the food was not good  He is clearly uncomfortable during this visit and reports significant neck pain  He states he has had this pain for years but it has worsened over the last several months  He also reports significant hip pain  He states they prescribed him MS Contin 15 mg twice a day at the prior hospital   He does not notice any relief from this but he is not sure because he does not know what feels to be off of this medication  He stated the other day he tried 2 mg of Dilaudid and he believes he almost overdosed  He reports his legs felt shaky and they gave way  He is not willing to try any Dilaudid while he is taking the MS Contin  Otherwise, he does not know any drowsiness, lightheadedness, or sleepiness from MS Contin      We discussed realistic expectations for pain control  Sathish Rosario tends to be highly sensitive to medications and may not be able to balance his independence with optimal pain control  We discussed his personal goals for his health  He would like to focus on alleviation of his pain  He is very clear he would like to continue treatment because he believes it will prolong his life despite significant hot flashes and side effects  We discussed possible changes to his regimen  At this time, I agree that Sathish Rosario should be on a long-acting analgesic regimen but I would also like him to have breakthrough medicine  I believe starting him on a long-acting Dilaudid would be too strong for him, so we plan to keep the MS Contin  We will see how he responds to adding a 3rd dose during the day since he does not seem to have significant response with the twice daily dosing  After that, we will determine need to rotate opioid  I would like to follow with Sathish Rosario closely  We will plan to see him again in 1 month  Past medical, surgical, social, and family histories are reviewed and pertinent updates are made  Review of Systems   Constitutional: Positive for decreased appetite, malaise/fatigue, night sweats and weight loss  HENT: Negative for congestion and ear pain  Eyes: Negative for blurred vision and pain  Cardiovascular: Negative for chest pain, cyanosis and dyspnea on exertion  Respiratory: Negative for shortness of breath and sleep disturbances due to breathing  Skin: Negative for dry skin and flushing  Musculoskeletal: Positive for back pain, muscle weakness, myalgias, neck pain and stiffness  Gastrointestinal: Negative for bloating, abdominal pain, change in bowel habit, nausea and vomiting  Genitourinary: Negative for bladder incontinence and dysuria  Neurological: Positive for weakness  Negative for aphonia, difficulty with concentration and disturbances in coordination  Psychiatric/Behavioral: Negative for altered mental status, depression, hallucinations and hypervigilance  Vital Signs    /60 (BP Location: Left arm, Patient Position: Sitting, Cuff Size: Adult)   Pulse 74   Temp 97 6 °F (36 4 °C) (Temporal)   Ht 5' 6 22" (1 682 m)   Wt 48 1 kg (106 lb)   SpO2 94%   BMI 17 00 kg/m²      Physical Exam and Objective Data  Physical Exam  Vitals and nursing note reviewed  Constitutional:       General: He is not in acute distress  Appearance: He is cachectic  He is ill-appearing  Comments: Fully conversational; appears mildly uncomfortable related to pain  No respiratory distress  Extremely frail with fat loss and muscle wasting present  HENT:      Head: Normocephalic and atraumatic  Eyes:      Conjunctiva/sclera: Conjunctivae normal    Pulmonary:      Effort: Pulmonary effort is normal  No respiratory distress  Breath sounds: Normal breath sounds  Abdominal:      Palpations: Abdomen is soft  Tenderness: There is no abdominal tenderness  Musculoskeletal:         General: No swelling  Cervical back: Neck supple  Skin:     General: Skin is warm and dry  Capillary Refill: Capillary refill takes less than 2 seconds  Coloration: Skin is pale  Neurological:      Mental Status: He is alert and oriented to person, place, and time  Mental status is at baseline     Psychiatric:         Mood and Affect: Mood normal          Behavior: Behavior normal        Radiology and Laboratory:  I personally reviewed and interpreted the following results:  CBC, CMP    60+ minutes was spent face to face with Rene Saenz with greater than 50% of the time spent in counseling or coordination of care including discussions of etiology of diagnosis, instructions for disease self management, treatment instructions, follow up requirements, risk factors and risk reduction of disease, patient and family counseling/involvement in care and compliance with treatment regimen  All of the patient's or agent's questions were answered during this discussion

## 2022-12-06 NOTE — TELEPHONE ENCOUNTER
Prior Authorization initiated for  [employer] with Cynthia  Cannot complete via phone  Form being faxed to office  Fax completed form and supporting documentation to [id number]:      Additional Clinical information needed

## 2022-12-06 NOTE — TELEPHONE ENCOUNTER
Prior Authorization completed  for   Butrans patch 5 mcg/hr   Forms completed and faxed to   [phone]        Requested  Urgent    Await response

## 2022-12-07 NOTE — TELEPHONE ENCOUNTER
Received prior authorization approval for Buprenorphine patch weekly 5 MCG/HR  This approval is for 10/01/22 until further notice     Faxed results to Pharmacy  Pharmacy has been asked to inform pt of outcome

## 2022-12-09 ENCOUNTER — TELEPHONE (OUTPATIENT)
Dept: PALLIATIVE MEDICINE | Facility: CLINIC | Age: 67
End: 2022-12-09

## 2022-12-09 DIAGNOSIS — F32.9 REACTIVE DEPRESSION: ICD-10-CM

## 2022-12-09 DIAGNOSIS — F41.9 ANXIETY: ICD-10-CM

## 2022-12-09 DIAGNOSIS — Z51.5 PALLIATIVE CARE PATIENT: ICD-10-CM

## 2022-12-09 DIAGNOSIS — G47.00 INSOMNIA, UNSPECIFIED TYPE: ICD-10-CM

## 2022-12-09 NOTE — TELEPHONE ENCOUNTER
Patient called office stating of a concern of having a reaction to Butrans patch,   Patient stated last night patient experience, sweating and cold at the same time, shaking and shortness of breath  Patient doesn't know if a withdraw from morphine,   Patient stated of having oxycodone sitting around and took it and this symptoms decrease  Patient is requesting a back

## 2022-12-12 DIAGNOSIS — J44.9 CHRONIC OBSTRUCTIVE PULMONARY DISEASE, UNSPECIFIED COPD TYPE (HCC): ICD-10-CM

## 2022-12-12 RX ORDER — GABAPENTIN 300 MG/1
300 CAPSULE ORAL
Qty: 90 CAPSULE | Refills: 0 | Status: SHIPPED | OUTPATIENT
Start: 2022-12-12

## 2022-12-12 RX ORDER — CLONAZEPAM 0.5 MG/1
0.5 TABLET ORAL
Qty: 30 TABLET | Refills: 1 | Status: SHIPPED | OUTPATIENT
Start: 2022-12-12

## 2022-12-12 RX ORDER — BUDESONIDE AND FORMOTEROL FUMARATE DIHYDRATE 160; 4.5 UG/1; UG/1
AEROSOL RESPIRATORY (INHALATION)
Qty: 10.2 G | Refills: 0 | Status: SHIPPED | OUTPATIENT
Start: 2022-12-12

## 2022-12-12 NOTE — TELEPHONE ENCOUNTER
I returned call to Bety and we spoke about his symptoms  He reports that he self discontinued the Butrans patch after several days of shakiness/hot/cold flashes  He states while he was on the patch, he did take some breakthrough oxycodone which completely alleviated the symptoms but he would feel them again after the oxycodone wore off  Prior to initiating Butrans patch, Bety had low daily OME needs, therefore there was low probability that Butrans patch would precipitate opioid withdrawal  However given the description of his symptoms, I think opioid withdrawal is the most likely explanation  For now, Bety states his pain is very well controlled with his prior regimen, MS Contin 15MG BID  He continues to take gabapentin nightly  So far, he has not tried the 2 5 mg oxycodone while he is on the MS Contin because his breakthrough pain episodes have been very minimal     He is satisfied with this regimen and we will continue it  I canceled the Butrans patch    I sent a refill of gabapentin and Klonopin at his request

## 2022-12-19 ENCOUNTER — TELEPHONE (OUTPATIENT)
Dept: PALLIATIVE MEDICINE | Facility: CLINIC | Age: 67
End: 2022-12-19

## 2022-12-19 DIAGNOSIS — C79.51 PROSTATE CANCER METASTATIC TO BONE (HCC): ICD-10-CM

## 2022-12-19 DIAGNOSIS — C61 PROSTATE CANCER METASTATIC TO BONE (HCC): ICD-10-CM

## 2022-12-19 DIAGNOSIS — G89.3 CANCER RELATED PAIN: ICD-10-CM

## 2022-12-19 DIAGNOSIS — Z51.5 PALLIATIVE CARE PATIENT: Primary | ICD-10-CM

## 2022-12-19 RX ORDER — MORPHINE SULFATE 15 MG/1
15 TABLET, FILM COATED, EXTENDED RELEASE ORAL 2 TIMES DAILY
Qty: 60 TABLET | Refills: 0 | Status: SHIPPED | OUTPATIENT
Start: 2022-12-19

## 2022-12-19 NOTE — TELEPHONE ENCOUNTER
[id number] 11/26/2022 11/26/2022 Morphine Sulfate (Tablet, Extended Release) 42 0 14 15 MG 45 0 WINTER KELLY [facility]  [place] 0 / 0 [place]     1 [id number] 11/08/2022 11/08/2022 Morphine Sulfate (Tablet, Extended Release) 42 0 14 15 MG 45 0 UVALDO QUIROZ [facility]  [place] 0 / 0 [place]    1 [id number] 10/29/2022 10/29/2022 Morphine Sulfate (Tablet, Extended Release) 20 0 10 15 MG 30 0 HERNANDEZ FRY [facility]   [place] 0 / 0 [place]    1                     12/27/22 [facility] apt     Patient requesting  Morphine sulfate 15 mg refill

## 2022-12-20 ENCOUNTER — HOSPITAL ENCOUNTER (EMERGENCY)
Facility: HOSPITAL | Age: 67
Discharge: LEFT BEFORE DISCHARGE | End: 2022-12-20

## 2022-12-20 ENCOUNTER — APPOINTMENT (OUTPATIENT)
Dept: RADIOLOGY | Facility: HOSPITAL | Age: 67
End: 2022-12-20

## 2022-12-20 VITALS
HEART RATE: 91 BPM | WEIGHT: 106 LBS | SYSTOLIC BLOOD PRESSURE: 165 MMHG | DIASTOLIC BLOOD PRESSURE: 112 MMHG | TEMPERATURE: 98.2 F | OXYGEN SATURATION: 95 % | RESPIRATION RATE: 20 BRPM | HEIGHT: 66 IN | BODY MASS INDEX: 17.04 KG/M2

## 2022-12-20 LAB
ALBUMIN SERPL BCP-MCNC: 3.8 G/DL (ref 3.5–5)
ALP SERPL-CCNC: 64 U/L (ref 46–116)
ALT SERPL W P-5'-P-CCNC: 23 U/L (ref 12–78)
ANION GAP SERPL CALCULATED.3IONS-SCNC: 8 MMOL/L (ref 4–13)
AST SERPL W P-5'-P-CCNC: 22 U/L (ref 5–45)
BASOPHILS # BLD AUTO: 0.08 THOUSANDS/ÂΜL (ref 0–0.1)
BASOPHILS NFR BLD AUTO: 1 % (ref 0–1)
BILIRUB SERPL-MCNC: 0.4 MG/DL (ref 0.2–1)
BUN SERPL-MCNC: 14 MG/DL (ref 5–25)
CALCIUM SERPL-MCNC: 8.7 MG/DL (ref 8.3–10.1)
CARDIAC TROPONIN I PNL SERPL HS: 8 NG/L
CHLORIDE SERPL-SCNC: 105 MMOL/L (ref 96–108)
CO2 SERPL-SCNC: 30 MMOL/L (ref 21–32)
CREAT SERPL-MCNC: 0.69 MG/DL (ref 0.6–1.3)
EOSINOPHIL # BLD AUTO: 0.1 THOUSAND/ÂΜL (ref 0–0.61)
EOSINOPHIL NFR BLD AUTO: 2 % (ref 0–6)
ERYTHROCYTE [DISTWIDTH] IN BLOOD BY AUTOMATED COUNT: 12.9 % (ref 11.6–15.1)
GFR SERPL CREATININE-BSD FRML MDRD: 97 ML/MIN/1.73SQ M
GLUCOSE SERPL-MCNC: 104 MG/DL (ref 65–140)
HCT VFR BLD AUTO: 43.4 % (ref 36.5–49.3)
HGB BLD-MCNC: 14.1 G/DL (ref 12–17)
IMM GRANULOCYTES # BLD AUTO: 0.01 THOUSAND/UL (ref 0–0.2)
IMM GRANULOCYTES NFR BLD AUTO: 0 % (ref 0–2)
LYMPHOCYTES # BLD AUTO: 0.88 THOUSANDS/ÂΜL (ref 0.6–4.47)
LYMPHOCYTES NFR BLD AUTO: 15 % (ref 14–44)
MCH RBC QN AUTO: 31.8 PG (ref 26.8–34.3)
MCHC RBC AUTO-ENTMCNC: 32.5 G/DL (ref 31.4–37.4)
MCV RBC AUTO: 98 FL (ref 82–98)
MONOCYTES # BLD AUTO: 0.45 THOUSAND/ÂΜL (ref 0.17–1.22)
MONOCYTES NFR BLD AUTO: 8 % (ref 4–12)
NEUTROPHILS # BLD AUTO: 4.52 THOUSANDS/ÂΜL (ref 1.85–7.62)
NEUTS SEG NFR BLD AUTO: 74 % (ref 43–75)
NRBC BLD AUTO-RTO: 0 /100 WBCS
PLATELET # BLD AUTO: 356 THOUSANDS/UL (ref 149–390)
PMV BLD AUTO: 8.5 FL (ref 8.9–12.7)
POTASSIUM SERPL-SCNC: 3.8 MMOL/L (ref 3.5–5.3)
PROT SERPL-MCNC: 7.3 G/DL (ref 6.4–8.4)
RBC # BLD AUTO: 4.43 MILLION/UL (ref 3.88–5.62)
SODIUM SERPL-SCNC: 143 MMOL/L (ref 135–147)
WBC # BLD AUTO: 6.04 THOUSAND/UL (ref 4.31–10.16)

## 2022-12-21 DIAGNOSIS — J44.1 COPD WITH ACUTE EXACERBATION (HCC): ICD-10-CM

## 2022-12-21 RX ORDER — ALBUTEROL SULFATE 90 UG/1
2 AEROSOL, METERED RESPIRATORY (INHALATION) EVERY 4 HOURS PRN
Qty: 18 G | Refills: 0 | Status: SHIPPED | OUTPATIENT
Start: 2022-12-21

## 2023-01-03 ENCOUNTER — OFFICE VISIT (OUTPATIENT)
Dept: PALLIATIVE MEDICINE | Age: 68
End: 2023-01-03

## 2023-01-03 VITALS
HEART RATE: 90 BPM | DIASTOLIC BLOOD PRESSURE: 70 MMHG | TEMPERATURE: 98.2 F | SYSTOLIC BLOOD PRESSURE: 98 MMHG | WEIGHT: 105 LBS | BODY MASS INDEX: 16.95 KG/M2 | OXYGEN SATURATION: 90 % | RESPIRATION RATE: 16 BRPM

## 2023-01-03 DIAGNOSIS — E43 SEVERE PROTEIN-CALORIE MALNUTRITION (HCC): ICD-10-CM

## 2023-01-03 DIAGNOSIS — Z51.5 PALLIATIVE CARE PATIENT: ICD-10-CM

## 2023-01-03 DIAGNOSIS — C79.51 PROSTATE CANCER METASTATIC TO BONE (HCC): Primary | ICD-10-CM

## 2023-01-03 DIAGNOSIS — Z79.899 MEDICAL MARIJUANA USE: ICD-10-CM

## 2023-01-03 DIAGNOSIS — G89.3 CANCER-RELATED PAIN: ICD-10-CM

## 2023-01-03 DIAGNOSIS — C61 PROSTATE CANCER METASTATIC TO BONE (HCC): Primary | ICD-10-CM

## 2023-01-03 DIAGNOSIS — F32.9 REACTIVE DEPRESSION: ICD-10-CM

## 2023-01-03 DIAGNOSIS — G89.3 NEOPLASM RELATED PAIN: ICD-10-CM

## 2023-01-03 DIAGNOSIS — R23.2 HOT FLASHES: ICD-10-CM

## 2023-01-03 DIAGNOSIS — R64 CACHEXIA (HCC): ICD-10-CM

## 2023-01-03 RX ORDER — SERTRALINE HYDROCHLORIDE 25 MG/1
25 TABLET, FILM COATED ORAL
Qty: 30 TABLET | Refills: 0 | Status: SHIPPED | OUTPATIENT
Start: 2023-01-03

## 2023-01-03 RX ORDER — LIDOCAINE 50 MG/G
3 PATCH TOPICAL DAILY
Qty: 90 PATCH | Refills: 0 | Status: SHIPPED | OUTPATIENT
Start: 2023-01-03

## 2023-01-03 NOTE — PROGRESS NOTES
Outpatient Follow-Up - Palliative and Supportive Care   Satish Sharma 75 y o  male [id number]    Assessment & Plan  1  Prostate cancer metastatic to bone ([facility] )    2  Severe protein-calorie malnutrition ([facility] )    3  Cachexia ([facility] )    4  Palliative care patient    5  Neoplasm related pain    6  Medical marijuana use    7  Reactive depression    8  Hot flashes    9  Cancer-related pain       PLAN:  · Today, a niece transports Geovanna Galo to and from his appointment however he is alone during our appointment  · Geovanna Galo reports stable symptoms  · He has superficial hand laceration that I treated with steri-strips and bandage today  He is vague about what caused this but says is was an accident at home involving his car keys  · Will continue MS Contin 15MG BID  · Will continue PRN oxycodone  Geovanna Galo prefers to split a 5MG tablet into 4 pieces (approximately 1 25MG OxyIR) to take  at a time  · Butrans discontinued  · Continue Clonopin QHS  · Continue Xanax during the day for panic attacks  · Will trial increased of sertraline to 25 mg for hot flashes  · Continue gabapentin  · Geovanna Galo was not certain if he should complete PET CT scan and PSA  I offered to reach out to oncology to find out for him  He will wait to talk with Dr Alexis Hernandez on Monday about this  · Follow-up with urology is at the end of February  · Not taking buprenorphine  · ACP - Geovanna Galo seems aware of the severity of his disease  He plans to sell his home early this year and move closer to family for better support  Should clarify at next visit who is surrogate decision-maker     · RTC 6 weeks    Medications adjusted this encounter:  Requested Prescriptions     Signed Prescriptions Disp Refills   • sertraline (Zoloft) 25 mg tablet 30 tablet 0     Sig: Take 1 tablet (25 mg total) by mouth daily at bedtime   • lidocaine (LIDODERM) 5 % 90 patch 0     Sig: Apply 3 patches topically daily Remove & Discard patch within 12 hours or as directed by MD No orders of the defined types were placed in this encounter  Medications Discontinued During This Encounter   Medication Reason   • lidocaine (LIDODERM) 5 % Reorder   • sertraline (Zoloft) 25 mg tablet Reorder         Rosalva Stoll was seen today for symptoms and planning cares related to above illnesses  I have reviewed the patient's controlled substance dispensing history in the Prescription Drug Monitoring Program in compliance with the [facility] regulations before prescribing any controlled substances  Filled  Written  Sold  ID  Drug  QTY  Days  Prescriber  RX #  Dispenser  Refill  Daily Dose*  Pymt Type       12/19/2022 12/19/2022   1  Morphine Sulf Er 15 Mg Tablet 60 00  30  Ch Fis  [id number]   Pen (4411)   30 00 MME  [place]     12/12/2022 12/12/2022   1  Clonazepam 0 5 Mg Tablet 30 00  30  Ch Fis  [id number]   Pen (4411)   1 00 LME  [place]     12/07/2022 12/01/2022   1  Buprenorphine 5 Mcg/hr Patch 4 00  28  Ch Fis  [id number]   Pen (4411)   0 12 mg  [place]     12/01/2022 12/01/2022   1  Oxycodone Hcl (Ir) 5 Mg Tablet 270 00  90  Ch Fis  [id number]   Pen (4411)   22 50 MME  [place]          They are invited to continue to follow with us  If there are questions or concerns, please contact us through our clinic/answering service 24 hours a day, seven days a week  [address] [place] [address] and [facility]      Visit Information    Accompanied By: alone today     Source of History: self      History of Present Illness      Rosalva Stoll is a 75 y o  male who presents in follow up of symptoms related to metastatic prostate cancer  Pertinent issues include: symptom management, pain, neoplasm related, breathlessness, depression or anxiety, nausea, fatigue, assessment of goals of care, disease process education and discussion of prognosis, advance care planning      Hilda Jiang is under the care of Dr Arianna Bellamy and ISA Crouch for prostate cancer, diagnosed early 2022  CT image done 12/21 revealed extensive multifocal osseous metastatic disease including axial and appendicular skeletal system, calvarium, sternum, ribs, right humerus and clavicle, bilateral scapulae, cervical spine and thoracolumbar spine, sacrum, pelvis, and bilateral femurs  Zelalem Jones was initiated on Atamaria 86 antiandrogen therapy by urology and continues Lupron every 6 months  He is also initiated on Veldon Pique but this was discontinued due to side effects of hallucinations that led to hospitalization in May 2022  He was switched to [place] with Lucilla Fleischer every 3 months for bone metastasis  Zelalem Jones follows with palliative care for symptom management  In October 2022, Zelalem Jones was hospitalized and an external agency [facility] in [facility] due to profound fatigue and intractable pain  He was rehydrated and his symptomatic regimen was adjusted by palliative care physician Dr Diana Lyn ([phone])  Zelalem Jones return to see Dr Kristie Campos later that month  Per Dr Kristie Campos, it was difficult to determine any progression of disease due to poor follow-up  Zelalem Jones had stable PSA level but suggestion of worsening on imaging  Zelalem Jones is being arranged for follow-up PET CT scan for staging  He has elected to stay on his current treatment  Today, Zelalem Jones presents unaccompanied today for symptom follow up  His right hand is bandaged and taped with blue masking tape  He reports that he had an accident at home with his couch that involved his car keys  He allows me to look at the incision and it is a relatively superficial gash  There are no signs of infection and it is not actively bleeding  I sterilized the area and applied Steri-Strips and recovered with gauze and medical tape  We reviewed Rishabh's symptoms  He reports his pain is relatively well controlled on current regimen  He continues to take MS Contin 50 mg twice daily    He has learned he can tolerate a quarter of an oxycodone 5 mg as needed breakthrough pain  He states his hot flashes are still significant and wonders if we could uptitrate Zoloft to 25 mg which I agree with  He continues to take gabapentin and finds good relief from this  He states he does have panic attacks for which the Xanax is helpful and continues to take Klonopin at night however it does not last all night for him  He wants to discuss with Dr Jesús Mata if he should get the PET scan and blood work  Agustin Carroll was not aware that these were ordered  I reviewed recent visit with Dr Jesús Mata and offered to recheck it would help with Agustin Carroll  For now, Agustin Carroll continues to make arrangements for his future  He knows he is doing well now but may have worsening in the near future  I would like to follow closely with Agustin Carroll  We will plan to see him again in 6 weeks after he has his visit with Dr Jesús Mata  Past medical, surgical, social, and family histories are reviewed and pertinent updates are made  Review of Systems   Constitutional: Positive for decreased appetite  Negative for malaise/fatigue  HENT: Negative for congestion and hearing loss  Eyes: Negative for blurred vision  Cardiovascular: Negative for chest pain and dyspnea on exertion  Respiratory: Negative for cough  Musculoskeletal: Positive for back pain  Negative for joint pain, joint swelling and muscle weakness  Gastrointestinal: Negative for bloating, abdominal pain, nausea and vomiting  Genitourinary: Negative for bladder incontinence and dysuria  Neurological: Positive for weakness  Negative for aphonia, difficulty with concentration, disturbances in coordination, excessive daytime sleepiness and light-headedness  Psychiatric/Behavioral: Negative for altered mental status  The patient is not nervous/anxious        Vital Signs    BP 98/70 (BP Location: Left arm, Patient Position: Sitting, Cuff Size: Standard)   Pulse 90   Temp 98 2 °F (36 8 °C) (Temporal)   Resp 16   Wt 47 6 kg (105 lb)   SpO2 90%   BMI 16 95 kg/m²      Physical Exam and Objective Data  Physical Exam  Vitals and nursing note reviewed  Constitutional:       General: He is not in acute distress  Appearance: He is cachectic  He is ill-appearing  HENT:      Head: Normocephalic and atraumatic  Eyes:      Conjunctiva/sclera: Conjunctivae normal    Cardiovascular:      Rate and Rhythm: Normal rate  Pulmonary:      Effort: Pulmonary effort is normal  No respiratory distress  Breath sounds: Normal breath sounds  Abdominal:      Palpations: Abdomen is soft  Tenderness: There is no abdominal tenderness  Musculoskeletal:         General: No swelling  Cervical back: Neck supple  Comments: R and L hand ROM within normal limits   Skin:     General: Skin is warm and dry  Capillary Refill: Capillary refill takes less than 2 seconds  Coloration: Skin is pale  Findings: Lesion (clean, semicircular hand laceration on dorsum of R hand  No sign of infection  Not bleeding  Skin flaps are approximated  ) present  Neurological:      Mental Status: He is alert and oriented to person, place, and time  Mental status is at baseline  Psychiatric:         Mood and Affect: Mood normal          Behavior: Behavior normal        Radiology and Laboratory:  I personally reviewed and interpreted the following results:  CBC, CMP    40 minutes was spent face to face with Bruna Stokes with greater than 50% of the time spent in counseling or coordination of care including discussions of etiology of diagnosis, instructions for disease self management, treatment instructions, follow up requirements, risk factors and risk reduction of disease, patient and family counseling/involvement in care and compliance with treatment regimen  All of the patient's or agent's questions were answered during this discussion

## 2023-01-05 ENCOUNTER — TELEPHONE (OUTPATIENT)
Dept: HEMATOLOGY ONCOLOGY | Facility: CLINIC | Age: 68
End: 2023-01-05

## 2023-01-05 NOTE — TELEPHONE ENCOUNTER
01/05/23 chart prep:    Of note, Dr Mahnaz Arndt would like to see patient back with the PSA test results and also the PSMA PET scan result  Patient canceled his a PET scan in November  I called back and spoke with patient advising patient to get the PET scan performed  and repeat another PSA test   Or if patient does not want to perform the PET scan, we will still keep his appointment with Dr Mahnaz Arndt on the coming Monday to discuss the necessity of the PET scan  Eventually patient changed his mind, he stated he will call the central scheduling to schedule his PET scan, he will get 1 more PSA test performed, he will call us back to schedule the follow-up appointment with Dr Mahnaz Arndt  I think this is reasonable  Patient knows he can call us back with any questions or concerns  I will cancel his appointment on January 9 with Dr Mahnaz Arndt

## 2023-01-10 ENCOUNTER — APPOINTMENT (OUTPATIENT)
Dept: LAB | Facility: HOSPITAL | Age: 68
End: 2023-01-10
Attending: INTERNAL MEDICINE

## 2023-01-16 DIAGNOSIS — C79.51 PROSTATE CANCER METASTATIC TO BONE (HCC): ICD-10-CM

## 2023-01-16 DIAGNOSIS — R63.0 ANOREXIA: ICD-10-CM

## 2023-01-16 DIAGNOSIS — C61 PROSTATE CANCER METASTATIC TO BONE (HCC): ICD-10-CM

## 2023-01-16 DIAGNOSIS — Z51.5 PALLIATIVE CARE PATIENT: ICD-10-CM

## 2023-01-16 DIAGNOSIS — R64 CACHEXIA (HCC): ICD-10-CM

## 2023-01-16 DIAGNOSIS — G89.3 CANCER RELATED PAIN: ICD-10-CM

## 2023-01-16 RX ORDER — MORPHINE SULFATE 15 MG/1
15 TABLET, FILM COATED, EXTENDED RELEASE ORAL 2 TIMES DAILY
Qty: 60 TABLET | Refills: 0 | Status: SHIPPED | OUTPATIENT
Start: 2023-01-16

## 2023-01-16 NOTE — TELEPHONE ENCOUNTER
Primary palliative medicine provider: MARSHALL Mccartney    Medication requested: morphine 15 mg     If for pain, how has the patient been taking their pain medicine? Last appointment:  1/3    Next scheduled appointment: 2/14    PDMP review:   PATIENT ID PRESCRIPTION # FILLED WRITTEN DRUG LABEL QTY DAYS STRENGTH MME** PRESCRIBER PHARMACY PAYMENT REFILL #/AUTH STATE DETAIL   1 [id number] 12/19/2022 12/19/2022 Morphine Sulfate (Tablet, Extended Release) 60 0 30 15 MG 30 0 [facility]  [place] / [place]    1 [id number] 12/12/2022 12/12/2022 clonazePAM (Tablet) 30 0 30 0 5 MG NA [facility]  [place] 0 / 1 [place]    1 [id number] 12/07/2022 12/01/2022 Buprenorphine (Patch, Extended Release) 4 0 28 5 MCG/1 HR NA [facility]  [place] / [place]    1 [id number] 12/01/2022 12/01/2022 oxyCODONE HCL (Tablet) 270 0 90 5 MG 22 50 [facility]  [place] 0 / [place]    1 [id number] 11/26/2022 11/26/2022 Morphine Sulfate (Tablet, Extended Release) 42 0 14 15 MG 45 0 [facility]   [place]are 0 / 0 [place]            Pharmacy:

## 2023-01-16 NOTE — TELEPHONE ENCOUNTER
Primary palliative medicine provider:   Phil Silva    Medication requested:  Prednisone 1mg tab    If for pain, how has the patient been taking their pain medicine?      Last appointment: 01/03/23    Next scheduled appointment: 02/14/23    PDMP review: na    Ph[facility]: [facility]      Pt takes 1 5 mg daily ( not 2 5 mg as per order )

## 2023-01-17 RX ORDER — PREDNISONE 1 MG/1
2.5 TABLET ORAL DAILY
Qty: 75 TABLET | Refills: 0 | Status: SHIPPED | OUTPATIENT
Start: 2023-01-17

## 2023-01-26 ENCOUNTER — TELEPHONE (OUTPATIENT)
Dept: PULMONOLOGY | Facility: CLINIC | Age: 68
End: 2023-01-26

## 2023-01-26 NOTE — TELEPHONE ENCOUNTER
Left a voicemail for patient to schedule a consultation with a Pulmonary doctor per referral placed by PCP

## 2023-01-27 ENCOUNTER — HOSPITAL ENCOUNTER (OUTPATIENT)
Dept: NUCLEAR MEDICINE | Facility: HOSPITAL | Age: 68
End: 2023-01-27

## 2023-01-27 DIAGNOSIS — C61 MALIGNANT NEOPLASM OF PROSTATE METASTATIC TO BONE (HCC): ICD-10-CM

## 2023-01-27 DIAGNOSIS — M89.9 BONE LESION: ICD-10-CM

## 2023-01-27 DIAGNOSIS — C79.51 MALIGNANT NEOPLASM OF PROSTATE METASTATIC TO BONE (HCC): ICD-10-CM

## 2023-01-27 DIAGNOSIS — C79.51 PROSTATE CANCER METASTATIC TO BONE (HCC): ICD-10-CM

## 2023-01-27 DIAGNOSIS — C61 PROSTATE CANCER METASTATIC TO BONE (HCC): ICD-10-CM

## 2023-01-30 ENCOUNTER — HOSPITAL ENCOUNTER (OUTPATIENT)
Dept: INFUSION CENTER | Facility: HOSPITAL | Age: 68
Discharge: HOME | End: 2023-01-30
Attending: INTERNAL MEDICINE

## 2023-01-30 VITALS
BODY MASS INDEX: 17.13 KG/M2 | RESPIRATION RATE: 16 BRPM | OXYGEN SATURATION: 93 % | HEIGHT: 66 IN | HEART RATE: 82 BPM | TEMPERATURE: 98.2 F | WEIGHT: 106.6 LBS | DIASTOLIC BLOOD PRESSURE: 62 MMHG | SYSTOLIC BLOOD PRESSURE: 120 MMHG

## 2023-01-30 DIAGNOSIS — C61 PROSTATE CANCER METASTATIC TO BONE (HCC): Primary | ICD-10-CM

## 2023-01-30 DIAGNOSIS — C79.51 PROSTATE CANCER METASTATIC TO BONE (HCC): Primary | ICD-10-CM

## 2023-01-30 RX ADMIN — DENOSUMAB 120 MG: 120 INJECTION SUBCUTANEOUS at 13:11

## 2023-01-30 NOTE — PROGRESS NOTES
CrCl and Ca within parameters for treatment  Confirmed no dental/mouth pain or upcoming procedures  Tolerated L arm Xgeva without issue  Next appointment made by infusion technician

## 2023-02-02 DIAGNOSIS — G47.00 INSOMNIA, UNSPECIFIED TYPE: ICD-10-CM

## 2023-02-02 DIAGNOSIS — F41.9 ANXIETY: ICD-10-CM

## 2023-02-02 DIAGNOSIS — Z51.5 PALLIATIVE CARE PATIENT: ICD-10-CM

## 2023-02-02 DIAGNOSIS — J44.1 COPD WITH ACUTE EXACERBATION (HCC): ICD-10-CM

## 2023-02-02 DIAGNOSIS — G47.00 INSOMNIA: ICD-10-CM

## 2023-02-02 RX ORDER — ALBUTEROL SULFATE 90 UG/1
2 AEROSOL, METERED RESPIRATORY (INHALATION) EVERY 4 HOURS PRN
Qty: 18 G | Refills: 0 | Status: SHIPPED | OUTPATIENT
Start: 2023-02-02

## 2023-02-02 NOTE — TELEPHONE ENCOUNTER
Primary palliative medicine provider:   Jessica Tristan    Medication requested:  Alprazolam  Clonazepam    Patient is out of medication    If for pain, how has the patient been taking their pain medicine?      Last appointment:  1/3/23     Next scheduled appointment:  2/14/2023    PDMP review:    Ph[facility]: [facility]

## 2023-02-03 ENCOUNTER — OFFICE VISIT (OUTPATIENT)
Dept: HEMATOLOGY ONCOLOGY | Facility: HOSPITAL | Age: 68
End: 2023-02-03

## 2023-02-03 VITALS
TEMPERATURE: 98.2 F | RESPIRATION RATE: 16 BRPM | SYSTOLIC BLOOD PRESSURE: 120 MMHG | HEART RATE: 76 BPM | BODY MASS INDEX: 17.04 KG/M2 | DIASTOLIC BLOOD PRESSURE: 66 MMHG | WEIGHT: 106 LBS | OXYGEN SATURATION: 92 % | HEIGHT: 66 IN

## 2023-02-03 DIAGNOSIS — C61 PROSTATE CANCER METASTATIC TO BONE (HCC): ICD-10-CM

## 2023-02-03 DIAGNOSIS — C79.51 PROSTATE CANCER METASTATIC TO BONE (HCC): ICD-10-CM

## 2023-02-03 DIAGNOSIS — M89.9 BONE LESION: ICD-10-CM

## 2023-02-03 DIAGNOSIS — C79.51 MALIGNANT NEOPLASM OF PROSTATE METASTATIC TO BONE (HCC): Primary | ICD-10-CM

## 2023-02-03 DIAGNOSIS — C61 MALIGNANT NEOPLASM OF PROSTATE METASTATIC TO BONE (HCC): Primary | ICD-10-CM

## 2023-02-03 RX ORDER — CLONAZEPAM 0.5 MG/1
0.5 TABLET ORAL
Qty: 30 TABLET | Refills: 1 | Status: SHIPPED | OUTPATIENT
Start: 2023-02-03

## 2023-02-03 RX ORDER — ALPRAZOLAM 0.5 MG/1
0.5 TABLET ORAL
Qty: 30 TABLET | Refills: 0 | Status: SHIPPED | OUTPATIENT
Start: 2023-02-03

## 2023-02-03 NOTE — PROGRESS NOTES
Hematology/Oncology Outpatient Follow- up Note  Samra Cain 75 y o  male MRN: @ Encounter: [id number]        Date:  2/3/2023    Presenting Complaint/Diagnosis :     Metastatic prostate cancer    HPI:      Surya Arriaza seen for initial consultation 1/14/2022 regarding newly diagnosed prostate cancer which appears to be metastatic to the bone   This was biopsy proven   The patient was referred from his primary care physician to urology in December for PSA of 850  CT scan of the abdomen pelvis demonstrated multiple sclerotic lesions   He had a biopsy of 1 of these lesions and it came back as metastatic prostate cancer  Venita Knowles was started on anti androgen therapy by Urology in the form of Firmagon       Previous Hematologic/ Oncologic History:      Workup  Patient was briefly on [address] [facility] but was admitted with confusion which was thought to be related although based on his family's account I suspect it was related to other issues  Current Hematologic/ Oncologic Treatment:      Lupron with his urologist  Addis through our office  Saige Arriaza every 3 months for bone metastases    Interval History:      The patient returns for follow-up visit  He is doing reasonably well  Has cut down his prednisone but is tolerating his medication quite well  Denies any nausea denies any vomiting denies any diarrhea  PSA continues to come down  PSMA scan shows no evidence of progression  Does have scattered bony lesions some of which have uptake  Overall I would say he is doing reasonably well  He is tolerating his Zytiga and low-dose prednisone well  Lupron with urologist is scheduled for the end of February from what I can see in the system  The rest of his 14 point review of systems today was negative  Test Results:    Imaging: NM PET CT skull base to mid thigh    Result Date: 1/27/2023  Narrative: PYLARIFY PSMA PET/CT SCAN INDICATION: Restaging of prostate cancer  C61:  Malignant neoplasm of prostate C79 51: Secondary malignant neoplasm of bone C61: Malignant neoplasm of prostate C79 51: Secondary malignant neoplasm of bone M89 9: Disorder of bone, unspecified MODIFIER: PS COMPARISON: CT lumbar spine 10/18/2022, CT chest 12/21/2021, bone scan 12/21/2021 CELL TYPE:  Adenocarcinoma of the prostate TECHNIQUE:   9 3 mCi Pylarify PSMA administered IV  Multiplanar attenuation corrected and non attenuation corrected PET images were acquired 60 minutes post injection  Contiguous, low dose, axial CT sections were obtained from the vertex through the femurs   Intravenous or oral contrast was not utilized  This examination, like all CT scans performed in the [facility], was performed utilizing techniques to minimize radiation dose exposure, including the use of iterative reconstruction and automated exposure control  FINDINGS: VISUALIZED BRAIN:   No acute abnormalities are seen  HEAD/NECK:   There is a physiologic distribution of the radiotracer  No PSMA avid cervical adenopathy is seen  CT images: 1 3 cm hypodense nodule in the left lobe of the thyroid with tiny calcification, stable  CHEST:   A few mild foci of uptake in the mediastinal and bilateral perihilar regions  Right hilar focus demonstrates SUV max of 2 9  Left perihilar focus demonstrates SUV max of 3 6  Subcarinal focus demonstrates SUV max of 3 8  There are not well seen on the limited CT  Previously noted pulmonary nodules are less well seen  For example, there is a 4 mm left lower lobe nodule laterally image 135 series 4, smaller previously 7 mm  This does not demonstrate significant activity  CT images: Mild/moderate coronary artery calcifications  Emphysematous changes of the lung fields  Mild branching tree-in-bud opacities in the right lower lobe laterally, stable  ABDOMEN:   No PSMA avid soft tissue lesions are seen  CT images: Scattered 2 to 3 mm intrarenal calculi bilaterally  Moderate fecal retention in the colon   PELVIS: No PSMA avid soft tissue lesions are seen  No suspicious uptake at the level of the prostate  CT images: Right testis appears to be in the lower inguinal canal  OSSEOUS STRUCTURES: Scattered PSMA avid osseous lesions with variable uptake  Many of the sclerotic osseous lesions do not demonstrate activity  Overall less extensive PSMA avid osseous uptake compared to prior bone scan but the mechanism of uptake is different  Compared  to CT lumbar spine study of 10/18/2022, distribution of disease in the lumbar spine region appears similar  Reference lesions as noted below: T7 vertebral body lesion demonstrates SUV max of 3 8  T8 vertebral body demonstrates SUV max of 3 7  T11 vertebral body lesion demonstrates SUV max of 6 2  L5 lesion near the right transverse process demonstrates SUV max of 4 0  Right femoral neck lesion demonstrates SUV max 3 4  For reference: SUV max of the liver: 5 3 CT images: Grade 1 anterolisthesis of L5 on S1  Bilateral spondylosis defects at L5  Impression: 1  Scattered PSMA avid osseous lesions with variable uptake compatible with osseous metastasis  Many of the sclerotic osseous lesions do not demonstrate significant activity  Compared to the CT lumbar spine study of 10/18/2022, distribution of disease in the lumbar spine region appears similar  2   A few mild foci of uptake in the mediastinal and bilateral perihilar regions, non-specific, could be reactive but can be reassessed on follow up  Otherwise no PSMA avid soft tissue lesions  3   Previously noted pulmonary nodules are smaller and less well seen  These do not demonstrate significant uptake   Workstation performed: [id number]       Labs:   Lab Results   Component Value Date    WBC 5 54 01/10/2023    HGB 13 6 01/10/2023    HCT 42 2 01/10/2023    MCV 99 (H) 01/10/2023     (H) 01/10/2023     Lab Results   Component Value Date    K 4 3 01/10/2023     01/10/2023    CO2 34 (H) 01/10/2023    BUN 13 01/10/2023    CREATININE 0 71 01/10/2023    GLUF 106 (H) 08/15/2022    CALCIUM 8 9 01/10/2023    CORRECTEDCA 9 4 08/15/2022    AST 19 01/10/2023    ALT 27 01/10/2023    ALKPHOS 57 01/10/2023    EGFR 96 01/10/2023       Lab Results   Component Value Date    PSA 1 3 01/10/2023    PSA 7 0 (H) 07/08/2022    PSA 9 8 (H) 06/23/2022         Lab Results   Component Value Date    [id number] 334 05/08/2022         ROS: As stated in the history of present illness otherwise his 14 point review of systems today was negative  Active Problems:   Patient Active Problem List   Diagnosis   • Chronic obstructive pulmonary disease (HCC)   • Acute tracheobronchitis   • Severe protein-calorie malnutrition (HCC)   • HLD (hyperlipidemia)   • Left lower quadrant abdominal pain   • Personal history of colonic polyps   • Prostate cancer metastatic to bone ([facility] [address] )   • Insomnia   • Hot flashes   • Anorexia   • Cachexia ([facility] [address] )   • Palliative care patient   • Encephalopathy acute   • Neoplasm related pain   • Medical marijuana use       Past Medical History:   Past Medical History:   Diagnosis Date   • Bone cancer ([facility] [address] )    • Colon polyp    • COPD (chronic obstructive pulmonary disease) ([facility] [address] )    • Emphysema lung (HCC)    • Hyperlipidemia    • Prostate cancer [facility]        Surgical History:   Past Surgical History:   Procedure Laterality Date   • APPENDECTOMY     • COLONOSCOPY     • IR BIOPSY BONE  12/30/2021   • JOINT REPLACEMENT     • UPPER GASTROINTESTINAL ENDOSCOPY         Family History:    Family History   Problem Relation Age of Onset   • Lung cancer Mother    • Breast cancer Sister    • Colon polyps Brother    • Colon cancer Neg Hx        Cancer-related family history includes Breast cancer in his sister; Lung cancer in his mother  There is no history of Colon cancer      Social History:   Social History     Socioeconomic History   • Marital status: Single     Spouse name: Not on file   • Number of children: Not on file   • Years of education: Not on file   • Highest education level: Not on file   Occupational History   • Not on file   Tobacco Use   • Smoking status: Former     Packs/day: 1 00     Years: 41 00     Pack years: 41 00     Types: Cigarettes     Quit date:      Years since quittin 0   • Smokeless tobacco: Never   Vaping Use   • Vaping Use: Never used   Substance and Sexual Activity   • Alcohol use: Never   • Drug use: Never   • Sexual activity: Not Currently   Other Topics Concern   • Not on file   Social History Narrative   • Not on file     Social Determinants of Health     Financial Resource Strain: Not on file   Food Insecurity: No Food Insecurity   • Worried About Running Out of Food in the Last Year: Never true   • Ran Out of Food in the Last Year: Never true   Transportation Needs: Unmet Transportation Needs   • Lack of Transportation (Medical): Yes   • Lack of Transportation (Non-Medical): Yes   Physical Activity: Not on file   Stress: Not on file   Social Connections: Not on file   Intimate Partner Violence: Not At Risk   • Fear of Current or Ex-Partner: No   • Emotionally Abused: No   • Physically Abused: No   • Sexually Abused: No   Housing Stability: Low Risk    • Unable to Pay for Housing in the Last Year: No   • Number of Places Lived in the Last Year: 1   • Unstable Housing in the Last Year: No       Current Medications:   Current Outpatient Medications   Medication Sig Dispense Refill   • abiraterone (Zytiga) 250 mg tablet Take 4 tablets (1,000 mg total) by mouth in the morning  120 tablet 11   • albuterol (PROVENTIL HFA,VENTOLIN HFA) 90 mcg/act inhaler Inhale 2 puffs every 4 (four) hours as needed for wheezing 18 g 0   • albuterol (PROVENTIL HFA,VENTOLIN HFA) 90 mcg/act inhaler Inhale 2 puffs every 4 (four) hours as needed for wheezing 18 g 0   • ALPRAZolam (XANAX) 0 5 mg tablet Take 1 tablet (0 5 mg total) by mouth daily at bedtime as needed for anxiety or sleep Ongoing therapy   30 tablet 0   • atorvastatin (LIPITOR) 40 mg tablet Take 1 tablet (40 mg total) by mouth daily with dinner 30 tablet 0   • budesonide-formoterol (SYMBICORT) 160-4 5 mcg/act inhaler Inhale 2 puffs     • clonazePAM (KlonoPIN) 0 5 mg tablet Take 1 tablet (0 5 mg total) by mouth daily at bedtime Ongoing therapy  30 tablet 1   • gabapentin (NEURONTIN) 300 mg capsule Take 1 capsule (300 mg total) by mouth daily at bedtime 90 capsule 0   • lidocaine (LIDODERM) 5 % Apply 3 patches topically daily Remove & Discard patch within 12 hours or as directed by MD 90 patch 0   • montelukast (SINGULAIR) 10 mg tablet TAKE 1 TABLET BY MOUTH DAILY AT BEDTIME 90 tablet 1   • morphine (MS CONTIN) 15 mg 12 hr tablet Take 1 tablet (15 mg total) by mouth 2 (two) times a day Ongoing therapy Max Daily Amount: 30 mg 60 tablet 0   • naloxone (NARCAN) 4 mg/0 1 mL nasal spray Administer 1 spray into a nostril  If no response after 2-3 minutes, give another dose in the other nostril using a new spray  1 each 0   • NON FORMULARY Medical marijuana     • predniSONE 1 mg tablet Take 2 5 tablets (2 5 mg total) by mouth daily (Patient taking differently: Take 1 5 mg by mouth daily) 75 tablet 0   • senna (SENOKOT) 8 6 mg Take 2 tablets (17 2 mg total) by mouth daily at bedtime as needed for constipation 60 tablet 0   • sertraline (Zoloft) 25 mg tablet Take 1 tablet (25 mg total) by mouth daily at bedtime 30 tablet 0   • Symbicort 160-4 5 MCG/ACT inhaler INHALE 2 PUFFS BY MOUTH 2 TIMES A DAY RINSE MOUTH AFTER USE  10 2 g 0   • oxyCODONE (Roxicodone) 5 immediate release tablet Take 0 5 tablets (2 5 mg total) by mouth every 4 (four) hours as needed (breakthough pain) Max Daily Amount: 15 mg (Patient not taking: Reported on 2/3/2023) 360 tablet 0     No current facility-administered medications for this visit  Allergies: No Known Allergies    Physical Exam:    Body surface area is 1 53 meters squared      Wt Readings from Last 3 Encounters:   02/03/23 48 1 kg (106 lb)   01/30/23 48 4 kg (106 lb 9 6 oz) 01/03/23 47 6 kg (105 lb)        Temp Readings from Last 3 Encounters:   02/03/23 98 2 °F (36 8 °C) (Temporal)   01/30/23 98 2 °F (36 8 °C) (Temporal)   01/03/23 98 2 °F (36 8 °C) (Temporal)        BP Readings from Last 3 Encounters:   02/03/23 120/66   01/30/23 120/62   01/03/23 98/70         Pulse Readings from Last 3 Encounters:   02/03/23 76   01/30/23 82   01/03/23 90        Physical Exam     Constitutional   General appearance: No acute distress, well appearing and well nourished  Eyes   Conjunctiva and lids: No swelling, erythema or discharge  Pupils and irises: Equal, round and reactive to light  Ears, Nose, Mouth, and Throat   External inspection of ears and nose: Normal     Nasal mucosa, septum, and turbinates: Normal without edema or erythema  Oropharynx: Normal with no erythema, edema, exudate or lesions  Pulmonary   Respiratory effort: No increased work of breathing or signs of respiratory distress  Auscultation of lungs: Clear to auscultation  Cardiovascular   Palpation of heart: Normal PMI, no thrills  Auscultation of heart: Normal rate and rhythm, normal S1 and S2, without murmurs  Examination of extremities for edema and/or varicosities: Normal     Carotid pulses: Normal     Abdomen   Abdomen: Non-tender, no masses  Liver and spleen: No hepatomegaly or splenomegaly  Lymphatic   Palpation of lymph nodes in neck: No lymphadenopathy  Musculoskeletal   Gait and station: Normal     Digits and nails: Normal without clubbing or cyanosis  Inspection/palpation of joints, bones, and muscles: Normal     Skin   Skin and subcutaneous tissue: Normal without rashes or lesions  Neurologic   Cranial nerves: Cranial nerves 2-12 intact  Sensation: No sensory loss      Psychiatric   Orientation to person, place, and time: Normal     Mood and affect: Normal         Assessment / Plan:      The patient is pleasant 54-year-old male with newly diagnosed metastatic prostate cancer to the bone   He was started on Firmagon with Urology   He was started on [facility] [place] was admitted with confusion   Based on his preference he was switched to [facility] as they thought the confusion may be related to his Elijah Mehta although I thought it was less likely  Jenni Arevalo is getting Preston Backers through his urologist        He was admitted to the hospital in chest and health for confusion after he took his narcotic pain medications  These were ramped up quickly so he got quite confused  He also had pain which was then controlled at [facility] through long-acting morphine and p r n  His PSA has come down to 1 3 in January compared to 7 0 in July of last year compared to 850 in December of 2021  At this point he will stay on his current medications with no changes  He will have a PSMA scan and see me back with results  He will have his narcotics and his pain medications adjusted by our colleagues in [facility] who he is seeing for this  I think his main issue that led to his hospitalization was his pain and ramping up his pain medication quickly  He will discuss this with them  He will see us in 10-12 weeks with results of imaging  Until then if he has any questions he will call our office  PET CT scan shows scattered PSMA avid osseous lesions which are pretty stable compared to imaging in October 2022  There were some mild foci of uptake in the mediastinal and bilateral perihilar regions which are nonspecific otherwise no PSMA soft tissue lesions were noted  Previously noted pulmonary nodules were smaller and less well seen and do not demonstrate significant uptake  At this point with the following PSA I think it is reasonable for him to continue on his current regimen with no changes  The patient will continue to get his Lupron through urology  He will continue to stay on his Zytiga and prednisone  We will see him back in 2 months  He will get blood work every month        Goals and Barriers:  Current Goal:  Prolong Survival from metastatic prostate cancer  Barriers: None  Patient's Capacity to Self Care:  Patient able to self care  Portions of the record may have been created with voice recognition software  Occasional wrong word or "sound a like" substitutions may have occurred due to the inherent limitations of voice recognition software  Read the chart carefully and recognize, using context, where substitutions have occurred

## 2023-02-14 ENCOUNTER — OFFICE VISIT (OUTPATIENT)
Dept: PALLIATIVE MEDICINE | Age: 68
End: 2023-02-14

## 2023-02-14 VITALS
HEART RATE: 89 BPM | BODY MASS INDEX: 17.53 KG/M2 | RESPIRATION RATE: 16 BRPM | DIASTOLIC BLOOD PRESSURE: 70 MMHG | OXYGEN SATURATION: 93 % | TEMPERATURE: 97.4 F | WEIGHT: 108.6 LBS | SYSTOLIC BLOOD PRESSURE: 110 MMHG

## 2023-02-14 DIAGNOSIS — Z79.899 MEDICAL MARIJUANA USE: ICD-10-CM

## 2023-02-14 DIAGNOSIS — R23.2 HOT FLASHES: ICD-10-CM

## 2023-02-14 DIAGNOSIS — G47.00 INSOMNIA, UNSPECIFIED TYPE: ICD-10-CM

## 2023-02-14 DIAGNOSIS — R63.0 ANOREXIA: ICD-10-CM

## 2023-02-14 DIAGNOSIS — G47.00 INSOMNIA: ICD-10-CM

## 2023-02-14 DIAGNOSIS — J44.9 CHRONIC OBSTRUCTIVE PULMONARY DISEASE, UNSPECIFIED COPD TYPE (HCC): ICD-10-CM

## 2023-02-14 DIAGNOSIS — C61 PROSTATE CANCER METASTATIC TO BONE (HCC): Primary | ICD-10-CM

## 2023-02-14 DIAGNOSIS — C79.51 PROSTATE CANCER METASTATIC TO BONE (HCC): Primary | ICD-10-CM

## 2023-02-14 DIAGNOSIS — R64 CACHEXIA (HCC): ICD-10-CM

## 2023-02-14 DIAGNOSIS — Z51.5 PALLIATIVE CARE PATIENT: ICD-10-CM

## 2023-02-14 DIAGNOSIS — E43 SEVERE PROTEIN-CALORIE MALNUTRITION (HCC): ICD-10-CM

## 2023-02-14 DIAGNOSIS — G89.3 CANCER RELATED PAIN: ICD-10-CM

## 2023-02-14 DIAGNOSIS — G89.3 NEOPLASM RELATED PAIN: ICD-10-CM

## 2023-02-14 RX ORDER — ALPRAZOLAM 0.5 MG/1
0.5 TABLET ORAL 2 TIMES DAILY PRN
Qty: 90 TABLET | Refills: 0 | Status: SHIPPED | OUTPATIENT
Start: 2023-02-14

## 2023-02-14 RX ORDER — MORPHINE SULFATE 15 MG/1
15 TABLET, FILM COATED, EXTENDED RELEASE ORAL 2 TIMES DAILY
Qty: 60 TABLET | Refills: 0 | Status: SHIPPED | OUTPATIENT
Start: 2023-02-14

## 2023-02-14 NOTE — PROGRESS NOTES
Outpatient Follow-Up - Palliative and Supportive Care   Samra Cain 75 y o  male [id number]    Assessment & Plan  1  Prostate cancer metastatic to bone ([facility] )    2  Severe protein-calorie malnutrition ([facility] )    3  Hot flashes    4  Chronic obstructive pulmonary disease, unspecified COPD type ([facility] )    5  Medical marijuana use    6  Palliative care patient    7  Neoplasm related pain    8  Cachexia ([place] [address] )    9  Anorexia    10  Insomnia, unspecified type    11  Cancer related pain    12  Insomnia       PLAN:  · Brooks Ceballos reports overall stable symptoms  Recent imaging shows stable disease  · Continue MS Contin 15MG BID  · He divides the oxycodone IR 5MG tabs into 4 pieces (approx 1 25MG)  Barely taking this  · Stopped clonazepam due to headaches  Will d/c this  · Continuing xanax 0 5MG TID PRN  Sometimes, he cuts tablet in half (0 25MG)  · No constipation  Not requiring senna  · Zoloft not helping with hotflashes therefore Brooks Ceballos discontinued it  · Provided emotional support as Brooks Ceballos is worried about his brother who also has cancer  · ACP - on prior visit, Brooks Ceballos noted that he would like tos ell his more to move closer to family  Should designate surrogate decision maker on next appointment  · RTC - 3 months for symptom follow up  Medications adjusted this encounter:  Requested Prescriptions     Signed Prescriptions Disp Refills   • morphine (MS CONTIN) 15 mg 12 hr tablet 60 tablet 0     Sig: Take 1 tablet (15 mg total) by mouth 2 (two) times a day Ongoing therapy Max Daily Amount: 30 mg   • ALPRAZolam (XANAX) 0 5 mg tablet 90 tablet 0     Sig: Take 1 tablet (0 5 mg total) by mouth 2 (two) times a day as needed for anxiety or sleep Ongoing therapy  No orders of the defined types were placed in this encounter      Medications Discontinued During This Encounter   Medication Reason   • clonazePAM (KlonoPIN) 0 5 mg tablet Side effects   • sertraline (Zoloft) 25 mg tablet Alternate therapy   • morphine (MS CONTIN) 15 mg 12 hr tablet Reorder   • ALPRAZolam (XANAX) 0 5 mg tablet Reorder         Refugio Zafar was seen today for symptoms and planning cares related to above illnesses  I have reviewed the patient's controlled substance dispensing history in the Prescription Drug Monitoring Program in compliance with the [facility] regulations before prescribing any controlled substances  Filled  Written  Sold  ID  Drug  QTY  Days  Prescriber  RX #  Dispenser  Refill  Daily Dose*  Pymt Type       02/03/2023 02/03/2023   1  Clonazepam 0 5 Mg Tablet 30 00  30  Ch Fis  [id number]   Pen (4411)   1 00 LME  [place]     02/03/2023 02/03/2023   1  Alprazolam 0 5 Mg Tablet 30 00  30  Ch Fis  [id number]   Pen (4411)   1 00 LME  [place]     01/16/2023 01/16/2023   1  Morphine Sulf Er 15 Mg Tablet 60 00  30  Ch Fis  [id number]   Pen (4411)   30 00 E  [place]        They are invited to continue to follow with us  If there are questions or concerns, please contact us through our clinic/answering service 24 hours a day, seven days a week  [address] [place] [address] and [facility]      Visit Information    Accompanied By: presents alone     Source of History: self      History of Present Illness      Refugio Zafar is a 75 y o  male who presents in follow up of symptoms related to metastatic prostate cancer  Pertinent issues include: symptom management, pain, neoplasm related, breathlessness, depression or anxiety, nausea, fatigue, assessment of goals of care, disease process education and discussion of prognosis, advance care planning  Annalise Kern is under the care of Dr Faby Morrow and ISA Jacques for metastatic prostate cancer, diagnosed early 2022   CT image done 12/21 revealed extensive multifocal osseous metastatic disease including axial and appendicular skeletal system, calvarium, sternum, ribs, right humerus and clavicle, bilateral scapulae, cervical spine and thoracolumbar spine, sacrum, pelvis, and bilateral femurs  Annalise Kern was initiated on Atamaria 86 antiandrogen therapy by urology and continues Lupron every 6 months  He was initiated on Xtandi but this was discontinued due to side effects of hallucinations that led to hospitalization in May 2022 however the side effects may have been contributed to other factors  He was switched to [place] with Jerrol Floro every 3 months for bone metastasis  In October 2022, Annalise Kern was hospitalized at an external agency [facility] due to profound fatigue and intractable pain  He was rehydrated and his symptom regimen was adjusted by palliative care physician Dr Severiano Fulling ([phone])  Annalise Kern returns to see Dr Faby Morrow after this  His most recent imaging does not show significant progression and his PSA is stable  Annalise Kern follows with palliative care for symptom management  We reviewed his symptoms  His pain is overall well controlled and stable on his MS Contin 15 mg twice daily  He states he barely has to take breakthrough oxycodone and when he does, he takes a fourth of a 5MG tablet  He is interested to know if his Jerrol Floro is actually helping restore his bones  He has many questions about his treatment  Annalise Kern states he stopped the Zoloft because it was not helping with his hot flashes  This symptom is not bothering him particularly and he does not ask for replacement  No changes to medications at this time  We will continue to follow-up with Dr Faby Morrow  We will plan to discuss advance care planning and surrogate decision maker at next appointment  Past medical, surgical, social, and family histories are reviewed and pertinent updates are made  Review of Systems   Constitutional: Positive for night sweats and weight loss  Negative for decreased appetite and malaise/fatigue  HENT: Negative for congestion and hearing loss  Eyes: Negative for blurred vision     Cardiovascular: Negative for chest pain and dyspnea on exertion  Respiratory: Negative for cough and sleep disturbances due to breathing  Musculoskeletal: Positive for arthritis, back pain and muscle weakness  Gastrointestinal: Negative for bloating, abdominal pain, anorexia, nausea and vomiting  Genitourinary: Negative for bladder incontinence and dysuria  Neurological: Positive for weakness  Negative for aphonia, difficulty with concentration, disturbances in coordination and excessive daytime sleepiness  Psychiatric/Behavioral: Negative for altered mental status, depression, hallucinations and hypervigilance  The patient does not have insomnia  Vital Signs    /70 (BP Location: Left arm, Patient Position: Sitting, Cuff Size: Standard)   Pulse 89   Temp (!) 97 4 °F (36 3 °C) (Temporal)   Resp 16   Wt 49 3 kg (108 lb 9 6 oz)   SpO2 93%   BMI 17 53 kg/m²      Physical Exam and Objective Data  Physical Exam  Vitals and nursing note reviewed  Exam conducted with a chaperone present  Constitutional:       General: He is not in acute distress  Appearance: He is cachectic  He is ill-appearing  HENT:      Head: Normocephalic and atraumatic  Eyes:      Conjunctiva/sclera: Conjunctivae normal    Cardiovascular:      Rate and Rhythm: Normal rate and regular rhythm  Pulmonary:      Effort: Pulmonary effort is normal  No respiratory distress  Musculoskeletal:         General: No swelling  Cervical back: Neck supple  Skin:     General: Skin is warm and dry  Neurological:      Mental Status: He is alert and oriented to person, place, and time  Psychiatric:         Mood and Affect: Mood normal        Radiology and Laboratory:  I personally reviewed and interpreted the following results: CBC   NM CT, CMP, CXRAY    40 minutes was spent face to face with Steve Mccall with greater than 50% of the time spent in counseling or coordination of care including discussions of etiology of diagnosis, instructions for disease self management, treatment instructions, follow up requirements, risk factors and risk reduction of disease, patient and family counseling/involvement in care and compliance with treatment regimen  All of the patient's or agent's questions were answered during this discussion

## 2023-03-06 ENCOUNTER — OFFICE VISIT (OUTPATIENT)
Dept: UROLOGY | Facility: HOSPITAL | Age: 68
End: 2023-03-06

## 2023-03-06 ENCOUNTER — TELEPHONE (OUTPATIENT)
Dept: UROLOGY | Facility: HOSPITAL | Age: 68
End: 2023-03-06

## 2023-03-06 VITALS
DIASTOLIC BLOOD PRESSURE: 78 MMHG | BODY MASS INDEX: 17.19 KG/M2 | WEIGHT: 107 LBS | HEIGHT: 66 IN | SYSTOLIC BLOOD PRESSURE: 138 MMHG | OXYGEN SATURATION: 94 % | HEART RATE: 70 BPM

## 2023-03-06 DIAGNOSIS — C79.51 PROSTATE CANCER METASTATIC TO BONE (HCC): Primary | ICD-10-CM

## 2023-03-06 DIAGNOSIS — C61 PROSTATE CANCER METASTATIC TO BONE (HCC): Primary | ICD-10-CM

## 2023-03-06 NOTE — PROGRESS NOTES
03/06/23    Kenny Aschoff   1955   [id number]     Assessment  1 Metastatic prostate cancer      Discussion/Plan  1 Metastatic prostate cancer                1/10/23 PSA 1 3              Follow up Heme-Onc 4/3/23                45 mg IM Lupron administered today      Follow up 6 months with PSA  Subjective  HPI   Kenny Aschoff is a 75year old male known to Dr Kusum Green with history of metastatic prostate cancer  He was originally seen in December 2021 by Dr Nolan Crowell with a PSA of 850  CT abdomen demonstrated multiple sclerotic lesions  One of these lesions was biopsied confirming metastatic disease  He had not had prior PSA lab work however reported normal TAWANDA  Denies family history of prostate cancer  After he retired in his 46s, he experienced weight loss from 135 lbs to 110 lbs  He received 120 mg Firmagon on 1/5/22 with our nursing staff  He then received Lupron 45 mg on 3/2/22  He is following with Oncology  He met with Dr Kusum Green in April 2022 with a PSA of 14 0  He continued with weight loss and it was recommended to hold off on exercising to allow weight gain  He returns for next dose of Lupron  PSA trend below  He experiences severe hot flashes and states he decreased his prednisone dose from 2 5 mg to 1 5 mg  He states he did not tell his providers he was adjusting this but he could not tolerate the hot flashes  He is following under Palliative care  He takes morphine BID  Does not take oxy or further pain medications  He wishes to be off zytiga and wants his testicles removed  He is terribly bothered by the hot flashes  Final Diagnosis    A  Iliac Crest, Right, bone lesion:  -Metastatic adenocarcinoma, immunohistochemically consistent with metastatic prostatic adenocarcinoma       Component      Latest Ref Rng & Units 12/3/2021 3/10/2022 4/15/2022 5/8/2022           3:04 PM 11:42 AM 11:04 AM  8:55 AM   PSA, Total      0 0 - 4 0 ng/mL 850 0 (H) 131 0 (H) 14 0 (H) 8 4 (H) Component      Latest Ref Rng & Units 5/23/2022 6/23/2022 7/8/2022 1/10/2023           2:20 PM 10:58 AM  1:20 PM 12:08 PM   PSA, Total      0 0 - 4 0 ng/mL 14 5 (H) 9 8 (H) 7 0 (H) 1 3       Review of Systems - History obtained from chart review and the patient  General ROS: positive for - hot flashes   Psychological ROS: negative  Respiratory ROS: no cough, shortness of breath, or wheezing  Cardiovascular ROS: no chest pain or dyspnea on exertion  Gastrointestinal ROS: no abdominal pain, change in bowel habits, or black or bloody stools  Genito-Urinary ROS: no dysuria, trouble voiding, or hematuria  Musculoskeletal ROS: negative  Neurological ROS: negative  Dermatological ROS: negative     Objective   Physical Exam  Vitals and nursing note reviewed  Constitutional:       General: He is awake  He is not in acute distress  Appearance: Normal appearance  He is well-developed, well-groomed and normal weight  He is not ill-appearing, toxic-appearing or diaphoretic  Pulmonary:      Effort: Pulmonary effort is normal    Abdominal:      Tenderness: There is no right CVA tenderness or left CVA tenderness  Musculoskeletal:         General: Normal range of motion  Cervical back: Normal range of motion and neck supple  Skin:     General: Skin is warm  Neurological:      General: No focal deficit present  Mental Status: He is alert and oriented to person, place, and time  Mental status is at baseline  Psychiatric:         Attention and Perception: Attention normal          Mood and Affect: Mood normal          Speech: Speech normal          Behavior: Behavior normal  Behavior is cooperative  Thought Content:  Thought content normal          Cognition and Memory: Cognition normal          Judgment: Judgment normal          ISA Akins     I have spent 15 minutes with Patient  today in which greater than 50% of this time was spent in counseling/coordination of care regarding Diagnostic results

## 2023-03-07 DIAGNOSIS — J44.1 COPD WITH ACUTE EXACERBATION (HCC): ICD-10-CM

## 2023-03-07 RX ORDER — ALBUTEROL SULFATE 90 UG/1
2 AEROSOL, METERED RESPIRATORY (INHALATION) EVERY 4 HOURS PRN
Qty: 18 G | Refills: 0 | Status: SHIPPED | OUTPATIENT
Start: 2023-03-07

## 2023-03-08 NOTE — TELEPHONE ENCOUNTER
Noted on Excel spreadsheet for DOS 9/13/23  LUPRON 45mg - Medicare ONLY - NO authorization required  Office stock okay to use  Please get ABN signed for 2023   *VLD 3/6/23

## 2023-03-18 ENCOUNTER — TELEPHONE (OUTPATIENT)
Dept: OTHER | Facility: OTHER | Age: 68
End: 2023-03-18

## 2023-03-18 DIAGNOSIS — G89.3 CANCER RELATED PAIN: ICD-10-CM

## 2023-03-18 DIAGNOSIS — Z51.5 PALLIATIVE CARE PATIENT: ICD-10-CM

## 2023-03-18 DIAGNOSIS — C79.51 PROSTATE CANCER METASTATIC TO BONE (HCC): ICD-10-CM

## 2023-03-18 DIAGNOSIS — C61 PROSTATE CANCER METASTATIC TO BONE (HCC): ICD-10-CM

## 2023-03-18 RX ORDER — MORPHINE SULFATE 15 MG/1
15 TABLET, FILM COATED, EXTENDED RELEASE ORAL 2 TIMES DAILY
Qty: 60 TABLET | Refills: 0 | Status: SHIPPED | OUTPATIENT
Start: 2023-03-18

## 2023-03-18 NOTE — TELEPHONE ENCOUNTER
Pt called, requesting a call back, requesting a call back for a refill for Morphine 15 mg   Provider paged via [facility]

## 2023-03-24 ENCOUNTER — APPOINTMENT (OUTPATIENT)
Dept: LAB | Facility: HOSPITAL | Age: 68
End: 2023-03-24
Attending: INTERNAL MEDICINE

## 2023-04-02 DIAGNOSIS — Z51.5 PALLIATIVE CARE PATIENT: ICD-10-CM

## 2023-04-02 DIAGNOSIS — R64 CACHEXIA (HCC): ICD-10-CM

## 2023-04-02 DIAGNOSIS — R63.0 ANOREXIA: ICD-10-CM

## 2023-04-02 RX ORDER — PREDNISONE 1 MG/1
1 TABLET ORAL DAILY
Qty: 30 TABLET | Refills: 0 | Status: SHIPPED | OUTPATIENT
Start: 2023-04-02

## 2023-04-02 RX ORDER — PREDNISONE 1 MG/1
2.5 TABLET ORAL DAILY
Qty: 75 TABLET | Refills: 0 | Status: CANCELLED | OUTPATIENT
Start: 2023-04-02
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
## 2023-04-02 NOTE — PROGRESS NOTES
Patient requesting refill of his prednisone  States he is only taking 1mg a day and no longer taking 2 5mg daily as previously prescribed  States he has to follow-up with his Oncologist regarding what dosing he should be taking and if he should be continuing this especially since he is on [place]  I stated I would update his script to reflect what he is currently taking at 1mg daily  Should dosing need revision, this can be done with follow-up with his Oncology specialist  He is in agreement to this plan      David Singh DO  HPM Fellow

## 2023-04-02 NOTE — TELEPHONE ENCOUNTER
Medication Refill Request     Name  predniSONE 1 mg tablet  Dose/Frequency  Take 2 5 tablets (2 5 mg total) by mouth daily  Quantity 75  Verified pharmacy   [x]  Verified ordering Provider   [x]  Does patient have enough for the next 3 days?  Yes [] No [x]

## 2023-04-03 ENCOUNTER — OFFICE VISIT (OUTPATIENT)
Age: 68
End: 2023-04-03

## 2023-04-03 VITALS
DIASTOLIC BLOOD PRESSURE: 78 MMHG | SYSTOLIC BLOOD PRESSURE: 124 MMHG | RESPIRATION RATE: 18 BRPM | OXYGEN SATURATION: 90 % | HEIGHT: 66 IN | WEIGHT: 105 LBS | TEMPERATURE: 98.3 F | BODY MASS INDEX: 16.88 KG/M2 | HEART RATE: 93 BPM

## 2023-04-03 DIAGNOSIS — C79.51 PROSTATE CANCER METASTATIC TO BONE (HCC): Primary | ICD-10-CM

## 2023-04-03 DIAGNOSIS — J44.1 COPD WITH ACUTE EXACERBATION (HCC): ICD-10-CM

## 2023-04-03 DIAGNOSIS — C61 PROSTATE CANCER METASTATIC TO BONE (HCC): Primary | ICD-10-CM

## 2023-04-03 RX ORDER — ALBUTEROL SULFATE 90 UG/1
2 AEROSOL, METERED RESPIRATORY (INHALATION) EVERY 4 HOURS PRN
Qty: 18 G | Refills: 0 | Status: SHIPPED | OUTPATIENT
Start: 2023-04-03

## 2023-04-07 ENCOUNTER — TELEPHONE (OUTPATIENT)
Dept: HEMATOLOGY ONCOLOGY | Facility: CLINIC | Age: 68
End: 2023-04-07

## 2023-04-07 NOTE — TELEPHONE ENCOUNTER
I called and spoke with Johanna Mead  He is interested in stopping all treatment  He does not like side effects of Zytiga with prednisone  He is in agreement to continue on current treatment until we can bring him in to meet with Dr Makenna Sloan and discuss options

## 2023-04-07 NOTE — TELEPHONE ENCOUNTER
Patient Call    Who are you speaking with? Patient    If it is not the patient, are they listed on an active communication consent form? N/A   What is the reason for this call? Patient is adamant on having [facility] contact him when she is not busy  Patient wouldn't state anything else, he just kept repeating he would appreciate if [facility] could give him a call  Does this require a call back? Yes   If a call back is required, please list best call back number [phone]   If a call back is required, advise that a message will be forwarded to their care team and someone will return their call as soon as possible  Did you relay this information to the patient?  Yes

## 2023-04-07 NOTE — TELEPHONE ENCOUNTER
"Attempted to call patient  He recently increased his prednisone to 2mg and said he \"had to because of his PSA  \"   He said he thinks the prednisone is exacerbating his breathing condition and wants to get off of it  He said he thinks he is at the point where it is medically necessary for the surgery  He is experiencing hot flashes, sweating, he is wheezig and stated that he is overusing his albuterol to keep his airway open  I did let him know that if he is in distress with his breathing he needs to be seen in the ED  He said he knows when he needs to go and said if it gets any worse, he will go to the ED  He is still hoping for a call back from you       "

## 2023-04-23 ENCOUNTER — HOSPITAL ENCOUNTER (INPATIENT)
Facility: HOSPITAL | Age: 68
LOS: 2 days | Discharge: HOME | End: 2023-04-25
Attending: STUDENT IN AN ORGANIZED HEALTH CARE EDUCATION/TRAINING PROGRAM | Admitting: HOSPITALIST

## 2023-04-23 ENCOUNTER — APPOINTMENT (EMERGENCY)
Dept: RADIOLOGY | Facility: HOSPITAL | Age: 68
End: 2023-04-23

## 2023-04-23 DIAGNOSIS — G89.3 CANCER RELATED PAIN: ICD-10-CM

## 2023-04-23 DIAGNOSIS — C61 PROSTATE CANCER METASTATIC TO BONE (HCC): ICD-10-CM

## 2023-04-23 DIAGNOSIS — R06.02 SOB (SHORTNESS OF BREATH): ICD-10-CM

## 2023-04-23 DIAGNOSIS — C79.51 PROSTATE CANCER METASTATIC TO BONE (HCC): ICD-10-CM

## 2023-04-23 DIAGNOSIS — Z51.5 PALLIATIVE CARE PATIENT: ICD-10-CM

## 2023-04-23 DIAGNOSIS — E43 SEVERE PROTEIN-CALORIE MALNUTRITION (HCC): ICD-10-CM

## 2023-04-23 DIAGNOSIS — G89.3 NEOPLASM RELATED PAIN: ICD-10-CM

## 2023-04-23 DIAGNOSIS — J44.1 COPD EXACERBATION (HCC): Primary | ICD-10-CM

## 2023-04-23 LAB
2HR DELTA HS TROPONIN: 3 NG/L
ALBUMIN SERPL BCP-MCNC: 3.9 G/DL (ref 3.5–5)
ALP SERPL-CCNC: 66 U/L (ref 34–104)
ALT SERPL W P-5'-P-CCNC: 13 U/L (ref 7–52)
ANION GAP SERPL CALCULATED.3IONS-SCNC: 6 MMOL/L (ref 4–13)
AST SERPL W P-5'-P-CCNC: 16 U/L (ref 13–39)
BASE EXCESS BLDA CALC-SCNC: 8 MMOL/L (ref -2–3)
BASOPHILS # BLD AUTO: 0.12 THOUSANDS/ΜL (ref 0–0.1)
BASOPHILS NFR BLD AUTO: 2 % (ref 0–1)
BILIRUB SERPL-MCNC: 0.36 MG/DL (ref 0.2–1)
BUN SERPL-MCNC: 16 MG/DL (ref 5–25)
CA-I BLD-SCNC: 1.03 MMOL/L (ref 1.12–1.32)
CALCIUM SERPL-MCNC: 9.2 MG/DL (ref 8.4–10.2)
CARDIAC TROPONIN I PNL SERPL HS: 3 NG/L
CARDIAC TROPONIN I PNL SERPL HS: 6 NG/L
CHLORIDE SERPL-SCNC: 102 MMOL/L (ref 96–108)
CO2 SERPL-SCNC: 33 MMOL/L (ref 21–32)
CREAT SERPL-MCNC: 0.53 MG/DL (ref 0.6–1.3)
D DIMER PPP FEU-MCNC: 0.35 UG/ML FEU
EOSINOPHIL # BLD AUTO: 0.34 THOUSAND/ΜL (ref 0–0.61)
EOSINOPHIL NFR BLD AUTO: 5 % (ref 0–6)
ERYTHROCYTE [DISTWIDTH] IN BLOOD BY AUTOMATED COUNT: 12.8 % (ref 11.6–15.1)
FLUAV RNA RESP QL NAA+PROBE: NEGATIVE
FLUBV RNA RESP QL NAA+PROBE: NEGATIVE
GFR SERPL CREATININE-BSD FRML MDRD: 108 ML/MIN/1.73SQ M
GLUCOSE SERPL-MCNC: 105 MG/DL (ref 65–140)
GLUCOSE SERPL-MCNC: 99 MG/DL (ref 65–140)
HCO3 BLDA-SCNC: 32 MMOL/L (ref 24–30)
HCT VFR BLD AUTO: 42.4 % (ref 36.5–49.3)
HCT VFR BLD CALC: 41 % (ref 36.5–49.3)
HGB BLD-MCNC: 13.6 G/DL (ref 12–17)
HGB BLDA-MCNC: 13.9 G/DL (ref 12–17)
IMM GRANULOCYTES # BLD AUTO: 0.03 THOUSAND/UL (ref 0–0.2)
IMM GRANULOCYTES NFR BLD AUTO: 0 % (ref 0–2)
LYMPHOCYTES # BLD AUTO: 1.64 THOUSANDS/ΜL (ref 0.6–4.47)
LYMPHOCYTES NFR BLD AUTO: 24 % (ref 14–44)
MAGNESIUM SERPL-MCNC: 2.2 MG/DL (ref 1.9–2.7)
MCH RBC QN AUTO: 31.5 PG (ref 26.8–34.3)
MCHC RBC AUTO-ENTMCNC: 32.1 G/DL (ref 31.4–37.4)
MCV RBC AUTO: 98 FL (ref 82–98)
MONOCYTES # BLD AUTO: 0.63 THOUSAND/ΜL (ref 0.17–1.22)
MONOCYTES NFR BLD AUTO: 9 % (ref 4–12)
NEUTROPHILS # BLD AUTO: 3.96 THOUSANDS/ΜL (ref 1.85–7.62)
NEUTS SEG NFR BLD AUTO: 60 % (ref 43–75)
NRBC BLD AUTO-RTO: 0 /100 WBCS
PCO2 BLD: 33 MMOL/L (ref 21–32)
PCO2 BLD: 42.7 MM HG (ref 42–50)
PH BLD: 7.48 [PH] (ref 7.3–7.4)
PLATELET # BLD AUTO: 453 THOUSANDS/UL (ref 149–390)
PMV BLD AUTO: 8.8 FL (ref 8.9–12.7)
PO2 BLD: 74 MM HG (ref 35–45)
POTASSIUM BLD-SCNC: 4.1 MMOL/L (ref 3.5–5.3)
POTASSIUM SERPL-SCNC: 3.9 MMOL/L (ref 3.5–5.3)
PROCALCITONIN SERPL-MCNC: <0.05 NG/ML
PROT SERPL-MCNC: 6.9 G/DL (ref 6.4–8.4)
RBC # BLD AUTO: 4.32 MILLION/UL (ref 3.88–5.62)
RSV RNA RESP QL NAA+PROBE: NEGATIVE
SAO2 % BLD FROM PO2: 96 % (ref 60–85)
SARS-COV-2 RNA RESP QL NAA+PROBE: NEGATIVE
SODIUM BLD-SCNC: 139 MMOL/L (ref 136–145)
SODIUM SERPL-SCNC: 141 MMOL/L (ref 135–147)
SPECIMEN SOURCE: ABNORMAL
WBC # BLD AUTO: 6.72 THOUSAND/UL (ref 4.31–10.16)

## 2023-04-23 PROCEDURE — 5A09357 ASSISTANCE WITH RESPIRATORY VENTILATION, LESS THAN 24 CONSECUTIVE HOURS, CONTINUOUS POSITIVE AIRWAY PRESSURE: ICD-10-PCS | Performed by: HOSPITALIST

## 2023-04-23 RX ORDER — SODIUM CHLORIDE FOR INHALATION 0.9 %
3 VIAL, NEBULIZER (ML) INHALATION ONCE
Status: COMPLETED | OUTPATIENT
Start: 2023-04-23 | End: 2023-04-23

## 2023-04-23 RX ORDER — MORPHINE SULFATE 15 MG/1
15 TABLET, FILM COATED, EXTENDED RELEASE ORAL ONCE
Status: COMPLETED | OUTPATIENT
Start: 2023-04-23 | End: 2023-04-23

## 2023-04-23 RX ORDER — METHYLPREDNISOLONE SODIUM SUCCINATE 125 MG/2ML
125 INJECTION, POWDER, LYOPHILIZED, FOR SOLUTION INTRAMUSCULAR; INTRAVENOUS ONCE
Status: COMPLETED | OUTPATIENT
Start: 2023-04-23 | End: 2023-04-23

## 2023-04-23 RX ORDER — ALBUTEROL SULFATE 2.5 MG/3ML
2.5 SOLUTION RESPIRATORY (INHALATION) ONCE
Status: COMPLETED | OUTPATIENT
Start: 2023-04-23 | End: 2023-04-23

## 2023-04-23 RX ORDER — CEFEPIME HYDROCHLORIDE 2 G/50ML
2000 INJECTION, SOLUTION INTRAVENOUS ONCE
Status: COMPLETED | OUTPATIENT
Start: 2023-04-23 | End: 2023-04-23

## 2023-04-23 RX ORDER — GABAPENTIN 300 MG/1
300 CAPSULE ORAL ONCE
Status: COMPLETED | OUTPATIENT
Start: 2023-04-23 | End: 2023-04-23

## 2023-04-23 RX ADMIN — IPRATROPIUM BROMIDE 0.5 MG: 0.5 SOLUTION RESPIRATORY (INHALATION) at 20:16

## 2023-04-23 RX ADMIN — GABAPENTIN 300 MG: 300 CAPSULE ORAL at 22:09

## 2023-04-23 RX ADMIN — CEFEPIME HYDROCHLORIDE 2000 MG: 2 INJECTION, SOLUTION INTRAVENOUS at 21:05

## 2023-04-23 RX ADMIN — ISODIUM CHLORIDE 3 ML: 0.03 SOLUTION RESPIRATORY (INHALATION) at 21:38

## 2023-04-23 RX ADMIN — ALBUTEROL SULFATE 2.5 MG: 2.5 SOLUTION RESPIRATORY (INHALATION) at 20:16

## 2023-04-23 RX ADMIN — MORPHINE SULFATE 15 MG: 15 TABLET, EXTENDED RELEASE ORAL at 22:09

## 2023-04-23 RX ADMIN — METHYLPREDNISOLONE SODIUM SUCCINATE 125 MG: 125 INJECTION, POWDER, FOR SOLUTION INTRAMUSCULAR; INTRAVENOUS at 20:17

## 2023-04-23 RX ADMIN — IPRATROPIUM BROMIDE 1 MG: 0.5 SOLUTION RESPIRATORY (INHALATION) at 21:38

## 2023-04-23 RX ADMIN — IPRATROPIUM BROMIDE 1 MG: 0.5 SOLUTION RESPIRATORY (INHALATION) at 20:22

## 2023-04-23 RX ADMIN — ALBUTEROL SULFATE 10 MG: 2.5 SOLUTION RESPIRATORY (INHALATION) at 21:38

## 2023-04-23 RX ADMIN — ALBUTEROL SULFATE 10 MG: 2.5 SOLUTION RESPIRATORY (INHALATION) at 20:22

## 2023-04-23 RX ADMIN — ISODIUM CHLORIDE 3 ML: 0.03 SOLUTION RESPIRATORY (INHALATION) at 20:22

## 2023-04-23 NOTE — LETTER
Thank you for allowing us to participate in the care of your patient, Mary Cavazos, who was hospitalized from 4/23 through 4/25/2023 with the admitting diagnosis of COPD exacerbation  Patient was treated with IV solumedrol and transitioned to PO prednisone, cleared for DC on 4/25 per pulmonology  Notably 1/2 blood cultures grew coag-neg staph which was suspected contaminant  This was discussed with ID, repeat cultures were collected 4/25, patient okay for discharge and agreed to receive follow up call tomorrow with results and return to ED if necessary  Will need pulm follow up for PFTs  Patient was discharged on 2L O2  If you have any additional questions or would like to discuss further, please feel free to contact me      DEBI Bautista  Internal Medicine, Hospitalist  [phone]

## 2023-04-24 ENCOUNTER — HOSPITAL ENCOUNTER (OUTPATIENT)
Dept: INFUSION CENTER | Facility: HOSPITAL | Age: 68
End: 2023-04-24
Attending: INTERNAL MEDICINE

## 2023-04-24 PROBLEM — R06.89 ACUTE RESPIRATORY INSUFFICIENCY: Status: ACTIVE | Noted: 2023-04-24

## 2023-04-24 PROBLEM — F11.20 OPIOID DEPENDENCE (HCC): Status: ACTIVE | Noted: 2023-04-24

## 2023-04-24 PROBLEM — G93.41 METABOLIC ENCEPHALOPATHY: Status: ACTIVE | Noted: 2023-04-24

## 2023-04-24 LAB
ANION GAP SERPL CALCULATED.3IONS-SCNC: 6 MMOL/L (ref 4–13)
ATRIAL RATE: 76 BPM
BUN SERPL-MCNC: 13 MG/DL (ref 5–25)
CALCIUM SERPL-MCNC: 8.5 MG/DL (ref 8.4–10.2)
CHLORIDE SERPL-SCNC: 103 MMOL/L (ref 96–108)
CO2 SERPL-SCNC: 29 MMOL/L (ref 21–32)
CREAT SERPL-MCNC: 0.45 MG/DL (ref 0.6–1.3)
ERYTHROCYTE [DISTWIDTH] IN BLOOD BY AUTOMATED COUNT: 13 % (ref 11.6–15.1)
GFR SERPL CREATININE-BSD FRML MDRD: 116 ML/MIN/1.73SQ M
GLUCOSE SERPL-MCNC: 163 MG/DL (ref 65–140)
HCT VFR BLD AUTO: 39.3 % (ref 36.5–49.3)
HGB BLD-MCNC: 13.3 G/DL (ref 12–17)
MCH RBC QN AUTO: 32.7 PG (ref 26.8–34.3)
MCHC RBC AUTO-ENTMCNC: 33.8 G/DL (ref 31.4–37.4)
MCV RBC AUTO: 97 FL (ref 82–98)
P AXIS: 85 DEGREES
PLATELET # BLD AUTO: 425 THOUSANDS/UL (ref 149–390)
PMV BLD AUTO: 8.4 FL (ref 8.9–12.7)
POTASSIUM SERPL-SCNC: 4.2 MMOL/L (ref 3.5–5.3)
PR INTERVAL: 152 MS
PROCALCITONIN SERPL-MCNC: <0.05 NG/ML
QRS AXIS: 92 DEGREES
QRSD INTERVAL: 70 MS
QT INTERVAL: 386 MS
QTC INTERVAL: 434 MS
RBC # BLD AUTO: 4.07 MILLION/UL (ref 3.88–5.62)
SODIUM SERPL-SCNC: 138 MMOL/L (ref 135–147)
T WAVE AXIS: 79 DEGREES
VENTRICULAR RATE: 76 BPM
WBC # BLD AUTO: 4.24 THOUSAND/UL (ref 4.31–10.16)

## 2023-04-24 RX ORDER — ENOXAPARIN SODIUM 100 MG/ML
30 INJECTION SUBCUTANEOUS DAILY
Status: DISCONTINUED | OUTPATIENT
Start: 2023-04-24 | End: 2023-04-25 | Stop reason: HOSPADM

## 2023-04-24 RX ORDER — MONTELUKAST SODIUM 10 MG/1
10 TABLET ORAL
Status: DISCONTINUED | OUTPATIENT
Start: 2023-04-24 | End: 2023-04-25 | Stop reason: HOSPADM

## 2023-04-24 RX ORDER — BUDESONIDE 0.5 MG/2ML
0.5 INHALANT ORAL
Status: DISCONTINUED | OUTPATIENT
Start: 2023-04-24 | End: 2023-04-25 | Stop reason: HOSPADM

## 2023-04-24 RX ORDER — LEVALBUTEROL 1.25 MG/.5ML
1.25 SOLUTION, CONCENTRATE RESPIRATORY (INHALATION)
Status: DISCONTINUED | OUTPATIENT
Start: 2023-04-24 | End: 2023-04-25 | Stop reason: HOSPADM

## 2023-04-24 RX ORDER — IPRATROPIUM BROMIDE AND ALBUTEROL SULFATE 2.5; .5 MG/3ML; MG/3ML
3 SOLUTION RESPIRATORY (INHALATION) EVERY 6 HOURS PRN
Status: DISCONTINUED | OUTPATIENT
Start: 2023-04-24 | End: 2023-04-25 | Stop reason: HOSPADM

## 2023-04-24 RX ORDER — PREDNISONE 20 MG/1
40 TABLET ORAL DAILY
Status: DISCONTINUED | OUTPATIENT
Start: 2023-04-24 | End: 2023-04-25 | Stop reason: HOSPADM

## 2023-04-24 RX ORDER — LIDOCAINE 50 MG/G
1 PATCH TOPICAL DAILY
Status: DISCONTINUED | OUTPATIENT
Start: 2023-04-24 | End: 2023-04-25 | Stop reason: HOSPADM

## 2023-04-24 RX ORDER — MAGNESIUM HYDROXIDE/ALUMINUM HYDROXICE/SIMETHICONE 120; 1200; 1200 MG/30ML; MG/30ML; MG/30ML
30 SUSPENSION ORAL EVERY 6 HOURS PRN
Status: DISCONTINUED | OUTPATIENT
Start: 2023-04-24 | End: 2023-04-25 | Stop reason: HOSPADM

## 2023-04-24 RX ORDER — CLONAZEPAM 0.5 MG/1
0.25 TABLET ORAL
Status: DISCONTINUED | OUTPATIENT
Start: 2023-04-24 | End: 2023-04-25 | Stop reason: HOSPADM

## 2023-04-24 RX ORDER — METHYLPREDNISOLONE SODIUM SUCCINATE 40 MG/ML
40 INJECTION, POWDER, LYOPHILIZED, FOR SOLUTION INTRAMUSCULAR; INTRAVENOUS EVERY 8 HOURS
Status: DISCONTINUED | OUTPATIENT
Start: 2023-04-24 | End: 2023-04-24

## 2023-04-24 RX ORDER — BENZONATATE 100 MG/1
100 CAPSULE ORAL 3 TIMES DAILY
Status: DISCONTINUED | OUTPATIENT
Start: 2023-04-24 | End: 2023-04-25 | Stop reason: HOSPADM

## 2023-04-24 RX ORDER — ACETAMINOPHEN 325 MG/1
650 TABLET ORAL EVERY 6 HOURS PRN
Status: DISCONTINUED | OUTPATIENT
Start: 2023-04-24 | End: 2023-04-25 | Stop reason: HOSPADM

## 2023-04-24 RX ORDER — MORPHINE SULFATE 15 MG/1
15 TABLET, FILM COATED, EXTENDED RELEASE ORAL EVERY 8 HOURS SCHEDULED
Status: DISCONTINUED | OUTPATIENT
Start: 2023-04-24 | End: 2023-04-25 | Stop reason: HOSPADM

## 2023-04-24 RX ORDER — ALPRAZOLAM 0.5 MG/1
0.5 TABLET ORAL 2 TIMES DAILY PRN
Status: DISCONTINUED | OUTPATIENT
Start: 2023-04-24 | End: 2023-04-25 | Stop reason: HOSPADM

## 2023-04-24 RX ORDER — MORPHINE SULFATE 15 MG/1
7.5 TABLET ORAL EVERY 6 HOURS PRN
Status: DISCONTINUED | OUTPATIENT
Start: 2023-04-24 | End: 2023-04-25 | Stop reason: HOSPADM

## 2023-04-24 RX ORDER — GABAPENTIN 300 MG/1
300 CAPSULE ORAL
Status: DISCONTINUED | OUTPATIENT
Start: 2023-04-24 | End: 2023-04-25 | Stop reason: HOSPADM

## 2023-04-24 RX ORDER — FORMOTEROL FUMARATE 20 UG/2ML
20 SOLUTION RESPIRATORY (INHALATION)
Status: DISCONTINUED | OUTPATIENT
Start: 2023-04-24 | End: 2023-04-25 | Stop reason: HOSPADM

## 2023-04-24 RX ORDER — ALPRAZOLAM 0.5 MG/1
0.5 TABLET ORAL 2 TIMES DAILY PRN
Status: DISCONTINUED | OUTPATIENT
Start: 2023-04-24 | End: 2023-04-24

## 2023-04-24 RX ORDER — ABIRATERONE ACETATE 250 MG/1
1000 TABLET ORAL DAILY
Status: DISCONTINUED | OUTPATIENT
Start: 2023-04-24 | End: 2023-04-25 | Stop reason: HOSPADM

## 2023-04-24 RX ORDER — GUAIFENESIN 600 MG/1
600 TABLET, EXTENDED RELEASE ORAL 2 TIMES DAILY
Status: DISCONTINUED | OUTPATIENT
Start: 2023-04-24 | End: 2023-04-25 | Stop reason: HOSPADM

## 2023-04-24 RX ORDER — MORPHINE SULFATE 15 MG/1
15 TABLET, FILM COATED, EXTENDED RELEASE ORAL 2 TIMES DAILY
Status: DISCONTINUED | OUTPATIENT
Start: 2023-04-24 | End: 2023-04-24

## 2023-04-24 RX ORDER — BENZONATATE 100 MG/1
100 CAPSULE ORAL 3 TIMES DAILY PRN
Status: DISCONTINUED | OUTPATIENT
Start: 2023-04-24 | End: 2023-04-25 | Stop reason: HOSPADM

## 2023-04-24 RX ADMIN — GABAPENTIN 300 MG: 300 CAPSULE ORAL at 21:39

## 2023-04-24 RX ADMIN — LEVALBUTEROL 1.25 MG: 1.25 SOLUTION, CONCENTRATE RESPIRATORY (INHALATION) at 07:43

## 2023-04-24 RX ADMIN — GUAIFENESIN 600 MG: 600 TABLET ORAL at 08:11

## 2023-04-24 RX ADMIN — MONTELUKAST 10 MG: 10 TABLET, FILM COATED ORAL at 21:39

## 2023-04-24 RX ADMIN — ENOXAPARIN SODIUM 30 MG: 30 INJECTION SUBCUTANEOUS at 08:12

## 2023-04-24 RX ADMIN — METHYLPREDNISOLONE SODIUM SUCCINATE 40 MG: 40 INJECTION, POWDER, FOR SOLUTION INTRAMUSCULAR; INTRAVENOUS at 05:35

## 2023-04-24 RX ADMIN — BUDESONIDE 0.5 MG: 0.5 INHALANT ORAL at 07:43

## 2023-04-24 RX ADMIN — BENZONATATE 100 MG: 100 CAPSULE ORAL at 08:17

## 2023-04-24 RX ADMIN — FORMOTEROL FUMARATE DIHYDRATE 20 MCG: 20 SOLUTION RESPIRATORY (INHALATION) at 07:43

## 2023-04-24 RX ADMIN — BENZONATATE 100 MG: 100 CAPSULE ORAL at 21:39

## 2023-04-24 RX ADMIN — GABAPENTIN 300 MG: 300 CAPSULE ORAL at 01:01

## 2023-04-24 RX ADMIN — LEVALBUTEROL 1.25 MG: 1.25 SOLUTION, CONCENTRATE RESPIRATORY (INHALATION) at 20:00

## 2023-04-24 RX ADMIN — MONTELUKAST 10 MG: 10 TABLET, FILM COATED ORAL at 01:01

## 2023-04-24 RX ADMIN — MORPHINE SULFATE 15 MG: 15 TABLET, EXTENDED RELEASE ORAL at 21:39

## 2023-04-24 RX ADMIN — BENZONATATE 100 MG: 100 CAPSULE ORAL at 01:00

## 2023-04-24 RX ADMIN — GUAIFENESIN 600 MG: 600 TABLET ORAL at 17:30

## 2023-04-24 RX ADMIN — LEVALBUTEROL 1.25 MG: 1.25 SOLUTION, CONCENTRATE RESPIRATORY (INHALATION) at 13:29

## 2023-04-24 RX ADMIN — IPRATROPIUM BROMIDE 0.5 MG: 0.5 SOLUTION RESPIRATORY (INHALATION) at 13:29

## 2023-04-24 RX ADMIN — IPRATROPIUM BROMIDE 0.5 MG: 0.5 SOLUTION RESPIRATORY (INHALATION) at 20:00

## 2023-04-24 RX ADMIN — MORPHINE SULFATE 15 MG: 15 TABLET, EXTENDED RELEASE ORAL at 15:08

## 2023-04-24 RX ADMIN — MORPHINE SULFATE 15 MG: 15 TABLET, EXTENDED RELEASE ORAL at 08:11

## 2023-04-24 RX ADMIN — BENZONATATE 100 MG: 100 CAPSULE ORAL at 05:42

## 2023-04-24 RX ADMIN — BUDESONIDE 0.5 MG: 0.5 INHALANT ORAL at 20:00

## 2023-04-24 RX ADMIN — ACETAMINOPHEN 325MG 325 MG: 325 TABLET ORAL at 15:12

## 2023-04-24 RX ADMIN — CLONAZEPAM 0.25 MG: 0.5 TABLET ORAL at 21:51

## 2023-04-24 RX ADMIN — BENZONATATE 100 MG: 100 CAPSULE ORAL at 08:16

## 2023-04-24 RX ADMIN — ALPRAZOLAM 0.5 MG: 0.5 TABLET ORAL at 01:41

## 2023-04-24 RX ADMIN — LIDOCAINE 1 PATCH: 50 PATCH TOPICAL at 05:33

## 2023-04-24 RX ADMIN — IPRATROPIUM BROMIDE 0.5 MG: 0.5 SOLUTION RESPIRATORY (INHALATION) at 07:43

## 2023-04-24 RX ADMIN — IPRATROPIUM BROMIDE AND ALBUTEROL SULFATE 3 ML: 2.5; .5 SOLUTION RESPIRATORY (INHALATION) at 05:55

## 2023-04-24 RX ADMIN — BENZONATATE 100 MG: 100 CAPSULE ORAL at 15:08

## 2023-04-24 RX ADMIN — FORMOTEROL FUMARATE DIHYDRATE 20 MCG: 20 SOLUTION RESPIRATORY (INHALATION) at 20:00

## 2023-04-24 NOTE — H&P
[place]  H&P  Name: Priyanka Dominguez 75 y o  male I MRN: [id number]  Unit/Bed#: -01 SDU I Date of Admission: 4/23/2023   Date of Service: 4/24/2023 I Hospital Day: 1      Assessment/Plan   * Chronic obstructive pulmonary disease with acute exacerbation ([facility] )  Assessment & Plan  · Sudden onset dyspnea 1 hour prior to arrival to the ED unrelieved by home inhaler  · Suspected etiology is recent use of home pellet stove with dust exposure  · Placed on BiPAP on arrival to the ED, however patient could not tolerate due to claustrophobia so he was given ALCAZAR neb x2 and Solu-Medrol 125 mg with improvement   · Initially on room air on admission, however after coughing fit required 0 5 L as SPO2 was 87% on RA  · Home regimen: Symbicort BID -not established with pulmonology outpatient, no prior PFTs  · Xopenex and Atrovent TID  · Pulmicort and Performist BID  · Solu-Medrol 40 mg every 8 hours  · Significant dry cough with resultant coughing fits -scheduled Tessalon Perles with as needed TID   · Pulmonology consult  · Admitted to [facility] due to severity on presentation - would downgrade once able     Acute respiratory insufficiency  Assessment & Plan  · SPO2 87% on room air status post coughing fit  · SPO2 stable on 0 5 L  · SPO2 goal 88 to 92% due to underlying severe COPD    Prostate cancer metastatic to bone ([facility] )  Assessment & Plan  · Maintained on Zytiga 4000 mg daily  · Utilizes prednisone 1 mg daily to help with hot flashes-prednisone held in setting of IV steroids  · Continue Zytiga    Neoplasm related pain  Assessment & Plan  · Follows with palliative care  · Home regimen: Morphine ER 15 Mg twice daily  · Palliative care consulted    Severe protein-calorie malnutrition ([facility] )  Assessment & Plan  Malnutrition Findings:                          Severe pulmonary cachexia  Nutrition consulted       BMI Findings: Body mass index is 16 23 kg/m²         Cachexia "([facility])  Assessment & Plan  Malnutrition Findings:                        · Secondary to severe emphysema and prostate cancer with metastasis to the bone  · Nutrition consult         BMI Findings: Body mass index is 16 23 kg/m²  Opioid dependence ([facility])  Assessment & Plan  · Utilizes morphine ER 15 mg every 12 hours at home  · PDMP reviewed  · Continue  · Palliative consulted, as cancer related pain not controlled on this regimen       VTE Pharmacologic Prophylaxis: VTE Score: 5 High Risk (Score >/= 5) - Pharmacological DVT Prophylaxis Ordered: enoxaparin (Lovenox)  Sequential Compression Devices Ordered  Code Status: Level 1 - Full Code   Discussion with family: Patient declined call to   Anticipated Length of Stay: Patient will be admitted on an inpatient basis with an anticipated length of stay of greater than 2 midnights secondary to COPD with acute exacerbation, acute respiratory insufficiency, prostate cancer with metastasis to the bone, neoplasm pain, pulmonary cachexia  Total Time Spent on Date of Encounter in care of patient: 85 minutes This time was spent on one or more of the following: performing physical exam; counseling and coordination of care; obtaining or reviewing history; documenting in the medical record; reviewing/ordering tests, medications or procedures; communicating with other healthcare professionals and discussing with patient's family/caregivers  Chief Complaint: \" I felt like I was suffocating\"    History of Present Illness:  Yue Baires is a 75 y o  male with a PMH of severe COPD, prostate cancer with metastasis to the bone, and opioid dependence who presents with severe dyspnea that onset 1 hour prior to arrival to the ED  Patient reports he uses home albuterol inhaler without any relief  No recent fevers or chills  Has developed a dry cough over the last few days that has worsened today    Denies rhinorrhea, sinus congestion, sinus " pressure, or sore throat  He reports that he used a pellet stove in his house a few nights ago that did result in a lot of dust   No abdominal pain, nausea, vomiting, or change in bowel or bladder habits  Patient reports his back, hip, and neck pain is not well controlled on home morphine  He is hesitant to use opioids for breakthrough pain due to accidental overdose previously  Review of Systems:  Review of Systems   Constitutional: Negative for chills and fever  HENT: Negative for congestion, postnasal drip, rhinorrhea, sinus pressure and sore throat  Respiratory: Positive for cough and shortness of breath  Cardiovascular: Negative for chest pain, palpitations and leg swelling  Gastrointestinal: Negative for abdominal pain, constipation, diarrhea, nausea and vomiting  Genitourinary: Negative for difficulty urinating, dysuria and hematuria  Musculoskeletal: Positive for arthralgias, back pain and neck pain  Neurological: Negative for weakness and numbness  All other systems reviewed and are negative  Past Medical and Surgical History:   Past Medical History:   Diagnosis Date   • Bone cancer [facility])    • Colon polyp    • COPD (chronic obstructive pulmonary disease) ([facility] )    • Emphysema lung ([facility] )    • Hyperlipidemia    • Prostate cancer [facility])        Past Surgical History:   Procedure Laterality Date   • APPENDECTOMY     • COLONOSCOPY     • IR BIOPSY BONE  12/30/2021   • JOINT REPLACEMENT     • UPPER GASTROINTESTINAL ENDOSCOPY         Meds/Allergies:  Prior to Admission medications    Medication Sig Start Date End Date Taking? Authorizing Provider   abiraterone (Zytiga) 250 mg tablet Take 4 tablets (1,000 mg total) by mouth in the morning  5/19/22  Yes Tina Pandey MD   ALPRAZolam [facility]) 0 5 mg tablet TAKE 1 TABLET BY MOUTH 2 (TWO) TIMES A DAY AS NEEDED FOR ANXIETY OR SLEEP ONGOING THERAPY   4/17/23  Yes Arleth Scales PA-C   budesonide-formoterol [facility]) 160-4 5 mcg/act inhaler Inhale 2 puffs   Yes Historical Provider, MD   gabapentin (NEURONTIN) 300 mg capsule Take 1 capsule (300 mg total) by mouth daily at bedtime 4/17/23  Yes Roxanne Scales PA-C   montelukast (SINGULAIR) 10 mg tablet TAKE 1 TABLET BY MOUTH EVERYDAY AT BEDTIME 4/13/23  Yes ISA Molina   morphine (MS CONTIN) 15 mg 12 hr tablet Take 1 tablet (15 mg total) by mouth 2 (two) times a day Ongoing therapy Max Daily Amount: 30 mg 4/17/23  Yes Roxanne Scales PA-C   predniSONE 1 mg tablet Take 1 tablet (1 mg total) by mouth daily  Patient taking differently: Take 1 mg by mouth daily 1 hour after zytega 4/2/23  Yes Bhargav Araujo DO   albuterol (PROVENTIL HFA,VENTOLIN HFA) 90 mcg/act inhaler Inhale 2 puffs every 4 (four) hours as needed for wheezing 4/3/23   Martinez SilviusISA   atorvastatin (LIPITOR) 40 mg tablet Take 1 tablet (40 mg total) by mouth daily with dinner 2/24/20   Josue TORO PA-C   lidocaine (LIDODERM) 5 % Apply 3 patches topically daily Remove & Discard patch within 12 hours or as directed by MD 1/3/23   Augie Muñoz PA-C   naloxone [facility]) 4 mg/0 1 mL nasal spray Administer 1 spray into a nostril  If no response after 2-3 minutes, give another dose in the other nostril using a new spray  6/2/22   Augie Muñoz PA-C   NON FORMULARY Medical marijuana    Historical Provider, MD   senna (SENOKOT) 8 6 mg Take 2 tablets (17 2 mg total) by mouth daily at bedtime as needed for constipation 5/9/22   Augie Muñoz PA-C   Symbicort 160-4 5 MCG/ACT inhaler INHALE 2 PUFFS BY MOUTH 2 TIMES A DAY RINSE MOUTH AFTER USE  12/12/22   Kevin Mejia DO   oxyCODONE (Roxicodone) 5 immediate release tablet Take 0 5 tablets (2 5 mg total) by mouth every 4 (four) hours as needed (breakthough pain) Max Daily Amount: 15 mg 12/1/22 4/24/23  Augie Muñoz PA-C     I have reviewed home medications with patient personally      Allergies: No Known Allergies    Social "History:  Marital Status: Single   Occupation: Was a [employer] for [employer]  Patient Pre-hospital Living Situation: Home, Alone  Patient Pre-hospital Level of Mobility: walks  Patient Pre-hospital Diet Restrictions: None  Substance Use History:   Social History     Substance and Sexual Activity   Alcohol Use Never     Social History     Tobacco Use   Smoking Status Former   • Packs/day: 1 00   • Years: 41 00   • Pack years: 41 00   • Types: Cigarettes   • Quit date:    • Years since quittin 3   Smokeless Tobacco Never     Social History     Substance and Sexual Activity   Drug Use Never       Family History:  Family History   Problem Relation Age of Onset   • Lung cancer Mother    • Breast cancer Sister    • Colon polyps Brother    • Colon cancer Neg Hx        Physical Exam:     Vitals:   Blood Pressure: (!) 139/103 (23)  Pulse: 90 (23)  Temperature: 98 3 °F (36 8 °C) (23)  Temp Source: Oral (23)  Respirations: 20 (23)  Height: 5' 6\" (167 6 cm) (23)  Weight - Scale: 45 6 kg (100 lb 8 5 oz) (23)  SpO2: 91 % (23)    Physical Exam  Vitals and nursing note reviewed  Constitutional:       Comments: Cachectic  Chronically ill-appearing  Pleasant and conversational    Clenton Handy:      Head: Normocephalic  Nose: Nose normal       Mouth/Throat:      Mouth: Mucous membranes are dry  Eyes:      Extraocular Movements: Extraocular movements intact  Conjunctiva/sclera: Conjunctivae normal    Cardiovascular:      Rate and Rhythm: Regular rhythm  Tachycardia present  Pulses: Normal pulses  Heart sounds: No murmur heard  Pulmonary:      Comments: No accessory muscle use  Diminished lung sounds throughout posterior lung fields  Slight expiratory wheeze in bilateral bases  He is talking in full sentences without any dyspnea       Intermittent dry cough on exam that resulted in a coughing fit and severe " dyspnea  Patient is able to be talked through the event  This is the only time he appears in distress  Abdominal:      General: Abdomen is flat  Palpations: Abdomen is soft  Tenderness: There is no abdominal tenderness  There is no guarding or rebound  Musculoskeletal:         General: Normal range of motion  Cervical back: Normal range of motion  Right lower leg: No edema  Left lower leg: No edema  Skin:     General: Skin is warm and dry  Coloration: Skin is pale  Neurological:      General: No focal deficit present  Mental Status: He is alert and oriented to person, place, and time  Psychiatric:         Mood and Affect: Mood normal          Thought Content:  Thought content normal           Additional Data:     Lab Results:  Results from last 7 days   Lab Units 04/23/23 2026 04/23/23 2010   WBC Thousand/uL  --  6 72   HEMOGLOBIN g/dL  --  13 6   I STAT HEMOGLOBIN g/dl 13 9  --    HEMATOCRIT %  --  42 4   HEMATOCRIT, ISTAT % 41  --    PLATELETS Thousands/uL  --  453*   NEUTROS PCT %  --  60   LYMPHS PCT %  --  24   MONOS PCT %  --  9   EOS PCT %  --  5     Results from last 7 days   Lab Units 04/23/23 2026 04/23/23 2010   SODIUM mmol/L  --  141   POTASSIUM mmol/L  --  3 9   CHLORIDE mmol/L  --  102   CO2 mmol/L  --  33*   CO2, I-STAT mmol/L 33*  --    BUN mg/dL  --  16   CREATININE mg/dL  --  0 53*   ANION GAP mmol/L  --  6   CALCIUM mg/dL  --  9 2   ALBUMIN g/dL  --  3 9   TOTAL BILIRUBIN mg/dL  --  0 36   ALK PHOS U/L  --  66   ALT U/L  --  13   AST U/L  --  16   GLUCOSE RANDOM mg/dL  --  99                 Results from last 7 days   Lab Units 04/23/23 2010   PROCALCITONIN ng/ml <0 05       Lines/Drains:  Invasive Devices     Peripheral Intravenous Line  Duration           Peripheral IV 04/23/23 Left;Ventral (anterior) Forearm <1 day    Peripheral IV 04/23/23 Right;Upper;Ventral (anterior) Arm <1 day                     Imaging: Personally reviewed the following imaging: CXR with severe emphysema - no conscolidation  XR chest 1 view portable   ED Interpretation by Ezio Moreno DO ([id number])   Severe emphysematous changes and diaphragm flattening          EKG and Other Studies Reviewed on Admission:   · EK   No acute ischemia  Normal QT interval     ** Please Note: This note has been constructed using a voice recognition system   **

## 2023-04-24 NOTE — CONSULTS
Consultation - Palliative and Supportive Care   Foster Jones 75 y o  male [id number]    Assessment/Problems Actively Addressed:  Goals of care counseling  Encounter for palliative care   Acute respiratory insufficiency - resolving   COPD with acute exacerbation - resolving   Prostate cancer metastatic to bone  Protein calorie malnutrition  Cancer related pain  Anxiety    Plan:  1  Symptom management:  Brook Dennis complains of cancer related pain, insomnia, and hotflashes   · Uptitrate MS Contin ER to 15MG TID  · Add morphine IR 7 5MG Q6HPRN (pt does not tolerate oxycodone or hydromorphone for BT pain)  · Continue alprazolam 0 5MG BID PRN  · Add clonazepam 0 25MG QHS  · Would restart olanzapine as an outpatient basis  2  Goals / Recommendations:    · Brook Dennis will need ongoing palliative care follow up; his next appointment with me is 5/9/23  · Brook Dennis acknowledges that he cannot live alone, states that he is falling a lot  · Sister Lucille Pérez is at bedside and expresses her concern for him  · I dicussed code status to Brook Dennis extensively and his is DNR/DNI   · In the past, I have discussed goals with Brook Dennis and he remains strongly focused on disease directed care  · Will follow this admission  Social support:  • Time spent providing supportive listening  • Patient lives alone and falls a lot  • Family trying to assist in finding a safe discharge dispo  I have reviewed the patient's controlled substance dispensing history in the Prescription Drug Monitoring Program in compliance with the [facility] regulations before prescribing any controlled substances    Last refills  Filled  Written  Sold  ID  Drug  QTY  Days  Prescriber  RX #  Dispenser  Refill  Daily Dose*  Pymt Type       04/17/2023 04/17/2023   1  Morphine Sulf Er 15 Mg Tablet 60 00  30  Ch Fis  [id number]   Pen (7891)   30 00 [place]     04/17/2023 04/17/2023   1  Alprazolam 0 5 Mg Tablet 90 00  45  Ch Fis  [id number]   Pen (4411)   2 00 LME  [place]     03/19/2023 03/18/2023   1  Morphine Sulf Er 15 Mg Tablet 60 00  30  Re Bal  [id number]   Pen (2891)   30 00 MME  [place]     02/15/2023  02/14/2023   1  Alprazolam 0 5 Mg Tablet 90 00  45  Ch Fis  [id number]   Pen (0311)   2 00 LME  [place]        Decisional apparatus:  Patient is competent on exam today  If competence is lost, patient's substitute decision maker would default to adult siblings by [place] Act 169  Advance Directive/Living Will/POLST: none on file     We appreciate the invitation to be involved in this patient's care  We will continue to follow throughout this hospitalization  Please do not hesitate to reach our on call provider through our clinic answering service at  should you have acute symptom control concerns  Eric Ugarte PA-C  Palliative and Supportive Care  Clinic/Answering Service: [phone]  You can find me on [email or web address]! IDENTIFICATION:  Inpatient consult to Palliative Care  Consult performed by: Leah Garcia PA-C  Consult ordered by: Scar Nettles PA-C        Physician Requesting Consult: Emelyn Haddad MD  Reason for Consult / Principal Problem: [address] counseling and SM secondary to metastatic cancer     History of Present Illness:  Foster Jones is a 75 y o  male who presents with difficulty breathing  Brook Dennis has a past medical history of severe COPD, prostate cancer with diffuse metastasis to bone, and chronic opioid dependence who presented with severe dyspnea  Mac follows with palliative care and with oncology as an outpatient  He is on Zytiga as cancer therapy  He has been cleaning his home in preparation to sell it as he has been falling frequently and began to develop increased shortness of breath  Upon presentation to the hospital, he required BiPAP for a brief time and was admitted to the ICU done unit  He been treated for COPD flare    Palliative and supportive care was consulted due to uncontrolled pain and other symptoms  Keller Fillers at bedside  He appears fatigued  Fortunately, his oxygen has been weaned down to a small amount  He expresses uncontrolled cancer related pain although he does believe the morphine is helping  He states he has not been sleeping and has ongoing hot flashes  We discussed changes to his regimen  We discussed his code status  Sister Claudene Ade arrives at bedside and Savi Vo is allows me to update her  She expresses her concern for press  He agrees that he is not safe to live at home alone  Savi Vo will have ongoing palliative care follow-up    Review of Systems   Constitutional: Positive for decreased appetite and malaise/fatigue  HENT: Negative for congestion and hearing loss  Respiratory: Positive for cough and shortness of breath  Negative for sleep disturbances due to breathing  Endocrine: Positive for heat intolerance  Hotflashes    Skin: Negative for flushing  Musculoskeletal: Positive for arthritis, back pain, falls and muscle weakness  Gastrointestinal: Negative for bloating, abdominal pain, excessive appetite, nausea and vomiting  Genitourinary: Negative for bladder incontinence  Neurological: Positive for disturbances in coordination and weakness  Negative for difficulty with concentration, excessive daytime sleepiness and light-headedness  Psychiatric/Behavioral: Negative for altered mental status and hallucinations         Past Medical History:   Diagnosis Date   • Bone cancer [facility])    • Colon polyp    • COPD (chronic obstructive pulmonary disease) ([facility] [address] )    • [facility]    • Hyperlipidemia    • Prostate cancer [facility])      Past Surgical History:   Procedure Laterality Date   • APPENDECTOMY     • COLONOSCOPY     • IR BIOPSY BONE  12/30/2021   • JOINT REPLACEMENT     • UPPER GASTROINTESTINAL ENDOSCOPY       Social History     Socioeconomic History   • Marital status: Single     Spouse name: Not on file   • Number of children: Not on file   • Years of education: Not on file   • Highest education level: Not on file   Occupational History   • Not on file   Tobacco Use   • Smoking status: Former     Packs/day: 1 00     Years: 41 00     Pack years: 41 00     Types: Cigarettes     Quit date:      Years since quittin 3   • Smokeless tobacco: Never   Vaping Use   • Vaping Use: Never used   Substance and Sexual Activity   • Alcohol use: Never   • Drug use: Never   • Sexual activity: Not Currently   Other Topics Concern   • Not on file   Social History Narrative   • Not on file     Social Determinants of Health     Financial Resource Strain: Not on file   Food Insecurity: No Food Insecurity   • Worried About Running Out of Food in the Last Year: Never true   • Ran Out of Food in the Last Year: Never true   Transportation Needs: Unmet Transportation Needs   • Lack of Transportation (Medical):  Yes   • Lack of Transportation (Non-Medical): Yes   Physical Activity: Not on file   Stress: Not on file   Social Connections: Not on file   Intimate Partner Violence: Not on file   Housing Stability: Low Risk    • Unable to Pay for Housing in the Last Year: No   • Number of Places Lived in the Last Year: 1   • Unstable Housing in the Last Year: No     Family History   Problem Relation Age of Onset   • Lung cancer Mother    • Breast cancer Sister    • Colon polyps Brother    • Colon cancer Neg Hx        Medications:  all current active meds have been reviewed and current meds:   Current Facility-Administered Medications   Medication Dose Route Frequency   • abiraterone (ZYTIGA) tablet 1,000 mg  1,000 mg Oral Daily   • acetaminophen (TYLENOL) tablet 650 mg  650 mg Oral Q6H PRN   • ALPRAZolam (XANAX) tablet 0 5 mg  0 5 mg Oral BID PRN   • aluminum-magnesium hydroxide-simethicone (MYLANTA) oral suspension 30 mL  30 mL Oral Q6H PRN   • benzonatate (TESSALON PERLES) capsule 100 mg  100 mg Oral TID   • benzonatate (TESSALON PERLES) capsule 100 mg  100 mg Oral TID PRN   • budesonide (PULMICORT) inhalation solution 0 5 mg  0 5 mg Nebulization Q12H   • clonazePAM (KlonoPIN) tablet 0 25 mg  0 25 mg Oral HS PRN   • enoxaparin (LOVENOX) subcutaneous injection 30 mg  30 mg Subcutaneous Daily   • formoterol (PERFOROMIST) nebulizer solution 20 mcg  20 mcg Nebulization Q12H   • gabapentin (NEURONTIN) capsule 300 mg  300 mg Oral HS   • guaiFENesin (MUCINEX) 12 hr tablet 600 mg  600 mg Oral BID   • ipratropium (ATROVENT) 0 02 % inhalation solution 0 5 mg  0 5 mg Nebulization TID   • ipratropium-albuterol (DUO-NEB) 0 5-2 5 mg/3 mL inhalation solution 3 mL  3 mL Nebulization Q6H PRN   • levalbuterol (XOPENEX) inhalation solution 1 25 mg  1 25 mg Nebulization TID   • lidocaine (LIDODERM) 5 % patch 1 patch  1 patch Topical Daily   • lidocaine (LIDODERM) 5 % patch 1 patch  1 patch Topical Daily   • montelukast (SINGULAIR) tablet 10 mg  10 mg Oral HS   • morphine (MS CONTIN) ER tablet 15 mg  15 mg Oral Q8H [address]   • morphine (MSIR) IR tablet 7 5 mg  7 5 mg Oral Q6H PRN   • predniSONE tablet 40 mg  40 mg Oral Daily       No Known Allergies      Medications    Current Facility-Administered Medications:   •  abiraterone (ZYTIGA) tablet 1,000 mg, 1,000 mg, Oral, Daily, Gustavo Fletcher PA-C  •  acetaminophen (TYLENOL) tablet 650 mg, 650 mg, Oral, Q6H PRN, Gustavo Fletcher PA-C  •  ALPRAZolam Thresa Pile) tablet 0 5 mg, 0 5 mg, Oral, BID PRN, Gustavo Fletcher PA-C, 0 5 mg at 04/24/23 0141  •  aluminum-magnesium hydroxide-simethicone (MYLANTA) oral suspension 30 mL, 30 mL, Oral, Q6H PRN, Gustavo Fletcher PA-C  •  benzonatate (TESSALON PERLES) capsule 100 mg, 100 mg, Oral, TID, Gustavo Fletcher PA-C, 100 mg at 04/24/23 6490  •  benzonatate (TESSALON PERLES) capsule 100 mg, 100 mg, Oral, TID PRN, Gustavo Fletcher PA-C, 100 mg at 04/24/23 0542  •  budesonide (PULMICORT) inhalation solution 0 5 mg, 0 5 mg, Nebulization, Q12H, Gustavo Fletcher PA-C, 0 5 mg at "04/24/23 0743  •  enoxaparin (LOVENOX) subcutaneous injection 30 mg, 30 mg, Subcutaneous, Daily, Dalila Zazueta PA-C, 30 mg at 04/24/23 4962  •  formoterol (PERFOROMIST) nebulizer solution 20 mcg, 20 mcg, Nebulization, Q12H, Dalila Zazueta PA-C, 20 mcg at 04/24/23 1225  •  gabapentin (NEURONTIN) capsule 300 mg, 300 mg, Oral, HS, Dalila Zazueta PA-C, 300 mg at 04/24/23 0101  •  guaiFENesin (MUCINEX) 12 hr tablet 600 mg, 600 mg, Oral, BID, Dalila Zazueta PA-C, 600 mg at 04/24/23 0137  •  ipratropium (ATROVENT) 0 02 % inhalation solution 0 5 mg, 0 5 mg, Nebulization, TID, Dalila Zazueta PA-C, 0 5 mg at 04/24/23 0721  •  ipratropium-albuterol (DUO-NEB) 0 5-2 5 mg/3 mL inhalation solution 3 mL, 3 mL, Nebulization, Q6H PRN, ISA Reid, 3 mL at 04/24/23 0555  •  levalbuterol (XOPENEX) inhalation solution 1 25 mg, 1 25 mg, Nebulization, TID, Dalila Zazueta PA-C, 1 25 mg at 04/24/23 5912  •  lidocaine (LIDODERM) 5 % patch 1 patch, 1 patch, Topical, Daily, ISA Reid  •  lidocaine (LIDODERM) 5 % patch 1 patch, 1 patch, Topical, Daily, Dalila Zazueta PA-C, 1 patch at 04/24/23 0533  •  methylPREDNISolone sodium succinate (Solu-MEDROL) injection 40 mg, 40 mg, Intravenous, Q8H, Dalila Zazueta PA-C, 40 mg at 04/24/23 0535  •  montelukast (SINGULAIR) tablet 10 mg, 10 mg, Oral, HS, Dalila Zazueta PA-C, 10 mg at 04/24/23 0101  •  morphine (MS CONTIN) ER tablet 15 mg, 15 mg, Oral, BID, Dalila Zazueta PA-C, 15 mg at 04/24/23 1469    Objective  /90 (BP Location: Left arm)   Pulse 105   Temp 98 2 °F (36 8 °C) (Oral)   Resp (!) 25   Ht 5' 6\" (1 676 m)   Wt 46 3 kg (102 lb 1 2 oz)   SpO2 98%   BMI 16 48 kg/m²     Physical Exam  Vitals and nursing note reviewed  Constitutional:       General: He is not in acute distress  Appearance: He is cachectic  He is ill-appearing  He is not toxic-appearing  Interventions: Nasal cannula in place     HENT:      Head: " Normocephalic and atraumatic  Eyes:      Conjunctiva/sclera: Conjunctivae normal    Cardiovascular:      Rate and Rhythm: Normal rate  Pulmonary:      Effort: Pulmonary effort is normal  No respiratory distress  Abdominal:      Palpations: Abdomen is soft  Tenderness: There is no abdominal tenderness  Musculoskeletal:         General: No swelling  Cervical back: Neck supple  Skin:     General: Skin is warm and dry  Coloration: Skin is pale  Neurological:      Mental Status: He is alert and oriented to person, place, and time  Mental status is at baseline  Psychiatric:         Mood and Affect: Mood normal          Behavior: Behavior is cooperative  Lab Results:   I have personally reviewed pertinent labs  , CBC:   Lab Results   Component Value Date    WBC 4 24 (L) 04/24/2023    HGB 13 3 04/24/2023    HCT 39 3 04/24/2023    MCV 97 04/24/2023     (H) 04/24/2023    MCH 32 7 04/24/2023    MCHC 33 8 04/24/2023    RDW 13 0 04/24/2023    MPV 8 4 (L) 04/24/2023    NRBC 0 04/23/2023   , CMP:   Lab Results   Component Value Date    SODIUM 138 04/24/2023    K 4 2 04/24/2023     04/24/2023    CO2 29 04/24/2023    CO2 33 (H) 04/23/2023    BUN 13 04/24/2023    CREATININE 0 45 (L) 04/24/2023    GLUCOSE 105 04/23/2023    CALCIUM 8 5 04/24/2023    AST 16 04/23/2023    ALT 13 04/23/2023    ALKPHOS 66 04/23/2023    EGFR 116 04/24/2023     Imaging Studies: I have personally reviewed pertinent reports  EKG, Pathology, and Other Studies: I have personally reviewed pertinent reports  Counseling / Coordination of Care  Total floor / unit time spent today 70 minutes  Greater than 50% of total time was spent with the patient and / or family counseling and / or coordination of care   A description of the counseling / coordination of care: reviewed chart, reviewed lab values, reviewed imaging, provided medical updates, discussed palliative care and symptom management, discussed hospice care and "comfort care, opioid titration, discussed goals of care, discussed code status, provided supportive listening, provided anticipatory guidance, provided psychosocial and emotional support, assessed competency and decision-making and facilitated interdisciplinary communication  Reviewed with SLIM, ICU team, RN and CM  Portions of this document may have been created using dictation software and as such some \"sound alike\" terms may have been generated by the system  Do not hesitate to contact me with any questions or clarifications     "

## 2023-04-24 NOTE — ASSESSMENT & PLAN NOTE
Metabolic encephalopathy due to acute respiratory failure as evidenced by hypoxia and somnolence, treated with Bipap, telemetry and neuro assessments  Resolved

## 2023-04-24 NOTE — ASSESSMENT & PLAN NOTE
Malnutrition Findings:                        · Secondary to severe emphysema and prostate cancer with metastasis to the bone  · Nutrition consult         BMI Findings: Body mass index is 16 48 kg/m²

## 2023-04-24 NOTE — ASSESSMENT & PLAN NOTE
· SPO2 87% on room air status post coughing fit  · SPO2 stable on 0 5 L  · SPO2 goal 88 to 92% due to underlying severe COPD

## 2023-04-24 NOTE — RESPIRATORY THERAPY NOTE
RT Protocol Note  Paul Gallo 75 y o  male MRN: [id number]  Unit/Bed#: -01 SDU Encounter: [id number]    Assessment    Principal Problem:    Chronic obstructive pulmonary disease ([place] [address] )  Active Problems:    Severe protein-calorie malnutrition ([place] [address] )    Prostate cancer metastatic to bone [facility])      Home Pulmonary Medications:Albuterol, Symbicort  Home Devices/Therapy: Home O2    Past Medical History:   Diagnosis Date    Bone cancer ([place] [address] )     Colon polyp     COPD (chronic obstructive pulmonary disease) ([place] [address] )     Emphysema lung ([facility])     Hyperlipidemia     Prostate cancer ([place] [address] )      Social History     Socioeconomic History    Marital status: Single     Spouse name: None    Number of children: None    Years of education: None    Highest education level: None   Occupational History    None   Tobacco Use    Smoking status: Former     Packs/day: 1 00     Years: 41 00     Pack years: 41 00     Types: Cigarettes     Quit date:      Years since quittin 3    Smokeless tobacco: Never   Vaping Use    Vaping Use: Never used   Substance and Sexual Activity    Alcohol use: Never    Drug use: Never    Sexual activity: Not Currently   Other Topics Concern    None   Social History Narrative    None     Social Determinants of Health     Financial Resource Strain: Not on file   Food Insecurity: No Food Insecurity    Worried About Running Out of Food in the Last Year: Never true    Ran Out of Food in the Last Year: Never true   Transportation Needs: Unmet Transportation Needs    Lack of Transportation (Medical): Yes    Lack of Transportation (Non-Medical): Yes   Physical Activity: Not on file   Stress: Not on file   Social Connections: Not on file   Intimate Partner Violence: Not on file   Housing Stability: [address] to Pay for Housing in the Last Year: No    Number of [place] in the Last Year: 1    Unstable Housing in the Last Year: No       Subjective         Objective    Physical Exam: "  Assessment Type: Assess only  General Appearance: Alert, Awake  Respiratory Pattern: Labored  Chest Assessment: Chest expansion symmetrical, Trachea midline  Bilateral Breath Sounds: Diminished, Expiratory wheezes  Cough: Non-productive    Vitals:  Blood pressure (!) 139/103, pulse 90, temperature 98 3 °F (36 8 °C), temperature source Oral, resp  rate 20, height 5' 6\" (1 676 m), weight 45 6 kg (100 lb 8 5 oz), SpO2 91 %  Imaging and other studies: I have personally reviewed pertinent reports  Plan    Respiratory Plan: Moderate/Severe Distress pathway  Start Xopenex/Atrovent TID and Pulmicort, Perforomist BID  Per PA      Airway Clearance Plan: Incentive Spirometer     Resp Comments: Patient seen in ER, did not tolerate BIPAP due to claustrophobia, placed on heart neb, diminished b/l mild exp wheeze on L side, non productive cough   "

## 2023-04-24 NOTE — ASSESSMENT & PLAN NOTE
· Follows with palliative care  · Home regimen: Morphine ER 15 Mg twice daily  · Palliative care consulted

## 2023-04-24 NOTE — ASSESSMENT & PLAN NOTE
Malnutrition Findings:                          Severe pulmonary cachexia  Nutrition consulted       BMI Findings: Body mass index is 16 23 kg/m²

## 2023-04-24 NOTE — QUICK NOTE
Progress Note - Triage Asssessment   Yue Baires 75 y o  male MRN: [id number]    Time Called ( Time): 2151  Date Called: 04/23/23  Room#: ER 6  Person requesting evaluation: Dr Good Jon     Situation:    39-year-old male with a past medical history of hyperlipidemia, metastatic prostate cancer, severe protein calorie malnutrition, COPD who comes to the ER with a CC SOB  He states this occurred suddenly at home this afternoon and prior to this he has not had any fevers, changes in his respiratory status  He states that he takes his Symbicort religiously at the same time every night, however this evening he noticed that he did not feel improved  In the ER, he was initially somnolent and was placed on BiPAP very briefly  The patient then woke and removed the BiPAP  He was given 2 hour-long nebulizers, cefepime, 125mg IV Solu-Medrol  On my assessment, the patient is on room air with oxygen saturation 90 to 93%  He denies wearing home oxygen  He is able to speak in full sentences, however after much discussion, he has dry cough, which she states is different than his baseline  He denies sputum production  He does have some expiratory wheezing and decreased breath sounds posteriorly, however he states he feels overall improved from his admission to the ER  He confirms with me that he would not want to be intubated or receive CPR  He appears appropriate for admission to the hospitalist service on stepdown 2 level of care      Interventions:   Suggestions:  · Admit to SD2  · Continue scheduled xopenex, atrovent   · Continue scheduled IV steroids   · Add PRN nebs   · May benefit from [facility] consult   · Patient of palliative care as OP -- would consult to follow IP   · Patient shares he has a large component of anxiety, which is likely contributory        Triage Assessment:     Patient to be admitted to step down level of care    Recommendations discussed with Dr Good Jon

## 2023-04-24 NOTE — ED PROVIDER NOTES
"History  Chief Complaint   Patient presents with   • Shortness of Breath     Per family pt has been SOB all day, got worse over the last hour  Hx of emphysema and cancer     HPI     75 yr old male pmhx of COPD, (previously on 2L NC at baseline), on daily prednisone, arthritis, hyperlipidemia, prostate cancer with metastasis to his bone followed by [facility] oncology, presenting to the ED for 1 hour of progressive shortness of breath  Patient accompanied by nephew Ricarda Gaucher  He reports patient lives alone  Ricarda Gaucher says that patient's shortness of breath has been worsening over the last hour, similar to prior exacerbations  Tried albuterol inhaler at home w/o relief  Initially patient was somnolent on arrival, lungs sounded tight  Respiratory therapy was called and patient was trialed on BiPAP, which she tolerated for about 5 minutes  He was started on continuous nebulizer  After being on BiPAP for short time, patient seem more alert and awake and reports he is claustrophobic  He has had multiple admissions for COPD in the past per patient's nephew  No history of any intubation  Patient denies any chest pain  Pt feels that URI are triggers for his COPD  On further discussion with the patient, he reports he woke up this morning and \"did not feel right\" and felt worse throughout the day until he called his nephew Ricarda Gaucher to bring him to the emergency department           Oncology History Overview Note    12/302021 Iliac Crest, Right, bone lesion:  -Metastatic adenocarcinoma, immunohistochemically consistent with metastatic prostatic adenocarcinoma     1/5/2022 started on Firmagon by Urology     2/2022 started Xtandi      5/2022 hospitalized with acute encephalopathy   Arianna Tapia discontinued and he was switched to Zytiga and prednisone 2 5 mg     Xgeva every 3 months      1/27/23 PSMA PET CT showed stable scattered bony lesions, no evidence of progression         Prostate cancer metastatic to bone ([facility] [address] )    12/30/2021 " Initial Diagnosis      Prostate cancer metastatic to bone [facility])               Prior to Admission Medications   Prescriptions Last Dose Informant Patient Reported? Taking? ALPRAZolam (XANAX) 0 5 mg tablet   No No   Sig: TAKE 1 TABLET BY MOUTH 2 (TWO) TIMES A DAY AS NEEDED FOR ANXIETY OR SLEEP ONGOING THERAPY  NON FORMULARY   Yes No   Sig: Medical marijuana   Symbicort 160-4 5 MCG/ACT inhaler   No No   Sig: INHALE 2 PUFFS BY MOUTH 2 TIMES A DAY RINSE MOUTH AFTER USE    abiraterone (Zytiga) 250 mg tablet   No No   Sig: Take 4 tablets (1,000 mg total) by mouth in the morning  albuterol (PROVENTIL HFA,VENTOLIN HFA) 90 mcg/act inhaler   No No   Sig: Inhale 2 puffs every 4 (four) hours as needed for wheezing   atorvastatin (LIPITOR) 40 mg tablet   No No   Sig: Take 1 tablet (40 mg total) by mouth daily with dinner   budesonide-formoterol (SYMBICORT) 160-4 5 mcg/act inhaler   Yes No   Sig: Inhale 2 puffs   gabapentin (NEURONTIN) 300 mg capsule   No No   Sig: Take 1 capsule (300 mg total) by mouth daily at bedtime   lidocaine (LIDODERM) 5 %   No No   Sig: Apply 3 patches topically daily Remove & Discard patch within 12 hours or as directed by MD   montedon (SINGULAIR) 10 mg tablet   No No   Sig: TAKE 1 TABLET BY MOUTH EVERYDAY AT BEDTIME   morphine (MS CONTIN) 15 mg 12 hr tablet   No No   Sig: Take 1 tablet (15 mg total) by mouth 2 (two) times a day Ongoing therapy Max Daily Amount: 30 mg   naloxone (NARCAN) 4 mg/0 1 mL nasal spray   No No   Sig: Administer 1 spray into a nostril  If no response after 2-3 minutes, give another dose in the other nostril using a new spray     oxyCODONE (Roxicodone) 5 immediate release tablet   No No   Sig: Take 0 5 tablets (2 5 mg total) by mouth every 4 (four) hours as needed (breakthough pain) Max Daily Amount: 15 mg   predniSONE 1 mg tablet   No No   Sig: Take 1 tablet (1 mg total) by mouth daily   senna (SENOKOT) 8 6 mg   No No   Sig: Take 2 tablets (17 2 mg total) by mouth daily at bedtime as needed for constipation      Facility-Administered Medications: None       Past Medical History:   Diagnosis Date   • Bone cancer ([facility] [address] )    • Colon polyp    • COPD (chronic obstructive pulmonary disease) ([facility] )    • Emphysema lung (HCC)    • Hyperlipidemia    • Prostate cancer [facility])        Past Surgical History:   Procedure Laterality Date   • APPENDECTOMY     • COLONOSCOPY     • IR BIOPSY BONE  2021   • JOINT REPLACEMENT     • UPPER GASTROINTESTINAL ENDOSCOPY         Family History   Problem Relation Age of Onset   • Lung cancer Mother    • Breast cancer Sister    • Colon polyps Brother    • Colon cancer Neg Hx      I have reviewed and agree with the history as documented  E-Cigarette/Vaping   • E-Cigarette Use Never User      E-Cigarette/Vaping Substances   • Nicotine No    • THC No    • CBD No    • Flavoring No    • Other No    • Unknown No      Social History     Tobacco Use   • Smoking status: Former     Packs/day: 1 00     Years: 41 00     Pack years: 41 00     Types: Cigarettes     Quit date:      Years since quittin 3   • Smokeless tobacco: Never   Vaping Use   • Vaping Use: Never used   Substance Use Topics   • Alcohol use: Never   • Drug use: Never       Review of Systems   Constitutional: Negative for chills and fever  Respiratory: Positive for cough, chest tightness, shortness of breath and wheezing  Physical Exam  Physical Exam  Vitals and nursing note reviewed  Constitutional:       General: He is in acute distress  Appearance: He is ill-appearing  He is not toxic-appearing or diaphoretic  Comments: Somnolent increased work of breathing   HENT:      Mouth/Throat:      Pharynx: Oropharynx is clear  Cardiovascular:      Rate and Rhythm: Tachycardia present  Pulses: Normal pulses  Pulmonary:      Effort: Tachypnea and accessory muscle usage present  Breath sounds: Decreased air movement present   Wheezes:        Comments: Tight lung sounds B/L  Abdominal:      General: There is no distension  Musculoskeletal:      Cervical back: Neck supple  Right lower leg: No edema  Left lower leg: No edema  Skin:     General: Skin is dry  Findings: No rash  Neurological:      General: No focal deficit present  Mental Status: Mental status is at baseline     Psychiatric:         Mood and Affect: Mood normal          Behavior: Behavior normal          Vital Signs  ED Triage Vitals [04/23/23 2008]   Temperature Pulse Respirations Blood Pressure SpO2   98 5 °F (36 9 °C) 88 (!) 29 (!) 175/95 96 %      Temp Source Heart Rate Source Patient Position - Orthostatic VS BP Location FiO2 (%)   Oral Monitor Lying Left arm --      Pain Score       --           Vitals:    04/23/23 2008   BP: (!) 175/95   Pulse: 88   Patient Position - Orthostatic VS: Lying         Visual Acuity      ED Medications  Medications   albuterol inhalation solution 2 5 mg (2 5 mg Nebulization Given 4/23/23 2016)   ipratropium (ATROVENT) 0 02 % inhalation solution 0 5 mg (0 5 mg Nebulization Given 4/23/23 2016)   methylPREDNISolone sodium succinate (Solu-MEDROL) injection 125 mg (125 mg Intravenous Given 4/23/23 2017)   albuterol inhalation solution 10 mg (10 mg Nebulization Given 4/23/23 2022)     And   ipratropium (ATROVENT) 0 02 % inhalation solution 1 mg (1 mg Nebulization Given 4/23/23 2022)     And   sodium chloride 0 9 % inhalation solution 3 mL (3 mL Nebulization Given 4/23/23 2022)   cefepime (MAXIPIME) IVPB (premix in dextrose) 2,000 mg 50 mL (0 mg Intravenous Stopped 4/23/23 2135)   morphine (MS CONTIN) ER tablet 15 mg (15 mg Oral Given 4/23/23 2209)   gabapentin (NEURONTIN) capsule 300 mg (300 mg Oral Given 4/23/23 2209)   albuterol inhalation solution 10 mg (10 mg Nebulization Given 4/23/23 2138)     And   ipratropium (ATROVENT) 0 02 % inhalation solution 1 mg (1 mg Nebulization Given 4/23/23 2138)     And   sodium chloride 0 9 % inhalation solution 3 mL (3 mL Nebulization Given 4/23/23 2138)       Diagnostic Studies  Results Reviewed     Procedure Component Value Units Date/Time    HS Troponin I 2hr [[id number]]  (Normal) Collected: 04/23/23 2206    Lab Status: Final result Specimen: Blood from Arm, Right Updated: 04/23/23 2235     hs TnI 2hr 6 ng/L      Delta 2hr hsTnI 3 ng/L     HS Troponin I 4hr [[id number]]     Lab Status: No result Specimen: Blood     FLU/RSV/COVID - if FLU/RSV clinically relevant [[id number]]  (Normal) Collected: 04/23/23 2010    Lab Status: Final result Specimen: Nares from Nose Updated: 04/23/23 2151     SARS-CoV-2 Negative     INFLUENZA A PCR Negative     INFLUENZA B PCR Negative     RSV PCR Negative    Narrative:      FOR PEDIATRIC PATIENTS - copy/paste COVID Guidelines URL to browser: [email or web address]    SARS-CoV-2 assay is a Nucleic Acid Amplification assay intended for the  qualitative detection of nucleic acid from SARS-CoV-2 in nasopharyngeal  swabs  Results are for the presumptive identification of SARS-CoV-2 RNA  Positive results are indicative of infection with SARS-CoV-2, the virus  causing COVID-19, but do not rule out bacterial infection or co-infection  with other viruses  Laboratories within the [place] and its  territories are required to report all positive results to the appropriate  public health authorities  Negative results do not preclude SARS-CoV-2  infection and should not be used as the sole basis for treatment or other  patient management decisions  Negative results must be combined with  clinical observations, patient history, and epidemiological information  This test has not been FDA cleared or approved  This test has been authorized by FDA under an Emergency Use Authorization  (EUA)   This test is only authorized for the duration of time the  declaration that circumstances exist justifying the authorization of the  emergency use of an in vitro diagnostic tests for detection of SARS-CoV-2  virus and/or diagnosis of COVID-19 infection under section 564(b)(1) of  the Act, 21 U  S C  463WRC-5(R)(6), unless the authorization is terminated  or revoked sooner  The test has been validated but independent review by FDA  and CLIA is pending  Test performed using [employer] GeneXpert: This RT-PCR assay targets N2,  a region unique to SARS-CoV-2  A conserved region in the E-gene was chosen  for pan-Sarbecovirus detection which includes SARS-CoV-2  According to CMS-2020-01-R, this platform meets the definition of high-throughput technology      Procalcitonin [[id number]]  (Normal) Collected: 04/23/23 2010    Lab Status: Final result Specimen: Blood from Arm, Right Updated: 04/23/23 2139     Procalcitonin <0 05 ng/ml     HS Troponin 0hr (reflex protocol) [[id number]]  (Normal) Collected: 04/23/23 2010    Lab Status: Final result Specimen: Blood from Arm, Right Updated: 04/23/23 2136     hs TnI 0hr 3 ng/L     Comprehensive metabolic panel [[id number]]  (Abnormal) Collected: 04/23/23 2010    Lab Status: Final result Specimen: Blood from Arm, Right Updated: 04/23/23 2132     Sodium 141 mmol/L      Potassium 3 9 mmol/L      Chloride 102 mmol/L      CO2 33 mmol/L      ANION GAP 6 mmol/L      BUN 16 mg/dL      Creatinine 0 53 mg/dL      Glucose 99 mg/dL      Calcium 9 2 mg/dL      AST 16 U/L      ALT 13 U/L      Alkaline Phosphatase 66 U/L      Total Protein 6 9 g/dL      Albumin 3 9 g/dL      Total Bilirubin 0 36 mg/dL      eGFR 108 ml/min/1 73sq m     Narrative:      Ashley guidelines for Chronic Kidney Disease (CKD):   •  Stage 1 with normal or high GFR (GFR > 90 mL/min/1 73 square meters)  •  Stage 2 Mild CKD (GFR = 60-89 mL/min/1 73 square meters)  •  Stage 3A Moderate CKD (GFR = 45-59 mL/min/1 73 square meters)  •  Stage 3B Moderate CKD (GFR = 30-44 mL/min/1 73 square meters)  •  Stage 4 Severe CKD (GFR = 15-29 mL/min/1 73 square meters)  • Stage 5 End Stage CKD (GFR <15 mL/min/1 73 square meters)  Note: GFR calculation is accurate only with a steady state creatinine    Magnesium [[id number]]  (Normal) Collected: 04/23/23 2010    Lab Status: Final result Specimen: Blood from Arm, Right Updated: 04/23/23 2132     Magnesium 2 2 mg/dL     D-dimer, quantitative [[id number]]  (Normal) Collected: 04/23/23 2010    Lab Status: Final result Specimen: Blood from Arm, Right Updated: 04/23/23 2127     D-Dimer, Quant 0 35 ug/ml FEU     Narrative: In the evaluation for possible pulmonary embolism, in the appropriate (Well's Score of 4 or less) patient, the age adjusted d-dimer cutoff for this patient can be calculated as:    Age x 0 01 (in ug/mL) for Age-adjusted D-dimer exclusion threshold for a patient over 50 years  CBC and differential [[id number]]  (Abnormal) Collected: 04/23/23 2010    Lab Status: Final result Specimen: Blood from Arm, Right Updated: 04/23/23 2114     WBC 6 72 Thousand/uL      RBC 4 32 Million/uL      Hemoglobin 13 6 g/dL      Hematocrit 42 4 %      MCV 98 fL      MCH 31 5 pg      MCHC 32 1 g/dL      RDW 12 8 %      MPV 8 8 fL      Platelets 043 Thousands/uL      nRBC 0 /100 WBCs      Neutrophils Relative 60 %      Immat GRANS % 0 %      Lymphocytes Relative 24 %      Monocytes Relative 9 %      Eosinophils Relative 5 %      Basophils Relative 2 %      Neutrophils Absolute 3 96 Thousands/µL      Immature Grans Absolute 0 03 Thousand/uL      Lymphocytes Absolute 1 64 Thousands/µL      Monocytes Absolute 0 63 Thousand/µL      Eosinophils Absolute 0 34 Thousand/µL      Basophils Absolute 0 12 Thousands/µL     Blood culture [[id number]] Collected: 04/23/23 2104    Lab Status: In process Specimen: Blood from Arm, Right Updated: 04/23/23 2111    Blood culture [[id number]] Collected: 04/23/23 2104    Lab Status:  In process Specimen: Blood from Arm, Left Updated: 04/23/23 2111    POCT Blood Gas (CG8+) [[id number]]  (Abnormal) Collected: 04/23/23 "2026    Lab Status: Final result Specimen: Venous Updated: 04/23/23 2029     ph, Rupert ISTAT 7 482     pCO2, Rupert i-STAT 42 7 mm HG      pO2, Rupert i-STAT 74 0 mm HG      BE, i-STAT 8 mmol/L      HCO3, Rupert i-STAT 32 0 mmol/L      CO2, i-STAT 33 mmol/L      O2 Sat, i-STAT 96 %      SODIUM, I-STAT 139 mmol/l      Potassium, i-STAT 4 1 mmol/L      Calcium, Ionized i-STAT 1 03 mmol/L      Hct, i-STAT 41 %      Hgb, i-STAT 13 9 g/dl      Glucose, i-STAT 105 mg/dl      Specimen Type VENOUS    Blood gas, venous [[id number]] Collected: 04/23/23 2010    Lab Status: No result Specimen: Blood from Arm, Right                  XR chest 1 view portable   ED Interpretation by Alberto Chan DO (04/23 2028)   Severe emphysematous changes and diaphragm flattening                 Procedures  ECG 12 Lead Documentation Only    Date/Time: 4/23/2023 8:44 PM  Performed by: Alberto Chan DO  Authorized by: Alberto Chan DO     Indications / Diagnosis:  SOB  ECG reviewed by me, the ED Provider: yes    Patient location:  ED  Interpretation:     Interpretation: non-specific    Rate:     ECG rate:  76    ECG rate assessment: normal    Rhythm:     Rhythm: sinus rhythm    QRS:     QRS axis:  Right    QRS intervals:  Normal  ST segments:     ST segments:  Normal  Comments:      No STEMI             ED Course  ED Course as of 04/23/23 2248   Sun Apr 23, 2023 2019 Patient on BiPAP for approximately 5 minutes  Could not tolerate  Will try continuous nebs x1 hour  Respiratory therapy in room   2109 Patient much improved after 1 hour continuous nebulizer  Talking, awake and alert x4   Feels breathing has improved but still \"a bit raggid\"    2125 WBC: 6 72   2125 Hemoglobin: 13 6   2135 CO2(!): 33   2135 D-Dimer, Quant: 0 35   2136 Magnesium: 2 2   2147 Procalcitonin: <0 05   2147 hs TnI 0hr: 3   2147 Gilbert Elders [phone]  Niece in law Owen Never [phone]    Nnamdi reiterated about patient's severe neck and hip pain and to take extra " cares when transferring/moving patient  2149 FLU/RSV/COVID - if FLU/RSV clinically relevant  negative   2211 Osseous mets per PET scan:    OSSEOUS STRUCTURES:  Scattered PSMA avid osseous lesions with variable uptake  Many of the sclerotic osseous lesions do not demonstrate activity  Overall less extensive PSMA avid osseous uptake compared to prior bone scan but the mechanism of uptake is different  Compared   to CT lumbar spine study of 10/18/2022, distribution of disease in the lumbar spine region appears similar      Reference lesions as noted below:  T7 vertebral body lesion demonstrates SUV max of 3 8  T8 vertebral body demonstrates SUV max of 3 7  T11 vertebral body lesion demonstrates SUV max of 6 2  L5 lesion near the right transverse process demonstrates SUV max of 4 0  Right femoral neck lesion demonstrates SUV max 3 4    2246 Attempted to call carmel Guevara to update him regarding admission with # he provided me but automated message saying # is not in service  Attempted to call Baylee Mack, phone went directly to   2247 Discussed case with critical care [facility] AT [facility] who evaluated patient at bedside  Given clinical improvement, in agreement to admit to SLIM level 2 step down  PERC Rule for PE    Flowsheet Row Most Recent Value   PERC Rule for PE    Age >=50 1 Filed at: 04/23/2023 2225   HR >=100 0 Filed at: 04/23/2023 2225   O2 Sat on room air < 95% 1 Filed at: 04/23/2023 2225   History of PE or DVT 0 Filed at: 04/23/2023 2225   Recent trauma or surgery 0 Filed at: 04/23/2023 2225   Hemoptysis 0 Filed at: 04/23/2023 2225   Exogenous estrogen 0 Filed at: 04/23/2023 2225   Unilateral leg swelling 0 Filed at: 04/23/2023 2225   PERC Rule for PE Results 2 Filed at: 04/23/2023 2225              SBIRT 20yo+    Flowsheet Row Most Recent Value   Initial Alcohol Screen: US AUDIT-C     1  How often do you have a drink containing alcohol? 0 Filed at: 04/23/2023 2009   2   How many drinks containing alcohol do you have on a typical day you are drinking? 0 Filed at: 04/23/2023 2009   3a  Male UNDER 65: How often do you have five or more drinks on one occasion? 0 Filed at: 04/23/2023 2009   3b  FEMALE Any Age, or MALE 65+: How often do you have 4 or more drinks on one occassion? 0 Filed at: 04/23/2023 2009   Audit-C Score 0 Filed at: 04/23/2023 2009   YOUSIF: How many times in the past year have you    Used an illegal drug or used a prescription medication for non-medical reasons? Never Filed at: 04/23/2023 2009          Wells' Criteria for PE    Flowsheet Row Most Recent Value   Wells' Criteria for PE    Clinical signs and symptoms of DVT 0 Filed at: 04/23/2023 2226   PE is primary diagnosis or equally likely 0 Filed at: 04/23/2023 2226   HR >100 0 Filed at: 04/23/2023 2226   Immobilization at least 3 days or Surgery in the previous 4 weeks 0 Filed at: 04/23/2023 2226   Previous, objectively diagnosed PE or DVT 0 Filed at: 04/23/2023 2226   Hemoptysis 0 Filed at: 04/23/2023 2226   Malignancy with treatment within 6 months or palliative 1 Filed at: 04/23/2023 2226   Jeremías' Criteria Total 1 Filed at: 04/23/2023 2226                Medical Decision Making  75 yr old male pmhx of COPD, (previously on 2L NC at baseline), on daily prednisone, arthritis, hyperlipidemia, prostate cancer with metastasis to his bone followed by [facility] oncology, presenting to the ED for 1 hour of progressive shortness of breath  This patient presents with symptoms most consistent with an acute COPD exacerbation  These constellation of symptoms are similar to prior flares without overt deviations from normal exacerbations  The likely precipitant is acute respiratory infection // weather change or air quality  He is on chronic opioids for metastatic prostate CA with no changes in doses recently per PDMP   Low suspicion for alternate etiologies such as pneumothorax, acute PE   Presentation not consistent with other acute cardiopulmonary causes including ACS / CHF / cardiac effusion  Pseudomonas risk factors: severe COPD and chronic steroid use  Plan to maintain SaO2 ~90-94% with supplemental O2  Based on current presentation, including work of breathing, patient needed BiPAP on arrival  Plan for trial of duonebs, steroids  Antibiotics indicated  Will evaluate for other acute cardiopulmonary processes with a CXR  Anticipate hospitalization given marked increase in symptoms // significant co-morbidities and age    Plan: supplemental O2 (goal SaO2 ~90-94%), NIPPV, duonebs, steroids, antibiotics, CXR, serial reassessment      Amount and/or Complexity of Data Reviewed  Independent Historian: caregiver     Details: nephgraciela Ledbetter   External Data Reviewed: notes  Labs: ordered  Decision-making details documented in ED Course  Radiology: ordered and independent interpretation performed  Decision-making details documented in ED Course  ECG/medicine tests:  Decision-making details documented in ED Course  Risk  Prescription drug management  Decision regarding hospitalization  Disposition  Final diagnoses:   COPD exacerbation ([facility] [address] )   SOB (shortness of breath)     Time reflects when diagnosis was documented in both MDM as applicable and the Disposition within this note     Time User Action Codes Description Comment    4/23/2023 10:10 PM Art Boyer Add [J44 1] COPD exacerbation ([facility] [address] )     4/23/2023 10:10 PM Willy Butt Add [R06 02] SOB (shortness of breath)       ED Disposition     ED Disposition   Admit    Condition   Stable    Date/Time   Sun Apr 23, 2023 10:43 PM    Comment   Case was discussed with José Miguel Esparza and the patient's admission status was agreed to be Admission Status: inpatient status to the service of Dr Julia Adams   Follow-up Information    None         Patient's Medications   Discharge Prescriptions    No medications on file       No discharge procedures on file      PDMP Review       Value Time User    PDMP Reviewed  Yes 4/23/2023  9:09 PM Franklin Lopez DO          ED Provider  Electronically Signed by Andrew Ackerman DO  04/23/23 [id number]

## 2023-04-24 NOTE — CASE MANAGEMENT
Case Management Assessment & Discharge Planning Note    Patient name Venancio Gomes  Location  SDU/-01 S* MRN [id number]  : 1955 Date 2023       Current Admission Date: 2023  Current Admission Diagnosis:Chronic obstructive pulmonary disease with acute exacerbation [facility])   Patient Active Problem List    Diagnosis Date Noted   • Acute respiratory insufficiency 2023   • Opioid dependence ([address] )    • Metabolic encephalopathy    • Neoplasm related pain 2023   • Medical marijuana use 2023   • Encephalopathy acute 2022   • Anorexia 2022   • Cachexia ([address] ) 2022   • Palliative care patient 2022   • Prostate cancer metastatic to bone ([facility] ) 2022   • Insomnia 2022   • Hot flashes 2022   • Left lower quadrant abdominal pain 2021   • Personal history of colonic polyps 2021   • HLD (hyperlipidemia) 2020   • Acute respiratory failure with hypoxia ([address] ) 2020   • Severe protein-calorie malnutrition ([address] ) 2020   • Chronic obstructive pulmonary disease with acute exacerbation ([address] ) 2020   • Acute tracheobronchitis 2020      LOS (days): 1  Geometric Mean LOS (GMLOS) (days): 3 40  Days to GMLOS:2 8     OBJECTIVE:    Risk of Unplanned Readmission Score: 21 56     Current admission status: Inpatient    Preferred Pharmacy:   [id number] [place] [address] [place] [address]  Phone: [phone] Fax: [phone] [facility], [facility] [address] [place] [address] 200  [address] [place] [address]  Phone: [phone] Fax: [phone]    Primary Care Provider: IAS Underwood    Primary Insurance: MEDICARE  Secondary Insurance:     ASSESSMENT:  Active Health Care Proxies     [address] Representative - Sister   Primary Phone: [phone] (Mobile)               Advance Directives  Does patient have a [address] [place] [address] Avenue?: No  Does patient currently have a [facility] decision maker?: Yes, please see Health Care Proxy section  Primary Contact: Rigo Lopes: sister: [phone]    Readmission Root Cause  30 Day Readmission: No    Patient Information  Admitted from[de-identified] Home  Mental Status: Alert  During Assessment patient was accompanied by: Not accompanied during assessment  Assessment information provided by[de-identified] Patient  Primary Caregiver: Self  Support Systems: Family members  [place] of Residence: [address] [place] [address] do you live in?: [facility] Financial entry access options  Select all that apply : No steps to enter home  Type of Current Residence: [facility]  In the last 12 months, was there a time when you were not able to pay the mortgage or rent on time?: No  In the last 12 months, how many places have you lived?: 1  In the last 12 months, was there a time when you did not have a steady place to sleep or slept in a shelter (including now)?: No  Homeless/housing insecurity resource given?: N/A  Living Arrangements: Lives Alone  Is patient a ?: No    Activities of Daily Living Prior to Admission  Functional Status: Independent  Completes ADLs independently?: Yes  Ambulates independently?: Yes  Does patient use assisted devices?: No  Does patient currently own DME?: Yes  What DME does the patient currently own?: Nebulizer, Other (Comment) (rescue inhalers)  Does patient have a history of Outpatient Therapy (PT/OT)?: No  Does the patient have a history of Short-Term Rehab?: No  Does patient have a history of C?: No  Does patient currently have [facility]?: No    Patient Information Continued  Income Source: Pension/FDC  Does patient have prescription coverage?: Yes  Within the past 12 months, you worried that your food would run out before you got the money to buy more : Never true  Within the past 12 months, the food you bought just didn't last and you didn't have money to get more  Venda Robes Never true  Food insecurity resource given?: N/A  Does patient receive dialysis treatments?: No  Does patient have a history of substance abuse?: No  Does patient have a history of Mental Health Diagnosis?: No    Means of Transportation  Means of Transport to Appts[de-identified] Drives Self  In the past 12 months, has lack of transportation kept you from medical appointments or from getting medications?: No  In the past 12 months, has lack of transportation kept you from meetings, work, or from getting things needed for daily living?: No  Was application for public transport provided?: N/A    DISCHARGE DETAILS:    Discharge planning discussed with[de-identified] patient  Freedom of Choice: Yes  Comments - Freedom of Choice: Pt declines home care  Reports will be moving in with his nephew and niece-in-law in one month  CM contacted family/caregiver?: No- see comments (Pt presents AA&Ox3 )    Additional Comments: Met with Pt  Pt presents AA&Ox3  Discussed role of case management  Pt reports he lives alone in 1sh, no julio c  Independent PTA  Drives locally  Pt reports his nephew and niece-in-law assist with grocery shopping, meals  Pt reports he will be moving in with his nephew and niece-in-law in one month  Has nebulizer and rescue inhalers  Denies hx of VNA/SNF/MH/D&A  Pt reports he plans to return home and declines discharge needs at this time  CM to follow for home oxygen needs

## 2023-04-24 NOTE — MALNUTRITION/BMI
This medical record reflects one or more clinical indicators suggestive of malnutrition and/or morbid obesity  Malnutrition Findings:   Adult Malnutrition type: Chronic illness  Adult Degree of Malnutrition: Other severe protein calorie malnutrition  Malnutrition Characteristics: Fat loss, Muscle loss, Inadequate energy, Weight loss                  360 Statement: Pt presents with severe protein calorie malnutrition as evidenced by prominent clavicle bone protrusion, sunken orbitals, b/l temporal scooping, <75% estimated needs compared to estimated needs and BMI 16 48  Treat with Hi Calorie Hi Protein Diet, Suggested PO supplements TID-QID, pt declining all commercial supplements  Encouraged to bring in supplements from home if pt will consume  BMI Findings:  Adult BMI Classifications: Underweight < 18 5        Body mass index is 16 48 kg/m²  See Nutrition note dated 4/24/23 for additional details  Completed nutrition assessment is viewable in the nutrition documentation

## 2023-04-24 NOTE — ASSESSMENT & PLAN NOTE
Acute respiratory failure with hypoxia, POA, due to COPD exacerbation as evidenced by tachypnea, use of accessory muscles, increased WOB, tight lung sounds and somnolence, treated with Bipap, 1Hr UDN x 2, Solumedrol 125mg and continuous cardiopulmonary monitoring    ·

## 2023-04-24 NOTE — CONSULTS
"Pulmonary Consultation   Saturnino Reardon 75 y o  male MRN: [id number]  Unit/Bed#: -01 SDU Encounter: [id number]      Reason for consultation: COPD with AE    Requesting physician: Dash Vazquez    Impressions/Plans:    1  Acute hypoxic respiratory failure secondary to COPD/emphysema with acute exacerbation  Likely exacerbated by inhalation of wood dust while cleaning his house  No obvious infectious etiology  · I placed on room air -likely will not require supplemental oxygen at discharge  · Goal sats greater than 88%  · Discontinue Solu-Medrol  Start prednisone taper 40 mg daily, first dose this afternoon  Can taper by 10 mg every 3-4 days  · Continue Pulmicort twice daily, Xopenex/Atrovent 3 times daily while in the hospital   Discharge home on Symbicort twice daily, albuterol MDI/neb as needed    2  Severe protein calorie malnutrition  · Encourage good p o  intake    3  Metastatic prostate CA with mets to bone  · Appreciate palliative care input  · Outpatient oncology follow-up    If breathing is stable in a   4/25, can discharge home from a pulmonary standpoint on above regimen  History of Present Illness   HPI:  Saturnino Reardon is a 75 y o  male who presented to the emergency room with his nephew after couple days of worsening shortness of breath  He states that he is moving, and has been cleaning up a lot around his house  He thinks he got \"a lung full of wood dust \"  He had been having some coughing and shortness of breath that acutely worsened  He stated that his Symbicort and albuterol did not seem to help him  At that time he presented to the emergency room  He was intolerant to BiPAP that was tried in the emergency room  He did respond quickly to nebulizer treatments and Solu-Medrol  He has COPD/emphysema but does not follow with an outpatient pulmonologist   He uses Symbicort twice daily and albuterol MDI as needed    He quit smoking 10 years ago with a " 30-40-pack-year smoking history  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  He has some mild dyspnea on exertion at baseline without significant cough, wheezing or chest pain  He has had some ongoing weight loss and some pain related to metastatic prostate cancer  He gets occasional indigestion  Review of systems:  12 point review of systems was completed and was otherwise negative except as listed in HPI        Historical Information   Past Medical History:   Diagnosis Date   • Bone cancer ([facility] )    • Colon polyp    • COPD (chronic obstructive pulmonary disease) ([facility] )    • Emphysema lung ([facility] )    • Hyperlipidemia    • Prostate cancer [facility])      Past Surgical History:   Procedure Laterality Date   • APPENDECTOMY     • COLONOSCOPY     • IR BIOPSY BONE  2021   • JOINT REPLACEMENT     • UPPER GASTROINTESTINAL ENDOSCOPY       Family History   Problem Relation Age of Onset   • Lung cancer Mother    • Breast cancer Sister    • Colon polyps Brother    • Colon cancer Neg Hx        Occupational History: Retired manufacturing plant    Social History:  Social History     Tobacco Use   Smoking Status Former   • Packs/day: 1 00   • Years: 41 00   • Pack years: 41 00   • Types: Cigarettes   • Quit date:    • Years since quittin 3   Smokeless Tobacco Never     Social History     Substance and Sexual Activity   Alcohol Use Never     Social History     Substance and Sexual Activity   Drug Use Never     Marital Status: Single    Meds/Allergies   Current Facility-Administered Medications   Medication Dose Route Frequency   • abiraterone (ZYTIGA) tablet 1,000 mg  1,000 mg Oral Daily   • acetaminophen (TYLENOL) tablet 650 mg  650 mg Oral Q6H PRN   • ALPRAZolam (XANAX) tablet 0 5 mg  0 5 mg Oral BID PRN   • aluminum-magnesium hydroxide-simethicone (MYLANTA) oral suspension 30 mL  30 mL Oral Q6H PRN   • benzonatate (TESSALON PERLES) capsule 100 mg  100 mg Oral TID   • "benzonatate (TESSALON PERLES) capsule 100 mg  100 mg Oral TID PRN   • budesonide (PULMICORT) inhalation solution 0 5 mg  0 5 mg Nebulization Q12H   • clonazePAM (KlonoPIN) tablet 0 25 mg  0 25 mg Oral HS PRN   • enoxaparin (LOVENOX) subcutaneous injection 30 mg  30 mg Subcutaneous Daily   • formoterol (PERFOROMIST) nebulizer solution 20 mcg  20 mcg Nebulization Q12H   • gabapentin (NEURONTIN) capsule 300 mg  300 mg Oral HS   • guaiFENesin (MUCINEX) 12 hr tablet 600 mg  600 mg Oral BID   • ipratropium (ATROVENT) 0 02 % inhalation solution 0 5 mg  0 5 mg Nebulization TID   • ipratropium-albuterol (DUO-NEB) 0 5-2 5 mg/3 mL inhalation solution 3 mL  3 mL Nebulization Q6H PRN   • levalbuterol (XOPENEX) inhalation solution 1 25 mg  1 25 mg Nebulization TID   • lidocaine (LIDODERM) 5 % patch 1 patch  1 patch Topical Daily   • lidocaine (LIDODERM) 5 % patch 1 patch  1 patch Topical Daily   • methylPREDNISolone sodium succinate (Solu-MEDROL) injection 40 mg  40 mg Intravenous Q8H   • montelukast (SINGULAIR) tablet 10 mg  10 mg Oral HS   • morphine (MS CONTIN) ER tablet 15 mg  15 mg Oral Q8H [address]   • morphine (MSIR) IR tablet 7 5 mg  7 5 mg Oral Q6H PRN     Medications Prior to Admission   Medication   • abiraterone (Zytiga) 250 mg tablet   • ALPRAZolam (XANAX) 0 5 mg tablet   • budesonide-formoterol (SYMBICORT) 160-4 5 mcg/act inhaler   • gabapentin (NEURONTIN) 300 mg capsule   • montelukast (SINGULAIR) 10 mg tablet   • morphine (MS CONTIN) 15 mg 12 hr tablet   • predniSONE 1 mg tablet   • albuterol (PROVENTIL HFA,VENTOLIN HFA) 90 mcg/act inhaler   • atorvastatin (LIPITOR) 40 mg tablet   • lidocaine (LIDODERM) 5 %   • naloxone (NARCAN) 4 mg/0 1 mL nasal spray   • NON FORMULARY   • senna (SENOKOT) 8 6 mg   • Symbicort 160-4 5 MCG/ACT inhaler     No Known Allergies    Vitals: Blood pressure 152/84, pulse 87, temperature 98 2 °F (36 8 °C), temperature source Oral, resp   rate 17, height 5' 6\" (1 676 m), weight 46 3 kg (102 lb 1 2 " oz), SpO2 94 %  , 2L, Body mass index is 16 48 kg/m²        Intake/Output Summary (Last 24 hours) at 4/24/2023 1149  Last data filed at 4/24/2023 1054  Gross per 24 hour   Intake --   Output 1050 ml   Net -1050 ml       Physical exam:    General Appearance:    Alert, cooperative, no conversational dyspnea or accessory     muscle use       Head/eyes:    Normocephalic, without obvious abnormality, atraumatic,         PERRL, sclera non-injected, nonicteric    Nose:   Nares normal, mucosa normal, no drainage or sinus tenderness   Mouth:   Moist mucous membranes, no thrush, normal dentition   Neck:   Supple, trachea midline, no adenopathy; JVD not elevated   Lungs:    Diminished breath sounds bilateral without wheeze, rales or rhonchi   Chest Wall:    No tenderness or deformity    Heart:    Regular rate and rhythm, S1 and S2 normal, no murmur, rub   or gallop   Abdomen:     Soft, non-tender, bowel sounds active all four quadrants,     no masses, no organomegaly   Extremities:   Extremities normal, atraumatic, no cyanosis or edema   Skin:   Warm, dry, turgor normal, no rashes or lesions   Lymph nodes:   No Cervical or supraclavicular lymphadenopathy   Neurologic:   CNII-XII grossly intact, normal strength, non-focal         Labs:   BNP: No results found for: BNP, CBC:   Lab Results   Component Value Date    WBC 4 24 (L) 04/24/2023    HGB 13 3 04/24/2023    HCT 39 3 04/24/2023    MCV 97 04/24/2023     (H) 04/24/2023    MCH 32 7 04/24/2023    MCHC 33 8 04/24/2023    RDW 13 0 04/24/2023    MPV 8 4 (L) 04/24/2023    NRBC 0 04/23/2023   , CMP:   Lab Results   Component Value Date    SODIUM 138 04/24/2023    K 4 2 04/24/2023     04/24/2023    CO2 29 04/24/2023    CO2 33 (H) 04/23/2023    BUN 13 04/24/2023    CREATININE 0 45 (L) 04/24/2023    GLUCOSE 105 04/23/2023    CALCIUM 8 5 04/24/2023    AST 16 04/23/2023    ALT 13 04/23/2023    ALKPHOS 66 04/23/2023    EGFR 116 04/24/2023       Imaging and other studies: I have "personally reviewed pertinent reports  and I have personally reviewed pertinent films in PACS  CXr - emphysema, no focal infiltrate    Pulmonary function testing: None available    Code Status: Level 3 - DNAR and DNI      Evelin Rubio DO      Portions of the record may have been created with voice recognition software  Occasional wrong word or \"sound a like\" substitutions may have occurred due to the inherent limitations of voice recognition software  Read the chart carefully and recognize, using context, where substitutions have occurred      "

## 2023-04-24 NOTE — ASSESSMENT & PLAN NOTE
· Sudden onset dyspnea 1 hour prior to arrival to the ED unrelieved by home inhaler  · Suspected etiology is recent use of home pellet stove with dust exposure  · Placed on BiPAP on arrival to the ED, however patient could not tolerate due to claustrophobia so he was given ALCAZAR neb x2 and Solu-Medrol 125 mg with improvement   · Initially on room air on admission, however after coughing fit required 0 5 L as SPO2 was 87% on RA  · Home regimen: Symbicort BID -not established with pulmonology outpatient, no prior PFTs  · Xopenex and Atrovent TID  · Pulmicort and Performist BID  · Solu-Medrol 40 mg every 8 hours  · Significant dry cough with resultant coughing fits -scheduled Tessalon Perles with as needed TID   · Pulmonology consult  · Admitted to SD2 due to severity on presentation - would downgrade once able

## 2023-04-24 NOTE — PLAN OF CARE
Problem: Potential for Falls  Goal: Patient will remain free of falls  Description: INTERVENTIONS:  - Educate patient/family on patient safety including physical limitations  - Instruct patient to call for assistance with activity   - Consult OT/PT to assist with strengthening/mobility   - Keep Call bell within reach  - Keep bed low and locked with side rails adjusted as appropriate  - Keep care items and personal belongings within reach  - Initiate and maintain comfort rounds  - Make Fall Risk Sign visible to staff  - Offer Toileting every 2 Hours, in advance of need  - Initiate/Maintain bed alarm  - Apply yellow socks and bracelet for high fall risk patients  - Consider moving patient to room near nurses station  Outcome: Progressing     Problem: Nutrition/Hydration-ADULT  Goal: Nutrient/Hydration intake appropriate for improving, restoring or maintaining nutritional needs  Description: Monitor and assess patient's nutrition/hydration status for malnutrition  Collaborate with interdisciplinary team and initiate plan and interventions as ordered  Monitor patient's weight and dietary intake as ordered or per policy  Utilize nutrition screening tool and intervene as necessary  Determine patient's food preferences and provide high-protein, high-caloric foods as appropriate       INTERVENTIONS:  - Monitor oral intake, urinary output, labs, and treatment plans  - Assess nutrition and hydration status and recommend course of action  - Evaluate amount of meals eaten  - Assist patient with eating if necessary   - Allow adequate time for meals  - Recommend/ encourage appropriate diets, oral nutritional supplements, and vitamin/mineral supplements  - Order, calculate, and assess calorie counts as needed  - Recommend, monitor, and adjust tube feedings and TPN/PPN based on assessed needs  - Assess need for intravenous fluids  - Provide specific nutrition/hydration education as appropriate  - Include patient/family/caregiver in decisions related to nutrition  Outcome: Progressing     Problem: MOBILITY - ADULT  Goal: Maintain or return to baseline ADL function  Description: INTERVENTIONS:  -  Assess patient's ability to carry out ADLs; assess patient's baseline for ADL function and identify physical deficits which impact ability to perform ADLs (bathing, care of mouth/teeth, toileting, grooming, dressing, etc )  - Assess/evaluate cause of self-care deficits   - Assess range of motion  - Assess patient's mobility; develop plan if impaired  - Assess patient's need for assistive devices and provide as appropriate  - Encourage maximum independence but intervene and supervise when necessary  - Involve family in performance of ADLs  - Assess for home care needs following discharge   - Consider OT consult to assist with ADL evaluation and planning for discharge  - Provide patient education as appropriate  Outcome: Progressing  Goal: Maintains/Returns to pre admission functional level  Description: INTERVENTIONS:  - Perform BMAT or MOVE assessment daily    - Set and communicate daily mobility goal to care team and patient/family/caregiver  - Collaborate with rehabilitation services on mobility goals if consulted  - Perform Range of Motion 2 times a day  - Reposition patient every 2 hours    - Dangle patient 2 times a day  - Stand patient 2 times a day  - Ambulate patient 2 times a day  - Out of bed to chair 2 times a day   - Out of bed for meals 2 times a day  - Out of bed for toileting  - Record patient progress and toleration of activity level   Outcome: Progressing     Problem: Prexisting or High Potential for Compromised Skin Integrity  Goal: Skin integrity is maintained or improved  Description: INTERVENTIONS:  - Identify patients at risk for skin breakdown  - Assess and monitor skin integrity  - Assess and monitor nutrition and hydration status  - Monitor labs   - Assess for incontinence   - Turn and reposition patient  - Assist with mobility/ambulation  - Relieve pressure over bony prominences  - Avoid friction and shearing  - Provide appropriate hygiene as needed including keeping skin clean and dry  - Evaluate need for skin moisturizer/barrier cream  - Collaborate with interdisciplinary team   - Patient/family teaching  - Consider wound care consult   Outcome: Progressing     Problem: PAIN - ADULT  Goal: Verbalizes/displays adequate comfort level or baseline comfort level  Description: Interventions:  - Encourage patient to monitor pain and request assistance  - Assess pain using appropriate pain scale  - Administer analgesics based on type and severity of pain and evaluate response  - Implement non-pharmacological measures as appropriate and evaluate response  - Consider cultural and social influences on pain and pain management  - Notify physician/advanced practitioner if interventions unsuccessful or patient reports new pain  Outcome: Progressing     Problem: RESPIRATORY - ADULT  Goal: Achieves optimal ventilation and oxygenation  Description: INTERVENTIONS:  - Assess for changes in respiratory status  - Assess for changes in mentation and behavior  - Position to facilitate oxygenation and minimize respiratory effort  - Oxygen administered by appropriate delivery if ordered  - Initiate smoking cessation education as indicated  - Encourage broncho-pulmonary hygiene including cough, deep breathe, Incentive Spirometry  - Assess the need for suctioning and aspirate as needed  - Assess and instruct to report SOB or any respiratory difficulty  - Respiratory Therapy support as indicated  Outcome: Progressing

## 2023-04-24 NOTE — ASSESSMENT & PLAN NOTE
· Maintained on Zytiga 4000 mg daily  · Utilizes prednisone 1 mg daily to help with hot flashes-prednisone held in setting of IV steroids  · Continue Zytiga

## 2023-04-24 NOTE — PROGRESS NOTES
[place]  Progress Note  Name: Osiel Moses  MRN: [id number]  Unit/Bed#: -01 SDU I Date of Admission: 4/23/2023   Date of Service: 4/24/2023 I Hospital Day: 1    Assessment/Plan   Metabolic encephalopathy  Assessment & Plan  Metabolic encephalopathy due to acute respiratory failure as evidenced by hypoxia and somnolence, treated with Bipap, telemetry and neuro assessments  Resolved  Opioid dependence (HCC)  Assessment & Plan  · Utilizes morphine ER 15 mg every 12 hours at home  · PDMP reviewed  · Continue  · Palliative consulted, as cancer related pain not controlled on this regimen    Acute respiratory insufficiency  Assessment & Plan   Acute respiratory failure with hypoxia, POA, due to COPD exacerbation as evidenced by tachypnea, use of accessory muscles, increased WOB, tight lung sounds and somnolence, treated with Bipap, 1Hr UDN x 2, Solumedrol 125mg and continuous cardiopulmonary monitoring  ·     Neoplasm related pain  Assessment & Plan  · Follows with palliative care  · Home regimen: Morphine ER 15 Mg twice daily  · Palliative care consulted    Cachexia [facility])  Assessment & Plan  Malnutrition Findings:                        · Secondary to severe emphysema and prostate cancer with metastasis to the bone  · Nutrition consult         BMI Findings: Body mass index is 16 48 kg/m²  Prostate cancer metastatic to bone [facility])  Assessment & Plan  · Maintained on Zytiga 4000 mg daily  · Utilizes prednisone 1 mg daily to help with hot flashes-prednisone held in setting of IV steroids  · Continue Zytiga    Severe protein-calorie malnutrition ([address] )  Assessment & Plan  Malnutrition Findings:                          Severe pulmonary cachexia  Nutrition consulted       BMI Findings: Body mass index is 16 48 kg/m²         * Chronic obstructive pulmonary disease with acute exacerbation (HCC)  Assessment & Plan  · Sudden onset dyspnea 1 hour prior to arrival to the ED unrelieved by home inhaler  · Suspected etiology is recent use of home pellet stove with dust exposure  · Placed on BiPAP on arrival to the ED, however patient could not tolerate due to claustrophobia so he was given ALCAZAR neb x2 and Solu-Medrol 125 mg with improvement   · Initially on room air on admission, however after coughing fit required 0 5 L as SPO2 was 87% on RA  · Home regimen: Symbicort BID -not established with pulmonology outpatient, no prior PFTs  · Xopenex and Atrovent TID  · Pulmicort and Performist BID  · Solu-Medrol 40 mg every 8 hours  · Significant dry cough with resultant coughing fits -scheduled Tessalon Perles with as needed TID   · Pulmonology consult  · Admitted to SD2 due to severity on presentation - would downgrade once able               VTE  Prophylaxis:   Pharmacologic: in place  Mechanical VTE Prophylaxis in Place: Yes    Patient Centered Rounds: I have performed bedside rounds with nursing staff today  Discussions with Specialists or Other Care Team Provider: case management    Education and Discussions with Family / Patient: pt      Current Length of Stay: 1 day(s)    Current Patient Status: Inpatient        Code Status: Level 3 - DNAR and DNI    Discharge Plan: Pt will require continued inpatient hospitalization  Subjective:   Pt feels markedly better doing ok on o2   States less sob, improving    Patient is seen and examined at bedside  All other ROS are negative  Objective:     Vitals:   Temp (24hrs), Av 2 °F (36 8 °C), Min:97 8 °F (36 6 °C), Max:98 5 °F (36 9 °C)    Temp:  [97 8 °F (36 6 °C)-98 5 °F (36 9 °C)] 98 2 °F (36 8 °C)  HR:  [] 87  Resp:  [14-33] 17  BP: (132-175)/() 152/84  SpO2:  [87 %-99 %] 90 %  Body mass index is 16 48 kg/m²  Input and Output Summary (last 24 hours):        Intake/Output Summary (Last 24 hours) at 2023 1334  Last data filed at 2023 1054  Gross per 24 hour   Intake 180 ml   Output 1050 ml   Net -870 ml       Physical Exam:       GEN: No acute distress, comfortable on o2, frail appearing  HEEENT: No JVD, PERRLA, no scleral icterus  RESP: Lungs diminshed, occasional wheezes  CV: RRR, +s1/s2   ABD: SOFT NON TENDER, POSITIVE BOWEL SOUNDS, NO DISTENTION  PSYCH: CALM  NEURO: Mentation baseline, NO FOCAL DEFICITS  SKIN: NO RASH  EXTREM: NO EDEMA    Additional Data:     Labs:    Results from last 7 days   Lab Units 04/24/23 0548 04/23/23 2026 04/23/23 2010   WBC Thousand/uL 4 24*  --  6 72   HEMOGLOBIN g/dL 13 3  --  13 6   I STAT HEMOGLOBIN   --    < >  --    HEMATOCRIT % 39 3  --  42 4   HEMATOCRIT, ISTAT   --    < >  --    PLATELETS Thousands/uL 425*  --  453*   NEUTROS PCT %  --   --  60   LYMPHS PCT %  --   --  24   MONOS PCT %  --   --  9   EOS PCT %  --   --  5    < > = values in this interval not displayed  Results from last 7 days   Lab Units 04/24/23 0548 04/23/23 2026 04/23/23 2010   SODIUM mmol/L 138  --  141   POTASSIUM mmol/L 4 2  --  3 9   CHLORIDE mmol/L 103  --  102   CO2 mmol/L 29  --  33*   CO2, I-STAT   --    < >  --    BUN mg/dL 13  --  16   CREATININE mg/dL 0 45*  --  0 53*   ANION GAP mmol/L 6  --  6   CALCIUM mg/dL 8 5  --  9 2   ALBUMIN g/dL  --   --  3 9   TOTAL BILIRUBIN mg/dL  --   --  0 36   ALK PHOS U/L  --   --  66   ALT U/L  --   --  13   AST U/L  --   --  16   GLUCOSE RANDOM mg/dL 163*  --  99    < > = values in this interval not displayed                   Results from last 7 days   Lab Units 04/24/23 0548 04/23/23 2010   PROCALCITONIN ng/ml <0 05 <0 05       Lines/Drains:  Invasive Devices     Peripheral Intravenous Line  Duration           Peripheral IV 04/23/23 Left;Ventral (anterior) Forearm <1 day    Peripheral IV 04/23/23 Right;Upper;Ventral (anterior) Arm <1 day                Telemetry:   Telemetry Orders (From admission, onward)             24 Hour Telemetry Monitoring  Continuous x 24 Hours ([place])        References:    Telemetry Guidelines   Question: Reason for 24 Hour Telemetry  Answer:  Metabolic/Electrolyte Disturbance with High Probability of Dysrhythmia (K level <3 or >6, or KCL infusion >10mEq/hr)  Comment:  copd exacerbation                    * I Have Reviewed All Lab Data Listed Above  Imaging:     Results for orders placed during the hospital encounter of 04/23/23    XR chest 1 view portable    Narrative  CHEST    INDICATION:   SOB     COMPARISON:  12/20/2022    EXAM PERFORMED/VIEWS:  XR CHEST PORTABLE  Images: 2    FINDINGS:    Cardiomediastinal silhouette appears unremarkable  The lungs are clear  The patient has severe emphysema  No pneumothorax or pleural effusion  Scattered blastic osseous metastasis  Impression  No acute cardiopulmonary disease  Emphysema  Workstation performed: [id number]    Results for orders placed during the hospital encounter of 02/18/20    XR chest 2 views    Narrative  CHEST    INDICATION:   Chest Pain  COMPARISON:  None    EXAM PERFORMED/VIEWS:  XR CHEST PA & LATERAL      FINDINGS:    Cardiomediastinal silhouette appears unremarkable  Emphysematous changes are noted consistent with chronic obstructive pulmonary disease  No airspace opacity to suggest focal pneumonia  No pneumothorax or pleural effusion  Osseous structures appear within normal limits for patient age  Impression  Emphysematous changes are noted  No focal consolidation, pleural effusion, or pneumothorax  Workstation performed: [id number]      *I have reviewed all imaging reports listed above      Recent Cultures (last 7 days):     Results from last 7 days   Lab Units 04/23/23 2104   BLOOD CULTURE  Received in Microbiology Lab  Culture in Progress  Received in Microbiology Lab  Culture in Progress         Last 24 Hours Medication List:   Current Facility-Administered Medications   Medication Dose Route Frequency Provider Last Rate   • abiraterone  1,000 mg Oral Daily Xavier DEBI Macias     • acetaminophen  650 mg Oral Q6H PRN Jarome Licpam, PA-C     • ALPRAZolam  0 5 mg Oral BID PRN Cirilo Scales, PA-C     • aluminum-magnesium hydroxide-simethicone  30 mL Oral Q6H PRN Jarome Licpam, PA-C     • benzonatate  100 mg Oral TID Jarome Licpam, PA-C     • benzonatate  100 mg Oral TID PRN Jarome Lick, PA-C     • budesonide  0 5 mg Nebulization Q12H Jarome Licpam, PA-C     • clonazePAM  0 25 mg Oral HS PRN Cirilo Miles Scales, PA-C     • enoxaparin  30 mg Subcutaneous Daily Jarome Licpam, PA-C     • formoterol  20 mcg Nebulization Q12H Jarome Licpam, DEBI     • gabapentin  300 mg Oral HS Jarome Licpam, PA-C     • guaiFENesin  600 mg Oral BID Jarome Licpam, PA-C     • ipratropium  0 5 mg Nebulization TID Jarome Licpam, DEBI     • ipratropium-albuterol  3 mL Nebulization Q6H PRN ISA Balderas     • levalbuterol  1 25 mg Nebulization TID Jarome Licpam, PA-C     • lidocaine  1 patch Topical Daily ISA Mercedes     • lidocaine  1 patch Topical Daily Jarome LicDEBI orozco     • montelukast  10 mg Oral HS Jarome Licpam, DEBI     • morphine  15 mg Oral Q8H [address] [place], PA-C     • morphine  7 5 mg Oral Q6H PRN Haley Aguilera PA-C          Today, Patient Was Seen By: Juno Mckeon MD    ** Please Note: Dictation voice to text software may have been used in the creation of this document   **

## 2023-04-24 NOTE — ASSESSMENT & PLAN NOTE
· Utilizes morphine ER 15 mg every 12 hours at home  · PDMP reviewed  · Continue  · Palliative consulted, as cancer related pain not controlled on this regimen

## 2023-04-24 NOTE — ASSESSMENT & PLAN NOTE
Malnutrition Findings:                          Severe pulmonary cachexia  Nutrition consulted       BMI Findings: Body mass index is 16 48 kg/m²

## 2023-04-24 NOTE — ASSESSMENT & PLAN NOTE
Malnutrition Findings:                        · Secondary to severe emphysema and prostate cancer with metastasis to the bone  · Nutrition consult         BMI Findings: Body mass index is 16 23 kg/m²

## 2023-04-24 NOTE — ASSESSMENT & PLAN NOTE
· Sudden onset dyspnea 1 hour prior to arrival to the ED unrelieved by home inhaler  · Suspected etiology is recent use of home pellet stove with dust exposure  · Placed on BiPAP on arrival to the ED, however patient could not tolerate due to claustrophobia so he was given ALCAZAR neb x2 and Solu-Medrol 125 mg with improvement   · Initially on room air on admission, however after coughing fit required 0 5 L as SPO2 was 87% on RA  · Home regimen: Symbicort BID -not established with pulmonology outpatient, no prior PFTs  · Xopenex and Atrovent TID  · Pulmicort and Performist BID  · Solu-Medrol 40 mg every 8 hours  · Significant dry cough with resultant coughing fits -scheduled Tessalon Perles with as needed TID   · Pulmonology consult  · Admitted to SD2 due to severity on presentation - would downgrade once able No

## 2023-04-24 NOTE — NUTRITION
Visited duet to MD consult for pulmonary cachexia, wt loss, poor po and BMI 16 48 triggers  Pt presents with acute hypoxic respiratory failure due to COPD/emphsema with acute exacerbation, severe protein calorie malnutrition, metastatic prostate cancer with mets to bone  Peports he has not been eating well due to hot flashes from the cancer medications  Eats all foods put in front of him (pizza, pasta, burgers etc)  Staying away from all sugars due to cancer  Drinks no added sugar cranberry juice and pumpkin spice powder supplement that he mixes with water and reports that it is 1000 kcal -admits it tastes terrible  Will eat applesauce  Takes a calcium supplement and try to drink 80 oz water daily  Recommendation/Interventions:  Suggested po supplements TID-QID  Pt not interested in any commercial grade products (Ensure MAX, Glucerna, Ensure High Protein, Ensure Plant Based  Ensure Pudding, Ensure Clears, Magic Cup Ice Cream)  Reviewed need for calories to help stop or decrease amount of wt loss, reviewed with pt that grains are complex sugars and fruits have a natural sugar component  Reviewed Hi Calorie-Hi Protein diet and methods of increasing calories  Encouraged to bring in home supplement if he will consume

## 2023-04-25 VITALS
DIASTOLIC BLOOD PRESSURE: 73 MMHG | OXYGEN SATURATION: 90 % | WEIGHT: 98.99 LBS | SYSTOLIC BLOOD PRESSURE: 135 MMHG | HEIGHT: 66 IN | RESPIRATION RATE: 20 BRPM | BODY MASS INDEX: 15.91 KG/M2 | HEART RATE: 66 BPM | TEMPERATURE: 98.1 F

## 2023-04-25 PROBLEM — R78.81 POSITIVE BLOOD CULTURE: Status: ACTIVE | Noted: 2023-04-25

## 2023-04-25 PROBLEM — G93.41 METABOLIC ENCEPHALOPATHY: Status: RESOLVED | Noted: 2023-04-24 | Resolved: 2023-04-25

## 2023-04-25 LAB
ALBUMIN SERPL BCP-MCNC: 3.4 G/DL (ref 3.5–5)
ALP SERPL-CCNC: 40 U/L (ref 34–104)
ALT SERPL W P-5'-P-CCNC: 12 U/L (ref 7–52)
ANION GAP SERPL CALCULATED.3IONS-SCNC: 1 MMOL/L (ref 4–13)
AST SERPL W P-5'-P-CCNC: 17 U/L (ref 13–39)
BASOPHILS # BLD AUTO: 0.06 THOUSANDS/ΜL (ref 0–0.1)
BASOPHILS NFR BLD AUTO: 1 % (ref 0–1)
BILIRUB SERPL-MCNC: 0.53 MG/DL (ref 0.2–1)
BUN SERPL-MCNC: 16 MG/DL (ref 5–25)
CALCIUM ALBUM COR SERPL-MCNC: 8.8 MG/DL (ref 8.3–10.1)
CALCIUM SERPL-MCNC: 8.3 MG/DL (ref 8.4–10.2)
CHLORIDE SERPL-SCNC: 104 MMOL/L (ref 96–108)
CO2 SERPL-SCNC: 34 MMOL/L (ref 21–32)
CREAT SERPL-MCNC: 0.53 MG/DL (ref 0.6–1.3)
EOSINOPHIL # BLD AUTO: 0.11 THOUSAND/ΜL (ref 0–0.61)
EOSINOPHIL NFR BLD AUTO: 2 % (ref 0–6)
ERYTHROCYTE [DISTWIDTH] IN BLOOD BY AUTOMATED COUNT: 13.2 % (ref 11.6–15.1)
GFR SERPL CREATININE-BSD FRML MDRD: 108 ML/MIN/1.73SQ M
GLUCOSE SERPL-MCNC: 87 MG/DL (ref 65–140)
HCT VFR BLD AUTO: 37.7 % (ref 36.5–49.3)
HGB BLD-MCNC: 12.5 G/DL (ref 12–17)
IMM GRANULOCYTES # BLD AUTO: 0.02 THOUSAND/UL (ref 0–0.2)
IMM GRANULOCYTES NFR BLD AUTO: 0 % (ref 0–2)
LYMPHOCYTES # BLD AUTO: 1.85 THOUSANDS/ΜL (ref 0.6–4.47)
LYMPHOCYTES NFR BLD AUTO: 29 % (ref 14–44)
MCH RBC QN AUTO: 32.2 PG (ref 26.8–34.3)
MCHC RBC AUTO-ENTMCNC: 33.2 G/DL (ref 31.4–37.4)
MCV RBC AUTO: 97 FL (ref 82–98)
MONOCYTES # BLD AUTO: 0.61 THOUSAND/ΜL (ref 0.17–1.22)
MONOCYTES NFR BLD AUTO: 10 % (ref 4–12)
NEUTROPHILS # BLD AUTO: 3.79 THOUSANDS/ΜL (ref 1.85–7.62)
NEUTS SEG NFR BLD AUTO: 58 % (ref 43–75)
NRBC BLD AUTO-RTO: 0 /100 WBCS
PLATELET # BLD AUTO: 300 THOUSANDS/UL (ref 149–390)
PMV BLD AUTO: 8.7 FL (ref 8.9–12.7)
POTASSIUM SERPL-SCNC: 4.5 MMOL/L (ref 3.5–5.3)
PROT SERPL-MCNC: 5.7 G/DL (ref 6.4–8.4)
RBC # BLD AUTO: 3.88 MILLION/UL (ref 3.88–5.62)
SODIUM SERPL-SCNC: 139 MMOL/L (ref 135–147)
WBC # BLD AUTO: 6.44 THOUSAND/UL (ref 4.31–10.16)

## 2023-04-25 RX ORDER — MORPHINE SULFATE 15 MG/1
7.5 TABLET ORAL EVERY 4 HOURS PRN
Qty: 20 TABLET | Refills: 0 | Status: SHIPPED | OUTPATIENT
Start: 2023-04-25

## 2023-04-25 RX ORDER — ALBUTEROL SULFATE 2.5 MG/3ML
2.5 SOLUTION RESPIRATORY (INHALATION) EVERY 6 HOURS PRN
Qty: 360 ML | Refills: 0 | Status: SHIPPED | OUTPATIENT
Start: 2023-04-25 | End: 2023-05-25

## 2023-04-25 RX ORDER — MORPHINE SULFATE 15 MG/1
15 TABLET, FILM COATED, EXTENDED RELEASE ORAL 3 TIMES DAILY
Qty: 21 TABLET | Refills: 0 | Status: SHIPPED | OUTPATIENT
Start: 2023-05-02

## 2023-04-25 RX ORDER — PREDNISONE 10 MG/1
TABLET ORAL
Qty: 22 TABLET | Refills: 0 | Status: SHIPPED | OUTPATIENT
Start: 2023-04-26 | End: 2023-05-06

## 2023-04-25 RX ADMIN — IPRATROPIUM BROMIDE 0.5 MG: 0.5 SOLUTION RESPIRATORY (INHALATION) at 14:38

## 2023-04-25 RX ADMIN — IPRATROPIUM BROMIDE 0.5 MG: 0.5 SOLUTION RESPIRATORY (INHALATION) at 07:52

## 2023-04-25 RX ADMIN — BENZONATATE 100 MG: 100 CAPSULE ORAL at 08:21

## 2023-04-25 RX ADMIN — BUDESONIDE 0.5 MG: 0.5 INHALANT ORAL at 07:52

## 2023-04-25 RX ADMIN — LEVALBUTEROL 1.25 MG: 1.25 SOLUTION, CONCENTRATE RESPIRATORY (INHALATION) at 14:38

## 2023-04-25 RX ADMIN — GUAIFENESIN 600 MG: 600 TABLET ORAL at 08:21

## 2023-04-25 RX ADMIN — PREDNISONE 40 MG: 20 TABLET ORAL at 08:21

## 2023-04-25 RX ADMIN — MORPHINE SULFATE 15 MG: 15 TABLET, EXTENDED RELEASE ORAL at 06:06

## 2023-04-25 RX ADMIN — MORPHINE SULFATE 15 MG: 15 TABLET, EXTENDED RELEASE ORAL at 15:56

## 2023-04-25 RX ADMIN — LEVALBUTEROL 1.25 MG: 1.25 SOLUTION, CONCENTRATE RESPIRATORY (INHALATION) at 07:52

## 2023-04-25 RX ADMIN — ABIRATERONE ACETATE 1000 MG: 250 TABLET ORAL at 08:21

## 2023-04-25 RX ADMIN — FORMOTEROL FUMARATE DIHYDRATE 20 MCG: 20 SOLUTION RESPIRATORY (INHALATION) at 07:52

## 2023-04-25 RX ADMIN — BENZONATATE 100 MG: 100 CAPSULE ORAL at 15:57

## 2023-04-25 NOTE — DISCHARGE SUMMARY
[place]  Discharge- Sharon Della 1955, 75 y o  male MRN: [id number]  Unit/Bed#: -01 SDU Encounter: [id number]  Primary Care Provider: ISA Glover   Date and time admitted to hospital: 4/23/2023  8:07 PM    * Chronic obstructive pulmonary disease with acute exacerbation ([facility] )  Assessment & Plan  · Sudden onset dyspnea 1 hour prior to arrival to the ED unrelieved by home inhaler  · Suspected etiology is recent use of home pellet stove with dust exposure  · Placed on BiPAP on arrival to the ED, however patient could not tolerate due to claustrophobia so he was given ALCAZAR neb x2 and Solu-Medrol 125 mg with improvement   · Home regimen: Symbicort BID -not established with pulmonology outpatient, no prior PFTs  · Xopenex and Atrovent TID  · Pulmicort and Performist BID  · Pulm consulted   · S/P IV solumedrol, weaned to prednisone 40 mg on 4/24 --> continue to taper by 10 mg every 3 days on DC  · albuerol neb on DC, already has home nebulizer  · OP pulm follow up  · Requires outpatient pulm follow up for PFTs   · Home O2 eval - qualifies for home O2   · Cleared for DC per pulm on 4/25    Positive blood culture  Assessment & Plan  · Blood cultures x 2 drawn on admission --> #1 is neg x 24 hours, #2 is positive for coag neg staph   · Suspect this is contaminant/skin colonizer  · No fevers, white count, or other SIRS since admission   · No indwelling port, chemo is by injections   · Discussed with ID --> agree that this is likely contaminant however recommended checking repeat BC to be safe however still ok for DC, no need to remain inpatient to observe, can call patient back to ED for investigation if repeats positive   · Repeat Bc x 2 collected --> will update patient with results tomorrow  · Patient agreed to return to ED if necessary     Opioid dependence ([facility] )  Assessment & Plan  · Utilizes morphine ER 15 mg every 12 hours at home  · PDMP reviewed  · Continue  · Palliative consulted, as cancer related pain not controlled on this regimen    Acute respiratory insufficiency  Assessment & Plan   Acute respiratory failure with hypoxia, POA, due to COPD exacerbation as evidenced by tachypnea, use of accessory muscles, increased WOB, tight lung sounds and somnolence, treated with Bipap, 1Hr UDN x 2, Solumedrol 125mg and continuous cardiopulmonary monitoring  · Acute phase resolved  · Home o2 eval 4/25- qualifies for O2 on DC    Neoplasm related pain  Assessment & Plan  · Follows with palliative care  · Home regimen: Morphine ER 15 Mg twice daily  · Palliative care consulted    Cachexia [facility])  Assessment & Plan  Malnutrition Findings:   Adult Malnutrition type: Chronic illness  Adult Degree of Malnutrition: Other severe protein calorie malnutrition  Malnutrition Characteristics: Fat loss, Muscle loss, Inadequate energy, Weight loss              · Secondary to severe emphysema and prostate cancer with metastasis to the bone  · Nutrition consult    360 Statement: Pt presents with severe protein calorie malnutrition as evidenced by prominent clavicle bone protrusion, sunken orbitals, b/l temporal scooping, <75% estimated needs compared to estimated needs and BMI 16 48  Treat with Hi Calorie Hi Protein Diet, Suggested PO supplements TID-QID, pt declining all commercial supplements  Encouraged to bring in supplements from home if pt will consume  BMI Findings:  Adult BMI Classifications: Underweight < 18 5        Body mass index is 15 98 kg/m²         Prostate cancer metastatic to bone [facility])  Assessment & Plan  · Maintained on Zytiga 4000 mg daily  · Utilizes prednisone 1 mg daily to help with hot flashes-prednisone held in setting of IV steroids  · Continue Zytiga    Severe protein-calorie malnutrition ([address] )  Assessment & Plan  Malnutrition Findings:   Adult Malnutrition type: Chronic illness  Adult Degree of Malnutrition: Other severe protein calorie malnutrition  Malnutrition Characteristics: Fat loss, Muscle loss, Inadequate energy, Weight loss                Severe pulmonary cachexia  Nutrition consulted  360 Statement: Pt presents with severe protein calorie malnutrition as evidenced by prominent clavicle bone protrusion, sunken orbitals, b/l temporal scooping, <75% estimated needs compared to estimated needs and BMI 16 48  Treat with Hi Calorie Hi Protein Diet, Suggested PO supplements TID-QID, pt declining all commercial supplements  Encouraged to bring in supplements from home if pt will consume  BMI Findings:  Adult BMI Classifications: Underweight < 18 5        Body mass index is 15 98 kg/m²  Metabolic encephalopathy-resolved as of 4/25/2023  Assessment & Plan  Metabolic encephalopathy due to acute respiratory failure as evidenced by hypoxia and somnolence, treated with Bipap, telemetry and neuro assessments  Resolved  Medical Problems     Resolved Problems  Date Reviewed: 4/25/2023          Resolved    Metabolic encephalopathy 0/50/0520     Resolved by  Raymond Chi PA-C        Discharging Physician / Practitioner: Raymond Chi PA-C  PCP: Sharmila Garza  Admission Date:   Admission Orders (From admission, onward)     Ordered        04/23/23 2243  [address] [place] [address],[address]  Once                      Discharge Date: 04/25/23    Consultations During Hospital Stay:  · Pulmonology    Procedures Performed:   XR chest 1 view portable   ED Interpretation by Iram Manjarrez DO (04/23 2028)   Severe emphysematous changes and diaphragm flattening      Final Result by Bibi Norman MD (04/24 5329)      No acute cardiopulmonary disease  Emphysema                 Workstation performed: [id number]           ·     Significant Findings / Test Results:   · Blood cultures: #1 negative at 24 hours, #2 positive for coag negative staph    Incidental Findings:   · None    Test Results Pending at Discharge (will require follow up): · Repeat blood cultures x2     Outpatient Tests Requested:  · Outpatient PFTs with pulmonology    Complications: None    Reason for Admission: Shortness of breath    Hospital Course:   Paul Gallo is a 75 y o  male patient with PMH of severe COPD, prostate cancer with metastasis to bone, opioid dependence who originally presented to the hospital on 4/23/2023 due to report of severe dyspnea unrelieved by home albuterol inhaler, along with dry cough following use of pellet stove at home along with housework and disruption of significant amount of dust which patient had inhaled  Initially patient required BiPAP in the ED due to shortness of breath however patient did not tolerate this due to claustrophobia and was transitioned to nasal cannula  Pulmonology was consulted and patient was treated with IV Solu-Medrol which was transitioned to p o  prednisone for discharge  Prior to discharge patient underwent home O2 eval on 4/25 with respiratory therapy, he qualified for 2 L of oxygen at rest and with exertion  Patient was cleared for discharge by pulmonology on 4/25, he will need to follow-up outpatient for formal PFT testing  Patient already had home nebulizer, refilled albuterol neb prescription, he will continue prior Symbicort twice daily  Notably blood cultures were drawn in the ED due to SIRS criteria  At 24 hours, 1 blood culture was negative, second blood culture was with coag negative staph which likely represented skin contaminant given patient's lack of clinical evidence of infection  This was discussed with infectious disease who recommended repeating blood cultures to ensure no further growth however recommended no need to keep patient inpatient while awaiting culture results  Repeat blood cultures were collected on 4/25 prior to discharge  Patient agreed to return to ED if needed for further investigation pending repeat blood culture results    Will monitor for results and "update patient by phone tomorrow  Please see above list of diagnoses and related plan for additional information  Condition at Discharge: stable    Discharge Day Visit / Exam:   Subjective: Patient denies any complaints today, states he feels good to go home  Vitals: Blood Pressure: 135/73 (04/25/23 1135)  Pulse: 66 (04/25/23 1135)  Temperature: 98 1 °F (36 7 °C) (04/25/23 0743)  Temp Source: Oral (04/25/23 0743)  Respirations: 20 (04/25/23 1135)  Height: 5' 6\" (167 6 cm) (04/24/23 0100)  Weight - Scale: 44 9 kg (98 lb 15 8 oz) (04/25/23 0600)  SpO2: 90 % (04/25/23 1442)  Exam:   Physical Exam  Vitals and nursing note reviewed  Constitutional:       General: He is not in acute distress  Appearance: He is well-developed  Comments: Very thin appearing  HENT:      Head: Normocephalic and atraumatic  Eyes:      General:         Right eye: No discharge  Left eye: No discharge  Extraocular Movements: Extraocular movements intact  Conjunctiva/sclera: Conjunctivae normal    Cardiovascular:      Rate and Rhythm: Normal rate and regular rhythm  Heart sounds: No murmur heard  Pulmonary:      Effort: Pulmonary effort is normal  No respiratory distress  Breath sounds: Wheezing present  No rhonchi or rales  Comments: Mild expiratory wheezing  Currently 91% on room air  Abdominal:      General: Bowel sounds are normal  There is no distension  Palpations: Abdomen is soft  Tenderness: There is no abdominal tenderness  Musculoskeletal:      Cervical back: Neck supple  Right lower leg: No edema  Left lower leg: No edema  Skin:     General: Skin is warm and dry  Capillary Refill: Capillary refill takes less than 2 seconds  Neurological:      General: No focal deficit present  Mental Status: He is alert and oriented to person, place, and time  Mental status is at baseline  Cranial Nerves: No cranial nerve deficit     Psychiatric:         " Mood and Affect: Mood normal          Behavior: Behavior normal           Discussion with Family: Patient declined call to   Discharge instructions/Information to patient and family:   See after visit summary for information provided to patient and family  Provisions for Follow-Up Care:  See after visit summary for information related to follow-up care and any pertinent home health orders  Disposition:   Home    Planned Readmission: none     Discharge Statement:  I spent 65 minutes discharging the patient  This time was spent on the day of discharge  I had direct contact with the patient on the day of discharge  Greater than 50% of the total time was spent examining patient, answering all patient questions, arranging and discussing plan of care with patient as well as directly providing post-discharge instructions  Additional time then spent on discharge activities  Discharge Medications:  See after visit summary for reconciled discharge medications provided to patient and/or family        **Please Note: This note may have been constructed using a voice recognition system**

## 2023-04-25 NOTE — ASSESSMENT & PLAN NOTE
Acute respiratory failure with hypoxia, POA, due to COPD exacerbation as evidenced by tachypnea, use of accessory muscles, increased WOB, tight lung sounds and somnolence, treated with Bipap, 1Hr UDN x 2, Solumedrol 125mg and continuous cardiopulmonary monitoring    · Acute phase resolved  · Home o2 eval 4/25- qualifies for O2 on DC

## 2023-04-25 NOTE — PROGRESS NOTES
Progress Note - Palliative & Supportive Care  Simeon Ortiz  75 y o   male   SDU/-01 S*   MRN: [id number]  Encounter: [id number]     Assessment/Problems Actively Addressed this Visit:  Goals of care counseling  Encounter for palliative care   Acute respiratory insufficiency - resolving   COPD with acute exacerbation - resolving   Prostate cancer metastatic to bone  Protein calorie malnutrition  Cancer related pain  Anxiety    Plan:  1  Symptom Management:    Patient's pain is better controlled on current regimen:  · Can discharge with MS Contin ER 15MG TID  · Can continue morphine IR 7 5MG Q6HPRN  · Continue clonazepam 0 25MG QHS  · Will restart olanzapine on outpatient basis  2  Goals of Care / Recommendations   · Level 3 code status  · José Miguel Bashir' goals remains disease directed, however he is increasingly dissatisfied with quality of life  Emotional support provided  We did discuss eventual hospice however he is not ready for this  · He will have close palliative follow up      24 History  Chart reviewed before visit  José Miguel Bashir is being discharged today  He states his pain is better controlled with current regimen  He is awaiting oxygen delivery before he goes  He is very dissatisfied with his current life, especially because he hates being dependent on people  Emotional support provided  We will continue close follow-up  Decisional apparatus:  Patient is competent on exam today  If competence is lost, patient's substitute decision maker would default to adult siblings by [place] Act 169  Advance Directive/Living Will/POLST: none on file     Review of Systems:   Review of Systems   Constitutional: Positive for malaise/fatigue  HENT: Negative for congestion and ear pain  Respiratory: Negative for cough and shortness of breath  Endocrine: Positive for heat intolerance  Musculoskeletal: Positive for arthritis, back pain, falls and muscle weakness     Gastrointestinal: Positive for anorexia  Negative for bloating, nausea and vomiting  Genitourinary: Negative for bladder incontinence and dysuria  Neurological: Positive for weakness  Negative for disturbances in coordination  Psychiatric/Behavioral: Negative for altered mental status and hypervigilance         Medications    Current Facility-Administered Medications:   •  abiraterone (ZYTIGA) tablet 1,000 mg, 1,000 mg, Oral, Daily, Keanu Schwartz PA-C, 1,000 mg at 04/25/23 8191  •  acetaminophen (TYLENOL) tablet 650 mg, 650 mg, Oral, Q6H PRN, Keanu Schwartz PA-C, 325 mg at 04/24/23 1512  •  ALPRAZolam (XANAX) tablet 0 5 mg, 0 5 mg, Oral, BID PRN, Nando Scales PA-C  •  aluminum-magnesium hydroxide-simethicone (MYLANTA) oral suspension 30 mL, 30 mL, Oral, Q6H PRN, Keanu Schwartz PA-C  •  benzonatate (TESSALON PERLES) capsule 100 mg, 100 mg, Oral, TID, Keanu Schwartz PA-C, 100 mg at 04/25/23 1156  •  benzonatate (TESSALON PERLES) capsule 100 mg, 100 mg, Oral, TID PRN, Keanu Schwartz PA-C, 100 mg at 04/24/23 0816  •  budesonide (PULMICORT) inhalation solution 0 5 mg, 0 5 mg, Nebulization, Q12H, Keanu Schwartz PA-C, 0 5 mg at 04/25/23 2199  •  clonazePAM (KlonoPIN) tablet 0 25 mg, 0 25 mg, Oral, HS PRN, Nando Scales PA-C, 0 25 mg at 04/24/23 2151  •  enoxaparin (LOVENOX) subcutaneous injection 30 mg, 30 mg, Subcutaneous, Daily, Keanu Schwartz PA-C, 30 mg at 04/24/23 0427  •  formoterol (PERFOROMIST) nebulizer solution 20 mcg, 20 mcg, Nebulization, Q12H, Keanu Schwartz PA-C, 20 mcg at 04/25/23 7946  •  gabapentin (NEURONTIN) capsule 300 mg, 300 mg, Oral, HS, Keanu Schwartz PA-C, 300 mg at 04/24/23 2139  •  guaiFENesin (MUCINEX) 12 hr tablet 600 mg, 600 mg, Oral, BID, Keanu Schwartz PA-C, 600 mg at 04/25/23 3286  •  ipratropium (ATROVENT) 0 02 % inhalation solution 0 5 mg, 0 5 mg, Nebulization, TID, Keanu Schwartz PA-C, 0 5 mg at 04/25/23 2999  •  ipratropium-albuterol (DUO-NEB) 0 5-2 5 "mg/3 mL inhalation solution 3 mL, 3 mL, Nebulization, Q6H PRN, BRAULIO GonzalezNP, 3 mL at 04/24/23 0555  •  levalbuterol (XOPENEX) inhalation solution 1 25 mg, 1 25 mg, Nebulization, TID, Abigail Barroso PA-C, 1 25 mg at 04/25/23 3314  •  lidocaine (LIDODERM) 5 % patch 1 patch, 1 patch, Topical, Daily, ISA Gonzalez  •  lidocaine (LIDODERM) 5 % patch 1 patch, 1 patch, Topical, Daily, Abigail Barroso PA-C, 1 patch at 04/24/23 0533  •  montelukast (SINGULAIR) tablet 10 mg, 10 mg, Oral, HS, Abigail Barroso PA-C, 10 mg at 04/24/23 2139  •  morphine (MS CONTIN) ER tablet 15 mg, 15 mg, Oral, Q8H [address], Arleth Scales PA-C, 15 mg at 04/25/23 5863  •  morphine (MSIR) IR tablet 7 5 mg, 7 5 mg, Oral, Q6H PRN, Mia Scales PA-C  •  predniSONE tablet 40 mg, 40 mg, Oral, Daily, Hollis Cabrera, DO, 40 mg at 04/25/23 1608    Objective  /73   Pulse 66   Temp 98 1 °F (36 7 °C) (Oral)   Resp 20   Ht 5' 6\" (1 676 m)   Wt 44 9 kg (98 lb 15 8 oz)   SpO2 93%   BMI 15 98 kg/m²     Physical Exam  Vitals and nursing note reviewed  Exam conducted with a chaperone present  Constitutional:       General: He is not in acute distress  Appearance: He is cachectic  He is ill-appearing  Interventions: Nasal cannula in place  HENT:      Head: Normocephalic and atraumatic  Eyes:      General:         Right eye: No discharge  Left eye: No discharge  Conjunctiva/sclera: Conjunctivae normal    Cardiovascular:      Rate and Rhythm: Normal rate  Pulmonary:      Effort: Pulmonary effort is normal  No respiratory distress  Musculoskeletal:         General: No swelling  Cervical back: Neck supple  Skin:     General: Skin is warm and dry  Coloration: Skin is pale  Neurological:      Mental Status: He is alert and oriented to person, place, and time     Psychiatric:         Mood and Affect: Mood normal          Behavior: Behavior normal        Lab Results:   I have " "personally reviewed pertinent labs  , CBC:   Lab Results   Component Value Date    WBC 6 44 04/25/2023    HGB 12 5 04/25/2023    HCT 37 7 04/25/2023    MCV 97 04/25/2023     04/25/2023    MCH 32 2 04/25/2023    MCHC 33 2 04/25/2023    RDW 13 2 04/25/2023    MPV 8 7 (L) 04/25/2023    NRBC 0 04/25/2023   , CMP:   Lab Results   Component Value Date    SODIUM 139 04/25/2023    K 4 5 04/25/2023     04/25/2023    CO2 34 (H) 04/25/2023    BUN 16 04/25/2023    CREATININE 0 53 (L) 04/25/2023    CALCIUM 8 3 (L) 04/25/2023    AST 17 04/25/2023    ALT 12 04/25/2023    ALKPHOS 40 04/25/2023    EGFR 108 04/25/2023     Imaging Studies: I have personally reviewed pertinent reports  EKG, Pathology, and Other Studies: I have personally reviewed pertinent reports  Counseling / Coordination of Care  Total floor / unit time spent today 45 minutes  Greater than 50% of total time was spent with the patient and / or family counseling and / or coordinating of care  A description of the counseling / coordination of care: reviewed chart, reviewed lab values, reviewed imaging, provided medical updates, discussed palliative care and symptom management, discussed hospice care and comfort care, opioid titration, discussed goals of care, discussed advanced directives, assessed POA/HCA, provided supportive listening, provided anticipatory guidance, provided psychosocial and emotional support, assessed competency and decision-making and facilitated interdisciplinary communication  Reviewed with ICU team, RN and CM  Nafisa Hathaway PA-C  Palliative & Supportive Care    Portions of this document may have been created using dictation software and as such some \"sound alike\" terms may have been generated by the system  Do not hesitate to contact me with any questions or clarifications     "

## 2023-04-25 NOTE — ASSESSMENT & PLAN NOTE
Malnutrition Findings:   Adult Malnutrition type: Chronic illness  Adult Degree of Malnutrition: Other severe protein calorie malnutrition  Malnutrition Characteristics: Fat loss, Muscle loss, Inadequate energy, Weight loss              · Secondary to severe emphysema and prostate cancer with metastasis to the bone  · Nutrition consult    360 Statement: Pt presents with severe protein calorie malnutrition as evidenced by prominent clavicle bone protrusion, sunken orbitals, b/l temporal scooping, <75% estimated needs compared to estimated needs and BMI 16 48  Treat with Hi Calorie Hi Protein Diet, Suggested PO supplements TID-QID, pt declining all commercial supplements  Encouraged to bring in supplements from home if pt will consume  BMI Findings:  Adult BMI Classifications: Underweight < 18 5        Body mass index is 15 98 kg/m²

## 2023-04-25 NOTE — DISCHARGE INSTR - AVS FIRST PAGE
Please follow up with PCP in 1 week for post hospital visit   Please follow up with outpatient pulmonology in 1-2 weeks for post hospital visit     Medications:  Please take prednisone 40 mg x 1 day on 4/26  Then, please take prednisone 30 mg for 3 days beginning 4/27, followed by 20 mg x 3 days followed by 10 mg x 3 days     After you complete prednisone taper please continue prior prednisone 1 mg with zytiga   Please continue all other prior home medications     Please follow up repeat blood cultures with your PCP    Please use albuteral nebulizer every 6 hours as needed for wheezing

## 2023-04-25 NOTE — ASSESSMENT & PLAN NOTE
· Blood cultures x 2 drawn on admission --> #1 is neg x 24 hours, #2 is positive for coag neg staph   · Suspect this is contaminant/skin colonizer  · No fevers, white count, or other SIRS since admission   · No indwelling port, chemo is by injections   · Discussed with ID --> agree that this is likely contaminant however recommended checking repeat BC to be safe however still ok for DC, no need to remain inpatient to observe, can call patient back to ED for investigation if repeats positive   · Repeat Bc x 2 collected --> will update patient with results tomorrow  · Patient agreed to return to ED if necessary

## 2023-04-25 NOTE — PLAN OF CARE
Problem: Potential for Falls  Goal: Patient will remain free of falls  Description: INTERVENTIONS:  - Educate patient/family on patient safety including physical limitations  - Instruct patient to call for assistance with activity   - Consult OT/PT to assist with strengthening/mobility   - Keep Call bell within reach  - Keep bed low and locked with side rails adjusted as appropriate  - Keep care items and personal belongings within reach  - Initiate and maintain comfort rounds  - Make Fall Risk Sign visible to staff  - Offer Toileting every Hours, in advance of need  - Initiate/Maintain alarm  - Obtain necessary fall risk management equipment:   - Apply yellow socks and bracelet for high fall risk patients  - Consider moving patient to room near nurses station  Outcome: Progressing     Problem: Nutrition/Hydration-ADULT  Goal: Nutrient/Hydration intake appropriate for improving, restoring or maintaining nutritional needs  Description: Monitor and assess patient's nutrition/hydration status for malnutrition  Collaborate with interdisciplinary team and initiate plan and interventions as ordered  Monitor patient's weight and dietary intake as ordered or per policy  Utilize nutrition screening tool and intervene as necessary  Determine patient's food preferences and provide high-protein, high-caloric foods as appropriate       INTERVENTIONS:  - Monitor oral intake, urinary output, labs, and treatment plans  - Assess nutrition and hydration status and recommend course of action  - Evaluate amount of meals eaten  - Assist patient with eating if necessary   - Allow adequate time for meals  - Recommend/ encourage appropriate diets, oral nutritional supplements, and vitamin/mineral supplements  - Order, calculate, and assess calorie counts as needed  - Recommend, monitor, and adjust tube feedings and TPN/PPN based on assessed needs  - Assess need for intravenous fluids  - Provide specific nutrition/hydration education as appropriate  - Include patient/family/caregiver in decisions related to nutrition  Outcome: Progressing     Problem: MOBILITY - ADULT  Goal: Maintain or return to baseline ADL function  Description: INTERVENTIONS:  -  Assess patient's ability to carry out ADLs; assess patient's baseline for ADL function and identify physical deficits which impact ability to perform ADLs (bathing, care of mouth/teeth, toileting, grooming, dressing, etc )  - Assess/evaluate cause of self-care deficits   - Assess range of motion  - Assess patient's mobility; develop plan if impaired  - Assess patient's need for assistive devices and provide as appropriate  - Encourage maximum independence but intervene and supervise when necessary  - Involve family in performance of ADLs  - Assess for home care needs following discharge   - Consider OT consult to assist with ADL evaluation and planning for discharge  - Provide patient education as appropriate  Outcome: Progressing  Goal: Maintains/Returns to pre admission functional level  Description: INTERVENTIONS:  - Perform BMAT or MOVE assessment daily    - Set and communicate daily mobility goal to care team and patient/family/caregiver  - Collaborate with rehabilitation services on mobility goals if consulted  - Perform Range of Motion  times a day  - Reposition patient every  hours    - Dangle patient  times a day  - Stand patient  times a day  - Ambulate patient  times a day  - Out of bed to chair  times a day   - Out of bed for meal times a day  - Out of bed for toileting  - Record patient progress and toleration of activity level   Outcome: Progressing     Problem: Prexisting or High Potential for Compromised Skin Integrity  Goal: Skin integrity is maintained or improved  Description: INTERVENTIONS:  - Identify patients at risk for skin breakdown  - Assess and monitor skin integrity  - Assess and monitor nutrition and hydration status  - Monitor labs   - Assess for incontinence   - Turn and reposition patient  - Assist with mobility/ambulation  - Relieve pressure over bony prominences  - Avoid friction and shearing  - Provide appropriate hygiene as needed including keeping skin clean and dry  - Evaluate need for skin moisturizer/barrier cream  - Collaborate with interdisciplinary team   - Patient/family teaching  - Consider wound care consult   Outcome: Progressing     Problem: PAIN - ADULT  Goal: Verbalizes/displays adequate comfort level or baseline comfort level  Description: Interventions:  - Encourage patient to monitor pain and request assistance  - Assess pain using appropriate pain scale  - Administer analgesics based on type and severity of pain and evaluate response  - Implement non-pharmacological measures as appropriate and evaluate response  - Consider cultural and social influences on pain and pain management  - Notify physician/advanced practitioner if interventions unsuccessful or patient reports new pain  Outcome: Progressing     Problem: RESPIRATORY - ADULT  Goal: Achieves optimal ventilation and oxygenation  Description: INTERVENTIONS:  - Assess for changes in respiratory status  - Assess for changes in mentation and behavior  - Position to facilitate oxygenation and minimize respiratory effort  - Oxygen administered by appropriate delivery if ordered  - Initiate smoking cessation education as indicated  - Encourage broncho-pulmonary hygiene including cough, deep breathe, Incentive Spirometry  - Assess the need for suctioning and aspirate as needed  - Assess and instruct to report SOB or any respiratory difficulty  - Respiratory Therapy support as indicated  Outcome: Progressing

## 2023-04-25 NOTE — PROGRESS NOTES
"Pulmonary Progress Note  Karen Magana 75 y o  male MRN: [id number]  Unit/Bed#: -01 SDU Encounter: [id number]      Impressions:    1  Acute hypoxic respiratory failure secondary to COPD/emphysema with acute exacerbation  Likely exacerbated by inhalation of wood dust while cleaning his house  No obvious infectious etiology  Resolved  • Will do RT assessment today to determine need for home oxygen  • Already on prednisone and doing well - will continue taper by 10 mg every 3 days  • Discharged home on Symbicort twice daily, albuterol MDI/neb as needed     2  Severe protein calorie malnutrition  • Encourage good p o  intake     3  Metastatic prostate CA with mets to bone  • Outpatient oncology and palliative care follow-up    Stable for discharge home from a pulmonary standpoint, will sign off, please call with any questions    Chief Complaint:  Doing better    Subjective:  Overall the patient states that he is doing better  No significant shortness of breath at rest   No coughing  Feels like his breathing has significantly improved  Vitals: Blood pressure 138/92, pulse 76, temperature 97 9 °F (36 6 °C), temperature source Oral, resp  rate 19, height 5' 6\" (1 676 m), weight 44 9 kg (98 lb 15 8 oz), SpO2 90 %  ,  Room air, Body mass index is 15 98 kg/m²        Intake/Output Summary (Last 24 hours) at 4/25/2023 7528  Last data filed at 4/24/2023 2144  Gross per 24 hour   Intake 280 ml   Output 1725 ml   Net -1445 ml       Physical exam:  General:  Patient is awake, alert, non-toxic and in no acute respiratory distress  Neck: No JVD  CV:  Regular, +S1 and S2, No murmurs, gallops or rubs appreciated  Lungs: Diminished breath sounds bilateral without wheeze, rales or rhonchi  Abdomen: Soft, +BS, Non-tender, non-distended  Extremities: No clubbing, cyanosis or edema  Neuro: No focal deficits    Labs:   CBC:   Lab Results   Component Value Date    WBC 6 44 04/25/2023    HGB 12 5 04/25/2023    HCT 37 7 " "04/25/2023    MCV 97 04/25/2023     04/25/2023    MCH 32 2 04/25/2023    MCHC 33 2 04/25/2023    RDW 13 2 04/25/2023    MPV 8 7 (L) 04/25/2023    NRBC 0 04/25/2023   , CMP:   Lab Results   Component Value Date    SODIUM 139 04/25/2023    K 4 5 04/25/2023     04/25/2023    CO2 34 (H) 04/25/2023    BUN 16 04/25/2023    CREATININE 0 53 (L) 04/25/2023    CALCIUM 8 3 (L) 04/25/2023    AST 17 04/25/2023    ALT 12 04/25/2023    ALKPHOS 40 04/25/2023    EGFR 108 04/25/2023         Antonio Booth DO      Portions of the record may have been created with voice recognition software  Occasional wrong word or \"sound a like\" substitutions may have occurred due to the inherent limitations of voice recognition software  Read the chart carefully and recognize, using context, where substitutions have occurred      "

## 2023-04-25 NOTE — CASE MANAGEMENT
Case Management Discharge Planning Note    Patient name Carrol Sena  Location  SDU/-01 S* MRN [id number]  : 1955 Date 2023       Current Admission Date: 2023  Current Admission Diagnosis:Chronic obstructive pulmonary disease with acute exacerbation [facility])   Patient Active Problem List    Diagnosis Date Noted   • Positive blood culture 2023   • Acute respiratory insufficiency 2023   • Opioid dependence ([address] ) 2023   • Neoplasm related pain 2023   • Medical marijuana use 2023   • Encephalopathy acute 2022   • Anorexia 2022   • Cachexia ([address] ) 2022   • Palliative care patient 2022   • Prostate cancer metastatic to bone ([facility] [address] ) 2022   • Insomnia 2022   • Hot flashes 2022   • Left lower quadrant abdominal pain 2021   • Personal history of colonic polyps 2021   • HLD (hyperlipidemia) 2020   • Acute respiratory failure with hypoxia ([address] ) 2020   • Severe protein-calorie malnutrition ([address] ) 2020   • Chronic obstructive pulmonary disease with acute exacerbation ([address] ) 2020   • Acute tracheobronchitis 2020      LOS (days): 2  Geometric Mean LOS (GMLOS) (days): 3 50  Days to GMLOS:1 8     OBJECTIVE:  Risk of Unplanned Readmission Score: 25 36         Current admission status: Inpatient   Preferred Pharmacy:   [id number] [place]  [address] [facility]  [address] [place] [address]  Phone: [phone] Fax: [phone] [facility] [address] [place] [address]  Phone: [phone] Fax: [phone]    Primary Care Provider: ISA Espinoza    Primary Insurance: MEDICARE  Secondary Insurance:     DISCHARGE DETAILS:    Additional Comments: Met with Pt re: need for home oxygen upon discharge  Pt in agreement and choosing [employer] for DME   Referral sent to [facility] via [place]  Per Jose Manuel Patel [facility]  Juno tank given to Pt  Pt reports he will call [facility] to setup delivery for oxygen concentrator  Pt's sister will transport home

## 2023-04-25 NOTE — RESPIRATORY THERAPY NOTE
Home Oxygen Qualifying Test     Patient name: Brent Pimentel        : 1955   Date of Test:  2023  Diagnosis:    Home Oxygen Test:    **Medicare Guidelines require item(s) 1-5 on all ambulatory patients or 1 and 2 on non-ambulatory patients  1  Baseline SPO2 on Room Air at rest 91 %   a  If <= 88% on Room Air add O2 via NC to obtain SpO2 >=88%  If LPM needed, document LPM  needed to reach =>88%    2  SPO2 during exertion on Room Air 86  %  a  During exertion monitor SPO2  If SPO2 increases >=89%, do not add supplemental oxygen    3  SPO2 on Oxygen at Rest 96 % at 2 LPM    4  SPO2 during exertion on Oxygen 96 % at 2LPM    5  Test performed during exertion activity  [x]  Supplemental Home Oxygen is indicated  []  Client does not qualify for home oxygen  Respiratory Additional Notes- pt ambulated approx   [address], [place]

## 2023-04-25 NOTE — ASSESSMENT & PLAN NOTE
· Sudden onset dyspnea 1 hour prior to arrival to the ED unrelieved by home inhaler  · Suspected etiology is recent use of home pellet stove with dust exposure  · Placed on BiPAP on arrival to the ED, however patient could not tolerate due to claustrophobia so he was given ALCAZAR neb x2 and Solu-Medrol 125 mg with improvement   · Home regimen: Symbicort BID -not established with pulmonology outpatient, no prior PFTs  · Xopenex and Atrovent TID  · Pulmicort and Performist BID  · Pulm consulted   · S/P IV solumedrol, weaned to prednisone 40 mg on 4/24 --> continue to taper by 10 mg every 3 days on DC  · albuerol neb on DC, already has home nebulizer  · OP pulm follow up  · Requires outpatient pulm follow up for PFTs   · Home O2 eval - qualifies for home O2   · Cleared for DC per pulm on 4/25

## 2023-04-25 NOTE — ASSESSMENT & PLAN NOTE
Malnutrition Findings:   Adult Malnutrition type: Chronic illness  Adult Degree of Malnutrition: Other severe protein calorie malnutrition  Malnutrition Characteristics: Fat loss, Muscle loss, Inadequate energy, Weight loss                Severe pulmonary cachexia  Nutrition consulted  360 Statement: Pt presents with severe protein calorie malnutrition as evidenced by prominent clavicle bone protrusion, sunken orbitals, b/l temporal scooping, <75% estimated needs compared to estimated needs and BMI 16 48  Treat with Hi Calorie Hi Protein Diet, Suggested PO supplements TID-QID, pt declining all commercial supplements  Encouraged to bring in supplements from home if pt will consume  BMI Findings:  Adult BMI Classifications: Underweight < 18 5        Body mass index is 15 98 kg/m²

## 2023-04-26 ENCOUNTER — TRANSITIONAL CARE MANAGEMENT (OUTPATIENT)
Dept: FAMILY MEDICINE CLINIC | Facility: CLINIC | Age: 68
End: 2023-04-26

## 2023-04-27 DIAGNOSIS — C79.51 PROSTATE CANCER METASTATIC TO BONE (HCC): ICD-10-CM

## 2023-04-27 DIAGNOSIS — C79.51 MALIGNANT NEOPLASM OF PROSTATE METASTATIC TO BONE (HCC): ICD-10-CM

## 2023-04-27 DIAGNOSIS — C61 MALIGNANT NEOPLASM OF PROSTATE METASTATIC TO BONE (HCC): ICD-10-CM

## 2023-04-27 DIAGNOSIS — C61 PROSTATE CANCER METASTATIC TO BONE (HCC): ICD-10-CM

## 2023-04-27 LAB
BACTERIA BLD CULT: ABNORMAL
GRAM STN SPEC: ABNORMAL
S AUREUS+CONS DNA BLD POS NAA+NON-PROBE: DETECTED

## 2023-04-27 RX ORDER — ABIRATERONE ACETATE 250 MG/1
TABLET ORAL
Qty: 120 TABLET | Refills: 10 | Status: SHIPPED | OUTPATIENT
Start: 2023-04-27

## 2023-04-28 ENCOUNTER — HOSPITAL ENCOUNTER (OUTPATIENT)
Dept: INFUSION CENTER | Facility: HOSPITAL | Age: 68
Discharge: HOME | End: 2023-04-28
Attending: INTERNAL MEDICINE

## 2023-04-28 VITALS
SYSTOLIC BLOOD PRESSURE: 120 MMHG | TEMPERATURE: 99.7 F | DIASTOLIC BLOOD PRESSURE: 71 MMHG | BODY MASS INDEX: 16.62 KG/M2 | HEIGHT: 66 IN | WEIGHT: 103.4 LBS | RESPIRATION RATE: 18 BRPM | OXYGEN SATURATION: 92 % | HEART RATE: 75 BPM

## 2023-04-28 DIAGNOSIS — C61 PROSTATE CANCER METASTATIC TO BONE (HCC): Primary | ICD-10-CM

## 2023-04-28 DIAGNOSIS — C79.51 PROSTATE CANCER METASTATIC TO BONE (HCC): Primary | ICD-10-CM

## 2023-04-28 RX ADMIN — DENOSUMAB 120 MG: 120 INJECTION SUBCUTANEOUS at 11:40

## 2023-04-28 NOTE — PROGRESS NOTES
Pt arrived amb for Xgeva injection  Labs and CrCl meet parameters  Pt very thin  Xgeva given SQ ADY, dsd applied  Disch amb to home, steady gait  Pt has more appts

## 2023-04-29 LAB — BACTERIA BLD CULT: NORMAL

## 2023-04-30 LAB
BACTERIA BLD CULT: NORMAL
BACTERIA BLD CULT: NORMAL

## 2023-05-02 ENCOUNTER — OFFICE VISIT (OUTPATIENT)
Dept: FAMILY MEDICINE CLINIC | Facility: CLINIC | Age: 68
End: 2023-05-02

## 2023-05-02 VITALS
BODY MASS INDEX: 17.26 KG/M2 | WEIGHT: 107.4 LBS | DIASTOLIC BLOOD PRESSURE: 60 MMHG | TEMPERATURE: 99.1 F | HEART RATE: 100 BPM | OXYGEN SATURATION: 91 % | SYSTOLIC BLOOD PRESSURE: 100 MMHG | HEIGHT: 66 IN | RESPIRATION RATE: 22 BRPM

## 2023-05-02 DIAGNOSIS — J44.1 CHRONIC OBSTRUCTIVE PULMONARY DISEASE WITH ACUTE EXACERBATION (HCC): ICD-10-CM

## 2023-05-02 DIAGNOSIS — Z76.89 ENCOUNTER FOR SUPPORT AND COORDINATION OF TRANSITION OF CARE: Primary | ICD-10-CM

## 2023-05-02 LAB
DME PARACHUTE DELIVERY DATE ACTUAL: NORMAL
DME PARACHUTE DELIVERY DATE REQUESTED: NORMAL
DME PARACHUTE ITEM DESCRIPTION: NORMAL
DME PARACHUTE ORDER STATUS: NORMAL
DME PARACHUTE SUPPLIER NAME: NORMAL
DME PARACHUTE SUPPLIER PHONE: NORMAL

## 2023-05-02 NOTE — PROGRESS NOTES
"Assessment & Plan     1  Encounter for support and coordination of transition of care    2  Chronic obstructive pulmonary disease with acute exacerbation [facility])         Subjective     Transitional Care Management Review:   Sanjeev Dave is a 75 y o  male here for TCM follow up  During the TCM phone call patient stated:  TCM Call     Date and time call was made  4/26/2023  1:14 PM    Hospital care reviewed  Records reviewed    Patient was hospitialized at  [address]  [facility]    Date of Admission  04/23/23    Date of discharge  04/25/23    Diagnosis  Chronic obstructive pulmonary disease with acute exacerbation    Disposition  Home    Were the patients medications reviewed and updated  No      TCM Call     Patients specialists  Other (comment)    Other specialists names  Hematology    I have advised the patient to call PCP with any new or worsening symptoms  George Hanks MA        Here today for a Transition of care appointment post hospitalization at [address] [facility] from 4/23 through 4/25 for hypoxia due to excerebration of his COPD     Completing his    Prednisone taper down to 10 mg for next three days - - feels hot flashes - and \"sweats\" - reports \"left hospital with a cold\" coughing clear sputum - using increased albuterol   Defers chest xray at this time    Home oxygen at 2 liters - reports only used when sats decreases to 90 - encouraged use of O2 for now     Palliative care - metastatic prostate cancer - takes morphine     Discussed weight - undernourished      Review of Systems   Constitutional: Positive for activity change, appetite change and fatigue  Negative for fever  HENT: Negative  Eyes: Negative  Respiratory: Positive for cough  Cardiovascular: Negative  Gastrointestinal: Negative  Endocrine: Negative  Genitourinary: Negative  Musculoskeletal: Negative  Skin: Negative  Allergic/Immunologic: Negative  Neurological: Negative  Hematological: Negative    " "  Psychiatric/Behavioral: Negative  Objective     /60 (BP Location: Left arm, Patient Position: Sitting, Cuff Size: Standard)   Pulse 100   Temp 99 1 °F (37 3 °C) (Tympanic)   Resp 22   Ht 5' 6 42\" (1 687 m)   Wt 48 7 kg (107 lb 6 4 oz)   SpO2 91%   BMI 17 12 kg/m²      Physical Exam  Vitals and nursing note reviewed  Constitutional:       Appearance: He is ill-appearing  He is not toxic-appearing  HENT:      Head: Normocephalic and atraumatic  Nose: Nose normal  No congestion  Cardiovascular:      Rate and Rhythm: Normal rate and regular rhythm  Pulses: Normal pulses  Heart sounds: Normal heart sounds  No murmur heard  Pulmonary:      Effort: Pulmonary effort is normal       Breath sounds: Examination of the right-lower field reveals decreased breath sounds  Examination of the left-lower field reveals decreased breath sounds  Decreased breath sounds and wheezing present  No rhonchi  Musculoskeletal:         General: No swelling or signs of injury  Normal range of motion  Skin:     General: Skin is warm and dry  Capillary Refill: Capillary refill takes less than 2 seconds  Neurological:      Mental Status: He is alert and oriented to person, place, and time  Psychiatric:         Mood and Affect: Mood normal          Behavior: Behavior normal          Thought Content:  Thought content normal          Judgment: Judgment normal        Medications have been reviewed by provider in current encounter    ISA Roberts       "

## 2023-05-02 NOTE — PROGRESS NOTES
Name: Gayla Jang      : 1955      MRN: [id number]  Encounter Provider: ISA Lockhart  Encounter Date: 2023   Encounter department: [facility]    Assessment & Plan     1  Encounter for support and coordination of transition of care    2  Chronic obstructive pulmonary disease with acute exacerbation (HCC)           Subjective      HPI  Review of Systems    Current Outpatient Medications on File Prior to Visit   Medication Sig    abiraterone (ZYTIGA) 250 mg tablet Take 4 tablets (1,000 mg total) by mouth once daily   albuterol (2 5 mg/3 mL) 0 083 % nebulizer solution Take 3 mL (2 5 mg total) by nebulization every 6 (six) hours as needed for wheezing or shortness of breath    albuterol (PROVENTIL HFA,VENTOLIN HFA) 90 mcg/act inhaler Inhale 2 puffs every 4 (four) hours as needed for wheezing    ALPRAZolam (XANAX) 0 5 mg tablet TAKE 1 TABLET BY MOUTH 2 (TWO) TIMES A DAY AS NEEDED FOR ANXIETY OR SLEEP ONGOING THERAPY   atorvastatin (LIPITOR) 40 mg tablet Take 1 tablet (40 mg total) by mouth daily with dinner    budesonide-formoterol (SYMBICORT) 160-4 5 mcg/act inhaler Inhale 2 puffs    gabapentin (NEURONTIN) 300 mg capsule Take 1 capsule (300 mg total) by mouth daily at bedtime    lidocaine (LIDODERM) 5 % Apply 3 patches topically daily Remove & Discard patch within 12 hours or as directed by MD    montelukast (SINGULAIR) 10 mg tablet TAKE 1 TABLET BY MOUTH EVERYDAY AT BEDTIME    morphine (MS CONTIN) 15 mg 12 hr tablet Take 1 tablet (15 mg total) by mouth 3 (three) times a day Ongoing therapy Max Daily Amount: 45 mg Do not start before May 2, 2023   morphine (MSIR) 15 mg tablet Take 0 5 tablets (7 5 mg total) by mouth every 4 (four) hours as needed for severe pain (breakthrough pain) Max Daily Amount: 45 mg    naloxone (NARCAN) 4 mg/0 1 mL nasal spray Administer 1 spray into a nostril   If no response after 2-3 minutes, give another dose in the other "nostril using a new spray   NON FORMULARY Medical marijuana    predniSONE 10 mg tablet Take 4 tablets (40 mg total) by mouth daily for 1 day, THEN 3 tablets (30 mg total) daily for 3 days, THEN 2 tablets (20 mg total) daily for 3 days, THEN 1 tablet (10 mg total) daily for 3 days  Do not start before April 26, 2023   senna (SENOKOT) 8 6 mg Take 2 tablets (17 2 mg total) by mouth daily at bedtime as needed for constipation    Symbicort 160-4 5 MCG/ACT inhaler INHALE 2 PUFFS BY MOUTH 2 TIMES A DAY RINSE MOUTH AFTER USE         Objective     /60 (BP Location: Left arm, Patient Position: Sitting, Cuff Size: Standard)   Pulse 100   Temp 99 1 °F (37 3 °C) (Tympanic)   Resp 22   Ht 5' 6 42\" (1 687 m)   Wt 48 7 kg (107 lb 6 4 oz)   SpO2 91%   BMI 17 12 kg/m²     Physical Exam  FleISA Del Valle  "

## 2023-05-08 ENCOUNTER — TELEPHONE (OUTPATIENT)
Dept: PALLIATIVE MEDICINE | Facility: CLINIC | Age: 68
End: 2023-05-08

## 2023-05-08 DIAGNOSIS — Z51.5 PALLIATIVE CARE PATIENT: ICD-10-CM

## 2023-05-08 DIAGNOSIS — G89.3 NEOPLASM RELATED PAIN: ICD-10-CM

## 2023-05-08 DIAGNOSIS — R23.2 HOT FLASHES: Primary | ICD-10-CM

## 2023-05-08 RX ORDER — PREDNISONE 2.5 MG/1
2.5 TABLET ORAL DAILY
Qty: 30 TABLET | Refills: 0 | Status: SHIPPED | OUTPATIENT
Start: 2023-05-08 | End: 2023-05-09 | Stop reason: ALTCHOICE

## 2023-05-09 ENCOUNTER — OFFICE VISIT (OUTPATIENT)
Age: 68
End: 2023-05-09

## 2023-05-09 VITALS
DIASTOLIC BLOOD PRESSURE: 68 MMHG | HEART RATE: 78 BPM | OXYGEN SATURATION: 94 % | TEMPERATURE: 97.6 F | SYSTOLIC BLOOD PRESSURE: 122 MMHG | RESPIRATION RATE: 18 BRPM | BODY MASS INDEX: 17.34 KG/M2 | WEIGHT: 108.8 LBS

## 2023-05-09 DIAGNOSIS — C79.51 PROSTATE CANCER METASTATIC TO BONE (HCC): ICD-10-CM

## 2023-05-09 DIAGNOSIS — F11.20 UNCOMPLICATED OPIOID DEPENDENCE (HCC): ICD-10-CM

## 2023-05-09 DIAGNOSIS — G47.00 INSOMNIA, UNSPECIFIED TYPE: ICD-10-CM

## 2023-05-09 DIAGNOSIS — R63.0 ANOREXIA: ICD-10-CM

## 2023-05-09 DIAGNOSIS — R64 CACHEXIA (HCC): ICD-10-CM

## 2023-05-09 DIAGNOSIS — R23.2 HOT FLASHES: Primary | ICD-10-CM

## 2023-05-09 DIAGNOSIS — Z79.899 MEDICAL MARIJUANA USE: ICD-10-CM

## 2023-05-09 DIAGNOSIS — C61 PROSTATE CANCER METASTATIC TO BONE (HCC): ICD-10-CM

## 2023-05-09 DIAGNOSIS — Z51.5 PALLIATIVE CARE PATIENT: ICD-10-CM

## 2023-05-09 RX ORDER — PREDNISONE 1 MG/1
1 TABLET ORAL DAILY
Qty: 30 TABLET | Refills: 1 | Status: SHIPPED | OUTPATIENT
Start: 2023-05-09

## 2023-05-09 NOTE — PROGRESS NOTES
"    Outpatient Follow-Up - Palliative and Supportive Care   Alyson Reynolds 75 y o  male [id number]    Assessment & Plan  1  Hot flashes    2  Prostate [facility] metastatic to bone ([facility] )    3  Palliative care patient    4  Cachexia ([facility] )    5  Anorexia    6  Insomnia, unspecified type    7  Medical marijuana use    8  Uncomplicated opioid dependence ([facility] )      Plan:  · Rishabh's symptoms are stable, except he is suffering from \"head cold\" since prior hospitalization  · He also endorses overexertion from moving process as he must be out of his current house by the end of the month  · Pain is well managed on MS Contin 15MG BID and he is trying to wean further  · He is prescribed morhpine IR 7 5MG Q6H PRN but does not take this  (Pt does not tolerate IR oxycodone or hydromorphone for BT pain)  · He is having ongoing hot flashes which are bothersome to him  We discussed gabapentin and he believes this has been helpful  He states he takes 600MG nightly however prescription has been for 300MG  He did not tolerate Zoloft for hot flashes  · Stopped clonazepam due to headaches  · Prescribed Xanax 0 5MG TID PRN -- only takes occasionally  · He requests 1MG prednisone, rather than 2 5MG  · We did discuss indications to go to ER as Merna Hou states he has been using his inhaler more than prescribed  He is also declining referral to pulmonology  · ACP - Merna Hou is DNR/DNI  Will request documentation  · RTC - 2 months     Medications adjusted this encounter:  Requested Prescriptions     Signed Prescriptions Disp Refills   • predniSONE 1 mg tablet 30 tablet 1     Sig: Take 1 tablet (1 mg total) by mouth daily     No orders of the defined types were placed in this encounter  Medications Discontinued During This Encounter   Medication Reason   • predniSONE 2 5 mg tablet Alternate therapy         Alyson Reynolds was seen today for symptoms and planning cares related to above illnesses    I have reviewed the " patient's controlled substance dispensing history in the Prescription Drug Monitoring Program in compliance with the [facility] regulations before prescribing any controlled substances  Filled  Written  Sold  ID  Drug  QTY  Days  Prescriber  RX #  Dispenser  Refill  Daily Dose*  Pymt Type       04/25/2023 04/25/2023   1  Morphine Sulfate Ir 15 Mg Tab 20 00  7  [facility] Fis  [id number]   Pen (2781)   42 86 MME  Medicare [place]     04/17/2023 04/17/2023   1  Morphine Sulf Er 15 Mg Tablet 60 00  30  Ch Fis  [id number]   Pen (8581)   30 00 MME  Medicare [place]     04/17/2023 04/17/2023   1  Alprazolam 0 5 Mg Tablet 90 00  45  Ch Fis              They are invited to continue to follow with us  If there are questions or concerns, please contact us through our clinic/answering service 24 hours a day, seven days a week  Diego Rushing PA-C  [facility]  [phone]    Visit Information    Accompanied By: No one    Source of History: Self    History Limitations: None    History of Present Illness      Julia Clark is a 75 y o  male who presents in follow up of symptoms related to metastatic prostate cancer  Pertinent issues include: symptom management, pain, neoplasm related, breathlessness, depression or anxiety, nausea, fatigue, assessment of goals of care, disease process education and discussion of prognosis, advance care planning  Navjot Arguello is under the care of Dr Ceci Arredondo and Julita MOSS for metastatic prostate cancer diagnosed early 2022  CT image done 12/21 revealed extensive multifocal osseous metastatic disease eluding axial and appendicular skeletal system, calvarium, sternum, ribs, right humerus and clavicle, bilateral scapulae, cervical spine and thoracolumbar spine, sacrum pelvis and bilateral femurs  Navjot Arguello was initiated on Atamaria 86 antiandrogen therapy by urology and continues Lupron every 6 months    He was started on Xtandi but this was discontinued due to side effects of hallucinations that led to hospitalization in May 2022, however the side effects may have been contributed to other factors  He was switched to [place] with Emaline [place] every 3 months for bone metastasis  In October 2022, Kelli Solitario was hospitalized at an external agency [facility] in [place]) due to profound fatigue and intractable pain  He was rehydrated and his symptom regimen was adjusted by palliative care physician Dr Fabrizio Herbert ([phone])  Kelli Solitario returns to see Dr Kasey Falcon after this  His most recent imaging does not show significant progression and his PSA is stable  Most recently, Rishabh's imaging and PSA level shows stable disease  Kelli Solitario follows with palliative care for symptom management  Today, Kelli Solitario presents unaccompanied to his palliative and supportive care follow-up  He states overall his symptoms are stable  He reports his neck pain has actually resolved and his range of motion is improving  Currently he is suffering from an upper respiratory infection that has been lingering  He did not know what he could take for this and we discussed options today  He is feeling a lot of pressure from his upcoming move  He is well aware he is not in a good condition to be doing heavy lifting or housework however he does not see other options  He states his family is too busy  He has a moving company coming to help as well but there are some things he has to take care of on his own  Apart from the stress of moving and the setback of having an upper respiratory infection, he does not request any medication changes or report any new symptoms  Extensive support provided  Past medical, surgical, social, and family histories are reviewed and pertinent updates are made  Review of Systems   Constitutional: Positive for decreased appetite, malaise/fatigue and night sweats  Negative for fever  HENT: Negative for congestion  Eyes: Negative for blurred vision     Cardiovascular: "Negative for chest pain, dyspnea on exertion, irregular heartbeat and leg swelling  Respiratory: Positive for cough, sputum production (occasinal ) and wheezing (\"upper respiratory\")  Negative for hemoptysis, shortness of breath and sleep disturbances due to breathing  Skin: Negative for dry skin and nail changes  Musculoskeletal: Negative for arthritis and back pain  Gastrointestinal: Positive for anorexia  Negative for bloating, abdominal pain, nausea and vomiting  Genitourinary: Negative for bladder incontinence  Psychiatric/Behavioral: Negative for altered mental status, depression, hallucinations and hypervigilance  The patient does not have insomnia and is not nervous/anxious  Vital Signs    /68 (BP Location: Left arm, Patient Position: Sitting, Cuff Size: Standard)   Pulse 78   Temp 97 6 °F (36 4 °C) (Temporal)   Resp 18   Wt 49 4 kg (108 lb 12 8 oz)   SpO2 94%   BMI 17 34 kg/m²      Physical Exam and Objective Data  Physical Exam  Vitals and nursing note reviewed  Exam conducted with a chaperone present  Constitutional:       General: He is not in acute distress  Appearance: He is cachectic  He is ill-appearing  Eyes:      General:         Right eye: No discharge  Left eye: No discharge  Cardiovascular:      Rate and Rhythm: Normal rate  Heart sounds: No murmur heard  Pulmonary:      Effort: Pulmonary effort is normal  No respiratory distress  Breath sounds: Wheezing (mild, referred from upper respiratory ) present  Musculoskeletal:         General: No swelling  Cervical back: No rigidity  Right lower leg: No edema  Left lower leg: No edema  Skin:     General: Skin is warm and dry  Coloration: Skin is pale  Neurological:      Mental Status: He is oriented to person, place, and time  Psychiatric:         Mood and Affect: Mood normal          Behavior: Behavior normal  Behavior is cooperative         Radiology and " Laboratory:  I personally reviewed and interpreted the following results: CBC, CMP, CXRAY    40 minutes was spent face to face with Carlyn Riley with greater than 50% of the time spent in counseling or coordination of care including discussions of etiology of diagnosis, pathogenesis of diagnosis, prognosis of diagnosis, follow up requirements, risk factors and risk reduction of disease, patient and family counseling/involvement in care and compliance with treatment regimen  reviewed chart, reviewed lab values, reviewed imaging, provided medical updates, discussed palliative care and symptom management, opioid titration, discussed goals of care, provided supportive listening, provided anticipatory guidance, provided psychosocial and emotional support, assessed competency and decision-making and facilitated interdisciplinary communication  All of the patient's or agent's questions were answered during this discussion

## 2023-05-17 DIAGNOSIS — G89.3 CANCER RELATED PAIN: ICD-10-CM

## 2023-05-17 DIAGNOSIS — C61 PROSTATE CANCER METASTATIC TO BONE (HCC): ICD-10-CM

## 2023-05-17 DIAGNOSIS — Z51.5 PALLIATIVE CARE PATIENT: ICD-10-CM

## 2023-05-17 DIAGNOSIS — C79.51 PROSTATE CANCER METASTATIC TO BONE (HCC): ICD-10-CM

## 2023-05-17 RX ORDER — MORPHINE SULFATE 15 MG/1
15 TABLET, FILM COATED, EXTENDED RELEASE ORAL 2 TIMES DAILY
Qty: 60 TABLET | Refills: 0 | Status: SHIPPED | OUTPATIENT
Start: 2023-05-17

## 2023-05-17 NOTE — TELEPHONE ENCOUNTER
Primary palliative medicine provider: Rahul Mahmood     Medication requested:Morphine ms contin 15 mg      Next scheduled appointment:6/29/23      [facility]

## 2023-05-18 DIAGNOSIS — J44.9 CHRONIC OBSTRUCTIVE PULMONARY DISEASE, UNSPECIFIED COPD TYPE (HCC): ICD-10-CM

## 2023-05-18 RX ORDER — BUDESONIDE AND FORMOTEROL FUMARATE DIHYDRATE 160; 4.5 UG/1; UG/1
AEROSOL RESPIRATORY (INHALATION)
Qty: 10.2 G | Refills: 0 | Status: SHIPPED | OUTPATIENT
Start: 2023-05-18

## 2023-05-30 ENCOUNTER — OFFICE VISIT (OUTPATIENT)
Dept: FAMILY MEDICINE CLINIC | Facility: CLINIC | Age: 68
End: 2023-05-30

## 2023-05-30 VITALS
SYSTOLIC BLOOD PRESSURE: 132 MMHG | HEART RATE: 78 BPM | WEIGHT: 104.4 LBS | TEMPERATURE: 99 F | DIASTOLIC BLOOD PRESSURE: 70 MMHG | RESPIRATION RATE: 22 BRPM | OXYGEN SATURATION: 90 % | BODY MASS INDEX: 16.78 KG/M2 | HEIGHT: 66 IN

## 2023-05-30 DIAGNOSIS — R79.81 LOW OXYGEN SATURATION: ICD-10-CM

## 2023-05-30 DIAGNOSIS — Z00.00 MEDICARE ANNUAL WELLNESS VISIT, SUBSEQUENT: ICD-10-CM

## 2023-05-30 DIAGNOSIS — Z13.6 SCREENING FOR CARDIOVASCULAR CONDITION: ICD-10-CM

## 2023-05-30 DIAGNOSIS — R05.3 CHRONIC COUGH: Primary | ICD-10-CM

## 2023-05-30 DIAGNOSIS — Z11.59 ENCOUNTER FOR HEPATITIS C SCREENING TEST FOR LOW RISK PATIENT: ICD-10-CM

## 2023-05-30 DIAGNOSIS — J44.9 CHRONIC OBSTRUCTIVE PULMONARY DISEASE, UNSPECIFIED COPD TYPE (HCC): ICD-10-CM

## 2023-05-30 DIAGNOSIS — R63.6 UNDERWEIGHT: ICD-10-CM

## 2023-05-30 RX ORDER — AZITHROMYCIN 250 MG/1
250 TABLET, FILM COATED ORAL DAILY
Qty: 6 TABLET | Refills: 0 | Status: SHIPPED | OUTPATIENT
Start: 2023-05-30 | End: 2023-06-04

## 2023-05-30 NOTE — PROGRESS NOTES
Assessment and Plan:     Problem List Items Addressed This Visit    None  Visit Diagnoses     Medicare annual wellness visit, subsequent        Underweight            BMI Counseling: Body mass index is 16 64 kg/m²  The BMI is below normal  Patient advised to gain weight  Rationale for BMI follow-up plan is due to patient being underweight  Current treatment for prostrate cancer     Falls Plan of Care: balance, strength, and gait training instructions were provided  Recommended assistive device to help with gait and balance  Preventive health issues were discussed with patient, and age appropriate screening tests were ordered as noted in patient's After Visit Summary  Personalized health advice and appropriate referrals for health education or preventive services given if needed, as noted in patient's After Visit Summary       History of Present Illness:     Patient presents for a Medicare Wellness Visit    HPI   Patient Care Team:  Denver Molt as PCP - General (Family Medicine)  Elena Frost RD as Dietitian (Nutrition)     Review of Systems:     Review of Systems     Problem List:     Patient Active Problem List   Diagnosis   • COPD (chronic obstructive pulmonary disease) ([facility] )   • Acute tracheobronchitis   • Acute respiratory failure with hypoxia (HCC)   • Severe protein-calorie malnutrition (HCC)   • HLD (hyperlipidemia)   • Left lower quadrant abdominal pain   • Personal history of colonic polyps   • Prostate cancer metastatic to bone ([facility] )   • Insomnia   • Hot flashes   • Anorexia   • Cachexia [facility])   • Palliative care patient   • Encephalopathy acute   • Neoplasm related pain   • Medical marijuana use   • Acute respiratory insufficiency   • Opioid dependence ([facility] )   • Positive blood culture      Past Medical and Surgical History:     Past Medical History:   Diagnosis Date   • Bone cancer ([facility] )    • Colon polyp    • COPD (chronic obstructive pulmonary disease) ([facility] )    • Emphysema lung ([facility] ) • Hyperlipidemia    • Prostate cancer [facility])      Past Surgical History:   Procedure Laterality Date   • APPENDECTOMY     • COLONOSCOPY     • IR BIOPSY BONE  2021   • JOINT REPLACEMENT     • UPPER GASTROINTESTINAL ENDOSCOPY        Family History:     Family History   Problem Relation Age of Onset   • Lung cancer Mother    • Breast cancer Sister    • Colon polyps Brother    • Colon cancer Neg Hx       Social History:     Social History     Socioeconomic History   • Marital status: Single     Spouse name: None   • Number of children: None   • Years of education: None   • Highest education level: None   Occupational History   • None   Tobacco Use   • Smoking status: Former     Packs/day: 1 00     Years: 41 00     Total pack years: 41 00     Types: Cigarettes     Quit date:      Years since quittin 4   • Smokeless tobacco: Never   Vaping Use   • Vaping Use: Never used   Substance and Sexual Activity   • Alcohol use: Never   • Drug use: Never   • Sexual activity: Not Currently   Other Topics Concern   • None   Social History Narrative   • None     Social Determinants of Health     Financial Resource Strain: Low Risk  (2023)    Overall Financial Resource Strain (CARDIA)    • Difficulty of Paying Living Expenses: Not hard at all   Food Insecurity: No Food Insecurity (2023)    Hunger Vital Sign    • Worried About Running Out of Food in the Last Year: Never true    • Ran Out of Food in the Last Year: Never true   Transportation Needs: No Transportation Needs (2023)    PRAPARE - Transportation    • Lack of Transportation (Medical): No    • Lack of Transportation (Non-Medical): No   Physical Activity: Not on file   Stress: Not on file   Social Connections: Not on file   Intimate Partner Violence: Not At Risk (2022)    Humiliation, Afraid, Rape, and Kick questionnaire    • Fear of Current or Ex-Partner: No    • Emotionally Abused: No    • Physically Abused: No    • Sexually Abused:  No Housing Stability: Low Risk  (4/24/2023)    Housing Stability Vital Sign    • Unable to Pay for Housing in the Last Year: No    • Number of Places Lived in the Last Year: 1    • Unstable Housing in the Last Year: No      Medications and Allergies:     Current Outpatient Medications   Medication Sig Dispense Refill   • abiraterone (ZYTIGA) 250 mg tablet Take 4 tablets (1,000 mg total) by mouth once daily  120 tablet 10   • albuterol (PROVENTIL HFA,VENTOLIN HFA) 90 mcg/act inhaler Inhale 2 puffs every 4 (four) hours as needed for wheezing 18 g 0   • ALPRAZolam (XANAX) 0 5 mg tablet TAKE 1 TABLET BY MOUTH 2 (TWO) TIMES A DAY AS NEEDED FOR ANXIETY OR SLEEP ONGOING THERAPY  90 tablet 0   • atorvastatin (LIPITOR) 40 mg tablet Take 1 tablet (40 mg total) by mouth daily with dinner 30 tablet 0   • budesonide-formoterol (SYMBICORT) 160-4 5 mcg/act inhaler Inhale 2 puffs     • gabapentin (NEURONTIN) 300 mg capsule Take 1 capsule (300 mg total) by mouth daily at bedtime 90 capsule 0   • lidocaine (LIDODERM) 5 % Apply 3 patches topically daily Remove & Discard patch within 12 hours or as directed by MD 90 patch 0   • montelukast (SINGULAIR) 10 mg tablet TAKE 1 TABLET BY MOUTH EVERYDAY AT BEDTIME 90 tablet 1   • morphine (MS CONTIN) 15 mg 12 hr tablet Take 1 tablet (15 mg total) by mouth 2 (two) times a day Ongoing therapy Max Daily Amount: 30 mg 60 tablet 0   • morphine (MSIR) 15 mg tablet Take 0 5 tablets (7 5 mg total) by mouth every 4 (four) hours as needed for severe pain (breakthrough pain) Max Daily Amount: 45 mg 20 tablet 0   • naloxone (NARCAN) 4 mg/0 1 mL nasal spray Administer 1 spray into a nostril  If no response after 2-3 minutes, give another dose in the other nostril using a new spray   1 each 0   • NON FORMULARY Medical marijuana     • predniSONE 1 mg tablet Take 1 tablet (1 mg total) by mouth daily 30 tablet 1   • senna (SENOKOT) 8 6 mg Take 2 tablets (17 2 mg total) by mouth daily at bedtime as needed for constipation 60 tablet 0   • Symbicort 160-4 5 MCG/ACT inhaler INHALE 2 PUFFS BY MOUTH 2 TIMES A DAY RINSE MOUTH AFTER USE  10 2 g 0     No current facility-administered medications for this visit  No Known Allergies   Immunizations:     Immunization History   Administered Date(s) Administered   • COVID-19 MODERNA VACC 0 5 ML IM 05/14/2021, 06/11/2021   • INFLUENZA 10/27/2018   • Influenza Split High Dose Preservative Free IM 02/17/2021   • Pneumococcal Conjugate Vaccine 20-valent (Pcv20), Polysace 04/22/2022   • Td (adult), adsorbed 06/26/2018      Health Maintenance:         Topic Date Due   • Hepatitis C Screening  Never done   • Lung Cancer Screening  12/21/2022   • Colorectal Cancer Screening  12/13/2031         Topic Date Due   • COVID-19 Vaccine (3 - Moderna risk series) 07/09/2021      Medicare Screening Tests and Risk Assessments:     Last Medicare Wellness visit information reviewed, patient interviewed and updates made to the record today  Health Risk Assessment:   Patient rates overall health as poor  Patient feels that their physical health rating is slightly worse  Patient is dissatisfied with their life  Eyesight was rated as same  Hearing was rated as same  Patient feels that their emotional and mental health rating is slightly worse  Patients states they are sometimes angry  Patient states they are often unusually tired/fatigued  Pain experienced in the last 7 days has been some  Patient's pain rating has been 5/10  Patient states that he has experienced weight loss or gain in last 6 months  Fall Risk Screening: In the past year, patient has experienced: history of falling in past year    Number of falls: 2 or more  Injured during fall?: No    Feels unsteady when standing or walking?: Yes    Worried about falling?: Yes      Home Safety:  Patient does not have trouble with stairs inside or outside of their home  Home safety hazards include: none       Nutrition:   Current diet is Regular  Medications:   Patient is not currently taking any over-the-counter supplements  Patient is able to manage medications  Activities of Daily Living (ADLs)/Instrumental Activities of Daily Living (IADLs):   Walk and transfer into and out of bed and chair?: Yes  Dress and groom yourself?: Yes    Bathe or shower yourself?: Yes    Feed yourself? Yes  Do your laundry/housekeeping?: Yes  Manage your money, pay your bills and track your expenses?: Yes  Make your own meals?: Yes    Do your own shopping?: Yes    Previous Hospitalizations:   Any hospitalizations or ED visits within the last 12 months?: Yes    How many hospitalizations have you had in the last year?: 1-2    Advance Care Planning:   Living will: Yes    Durable POA for healthcare: Yes    Advanced directive: Yes      Cognitive Screening:   Provider or family/friend/caregiver concerned regarding cognition?: No    PREVENTIVE SCREENINGS      Cardiovascular Screening:    General: Screening Not Indicated and History Lipid Disorder      Diabetes Screening:     General: Screening Current      Colorectal Cancer Screening:     General: Screening Current      Prostate Cancer Screening:    General: History Prostate Cancer      Osteoporosis Screening:    General: Risks and Benefits Discussed and Patient Declines      Abdominal Aortic Aneurysm (AAA) Screening:    Risk factors include: age between 73-67 yo and tobacco use        Lung Cancer Screening:     General: Patient Declines and Risks and Benefits Discussed      Hepatitis C Screening:    General: Risks and Benefits Discussed and Patient Declines    Screening, Brief Intervention, and Referral to Treatment (SBIRT)    Screening  Typical number of drinks in a day: 0  Typical number of drinks in a week: 0  Interpretation: Low risk drinking behavior  Review of Current Opioid Use    Opioid Risk Tool (ORT) Interpretation: Complete Opioid Risk Tool (ORT)    No results found       Physical Exam:     /70 (BP "Location: Left arm, Patient Position: Sitting, Cuff Size: Standard)   Pulse 78   Temp 99 °F (37 2 °C) (Tympanic)   Resp 22   Ht 5' 6 42\" (1 687 m)   Wt 47 4 kg (104 lb 6 4 oz)   SpO2 90%   BMI 16 64 kg/m²     Physical Exam  Vitals and nursing note reviewed  Constitutional:       Appearance: He is ill-appearing  He is not diaphoretic  Comments: emaciated    HENT:      Head: Normocephalic and atraumatic  Right Ear: Tympanic membrane, ear canal and external ear normal  There is no impacted cerumen  Left Ear: Tympanic membrane, ear canal and external ear normal  There is no impacted cerumen  Nose: Nose normal  No congestion  Mouth/Throat:      Mouth: Mucous membranes are moist       Pharynx: Oropharynx is clear  No oropharyngeal exudate  Eyes:      General:         Right eye: No discharge  Extraocular Movements: Extraocular movements intact  Conjunctiva/sclera: Conjunctivae normal    Cardiovascular:      Rate and Rhythm: Regular rhythm  Tachycardia present  Pulses: Normal pulses  Heart sounds: Normal heart sounds  Pulmonary:      Effort: Prolonged expiration present  No accessory muscle usage  Breath sounds: Examination of the right-lower field reveals decreased breath sounds  Examination of the left-lower field reveals decreased breath sounds  Decreased breath sounds and wheezing present  No rhonchi  Abdominal:      General: Bowel sounds are normal       Palpations: Abdomen is soft  Tenderness: There is no right CVA tenderness or left CVA tenderness  Musculoskeletal:         General: Normal range of motion  Cervical back: Normal range of motion and neck supple  Right lower leg: No edema  Left lower leg: No edema  Skin:     General: Skin is warm and dry  Capillary Refill: Capillary refill takes less than 2 seconds  Neurological:      Mental Status: He is alert and oriented to person, place, and time     Psychiatric:         " Mood and Affect: Mood normal          Behavior: Behavior normal          Thought Content:  Thought content normal          Judgment: Judgment normal           ISA Nicholas

## 2023-05-30 NOTE — PATIENT INSTRUCTIONS
Medicare Preventive Visit Patient Instructions  Thank you for completing your Welcome to Medicare Visit or Medicare Annual Wellness Visit today  Your next wellness visit will be due in one year (5/30/2024)  The screening/preventive services that you may require over the next 5-10 years are detailed below  Some tests may not apply to you based off risk factors and/or age  Screening tests ordered at today's visit but not completed yet may show as past due  Also, please note that scanned in results may not display below  Preventive Screenings:  Service Recommendations Previous Testing/Comments   Colorectal Cancer Screening  · Colonoscopy    · Fecal Occult Blood Test (FOBT)/Fecal Immunochemical Test (FIT)  · Fecal DNA/Cologuard Test  · Flexible Sigmoidoscopy Age: 39-70 years old   Colonoscopy: every 10 years (May be performed more frequently if at higher risk)  OR  FOBT/FIT: every 1 year  OR  Cologuard: every 3 years  OR  Sigmoidoscopy: every 5 years  Screening may be recommended earlier than age 39 if at higher risk for colorectal cancer  Also, an individualized decision between you and your healthcare provider will decide whether screening between the ages of 74-80 would be appropriate   Colonoscopy: 12/13/2021  FOBT/FIT: Not on file  Cologuard: Not on file  Sigmoidoscopy: Not on file          Prostate Cancer Screening Individualized decision between patient and health care provider in men between ages of 53-78   Medicare will cover every 12 months beginning on the day after your 50th birthday PSA: 1 8 ng/mL           Hepatitis C Screening Once for adults born between 1945 and 1965  More frequently in patients at high risk for Hepatitis C Hep C Antibody: Not on file        Diabetes Screening 1-2 times per year if you're at risk for diabetes or have pre-diabetes Fasting glucose: 106 mg/dL (8/15/2022)  A1C: No results in last 5 years (No results in last 5 years)      Cholesterol Screening Once every 5 years if you don't have a lipid disorder  May order more often based on risk factors  Lipid panel: 02/19/2020         Other Preventive Screenings Covered by Medicare:  1  Abdominal Aortic Aneurysm (AAA) Screening: covered once if your at risk  You're considered to be at risk if you have a family history of AAA or a male between the age of 73-68 who smoking at least 100 cigarettes in your lifetime  2  Lung Cancer Screening: covers low dose CT scan once per year if you meet all of the following conditions: (1) Age 50-69; (2) No signs or symptoms of lung cancer; (3) Current smoker or have quit smoking within the last 15 years; (4) You have a tobacco smoking history of at least 20 pack years (packs per day x number of years you smoked); (5) You get a written order from a healthcare provider  3  Glaucoma Screening: covered annually if you're considered high risk: (1) You have diabetes OR (2) Family history of glaucoma OR (3)  aged 48 and older OR (3)  American aged 72 and older  3  Osteoporosis Screening: covered every 2 years if you meet one of the following conditions: (1) Have a vertebral abnormality; (2) On glucocorticoid therapy for more than 3 months; (3) Have primary hyperparathyroidism; (4) On osteoporosis medications and need to assess response to drug therapy  5  HIV Screening: covered annually if you're between the age of 12-76  Also covered annually if you are younger than 13 and older than 72 with risk factors for HIV infection  For pregnant patients, it is covered up to 3 times per pregnancy      Immunizations:  Immunization Recommendations   Influenza Vaccine Annual influenza vaccination during flu season is recommended for all persons aged >= 6 months who do not have contraindications   Pneumococcal Vaccine   * Pneumococcal conjugate vaccine = PCV13 (Prevnar 13), PCV15 (Vaxneuvance), PCV20 (Prevnar 20)  * Pneumococcal polysaccharide vaccine = PPSV23 (Pneumovax) Adults 25-60 years old: 1-3 doses may be recommended based on certain risk factors  Adults 72 years old: 1-2 doses may be recommended based off what pneumonia vaccine you previously received   Hepatitis B Vaccine 3 dose series if at intermediate or high risk (ex: diabetes, end stage renal disease, liver disease)   Tetanus (Td) Vaccine - COST NOT COVERED BY MEDICARE PART B Following completion of primary series, a booster dose should be given every 10 years to maintain immunity against tetanus  Td may also be given as tetanus wound prophylaxis  Tdap Vaccine - COST NOT COVERED BY MEDICARE PART B Recommended at least once for all adults  For pregnant patients, recommended with each pregnancy  Shingles Vaccine (Shingrix) - COST NOT COVERED BY MEDICARE PART B  2 shot series recommended in those aged 48 and above     Health Maintenance Due:      Topic Date Due   • Hepatitis C Screening  Never done   • Lung Cancer Screening  12/21/2022   • Colorectal Cancer Screening  12/13/2031     Immunizations Due:      Topic Date Due   • COVID-19 Vaccine (3 - Moderna risk series) 07/09/2021     Advance Directives   What are advance directives? Advance directives are legal documents that state your wishes and plans for medical care  These plans are made ahead of time in case you lose your ability to make decisions for yourself  Advance directives can apply to any medical decision, such as the treatments you want, and if you want to donate organs  What are the types of advance directives? There are many types of advance directives, and each state has rules about how to use them  You may choose a combination of any of the following:  · Living will: This is a written record of the treatment you want  You can also choose which treatments you do not want, which to limit, and which to stop at a certain time  This includes surgery, medicine, IV fluid, and tube feedings  · Durable power of  for healthcare [facility]):   This is a written record that states who you want to make healthcare choices for you when you are unable to make them for yourself  This person, called a proxy, is usually a family member or a friend  You may choose more than 1 proxy  · Do not resuscitate (DNR) order:  A DNR order is used in case your heart stops beating or you stop breathing  It is a request not to have certain forms of treatment, such as CPR  A DNR order may be included in other types of advance directives  · Medical directive: This covers the care that you want if you are in a coma, near death, or unable to make decisions for yourself  You can list the treatments you want for each condition  Treatment may include pain medicine, surgery, blood transfusions, dialysis, IV or tube feedings, and a ventilator (breathing machine)  · Values history: This document has questions about your views, beliefs, and how you feel and think about life  This information can help others choose the care that you would choose  Why are advance directives important? An advance directive helps you control your care  Although spoken wishes may be used, it is better to have your wishes written down  Spoken wishes can be misunderstood, or not followed  Treatments may be given even if you do not want them  An advance directive may make it easier for your family to make difficult choices about your care  Underweight  Underweight is defined as having a body mass index (BMI) of less than 18 5 kg/m2   Anorexia  is a loss of appetite, decreased food intake, or both  Your appetite naturally decreases as you get older  You also get full faster than you used to  This occurs because your body needs less energy  Other body changes can also lead to a decreased appetite  Even though some appetite loss is normal, you still need to get enough calories and nutrients to keep you healthy  You can start to lose too much weight if you do not eat as much food as your body needs   Unwanted weight loss can cause health problems, or worsen health problems you already have  You can also become dehydrated if you do not drink enough liquid  How to eat healthy and get enough nutrients:   · Choose healthy foods  Eat a variety of fruits, vegetables, whole grains, low-fat dairy foods, lean meats, and other protein foods  Limit foods high in fat, sugar, and salt  Limit or avoid alcohol as directed  Work with a dietitian to help you plan your meals if you need to follow a special diet  A dietitian can also teach you how to modify foods if you have trouble chewing or swallowing  · Snack on healthy foods between meals  if you only eat a small amount during meals  Snacks provide extra healthy nutrients and calories between meals  Examples include fruit, cheese, and whole grain crackers  · Drink liquids as directed  to avoid dehydration  Drink liquids between meals if they cause you to get full too quickly during meals  Ask how much liquid to drink each day and which liquids are best for you  · Use herbs, spices, and flavor enhancers to add flavor to foods  Avoid using herbs and spice blends that also contain sodium  Ask your healthcare provider or dietitian about flavor enhancers  Flavor enhancers with ham, natural ramos, and roast beef flavors can also be sprinkled on food to add flavor  · Share meals with others as often as you can  Eating with others may help you to eat better during meal time  Ask family members, neighbors, or friends to join you for lunch  There are also senior centers where you can meet people, and share meals with them  · Ask family and friends for help  with shopping or preparing foods  Ask for a ride to the grocery store, if needed  © Copyright [employer] 2018 Information is for End User's use only and may not be sold, redistributed or otherwise used for commercial purposes   All illustrations and images included in CareNotes® are the copyrighted property of A D A M , Inc  or [employer]

## 2023-06-13 DIAGNOSIS — F41.9 ANXIETY: ICD-10-CM

## 2023-06-13 DIAGNOSIS — G47.00 INSOMNIA: ICD-10-CM

## 2023-06-13 DIAGNOSIS — Z51.5 PALLIATIVE CARE PATIENT: ICD-10-CM

## 2023-06-13 RX ORDER — CLONAZEPAM 0.5 MG/1
0.5 TABLET, ORALLY DISINTEGRATING ORAL
Qty: 30 TABLET | Refills: 0 | Status: SHIPPED | OUTPATIENT
Start: 2023-06-13

## 2023-06-13 RX ORDER — ALPRAZOLAM 0.5 MG/1
0.5 TABLET ORAL 2 TIMES DAILY PRN
Qty: 90 TABLET | Refills: 0 | Status: SHIPPED | OUTPATIENT
Start: 2023-06-13

## 2023-06-13 NOTE — TELEPHONE ENCOUNTER
I will allow refill of both alprazolam and clonazepam  Meds sent to pharmacy  Rishabh tolerated both medications in the past, but he stopped the clonazepam because of headaches  If he wants to try again, that is fine

## 2023-06-14 DIAGNOSIS — J44.9 CHRONIC OBSTRUCTIVE PULMONARY DISEASE, UNSPECIFIED COPD TYPE (HCC): ICD-10-CM

## 2023-06-14 RX ORDER — BUDESONIDE AND FORMOTEROL FUMARATE DIHYDRATE 160; 4.5 UG/1; UG/1
AEROSOL RESPIRATORY (INHALATION)
Qty: 10.2 G | Refills: 0 | Status: SHIPPED | OUTPATIENT
Start: 2023-06-14

## 2023-06-17 ENCOUNTER — TELEPHONE (OUTPATIENT)
Dept: OTHER | Facility: OTHER | Age: 68
End: 2023-06-17

## 2023-06-17 DIAGNOSIS — C61 PROSTATE CANCER METASTATIC TO BONE (HCC): ICD-10-CM

## 2023-06-17 DIAGNOSIS — C79.51 PROSTATE CANCER METASTATIC TO BONE (HCC): ICD-10-CM

## 2023-06-17 DIAGNOSIS — G89.3 CANCER RELATED PAIN: ICD-10-CM

## 2023-06-17 DIAGNOSIS — F32.9 REACTIVE DEPRESSION: ICD-10-CM

## 2023-06-17 DIAGNOSIS — Z51.5 PALLIATIVE CARE PATIENT: ICD-10-CM

## 2023-06-17 RX ORDER — MORPHINE SULFATE 15 MG/1
15 TABLET, FILM COATED, EXTENDED RELEASE ORAL 2 TIMES DAILY
Qty: 60 TABLET | Refills: 0 | Status: SHIPPED | OUTPATIENT
Start: 2023-06-17

## 2023-06-17 RX ORDER — GABAPENTIN 300 MG/1
300 CAPSULE ORAL
Qty: 90 CAPSULE | Refills: 0 | Status: SHIPPED | OUTPATIENT
Start: 2023-06-17 | End: 2023-06-20 | Stop reason: SDUPTHER

## 2023-06-17 NOTE — TELEPHONE ENCOUNTER
PDMP and last office note reviewed  MS contin and gabapentin sent to [facility] pharmacy  Left message on machine for patient

## 2023-06-17 NOTE — TELEPHONE ENCOUNTER
Patient reports having a rough night last night with hot/cold flashes and pain and is requesting on call provider refill Morphine ER and gabapentin

## 2023-06-19 ENCOUNTER — TELEPHONE (OUTPATIENT)
Dept: PALLIATIVE MEDICINE | Facility: CLINIC | Age: 68
End: 2023-06-19

## 2023-06-19 DIAGNOSIS — F32.9 REACTIVE DEPRESSION: ICD-10-CM

## 2023-06-19 NOTE — TELEPHONE ENCOUNTER
Pt called office stating he is completely out of Gabapentin and he is unable to refill as he is taking BID but rx reads he is to take once daily  Pharmacy will not refill until 7/7/23 with current SIG  Pt would like to discuss change of SIG so he may refill

## 2023-06-20 RX ORDER — GABAPENTIN 300 MG/1
600 CAPSULE ORAL
Qty: 60 CAPSULE | Refills: 0 | Status: SHIPPED | OUTPATIENT
Start: 2023-06-20

## 2023-06-20 NOTE — TELEPHONE ENCOUNTER
Last office note from [facility] does indicate that he increased the dose to 600mg HS but does not comment on whether it was advised  Last CMP from 4/25 with GFR WNL  Will prescribe gabapentin 600mg HS but must address plan with [facility] at next palliative appointment on 6/29

## 2023-07-06 ENCOUNTER — APPOINTMENT (OUTPATIENT)
Dept: LAB | Facility: HOSPITAL | Age: 68
End: 2023-07-06
Payer: MEDICARE

## 2023-07-06 DIAGNOSIS — Z13.6 SCREENING FOR CARDIOVASCULAR CONDITION: ICD-10-CM

## 2023-07-06 DIAGNOSIS — C61 PROSTATE CANCER (HCC): ICD-10-CM

## 2023-07-06 DIAGNOSIS — Z11.59 ENCOUNTER FOR HEPATITIS C SCREENING TEST FOR LOW RISK PATIENT: ICD-10-CM

## 2023-07-06 DIAGNOSIS — C61 PROSTATE CANCER METASTATIC TO BONE (HCC): ICD-10-CM

## 2023-07-06 DIAGNOSIS — C79.51 PROSTATE CANCER METASTATIC TO BONE (HCC): ICD-10-CM

## 2023-07-06 LAB
ALBUMIN SERPL BCP-MCNC: 4 G/DL (ref 3.5–5)
ALP SERPL-CCNC: 47 U/L (ref 34–104)
ALT SERPL W P-5'-P-CCNC: 13 U/L (ref 7–52)
ANION GAP SERPL CALCULATED.3IONS-SCNC: 7 MMOL/L
AST SERPL W P-5'-P-CCNC: 19 U/L (ref 13–39)
BASOPHILS # BLD AUTO: 0.09 THOUSANDS/ÂΜL (ref 0–0.1)
BASOPHILS NFR BLD AUTO: 2 % (ref 0–1)
BILIRUB SERPL-MCNC: 0.48 MG/DL (ref 0.2–1)
BUN SERPL-MCNC: 18 MG/DL (ref 5–25)
CALCIUM SERPL-MCNC: 9.3 MG/DL (ref 8.4–10.2)
CHLORIDE SERPL-SCNC: 102 MMOL/L (ref 96–108)
CHOLEST SERPL-MCNC: 242 MG/DL
CO2 SERPL-SCNC: 33 MMOL/L (ref 21–32)
CREAT SERPL-MCNC: 0.71 MG/DL (ref 0.6–1.3)
EOSINOPHIL # BLD AUTO: 0.15 THOUSAND/ÂΜL (ref 0–0.61)
EOSINOPHIL NFR BLD AUTO: 3 % (ref 0–6)
ERYTHROCYTE [DISTWIDTH] IN BLOOD BY AUTOMATED COUNT: 12.9 % (ref 11.6–15.1)
GFR SERPL CREATININE-BSD FRML MDRD: 96 ML/MIN/1.73SQ M
GLUCOSE SERPL-MCNC: 110 MG/DL (ref 65–140)
HCT VFR BLD AUTO: 42.6 % (ref 36.5–49.3)
HCV AB SER QL: NORMAL
HDLC SERPL-MCNC: 83 MG/DL
HGB BLD-MCNC: 13.9 G/DL (ref 12–17)
IMM GRANULOCYTES # BLD AUTO: 0.02 THOUSAND/UL (ref 0–0.2)
IMM GRANULOCYTES NFR BLD AUTO: 0 % (ref 0–2)
LDLC SERPL CALC-MCNC: 119 MG/DL (ref 0–100)
LYMPHOCYTES # BLD AUTO: 1.12 THOUSANDS/ÂΜL (ref 0.6–4.47)
LYMPHOCYTES NFR BLD AUTO: 20 % (ref 14–44)
MCH RBC QN AUTO: 32.2 PG (ref 26.8–34.3)
MCHC RBC AUTO-ENTMCNC: 32.6 G/DL (ref 31.4–37.4)
MCV RBC AUTO: 99 FL (ref 82–98)
MONOCYTES # BLD AUTO: 0.49 THOUSAND/ÂΜL (ref 0.17–1.22)
MONOCYTES NFR BLD AUTO: 9 % (ref 4–12)
NEUTROPHILS # BLD AUTO: 3.77 THOUSANDS/ÂΜL (ref 1.85–7.62)
NEUTS SEG NFR BLD AUTO: 66 % (ref 43–75)
NONHDLC SERPL-MCNC: 159 MG/DL
NRBC BLD AUTO-RTO: 0 /100 WBCS
PLATELET # BLD AUTO: 341 THOUSANDS/UL (ref 149–390)
PMV BLD AUTO: 8.6 FL (ref 8.9–12.7)
POTASSIUM SERPL-SCNC: 4.1 MMOL/L (ref 3.5–5.3)
PROT SERPL-MCNC: 6.6 G/DL (ref 6.4–8.4)
PSA SERPL-MCNC: 0.88 NG/ML (ref 0–4)
RBC # BLD AUTO: 4.32 MILLION/UL (ref 3.88–5.62)
SODIUM SERPL-SCNC: 142 MMOL/L (ref 135–147)
TRIGL SERPL-MCNC: 200 MG/DL
WBC # BLD AUTO: 5.64 THOUSAND/UL (ref 4.31–10.16)

## 2023-07-06 PROCEDURE — 85025 COMPLETE CBC W/AUTO DIFF WBC: CPT

## 2023-07-06 PROCEDURE — 86803 HEPATITIS C AB TEST: CPT

## 2023-07-06 PROCEDURE — 84153 ASSAY OF PSA TOTAL: CPT

## 2023-07-06 PROCEDURE — 80053 COMPREHEN METABOLIC PANEL: CPT

## 2023-07-06 PROCEDURE — 36415 COLL VENOUS BLD VENIPUNCTURE: CPT

## 2023-07-06 PROCEDURE — 80061 LIPID PANEL: CPT

## 2023-07-11 DIAGNOSIS — R64 CACHEXIA (HCC): ICD-10-CM

## 2023-07-11 DIAGNOSIS — C79.51 PROSTATE CANCER METASTATIC TO BONE (HCC): ICD-10-CM

## 2023-07-11 DIAGNOSIS — R63.0 ANOREXIA: ICD-10-CM

## 2023-07-11 DIAGNOSIS — C61 PROSTATE CANCER METASTATIC TO BONE (HCC): ICD-10-CM

## 2023-07-11 DIAGNOSIS — Z51.5 PALLIATIVE CARE PATIENT: ICD-10-CM

## 2023-07-11 RX ORDER — PREDNISONE 1 MG/1
1 TABLET ORAL DAILY
Qty: 30 TABLET | Refills: 0 | Status: CANCELLED | OUTPATIENT
Start: 2023-07-11

## 2023-07-11 NOTE — TELEPHONE ENCOUNTER
Medication Refill Request     Name predniSONE 1 mg tablet  Dose/Frequency Take 1 tablet   Quantity 30 tablet   Verified pharmacy   [x]  Verified ordering Provider   [x]  Does patient have enough for the next 3 days?  Yes [] No [x]

## 2023-07-12 DIAGNOSIS — Z51.5 PALLIATIVE CARE PATIENT: ICD-10-CM

## 2023-07-12 DIAGNOSIS — C61 PROSTATE CANCER METASTATIC TO BONE (HCC): ICD-10-CM

## 2023-07-12 DIAGNOSIS — R64 CACHEXIA (HCC): ICD-10-CM

## 2023-07-12 DIAGNOSIS — R63.0 ANOREXIA: ICD-10-CM

## 2023-07-12 DIAGNOSIS — C79.51 PROSTATE CANCER METASTATIC TO BONE (HCC): ICD-10-CM

## 2023-07-12 RX ORDER — PREDNISONE 1 MG/1
1 TABLET ORAL DAILY
Qty: 30 TABLET | Refills: 1 | Status: SHIPPED | OUTPATIENT
Start: 2023-07-12

## 2023-07-13 ENCOUNTER — TELEPHONE (OUTPATIENT)
Dept: HEMATOLOGY ONCOLOGY | Facility: CLINIC | Age: 68
End: 2023-07-13

## 2023-07-13 ENCOUNTER — OFFICE VISIT (OUTPATIENT)
Age: 68
End: 2023-07-13

## 2023-07-13 VITALS
TEMPERATURE: 99.1 F | WEIGHT: 104 LBS | HEIGHT: 67 IN | DIASTOLIC BLOOD PRESSURE: 80 MMHG | BODY MASS INDEX: 16.32 KG/M2 | SYSTOLIC BLOOD PRESSURE: 110 MMHG | HEART RATE: 83 BPM | OXYGEN SATURATION: 92 %

## 2023-07-13 DIAGNOSIS — Z51.5 PALLIATIVE CARE PATIENT: ICD-10-CM

## 2023-07-13 DIAGNOSIS — G47.00 INSOMNIA, UNSPECIFIED TYPE: ICD-10-CM

## 2023-07-13 DIAGNOSIS — E43 SEVERE PROTEIN-CALORIE MALNUTRITION (HCC): ICD-10-CM

## 2023-07-13 DIAGNOSIS — J96.01 ACUTE RESPIRATORY FAILURE WITH HYPOXIA (HCC): ICD-10-CM

## 2023-07-13 DIAGNOSIS — Z79.899 MEDICAL MARIJUANA USE: ICD-10-CM

## 2023-07-13 DIAGNOSIS — C61 PROSTATE CANCER METASTATIC TO BONE (HCC): Primary | ICD-10-CM

## 2023-07-13 DIAGNOSIS — C79.51 PROSTATE CANCER METASTATIC TO BONE (HCC): Primary | ICD-10-CM

## 2023-07-13 DIAGNOSIS — J44.9 CHRONIC OBSTRUCTIVE PULMONARY DISEASE, UNSPECIFIED COPD TYPE (HCC): ICD-10-CM

## 2023-07-13 DIAGNOSIS — G89.3 NEOPLASM RELATED PAIN: ICD-10-CM

## 2023-07-13 DIAGNOSIS — F11.20 UNCOMPLICATED OPIOID DEPENDENCE (HCC): ICD-10-CM

## 2023-07-13 DIAGNOSIS — R64 CACHEXIA (HCC): ICD-10-CM

## 2023-07-13 DIAGNOSIS — R63.0 ANOREXIA: ICD-10-CM

## 2023-07-13 NOTE — PROGRESS NOTES
Outpatient Follow-Up - Palliative and Supportive Care   Eartha July 70 y.o. male [id number]    Assessment & Plan  1. Prostate cancer metastatic to bone ([address])    2. Chronic obstructive pulmonary disease, unspecified COPD type ([address])    3. Acute respiratory failure with hypoxia (HCC)    4. Severe protein-calorie malnutrition ([address])    5. Cachexia ([address])    6. Uncomplicated opioid dependence ([address])    7. Medical marijuana use    8. Neoplasm related pain    9. Palliative care patient    10. Insomnia, unspecified type    11. Anorexia      Plan:  · Rishabh's symptoms are stable except worsening headaches. He states he has had chronic headaches even prior to his diagnosis however over the last few weeks they are severe. · I recommended a brain MRI however he declines this. Will defer imaging to his oncologist appointment next week. · He is stable on morphine ER 15MG once a day. · He has morphine IR 7.5MG however avoids taking this. · He has ongoing hot flashes. Gabapentin 600MG QHS has been helpful. · Continue Xanax 0.5 mg TID PRN --only takes occasionally. In the past, he stopped clonazepam due to headaches. · Continue prednisone 1MG (could not tolerate 2.5MG). · We encouraged him to keep his appointment with oncology next week. · Condolences given for his brother who passed away on June 17. · Eleanor Gore declined referral to pulmonology on her last visit although at times he takes his inhaler more than prescribed. Discussed indications to present to ER. · ACP - Eleanor Gore is DNR/DNI however he does not have documention. We discussed the importance of naming a surrogate decision maker and will continue to address. · RTC - 3 months     Medications adjusted this encounter:  Requested Prescriptions      No prescriptions requested or ordered in this encounter     No orders of the defined types were placed in this encounter. There are no discontinued medications.       Jackie July was seen today for symptoms and planning cares related to above illnesses. I have reviewed the patient's controlled substance dispensing history in the Prescription Drug Monitoring Program in compliance with the [facility] regulations before prescribing any controlled substances. Filled  Written  Sold  ID  Drug  QTY  Days  Prescriber  RX #  Dispenser  Refill  Daily Dose*  Pymt Type      06/17/2023 06/17/2023   1  Morphine Sulf Er 15 Mg Tablet 60.00  30  [place]  [id number]   Pen (4411)   30.00 MME  [place]    06/13/2023 06/13/2023   1  Alprazolam 0.5 Mg Tablet 90.00  45  Ch Fis  [id number]   Pen (4411)   2.00 LME  [place]    06/13/2023 06/13/2023   1  Clonazepam 0.5 Mg Odt 30.00  30  Ch Fis  [id number]   Pen (4411)   1.00 LME  [place]    05/17/2023 05/17/2023   1  Morphine Sulf Er 15 Mg Tablet 60.00  30  Ch Fis  [id number]   Pen (4411)   30.00 MME  [place]        They are invited to continue to follow with us. If there are questions or concerns, please contact us through our clinic/answering service 24 hours a day, seven days a week. Thai Bonilla PA-C  [facility]  [phone]    Visit Information    Accompanied By: No one    Source of History: Self    History Limitations: None    History of Present Illness      Surya De La Cruz is a 70 y.o. male who presents in follow up of symptoms related to metastatic prostate cancer. Pertinent issues include: symptom management, pain, neoplasm related, breathlessness, depression or anxiety, nausea, fatigue, assessment of goals of care, disease process education and discussion of prognosis, advance care planning. Bella Vaca is under the care of Dr. Daryl Torres and ISA Amaya for metastatic prostate cancer diagnosed early 2022.   CT image done 12/21 revealed extensive multifocal osseous metastatic disease including axial and appendicular skeletal system, calvarium, sternum, ribs, right humerus and clavicle, bilateral scapulae, cervical spine, and thoracolumbar spine, sacrum, pelvis, and bilateral femurs. Krystle Bingham was initiated on [facility] antiandrogen therapy by urology and continues Lupron every 6 months. He was started on Xtandi but this was discontinued due to side effects of hallucinations that led to hospitalization in May 2022 however the side effects may have been contributed to other factors. He was switched to [place] with Enolia Brewer every 3 months for bone metastasis. In October 2022, Krystle Bingham was hospitalized at an external agency [facility] in [place]) due to profound fatigue and intractable pain. He was rehydrated and his symptom regimen was adjusted by palliative care physician Dr. Rk Patterson ([phone]). Krystle Bingham returned to see Dr. Domonique Brandon after this. His most recent imaging was 1/27/2023 and it did not show significant progression. His PSA is stable. Krystle Bingham established care with palliative and supportive care on 3/17/2022 and today 7/14/2023, he presents for symptom follow-up. Overall, Krystle Bingham reports his symptoms are stable. He has chronic insomnia and no matter what he takes he only gets about 5 hours of sleep. The pain is adequately managed on his current regimen. His main complaint is worsening headaches and we discussed this extensively however he is not interested in looking into it with imaging at this time. We also discussed his advance care planning. He is currently living in an apartment after selling his house however he does not know if this will be stable housing because his landlord's are in a marital dispute. We discussed the importance of advance care planning and naming a surrogate decision maker. He is not interested in engaging this question seriously as he feels he is doing well overall. We will continue to discuss at future appointments. We will plan to follow with Krystle Bingham closely. I reminded him to keep his appointment with oncology next week.     Past medical, surgical, social, and family histories are reviewed and pertinent updates are made. Review of Systems   Constitutional: Positive for malaise/fatigue and night sweats. Negative for decreased appetite. HENT: Negative for congestion. Cardiovascular: Negative for chest pain. Skin: Negative for color change and nail changes. Musculoskeletal: Positive for back pain. Negative for arthritis. Gastrointestinal: Negative for bloating, abdominal pain, nausea and vomiting. Genitourinary: Negative for bladder incontinence and dysuria. Neurological: Positive for weakness. Psychiatric/Behavioral: Negative for altered mental status and hallucinations. The patient has insomnia (chronic ). Vital Signs    /80 (BP Location: Left arm, Patient Position: Sitting, Cuff Size: Standard)   Pulse 83   Temp 99.1 °F (37.3 °C) (Tympanic)   Ht 5' 6.5" (1.689 m)   Wt 47.2 kg (104 lb)   SpO2 92%   BMI 16.53 kg/m²      Physical Exam and Objective Data  Physical Exam  Vitals and nursing note reviewed. Constitutional:       General: He is not in acute distress. Appearance: He is cachectic. He is ill-appearing. Comments: Extremely frail and chronically ill appearing. Pleasant, energetic, and fully conversational.    HENT:      Head: Normocephalic and atraumatic. Eyes:      Conjunctiva/sclera: Conjunctivae normal.   Pulmonary:      Effort: Pulmonary effort is normal. No respiratory distress. Abdominal:      Tenderness: There is no abdominal tenderness. Musculoskeletal:         General: No swelling. Cervical back: Neck supple. Skin:     General: Skin is warm and dry. Coloration: Skin is pale. Neurological:      Mental Status: He is alert and oriented to person, place, and time. Mental status is at baseline.    Psychiatric:         Mood and Affect: Mood normal.         Behavior: Behavior normal.       Radiology and Laboratory:  I personally reviewed and interpreted the following results: CBC, CMP    40 minutes was spent face to face with Caitlin Johnson with greater than 50% of the time spent in counseling or coordination of care including discussions of etiology of diagnosis, pathogenesis of diagnosis, prognosis of diagnosis, follow up requirements, risk factors and risk reduction of disease, patient and family counseling/involvement in care and compliance with treatment regimen. reviewed chart, reviewed lab values, reviewed imaging, provided medical updates, discussed palliative care and symptom management, opioid titration, discussed goals of care, provided supportive listening, provided anticipatory guidance, provided psychosocial and emotional support, assessed competency and decision-making and facilitated interdisciplinary communication. All of the patient's or agent's questions were answered during this discussion.

## 2023-07-13 NOTE — TELEPHONE ENCOUNTER
Appointment Change  Cancel, Reschedule, Change to Virtual      Who are you speaking with? Patient   If it is not the patient, are they listed on an active communication consent form? N/A   Which provider is the appointment scheduled with? ISA Fang   When is the appointment scheduled? Please list date and time 7/19/23 8:00AM   At which location is the appointment scheduled to take place? [place]   Was the appointment rescheduled or changed from an in person visit to a virtual visit? If so, please list the details of the change. 7/19/23 10:30AM   What is the reason for the appointment change? Patient needed later time   Was STAR transport scheduled for this visit? No   Does STAR transport need to be scheduled for the new visit (if applicable) No   Does the patient need an infusion appointment rescheduled? No   Does the patient have an infusion appointment scheduled? If so, when? No   Is the patient undergoing chemotherapy? No   Was the no-show policy reviewed for appointments being changed with less then 24 hours of notice?  Yes

## 2023-07-14 DIAGNOSIS — J44.9 CHRONIC OBSTRUCTIVE PULMONARY DISEASE, UNSPECIFIED COPD TYPE (HCC): ICD-10-CM

## 2023-07-14 RX ORDER — BUDESONIDE AND FORMOTEROL FUMARATE DIHYDRATE 160; 4.5 UG/1; UG/1
AEROSOL RESPIRATORY (INHALATION)
Qty: 10.2 G | Refills: 0 | Status: SHIPPED | OUTPATIENT
Start: 2023-07-14

## 2023-07-17 NOTE — PROGRESS NOTES
HEMATOLOGY / [facility] FOLLOW UP NOTE    Primary Care Provider: Rigo Carmona  Referring Provider:    MRN: [id number]  : 1955    Reason for Encounter: Follow-up for metastatic prostate cancer    Oncology History Overview Note    Iliac Crest, Right, bone lesion:  -Metastatic adenocarcinoma, immunohistochemically consistent with metastatic prostatic adenocarcinoma    2022 started on Firmagon by Urology    2022 started Alexander Muck     2022 hospitalized with acute encephalopathy. Alexander Muck discontinued and he was switched to [facility] and prednisone 2.5 mg    Xgeva every 3 months     23 PSMA PET CT showed stable scattered bony lesions, no evidence of progression       Prostate cancer metastatic to bone ([address])   2021 Initial Diagnosis    Prostate cancer metastatic to bone [facility])         Interval History: Patient presents for follow-up visit. He was last seen on 4/3/2023 and has missed several previously scheduled follow-ups. He was hospitalized at the end of April with COPD exacerbation. He continues to follow closely with palliative care for symptom management. He is taking Zytiga and low-dose prednisone daily. Last received dose of Xgeva on 2023  Most recent PSA from 2023 is down to 0.88. Overall, he is doing well. He looks well today although he is concerned about his weight. He did weigh in at 80, this is a few pounds lighter than he was at last visit. Denies any significant pain, pelvic pain or hematuria. He has been experiencing some headaches that he attributes to caffeine withdrawal.  Denies any visual changes, nausea/vomiting. Denies any severe or worsening than baseline headache pain.       REVIEW OF SYSTEMS:  Please note that a 14-point review of systems was performed to include Constitutional, HEENT, Respiratory, CVS, GI, , Musculoskeletal, Integumentary, Neurologic, Rheumatologic, Endocrinologic, Psychiatric, Lymphatic, and Hematologic/Oncologic systems were reviewed and are negative unless otherwise stated in HPI. Positive and negative findings pertinent to this evaluation are incorporated into the history of present illness. ECOG PS: 1    PROBLEM LIST:  Patient Active Problem List   Diagnosis   • COPD (chronic obstructive pulmonary disease) ([address])   • Acute tracheobronchitis   • Acute respiratory failure with hypoxia (HCC)   • Severe protein-calorie malnutrition (HCC)   • HLD (hyperlipidemia)   • Left lower quadrant abdominal pain   • Personal history of colonic polyps   • Prostate cancer metastatic to bone ([address])   • Insomnia   • Hot flashes   • Anorexia   • Cachexia ([address])   • Palliative care patient   • Encephalopathy acute   • Neoplasm related pain   • Medical marijuana use   • Acute respiratory insufficiency   • Opioid dependence ([address])   • Positive blood culture       Assessment / Plan:  1. Prostate cancer metastatic to bone [facility])      Patient is a 69-year-old gentleman with metastatic prostate cancer to the bone. He receives Lupron from his urologist and is currently on Zytiga and low-dose prednisone along with Xgeva. He is on low-dose prednisone because he cannot tolerate higher doses. Most recent blood work is within normal range, PSA has continued to trend down. He is scheduled for his next dose of Lupron with urology in September. He will receive Xgeva on 7/24/2023. He will continue on Zytiga and prednisone at current dose without change. Patient is in agreement. He will return for a follow-up visit in 3 months with repeat blood work. He knows to call anytime with questions or concerns. I spent 25 minutes on chart review, face to face counseling time, coordination of care and documentation. Past Medical History:   has a past medical history of Bone cancer ([address]), Colon polyp, COPD (chronic obstructive pulmonary disease) ([address]), Emphysema lung ([address]), Hyperlipidemia, and Prostate cancer ([address]).     PAST SURGICAL HISTORY:   has a past surgical history that includes Appendectomy; Colonoscopy; Upper gastrointestinal endoscopy; IR biopsy bone (12/30/2021); and Joint replacement. CURRENT MEDICATIONS  Current Outpatient Medications   Medication Sig Dispense Refill   • abiraterone (ZYTIGA) 250 mg tablet Take 4 tablets (1,000 mg total) by mouth once daily. 120 tablet 10   • ALPRAZolam (XANAX) 0.5 mg tablet Take 1 tablet (0.5 mg total) by mouth 2 (two) times a day as needed for anxiety or sleep Provider aware of co-existing clonazepam prescription. Patient is palliative care patient. 90 tablet 0   • atorvastatin (LIPITOR) 40 mg tablet Take 1 tablet (40 mg total) by mouth daily with dinner 30 tablet 0   • budesonide-formoterol (SYMBICORT) 160-4.5 mcg/act inhaler Inhale 2 puffs     • clonazePAM (KlonoPIN) 0.5 MG disintegrating tablet Take 1 tablet (0.5 mg total) by mouth daily at bedtime as needed for anxiety 30 tablet 0   • gabapentin (NEURONTIN) 300 mg capsule Take 2 capsules (600 mg total) by mouth daily at bedtime 60 capsule 0   • lidocaine (LIDODERM) 5 % Apply 3 patches topically daily Remove & Discard patch within 12 hours or as directed by MD 90 patch 0   • montelukast (SINGULAIR) 10 mg tablet TAKE 1 TABLET BY MOUTH EVERYDAY AT BEDTIME 90 tablet 1   • morphine (MS CONTIN) 15 mg 12 hr tablet Take 1 tablet (15 mg total) by mouth 2 (two) times a day Ongoing therapy Max Daily Amount: 30 mg 60 tablet 0   • morphine (MSIR) 15 mg tablet Take 0.5 tablets (7.5 mg total) by mouth every 4 (four) hours as needed for severe pain (breakthrough pain) Max Daily Amount: 45 mg 20 tablet 0   • naloxone (NARCAN) 4 mg/0.1 mL nasal spray Administer 1 spray into a nostril. If no response after 2-3 minutes, give another dose in the other nostril using a new spray.  1 each 0   • predniSONE 1 mg tablet Take 1 tablet (1 mg total) by mouth daily 30 tablet 1   • Symbicort 160-4.5 MCG/ACT inhaler INHALE 2 PUFFS BY MOUTH TWICE A DAY RINSE MOUTH AFTER USE 10.2 g 0   • albuterol (PROVENTIL HFA,VENTOLIN HFA) 90 mcg/act inhaler Inhale 2 puffs every 4 (four) hours as needed for wheezing (Patient not taking: Reported on 7/13/2023) 18 g 0   • NON FORMULARY Medical marijuana (Patient not taking: Reported on 7/13/2023)     • senna (SENOKOT) 8.6 mg Take 2 tablets (17.2 mg total) by mouth daily at bedtime as needed for constipation (Patient not taking: Reported on 7/13/2023) 60 tablet 0     No current facility-administered medications for this visit. [unfilled]    SOCIAL HISTORY:   reports that he quit smoking about 11 years ago. His smoking use included cigarettes. He started smoking about 53 years ago. He has a 41.00 pack-year smoking history. He has never used smokeless tobacco. He reports that he does not drink alcohol and does not use drugs. FAMILY HISTORY:  family history includes Breast cancer in his sister; Colon polyps in his brother; Lung cancer in his mother. ALLERGIES:  has No Known Allergies. Physical Exam:  Vital Signs:   Visit Vitals  /84 (BP Location: Left arm, Patient Position: Sitting, Cuff Size: Adult)   Pulse 84   Temp (!) 97 °F (36.1 °C)   Resp 16   Ht 5' 6" (1.676 m)   Wt 45.8 kg (101 lb)   SpO2 91%   BMI 16.30 kg/m²   Smoking Status Former   BSA 1.5 m²     Body mass index is 16.3 kg/m². Body surface area is 1.5 meters squared. Physical Exam  Constitutional:       General: He is not in acute distress. Comments: Cachetic    HENT:      Head: Normocephalic and atraumatic. Eyes:      General: No scleral icterus. Cardiovascular:      Rate and Rhythm: Normal rate and regular rhythm. Pulmonary:      Effort: Pulmonary effort is normal. No respiratory distress. Breath sounds: Wheezing (mild at bases ) present. Abdominal:      General: Abdomen is flat. Palpations: Abdomen is soft. Musculoskeletal:         General: Normal range of motion. Cervical back: Normal range of motion. Skin:     General: Skin is warm and dry. Neurological:      General: No focal deficit present. Mental Status: He is alert and oriented to person, place, and time.    Psychiatric:         Mood and Affect: Mood normal.         Behavior: Behavior normal.         Labs:  Lab Results   Component Value Date    WBC 5.64 07/06/2023    HGB 13.9 07/06/2023    HCT 42.6 07/06/2023    MCV 99 (H) 07/06/2023     07/06/2023     Lab Results   Component Value Date    SODIUM 142 07/06/2023    K 4.1 07/06/2023     07/06/2023    CO2 33 (H) 07/06/2023    AGAP 7 07/06/2023    BUN 18 07/06/2023    CREATININE 0.71 07/06/2023    GLUC 110 07/06/2023    GLUF 106 (H) 08/15/2022    CALCIUM 9.3 07/06/2023    AST 19 07/06/2023    ALT 13 07/06/2023    ALKPHOS 47 07/06/2023    TP 6.6 07/06/2023    TBILI 0.48 07/06/2023    EGFR 96 07/06/2023

## 2023-07-18 DIAGNOSIS — G89.3 CANCER RELATED PAIN: ICD-10-CM

## 2023-07-18 DIAGNOSIS — Z51.5 PALLIATIVE CARE PATIENT: ICD-10-CM

## 2023-07-18 DIAGNOSIS — F32.9 REACTIVE DEPRESSION: ICD-10-CM

## 2023-07-18 DIAGNOSIS — C61 PROSTATE CANCER METASTATIC TO BONE (HCC): ICD-10-CM

## 2023-07-18 DIAGNOSIS — C79.51 PROSTATE CANCER METASTATIC TO BONE (HCC): ICD-10-CM

## 2023-07-18 RX ORDER — MORPHINE SULFATE 15 MG/1
15 TABLET, FILM COATED, EXTENDED RELEASE ORAL 2 TIMES DAILY
Qty: 60 TABLET | Refills: 0 | Status: SHIPPED | OUTPATIENT
Start: 2023-07-18

## 2023-07-18 RX ORDER — GABAPENTIN 300 MG/1
600 CAPSULE ORAL
Qty: 60 CAPSULE | Refills: 0 | Status: SHIPPED | OUTPATIENT
Start: 2023-07-18

## 2023-07-19 ENCOUNTER — TELEPHONE (OUTPATIENT)
Age: 68
End: 2023-07-19

## 2023-07-19 ENCOUNTER — OFFICE VISIT (OUTPATIENT)
Age: 68
End: 2023-07-19
Payer: MEDICARE

## 2023-07-19 VITALS
SYSTOLIC BLOOD PRESSURE: 165 MMHG | HEIGHT: 66 IN | DIASTOLIC BLOOD PRESSURE: 84 MMHG | TEMPERATURE: 97 F | BODY MASS INDEX: 16.23 KG/M2 | OXYGEN SATURATION: 91 % | WEIGHT: 101 LBS | HEART RATE: 84 BPM | RESPIRATION RATE: 16 BRPM

## 2023-07-19 DIAGNOSIS — C61 PROSTATE CANCER METASTATIC TO BONE (HCC): Primary | ICD-10-CM

## 2023-07-19 DIAGNOSIS — C79.51 PROSTATE CANCER METASTATIC TO BONE (HCC): Primary | ICD-10-CM

## 2023-07-19 PROCEDURE — 99213 OFFICE O/P EST LOW 20 MIN: CPT | Performed by: NURSE PRACTITIONER

## 2023-07-24 ENCOUNTER — HOSPITAL ENCOUNTER (OUTPATIENT)
Dept: INFUSION CENTER | Facility: HOSPITAL | Age: 68
Discharge: HOME | End: 2023-07-24
Attending: INTERNAL MEDICINE
Payer: MEDICARE

## 2023-07-24 VITALS
TEMPERATURE: 97.2 F | DIASTOLIC BLOOD PRESSURE: 88 MMHG | WEIGHT: 95.46 LBS | HEART RATE: 88 BPM | RESPIRATION RATE: 18 BRPM | BODY MASS INDEX: 15.34 KG/M2 | OXYGEN SATURATION: 94 % | HEIGHT: 66 IN | SYSTOLIC BLOOD PRESSURE: 128 MMHG

## 2023-07-24 DIAGNOSIS — C79.51 PROSTATE CANCER METASTATIC TO BONE (HCC): Primary | ICD-10-CM

## 2023-07-24 DIAGNOSIS — C61 PROSTATE CANCER METASTATIC TO BONE (HCC): Primary | ICD-10-CM

## 2023-07-24 PROCEDURE — 96372 THER/PROPH/DIAG INJ SC/IM: CPT

## 2023-07-24 RX ADMIN — DENOSUMAB 120 MG: 120 INJECTION SUBCUTANEOUS at 14:27

## 2023-07-24 NOTE — PROGRESS NOTES
Rec'd pt in upset state after stepping on the scale and realizing a significant wt. loss. Pt states that he "eats a ton " but continues to lose wt and Is "worried something else is going on". Pt denies nausea but then reports vomiting in the morning 1-2 x/month"  And experiences" hot flashes and cold sweats" occasionally ever since starting on Zytiga. These findings were reported to Dr. Cee Mccrary office nurse who placed a nutrition consult". Pt encouraged to call the office with any reports of night sweats. He was in agreement and knows to expect a call from a nutritionist. Pt met treatment parameters for Xgeva today. Inj to upper r arm tolerated well. Pt discharged with steady gait with AVS in hand. Pt plans to Oncology office for next steps.

## 2023-07-25 ENCOUNTER — TELEPHONE (OUTPATIENT)
Dept: NUTRITION | Facility: CLINIC | Age: 68
End: 2023-07-25

## 2023-07-25 NOTE — TELEPHONE ENCOUNTER
Received notification by Franci Bowen on 7/24/23 that pt has triggered for oncology nutrition care (reason for referral: Ambulatory referral for nutrition services for oncology). Mario Thomas today to establish care. No answer. Left voice message with the reason for today's call and this RD’s contact information asking that Tatum Harrison call back as able/desired.

## 2023-07-28 ENCOUNTER — TELEPHONE (OUTPATIENT)
Dept: FAMILY MEDICINE CLINIC | Facility: CLINIC | Age: 68
End: 2023-07-28

## 2023-07-28 DIAGNOSIS — J44.9 CHRONIC OBSTRUCTIVE PULMONARY DISEASE, UNSPECIFIED COPD TYPE (HCC): Primary | ICD-10-CM

## 2023-07-28 RX ORDER — ALBUTEROL SULFATE 2.5 MG/3ML
2.5 SOLUTION RESPIRATORY (INHALATION) EVERY 6 HOURS PRN
Qty: 270 ML | Refills: 1 | Status: SHIPPED | OUTPATIENT
Start: 2023-07-28

## 2023-07-28 NOTE — TELEPHONE ENCOUNTER
Spoke to patient when he was in the hospital they gave him scripts for Albuterol solution for nebulizer.   [facility]

## 2023-07-31 NOTE — TELEPHONE ENCOUNTER
Second attempt made today to reach Mark Angel to establish care and discuss his nutrition. No answer. Left a voice message requesting a call back at [place]'s convenience.

## 2023-08-01 ENCOUNTER — APPOINTMENT (EMERGENCY)
Dept: RADIOLOGY | Facility: HOSPITAL | Age: 68
End: 2023-08-01
Payer: MEDICARE

## 2023-08-01 ENCOUNTER — APPOINTMENT (EMERGENCY)
Dept: CT IMAGING | Facility: HOSPITAL | Age: 68
End: 2023-08-01
Payer: MEDICARE

## 2023-08-01 ENCOUNTER — HOSPITAL ENCOUNTER (EMERGENCY)
Facility: HOSPITAL | Age: 68
Discharge: HOME | End: 2023-08-01
Attending: EMERGENCY MEDICINE
Payer: MEDICARE

## 2023-08-01 VITALS
SYSTOLIC BLOOD PRESSURE: 141 MMHG | WEIGHT: 95 LBS | BODY MASS INDEX: 14.91 KG/M2 | HEIGHT: 67 IN | OXYGEN SATURATION: 97 % | RESPIRATION RATE: 20 BRPM | HEART RATE: 82 BPM | DIASTOLIC BLOOD PRESSURE: 81 MMHG

## 2023-08-01 DIAGNOSIS — J18.9 LEFT LOWER LOBE PNEUMONIA: Primary | ICD-10-CM

## 2023-08-01 DIAGNOSIS — J44.1 COPD EXACERBATION (HCC): ICD-10-CM

## 2023-08-01 LAB
2HR DELTA HS TROPONIN: -1 NG/L
ALBUMIN SERPL BCP-MCNC: 3.8 G/DL (ref 3.5–5)
ALP SERPL-CCNC: 48 U/L (ref 34–104)
ALT SERPL W P-5'-P-CCNC: 12 U/L (ref 7–52)
ANION GAP SERPL CALCULATED.3IONS-SCNC: 4 MMOL/L
APTT PPP: 32 SECONDS (ref 23–37)
AST SERPL W P-5'-P-CCNC: 17 U/L (ref 13–39)
BASOPHILS # BLD AUTO: 0.07 THOUSANDS/ÂΜL (ref 0–0.1)
BASOPHILS NFR BLD AUTO: 1 % (ref 0–1)
BILIRUB SERPL-MCNC: 0.41 MG/DL (ref 0.2–1)
BNP SERPL-MCNC: 43 PG/ML (ref 0–100)
BUN SERPL-MCNC: 15 MG/DL (ref 5–25)
CALCIUM SERPL-MCNC: 9 MG/DL (ref 8.4–10.2)
CARDIAC TROPONIN I PNL SERPL HS: 2 NG/L
CARDIAC TROPONIN I PNL SERPL HS: 3 NG/L
CHLORIDE SERPL-SCNC: 103 MMOL/L (ref 96–108)
CO2 SERPL-SCNC: 34 MMOL/L (ref 21–32)
CREAT SERPL-MCNC: 0.63 MG/DL (ref 0.6–1.3)
EOSINOPHIL # BLD AUTO: 0.18 THOUSAND/ÂΜL (ref 0–0.61)
EOSINOPHIL NFR BLD AUTO: 2 % (ref 0–6)
ERYTHROCYTE [DISTWIDTH] IN BLOOD BY AUTOMATED COUNT: 12.9 % (ref 11.6–15.1)
FLUAV RNA RESP QL NAA+PROBE: NEGATIVE
FLUBV RNA RESP QL NAA+PROBE: NEGATIVE
GFR SERPL CREATININE-BSD FRML MDRD: 100 ML/MIN/1.73SQ M
GLUCOSE SERPL-MCNC: 127 MG/DL (ref 65–140)
HCT VFR BLD AUTO: 39.7 % (ref 36.5–49.3)
HGB BLD-MCNC: 13 G/DL (ref 12–17)
IMM GRANULOCYTES # BLD AUTO: 0.03 THOUSAND/UL (ref 0–0.2)
IMM GRANULOCYTES NFR BLD AUTO: 0 % (ref 0–2)
INR PPP: 0.99 (ref 0.84–1.19)
L PNEUMO1 AG UR QL IA.RAPID: NEGATIVE
LACTATE SERPL-SCNC: 0.8 MMOL/L (ref 0.5–2)
LYMPHOCYTES # BLD AUTO: 0.84 THOUSANDS/ÂΜL (ref 0.6–4.47)
LYMPHOCYTES NFR BLD AUTO: 11 % (ref 14–44)
MAGNESIUM SERPL-MCNC: 2.1 MG/DL (ref 1.9–2.7)
MCH RBC QN AUTO: 31.9 PG (ref 26.8–34.3)
MCHC RBC AUTO-ENTMCNC: 32.7 G/DL (ref 31.4–37.4)
MCV RBC AUTO: 97 FL (ref 82–98)
MONOCYTES # BLD AUTO: 0.89 THOUSAND/ÂΜL (ref 0.17–1.22)
MONOCYTES NFR BLD AUTO: 12 % (ref 4–12)
NEUTROPHILS # BLD AUTO: 5.56 THOUSANDS/ÂΜL (ref 1.85–7.62)
NEUTS SEG NFR BLD AUTO: 74 % (ref 43–75)
NRBC BLD AUTO-RTO: 0 /100 WBCS
PHOSPHATE SERPL-MCNC: 2.6 MG/DL (ref 2.3–4.1)
PLATELET # BLD AUTO: 377 THOUSANDS/UL (ref 149–390)
PMV BLD AUTO: 8.5 FL (ref 8.9–12.7)
POTASSIUM SERPL-SCNC: 4.4 MMOL/L (ref 3.5–5.3)
PROCALCITONIN SERPL-MCNC: 0.06 NG/ML
PROT SERPL-MCNC: 7 G/DL (ref 6.4–8.4)
PROTHROMBIN TIME: 13.8 SECONDS (ref 11.6–14.5)
RBC # BLD AUTO: 4.08 MILLION/UL (ref 3.88–5.62)
RSV RNA RESP QL NAA+PROBE: NEGATIVE
S PNEUM AG UR QL: NEGATIVE
SARS-COV-2 RNA RESP QL NAA+PROBE: NEGATIVE
SODIUM SERPL-SCNC: 141 MMOL/L (ref 135–147)
WBC # BLD AUTO: 7.57 THOUSAND/UL (ref 4.31–10.16)

## 2023-08-01 PROCEDURE — 0241U HB NFCT DS VIR RESP RNA 4 TRGT: CPT | Performed by: EMERGENCY MEDICINE

## 2023-08-01 PROCEDURE — 96366 THER/PROPH/DIAG IV INF ADDON: CPT

## 2023-08-01 PROCEDURE — 96367 TX/PROPH/DG ADDL SEQ IV INF: CPT

## 2023-08-01 PROCEDURE — 84484 ASSAY OF TROPONIN QUANT: CPT | Performed by: EMERGENCY MEDICINE

## 2023-08-01 PROCEDURE — 85610 PROTHROMBIN TIME: CPT | Performed by: EMERGENCY MEDICINE

## 2023-08-01 PROCEDURE — 85730 THROMBOPLASTIN TIME PARTIAL: CPT | Performed by: EMERGENCY MEDICINE

## 2023-08-01 PROCEDURE — 99285 EMERGENCY DEPT VISIT HI MDM: CPT | Performed by: EMERGENCY MEDICINE

## 2023-08-01 PROCEDURE — 83605 ASSAY OF LACTIC ACID: CPT | Performed by: EMERGENCY MEDICINE

## 2023-08-01 PROCEDURE — 96365 THER/PROPH/DIAG IV INF INIT: CPT

## 2023-08-01 PROCEDURE — 96375 TX/PRO/DX INJ NEW DRUG ADDON: CPT

## 2023-08-01 PROCEDURE — 87040 BLOOD CULTURE FOR BACTERIA: CPT | Performed by: EMERGENCY MEDICINE

## 2023-08-01 PROCEDURE — 71045 X-RAY EXAM CHEST 1 VIEW: CPT

## 2023-08-01 PROCEDURE — 80053 COMPREHEN METABOLIC PANEL: CPT | Performed by: EMERGENCY MEDICINE

## 2023-08-01 PROCEDURE — 99285 EMERGENCY DEPT VISIT HI MDM: CPT

## 2023-08-01 PROCEDURE — G1004 CDSM NDSC: HCPCS

## 2023-08-01 PROCEDURE — 36415 COLL VENOUS BLD VENIPUNCTURE: CPT | Performed by: EMERGENCY MEDICINE

## 2023-08-01 PROCEDURE — 94640 AIRWAY INHALATION TREATMENT: CPT

## 2023-08-01 PROCEDURE — 84100 ASSAY OF PHOSPHORUS: CPT | Performed by: EMERGENCY MEDICINE

## 2023-08-01 PROCEDURE — 83735 ASSAY OF MAGNESIUM: CPT | Performed by: EMERGENCY MEDICINE

## 2023-08-01 PROCEDURE — 85025 COMPLETE CBC W/AUTO DIFF WBC: CPT | Performed by: EMERGENCY MEDICINE

## 2023-08-01 PROCEDURE — 71260 CT THORAX DX C+: CPT

## 2023-08-01 PROCEDURE — 93005 ELECTROCARDIOGRAM TRACING: CPT

## 2023-08-01 PROCEDURE — 83880 ASSAY OF NATRIURETIC PEPTIDE: CPT | Performed by: EMERGENCY MEDICINE

## 2023-08-01 PROCEDURE — 87449 NOS EACH ORGANISM AG IA: CPT | Performed by: EMERGENCY MEDICINE

## 2023-08-01 PROCEDURE — 84145 PROCALCITONIN (PCT): CPT | Performed by: EMERGENCY MEDICINE

## 2023-08-01 RX ORDER — DOXYCYCLINE HYCLATE 100 MG/1
100 CAPSULE ORAL 2 TIMES DAILY
Qty: 10 CAPSULE | Refills: 0 | Status: SHIPPED | OUTPATIENT
Start: 2023-08-01 | End: 2023-08-06

## 2023-08-01 RX ORDER — MAGNESIUM SULFATE HEPTAHYDRATE 40 MG/ML
2 INJECTION, SOLUTION INTRAVENOUS ONCE
Status: COMPLETED | OUTPATIENT
Start: 2023-08-01 | End: 2023-08-01

## 2023-08-01 RX ORDER — CEFTRIAXONE 1 G/50ML
1000 INJECTION, SOLUTION INTRAVENOUS ONCE
Status: COMPLETED | OUTPATIENT
Start: 2023-08-01 | End: 2023-08-01

## 2023-08-01 RX ORDER — METHYLPREDNISOLONE SODIUM SUCCINATE 125 MG/2ML
80 INJECTION, POWDER, LYOPHILIZED, FOR SOLUTION INTRAMUSCULAR; INTRAVENOUS ONCE
Status: COMPLETED | OUTPATIENT
Start: 2023-08-01 | End: 2023-08-01

## 2023-08-01 RX ADMIN — IPRATROPIUM BROMIDE 0.5 MG: 0.5 SOLUTION RESPIRATORY (INHALATION) at 13:57

## 2023-08-01 RX ADMIN — MAGNESIUM SULFATE HEPTAHYDRATE 2 G: 2 INJECTION, SOLUTION INTRAVENOUS at 14:11

## 2023-08-01 RX ADMIN — METHYLPREDNISOLONE SODIUM SUCCINATE 80 MG: 125 INJECTION, POWDER, FOR SOLUTION INTRAMUSCULAR; INTRAVENOUS at 14:11

## 2023-08-01 RX ADMIN — CEFTRIAXONE 1000 MG: 1 INJECTION, SOLUTION INTRAVENOUS at 16:26

## 2023-08-01 RX ADMIN — ALBUTEROL SULFATE 5 MG: 2.5 SOLUTION RESPIRATORY (INHALATION) at 13:57

## 2023-08-01 RX ADMIN — AZITHROMYCIN MONOHYDRATE 500 MG: 500 INJECTION, POWDER, LYOPHILIZED, FOR SOLUTION INTRAVENOUS at 17:18

## 2023-08-01 RX ADMIN — IOHEXOL 85 ML: 350 INJECTION, SOLUTION INTRAVENOUS at 15:23

## 2023-08-01 NOTE — ED PROVIDER NOTES
History  Chief Complaint   Patient presents with   • Shortness of Breath     Patient presents to the ER being SOB with a long pulmonary history. 70year old male presents for evaluation of worsening shortness of breath over the past 4 months associated with cough which has been keeping him from sleep. Patient has history of COPD, but does not follow with a pulmonologist.  He takes Spiriva daily and uses albuterol twice per day, but states he feels that the albuterol is worsening his symptoms. He also has history of metastatic prostate cancer for which he takes [place]. He has hot flashes and sweats as a side effect of his Zytiga so does not know if he has been having fevers in association with his cough. He is on 2.5L supplemental oxygen chronically which he increased to 3L over the past 2 weeks. Prior to Admission Medications   Prescriptions Last Dose Informant Patient Reported? Taking? ALPRAZolam (XANAX) 0.5 mg tablet 7/31/2023 Self No Yes   Sig: Take 1 tablet (0.5 mg total) by mouth 2 (two) times a day as needed for anxiety or sleep Provider aware of co-existing clonazepam prescription. Patient is palliative care patient. NON FORMULARY Not Taking Self Yes No   Sig: Medical marijuana   Patient not taking: Reported on 7/13/2023   Symbicort 160-4.5 MCG/ACT inhaler 8/1/2023  No Yes   Sig: INHALE 2 PUFFS BY MOUTH TWICE A DAY RINSE MOUTH AFTER USE   abiraterone (ZYTIGA) 250 mg tablet 8/1/2023 Self No Yes   Sig: Take 4 tablets (1,000 mg total) by mouth once daily.    albuterol (2.5 mg/3 mL) 0.083 % nebulizer solution 8/1/2023  No Yes   Sig: Take 3 mL (2.5 mg total) by nebulization every 6 (six) hours as needed for wheezing or shortness of breath   albuterol (PROVENTIL HFA,VENTOLIN HFA) 90 mcg/act inhaler  Self No No   Sig: Inhale 2 puffs every 4 (four) hours as needed for wheezing   Patient not taking: Reported on 7/13/2023   atorvastatin (LIPITOR) 40 mg tablet 8/1/2023 Self No Yes   Sig: Take 1 tablet (40 mg total) by mouth daily with dinner   budesonide-formoterol (SYMBICORT) 160-4.5 mcg/act inhaler 8/1/2023 Self Yes Yes   Sig: Inhale 2 puffs   clonazePAM (KlonoPIN) 0.5 MG disintegrating tablet More than a month Self No No   Sig: Take 1 tablet (0.5 mg total) by mouth daily at bedtime as needed for anxiety   gabapentin (NEURONTIN) 300 mg capsule 8/1/2023  No Yes   Sig: Take 2 capsules (600 mg total) by mouth daily at bedtime   lidocaine (LIDODERM) 5 % Not Taking Self No No   Sig: Apply 3 patches topically daily Remove & Discard patch within 12 hours or as directed by MD   Patient not taking: Reported on 8/1/2023   montelukast (SINGULAIR) 10 mg tablet 8/1/2023 Self No Yes   Sig: TAKE 1 TABLET BY MOUTH EVERYDAY AT BEDTIME   morphine (MS CONTIN) 15 mg 12 hr tablet 8/1/2023  No Yes   Sig: Take 1 tablet (15 mg total) by mouth 2 (two) times a day Ongoing therapy Max Daily Amount: 30 mg   morphine (MSIR) 15 mg tablet 8/1/2023 Self No Yes   Sig: Take 0.5 tablets (7.5 mg total) by mouth every 4 (four) hours as needed for severe pain (breakthrough pain) Max Daily Amount: 45 mg   naloxone (NARCAN) 4 mg/0.1 mL nasal spray More than a month Self No No   Sig: Administer 1 spray into a nostril. If no response after 2-3 minutes, give another dose in the other nostril using a new spray.    predniSONE 1 mg tablet 8/1/2023 Self No Yes   Sig: Take 1 tablet (1 mg total) by mouth daily   senna (SENOKOT) 8.6 mg Not Taking Self No No   Sig: Take 2 tablets (17.2 mg total) by mouth daily at bedtime as needed for constipation   Patient not taking: Reported on 7/13/2023      Facility-Administered Medications: None       Past Medical History:   Diagnosis Date   • Bone cancer [facility])    • Colon polyp    • COPD (chronic obstructive pulmonary disease) ([address])    • Emphysema lung (HCC)    • Hyperlipidemia    • Prostate cancer [facility])        Past Surgical History:   Procedure Laterality Date   • APPENDECTOMY     • COLONOSCOPY     • IR BIOPSY BONE 2021   • JOINT REPLACEMENT     • UPPER GASTROINTESTINAL ENDOSCOPY         Family History   Problem Relation Age of Onset   • Lung cancer Mother    • Breast cancer Sister    • Colon polyps Brother    • Colon cancer Neg Hx      I have reviewed and agree with the history as documented. E-Cigarette/Vaping   • E-Cigarette Use Never User      E-Cigarette/Vaping Substances   • Nicotine No    • THC No    • CBD No    • Flavoring No    • Other No    • Unknown No      Social History     Tobacco Use   • Smoking status: Former     Packs/day: 1.00     Years: 41.00     Total pack years: 41.00     Types: Cigarettes     Start date: 5     Quit date:      Years since quittin.5   • Smokeless tobacco: Never   Vaping Use   • Vaping Use: Never used   Substance Use Topics   • Alcohol use: Never   • Drug use: Never       Review of Systems    Physical Exam  Physical Exam  Vitals and nursing note reviewed. Constitutional:       Appearance: He is cachectic. HENT:      Head: Normocephalic and atraumatic. Eyes:      Conjunctiva/sclera: Conjunctivae normal.   Cardiovascular:      Rate and Rhythm: Regular rhythm. Tachycardia present. Pulses: Normal pulses. Heart sounds: Normal heart sounds. Pulmonary:      Effort: Tachypnea and accessory muscle usage present. Breath sounds: Examination of the left-upper field reveals rhonchi. Decreased breath sounds (throughout) and rhonchi present. Musculoskeletal:      Right lower leg: No edema. Left lower leg: No edema. Skin:     General: Skin is warm and dry. Neurological:      Mental Status: He is alert.          Vital Signs  ED Triage Vitals [23 1318]   Temp Pulse Respirations Blood Pressure SpO2   -- 100 22 128/80 91 %      Temp src Heart Rate Source Patient Position - Orthostatic VS BP Location FiO2 (%)   -- Monitor Sitting Right arm --      Pain Score       No Pain           Vitals:    23 1500 23 1545 23 1600 23 1630 BP: 126/80 129/75 135/76 136/78   Pulse: 91 88 86 85   Patient Position - Orthostatic VS:             Visual Acuity      ED Medications  Medications   azithromycin (ZITHROMAX) 500 mg in sodium chloride 0.9% 250mL IVPB 500 mg (500 mg Intravenous New Bag 8/1/23 1718)   ipratropium (ATROVENT) 0.02 % inhalation solution 0.5 mg (0.5 mg Nebulization Given 8/1/23 1357)   albuterol inhalation solution 5 mg (5 mg Nebulization Given 8/1/23 1357)   magnesium sulfate 2 g/50 mL IVPB (premix) 2 g (0 g Intravenous Stopped 8/1/23 1628)   methylPREDNISolone sodium succinate (Solu-MEDROL) injection 80 mg (80 mg Intravenous Given 8/1/23 1411)   iohexol (OMNIPAQUE) 350 MG/ML injection (SINGLE-DOSE) 100 mL (85 mL Intravenous Given 8/1/23 1523)   cefTRIAXone (ROCEPHIN) IVPB (premix in dextrose) 1,000 mg 50 mL (0 mg Intravenous Stopped 8/1/23 1718)       Diagnostic Studies  Results Reviewed     Procedure Component Value Units Date/Time    FLU/RSV/COVID - if FLU/RSV clinically relevant [[id number]]  (Normal) Collected: 08/01/23 1342    Lab Status: Final result Specimen: Nares from Nose Updated: 08/01/23 1646     SARS-CoV-2 Negative     INFLUENZA A PCR Negative     INFLUENZA B PCR Negative     RSV PCR Negative    Narrative:      FOR PEDIATRIC PATIENTS - copy/paste COVID Guidelines URL to browser: [email or web address] ashx    SARS-CoV-2 assay is a Nucleic Acid Amplification assay intended for the  qualitative detection of nucleic acid from SARS-CoV-2 in nasopharyngeal  swabs. Results are for the presumptive identification of SARS-CoV-2 RNA. Positive results are indicative of infection with SARS-CoV-2, the virus  causing COVID-19, but do not rule out bacterial infection or co-infection  with other viruses. Laboratories within the [facility] and its  territories are required to report all positive results to the appropriate  public health authorities.  Negative results do not preclude SARS-CoV-2  infection and should not be used as the sole basis for treatment or other  patient management decisions. Negative results must be combined with  clinical observations, patient history, and epidemiological information. This test has not been FDA cleared or approved. This test has been authorized by FDA under an Emergency Use Authorization  (EUA). This test is only authorized for the duration of time the  declaration that circumstances exist justifying the authorization of the  emergency use of an in vitro diagnostic tests for detection of SARS-CoV-2  virus and/or diagnosis of COVID-19 infection under section 564(b)(1) of  the Act, 21 U. S.C. 217PNU-9(F)(3), unless the authorization is terminated  or revoked sooner. The test has been validated but independent review by FDA  and CLIA is pending. Test performed using [employer] GeneXpert: This RT-PCR assay targets N2,  a region unique to SARS-CoV-2. A conserved region in the E-gene was chosen  for pan-Sarbecovirus detection which includes SARS-CoV-2. According to CMS-2020-01-R, this platform meets the definition of high-throughput technology. HS Troponin I 2hr [[id number]]  (Normal) Collected: 08/01/23 1558    Lab Status: Final result Specimen: Blood from Arm, Left Updated: 08/01/23 1631     hs TnI 2hr 2 ng/L      Delta 2hr hsTnI -1 ng/L     Legionella antigen, urine [[id number]] Collected: 08/01/23 1625    Lab Status: In process Specimen: Urine, Other Updated: 08/01/23 1630    Strep Pneumoniae, Urine [[id number]] Collected: 08/01/23 1625    Lab Status:  In process Specimen: Urine, Other Updated: 08/01/23 1630    B-Type Natriuretic Peptide(BNP) [[id number]]  (Normal) Collected: 08/01/23 1342    Lab Status: Final result Specimen: Blood from Arm, Left Updated: 08/01/23 1629     BNP 43 pg/mL     Procalcitonin [[id number]]  (Normal) Collected: 08/01/23 1342    Lab Status: Final result Specimen: Blood from Arm, Left Updated: 08/01/23 1436     Procalcitonin 0.06 ng/ml     HS Troponin 0hr (reflex protocol) [[id number]]  (Normal) Collected: 08/01/23 1342    Lab Status: Final result Specimen: Blood from Arm, Left Updated: 08/01/23 1435     hs TnI 0hr 3 ng/L     Comprehensive metabolic panel [[id number]]  (Abnormal) Collected: 08/01/23 1342    Lab Status: Final result Specimen: Blood from Arm, Left Updated: 08/01/23 1433     Sodium 141 mmol/L      Potassium 4.4 mmol/L      Chloride 103 mmol/L      CO2 34 mmol/L      ANION GAP 4 mmol/L      BUN 15 mg/dL      Creatinine 0.63 mg/dL      Glucose 127 mg/dL      Calcium 9.0 mg/dL      AST 17 U/L      ALT 12 U/L      Alkaline Phosphatase 48 U/L      Total Protein 7.0 g/dL      Albumin 3.8 g/dL      Total Bilirubin 0.41 mg/dL      eGFR 100 ml/min/1.73sq m     Narrative:      [facility] guidelines for Chronic Kid[facility] Disease (CKD):   •  Stage 1 with normal or high GFR (GFR > 90 mL/min/1.73 square meters)  •  Stage 2 Mild CKD (GFR = 60-89 mL/min/1.73 square meters)  •  Stage 3A Moderate CKD (GFR = 45-59 mL/min/1.73 square meters)  •  Stage 3B Moderate CKD (GFR = 30-44 mL/min/1.73 square meters)  •  Stage 4 Severe CKD (GFR = 15-29 mL/min/1.73 square meters)  •  Stage 5 End Stage CKD (GFR <15 mL/min/1.73 square meters)  Note: GFR calculation is accurate only with a steady state creatinine    Magnesium [[id number]]  (Normal) Collected: 08/01/23 1342    Lab Status: Final result Specimen: Blood from Arm, Left Updated: 08/01/23 1433     Magnesium 2.1 mg/dL     Phosphorus [[id number]]  (Normal) Collected: 08/01/23 1342    Lab Status: Final result Specimen: Blood from Arm, Left Updated: 08/01/23 1433     Phosphorus 2.6 mg/dL     Lactic acid [[id number]]  (Normal) Collected: 08/01/23 1342    Lab Status: Final result Specimen: Blood from Arm, Left Updated: 08/01/23 1425     LACTIC ACID 0.8 mmol/L     Narrative:      Result may be elevated if tourniquet was used during collection.     Protime-INR [[id number]]  (Normal) Collected: 08/01/23 1342    Lab Status: Final result Specimen: Blood from Arm, Left Updated: 08/01/23 1424     Protime 13.8 seconds      INR 0.99    APTT [[id number]]  (Normal) Collected: 08/01/23 1342    Lab Status: Final result Specimen: Blood from Arm, Left Updated: 08/01/23 1424     PTT 32 seconds     CBC and differential [[id number]]  (Abnormal) Collected: 08/01/23 1342    Lab Status: Final result Specimen: Blood from Arm, Left Updated: 08/01/23 1411     WBC 7.57 Thousand/uL      RBC 4.08 Million/uL      Hemoglobin 13.0 g/dL      Hematocrit 39.7 %      MCV 97 fL      MCH 31.9 pg      MCHC 32.7 g/dL      RDW 12.9 %      MPV 8.5 fL      Platelets 360 Thousands/uL      nRBC 0 /100 WBCs      Neutrophils Relative 74 %      Immat GRANS % 0 %      Lymphocytes Relative 11 %      Monocytes Relative 12 %      Eosinophils Relative 2 %      Basophils Relative 1 %      Neutrophils Absolute 5.56 Thousands/µL      Immature Grans Absolute 0.03 Thousand/uL      Lymphocytes Absolute 0.84 Thousands/µL      Monocytes Absolute 0.89 Thousand/µL      Eosinophils Absolute 0.18 Thousand/µL      Basophils Absolute 0.07 Thousands/µL     Blood culture #2 [[id number]] Collected: 08/01/23 1342    Lab Status: In process Specimen: Blood from Arm, Left Updated: 08/01/23 1409    Blood culture #1 [[id number]] Collected: 08/01/23 1342    Lab Status: In process Specimen: Blood from Arm, Left Updated: 08/01/23 1409                 CT chest with contrast   Final Result by Roberto Marquez MD (08/01 1617)      Patchy consolidation in the left lower lobe due to pneumonia with trace left effusion. Mild tree-in-bud nodularity in the right lower lobe, slightly increased since January 2023, due to bronchiolitis. Severe emphysema.          Workstation performed: [id number]         XR chest portable   ED Interpretation by Lamont Pino MD (08/01 1426)   Abnormal   Increased patchy opacity left lower lung field Procedures  Procedures         ED Course  ED Course as of 08/01/23 1749   Tue Aug 01, 2023   1442 Procalcitonin: 0.06                               SBIRT 20yo+    Flowsheet Row Most Recent Value   Initial Alcohol Screen: US AUDIT-C     1. How often do you have a drink containing alcohol? 0 Filed at: 08/01/2023 1418   2. How many drinks containing alcohol do you have on a typical day you are drinking? 0 Filed at: 08/01/2023 1418   3a. Male UNDER 65: How often do you have five or more drinks on one occasion? 0 Filed at: 08/01/2023 1418   3b. FEMALE Any Age, or MALE 65+: How often do you have 4 or more drinks on one occassion? 0 Filed at: 08/01/2023 1418   Audit-C Score 0 Filed at: 08/01/2023 1418   YOUSIF: How many times in the past year have you. .. Used an illegal drug or used a prescription medication for non-medical reasons? Never Filed at: 08/01/2023 1418                    Medical Decision Making  70year old male presents for evaluation of worsening shortness of breath and cough for the past 4 months. Decreased breath sounds throughout with rhonchi in left upper lung field on exam.  Infiltrate on CXR; however, labs unremarkable with negative procalcitonin. CT chest ordered showing left lower lobe pneumonia. Rocephin and azithromycin given in ED. CURB 65 score 1. Discussed admission vs outpatient management with patient. He would like to attempt outpatient management at this time. Pulmonology follow up. Discussed return precautions with patient. Amount and/or Complexity of Data Reviewed  Labs: ordered. Decision-making details documented in ED Course. Radiology: ordered and independent interpretation performed. Risk  Prescription drug management.           Disposition  Final diagnoses:   Left lower lobe pneumonia   COPD exacerbation ([address])     Time reflects when diagnosis was documented in both MDM as applicable and the Disposition within this note     Time User Action Codes Description Comment    8/1/2023  4:22 PM Cristian Husain Add [J18.9] Left lower lobe pneumonia     8/1/2023  5:42 PM Rosalba Crandall Add [J44.1] COPD exacerbation [facility])       ED Disposition     ED Disposition   Discharge    Condition   Stable    Date/Time   Tue Aug 1, 2023  5:41 PM    Comment   Letty Holbrook discharge to home/self care.                Follow-up Information     Follow up With Specialties Details Why Contact Info Additional Information    [facility] [address] [facility] Pulmonology Schedule an appointment as soon as possible for a visit in 1 week for re-evaluation [address] [facility] [address] [phone] DL [facility] Pulmonary [address] [facility], [address] [place] [address], [address] [facility]     [address] [facility] Emergency Department Emergency Medicine Go to  If symptoms worsen [address] [facility] [address]  [address] [facility] Department, [address] [facility], [address] [facility], [address] [facility],[address] Floor    Pelon Orr, [address] [facility], Nurse Practitioner  As needed of if unable to follow up with pulmonology within the next week [address]  [facility] [phone]             Patient's Medications   Discharge Prescriptions    DOXYCYCLINE HYCLATE (VIBRAMYCIN) 100 MG CAPSULE    Take 1 capsule (100 mg total) by mouth 2 (two) times a day for 5 days       Start Date: 8/1/2023  End Date: 8/6/2023       Order Dose: 100 mg       Quantity: 10 capsule    Refills: 0           PDMP Review       Value Time User    PDMP Reviewed  Yes 7/18/2023  2:25 PM López Muro PA-C          ED Provider  Electronically Signed by           Claudette Vasques MD  08/01/23 0244

## 2023-08-04 LAB
ATRIAL RATE: 94 BPM
P AXIS: 84 DEGREES
PR INTERVAL: 146 MS
QRS AXIS: 97 DEGREES
QRSD INTERVAL: 72 MS
QT INTERVAL: 348 MS
QTC INTERVAL: 435 MS
T WAVE AXIS: 74 DEGREES
VENTRICULAR RATE: 94 BPM

## 2023-08-04 PROCEDURE — 93010 ELECTROCARDIOGRAM REPORT: CPT | Performed by: INTERNAL MEDICINE

## 2023-08-07 LAB
BACTERIA BLD CULT: NORMAL
BACTERIA BLD CULT: NORMAL

## 2023-08-11 DIAGNOSIS — J44.9 CHRONIC OBSTRUCTIVE PULMONARY DISEASE, UNSPECIFIED COPD TYPE (HCC): ICD-10-CM

## 2023-08-11 RX ORDER — BUDESONIDE AND FORMOTEROL FUMARATE DIHYDRATE 160; 4.5 UG/1; UG/1
AEROSOL RESPIRATORY (INHALATION)
Qty: 10.2 G | Refills: 1 | Status: SHIPPED | OUTPATIENT
Start: 2023-08-11

## 2023-08-17 DIAGNOSIS — G47.00 INSOMNIA: ICD-10-CM

## 2023-08-17 DIAGNOSIS — C61 PROSTATE CANCER METASTATIC TO BONE (HCC): ICD-10-CM

## 2023-08-17 DIAGNOSIS — F41.9 ANXIETY: ICD-10-CM

## 2023-08-17 DIAGNOSIS — Z51.5 PALLIATIVE CARE PATIENT: ICD-10-CM

## 2023-08-17 DIAGNOSIS — G89.3 CANCER RELATED PAIN: ICD-10-CM

## 2023-08-17 DIAGNOSIS — C79.51 PROSTATE CANCER METASTATIC TO BONE (HCC): ICD-10-CM

## 2023-08-17 RX ORDER — MORPHINE SULFATE 15 MG/1
15 TABLET, FILM COATED, EXTENDED RELEASE ORAL 2 TIMES DAILY
Qty: 60 TABLET | Refills: 0 | Status: SHIPPED | OUTPATIENT
Start: 2023-08-17

## 2023-08-17 RX ORDER — MORPHINE SULFATE 15 MG/1
7.5 TABLET ORAL EVERY 4 HOURS PRN
Qty: 20 TABLET | Refills: 0 | Status: SHIPPED | OUTPATIENT
Start: 2023-08-17

## 2023-08-17 RX ORDER — ALPRAZOLAM 0.5 MG/1
0.5 TABLET ORAL 2 TIMES DAILY PRN
Qty: 90 TABLET | Refills: 0 | Status: SHIPPED | OUTPATIENT
Start: 2023-08-17

## 2023-08-31 DIAGNOSIS — F32.9 REACTIVE DEPRESSION: ICD-10-CM

## 2023-08-31 RX ORDER — GABAPENTIN 300 MG/1
600 CAPSULE ORAL
Qty: 60 CAPSULE | Refills: 2 | Status: SHIPPED | OUTPATIENT
Start: 2023-08-31

## 2023-09-11 ENCOUNTER — APPOINTMENT (OUTPATIENT)
Dept: LAB | Facility: HOSPITAL | Age: 68
End: 2023-09-11
Payer: MEDICARE

## 2023-09-11 DIAGNOSIS — C79.51 PROSTATE CANCER METASTATIC TO BONE (HCC): ICD-10-CM

## 2023-09-11 DIAGNOSIS — C61 PROSTATE CANCER METASTATIC TO BONE (HCC): ICD-10-CM

## 2023-09-13 ENCOUNTER — CLINICAL SUPPORT (OUTPATIENT)
Dept: UROLOGY | Facility: HOSPITAL | Age: 68
End: 2023-09-13
Payer: MEDICARE

## 2023-09-13 VITALS
BODY MASS INDEX: 15.82 KG/M2 | OXYGEN SATURATION: 96 % | DIASTOLIC BLOOD PRESSURE: 80 MMHG | WEIGHT: 101 LBS | HEART RATE: 76 BPM | SYSTOLIC BLOOD PRESSURE: 132 MMHG

## 2023-09-13 DIAGNOSIS — C61 PROSTATE CANCER (HCC): Primary | ICD-10-CM

## 2023-09-13 PROCEDURE — 99213 OFFICE O/P EST LOW 20 MIN: CPT | Performed by: PHYSICIAN ASSISTANT

## 2023-09-13 PROCEDURE — 96402 CHEMO HORMON ANTINEOPL SQ/IM: CPT

## 2023-09-13 NOTE — PROGRESS NOTES
1. Prostate cancer (HCC)  leuprolide (LUPRON DEPOT 6 MONTH KIT) IM injection kit 45 mg    PSA Total, Diagnostic            Assessment and plan:       1. Metastatic prostate cancer  - lupron provided today in the office  - continues to follow with medical oncology   - voiding well  - f/u 6 months with next PSA and lupron      Severiano Gomez PA-C      Chief Complaint     Prostate cancer    History of Present Illness     Audra Becerra is a 70 y.o. male presenting today for follow up.    male known to Dr Ann Vences with history of metastatic prostate cancer. He was originally seen in December 2021 by Dr. Yaz Avila a PSA of 850. CT abdomen demonstrated multiple sclerotic lesions. One of these lesions was biopsied confirming metastatic disease. Follows closely with medical oncology. On zytiga and pregnisone daily. ADT:  firmagon 240mg (1/5/22)  lupron 45mg (3/2/22)  lupron 45mg (9/8/22)  lupron 45mg (3/6/23)    PSA 0.78 (9/11/23). Patient denies any LUTS - voiding comfortably at this time. Does report chronic bone pain and tendonitis. Laboratory     Lab Results   Component Value Date    CREATININE 0.46 (L) 09/11/2023       Lab Results   Component Value Date    PSA 0.78 09/11/2023    PSA 0.88 07/06/2023    PSA 1.8 03/24/2023         Review of Systems     Review of Systems   Constitutional: Negative for activity change, appetite change, chills, diaphoresis, fatigue, fever and unexpected weight change. Respiratory: Negative for chest tightness and shortness of breath. Cardiovascular: Negative for chest pain, palpitations and leg swelling. Gastrointestinal: Negative for abdominal distention, abdominal pain, constipation, diarrhea, nausea and vomiting. Genitourinary: Negative for decreased urine volume, difficulty urinating, dysuria, enuresis, flank pain, frequency, genital sores, hematuria and urgency. Musculoskeletal: Positive for arthralgias and myalgias.  Negative for back pain and gait problem. Skin: Negative for color change, pallor, rash and wound. Psychiatric/Behavioral: Negative for behavioral problems. The patient is not nervous/anxious. Allergies     No Known Allergies    Physical Exam     Physical Exam  Constitutional:       General: He is not in acute distress. Appearance: Normal appearance. He is not toxic-appearing or diaphoretic. Comments: Frail. Appears older than stated age. HENT:      Head: Normocephalic and atraumatic. Eyes:      General:         Right eye: No discharge. Left eye: No discharge. Conjunctiva/sclera: Conjunctivae normal.   Pulmonary:      Effort: Pulmonary effort is normal. No respiratory distress. Musculoskeletal:         General: No swelling or tenderness. Normal range of motion. Right lower leg: No edema. Left lower leg: No edema. Skin:     General: Skin is warm and dry. Coloration: Skin is not jaundiced or pale. Neurological:      General: No focal deficit present. Mental Status: He is alert and oriented to person, place, and time. Psychiatric:         Mood and Affect: Mood normal.         Behavior: Behavior normal.           Vital Signs     Vitals:    09/13/23 1338   BP: 132/80   BP Location: Left arm   Patient Position: Sitting   Cuff Size: Adult   Pulse: 76   SpO2: 96%   Weight: 45.8 kg (101 lb)         Current Medications       Current Outpatient Medications:   •  abiraterone (ZYTIGA) 250 mg tablet, Take 4 tablets (1,000 mg total) by mouth once daily. , Disp: 120 tablet, Rfl: 10  •  albuterol (2.5 mg/3 mL) 0.083 % nebulizer solution, Take 3 mL (2.5 mg total) by nebulization every 6 (six) hours as needed for wheezing or shortness of breath, Disp: 270 mL, Rfl: 1  •  ALPRAZolam (XANAX) 0.5 mg tablet, Take 1 tablet (0.5 mg total) by mouth 2 (two) times a day as needed for anxiety or sleep Provider aware of co-existing clonazepam prescription.  Patient is palliative care patient., Disp: 90 tablet, Rfl: 0  •  atorvastatin (LIPITOR) 40 mg tablet, Take 1 tablet (40 mg total) by mouth daily with dinner, Disp: 30 tablet, Rfl: 0  •  budesonide-formoterol (SYMBICORT) 160-4.5 mcg/act inhaler, Inhale 2 puffs, Disp: , Rfl:   •  clonazePAM (KlonoPIN) 0.5 MG disintegrating tablet, Take 1 tablet (0.5 mg total) by mouth daily at bedtime as needed for anxiety, Disp: 30 tablet, Rfl: 0  •  gabapentin (NEURONTIN) 300 mg capsule, Take 2 capsules (600 mg total) by mouth daily at bedtime, Disp: 60 capsule, Rfl: 2  •  montelukast (SINGULAIR) 10 mg tablet, TAKE 1 TABLET BY MOUTH EVERYDAY AT BEDTIME, Disp: 90 tablet, Rfl: 1  •  morphine (MS CONTIN) 15 mg 12 hr tablet, Take 1 tablet (15 mg total) by mouth 2 (two) times a day Ongoing therapy Max Daily Amount: 30 mg, Disp: 60 tablet, Rfl: 0  •  predniSONE 1 mg tablet, Take 1 tablet (1 mg total) by mouth daily, Disp: 30 tablet, Rfl: 1  •  Symbicort 160-4.5 MCG/ACT inhaler, INHALE 2 PUFFS BY MOUTH TWICE A DAY RINSE MOUTH AFTER USE, Disp: 10.2 g, Rfl: 1  •  albuterol (PROVENTIL HFA,VENTOLIN HFA) 90 mcg/act inhaler, Inhale 2 puffs every 4 (four) hours as needed for wheezing (Patient not taking: Reported on 7/13/2023), Disp: 18 g, Rfl: 0  •  lidocaine (LIDODERM) 5 %, Apply 3 patches topically daily Remove & Discard patch within 12 hours or as directed by MD (Patient not taking: Reported on 8/1/2023), Disp: 90 patch, Rfl: 0  •  morphine (MSIR) 15 mg tablet, Take 0.5 tablets (7.5 mg total) by mouth every 4 (four) hours as needed for severe pain (breakthrough pain) Max Daily Amount: 45 mg, Disp: 20 tablet, Rfl: 0  •  naloxone (NARCAN) 4 mg/0.1 mL nasal spray, Administer 1 spray into a nostril. If no response after 2-3 minutes, give another dose in the other nostril using a new spray.  (Patient not taking: Reported on 9/13/2023), Disp: 1 each, Rfl: 0  •  NON FORMULARY, Medical marijuana (Patient not taking: Reported on 7/13/2023), Disp: , Rfl:   •  senna (SENOKOT) 8.6 mg, Take 2 tablets (17.2 mg total) by mouth daily at bedtime as needed for constipation (Patient not taking: Reported on 7/13/2023), Disp: 60 tablet, Rfl: 0  No current facility-administered medications for this visit.       Active Problems     Patient Active Problem List   Diagnosis   • COPD (chronic obstructive pulmonary disease) ([address])   • Acute tracheobronchitis   • Acute respiratory failure with hypoxia (HCC)   • Severe protein-calorie malnutrition (HCC)   • HLD (hyperlipidemia)   • Left lower quadrant abdominal pain   • Personal history of colonic polyps   • Prostate cancer metastatic to bone ([address])   • Insomnia   • Hot flashes   • Anorexia   • Cachexia ([address])   • Palliative care patient   • Encephalopathy acute   • Neoplasm related pain   • Medical marijuana use   • Acute respiratory insufficiency   • Opioid dependence (HCC)   • Positive blood culture         Past Medical History     Past Medical History:   Diagnosis Date   • Bone cancer ([address])    • Colon polyp    • COPD (chronic obstructive pulmonary disease) ([address])    • Emphysema lung (HCC)    • Hyperlipidemia    • Prostate cancer [facility])          Surgical History     Past Surgical History:   Procedure Laterality Date   • APPENDECTOMY     • COLONOSCOPY     • IR BIOPSY BONE  12/30/2021   • JOINT REPLACEMENT     • UPPER GASTROINTESTINAL ENDOSCOPY           Family History     Family History   Problem Relation Age of Onset   • Lung cancer Mother    • Breast cancer Sister    • Colon polyps Brother    • Colon cancer Neg Hx          Social History     Social History       Radiology

## 2023-09-18 DIAGNOSIS — R63.0 ANOREXIA: ICD-10-CM

## 2023-09-18 DIAGNOSIS — C61 PROSTATE CANCER METASTATIC TO BONE (HCC): ICD-10-CM

## 2023-09-18 DIAGNOSIS — C79.51 PROSTATE CANCER METASTATIC TO BONE (HCC): ICD-10-CM

## 2023-09-18 DIAGNOSIS — G89.3 CANCER RELATED PAIN: ICD-10-CM

## 2023-09-18 DIAGNOSIS — R64 CACHEXIA (HCC): ICD-10-CM

## 2023-09-18 DIAGNOSIS — Z51.5 PALLIATIVE CARE PATIENT: ICD-10-CM

## 2023-09-18 RX ORDER — MORPHINE SULFATE 15 MG/1
15 TABLET, FILM COATED, EXTENDED RELEASE ORAL 2 TIMES DAILY
Qty: 60 TABLET | Refills: 0 | Status: SHIPPED | OUTPATIENT
Start: 2023-09-18

## 2023-09-18 RX ORDER — PREDNISONE 1 MG/1
1 TABLET ORAL DAILY
Qty: 30 TABLET | Refills: 1 | Status: SHIPPED | OUTPATIENT
Start: 2023-09-18

## 2023-09-29 DIAGNOSIS — Z51.5 PALLIATIVE CARE PATIENT: ICD-10-CM

## 2023-09-29 DIAGNOSIS — G47.00 INSOMNIA: ICD-10-CM

## 2023-09-29 DIAGNOSIS — F41.9 ANXIETY: ICD-10-CM

## 2023-09-29 RX ORDER — ALPRAZOLAM 0.5 MG/1
0.5 TABLET ORAL 2 TIMES DAILY PRN
Qty: 90 TABLET | Refills: 0 | Status: SHIPPED | OUTPATIENT
Start: 2023-09-29

## 2023-10-05 DIAGNOSIS — J44.9 CHRONIC OBSTRUCTIVE PULMONARY DISEASE, UNSPECIFIED COPD TYPE (HCC): ICD-10-CM

## 2023-10-05 RX ORDER — BUDESONIDE AND FORMOTEROL FUMARATE DIHYDRATE 160; 4.5 UG/1; UG/1
AEROSOL RESPIRATORY (INHALATION)
Qty: 10.2 G | Refills: 1 | Status: SHIPPED | OUTPATIENT
Start: 2023-10-05

## 2023-10-11 DIAGNOSIS — C61 PROSTATE CANCER METASTATIC TO BONE (HCC): Primary | ICD-10-CM

## 2023-10-11 DIAGNOSIS — C79.51 PROSTATE CANCER METASTATIC TO BONE (HCC): Primary | ICD-10-CM

## 2023-10-12 DIAGNOSIS — F32.9 REACTIVE DEPRESSION: ICD-10-CM

## 2023-10-13 ENCOUNTER — TELEPHONE (OUTPATIENT)
Dept: PULMONOLOGY | Facility: CLINIC | Age: 68
End: 2023-10-13

## 2023-10-13 ENCOUNTER — TELEPHONE (OUTPATIENT)
Dept: HEMATOLOGY ONCOLOGY | Facility: CLINIC | Age: 68
End: 2023-10-13

## 2023-10-13 DIAGNOSIS — C61 PROSTATE CANCER METASTATIC TO BONE (HCC): Primary | ICD-10-CM

## 2023-10-13 DIAGNOSIS — C79.51 PROSTATE CANCER METASTATIC TO BONE (HCC): Primary | ICD-10-CM

## 2023-10-13 RX ORDER — GABAPENTIN 300 MG/1
600 CAPSULE ORAL
Qty: 60 CAPSULE | Refills: 2 | Status: SHIPPED | OUTPATIENT
Start: 2023-10-13

## 2023-10-13 NOTE — TELEPHONE ENCOUNTER
When calling patient about need for CMP for today's Xgeva injection. Patient stating he didn't know about the appointment today and does not have any transportation lined up. Patient requesting for appt today to be cancelled and rescheduled. Patient expressing concerns about having to continue to pay for treatments and feeling overwhelmed. Also having transportation issues.

## 2023-10-13 NOTE — TELEPHONE ENCOUNTER
Patient lvm stating that he does not wish to make an appointment with Pulmonary because he cannot pay for any medical bills. Closing referral at this time.

## 2023-10-16 ENCOUNTER — TELEPHONE (OUTPATIENT)
Dept: HEMATOLOGY ONCOLOGY | Facility: CLINIC | Age: 68
End: 2023-10-16

## 2023-10-16 NOTE — TELEPHONE ENCOUNTER
Spoke to patient. He needed some clarity on the need for his upcoming appointment with Dory. I did explain to him that we need to be able to evaluate him regularly while on his prostate cancer medications. He said with the stock markets being low, he is trying to maintain his zero debt load with [employer] and wanted to eliminate any erroneous appointments. He will keep this appointment with Andrez Louis and plans to get his labs done before hand. He knows to call with any questions/concerns in the meantime.

## 2023-10-16 NOTE — TELEPHONE ENCOUNTER
Received from voice mail:    "Hello, my name is Denver Mano. I was just trying to get some information. This is this is been a difficult task. I actually need to talk to somebody about this, but OK, no big deal. I've telephone number is [phone]. I just need some information. I'm not getting the straight story here, I think. Alright, thank you.  Byabigail."

## 2023-10-18 ENCOUNTER — PATIENT OUTREACH (OUTPATIENT)
Dept: HEMATOLOGY ONCOLOGY | Facility: CLINIC | Age: 68
End: 2023-10-18

## 2023-10-18 DIAGNOSIS — J44.9 CHRONIC OBSTRUCTIVE PULMONARY DISEASE, UNSPECIFIED COPD TYPE (HCC): ICD-10-CM

## 2023-10-18 RX ORDER — ALBUTEROL SULFATE 2.5 MG/3ML
2.5 SOLUTION RESPIRATORY (INHALATION) EVERY 6 HOURS PRN
Qty: 270 ML | Refills: 1 | Status: SHIPPED | OUTPATIENT
Start: 2023-10-18

## 2023-10-18 NOTE — PROGRESS NOTES
Biopsychosocial and Barriers Assessment    Cancer Diagnosis: Prostate cancer metastatic to bone   Home/Cell Phone: [phone]  Emergency Contact: sister Woods, [phone]  Marital Status: Single  Interpretation concerns, speaks another language, preferred language: English  Cultural concerns: None reported  Ability to read or write: Fully Literate    Caregiver/Support: pt expressed not feeling supported  Children: 12 yr old  Child/Elder care: 12year old girl,     Housing: Pt sold his home  Home Setup: Pt lives in an apartment in his nephews  Lives With: niece in law, great niece  Daily Living Activities: Pt is able to care for himself  Durable Medical Equipment: nebulizer, rescue inhalers  Ambulation:     [facility]  High co-pays with insurance: No  High co-pays with medication coverage: No  No medication coverage:  Pt has  Medicare    Primary Care Provider: ISA Steve   Hx of [address]: No  Hx of Short term rehab: No  Mental Health Hx: No  Substance Abuse Hx: No  Employment: retired musician   Status/Location: No  Ability to pay bills: Yes but it is getting more difficult  POA/LW/AD: No  Transportation Plan/Concerns: Pt drives but he states that it is getting more difficult      What do you know about your Cancer Diagnosis    What has your doctor told you about your cancer diagnosis: Pt has a good knowledge base of his diagnosis  and disease progression. What has your doctor told you about your cancer treatment: Pt has knowledge of his treatment plan. What specific concerns do you have about your diagnosis and treatment:  Pt does not express any concerns. Have you been made aware of any hair loss associated with treatment: N/A    Additional Comments:    MSW received a referral for this pt from the Oncology office and outreach was made this day. The pt was open and receptive of this call.   The role of the OSW was described and explained and contact information was provided. The pt expressed financial concerns as he has been supporting his family for many years. When his health more recently declined, his nephew suggested he move in with him and his family. The pt explained how he paid to have the apartment in the home renovated for him to move into and he sold his home to move. Once he moved in with his nephew, the pt shared that his nephew "took off" with his girlfriend, leaving his wife and 12year old daughter behind. He has not been able to be reached since he left. The pt is now supporting his nephews wife and their daughter and he explained how difficult this is for him and the financial toll this is taking. MSW spent time listening and acknowledged this difficult position the pt has been placed as a result. When asked if he has any other support or family supports, the pt states that he does not. 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 MSW spoke with the pt about the hospital [facility] and [address]. He will plan on driving to his next appointment next week but will consider [facility] after that. MSW will plan to meet with the pt when he is in the office next week. Significant support offered and pt encouraged to call as needed. MSW will continue to follow.

## 2023-10-23 ENCOUNTER — TELEPHONE (OUTPATIENT)
Dept: HEMATOLOGY ONCOLOGY | Facility: CLINIC | Age: 68
End: 2023-10-23

## 2023-10-23 DIAGNOSIS — C79.51 PROSTATE CANCER METASTATIC TO BONE (HCC): ICD-10-CM

## 2023-10-23 DIAGNOSIS — C61 PROSTATE CANCER METASTATIC TO BONE (HCC): ICD-10-CM

## 2023-10-23 DIAGNOSIS — G89.3 CANCER RELATED PAIN: ICD-10-CM

## 2023-10-23 DIAGNOSIS — Z51.5 PALLIATIVE CARE PATIENT: ICD-10-CM

## 2023-10-23 NOTE — PROGRESS NOTES
HEMATOLOGY / [facility] FOLLOW UP NOTE    Primary Care Provider: Leslye Jiménez  Referring Provider:    MRN: [id number]  : 1955    Reason for Encounter: Follow-up for metastatic prostate cancer    Oncology History Overview Note    Iliac Crest, Right, bone lesion:  -Metastatic adenocarcinoma, immunohistochemically consistent with metastatic prostatic adenocarcinoma    2022 started on Firmagon by Urology    2022 started [facility]     2022 hospitalized with acute encephalopathy. [facility] discontinued and he was switched to [facility] and prednisone 2.5 mg    Xgeva every 3 months     23 PSMA PET CT showed stable scattered bony lesions, no evidence of progression       Prostate cancer metastatic to bone ([address])   2021 Initial Diagnosis    Prostate cancer metastatic to bone [facility])         Interval History: Patient presents for follow-up visit. He is taking Zytiga and low-dose prednisone daily. Last received Lupron from urology on 2023. Most recent PSA from 2023 is down to 0.78. Patient is perseverating today. He was seen by palliative care earlier this week and it was recommended he no longer drive given the high amount of opioids he is taking. This has been discussed with him multiple times in the past by multiple providers including myself. However, he insists he can "pass a reflex test" which is a telling sign indicating he can drive. He goes on to show me this reflex test which consists of putting a quarter on the back of his hand and catching at mid air. He states he did not take opioids this morning so he could drive here today. He was seen in the ED for COPD exacerbation earlier this week. He feels his emphysema is far worse than his cancer    Denies any pelvic pain or hematuria. He was prescribed tramadol to use as breakthrough.   He states he took a small dose of this last evening and "I had to Narcan myself"  Patient reports he will no longer be taking tramadol        REVIEW OF SYSTEMS:  Please note that a 14-point review of systems was performed to include Constitutional, HEENT, Respiratory, CVS, GI, , Musculoskeletal, Integumentary, Neurologic, Rheumatologic, Endocrinologic, Psychiatric, Lymphatic, and Hematologic/Oncologic systems were reviewed and are negative unless otherwise stated in HPI. Positive and negative findings pertinent to this evaluation are incorporated into the history of present illness. ECOG PS: 1    PROBLEM LIST:  Patient Active Problem List   Diagnosis    COPD (chronic obstructive pulmonary disease) (HCC)    Acute tracheobronchitis    Acute respiratory failure with hypoxia (HCC)    Severe protein-calorie malnutrition (HCC)    HLD (hyperlipidemia)    Left lower quadrant abdominal pain    Personal history of colonic polyps    Prostate cancer metastatic to bone ([address])    Insomnia    Hot flashes    Anorexia    Cachexia ([address])    Palliative care patient    Encephalopathy acute    Neoplasm related pain    Medical marijuana use    Acute respiratory insufficiency    Opioid dependence ([address])    Positive blood culture       Assessment / Plan:  1. Prostate cancer metastatic to bone ([address])    2. Chronic obstructive pulmonary disease, unspecified COPD type ([address])    3. Cachexia [facility]      Patient is a 26-year-old gentleman with metastatic prostate cancer to the bone. He receives Lupron from his urologist and is currently on Zytiga and low-dose prednisone along with Xgeva. He is on low-dose prednisone because he cannot tolerate higher doses. Most recent blood work is within normal range, PSA has continued to trend down. He will receive Xgeva today. He will continue on his current regimen without change. Patient is in agreement. Time spent trying to explain rationale for driving restrictions while on opioid medications with patient. Again, he was very perseverative.   DMV form submitted by Remi La PA-C with palliative care     I will see him back in 3 months with repeat blood work. I will coordinate his follow-up appointment with his next dose of Xgeva. Patient is in agreement. He knows to call anytime with questions or concerns in the interim    I spent 30 minutes on chart review, face to face counseling time, coordination of care and documentation. Past Medical History:   has a past medical history of Bone cancer ([address]), Colon polyp, COPD (chronic obstructive pulmonary disease) ([address]), Emphysema lung ([address]), Hyperlipidemia, and Prostate cancer ([address]). PAST SURGICAL HISTORY:   has a past surgical history that includes Appendectomy; Colonoscopy; Upper gastrointestinal endoscopy; IR biopsy bone (12/30/2021); and Joint replacement. CURRENT MEDICATIONS  Current Outpatient Medications   Medication Sig Dispense Refill    abiraterone (ZYTIGA) 250 mg tablet Take 4 tablets (1,000 mg total) by mouth once daily. 120 tablet 10    albuterol (2.5 mg/3 mL) 0.083 % nebulizer solution Take 3 mL (2.5 mg total) by nebulization every 6 (six) hours as needed for wheezing or shortness of breath 270 mL 1    ALPRAZolam (XANAX) 0.5 mg tablet Take 1 tablet (0.5 mg total) by mouth 2 (two) times a day as needed for anxiety or sleep Provider aware of co-existing clonazepam prescription. Patient is palliative care patient.  90 tablet 0    atorvastatin (LIPITOR) 40 mg tablet Take 1 tablet (40 mg total) by mouth daily with dinner 30 tablet 0    azithromycin (Zithromax Z-[place]ington) 250 mg tablet Take 2 tablets today then 1 tablet daily x 4 days 6 tablet 0    budesonide-formoterol (SYMBICORT) 160-4.5 mcg/act inhaler Inhale 2 puffs      clonazePAM (KlonoPIN) 0.5 MG disintegrating tablet Take 1 tablet (0.5 mg total) by mouth daily at bedtime as needed for anxiety 30 tablet 0    gabapentin (NEURONTIN) 300 mg capsule Take 2 capsules (600 mg total) by mouth daily at bedtime 60 capsule 2    montelukast (SINGULAIR) 10 mg tablet TAKE 1 TABLET BY MOUTH EVERYDAY AT BEDTIME 90 tablet 1 morphine (MS CONTIN) 15 mg 12 hr tablet Take 1 tablet (15 mg total) by mouth 2 (two) times a day Ongoing therapy Max Daily Amount: 30 mg 60 tablet 0    predniSONE 1 mg tablet Take 1 tablet (1 mg total) by mouth daily 30 tablet 1    predniSONE 20 mg tablet Take 2 tablets (40 mg total) by mouth daily for 4 days 8 tablet 0    Symbicort 160-4.5 MCG/ACT inhaler INHALE 2 PUFFS BY MOUTH TWICE A DAY RINSE MOUTH AFTER USE 10.2 g 1    traMADol (ULTRAM) 50 mg tablet Take 0.5 tablets (25 mg total) by mouth every 6 (six) hours as needed (breakthrough pain) 30 tablet 0    albuterol (PROVENTIL HFA,VENTOLIN HFA) 90 mcg/act inhaler Inhale 2 puffs every 4 (four) hours as needed for wheezing (Patient not taking: Reported on 7/13/2023) 18 g 0    lidocaine (LIDODERM) 5 % Apply 3 patches topically daily Remove & Discard patch within 12 hours or as directed by MD (Patient not taking: Reported on 8/1/2023) 90 patch 0    naloxone (NARCAN) 4 mg/0.1 mL nasal spray Administer 1 spray into a nostril. If no response after 2-3 minutes, give another dose in the other nostril using a new spray. (Patient not taking: Reported on 9/13/2023) 1 each 0    NON FORMULARY Medical marijuana (Patient not taking: Reported on 7/13/2023)      senna (SENOKOT) 8.6 mg Take 2 tablets (17.2 mg total) by mouth daily at bedtime as needed for constipation (Patient not taking: Reported on 7/13/2023) 60 tablet 0     No current facility-administered medications for this visit. [unfilled]    SOCIAL HISTORY:   reports that he quit smoking about 11 years ago. His smoking use included cigarettes. He started smoking about 53 years ago. He has a 41.00 pack-year smoking history. He has never used smokeless tobacco. He reports that he does not drink alcohol and does not use drugs. FAMILY HISTORY:  family history includes Breast cancer in his sister; Colon polyps in his brother; Lung cancer in his mother. ALLERGIES:  has No Known Allergies.       Physical Exam:  Vital Signs: Visit Vitals  /92 (BP Location: Left arm, Patient Position: Sitting, Cuff Size: Standard)   Pulse 98   Temp 97.6 °F (36.4 °C) (Temporal)   Wt 45.9 kg (101 lb 3.2 oz)   SpO2 92%   BMI 15.85 kg/m²   Smoking Status Former   BSA 1.51 m²     Body mass index is 15.85 kg/m². Body surface area is 1.51 meters squared. Physical Exam  Constitutional:       General: He is in acute distress. Comments: Cachetic appearing. NAD   HENT:      Head: Normocephalic and atraumatic. Eyes:      General: No scleral icterus. Cardiovascular:      Rate and Rhythm: Normal rate and regular rhythm. Pulmonary:      Effort: Pulmonary effort is normal.      Breath sounds: No wheezing. Comments: Diminished breath sounds at bases  Abdominal:      General: Abdomen is flat. Palpations: Abdomen is soft. Musculoskeletal:      Cervical back: Normal range of motion. Skin:     General: Skin is warm and dry. Neurological:      General: No focal deficit present. Mental Status: He is alert and oriented to person, place, and time.    Psychiatric:         Mood and Affect: Mood normal.         Behavior: Behavior normal.       Labs:  Lab Results   Component Value Date    WBC 6.08 10/24/2023    HGB 13.9 10/24/2023    HCT 41 10/24/2023    MCV 98 10/24/2023     (H) 10/24/2023     Lab Results   Component Value Date    SODIUM 140 10/24/2023    K 3.9 10/24/2023     10/24/2023    CO2 34 (H) 10/24/2023    AGAP 5 10/24/2023    BUN 13 10/24/2023    CREATININE 0.53 (L) 10/24/2023    GLUC 94 10/24/2023    GLUF 106 (H) 08/15/2022    CALCIUM 9.1 10/24/2023    AST 17 10/24/2023    ALT 16 10/24/2023    ALKPHOS 50 10/24/2023    TP 7.2 10/24/2023    TBILI 0.36 10/24/2023    EGFR 107 10/24/2023

## 2023-10-24 ENCOUNTER — OFFICE VISIT (OUTPATIENT)
Age: 68
End: 2023-10-24

## 2023-10-24 ENCOUNTER — HOSPITAL ENCOUNTER (EMERGENCY)
Facility: HOSPITAL | Age: 68
Discharge: HOME | End: 2023-10-24
Attending: EMERGENCY MEDICINE
Payer: MEDICARE

## 2023-10-24 ENCOUNTER — APPOINTMENT (EMERGENCY)
Dept: RADIOLOGY | Facility: HOSPITAL | Age: 68
End: 2023-10-24
Payer: MEDICARE

## 2023-10-24 ENCOUNTER — APPOINTMENT (EMERGENCY)
Dept: CT IMAGING | Facility: HOSPITAL | Age: 68
End: 2023-10-24
Payer: MEDICARE

## 2023-10-24 ENCOUNTER — CLINICAL SUPPORT (OUTPATIENT)
Age: 68
End: 2023-10-24

## 2023-10-24 VITALS
SYSTOLIC BLOOD PRESSURE: 173 MMHG | OXYGEN SATURATION: 95 % | HEART RATE: 90 BPM | DIASTOLIC BLOOD PRESSURE: 102 MMHG | TEMPERATURE: 98.3 F

## 2023-10-24 VITALS
OXYGEN SATURATION: 94 % | TEMPERATURE: 96.3 F | HEART RATE: 97 BPM | WEIGHT: 102.6 LBS | DIASTOLIC BLOOD PRESSURE: 70 MMHG | BODY MASS INDEX: 16.07 KG/M2 | SYSTOLIC BLOOD PRESSURE: 128 MMHG

## 2023-10-24 DIAGNOSIS — Z51.5 PALLIATIVE CARE PATIENT: ICD-10-CM

## 2023-10-24 DIAGNOSIS — J44.9 CHRONIC OBSTRUCTIVE PULMONARY DISEASE, UNSPECIFIED COPD TYPE (HCC): ICD-10-CM

## 2023-10-24 DIAGNOSIS — E43 SEVERE PROTEIN-CALORIE MALNUTRITION (HCC): ICD-10-CM

## 2023-10-24 DIAGNOSIS — J96.01 ACUTE RESPIRATORY FAILURE WITH HYPOXIA (HCC): ICD-10-CM

## 2023-10-24 DIAGNOSIS — C79.51 PROSTATE CANCER METASTATIC TO BONE (HCC): Primary | ICD-10-CM

## 2023-10-24 DIAGNOSIS — Z71.89 COUNSELING AND COORDINATION OF CARE: Primary | ICD-10-CM

## 2023-10-24 DIAGNOSIS — J44.1 COPD EXACERBATION (HCC): Primary | ICD-10-CM

## 2023-10-24 DIAGNOSIS — F11.20 UNCOMPLICATED OPIOID DEPENDENCE (HCC): ICD-10-CM

## 2023-10-24 DIAGNOSIS — C61 PROSTATE CANCER METASTATIC TO BONE (HCC): Primary | ICD-10-CM

## 2023-10-24 DIAGNOSIS — G89.3 CANCER RELATED PAIN: ICD-10-CM

## 2023-10-24 DIAGNOSIS — G89.3 NEOPLASM RELATED PAIN: ICD-10-CM

## 2023-10-24 LAB
ALBUMIN SERPL BCP-MCNC: 4.3 G/DL (ref 3.5–5)
ALP SERPL-CCNC: 50 U/L (ref 34–104)
ALT SERPL W P-5'-P-CCNC: 16 U/L (ref 7–52)
ANION GAP SERPL CALCULATED.3IONS-SCNC: 5 MMOL/L
APTT PPP: 31 SECONDS (ref 23–37)
AST SERPL W P-5'-P-CCNC: 17 U/L (ref 13–39)
BASE EXCESS BLDA CALC-SCNC: 5 MMOL/L (ref -2–3)
BASOPHILS # BLD AUTO: 0.08 THOUSANDS/ÂΜL (ref 0–0.1)
BASOPHILS NFR BLD AUTO: 1 % (ref 0–1)
BILIRUB SERPL-MCNC: 0.36 MG/DL (ref 0.2–1)
BILIRUB UR QL STRIP: NEGATIVE
BNP SERPL-MCNC: 33 PG/ML (ref 0–100)
BUN SERPL-MCNC: 13 MG/DL (ref 5–25)
CA-I BLD-SCNC: 1.21 MMOL/L (ref 1.12–1.32)
CALCIUM SERPL-MCNC: 9.1 MG/DL (ref 8.4–10.2)
CARDIAC TROPONIN I PNL SERPL HS: 3 NG/L
CHLORIDE SERPL-SCNC: 101 MMOL/L (ref 96–108)
CLARITY UR: CLEAR
CO2 SERPL-SCNC: 34 MMOL/L (ref 21–32)
COLOR UR: YELLOW
CREAT SERPL-MCNC: 0.53 MG/DL (ref 0.6–1.3)
EOSINOPHIL # BLD AUTO: 0.16 THOUSAND/ÂΜL (ref 0–0.61)
EOSINOPHIL NFR BLD AUTO: 3 % (ref 0–6)
ERYTHROCYTE [DISTWIDTH] IN BLOOD BY AUTOMATED COUNT: 13.5 % (ref 11.6–15.1)
FLUAV RNA RESP QL NAA+PROBE: NEGATIVE
FLUBV RNA RESP QL NAA+PROBE: NEGATIVE
GFR SERPL CREATININE-BSD FRML MDRD: 107 ML/MIN/1.73SQ M
GLUCOSE SERPL-MCNC: 94 MG/DL (ref 65–140)
GLUCOSE SERPL-MCNC: 96 MG/DL (ref 65–140)
GLUCOSE UR STRIP-MCNC: NEGATIVE MG/DL
HCO3 BLDA-SCNC: 32.5 MMOL/L (ref 24–30)
HCT VFR BLD AUTO: 42.6 % (ref 36.5–49.3)
HCT VFR BLD CALC: 41 % (ref 36.5–49.3)
HGB BLD-MCNC: 13.7 G/DL (ref 12–17)
HGB BLDA-MCNC: 13.9 G/DL (ref 12–17)
HGB UR QL STRIP.AUTO: NEGATIVE
IMM GRANULOCYTES # BLD AUTO: 0.02 THOUSAND/UL (ref 0–0.2)
IMM GRANULOCYTES NFR BLD AUTO: 0 % (ref 0–2)
INR PPP: 0.92 (ref 0.84–1.19)
KETONES UR STRIP-MCNC: NEGATIVE MG/DL
LACTATE SERPL-SCNC: 0.8 MMOL/L (ref 0.5–2)
LEUKOCYTE ESTERASE UR QL STRIP: NEGATIVE
LYMPHOCYTES # BLD AUTO: 1.12 THOUSANDS/ÂΜL (ref 0.6–4.47)
LYMPHOCYTES NFR BLD AUTO: 18 % (ref 14–44)
MCH RBC QN AUTO: 31.6 PG (ref 26.8–34.3)
MCHC RBC AUTO-ENTMCNC: 32.2 G/DL (ref 31.4–37.4)
MCV RBC AUTO: 98 FL (ref 82–98)
MONOCYTES # BLD AUTO: 0.55 THOUSAND/ÂΜL (ref 0.17–1.22)
MONOCYTES NFR BLD AUTO: 9 % (ref 4–12)
NEUTROPHILS # BLD AUTO: 4.15 THOUSANDS/ÂΜL (ref 1.85–7.62)
NEUTS SEG NFR BLD AUTO: 69 % (ref 43–75)
NITRITE UR QL STRIP: NEGATIVE
NRBC BLD AUTO-RTO: 0 /100 WBCS
PCO2 BLD: 34 MMOL/L (ref 21–32)
PCO2 BLD: 59.1 MM HG (ref 42–50)
PH BLD: 7.35 [PH] (ref 7.3–7.4)
PH UR STRIP.AUTO: 6.5 [PH]
PLATELET # BLD AUTO: 398 THOUSANDS/UL (ref 149–390)
PMV BLD AUTO: 8.4 FL (ref 8.9–12.7)
PO2 BLD: 25 MM HG (ref 35–45)
POTASSIUM BLD-SCNC: 3.8 MMOL/L (ref 3.5–5.3)
POTASSIUM SERPL-SCNC: 3.9 MMOL/L (ref 3.5–5.3)
PROCALCITONIN SERPL-MCNC: <0.05 NG/ML
PROT SERPL-MCNC: 7.2 G/DL (ref 6.4–8.4)
PROT UR STRIP-MCNC: NEGATIVE MG/DL
PROTHROMBIN TIME: 12.8 SECONDS (ref 11.6–14.5)
RBC # BLD AUTO: 4.33 MILLION/UL (ref 3.88–5.62)
RSV RNA RESP QL NAA+PROBE: NEGATIVE
SAO2 % BLD FROM PO2: 39 % (ref 60–85)
SARS-COV-2 RNA RESP QL NAA+PROBE: NEGATIVE
SODIUM BLD-SCNC: 140 MMOL/L (ref 136–145)
SODIUM SERPL-SCNC: 140 MMOL/L (ref 135–147)
SP GR UR STRIP.AUTO: 1.02 (ref 1–1.03)
SPECIMEN SOURCE: ABNORMAL
UROBILINOGEN UR STRIP-ACNC: <2 MG/DL
WBC # BLD AUTO: 6.08 THOUSAND/UL (ref 4.31–10.16)

## 2023-10-24 PROCEDURE — 85025 COMPLETE CBC W/AUTO DIFF WBC: CPT | Performed by: EMERGENCY MEDICINE

## 2023-10-24 PROCEDURE — 84132 ASSAY OF SERUM POTASSIUM: CPT

## 2023-10-24 PROCEDURE — 84295 ASSAY OF SERUM SODIUM: CPT

## 2023-10-24 PROCEDURE — 71275 CT ANGIOGRAPHY CHEST: CPT

## 2023-10-24 PROCEDURE — 96374 THER/PROPH/DIAG INJ IV PUSH: CPT

## 2023-10-24 PROCEDURE — 36415 COLL VENOUS BLD VENIPUNCTURE: CPT | Performed by: EMERGENCY MEDICINE

## 2023-10-24 PROCEDURE — 83605 ASSAY OF LACTIC ACID: CPT | Performed by: EMERGENCY MEDICINE

## 2023-10-24 PROCEDURE — 84484 ASSAY OF TROPONIN QUANT: CPT | Performed by: EMERGENCY MEDICINE

## 2023-10-24 PROCEDURE — 82330 ASSAY OF CALCIUM: CPT

## 2023-10-24 PROCEDURE — 99291 CRITICAL CARE FIRST HOUR: CPT | Performed by: EMERGENCY MEDICINE

## 2023-10-24 PROCEDURE — 85610 PROTHROMBIN TIME: CPT | Performed by: EMERGENCY MEDICINE

## 2023-10-24 PROCEDURE — 82947 ASSAY GLUCOSE BLOOD QUANT: CPT

## 2023-10-24 PROCEDURE — 85730 THROMBOPLASTIN TIME PARTIAL: CPT | Performed by: EMERGENCY MEDICINE

## 2023-10-24 PROCEDURE — 85014 HEMATOCRIT: CPT

## 2023-10-24 PROCEDURE — 94644 CONT INHLJ TX 1ST HOUR: CPT

## 2023-10-24 PROCEDURE — 0241U HB NFCT DS VIR RESP RNA 4 TRGT: CPT | Performed by: EMERGENCY MEDICINE

## 2023-10-24 PROCEDURE — 71046 X-RAY EXAM CHEST 2 VIEWS: CPT

## 2023-10-24 PROCEDURE — 83880 ASSAY OF NATRIURETIC PEPTIDE: CPT | Performed by: EMERGENCY MEDICINE

## 2023-10-24 PROCEDURE — 82803 BLOOD GASES ANY COMBINATION: CPT

## 2023-10-24 PROCEDURE — 81003 URINALYSIS AUTO W/O SCOPE: CPT | Performed by: EMERGENCY MEDICINE

## 2023-10-24 PROCEDURE — 84145 PROCALCITONIN (PCT): CPT | Performed by: EMERGENCY MEDICINE

## 2023-10-24 PROCEDURE — NC001 PR NO CHARGE

## 2023-10-24 PROCEDURE — 87040 BLOOD CULTURE FOR BACTERIA: CPT | Performed by: EMERGENCY MEDICINE

## 2023-10-24 PROCEDURE — 99285 EMERGENCY DEPT VISIT HI MDM: CPT

## 2023-10-24 PROCEDURE — 80053 COMPREHEN METABOLIC PANEL: CPT | Performed by: EMERGENCY MEDICINE

## 2023-10-24 PROCEDURE — 93005 ELECTROCARDIOGRAM TRACING: CPT

## 2023-10-24 RX ORDER — ALBUTEROL SULFATE 2.5 MG/3ML
10 SOLUTION RESPIRATORY (INHALATION) ONCE
Status: COMPLETED | OUTPATIENT
Start: 2023-10-24 | End: 2023-10-24

## 2023-10-24 RX ORDER — PREDNISONE 20 MG/1
40 TABLET ORAL DAILY
Qty: 8 TABLET | Refills: 0 | Status: SHIPPED | OUTPATIENT
Start: 2023-10-24 | End: 2023-10-28

## 2023-10-24 RX ORDER — MORPHINE SULFATE 15 MG/1
7.5 TABLET ORAL EVERY 4 HOURS PRN
Qty: 20 TABLET | Refills: 0 | OUTPATIENT
Start: 2023-10-24

## 2023-10-24 RX ORDER — AZITHROMYCIN 250 MG/1
TABLET, FILM COATED ORAL
Qty: 6 TABLET | Refills: 0 | Status: SHIPPED | OUTPATIENT
Start: 2023-10-24 | End: 2023-10-28

## 2023-10-24 RX ORDER — MORPHINE SULFATE 15 MG/1
15 TABLET, FILM COATED, EXTENDED RELEASE ORAL 2 TIMES DAILY
Qty: 60 TABLET | Refills: 0 | Status: SHIPPED | OUTPATIENT
Start: 2023-10-24

## 2023-10-24 RX ORDER — TRAMADOL HYDROCHLORIDE 50 MG/1
25 TABLET ORAL EVERY 6 HOURS PRN
Qty: 30 TABLET | Refills: 0 | Status: SHIPPED | OUTPATIENT
Start: 2023-10-24

## 2023-10-24 RX ORDER — METHYLPREDNISOLONE SODIUM SUCCINATE 125 MG/2ML
80 INJECTION, POWDER, LYOPHILIZED, FOR SOLUTION INTRAMUSCULAR; INTRAVENOUS ONCE
Status: COMPLETED | OUTPATIENT
Start: 2023-10-24 | End: 2023-10-24

## 2023-10-24 RX ADMIN — METHYLPREDNISOLONE SODIUM SUCCINATE 80 MG: 125 INJECTION, POWDER, FOR SOLUTION INTRAMUSCULAR; INTRAVENOUS at 15:25

## 2023-10-24 RX ADMIN — ALBUTEROL SULFATE 10 MG: 2.5 SOLUTION RESPIRATORY (INHALATION) at 15:25

## 2023-10-24 RX ADMIN — IPRATROPIUM BROMIDE 1 MG: 0.5 SOLUTION RESPIRATORY (INHALATION) at 15:25

## 2023-10-24 RX ADMIN — IOHEXOL 85 ML: 350 INJECTION, SOLUTION INTRAVENOUS at 17:10

## 2023-10-24 NOTE — DISCHARGE INSTRUCTIONS
You had some abnormalities noted on the CT scan  of your chest that showed possible early inflammation this will need follow-up with your primary care provider for recheck, if you develop worsening cough, shortness of breath, fevers or any other concerning symptoms please return to the emergency department for evaluation

## 2023-10-24 NOTE — ED CARE HANDOFF
Emergency Department Sign Out Note        Sign out and transfer of care from Dr. Nneka Contreras. See Separate Emergency Department note. The patient, Maria Fernanda Cain, was evaluated by the previous provider for shortness of breath. ED Course as of 10/24/23 1805   Tue Oct 24, 2023   1803 Patient resting comfortably no acute distress, normal lab work, does have some evidence of possible early inflammation, aspiration on CT scan however clinically low suspicion at this point will discharge with COPD exacerbation treatment and close follow-up with PCP for recheck, careful return precautions to return for any worsening symptoms     Procedures  Medical Decision Making  Amount and/or Complexity of Data Reviewed  Labs: ordered. Radiology: ordered. Risk  Prescription drug management. Disposition  Final diagnoses:   COPD exacerbation ([address])     Time reflects when diagnosis was documented in both MDM as applicable and the Disposition within this note       Time User Action Codes Description Comment    10/24/2023  6:00 PM Donnell Bosworth Add [J18.9] Pneumonia     10/24/2023  6:00 PM Donnell Bosworth Remove [J18.9] Pneumonia     10/24/2023  6:01 PM Donnell Bosworth Add [J44.1] COPD exacerbation [facility])           ED Disposition       ED Disposition   Discharge    Condition   Stable    Date/Time   Tue Oct 24, 2023  6:01 PM    Comment   Maria Fernanda Cain discharge to home/self care.                    Follow-up Information       Follow up With Specialties Details Why Contact Info Additional Information     [address] [facility] Emergency Department Emergency Medicine  If symptoms worsen [address] [facility] [phone]  [address] [facility] Emergency Room Emergency Department, [address] [facility] [place], [place], [address] [facility],[address]    Summer Calhoun, [address] [facility], Nurse Practitioner Schedule an appointment as soon as possible for a visit   [phone] Blanca Tejeda  [address] [address]  [phone]             Patient's Medications   Discharge Prescriptions    AZITHROMYCIN (ZITHROMAX Z-JAYSON) 250 MG TABLET    Take 2 tablets today then 1 tablet daily x 4 days       Start Date: 10/24/2023End Date: 10/28/2023       Order Dose: --       Quantity: 6 tablet    Refills: 0    PREDNISONE 20 MG TABLET    Take 2 tablets (40 mg total) by mouth daily for 4 days       Start Date: 10/24/2023End Date: 10/28/2023       Order Dose: 40 mg       Quantity: 8 tablet    Refills: 0    TRAMADOL (ULTRAM) 50 MG TABLET    Take 0.5 tablets (25 mg total) by mouth every 6 (six) hours as needed (breakthrough pain)       Start Date: 10/24/2023End Date: --       Order Dose: 25 mg       Quantity: 30 tablet    Refills: 0     No discharge procedures on file.        ED Provider  Electronically Signed by     Adelso Moore DO  10/24/23 0152

## 2023-10-24 NOTE — PROGRESS NOTES
.    Outpatient Follow-Up - Palliative and Supportive Care   Rosey Weathers 70 y.o. male [id number]    Assessment & Plan  1. Prostate cancer metastatic to bone ([address])    2. Acute respiratory failure with hypoxia (HCC)    3. Chronic obstructive pulmonary disease, unspecified COPD type ([address])    4. Severe protein-calorie malnutrition ([address])    5. Palliative care patient    6. Neoplasm related pain    7. Uncomplicated opioid dependence ([address])    8. Cancer related pain      Plan:  Ta Elizalde is in immense pain and feels that his lungs are getting worse, thinks he may have pneumonia? Can barely ambulate due to pain/deconditioning. His behavior is erratic. Adamantly refusing ER evaluation right now however states he will go later today. Ran out of MS Contin 15MG -- will permit short course refill and I will touch base with him in a week. He does not tolerate MSIR 7.5MG split in half. Will switch to low dose tramadol. He has many financial concerns -- we discussed cancer care funds and gave information for . We made him a pulmonology appt for next week. Discussed [facility] transport however they cannot come to his area. Recommend find a trusted friend/family member who has computer/smart phone so he could do virtual appointments. As pt is unsafe to drive, I sent in the form to [employer] to restrict his license; I have done this before. Sister/Benigno did call me later in the day and I updated her. She would like to speak with our CM. ACP - completed today; Maxx Wall is HCR. He is DNR/DNI.    RTC - 1 month     Medications adjusted this encounter:  Requested Prescriptions     Signed Prescriptions Disp Refills    morphine (MS CONTIN) 15 mg 12 hr tablet 60 tablet 0     Sig: Take 1 tablet (15 mg total) by mouth 2 (two) times a day Ongoing therapy Max Daily Amount: 30 mg    traMADol (ULTRAM) 50 mg tablet 30 tablet 0     Sig: Take 0.5 tablets (25 mg total) by mouth every 6 (six) hours as needed (breakthrough pain)     No orders of the defined types were placed in this encounter. Medications Discontinued During This Encounter   Medication Reason    morphine (MSIR) 15 mg tablet     morphine (MS CONTIN) 15 mg 12 hr tablet          Ubaldo Haley was seen today for symptoms and planning cares related to above illnesses. I have reviewed the patient's controlled substance dispensing history in the Prescription Drug Monitoring Program in compliance with the [facility] regulations before prescribing any controlled substances. Filled  Written  ID  Drug  QTY  Days  Prescriber  RX #  Dispenser  Refill  Daily Dose*  Pymt Type      09/29/2023 09/29/2023 1 Alprazolam 0.5 Mg Tablet 90.00 45 Ch Bul [id number] Pen (4411) 0 2.00 LME Medicare [place]   09/20/2023 09/18/2023 1 Morphine Sulf Er 15 Mg Tablet 60.00 30 Ch Bul [id number] Pen (4411) 0 30.00 MME Medicare [place]   08/17/2023 08/17/2023 1 Morphine Sulfate Ir 15 Mg Tab 20.00 6 Ch Bul [id number] Pen (4411) 0 50.00 MME Medicare [place]   08/17/2023 08/17/2023 1 Morphine Sulf Er 15 Mg Tablet 60.00 30 Ch Bul [id number] Pen (4411) 0 30.00 MME Medicare [place]   08/17/2023 08/17/2023 1 Alprazolam 0.5 Mg Tablet 60.00 30 Ch Bul [id number] Pen (4411) 0 2.00 LME Medicare [place]       They are invited to continue to follow with us. If there are questions or concerns, please contact us through our clinic/answering service 24 hours a day, seven days a week. Bobby Penaloza PA-C  [facility] Palliative and Supportive Care  [phone]    Visit Information    Accompanied By: presents alone     Source of History: Self    History Limitations: None    History of Present Illness      Ubaldo Haley is a 70 y.o. male who presents in follow up of symptoms related to metastatic prostate cancer.   Pertinent issues include: symptom management, pain, neoplasm related, breathlessness, depression or anxiety, nausea, fatigue, assessment of goals of care, disease process education and discussion of prognosis, advance care planning. Angi Longoria is under the care of Dr. Marlee Sullivan and ISA Curry for metastatic prostate cancer diagnosed early 2022. CT image done 12/21 revealed extensive multifocal osseous metastatic disease including axial and appendicular skeletal system, calvarium, sternum, ribs, right humerus and clavicle, bilateral scapula, cervical spine, and thoracolumbar spine, sacrum, pelvis, and bilateral femurs. Angi Longoria was initiated on [address] [facility] [address] antiandrogen therapy by urology and continues Lupron every 6 months. He was started on Xtandi but this was discontinued due to side effects of hallucinations that led to hospitalization in May 2022 although the side effects may have been attributed to other factors. He was switched to [place] with Hayley Frisk every 3 months for bone metastasis. In October 2022, Angi Longoria was hospitalized at an external agency [facility] in [place])  due to profound fatigue and intractable pain. He was rehydrated and his symptom regimen was adjusted by palliative care physician Dr. Marian Wright ([phone]). Angi Longoria returned to see Dr. Guera Ruiz after this. His most recent imaging was 1/27/2023 and it did not show significant progression. His PSA is stable. Angi Longoria established care with palliative and supportive care on 3/17/2022. Today, 10/24/2023 he presents for symptom follow-up. He had a prior appointment appointment with palliative which he did not attend and did not call our office to inform us that he ran out of his long-acting medication MS Contin. Shary Schwab is erratic today and very upset that we made him come to this appointment and says it is unnecessary. He demands a refill of his medication as he is in so much pain. He states his hips are hurting so badly he can barely walk and he states multiple times "I should not be driving anymore."  He thinks he may have a pneumonia and believes his pulmonary status is getting worse.   He has also no showed multiple pulmonology appointments, however he is willing to see them now. As Sue Zelaya looks ill today and is unable to walk and feels short of breath I requested he present to the ED. He adamantly refused this saying he needed to get to the studio within 2 hours. He is upset that we made him come to this appointment. I reminded him that we offer virtual however he does not have the technology to allow this. He is also concerned about driving and is aware he should not be driving any longer. He demands that he will drive himself home we offered to call Cheryl or Christa Pizarro and he adamantly refuses. He expressed multiple financial concerns and we gave him the number for the cancer financial support . I redirected the conversation multiple times as Sue Zelaya continues to focus on finances and needing to earn money because I am concerned his overall disease processes will limit his life, and we should plan for that. He was willing to complete advance care planning document at this time naming his sister Liliana Garcia as his surrogate decision maker for medical decisions. His goals are clearly disease focused and he wants to continue cancer treatments and he wants to see a pulmonologist.  At the end of this appointment, he says he is willing to go to an ER today. Past medical, surgical, social, and family histories are reviewed and pertinent updates are made. Review of Systems   Constitutional: Positive for chills, decreased appetite, malaise/fatigue and weight loss. HENT:  Negative for congestion. Cardiovascular:  Negative for chest pain and claudication. Musculoskeletal:  Positive for arthritis, back pain, neck pain and stiffness. Gastrointestinal:  Positive for anorexia. Negative for bloating, abdominal pain, nausea and vomiting. Genitourinary:  Negative for bladder incontinence and decreased libido. Neurological:  Positive for headaches, light-headedness and weakness.    Psychiatric/Behavioral:  Negative for altered mental status, depression, hallucinations and hypervigilance. Vital Signs    /70 (BP Location: Right arm, Patient Position: Sitting, Cuff Size: Standard)   Pulse 97   Temp (!) 96.3 °F (35.7 °C) (Temporal)   Wt 46.5 kg (102 lb 9.6 oz)   SpO2 94%   BMI 16.07 kg/m²      Physical Exam and Objective Data  Physical Exam  Vitals and nursing note reviewed. Constitutional:       General: He is not in acute distress. Appearance: He is cachectic. He is ill-appearing. HENT:      Head: Normocephalic and atraumatic. Eyes:      Conjunctiva/sclera: Conjunctivae normal.   Cardiovascular:      Rate and Rhythm: Normal rate and regular rhythm. Heart sounds: No murmur heard. Pulmonary:      Effort: Pulmonary effort is normal. No respiratory distress. Breath sounds: Normal breath sounds. Abdominal:      Palpations: Abdomen is soft. Tenderness: There is no abdominal tenderness. Musculoskeletal:         General: No swelling. Cervical back: Neck supple. Skin:     General: Skin is warm. Capillary Refill: Capillary refill takes less than 2 seconds. Coloration: Skin is pale. Neurological:      Mental Status: He is alert. Psychiatric:         Mood and Affect: Mood normal. Affect is labile. Behavior: Behavior is agitated. Judgment: Judgment is impulsive. Radiology and Laboratory:  I personally reviewed and interpreted the following results: CBC, CMP, CT    60 minutes was spent face to face with Estefania Kebede with greater than 50% of the time spent in counseling or coordination of care including discussions of etiology of diagnosis, pathogenesis of diagnosis, prognosis of diagnosis, follow up requirements, risk factors and risk reduction of disease, patient and family counseling/involvement in care and compliance with treatment regimen.  reviewed chart, reviewed lab values, reviewed imaging, provided medical updates, discussed palliative care and symptom management, opioid titration, discussed goals of care, provided supportive listening, provided anticipatory guidance, provided psychosocial and emotional support, assessed competency and decision-making and facilitated interdisciplinary communication. All of the patient's or agent's questions were answered during this discussion.

## 2023-10-24 NOTE — PROGRESS NOTES
Palliative Supportive Care  met with patient/family to continue to provide emotional support and guidance. Updated biopsychosocial information relevant to support:  Patient presents with increased work of breathing, limited mobility, and increased pain. Patient is advised to present to the Emergency Department for evaluation. After lots of discussion patient is agreeable to be evaluated. He will get his blood work completed first.      A lengthy conversation was had that under no circumstances can he drive due to his delayed motor skills, difficulty breathing, and limited mobility. Patient states that he will comply with this recommendation. DMV form submitted by Ms. Iman Segura. Patient requests a Pulmonary referral to address his increased work of breathing. Appointment scheduled for November 3rd. Identified areas of need include:  Patient does not have an Advanced Directive completed, needs transportation, needs financial assistance.       Resources provided:  AD form completed and scanned to chart,  [facility] application completed and faxed (unfortunately [facility] does not service his area), contact information given to call Katharine Weldon to evaluate financial options    Areas that need future follow-up include:  close monitoring and support     I have spent 45 minutes with Patient  today in which greater than 50% of this time was spent in counseling/coordination of care     Palliative  will follow-up as requested by patient, family, and primary team.  Please contact with any specific requests

## 2023-10-25 LAB
ATRIAL RATE: 90 BPM
P AXIS: 84 DEGREES
PR INTERVAL: 154 MS
QRS AXIS: 104 DEGREES
QRSD INTERVAL: 72 MS
QT INTERVAL: 374 MS
QTC INTERVAL: 457 MS
T WAVE AXIS: 77 DEGREES
VENTRICULAR RATE: 90 BPM

## 2023-10-25 PROCEDURE — 93010 ELECTROCARDIOGRAM REPORT: CPT | Performed by: INTERNAL MEDICINE

## 2023-10-25 NOTE — ED PROVIDER NOTES
History  Chief Complaint   Patient presents with    Shortness of Breath     "I was at a recording session and they were concerned with how I was breathing". 1 week SOB     69-year-old male presents for evaluation of increased work of breathing and mild, nonproductive cough for the last week. Patient with a history of COPD and states he has been using his inhaler with minimal relief. Denies chest pain, fever. Patient with a history of metastatic prostate cancer. Prior to Admission Medications   Prescriptions Last Dose Informant Patient Reported? Taking? ALPRAZolam (XANAX) 0.5 mg tablet   No No   Sig: Take 1 tablet (0.5 mg total) by mouth 2 (two) times a day as needed for anxiety or sleep Provider aware of co-existing clonazepam prescription. Patient is palliative care patient. NON FORMULARY  Self Yes No   Sig: Medical marijuana   Patient not taking: Reported on 7/13/2023   Symbicort 160-4.5 MCG/ACT inhaler   No No   Sig: INHALE 2 PUFFS BY MOUTH TWICE A DAY RINSE MOUTH AFTER USE   abiraterone (ZYTIGA) 250 mg tablet  Self No No   Sig: Take 4 tablets (1,000 mg total) by mouth once daily.    albuterol (2.5 mg/3 mL) 0.083 % nebulizer solution   No No   Sig: Take 3 mL (2.5 mg total) by nebulization every 6 (six) hours as needed for wheezing or shortness of breath   albuterol (PROVENTIL HFA,VENTOLIN HFA) 90 mcg/act inhaler  Self No No   Sig: Inhale 2 puffs every 4 (four) hours as needed for wheezing   Patient not taking: Reported on 7/13/2023   atorvastatin (LIPITOR) 40 mg tablet  Self No No   Sig: Take 1 tablet (40 mg total) by mouth daily with dinner   budesonide-formoterol (SYMBICORT) 160-4.5 mcg/act inhaler  Self Yes No   Sig: Inhale 2 puffs   clonazePAM (KlonoPIN) 0.5 MG disintegrating tablet  Self No No   Sig: Take 1 tablet (0.5 mg total) by mouth daily at bedtime as needed for anxiety   gabapentin (NEURONTIN) 300 mg capsule   No No   Sig: Take 2 capsules (600 mg total) by mouth daily at bedtime lidocaine (LIDODERM) 5 %  Self No No   Sig: Apply 3 patches topically daily Remove & Discard patch within 12 hours or as directed by MD   Patient not taking: Reported on 8/1/2023   montelukast (SINGULAIR) 10 mg tablet  Self No No   Sig: TAKE 1 TABLET BY MOUTH EVERYDAY AT BEDTIME   morphine (MS CONTIN) 15 mg 12 hr tablet   No No   Sig: Take 1 tablet (15 mg total) by mouth 2 (two) times a day Ongoing therapy Max Daily Amount: 30 mg   naloxone (NARCAN) 4 mg/0.1 mL nasal spray  Self No No   Sig: Administer 1 spray into a nostril. If no response after 2-3 minutes, give another dose in the other nostril using a new spray. Patient not taking: Reported on 9/13/2023   predniSONE 1 mg tablet   No No   Sig: Take 1 tablet (1 mg total) by mouth daily   senna (SENOKOT) 8.6 mg  Self No No   Sig: Take 2 tablets (17.2 mg total) by mouth daily at bedtime as needed for constipation   Patient not taking: Reported on 7/13/2023   traMADol (ULTRAM) 50 mg tablet   No No   Sig: Take 0.5 tablets (25 mg total) by mouth every 6 (six) hours as needed (breakthrough pain)      Facility-Administered Medications: None       Past Medical History:   Diagnosis Date    Bone cancer ([address])     Colon polyp     COPD (chronic obstructive pulmonary disease) (HCC)     Emphysema lung (HCC)     Hyperlipidemia     Prostate cancer ([address])        Past Surgical History:   Procedure Laterality Date    APPENDECTOMY      COLONOSCOPY      IR BIOPSY BONE  12/30/2021    JOINT REPLACEMENT      UPPER GASTROINTESTINAL ENDOSCOPY         Family History   Problem Relation Age of Onset    Lung cancer Mother     Breast cancer Sister     Colon polyps Brother     Colon cancer Neg Hx      I have reviewed and agree with the history as documented.     E-Cigarette/Vaping    E-Cigarette Use Never User      E-Cigarette/Vaping Substances    Nicotine No     THC No     CBD No     Flavoring No     Other No     Unknown No      Social History     Tobacco Use    Smoking status: Former Packs/day: 1.00     Years: 41.00     Total pack years: 41.00     Types: Cigarettes     Start date: 5     Quit date:      Years since quittin.8    Smokeless tobacco: Never   Vaping Use    Vaping Use: Never used   Substance Use Topics    Alcohol use: Never    Drug use: Never       Review of Systems   Respiratory:  Positive for shortness of breath and wheezing. Physical Exam  Physical Exam  Vitals and nursing note reviewed. Constitutional:       General: He is not in acute distress. Appearance: He is well-developed. HENT:      Head: Normocephalic and atraumatic. Right Ear: External ear normal.      Left Ear: External ear normal.      Nose: Nose normal.   Eyes:      General: No scleral icterus. Pulmonary:      Effort: Pulmonary effort is normal. No respiratory distress. Breath sounds: Wheezing present. Abdominal:      General: There is no distension. Palpations: Abdomen is soft. Musculoskeletal:         General: No deformity. Normal range of motion. Cervical back: Normal range of motion and neck supple. Skin:     General: Skin is warm. Findings: No rash. Neurological:      General: No focal deficit present. Mental Status: He is alert.       Gait: Gait normal.   Psychiatric:         Mood and Affect: Mood normal.         Vital Signs  ED Triage Vitals [10/24/23 1427]   Temperature Pulse Resp Blood Pressure SpO2   98.3 °F (36.8 °C) 90 -- (!) 173/102 95 %      Temp Source Heart Rate Source Patient Position - Orthostatic VS BP Location FiO2 (%)   Temporal -- Lying Right arm --      Pain Score       No Pain           Vitals:    10/24/23 1427   BP: (!) 173/102   Pulse: 90   Patient Position - Orthostatic VS: Lying         Visual Acuity      ED Medications  Medications   albuterol inhalation solution 10 mg (10 mg Nebulization Given 10/24/23 1525)   ipratropium (ATROVENT) 0.02 % inhalation solution 1 mg (1 mg Nebulization Given 10/24/23 1525)   methylPREDNISolone sodium succinate (Solu-MEDROL) injection 80 mg (80 mg Intravenous Given 10/24/23 1525)   iohexol (OMNIPAQUE) 350 MG/ML injection (SINGLE-DOSE) 100 mL (85 mL Intravenous Given 10/24/23 1710)       Diagnostic Studies  Results Reviewed       Procedure Component Value Units Date/Time    Blood culture #1 [[id number]] Collected: 10/24/23 1507    Lab Status: Preliminary result Specimen: Blood from Arm, Right Updated: 10/24/23 2101     Blood Culture Received in Microbiology Lab. Culture in Progress. Blood culture #2 [[id number]] Collected: 10/24/23 1507    Lab Status: Preliminary result Specimen: Blood from Arm, Left Updated: 10/24/23 2101     Blood Culture Received in Microbiology Lab. Culture in Progress. FLU/RSV/COVID - if FLU/RSV clinically relevant [[id number]]  (Normal) Collected: 10/24/23 1512    Lab Status: Final result Specimen: Nares from Nose Updated: 10/24/23 1601     SARS-CoV-2 Negative     INFLUENZA A PCR Negative     INFLUENZA B PCR Negative     RSV PCR Negative    Narrative:      FOR PEDIATRIC PATIENTS - copy/paste COVID Guidelines URL to browser: [email or web address]    SARS-CoV-2 assay is a Nucleic Acid Amplification assay intended for the  qualitative detection of nucleic acid from SARS-CoV-2 in nasopharyngeal  swabs. Results are for the presumptive identification of SARS-CoV-2 RNA. Positive results are indicative of infection with SARS-CoV-2, the virus  causing COVID-19, but do not rule out bacterial infection or co-infection  with other viruses. Laboratories within the [facility] and its  territories are required to report all positive results to the appropriate  public health authorities. Negative results do not preclude SARS-CoV-2  infection and should not be used as the sole basis for treatment or other  patient management decisions.  Negative results must be combined with  clinical observations, patient history, and epidemiological information. This test has not been FDA cleared or approved. This test has been authorized by FDA under an Emergency Use Authorization  (EUA). This test is only authorized for the duration of time the  declaration that circumstances exist justifying the authorization of the  emergency use of an in vitro diagnostic tests for detection of SARS-CoV-2  virus and/or diagnosis of COVID-19 infection under section 564(b)(1) of  the Act, 21 U. S.C. 180MRT-6(J)(3), unless the authorization is terminated  or revoked sooner. The test has been validated but independent review by FDA  and CLIA is pending. Test performed using [employer] GeneXpert: This RT-PCR assay targets N2,  a region unique to SARS-CoV-2. A conserved region in the E-gene was chosen  for pan-Sarbecovirus detection which includes SARS-CoV-2. According to CMS-2020-01-R, this platform meets the definition of high-throughput technology.     B-Type Natriuretic Peptide(BNP) [[id number]]  (Normal) Collected: 10/24/23 1507    Lab Status: Final result Specimen: Blood from Arm, Right Updated: 10/24/23 1601     BNP 33 pg/mL     Procalcitonin [[id number]]  (Normal) Collected: 10/24/23 1507    Lab Status: Final result Specimen: Blood from Arm, Right Updated: 10/24/23 1549     Procalcitonin <0.05 ng/ml     HS Troponin 0hr (reflex protocol) [[id number]]  (Normal) Collected: 10/24/23 1507    Lab Status: Final result Specimen: Blood from Arm, Right Updated: 10/24/23 1548     hs TnI 0hr 3 ng/L     UA w Reflex to Microscopic w Reflex to Culture [[id number]] Collected: 10/24/23 1513    Lab Status: Final result Specimen: Urine, Clean Catch Updated: 10/24/23 1543     Color, UA Yellow     Clarity, UA Clear     Specific Gravity, UA 1.020     pH, UA 6.5     Leukocytes, UA Negative     Nitrite, UA Negative     Protein, UA Negative mg/dl      Glucose, UA Negative mg/dl      Ketones, UA Negative mg/dl      Urobilinogen, UA <2.0 mg/dl      Bilirubin, UA Negative     Occult Blood, UA Negative    Comprehensive metabolic panel [[id number]]  (Abnormal) Collected: 10/24/23 1507    Lab Status: Final result Specimen: Blood from Arm, Right Updated: 10/24/23 1540     Sodium 140 mmol/L      Potassium 3.9 mmol/L      Chloride 101 mmol/L      CO2 34 mmol/L      ANION GAP 5 mmol/L      BUN 13 mg/dL      Creatinine 0.53 mg/dL      Glucose 94 mg/dL      Calcium 9.1 mg/dL      AST 17 U/L      ALT 16 U/L      Alkaline Phosphatase 50 U/L      Total Protein 7.2 g/dL      Albumin 4.3 g/dL      Total Bilirubin 0.36 mg/dL      eGFR 107 ml/min/1.73sq m     Narrative:      [facility] guidelines for Chronic Kidney Disease (CKD):     Stage 1 with normal or high GFR (GFR > 90 mL/min/1.73 square meters)    Stage 2 Mild CKD (GFR = 60-89 mL/min/1.73 square meters)    Stage 3A Moderate CKD (GFR = 45-59 mL/min/1.73 square meters)    Stage 3B Moderate CKD (GFR = 30-44 mL/min/1.73 square meters)    Stage 4 Severe CKD (GFR = 15-29 mL/min/1.73 square meters)    Stage 5 End Stage CKD (GFR <15 mL/min/1.73 square meters)  Note: GFR calculation is accurate only with a steady state creatinine    Lactic acid [[id number]]  (Normal) Collected: 10/24/23 1507    Lab Status: Final result Specimen: Blood from Arm, Right Updated: 10/24/23 1539     LACTIC ACID 0.8 mmol/L     Narrative:      Result may be elevated if tourniquet was used during collection.     Tayo Luevano [[id number]]  (Normal) Collected: 10/24/23 1507    Lab Status: Final result Specimen: Blood from Arm, Right Updated: 10/24/23 1536     Protime 12.8 seconds      INR 0.92    APTT [[id number]]  (Normal) Collected: 10/24/23 1507    Lab Status: Final result Specimen: Blood from Arm, Right Updated: 10/24/23 1536     PTT 31 seconds     CBC and differential [[id number]]  (Abnormal) Collected: 10/24/23 1507    Lab Status: Final result Specimen: Blood from Arm, Right Updated: 10/24/23 1523     WBC 6.08 Thousand/uL      RBC 4.33 Million/uL      Hemoglobin 13.7 g/dL Hematocrit 42.6 %      MCV 98 fL      MCH 31.6 pg      MCHC 32.2 g/dL      RDW 13.5 %      MPV 8.4 fL      Platelets 046 Thousands/uL      nRBC 0 /100 WBCs      Neutrophils Relative 69 %      Immat GRANS % 0 %      Lymphocytes Relative 18 %      Monocytes Relative 9 %      Eosinophils Relative 3 %      Basophils Relative 1 %      Neutrophils Absolute 4.15 Thousands/µL      Immature Grans Absolute 0.02 Thousand/uL      Lymphocytes Absolute 1.12 Thousands/µL      Monocytes Absolute 0.55 Thousand/µL      Eosinophils Absolute 0.16 Thousand/µL      Basophils Absolute 0.08 Thousands/µL     POCT Blood Gas (CG8+) [[id number]]  (Abnormal) Collected: 10/24/23 1519    Lab Status: Final result Specimen: Venous Updated: 10/24/23 1522     ph, Rupert ISTAT 7.348     pCO2, Rupert i-STAT 59.1 mm HG      pO2, Rupert i-STAT 25.0 mm HG      BE, i-STAT 5 mmol/L      HCO3, Rupert i-STAT 32.5 mmol/L      CO2, i-STAT 34 mmol/L      O2 Sat, i-STAT 39 %      SODIUM, I-STAT 140 mmol/l      Potassium, i-STAT 3.8 mmol/L      Calcium, Ionized i-STAT 1.21 mmol/L      Hct, i-STAT 41 %      Hgb, i-STAT 13.9 g/dl      Glucose, i-STAT 96 mg/dl      Specimen Type VENOUS                   CTA ED chest PE Study   Final Result by Rosenda Serna MD (10/24 7720)         1. No evidence of acute pulmonary embolus, thoracic aortic aneurysm or dissection. 2. Few tree-in-bud opacities in the lower lobes could represent mild aspiration. Moderate panlobular emphysema. 3. Stable sclerotic metastases. Workstation performed: [id number]         XR chest pa & lateral   Final Result by Coco Davis MD (10/25 7017)      No acute cardiopulmonary disease. Severe emphysema. Extensive bony metastasis. Workstation performed: [id number]                    Procedures  CriticalCare Time    Date/Time: 10/24/2023 4:00 PM    Performed by: Kristy Phipps DO  Authorized by:  Kristy Phipps DO    Critical care provider statement:     Critical care time (minutes):  32    Critical care time was exclusive of:  Separately billable procedures and treating other patients and teaching time    Critical care was necessary to treat or prevent imminent or life-threatening deterioration of the following conditions:  Circulatory failure, respiratory failure and sepsis    Critical care was time spent personally by me on the following activities:  Blood draw for specimens, development of treatment plan with patient or surrogate, obtaining history from patient or surrogate, discussions with primary provider, evaluation of patient's response to treatment, examination of patient, ordering and performing treatments and interventions, ordering and review of laboratory studies, ordering and review of radiographic studies, re-evaluation of patient's condition and review of old charts    I assumed direction of critical care for this patient from another provider in my specialty: no             ED Course                                             Medical Decision Making  59-year-old male with a history of COPD presenting with cough and shortness of breath. Exam consistent with COPD exacerbation. Given history of metastatic prostate cancer, differential diagnosis also includes pulmonary embolism, ACS. Obtain labs, EKG and CTA PE study. Symptom control as needed. Upon reassessment, patient with significant treatment of symptoms after breathing treatment. Patient signed out with CT pending. Problems Addressed:  COPD exacerbation ([address]): acute illness or injury    Amount and/or Complexity of Data Reviewed  Labs: ordered. Radiology: ordered. Risk  Prescription drug management.              Disposition  Final diagnoses:   COPD exacerbation ([address])     Time reflects when diagnosis was documented in both MDM as applicable and the Disposition within this note       Time User Action Codes Description Comment    10/24/2023  6:00 PM Daljit Walker [J18.9] Pneumonia     10/24/2023 6:00 PM Jose García Remove [J18.9] Pneumonia     10/24/2023  6:01 PM Jose García Add [J44.1] COPD exacerbation [facility])           ED Disposition       ED Disposition   Discharge    Condition   Stable    Date/Time   Tue Oct 24, 2023  6:01 PM    Comment   Estefania Kebede discharge to home/self care. Follow-up Information       Follow up With Specialties Details Why Contact Info Additional Information     [address] [facility] Emergency Medicine  If symptoms worsen [address] [facility] [phone]  [address] [facility], [address] [facility], [place], [address]    [facility], [place], [address] [facility], Nurse Practitioner Schedule an appointment as soon as possible for a visit   [address]  [address]  [address] [address] [phone]               Discharge Medication List as of 10/24/2023  6:02 PM        START taking these medications    Details   azithromycin (Zithromax Z-Remington) 250 mg tablet Take 2 tablets today then 1 tablet daily x 4 days, Normal      !! predniSONE 20 mg tablet Take 2 tablets (40 mg total) by mouth daily for 4 days, Starting Tue 10/24/2023, Until Sat 10/28/2023, Normal      morphine (MS CONTIN) 15 mg 12 hr tablet Take 1 tablet (15 mg total) by mouth 2 (two) times a day Ongoing therapy Max Daily Amount: 30 mg, Starting Tue 10/24/2023, Normal      traMADol (ULTRAM) 50 mg tablet Take 0.5 tablets (25 mg total) by mouth every 6 (six) hours as needed (breakthrough pain), Starting Tue 10/24/2023, Normal       !! - Potential duplicate medications found. Please discuss with provider. CONTINUE these medications which have NOT CHANGED    Details   abiraterone (ZYTIGA) 250 mg tablet Take 4 tablets (1,000 mg total) by mouth once daily. , Normal      albuterol (2.5 mg/3 mL) 0.083 % nebulizer solution Take 3 mL (2.5 mg total) by nebulization every 6 (six) hours as needed for wheezing or shortness of breath, Starting Wed 10/18/2023, Normal      albuterol (PROVENTIL HFA,VENTOLIN HFA) 90 mcg/act inhaler Inhale 2 puffs every 4 (four) hours as needed for wheezing, Starting Mon 4/3/2023, Normal      ALPRAZolam (XANAX) 0.5 mg tablet Take 1 tablet (0.5 mg total) by mouth 2 (two) times a day as needed for anxiety or sleep Provider aware of co-existing clonazepam prescription. Patient is palliative care patient., Starting Fri 9/29/2023, Normal      atorvastatin (LIPITOR) 40 mg tablet Take 1 tablet (40 mg total) by mouth daily with dinner, Starting Mon 2/24/2020, Normal      !! budesonide-formoterol (SYMBICORT) 160-4.5 mcg/act inhaler Inhale 2 puffs, Historical Med      clonazePAM (KlonoPIN) 0.5 MG disintegrating tablet Take 1 tablet (0.5 mg total) by mouth daily at bedtime as needed for anxiety, Starting Tue 6/13/2023, Normal      gabapentin (NEURONTIN) 300 mg capsule Take 2 capsules (600 mg total) by mouth daily at bedtime, Starting Fri 10/13/2023, Normal      lidocaine (LIDODERM) 5 % Apply 3 patches topically daily Remove & Discard patch within 12 hours or as directed by MD, Starting Tue 1/3/2023, Normal      montelukast (SINGULAIR) 10 mg tablet TAKE 1 TABLET BY MOUTH EVERYDAY AT BEDTIME, Normal      naloxone (NARCAN) 4 mg/0.1 mL nasal spray Administer 1 spray into a nostril. If no response after 2-3 minutes, give another dose in the other nostril using a new spray., Normal      NON FORMULARY Medical marijuana, Historical Med      !! predniSONE 1 mg tablet Take 1 tablet (1 mg total) by mouth daily, Starting Mon 9/18/2023, Normal      senna (SENOKOT) 8.6 mg Take 2 tablets (17.2 mg total) by mouth daily at bedtime as needed for constipation, Starting Mon 5/9/2022, Normal      !! Symbicort 160-4.5 MCG/ACT inhaler INHALE 2 PUFFS BY MOUTH TWICE A DAY RINSE MOUTH AFTER USE, Normal       !! - Potential duplicate medications found. Please discuss with provider.           No discharge procedures on file.    PDMP Review         Value Time User    PDMP Reviewed  Yes 10/24/2023  2:25 PM Ganesh Damian PA-C            ED Provider  Electronically Signed by             Johnna Armstrong DO  10/25/23 0942

## 2023-10-26 ENCOUNTER — HOSPITAL ENCOUNTER (OUTPATIENT)
Dept: INFUSION CENTER | Facility: HOSPITAL | Age: 68
Discharge: HOME | End: 2023-10-26
Attending: INTERNAL MEDICINE
Payer: MEDICARE

## 2023-10-26 ENCOUNTER — OFFICE VISIT (OUTPATIENT)
Age: 68
End: 2023-10-26
Payer: MEDICARE

## 2023-10-26 VITALS
WEIGHT: 101.19 LBS | BODY MASS INDEX: 15.85 KG/M2 | TEMPERATURE: 97.2 F | OXYGEN SATURATION: 94 % | RESPIRATION RATE: 18 BRPM | SYSTOLIC BLOOD PRESSURE: 135 MMHG | DIASTOLIC BLOOD PRESSURE: 88 MMHG | HEART RATE: 96 BPM

## 2023-10-26 VITALS
TEMPERATURE: 97.6 F | BODY MASS INDEX: 15.85 KG/M2 | WEIGHT: 101.2 LBS | OXYGEN SATURATION: 92 % | DIASTOLIC BLOOD PRESSURE: 92 MMHG | SYSTOLIC BLOOD PRESSURE: 146 MMHG | HEART RATE: 98 BPM

## 2023-10-26 DIAGNOSIS — R64 CACHEXIA (HCC): ICD-10-CM

## 2023-10-26 DIAGNOSIS — C61 PROSTATE CANCER METASTATIC TO BONE (HCC): Primary | ICD-10-CM

## 2023-10-26 DIAGNOSIS — C79.51 PROSTATE CANCER METASTATIC TO BONE (HCC): Primary | ICD-10-CM

## 2023-10-26 DIAGNOSIS — J44.9 CHRONIC OBSTRUCTIVE PULMONARY DISEASE, UNSPECIFIED COPD TYPE (HCC): ICD-10-CM

## 2023-10-26 PROCEDURE — 96372 THER/PROPH/DIAG INJ SC/IM: CPT

## 2023-10-26 PROCEDURE — 99214 OFFICE O/P EST MOD 30 MIN: CPT | Performed by: NURSE PRACTITIONER

## 2023-10-26 RX ADMIN — DENOSUMAB 120 MG: 120 INJECTION SUBCUTANEOUS at 12:19

## 2023-10-26 NOTE — PROGRESS NOTES
Rec'd pt without complaints. Labs meet treatment parameters. Injection to R upper arm tolerated well. Discharged with steady gait. AVS in hand, next appt confirmed.

## 2023-10-29 LAB
BACTERIA BLD CULT: NORMAL
BACTERIA BLD CULT: NORMAL

## 2023-11-10 ENCOUNTER — TELEPHONE (OUTPATIENT)
Dept: PALLIATIVE MEDICINE | Facility: CLINIC | Age: 68
End: 2023-11-10

## 2023-11-14 ENCOUNTER — PATIENT OUTREACH (OUTPATIENT)
Dept: HEMATOLOGY ONCOLOGY | Facility: CLINIC | Age: 68
End: 2023-11-14

## 2023-11-14 DIAGNOSIS — Z51.5 PALLIATIVE CARE PATIENT: ICD-10-CM

## 2023-11-14 DIAGNOSIS — G47.00 INSOMNIA: ICD-10-CM

## 2023-11-14 DIAGNOSIS — F41.9 ANXIETY: ICD-10-CM

## 2023-11-14 RX ORDER — ALPRAZOLAM 0.5 MG/1
0.5 TABLET ORAL 2 TIMES DAILY PRN
Qty: 60 TABLET | Refills: 0 | Status: SHIPPED | OUTPATIENT
Start: 2023-11-14

## 2023-11-14 RX ORDER — CLONAZEPAM 0.5 MG/1
0.5 TABLET, ORALLY DISINTEGRATING ORAL
Qty: 30 TABLET | Refills: 0 | Status: SHIPPED | OUTPATIENT
Start: 2023-11-14

## 2023-11-14 NOTE — PROGRESS NOTES
Pt called this day to express concerns about the amount of medical bills he has been receiving from [facility]. Pt states that he has been experiencing financial hardship since moving into his nephews home. Pt retold the story of his living situation where he paid to rehab his nephews house, moved in and his nephew "took off" and he has been left to "pay all the bills and financially support his wife and daughter. '  MSW spent a great deal of time listening to the pt and suggested he apply for food stamps and the [facility] [place] by [facility]. Pt was initially hesitant to apply, stating he "felt funny accepting help". MSW acknowledged this difficulty and validated the feelings that surround accepting assistance. It was explained to the pt that in doing so, it would assist with the extreme anxiety he is expressing that he is feeling as result of having these bills. Pt does have an income through [address] [employer] and he spoke, at great length, about stocks and bonds that he has but would have to pay "hefty taxes" to withdraw any finds. Significant support was provided and the pt did agree to apply for hernandez care at the end of the call. MSW sent an email to the hospital accounting department and requested an application be mailed. Pt encouraged to call if he had any questions with regards to this application when it arrives. Support was offered and MSW will continue to follow.

## 2023-11-14 NOTE — TELEPHONE ENCOUNTER
Refilled xanax and klonopin after review of PDMP and last office note dated 10/24. Refilled 30 day supply as patient has had multiple no shows. Further refills may not be permitted if patient does not attend his appointment on 12/12.

## 2023-11-22 DIAGNOSIS — C61 PROSTATE CANCER METASTATIC TO BONE (HCC): ICD-10-CM

## 2023-11-22 DIAGNOSIS — Z51.5 PALLIATIVE CARE PATIENT: ICD-10-CM

## 2023-11-22 DIAGNOSIS — C79.51 PROSTATE CANCER METASTATIC TO BONE (HCC): ICD-10-CM

## 2023-11-22 DIAGNOSIS — F32.9 REACTIVE DEPRESSION: ICD-10-CM

## 2023-11-22 DIAGNOSIS — G89.3 CANCER RELATED PAIN: ICD-10-CM

## 2023-11-22 RX ORDER — GABAPENTIN 300 MG/1
600 CAPSULE ORAL
Qty: 60 CAPSULE | Refills: 2 | Status: SHIPPED | OUTPATIENT
Start: 2023-11-22

## 2023-11-22 RX ORDER — MORPHINE SULFATE 15 MG/1
15 TABLET, FILM COATED, EXTENDED RELEASE ORAL 2 TIMES DAILY
Qty: 60 TABLET | Refills: 0 | Status: SHIPPED | OUTPATIENT
Start: 2023-11-22

## 2023-11-27 ENCOUNTER — TELEPHONE (OUTPATIENT)
Age: 68
End: 2023-11-27

## 2023-11-27 NOTE — TELEPHONE ENCOUNTER
S/W Neetu Sheikh, the patient, was [place] it [place] He has limited transportation and is not in the best health. The patient states the only office he can make it to is the [facility] location. Can he be scheduled in this office?     Neetu Sheikh  [phone]

## 2023-11-30 DIAGNOSIS — J44.9 CHRONIC OBSTRUCTIVE PULMONARY DISEASE, UNSPECIFIED COPD TYPE (HCC): ICD-10-CM

## 2023-11-30 RX ORDER — BUDESONIDE AND FORMOTEROL FUMARATE DIHYDRATE 160; 4.5 UG/1; UG/1
AEROSOL RESPIRATORY (INHALATION)
Qty: 10.2 G | Refills: 1 | Status: SHIPPED | OUTPATIENT
Start: 2023-11-30

## 2023-12-12 ENCOUNTER — OFFICE VISIT (OUTPATIENT)
Age: 68
End: 2023-12-12
Payer: MEDICARE

## 2023-12-12 VITALS
HEART RATE: 92 BPM | DIASTOLIC BLOOD PRESSURE: 76 MMHG | TEMPERATURE: 96 F | OXYGEN SATURATION: 92 % | WEIGHT: 101.6 LBS | SYSTOLIC BLOOD PRESSURE: 130 MMHG | BODY MASS INDEX: 15.91 KG/M2

## 2023-12-12 DIAGNOSIS — R23.2 HOT FLASHES: ICD-10-CM

## 2023-12-12 DIAGNOSIS — J44.9 CHRONIC OBSTRUCTIVE PULMONARY DISEASE, UNSPECIFIED COPD TYPE (HCC): ICD-10-CM

## 2023-12-12 DIAGNOSIS — Z79.899 MEDICAL MARIJUANA USE: ICD-10-CM

## 2023-12-12 DIAGNOSIS — C79.51 PROSTATE CANCER METASTATIC TO BONE (HCC): Primary | ICD-10-CM

## 2023-12-12 DIAGNOSIS — G47.00 INSOMNIA, UNSPECIFIED TYPE: ICD-10-CM

## 2023-12-12 DIAGNOSIS — F11.20 UNCOMPLICATED OPIOID DEPENDENCE (HCC): ICD-10-CM

## 2023-12-12 DIAGNOSIS — E43 SEVERE PROTEIN-CALORIE MALNUTRITION (HCC): ICD-10-CM

## 2023-12-12 DIAGNOSIS — G89.3 NEOPLASM RELATED PAIN: ICD-10-CM

## 2023-12-12 DIAGNOSIS — C61 PROSTATE CANCER METASTATIC TO BONE (HCC): Primary | ICD-10-CM

## 2023-12-12 DIAGNOSIS — R63.0 ANOREXIA: ICD-10-CM

## 2023-12-12 PROCEDURE — 99214 OFFICE O/P EST MOD 30 MIN: CPT | Performed by: PHYSICIAN ASSISTANT

## 2023-12-12 NOTE — PROGRESS NOTES
.    Outpatient Follow-Up - Palliative and Supportive Care   Luz Schreiber 70 y.o. male [id number]    Assessment & Plan  1. Prostate cancer metastatic to bone ([address])    2. Hot flashes    3. Chronic obstructive pulmonary disease, unspecified COPD type ([address])    4. Insomnia, unspecified type    5. Uncomplicated opioid dependence ([address])    6. Neoplasm related pain    7. Medical marijuana use    8. Anorexia    9. Severe protein-calorie malnutrition ([address])        Plan:  Compared to prior visits, Ebonie Purvis brings a remarkable report today saying that his pain is significantly improved as well as his appetite. He reports some weight gain. He weaned himself from morphine. He tried tramadol however did not like how it made him feel and is no longer taking this. He is only occasionally requiring Klonopin or Xanax. He is continuing to take 1MG prednisone with his Zytiga. Did not like how tramadol made him feel. Doesn't take morphine IR or Er anymore. He still has significant hot flashes and is managing these "as best he can". Not interested in medication for this at the current time. He will see pulmonology next week for chronic lung disease, notes coughing when he is exposed to hot steam of the shower. ACP -completed and on file. Marcelino Mac is HCR. Ebonie Purvis is DNR/DNI. RTC - 3 months     Medications adjusted this encounter:  Requested Prescriptions      No prescriptions requested or ordered in this encounter     No orders of the defined types were placed in this encounter. There are no discontinued medications. Luz Schreiber was seen today for symptoms and planning cares related to above illnesses. I have reviewed the patient's controlled substance dispensing history in the Prescription Drug Monitoring Program in compliance with the [facility] regulations before prescribing any controlled substances.   Filled  Written  ID  Drug  QTY  Days  Prescriber  RX #  Dispenser  Refill  Daily Dose*  Pymt Type     11/22/2023 11/22/2023 1 Morphine Sulf Er 15 Mg Tablet 60.00 30 Ch Bul [id number] Pen (4411) 0 30.00 MME Medicare [place]   11/14/2023 11/14/2023 1 Alprazolam 0.5 Mg Tablet 60.00 30 La Kni [id number] Pen (4411) 0 2.00 LME Medicare [place]   11/14/2023 11/14/2023 1 Clonazepam 0.5 Mg Odt 30.00 30 La Kni [id number] Pen (4411) 0 1.00 LME Medicare [place]   10/24/2023 10/24/2023 1 Morphine Sulf Er 15 Mg Tablet 60.00 30 Ch Bul [id number] Pen (4411) 0 30.00 MME Medicare [place]       They are invited to continue to follow with us. If there are questions or concerns, please contact us through our clinic/answering service 24 hours a day, seven days a week. MARSHALL Santiago-ERIK  [facility]  [phone]    Visit Information    Accompanied By: presents unaccompanied    Source of History: Self    History Limitations: None    History of Present Illness      Berline Sicard is a 70 y.o. male who presents in follow up of symptoms related to metastatic prostate cancer. Pertinent issues include: symptom management, pain, neoplasm related, breathlessness, depression or anxiety, nausea, fatigue, assessment of goals of care, disease process education and discussion of prognosis, advance care planning. RENEE HARTMANN is under the care of ISA Fierro for metastatic prostate cancer diagnosed early 2022. He has extensive multifocal osseous metastatic disease including axial and appendicular skeletal system, calvarium, sternum, ribs, right humerus and clavicle, bilateral scapula, cervical spine, and thoracolumbar spine, sacrum, pelvis, and bilateral femurs. RENEE HARTMANN was initiated on [address] antiandrogen therapy by urology and continues Lupron every 6 months.   He was started on Xtandi but this was discontinued due to side effects of hallucinations that led to hospitalization in May 2022 (although side effects may have been attributed to other factors.)  RENEE HARTMANN was switched to [place] with Markham Paresh every 3 months for bone metastasis. In October 2022, Micheal was hospitalized at an external agency [facility] in [place]) due to profound fatigue and intractable pain. He was rehydrated and his symptom regimen was adjusted by palliative care physician Dr. Chelsea Calderon ([phone]). Micheal completed imaging and is most recent imaging shows stable disease. PSA is stable. The patient established care with palliative and supportive care on 3/17/2022 and has followed closely ever since. See prior notes. Today, 12/12/2023, Micheal presents for routine symptom follow-up. Micheal is in a very pleasant and up beat mood today. He states his hip pain and spine pain is significantly improved ever since he weaned himself off of the opioid medications. He still does have trouble sleeping however this preexisted his cancer diagnosis and he has hot flashes related to his treatment. His appetite is significantly improved and he states that he is gaining weight. He is rarely requiring clonazepam or Ativan. When I ask what he thinks is different today compared to prior he states he is eating a lot of calcium and he believes this is being be re-deposited in his bone. His most recent calcium levels are completely normal.  We discussed that excess calcium can be deposited in other system such as his vessels or tendons however his quality of life should certainly be factored in into any long-term medical decisions and his calcium is currently normal so I am not very concerned. Further discussion about his medications as above. No changes made today. He is welcome to call with any questions or concerns. Past medical, surgical, social, and family histories are reviewed and pertinent updates are made. Review of Systems   Constitutional: Positive for weight gain. Negative for decreased appetite. HENT:  Negative for congestion. Cardiovascular:  Negative for chest pain.    Musculoskeletal:  Positive for arthritis. Negative for back pain. Gastrointestinal:  Negative for nausea and vomiting. Neurological:  Negative for disturbances in coordination and weakness. Psychiatric/Behavioral:  Negative for substance abuse. The patient is not nervous/anxious. Vital Signs    /76 (BP Location: Right arm, Patient Position: Sitting, Cuff Size: Standard)   Pulse 92   Temp (!) 96 °F (35.6 °C) (Temporal)   Wt 46.1 kg (101 lb 9.6 oz)   SpO2 92%   BMI 15.91 kg/m²      Physical Exam and Objective Data  Physical Exam  Vitals and nursing note reviewed. Constitutional:       General: He is not in acute distress. Appearance: He is cachectic. He is ill-appearing. Comments: Fully conversational and responds appropriately to conversational cues. HENT:      Head: Normocephalic and atraumatic. Eyes:      Conjunctiva/sclera: Conjunctivae normal.   Pulmonary:      Effort: Pulmonary effort is normal. No respiratory distress. Musculoskeletal:         General: No swelling. Cervical back: Neck supple. Skin:     General: Skin is warm and dry. Coloration: Skin is pale. Neurological:      Mental Status: He is alert and oriented to person, place, and time. Psychiatric:         Mood and Affect: Mood normal.         Behavior: Behavior normal.       Radiology and Laboratory:  I personally reviewed and interpreted the following results: CBC, CMP    35 minutes was spent face to face with Carrie Silver with greater than 50% of the time spent in counseling or coordination of care including discussions of etiology of diagnosis, pathogenesis of diagnosis, prognosis of diagnosis, follow up requirements, risk factors and risk reduction of disease, patient and family counseling/involvement in care and compliance with treatment regimen.  reviewed chart, reviewed lab values, reviewed imaging, provided medical updates, discussed palliative care and symptom management, opioid titration, discussed goals of care, provided supportive listening, provided anticipatory guidance, provided psychosocial and emotional support, assessed competency and decision-making and facilitated interdisciplinary communication. All of the patient's or agent's questions were answered during this discussion.

## 2023-12-20 ENCOUNTER — CONSULT (OUTPATIENT)
Age: 68
End: 2023-12-20
Payer: MEDICARE

## 2023-12-20 VITALS
HEART RATE: 106 BPM | SYSTOLIC BLOOD PRESSURE: 166 MMHG | HEIGHT: 67 IN | DIASTOLIC BLOOD PRESSURE: 86 MMHG | OXYGEN SATURATION: 85 % | BODY MASS INDEX: 16.01 KG/M2 | WEIGHT: 102 LBS

## 2023-12-20 DIAGNOSIS — R79.81 LOW OXYGEN SATURATION: ICD-10-CM

## 2023-12-20 DIAGNOSIS — J44.1 COPD EXACERBATION (HCC): ICD-10-CM

## 2023-12-20 DIAGNOSIS — J96.10 CHRONIC RESPIRATORY FAILURE, UNSPECIFIED WHETHER WITH HYPOXIA OR HYPERCAPNIA (HCC): ICD-10-CM

## 2023-12-20 DIAGNOSIS — J18.9 LEFT LOWER LOBE PNEUMONIA: ICD-10-CM

## 2023-12-20 DIAGNOSIS — J44.9 CHRONIC OBSTRUCTIVE PULMONARY DISEASE, UNSPECIFIED COPD TYPE (HCC): Primary | ICD-10-CM

## 2023-12-20 PROCEDURE — 99214 OFFICE O/P EST MOD 30 MIN: CPT | Performed by: INTERNAL MEDICINE

## 2023-12-20 PROCEDURE — 94618 PULMONARY STRESS TESTING: CPT | Performed by: INTERNAL MEDICINE

## 2023-12-20 RX ORDER — BUDESONIDE, GLYCOPYRROLATE, AND FORMOTEROL FUMARATE 160; 9; 4.8 UG/1; UG/1; UG/1
2 AEROSOL, METERED RESPIRATORY (INHALATION) 2 TIMES DAILY
Qty: 31.1 G | Refills: 3 | Status: SHIPPED | OUTPATIENT
Start: 2023-12-20

## 2023-12-20 NOTE — PATIENT INSTRUCTIONS
COPD: Breathing Exercises   AMBULATORY CARE:   Breathing exercises  may help you manage symptoms of COPD, such as shortness of breath. The exercises strengthen the muscles you use to breathe. They may also help you get air easier when you are having trouble breathing. Your healthcare provider will tell you how often to do these exercises.  Deep breathing and coughing  can decrease your risk for a lung infection:  Take a deep breath and hold it for as long as you can. Let the air out and then cough strongly. Deep breaths help open your airway.    You may be given an incentive spirometer to help you take deep breaths. Put the plastic piece in your mouth and take a slow, deep breath. Then let the air out and cough. Repeat these steps 10 times every hour.       Pursed-lip breathing  can be helpful before you start an activity or any time you feel short of breath:  Breathe in through your nose. Use the muscles in your abdomen to help fill your lungs with air.    Slowly breathe out through your mouth with your lips slightly puckered. You should make a quiet hissing sound as you breathe out.    Try to take 2 times as long to breathe out as to breathe in. This helps you get rid of as much air from your lungs as possible.    Repeat this exercise several times. When you are used to doing pursed-lip breathing, you can do it any time you need more air.       Diaphragmatic breathing  will help strengthen the muscles you use to breathe:  Place one hand just below your ribs. Place your other hand in the middle of your chest over your breastbone.    Breathe in slowly through your nose, as deeply as you can.    Breathe out slowly through pursed lips. As you breathe out, tighten the muscles just below your ribs. Use your hand to gently push in and up while you tighten the muscles.    Diaphragmatic breathing takes practice. Slowly increase the amount of time you spend during each practice session.    © Copyright [employer] 2023  Information is for End User's use only and may not be sold, redistributed or otherwise used for commercial purposes.  The above information is an  only. It is not intended as medical advice for individual conditions or treatments. Talk to your doctor, nurse or pharmacist before following any medical regimen to see if it is safe and effective for you.

## 2023-12-20 NOTE — PROGRESS NOTES
Pulmonary Consultation   Richard Nava 68 y.o. male MRN: [id number]  12/20/2023      Assessment and Plan:    1. Chronic obstructive pulmonary disease, unspecified COPD type (HCC)  Assessment & Plan:  Dyspnea on exertion mainly with going uphill or upstairs, had some smoking history and occupational exposures reportedly.  He has been on Symbicort and albuterol doing well, his Symbicort is going nonformulary by his insurance company plan for the medications coverage [employer]  Ordered Breztri, showed inhalation technique with him, double check with him to mouth wash and rinse afterwards    Orders:  -     Ambulatory Referral to Pulmonology  -     Budeson-Glycopyrrol-Formoterol (Breztri Aerosphere) 160-9-4.8 MCG/ACT AERO; Inhale 2 puffs 2 (two) times a day Rinse mouth after use.    2. COPD exacerbation (HCC)  Assessment & Plan:  Not in exacerbation currently, had exacerbation in 4/23      Orders:  -     Ambulatory referral to Pulmonology  -     Ambulatory Referral to Pulmonology    3. Low oxygen saturation  Assessment & Plan:  Did not desaturate today after walking 6 minutes     Orders:  -     Ambulatory Referral to Pulmonology    4. Left lower lobe pneumonia  Assessment & Plan:  No evidence on lobar pneumonia on recent CT chest  Chronic inflammatory changes     Orders:  -     Ambulatory Referral to Pulmonology    5. Chronic respiratory failure, unspecified whether with hypoxia or hypercapnia (HCC)  -     POCT 6 minute walk        Return in about 3 months (around 3/20/2024).    History of Present Illness   HPI:  Richard Nava is a 68 y.o. male who is here for a new consultation he has known history of metastatic prostate cancer follows up with palliative care and oncology, remote history of cigarette smoking, cachexia.  He reports history of shortness of breath with walking up hills or going up stairs he has been prescribed Symbicort and albuterol with some degree of help in the past.  He is not currently  smoking anymore.  He has daily cough and he has to clean up some white sputum and he gets congested easily.        Historical Information   Past Medical History:   Diagnosis Date    Bone cancer (HCC)     Colon polyp     COPD (chronic obstructive pulmonary disease) (HCC)     Emphysema lung (HCC)     Hyperlipidemia     Prostate cancer (HCC)      Past Surgical History:   Procedure Laterality Date    APPENDECTOMY      COLONOSCOPY      IR BIOPSY BONE  12/30/2021    JOINT REPLACEMENT      UPPER GASTROINTESTINAL ENDOSCOPY       Family History   Problem Relation Age of Onset    Lung cancer Mother     Breast cancer Sister     Colon polyps Brother     Colon cancer Neg Hx          Meds/Allergies     Current Outpatient Medications:     abiraterone (ZYTIGA) 250 mg tablet, Take 4 tablets (1,000 mg total) by mouth once daily., Disp: 120 tablet, Rfl: 10    albuterol (2.5 mg/3 mL) 0.083 % nebulizer solution, Take 3 mL (2.5 mg total) by nebulization every 6 (six) hours as needed for wheezing or shortness of breath, Disp: 270 mL, Rfl: 1    ALPRAZolam (XANAX) 0.5 mg tablet, Take 1 tablet (0.5 mg total) by mouth 2 (two) times a day as needed for anxiety or sleep Provider aware of co-existing clonazepam prescription. Patient is palliative care patient., Disp: 60 tablet, Rfl: 0    atorvastatin (LIPITOR) 40 mg tablet, Take 1 tablet (40 mg total) by mouth daily with dinner, Disp: 30 tablet, Rfl: 0    Budeson-Glycopyrrol-Formoterol (Breztri Aerosphere) 160-9-4.8 MCG/ACT AERO, Inhale 2 puffs 2 (two) times a day Rinse mouth after use., Disp: 31.1 g, Rfl: 3    budesonide-formoterol (Symbicort) 160-4.5 mcg/act inhaler, INHALE 2 PUFFS BY MOUTH TWICE A DAY RINSE MOUTH AFTER USE, Disp: 10.2 g, Rfl: 1    clonazePAM (KlonoPIN) 0.5 MG disintegrating tablet, Take 1 tablet (0.5 mg total) by mouth daily at bedtime as needed for anxiety, Disp: 30 tablet, Rfl: 0    gabapentin (NEURONTIN) 300 mg capsule, Take 2 capsules (600 mg total) by mouth daily at  "bedtime, Disp: 60 capsule, Rfl: 2    lidocaine (LIDODERM) 5 %, Apply 3 patches topically daily Remove & Discard patch within 12 hours or as directed by MD, Disp: 90 patch, Rfl: 0    montelukast (SINGULAIR) 10 mg tablet, TAKE 1 TABLET BY MOUTH EVERYDAY AT BEDTIME, Disp: 90 tablet, Rfl: 1    morphine (MS CONTIN) 15 mg 12 hr tablet, Take 1 tablet (15 mg total) by mouth 2 (two) times a day Ongoing therapy Max Daily Amount: 30 mg (Patient taking differently: Take 15 mg by mouth 2 (two) times a day Ongoing therapy Only taking once a day 12/12/2023), Disp: 60 tablet, Rfl: 0    predniSONE 1 mg tablet, TAKE 1 TABLET BY MOUTH EVERY DAY, Disp: 30 tablet, Rfl: 1    albuterol (PROVENTIL HFA,VENTOLIN HFA) 90 mcg/act inhaler, Inhale 2 puffs every 4 (four) hours as needed for wheezing (Patient not taking: Reported on 7/13/2023), Disp: 18 g, Rfl: 0    budesonide-formoterol (SYMBICORT) 160-4.5 mcg/act inhaler, Inhale 2 puffs (Patient not taking: Reported on 12/20/2023), Disp: , Rfl:     naloxone (NARCAN) 4 mg/0.1 mL nasal spray, Administer 1 spray into a nostril. If no response after 2-3 minutes, give another dose in the other nostril using a new spray. (Patient not taking: Reported on 9/13/2023), Disp: 1 each, Rfl: 0    NON FORMULARY, Medical marijuana (Patient not taking: Reported on 7/13/2023), Disp: , Rfl:     senna (SENOKOT) 8.6 mg, Take 2 tablets (17.2 mg total) by mouth daily at bedtime as needed for constipation (Patient not taking: Reported on 12/12/2023), Disp: 60 tablet, Rfl: 0    traMADol (ULTRAM) 50 mg tablet, Take 0.5 tablets (25 mg total) by mouth every 6 (six) hours as needed (breakthrough pain) (Patient not taking: Reported on 10/26/2023), Disp: 30 tablet, Rfl: 0  No Known Allergies    Vitals: Blood pressure 166/86, pulse (!) 106, height 5' 6.5\" (1.689 m), weight 46.3 kg (102 lb), SpO2 (!) 85%. Body mass index is 16.22 kg/m². Oxygen Therapy  SpO2: (!) 85 %  Oxygen Therapy: None (Room air)      Physical Exam  Gen: not " "in acute distress, Body mass index is 16.22 kg/m².  HEENT: supple, no adenopathy  Chest: Bilateral diminished equal air entry, occasional wheezes  CV: RRR, no murmurs appreciated  Extremities: no edema  Neuro: AAO X3, no focal motor deficit  Skin:  Grossly intact, no rash, no erythema      Labs:  Lab Results   Component Value Date    WBC 6.08 10/24/2023    HGB 13.9 10/24/2023    HCT 41 10/24/2023    MCV 98 10/24/2023     (H) 10/24/2023     Lab Results   Component Value Date    GLUCOSE 96 10/24/2023    CALCIUM 9.1 10/24/2023    K 3.9 10/24/2023    CO2 34 (H) 10/24/2023     10/24/2023    BUN 13 10/24/2023    CREATININE 0.53 (L) 10/24/2023     No results found for: \"IGE\"  Lab Results   Component Value Date    ALT 16 10/24/2023    AST 17 10/24/2023    ALKPHOS 50 10/24/2023           Imaging and other studies: I have personally reviewed pertinent films in PACS  CT chest 10/23  IMPRESSION:   1. No evidence of acute pulmonary embolus, thoracic aortic aneurysm or dissection.  2. Few tree-in-bud opacities in the lower lobes could represent mild aspiration. Moderate panlobular emphysema.  3. Stable sclerotic metastases.         Pulmonary function testing:   None to review    POCT 6MWT:  No oxygen desaturation walked 162 meters       EKG, Pathology, and Other Studies: I have personally reviewed pertinent reports.              Musa Fatima MD  [facility]       Portions of the record may have been created with voice recognition software. Occasional wrong word or \"sound a like\" substitutions may have occurred due to the inherent limitations of voice recognition software. Read the chart carefully and recognize, using context, where substitutions have occurred.  "

## 2023-12-20 NOTE — ASSESSMENT & PLAN NOTE
Dyspnea on exertion mainly with going uphill or upstairs, had some smoking history and occupational exposures reportedly.  He has been on Symbicort and albuterol doing well, his Symbicort is going nonformulary by his insurance company plan for the medications coverage [employer]  Ordered Araceli, showed inhalation technique with him, double check with him to mouth wash and rinse afterwards

## 2023-12-22 DIAGNOSIS — G47.00 INSOMNIA: ICD-10-CM

## 2023-12-22 DIAGNOSIS — Z51.5 PALLIATIVE CARE PATIENT: ICD-10-CM

## 2023-12-22 DIAGNOSIS — F41.9 ANXIETY: ICD-10-CM

## 2023-12-26 DIAGNOSIS — Z51.5 PALLIATIVE CARE PATIENT: ICD-10-CM

## 2023-12-26 DIAGNOSIS — C79.51 PROSTATE CANCER METASTATIC TO BONE (HCC): ICD-10-CM

## 2023-12-26 DIAGNOSIS — G89.3 CANCER RELATED PAIN: ICD-10-CM

## 2023-12-26 DIAGNOSIS — C61 PROSTATE CANCER METASTATIC TO BONE (HCC): ICD-10-CM

## 2023-12-26 RX ORDER — MORPHINE SULFATE 15 MG/1
15 TABLET, FILM COATED, EXTENDED RELEASE ORAL 2 TIMES DAILY
Qty: 60 TABLET | Refills: 0 | Status: SHIPPED | OUTPATIENT
Start: 2023-12-26

## 2023-12-26 RX ORDER — ALPRAZOLAM 0.5 MG/1
0.5 TABLET ORAL 2 TIMES DAILY PRN
Qty: 60 TABLET | Refills: 0 | Status: SHIPPED | OUTPATIENT
Start: 2023-12-26

## 2023-12-26 NOTE — ED NOTES
RN attempted to call report to ICU, ICU requesting 5min before report can be given     Hermila Ortiz, BATOOL  04/24/23 0002 Improved

## 2024-01-15 DIAGNOSIS — R63.0 ANOREXIA: ICD-10-CM

## 2024-01-15 DIAGNOSIS — C79.51 PROSTATE CANCER METASTATIC TO BONE (HCC): ICD-10-CM

## 2024-01-15 DIAGNOSIS — R64 CACHEXIA (HCC): ICD-10-CM

## 2024-01-15 DIAGNOSIS — C61 PROSTATE CANCER METASTATIC TO BONE (HCC): ICD-10-CM

## 2024-01-15 DIAGNOSIS — Z51.5 PALLIATIVE CARE PATIENT: ICD-10-CM

## 2024-01-15 RX ORDER — PREDNISONE 1 MG/1
1 TABLET ORAL DAILY
Qty: 30 TABLET | Refills: 1 | Status: SHIPPED | OUTPATIENT
Start: 2024-01-15

## 2024-01-24 DIAGNOSIS — Z51.5 PALLIATIVE CARE PATIENT: ICD-10-CM

## 2024-01-24 DIAGNOSIS — C61 PROSTATE CANCER METASTATIC TO BONE (HCC): ICD-10-CM

## 2024-01-24 DIAGNOSIS — G89.3 CANCER RELATED PAIN: ICD-10-CM

## 2024-01-24 DIAGNOSIS — C79.51 PROSTATE CANCER METASTATIC TO BONE (HCC): ICD-10-CM

## 2024-01-25 DIAGNOSIS — J44.1 COPD WITH ACUTE EXACERBATION (HCC): ICD-10-CM

## 2024-01-25 DIAGNOSIS — J44.9 CHRONIC OBSTRUCTIVE PULMONARY DISEASE, UNSPECIFIED COPD TYPE (HCC): ICD-10-CM

## 2024-01-25 RX ORDER — ALBUTEROL SULFATE 90 UG/1
AEROSOL, METERED RESPIRATORY (INHALATION)
Qty: 8.5 G | Refills: 1 | Status: SHIPPED | OUTPATIENT
Start: 2024-01-25

## 2024-01-25 RX ORDER — ALBUTEROL SULFATE 90 UG/1
2 AEROSOL, METERED RESPIRATORY (INHALATION) EVERY 4 HOURS PRN
Qty: 18 G | Refills: 0 | Status: SHIPPED | OUTPATIENT
Start: 2024-01-25

## 2024-01-25 RX ORDER — ALBUTEROL SULFATE 2.5 MG/3ML
2.5 SOLUTION RESPIRATORY (INHALATION) EVERY 6 HOURS PRN
Qty: 270 ML | Refills: 1 | Status: SHIPPED | OUTPATIENT
Start: 2024-01-25

## 2024-01-25 RX ORDER — MORPHINE SULFATE 15 MG/1
15 TABLET, FILM COATED, EXTENDED RELEASE ORAL 2 TIMES DAILY
Qty: 60 TABLET | Refills: 0 | Status: SHIPPED | OUTPATIENT
Start: 2024-01-25

## 2024-01-25 NOTE — TELEPHONE ENCOUNTER
Due to recent fall down a flight of steps patient has been taking it for pain in the hip and arm. He reports taking it as prescribed 15 mg bid.

## 2024-01-25 NOTE — TELEPHONE ENCOUNTER
Before I send refill, would you please call Rishabh and ask him how he is taking this medication, and how his pain is? In our last visit, he told me that he weaned from this medication and no longer needs it.

## 2024-01-29 ENCOUNTER — APPOINTMENT (OUTPATIENT)
Dept: LAB | Facility: HOSPITAL | Age: 69
End: 2024-01-29
Payer: MEDICARE

## 2024-01-29 ENCOUNTER — TELEPHONE (OUTPATIENT)
Age: 69
End: 2024-01-29

## 2024-01-29 NOTE — PROGRESS NOTES
HEMATOLOGY / ONCOLOGY CLINIC FOLLOW UP NOTE    Primary Care Provider: ISA Peterson  Referring Provider:    MRN: [id number]  : 1955    Reason for Encounter: Follow-up for metastatic prostate cancer    Oncology History Overview Note    Iliac Crest, Right, bone lesion:  -Metastatic adenocarcinoma, immunohistochemically consistent with metastatic prostatic adenocarcinoma    2022 started on Firmagon by Urology    2022 started Xtandi     2022 hospitalized with acute encephalopathy. Xtandi discontinued and he was switched to Zytiga and prednisone 2.5 mg    Xgeva every 3 months     23 PSMA PET CT showed stable scattered bony lesions, no evidence of progression       Prostate cancer metastatic to bone (HCC)   2021 Initial Diagnosis    Prostate cancer metastatic to bone (HCC)         Interval History: Patient presents for follow-up visit for history of metastatic prostate cancer on treatment with Lupron every 6 months, Xgeva every 3 months and Zytiga with low-dose prednisone.. He confirms he is taking Zytiga and low-dose prednisone daily.  Last received Lupron from urology on 2023.  Most recent PSA from 2024 is essentially stable at 0.98.  CBC and CMP unremarkable.  He continues to follow closely with palliative care for pain management and reports he is about 3 hours behind on his pain meds at time of visit due to driving himself to today's appointment.    He has gained a few pounds since last visit  Today he perseverates on the fact that he did not qualify for home oxygen use.  He feels he should have this if he wants it especially since he can afford to pay for it.  Denies chest pain or shortness of breath.  O2 sat 92%.  He follows with pulmonology  He will be seeing urology in March for his next dose of Lupron    REVIEW OF SYSTEMS:  Please note that a 14-point review of systems was performed to include Constitutional, HEENT, Respiratory, CVS, GI, , Musculoskeletal,  Integumentary, Neurologic, Rheumatologic, Endocrinologic, Psychiatric, Lymphatic, and Hematologic/Oncologic systems were reviewed and are negative unless otherwise stated in HPI. Positive and negative findings pertinent to this evaluation are incorporated into the history of present illness.      ECOG PS: 1    PROBLEM LIST:  Patient Active Problem List   Diagnosis    COPD (chronic obstructive pulmonary disease) (HCC)    Acute tracheobronchitis    Acute respiratory failure with hypoxia (HCC)    Severe protein-calorie malnutrition (HCC)    HLD (hyperlipidemia)    Left lower quadrant abdominal pain    Personal history of colonic polyps    Prostate cancer metastatic to bone (HCC)    Insomnia    Hot flashes    Anorexia    Cachexia (HCC)    Palliative care patient    Encephalopathy acute    Neoplasm related pain    Medical marijuana use    Acute respiratory insufficiency    Opioid dependence (HCC)    Positive blood culture    COPD exacerbation (HCC)    Low oxygen saturation    Left lower lobe pneumonia    Chronic respiratory failure (HCC)       Assessment / Plan:  1. Prostate cancer metastatic to bone (HCC)    2. Severe protein-calorie malnutrition (HCC)      Patient is a 68-year-old gentleman with metastatic prostate cancer to the bone.  He receives Lupron from his urologist and is currently on Zytiga and low-dose prednisone along with Xgeva.  He is on low-dose prednisone because he cannot tolerate higher doses.    Overall, patient is doing fairly well.  His PSA is stable.  He is tolerating his current regimen without any reported side effects.  He is on a good pain regimen as managed by palliative care.  He is due for Xgeva today and will receive as scheduled.  He has follow-up with urology planned for March for his next dose of Lupron.  He will continue on Zytiga and low-dose on his own without change.    He will return for a follow-up visit in 3 months with repeat blood work to establish care with Dr. Alvarez.  I will  coordinate his next dose of Xgeva on this day as well.  Patient is in agreement with this plan of care.  He knows to call anytime with questions or concerns in the interim    I spent 30 minutes on chart review, face to face counseling time, coordination of care and documentation.    Past Medical History:   has a past medical history of Bone cancer (HCC), Colon polyp, COPD (chronic obstructive pulmonary disease) (HCC), Emphysema lung (HCC), Hyperlipidemia, and Prostate cancer (HCC).    PAST SURGICAL HISTORY:   has a past surgical history that includes Appendectomy; Colonoscopy; Upper gastrointestinal endoscopy; IR biopsy bone (12/30/2021); and Joint replacement.    CURRENT MEDICATIONS  Current Outpatient Medications   Medication Sig Dispense Refill    abiraterone (ZYTIGA) 250 mg tablet Take 4 tablets (1,000 mg total) by mouth once daily. 120 tablet 10    albuterol (2.5 mg/3 mL) 0.083 % nebulizer solution Take 3 mL (2.5 mg total) by nebulization every 6 (six) hours as needed for wheezing or shortness of breath 270 mL 1    albuterol (PROVENTIL HFA,VENTOLIN HFA) 90 mcg/act inhaler TAKE 2 PUFFS BY MOUTH EVERY 4 HOURS AS NEEDED FOR WHEEZE 8.5 g 1    albuterol (PROVENTIL HFA,VENTOLIN HFA) 90 mcg/act inhaler Inhale 2 puffs every 4 (four) hours as needed for wheezing 18 g 0    ALPRAZolam (XANAX) 0.5 mg tablet Take 1 tablet (0.5 mg total) by mouth 2 (two) times a day as needed for anxiety or sleep Provider aware of co-existing clonazepam prescription. Patient is palliative care patient. 60 tablet 0    atorvastatin (LIPITOR) 40 mg tablet Take 1 tablet (40 mg total) by mouth daily with dinner 30 tablet 0    Budeson-Glycopyrrol-Formoterol (Breztri Aerosphere) 160-9-4.8 MCG/ACT AERO Inhale 2 puffs 2 (two) times a day Rinse mouth after use. 31.1 g 3    budesonide-formoterol (SYMBICORT) 160-4.5 mcg/act inhaler Inhale 2 puffs (Patient not taking: Reported on 12/20/2023)      budesonide-formoterol (Symbicort) 160-4.5 mcg/act inhaler  INHALE 2 PUFFS BY MOUTH TWICE A DAY RINSE MOUTH AFTER USE 10.2 g 1    clonazePAM (KlonoPIN) 0.5 MG disintegrating tablet Take 1 tablet (0.5 mg total) by mouth daily at bedtime as needed for anxiety 30 tablet 0    gabapentin (NEURONTIN) 300 mg capsule Take 2 capsules (600 mg total) by mouth daily at bedtime 60 capsule 2    lidocaine (LIDODERM) 5 % Apply 3 patches topically daily Remove & Discard patch within 12 hours or as directed by MD 90 patch 0    montelukast (SINGULAIR) 10 mg tablet TAKE 1 TABLET BY MOUTH EVERYDAY AT BEDTIME 90 tablet 1    morphine (MS CONTIN) 15 mg 12 hr tablet Take 1 tablet (15 mg total) by mouth 2 (two) times a day Ongoing therapy Max Daily Amount: 30 mg 60 tablet 0    naloxone (NARCAN) 4 mg/0.1 mL nasal spray Administer 1 spray into a nostril. If no response after 2-3 minutes, give another dose in the other nostril using a new spray. (Patient not taking: Reported on 9/13/2023) 1 each 0    NON FORMULARY Medical marijuana (Patient not taking: Reported on 7/13/2023)      predniSONE 1 mg tablet TAKE 1 TABLET BY MOUTH EVERY DAY 30 tablet 1    senna (SENOKOT) 8.6 mg Take 2 tablets (17.2 mg total) by mouth daily at bedtime as needed for constipation (Patient not taking: Reported on 12/12/2023) 60 tablet 0    traMADol (ULTRAM) 50 mg tablet Take 0.5 tablets (25 mg total) by mouth every 6 (six) hours as needed (breakthrough pain) (Patient not taking: Reported on 10/26/2023) 30 tablet 0     No current facility-administered medications for this visit.     [unfilled]    SOCIAL HISTORY:   reports that he quit smoking about 12 years ago. His smoking use included cigarettes. He started smoking about 54 years ago. He has a 42.0 pack-year smoking history. He has never used smokeless tobacco. He reports that he does not drink alcohol and does not use drugs.     FAMILY HISTORY:  family history includes Breast cancer in his sister; Colon polyps in his brother; Lung cancer in his mother.     ALLERGIES:  has No  Known Allergies.      Physical Exam:  Vital Signs:   Visit Vitals  Smoking Status Former     There is no height or weight on file to calculate BMI.  There is no height or weight on file to calculate BSA.    Physical Exam  Constitutional:       Appearance: He is not toxic-appearing.      Comments: Thin appearing.  No acute distress   HENT:      Head: Normocephalic and atraumatic.   Eyes:      General: No scleral icterus.  Cardiovascular:      Rate and Rhythm: Normal rate and regular rhythm.   Pulmonary:      Effort: Pulmonary effort is normal.      Comments: Diminished breath sounds.  Mild expiratory wheeze at bases  Abdominal:      Palpations: Abdomen is soft.   Musculoskeletal:         General: Normal range of motion.      Cervical back: Normal range of motion.      Right lower leg: No edema.      Left lower leg: No edema.   Neurological:      General: No focal deficit present.      Mental Status: He is alert and oriented to person, place, and time.   Psychiatric:         Mood and Affect: Mood normal.         Behavior: Behavior normal.         Labs:  Lab Results   Component Value Date    WBC 6.08 10/24/2023    HGB 13.9 10/24/2023    HCT 41 10/24/2023    MCV 98 10/24/2023     (H) 10/24/2023     Lab Results   Component Value Date    SODIUM 140 10/24/2023    K 3.9 10/24/2023     10/24/2023    CO2 34 (H) 10/24/2023    AGAP 5 10/24/2023    BUN 13 10/24/2023    CREATININE 0.53 (L) 10/24/2023    GLUC 94 10/24/2023    GLUF 106 (H) 08/15/2022    CALCIUM 9.1 10/24/2023    AST 17 10/24/2023    ALT 16 10/24/2023    ALKPHOS 50 10/24/2023    TP 7.2 10/24/2023    TBILI 0.36 10/24/2023    EGFR 107 10/24/2023

## 2024-01-29 NOTE — TELEPHONE ENCOUNTER
Spoke to Richard regarding having lab work completed for upcoming appointment 1/31/24. Patient appreciative of lab reminder call.

## 2024-01-30 DIAGNOSIS — F41.9 ANXIETY: ICD-10-CM

## 2024-01-30 DIAGNOSIS — Z51.5 PALLIATIVE CARE PATIENT: ICD-10-CM

## 2024-01-30 DIAGNOSIS — G47.00 INSOMNIA: ICD-10-CM

## 2024-01-30 RX ORDER — ALPRAZOLAM 0.5 MG/1
0.5 TABLET ORAL 2 TIMES DAILY PRN
Qty: 60 TABLET | Refills: 0 | Status: SHIPPED | OUTPATIENT
Start: 2024-01-30

## 2024-01-30 RX ORDER — CLONAZEPAM 0.5 MG/1
0.5 TABLET, ORALLY DISINTEGRATING ORAL
Qty: 30 TABLET | Refills: 0 | Status: SHIPPED | OUTPATIENT
Start: 2024-01-30

## 2024-01-30 NOTE — TELEPHONE ENCOUNTER
Last appointment:  12/12/23    Next scheduled appointment: 3/19/24    Pharmacy: [facility]      PDMP review:

## 2024-01-31 ENCOUNTER — OFFICE VISIT (OUTPATIENT)
Age: 69
End: 2024-01-31
Payer: MEDICARE

## 2024-01-31 ENCOUNTER — HOSPITAL ENCOUNTER (OUTPATIENT)
Dept: INFUSION CENTER | Facility: HOSPITAL | Age: 69
Discharge: HOME | End: 2024-01-31
Attending: INTERNAL MEDICINE
Payer: MEDICARE

## 2024-01-31 VITALS
DIASTOLIC BLOOD PRESSURE: 86 MMHG | RESPIRATION RATE: 18 BRPM | HEIGHT: 67 IN | SYSTOLIC BLOOD PRESSURE: 119 MMHG | WEIGHT: 104.2 LBS | BODY MASS INDEX: 16.35 KG/M2 | TEMPERATURE: 98.4 F | HEART RATE: 96 BPM | OXYGEN SATURATION: 92 %

## 2024-01-31 DIAGNOSIS — C61 PROSTATE CANCER METASTATIC TO BONE (HCC): Primary | ICD-10-CM

## 2024-01-31 DIAGNOSIS — E43 SEVERE PROTEIN-CALORIE MALNUTRITION (HCC): ICD-10-CM

## 2024-01-31 DIAGNOSIS — C79.51 PROSTATE CANCER METASTATIC TO BONE (HCC): Primary | ICD-10-CM

## 2024-01-31 PROCEDURE — 99214 OFFICE O/P EST MOD 30 MIN: CPT | Performed by: NURSE PRACTITIONER

## 2024-01-31 PROCEDURE — 96372 THER/PROPH/DIAG INJ SC/IM: CPT

## 2024-01-31 RX ADMIN — DENOSUMAB 120 MG: 120 INJECTION SUBCUTANEOUS at 12:35

## 2024-01-31 NOTE — PROGRESS NOTES
Richard Nava  tolerated treatment well with no complications.      Richard Nava is aware of future appt on 4/22 at 1130.     AVS printed and given to Richard Nava  No (Declined by Richard Nava)

## 2024-02-07 ENCOUNTER — TELEPHONE (OUTPATIENT)
Dept: PALLIATIVE MEDICINE | Facility: CLINIC | Age: 69
End: 2024-02-07

## 2024-02-07 NOTE — TELEPHONE ENCOUNTER
Received fax indicating pt may need a Prior auth for Morphine Sulfate 15mg.    Please contact pharmacy to confirm need and obtain PA information.    Thank you!

## 2024-02-18 ENCOUNTER — NURSE TRIAGE (OUTPATIENT)
Dept: OTHER | Facility: OTHER | Age: 69
End: 2024-02-18

## 2024-02-18 DIAGNOSIS — F32.9 REACTIVE DEPRESSION: ICD-10-CM

## 2024-02-18 PROBLEM — J18.9 LEFT LOWER LOBE PNEUMONIA: Status: RESOLVED | Noted: 2023-12-20 | Resolved: 2024-02-18

## 2024-02-18 RX ORDER — GABAPENTIN 300 MG/1
600 CAPSULE ORAL
Qty: 60 CAPSULE | Refills: 2 | Status: SHIPPED | OUTPATIENT
Start: 2024-02-18

## 2024-02-18 NOTE — PROGRESS NOTES
Paged by on-call for patient requesting Gabapentin refill.     Per chart review, patient following with Palliative care - Arleth Scales. Last seen 12/2023 for symptom management of metastatic prostate cancer. Patient currently on chemotherapy, with script for Gabapentin for associated neuropathy.     Brief discussion with patient notable for need for Gabapentin refill. Patient otherwise in no acute distress. Denies any other palliative care needs.     Refill provided.

## 2024-02-18 NOTE — TELEPHONE ENCOUNTER
Regarding: urgent med refill  ----- Message from Pauline Craig sent at 2/18/2024  9:13 AM EST -----  Medication Refill Request     Name gabapentin (NEURONTIN)   Dose/Frequency Take 2 capsules (600 mg total) by mouth daily at bedtime  Quantity 60 capsules  Verified pharmacy  Yes  Verified ordering Provider   Yes  Does patient have enough for the next 3 days? no

## 2024-02-23 DIAGNOSIS — G89.3 CANCER RELATED PAIN: ICD-10-CM

## 2024-02-23 DIAGNOSIS — C61 PROSTATE CANCER METASTATIC TO BONE (HCC): ICD-10-CM

## 2024-02-23 DIAGNOSIS — C79.51 PROSTATE CANCER METASTATIC TO BONE (HCC): ICD-10-CM

## 2024-02-23 DIAGNOSIS — Z51.5 PALLIATIVE CARE PATIENT: ICD-10-CM

## 2024-02-23 RX ORDER — MORPHINE SULFATE 15 MG/1
15 TABLET, FILM COATED, EXTENDED RELEASE ORAL 2 TIMES DAILY
Qty: 60 TABLET | Refills: 0 | Status: SHIPPED | OUTPATIENT
Start: 2024-02-23

## 2024-02-24 DIAGNOSIS — C79.51 PROSTATE CANCER METASTATIC TO BONE (HCC): ICD-10-CM

## 2024-02-24 DIAGNOSIS — G89.3 CANCER RELATED PAIN: ICD-10-CM

## 2024-02-24 DIAGNOSIS — Z51.5 PALLIATIVE CARE PATIENT: ICD-10-CM

## 2024-02-24 DIAGNOSIS — C61 PROSTATE CANCER METASTATIC TO BONE (HCC): ICD-10-CM

## 2024-02-24 RX ORDER — MORPHINE SULFATE 15 MG/1
15 TABLET, FILM COATED, EXTENDED RELEASE ORAL 2 TIMES DAILY
Qty: 60 TABLET | Refills: 0 | Status: CANCELLED | OUTPATIENT
Start: 2024-02-24

## 2024-02-24 NOTE — TELEPHONE ENCOUNTER
This medication was filled by our office yesterday.  I called patient to inform him of this and he expressed understanding.  He did not have additional questions or concerns at this time.

## 2024-02-24 NOTE — TELEPHONE ENCOUNTER
"Pt stated\" I am out of my Morphine and I am having trouble breathing in the morning, I need a new oxygen machine the one I have is old and unreliable.\"     Medication Name Morphine   Dose/Frequency 15 mg 12 hr tablet   Quantity 60 tablets  Verified pharmacy   [x ]  Verified ordering Provider  [x ]  Does patient have enough for the next 3 days? Yes [ ] No [x ]    "

## 2024-02-24 NOTE — TELEPHONE ENCOUNTER
Spoke with pharmacy, they are able to dispense medication for patient to pay out of pocket. He is aware and okay with this.

## 2024-02-24 NOTE — TELEPHONE ENCOUNTER
[phone] // Patient Marciostanley Elijah // Pt stated the pharmacy will not fill his morphine, they said they need an authorization sent.

## 2024-02-26 ENCOUNTER — TELEPHONE (OUTPATIENT)
Dept: PALLIATIVE MEDICINE | Facility: CLINIC | Age: 69
End: 2024-02-26

## 2024-02-26 ENCOUNTER — APPOINTMENT (EMERGENCY)
Dept: RADIOLOGY | Facility: HOSPITAL | Age: 69
End: 2024-02-26
Payer: MEDICARE

## 2024-02-26 ENCOUNTER — HOSPITAL ENCOUNTER (EMERGENCY)
Facility: HOSPITAL | Age: 69
Discharge: HOME | End: 2024-02-26
Attending: EMERGENCY MEDICINE
Payer: MEDICARE

## 2024-02-26 VITALS
RESPIRATION RATE: 23 BRPM | SYSTOLIC BLOOD PRESSURE: 165 MMHG | TEMPERATURE: 97.7 F | DIASTOLIC BLOOD PRESSURE: 89 MMHG | HEART RATE: 89 BPM | OXYGEN SATURATION: 97 %

## 2024-02-26 DIAGNOSIS — C79.51 PROSTATE CANCER METASTATIC TO BONE (HCC): ICD-10-CM

## 2024-02-26 DIAGNOSIS — J18.9 LEFT LOWER LOBE PNEUMONIA: ICD-10-CM

## 2024-02-26 DIAGNOSIS — R63.0 ANOREXIA: ICD-10-CM

## 2024-02-26 DIAGNOSIS — J44.1 COPD EXACERBATION (HCC): Primary | ICD-10-CM

## 2024-02-26 DIAGNOSIS — C61 PROSTATE CANCER METASTATIC TO BONE (HCC): ICD-10-CM

## 2024-02-26 DIAGNOSIS — R64 CACHEXIA (HCC): ICD-10-CM

## 2024-02-26 DIAGNOSIS — Z51.5 PALLIATIVE CARE PATIENT: ICD-10-CM

## 2024-02-26 LAB
ALBUMIN SERPL BCP-MCNC: 4.2 G/DL (ref 3.5–5)
ALP SERPL-CCNC: 49 U/L (ref 34–104)
ALT SERPL W P-5'-P-CCNC: 14 U/L (ref 7–52)
ANION GAP SERPL CALCULATED.3IONS-SCNC: 4 MMOL/L
AST SERPL W P-5'-P-CCNC: 14 U/L (ref 13–39)
ATRIAL RATE: 94 BPM
BASOPHILS # BLD AUTO: 0.1 THOUSANDS/ÂΜL (ref 0–0.1)
BASOPHILS NFR BLD AUTO: 1 % (ref 0–1)
BILIRUB SERPL-MCNC: 0.48 MG/DL (ref 0.2–1)
BILIRUB UR QL STRIP: NEGATIVE
BUN SERPL-MCNC: 9 MG/DL (ref 5–25)
CALCIUM SERPL-MCNC: 9.4 MG/DL (ref 8.4–10.2)
CARDIAC TROPONIN I PNL SERPL HS: 4 NG/L
CHLORIDE SERPL-SCNC: 97 MMOL/L (ref 96–108)
CLARITY UR: CLEAR
CO2 SERPL-SCNC: 41 MMOL/L (ref 21–32)
COLOR UR: YELLOW
CREAT SERPL-MCNC: 0.45 MG/DL (ref 0.6–1.3)
D DIMER PPP FEU-MCNC: <0.27 UG/ML FEU
EOSINOPHIL # BLD AUTO: 0.29 THOUSAND/ÂΜL (ref 0–0.61)
EOSINOPHIL NFR BLD AUTO: 3 % (ref 0–6)
ERYTHROCYTE [DISTWIDTH] IN BLOOD BY AUTOMATED COUNT: 13.4 % (ref 11.6–15.1)
FLUAV RNA RESP QL NAA+PROBE: NEGATIVE
FLUBV RNA RESP QL NAA+PROBE: NEGATIVE
GFR SERPL CREATININE-BSD FRML MDRD: 115 ML/MIN/1.73SQ M
GLUCOSE SERPL-MCNC: 123 MG/DL (ref 65–140)
GLUCOSE UR STRIP-MCNC: NEGATIVE MG/DL
HCT VFR BLD AUTO: 41.5 % (ref 36.5–49.3)
HGB BLD-MCNC: 13.1 G/DL (ref 12–17)
HGB UR QL STRIP.AUTO: NEGATIVE
IMM GRANULOCYTES # BLD AUTO: 0.07 THOUSAND/UL (ref 0–0.2)
IMM GRANULOCYTES NFR BLD AUTO: 1 % (ref 0–2)
KETONES UR STRIP-MCNC: NEGATIVE MG/DL
LEUKOCYTE ESTERASE UR QL STRIP: NEGATIVE
LIPASE SERPL-CCNC: <6 U/L (ref 11–82)
LYMPHOCYTES # BLD AUTO: 1.09 THOUSANDS/ÂΜL (ref 0.6–4.47)
LYMPHOCYTES NFR BLD AUTO: 10 % (ref 14–44)
MCH RBC QN AUTO: 31.3 PG (ref 26.8–34.3)
MCHC RBC AUTO-ENTMCNC: 31.6 G/DL (ref 31.4–37.4)
MCV RBC AUTO: 99 FL (ref 82–98)
MONOCYTES # BLD AUTO: 0.82 THOUSAND/ÂΜL (ref 0.17–1.22)
MONOCYTES NFR BLD AUTO: 8 % (ref 4–12)
NEUTROPHILS # BLD AUTO: 8.31 THOUSANDS/ÂΜL (ref 1.85–7.62)
NEUTS SEG NFR BLD AUTO: 77 % (ref 43–75)
NITRITE UR QL STRIP: NEGATIVE
NRBC BLD AUTO-RTO: 0 /100 WBCS
P AXIS: 86 DEGREES
PH UR STRIP.AUTO: 7 [PH]
PLATELET # BLD AUTO: 357 THOUSANDS/UL (ref 149–390)
PMV BLD AUTO: 8.3 FL (ref 8.9–12.7)
POTASSIUM SERPL-SCNC: 3.8 MMOL/L (ref 3.5–5.3)
PR INTERVAL: 156 MS
PROT SERPL-MCNC: 7.3 G/DL (ref 6.4–8.4)
PROT UR STRIP-MCNC: NEGATIVE MG/DL
QRS AXIS: 117 DEGREES
QRSD INTERVAL: 72 MS
QT INTERVAL: 352 MS
QTC INTERVAL: 440 MS
RBC # BLD AUTO: 4.19 MILLION/UL (ref 3.88–5.62)
RSV RNA RESP QL NAA+PROBE: NEGATIVE
SARS-COV-2 RNA RESP QL NAA+PROBE: NEGATIVE
SODIUM SERPL-SCNC: 142 MMOL/L (ref 135–147)
SP GR UR STRIP.AUTO: 1.01 (ref 1–1.03)
T WAVE AXIS: 90 DEGREES
UROBILINOGEN UR STRIP-ACNC: <2 MG/DL
VENTRICULAR RATE: 94 BPM
WBC # BLD AUTO: 10.68 THOUSAND/UL (ref 4.31–10.16)

## 2024-02-26 PROCEDURE — 36415 COLL VENOUS BLD VENIPUNCTURE: CPT | Performed by: EMERGENCY MEDICINE

## 2024-02-26 PROCEDURE — 99285 EMERGENCY DEPT VISIT HI MDM: CPT | Performed by: EMERGENCY MEDICINE

## 2024-02-26 PROCEDURE — 85379 FIBRIN DEGRADATION QUANT: CPT | Performed by: EMERGENCY MEDICINE

## 2024-02-26 PROCEDURE — 93005 ELECTROCARDIOGRAM TRACING: CPT

## 2024-02-26 PROCEDURE — 71045 X-RAY EXAM CHEST 1 VIEW: CPT

## 2024-02-26 PROCEDURE — 83690 ASSAY OF LIPASE: CPT | Performed by: EMERGENCY MEDICINE

## 2024-02-26 PROCEDURE — 85025 COMPLETE CBC W/AUTO DIFF WBC: CPT | Performed by: EMERGENCY MEDICINE

## 2024-02-26 PROCEDURE — 81003 URINALYSIS AUTO W/O SCOPE: CPT | Performed by: EMERGENCY MEDICINE

## 2024-02-26 PROCEDURE — 80053 COMPREHEN METABOLIC PANEL: CPT | Performed by: EMERGENCY MEDICINE

## 2024-02-26 PROCEDURE — 84484 ASSAY OF TROPONIN QUANT: CPT | Performed by: EMERGENCY MEDICINE

## 2024-02-26 PROCEDURE — 96374 THER/PROPH/DIAG INJ IV PUSH: CPT

## 2024-02-26 PROCEDURE — 93010 ELECTROCARDIOGRAM REPORT: CPT | Performed by: INTERNAL MEDICINE

## 2024-02-26 PROCEDURE — 99285 EMERGENCY DEPT VISIT HI MDM: CPT

## 2024-02-26 PROCEDURE — 94640 AIRWAY INHALATION TREATMENT: CPT

## 2024-02-26 PROCEDURE — 0241U HB NFCT DS VIR RESP RNA 4 TRGT: CPT | Performed by: EMERGENCY MEDICINE

## 2024-02-26 RX ORDER — DOXYCYCLINE HYCLATE 100 MG/1
100 CAPSULE ORAL ONCE
Status: COMPLETED | OUTPATIENT
Start: 2024-02-26 | End: 2024-02-26

## 2024-02-26 RX ORDER — DOXYCYCLINE HYCLATE 100 MG/1
100 CAPSULE ORAL 2 TIMES DAILY
Qty: 10 CAPSULE | Refills: 0 | Status: SHIPPED | OUTPATIENT
Start: 2024-02-26 | End: 2024-03-02

## 2024-02-26 RX ORDER — METHYLPREDNISOLONE SODIUM SUCCINATE 125 MG/2ML
80 INJECTION, POWDER, LYOPHILIZED, FOR SOLUTION INTRAMUSCULAR; INTRAVENOUS ONCE
Status: COMPLETED | OUTPATIENT
Start: 2024-02-26 | End: 2024-02-26

## 2024-02-26 RX ORDER — METHYLPREDNISOLONE 4 MG/1
TABLET ORAL
Qty: 21 TABLET | Refills: 0 | Status: SHIPPED | OUTPATIENT
Start: 2024-02-26

## 2024-02-26 RX ORDER — PREDNISONE 20 MG/1
40 TABLET ORAL DAILY
Qty: 8 TABLET | Refills: 0 | Status: SHIPPED | OUTPATIENT
Start: 2024-02-27 | End: 2024-02-26 | Stop reason: ALTCHOICE

## 2024-02-26 RX ADMIN — ALBUTEROL SULFATE 5 MG: 2.5 SOLUTION RESPIRATORY (INHALATION) at 07:35

## 2024-02-26 RX ADMIN — DOXYCYCLINE 100 MG: 100 CAPSULE ORAL at 08:47

## 2024-02-26 RX ADMIN — IPRATROPIUM BROMIDE 0.5 MG: 0.5 SOLUTION RESPIRATORY (INHALATION) at 07:35

## 2024-02-26 RX ADMIN — ALBUTEROL SULFATE 5 MG: 2.5 SOLUTION RESPIRATORY (INHALATION) at 08:47

## 2024-02-26 RX ADMIN — METHYLPREDNISOLONE SODIUM SUCCINATE 80 MG: 125 INJECTION, POWDER, FOR SOLUTION INTRAMUSCULAR; INTRAVENOUS at 08:08

## 2024-02-26 RX ADMIN — IPRATROPIUM BROMIDE 0.5 MG: 0.5 SOLUTION RESPIRATORY (INHALATION) at 08:47

## 2024-02-26 NOTE — ED PROVIDER NOTES
History  Chief Complaint   Patient presents with    Shortness of Breath     Patient complaint of worsening SOB x 2 days      68 year old male with history of COPD and metastatic prostate cancer presents for evaluation of 2 days of worsening shortness of breath.  Patient states he is on 2.5-3.5 L supplemental oxygen at baseline, but has been increasing to 4 L over the past 2 days.  He reports generalized abdominal pain which he states is from his chemotherapy.  He states he has difficulty with urination due to his prostate cancer.      Shortness of Breath      Prior to Admission Medications   Prescriptions Last Dose Informant Patient Reported? Taking?   ALPRAZolam (XANAX) 0.5 mg tablet   No No   Sig: Take 1 tablet (0.5 mg total) by mouth 2 (two) times a day as needed for anxiety or sleep Provider aware of co-existing clonazepam prescription. Patient is palliative care patient.   Budeson-Glycopyrrol-Formoterol (Breztri Aerosphere) 160-9-4.8 MCG/ACT AERO   No No   Sig: Inhale 2 puffs 2 (two) times a day Rinse mouth after use.   NON FORMULARY  Self Yes No   Sig: Medical marijuana   Patient not taking: Reported on 7/13/2023   abiraterone (ZYTIGA) 250 mg tablet  Self No No   Sig: Take 4 tablets (1,000 mg total) by mouth once daily.   albuterol (2.5 mg/3 mL) 0.083 % nebulizer solution   No No   Sig: Take 3 mL (2.5 mg total) by nebulization every 6 (six) hours as needed for wheezing or shortness of breath   albuterol (PROVENTIL HFA,VENTOLIN HFA) 90 mcg/act inhaler   No No   Sig: TAKE 2 PUFFS BY MOUTH EVERY 4 HOURS AS NEEDED FOR WHEEZE   albuterol (PROVENTIL HFA,VENTOLIN HFA) 90 mcg/act inhaler   No No   Sig: Inhale 2 puffs every 4 (four) hours as needed for wheezing   atorvastatin (LIPITOR) 40 mg tablet  Self No No   Sig: Take 1 tablet (40 mg total) by mouth daily with dinner   budesonide-formoterol (SYMBICORT) 160-4.5 mcg/act inhaler  Self Yes No   Sig: Inhale 2 puffs   Patient not taking: Reported on 12/20/2023    budesonide-formoterol (Symbicort) 160-4.5 mcg/act inhaler   No No   Sig: INHALE 2 PUFFS BY MOUTH TWICE A DAY RINSE MOUTH AFTER USE   clonazePAM (KlonoPIN) 0.5 MG disintegrating tablet   No No   Sig: Take 1 tablet (0.5 mg total) by mouth daily at bedtime as needed for anxiety   gabapentin (NEURONTIN) 300 mg capsule   No No   Sig: Take 2 capsules (600 mg total) by mouth daily at bedtime   lidocaine (LIDODERM) 5 %  Self No No   Sig: Apply 3 patches topically daily Remove & Discard patch within 12 hours or as directed by MD   montelukast (SINGULAIR) 10 mg tablet  Self No No   Sig: TAKE 1 TABLET BY MOUTH EVERYDAY AT BEDTIME   morphine (MS CONTIN) 15 mg 12 hr tablet   No No   Sig: Take 1 tablet (15 mg total) by mouth 2 (two) times a day Ongoing therapy Max Daily Amount: 30 mg   naloxone (NARCAN) 4 mg/0.1 mL nasal spray  Self No No   Sig: Administer 1 spray into a nostril. If no response after 2-3 minutes, give another dose in the other nostril using a new spray.   Patient not taking: Reported on 9/13/2023   predniSONE 1 mg tablet   No No   Sig: TAKE 1 TABLET BY MOUTH EVERY DAY   senna (SENOKOT) 8.6 mg  Self No No   Sig: Take 2 tablets (17.2 mg total) by mouth daily at bedtime as needed for constipation   Patient not taking: Reported on 12/12/2023   traMADol (ULTRAM) 50 mg tablet   No No   Sig: Take 0.5 tablets (25 mg total) by mouth every 6 (six) hours as needed (breakthrough pain)   Patient not taking: Reported on 10/26/2023      Facility-Administered Medications: None       Past Medical History:   Diagnosis Date    Bone cancer (HCC)     Colon polyp     COPD (chronic obstructive pulmonary disease) (HCC)     Emphysema lung (HCC)     Hyperlipidemia     Prostate cancer (HCC)        Past Surgical History:   Procedure Laterality Date    APPENDECTOMY      COLONOSCOPY      IR BIOPSY BONE  12/30/2021    JOINT REPLACEMENT      UPPER GASTROINTESTINAL ENDOSCOPY         Family History   Problem Relation Age of Onset    Lung cancer  Mother     Breast cancer Sister     Colon polyps Brother     Colon cancer Neg Hx      I have reviewed and agree with the history as documented.    E-Cigarette/Vaping    E-Cigarette Use Never User      E-Cigarette/Vaping Substances    Nicotine No     THC No     CBD No     Flavoring No     Other No     Unknown No      Social History     Tobacco Use    Smoking status: Former     Current packs/day: 0.00     Average packs/day: 1 pack/day for 42.0 years (42.0 ttl pk-yrs)     Types: Cigarettes     Start date:      Quit date:      Years since quittin.1    Smokeless tobacco: Never   Vaping Use    Vaping status: Never Used   Substance Use Topics    Alcohol use: Never    Drug use: Never       Review of Systems   Respiratory:  Positive for shortness of breath.        Physical Exam  Physical Exam  Vitals and nursing note reviewed.   Constitutional:       Appearance: He is cachectic.   HENT:      Head: Normocephalic and atraumatic.      Mouth/Throat:      Mouth: Mucous membranes are dry.   Eyes:      Conjunctiva/sclera: Conjunctivae normal.   Cardiovascular:      Rate and Rhythm: Normal rate and regular rhythm.      Pulses: Normal pulses.      Heart sounds: Normal heart sounds.   Pulmonary:      Effort: Pulmonary effort is normal.      Breath sounds: Wheezing (left mid lung field) present.   Abdominal:      General: There is no distension.      Palpations: Abdomen is soft.      Tenderness: There is generalized abdominal tenderness. There is no guarding or rebound.   Musculoskeletal:      Right lower leg: No edema.      Left lower leg: No edema.   Skin:     General: Skin is warm and dry.   Neurological:      Mental Status: He is alert.         Vital Signs  ED Triage Vitals [24 0713]   Temperature Pulse Respirations Blood Pressure SpO2   97.7 °F (36.5 °C) 96 18 168/94 97 %      Temp src Heart Rate Source Patient Position - Orthostatic VS BP Location FiO2 (%)   -- -- -- -- --      Pain Score       8            Vitals:    02/26/24 0713 02/26/24 0730 02/26/24 0800   BP: 168/94 142/83 146/81   Pulse: 96 90 94         Visual Acuity      ED Medications  Medications   ipratropium (ATROVENT) 0.02 % inhalation solution 0.5 mg (0.5 mg Nebulization Given 2/26/24 0735)   albuterol inhalation solution 5 mg (5 mg Nebulization Given 2/26/24 0735)   methylPREDNISolone sodium succinate (Solu-MEDROL) injection 80 mg (80 mg Intravenous Given 2/26/24 0808)   ipratropium (ATROVENT) 0.02 % inhalation solution 0.5 mg (0.5 mg Nebulization Given 2/26/24 0847)   albuterol inhalation solution 5 mg (5 mg Nebulization Given 2/26/24 0847)   doxycycline hyclate (VIBRAMYCIN) capsule 100 mg (100 mg Oral Given 2/26/24 0847)       Diagnostic Studies  Results Reviewed       Procedure Component Value Units Date/Time    FLU/RSV/COVID - if FLU/RSV clinically relevant [[id number]]  (Normal) Collected: 02/26/24 0722    Lab Status: Final result Specimen: Nares from Nose Updated: 02/26/24 0805     SARS-CoV-2 Negative     INFLUENZA A PCR Negative     INFLUENZA B PCR Negative     RSV PCR Negative    Narrative:      FOR PEDIATRIC PATIENTS - copy/paste COVID Guidelines URL to browser: [email or web address]    SARS-CoV-2 assay is a Nucleic Acid Amplification assay intended for the  qualitative detection of nucleic acid from SARS-CoV-2 in nasopharyngeal  swabs. Results are for the presumptive identification of SARS-CoV-2 RNA.    Positive results are indicative of infection with SARS-CoV-2, the virus  causing COVID-19, but do not rule out bacterial infection or co-infection  with other viruses. Laboratories within the [place] and its  territories are required to report all positive results to the appropriate  public health authorities. Negative results do not preclude SARS-CoV-2  infection and should not be used as the sole basis for treatment or other  patient management decisions. Negative results must be  combined with  clinical observations, patient history, and epidemiological information.  This test has not been FDA cleared or approved.    This test has been authorized by FDA under an Emergency Use Authorization  (EUA). This test is only authorized for the duration of time the  declaration that circumstances exist justifying the authorization of the  emergency use of an in vitro diagnostic tests for detection of SARS-CoV-2  virus and/or diagnosis of COVID-19 infection under section 564(b)(1) of  the Act, 21 U.S.C. 360bbb-3(b)(1), unless the authorization is terminated  or revoked sooner. The test has been validated but independent review by FDA  and CLIA is pending.    Test performed using [employer] GeneXpert: This RT-PCR assay targets N2,  a region unique to SARS-CoV-2. A conserved region in the E-gene was chosen  for pan-Sarbecovirus detection which includes SARS-CoV-2.    According to CMS-2020-01-R, this platform meets the definition of high-throughput technology.    HS Troponin 0hr (reflex protocol) [[id number]]  (Normal) Collected: 02/26/24 0722    Lab Status: Final result Specimen: Blood from Arm, Right Updated: 02/26/24 0749     hs TnI 0hr 4 ng/L     D-Dimer [[id number]]  (Normal) Collected: 02/26/24 0722    Lab Status: Final result Specimen: Blood from Arm, Right Updated: 02/26/24 0748     D-Dimer, Quant <0.27 ug/ml FEU     Narrative:      In the evaluation for possible pulmonary embolism, in the appropriate (Well's Score of 4 or less) patient, the age adjusted d-dimer cutoff for this patient can be calculated as:    Age x 0.01 (in ug/mL) for Age-adjusted D-dimer exclusion threshold for a patient over 50 years.    Comprehensive metabolic panel [[id number]]  (Abnormal) Collected: 02/26/24 0722    Lab Status: Final result Specimen: Blood from Arm, Right Updated: 02/26/24 0746     Sodium 142 mmol/L      Potassium 3.8 mmol/L      Chloride 97 mmol/L      CO2 41 mmol/L      ANION GAP 4 mmol/L      BUN 9 mg/dL       Creatinine 0.45 mg/dL      Glucose 123 mg/dL      Calcium 9.4 mg/dL      AST 14 U/L      ALT 14 U/L      Alkaline Phosphatase 49 U/L      Total Protein 7.3 g/dL      Albumin 4.2 g/dL      Total Bilirubin 0.48 mg/dL      eGFR 115 ml/min/1.73sq m     Narrative:      National Kidney Disease Foundation guidelines for Chronic Kidney Disease (CKD):     Stage 1 with normal or high GFR (GFR > 90 mL/min/1.73 square meters)    Stage 2 Mild CKD (GFR = 60-89 mL/min/1.73 square meters)    Stage 3A Moderate CKD (GFR = 45-59 mL/min/1.73 square meters)    Stage 3B Moderate CKD (GFR = 30-44 mL/min/1.73 square meters)    Stage 4 Severe CKD (GFR = 15-29 mL/min/1.73 square meters)    Stage 5 End Stage CKD (GFR <15 mL/min/1.73 square meters)  Note: GFR calculation is accurate only with a steady state creatinine    Lipase [[id number]]  (Abnormal) Collected: 02/26/24 0722    Lab Status: Final result Specimen: Blood from Arm, Right Updated: 02/26/24 0746     Lipase <6 u/L     UA w Reflex to Microscopic w Reflex to Culture [[id number]] Collected: 02/26/24 0732    Lab Status: Final result Specimen: Urine, Clean Catch Updated: 02/26/24 0741     Color, UA Yellow     Clarity, UA Clear     Specific Gravity, UA 1.015     pH, UA 7.0     Leukocytes, UA Negative     Nitrite, UA Negative     Protein, UA Negative mg/dl      Glucose, UA Negative mg/dl      Ketones, UA Negative mg/dl      Urobilinogen, UA <2.0 mg/dl      Bilirubin, UA Negative     Occult Blood, UA Negative    CBC and differential [[id number]]  (Abnormal) Collected: 02/26/24 0722    Lab Status: Final result Specimen: Blood from Arm, Right Updated: 02/26/24 0727     WBC 10.68 Thousand/uL      RBC 4.19 Million/uL      Hemoglobin 13.1 g/dL      Hematocrit 41.5 %      MCV 99 fL      MCH 31.3 pg      MCHC 31.6 g/dL      RDW 13.4 %      MPV 8.3 fL      Platelets 357 Thousands/uL      nRBC 0 /100 WBCs      Neutrophils Relative 77 %      Immat GRANS % 1 %      Lymphocytes Relative 10 %       Monocytes Relative 8 %      Eosinophils Relative 3 %      Basophils Relative 1 %      Neutrophils Absolute 8.31 Thousands/µL      Immature Grans Absolute 0.07 Thousand/uL      Lymphocytes Absolute 1.09 Thousands/µL      Monocytes Absolute 0.82 Thousand/µL      Eosinophils Absolute 0.29 Thousand/µL      Basophils Absolute 0.10 Thousands/µL                    XR chest 1 view portable   ED Interpretation by Adriane Crandall MD (02/26 0737)   Density left lower lung field                 Procedures  ECG 12 Lead Documentation Only    Date/Time: 2/26/2024 7:27 AM    Performed by: Adriane Crandall MD  Authorized by: Adriane Crandall MD    Indications / Diagnosis:  Shortness of breath  Patient location:  ED  Previous ECG:     Previous ECG:  Compared to current    Comparison ECG info:  10/24/23 sinus rhythm with right axis and cyrus    Similarity:  No change  Interpretation:     Interpretation: abnormal    Rate:     ECG rate:  94    ECG rate assessment: normal    Rhythm:     Rhythm: sinus rhythm    Ectopy:     Ectopy: none    QRS:     QRS axis:  Right    QRS intervals:  Normal  Conduction:     Conduction: normal    ST segments:     ST segments:  Normal  T waves:     T waves: flattening and inverted      Flattening:  V2    Inverted:  AVL  Other findings:     Other findings: CYRUS             ED Course                                   Wells' Criteria for PE      Flowsheet Row Most Recent Value   Wells' Criteria for PE    Clinical signs and symptoms of DVT 0 Filed at: 02/26/2024 0721   PE is primary diagnosis or equally likely 0 Filed at: 02/26/2024 0721   HR >100 0 Filed at: 02/26/2024 0721   Immobilization at least 3 days or Surgery in the previous 4 weeks 0 Filed at: 02/26/2024 0721   Previous, objectively diagnosed PE or DVT 0 Filed at: 02/26/2024 0721   Hemoptysis 0 Filed at: 02/26/2024 0721   Malignancy with treatment within 6 months or palliative 1 Filed at: 02/26/2024 0721   Wells' Criteria Total  1 Filed at: 02/26/2024 0721                  Medical Decision Making  68 year old male with history of COPD and metastatic prostate cancer presents for evaluation of increased shortness of breath.  Low risk Well's score for PE.  D-dimer negative.  Discussed obtaining CT with patient due to abdominal pain and tenderness; however, he declined stating the pain is chronic and unchanged.  Suspected pneumonia left lower lung field.  Patient started on doxycycline.  Solumedrol given and prednisone prescribed for COPD exacerbation.  Patient to follow up with his pulmonologist.    Amount and/or Complexity of Data Reviewed  Labs: ordered.  Radiology: ordered and independent interpretation performed.    Risk  Prescription drug management.             Disposition  Final diagnoses:   COPD exacerbation (HCC)   Left lower lobe pneumonia     Time reflects when diagnosis was documented in both MDM as applicable and the Disposition within this note       Time User Action Codes Description Comment    2/26/2024  8:36 AM Raz, Adriane J Add [J44.1] COPD exacerbation (HCC)     2/26/2024  8:37 AM Raz, Adriane J Add [C61,  C79.51] Prostate cancer metastatic to bone (HCC)     2/26/2024  8:37 AM Raz, Adriane J Add [Z51.5] Palliative care patient     2/26/2024  8:37 AM Raz, Adriane J Add [R64] Cachexia (HCC)     2/26/2024  8:37 AM Raz, Adriane J Add [R63.0] Anorexia     2/26/2024  8:41 AM Raz, Adriane J Add [J18.9] Left lower lobe pneumonia           ED Disposition       ED Disposition   Discharge    Condition   Stable    Date/Time   Mon Feb 26, 2024 0855    Comment   Richard Nava discharge to home/self care.                   Follow-up Information       Follow up With Specialties Details Why Contact Info Additional Information    [facility] [place] [facility] Schedule an appointment as soon as possible for a visit in 1 week for re-evaluation [address]  [address]  [address]  [phone] [facility] [place], [address], [address], [place], [place], [address]   [phone]     [facility] Emergency Medicine Go to  If symptoms worsen [address] [address] [phone] [facility], [address], [place] [address]            Patient's Medications   Discharge Prescriptions    DOXYCYCLINE HYCLATE (VIBRAMYCIN) 100 MG CAPSULE    Take 1 capsule (100 mg total) by mouth 2 (two) times a day for 5 days       Start Date: 2/26/2024 End Date: 3/2/2024       Order Dose: 100 mg       Quantity: 10 capsule    Refills: 0    PREDNISONE 20 MG TABLET    Take 2 tablets (40 mg total) by mouth daily for 4 days Do not start before February 27, 2024.       Start Date: 2/27/2024 End Date: 3/2/2024       Order Dose: 40 mg       Quantity: 8 tablet    Refills: 0       No discharge procedures on file.    PDMP Review         Value Time User    PDMP Reviewed  Yes 2/24/2024 11:24 AM Teresa Andrew MD            ED Provider  Electronically Signed by             Adriane Crandall MD  02/26/24 0858       Adrinae Crandall MD  02/26/24 0901

## 2024-02-26 NOTE — TELEPHONE ENCOUNTER
Per M KEY [id number]   Received prior authorization [APPROVAL] for [MSER 15 MG ] through 12/31/2099 .  APPROVAL LETTER SENT TO [employer]     Faxed results to Pharmacy. Pharmacy has been asked to inform pt of outcome.    Patient picked up over weekend paid out of pocket

## 2024-02-26 NOTE — TELEPHONE ENCOUNTER
Received prior authorization APPROVED for Morphine Sulfate ER 15MG Tablets through 02/12/2024-Further Notice.    Faxed results to Pharmacy. Pharmacy has been asked to inform pt of outcome.

## 2024-02-26 NOTE — TELEPHONE ENCOUNTER
Prior Authorization [STARTED] for [Morphine Sulfate ER 15MG ER Tablets]    KEY#[id number]    Pharmacy: [facility]  Phone: [phone]  Fax: [phone]

## 2024-02-26 NOTE — TELEPHONE ENCOUNTER
Prior Authorization COMPLETED for Morphine Sulfate ER 15MG Tablets    KEY#[id number]    Pharmacy: [facility]  Phone: [phone]  Fax: [phone]

## 2024-02-29 ENCOUNTER — TELEPHONE (OUTPATIENT)
Dept: PALLIATIVE MEDICINE | Facility: CLINIC | Age: 69
End: 2024-02-29

## 2024-02-29 NOTE — TELEPHONE ENCOUNTER
Please let Rishabh know that the methylprednisolone (medrol dose pack) was given to him as a one time treatment for his COPD flare. After that, he will go back on his baseline prednisone 2.5MG (however Rishabh typically cuts this in half and only does 1MG).  If his shortness of breath worsens after he stops the methylprednisolone he should please let us and his pulmonologist know.  He also needs to schedule a follow-up with his pulmonologist.  Thanks!

## 2024-02-29 NOTE — TELEPHONE ENCOUNTER
Patient seeking advice. Received script for methylPREDNISolone 4mg in ED and wants to know what happens when he goes through the course of medication, I.e. is this a one time medication or will he need refill.

## 2024-03-05 ENCOUNTER — TELEPHONE (OUTPATIENT)
Dept: PALLIATIVE MEDICINE | Facility: CLINIC | Age: 69
End: 2024-03-05

## 2024-03-05 NOTE — TELEPHONE ENCOUNTER
"Pt called in stating he is having bad side effects to the baseline prednisone. He states he can \"feel his pulse in his head\", and has a headache. Pt is seeking medical advise. Pt does not want to go to ED.    [phone]  "

## 2024-03-06 NOTE — TELEPHONE ENCOUNTER
Rishabh typically cuts his prednisone pills in half and only takes 1MG a day. Interestingly, this happened to him last time he took 2.5MG.     Please tell him to go back to taking 1MG dosing. If he still has issues after this, he must go to ER.

## 2024-03-07 ENCOUNTER — TELEPHONE (OUTPATIENT)
Age: 69
End: 2024-03-07

## 2024-03-07 ENCOUNTER — TELEPHONE (OUTPATIENT)
Dept: HEMATOLOGY ONCOLOGY | Facility: CLINIC | Age: 69
End: 2024-03-07

## 2024-03-07 NOTE — TELEPHONE ENCOUNTER
Call Transfer   Who are you speaking with?  Patient   If it is not the patient, are they listed on an active communication consent form? N/A   Who is the patients HemOnc/SurgOnc provider? Dr. Alvarez   What is the reason for this call? Oxygen script   Person/Department that the call was transferred to?    Time that call was transferred?    pulmonology   Your call will be transferred now. If you receive a voicemail, please leave a detailed message and a member of the team will return your call as soon as possible.    Did you relay this information to the caller?  N/A

## 2024-03-07 NOTE — TELEPHONE ENCOUNTER
Patient's sis ter is calling and would like to speak to someone in reference to a POC and the patient is struggling to breathe and according to his sister he cannot walk across the room without gasping for air. Please advise.

## 2024-03-07 NOTE — TELEPHONE ENCOUNTER
Incoming call: Pt's sister Piedad     Re: Pt of Dr. Fatima     Sister is asking if pt can get home oxygen with portability. Pt very SOB getting to different areas of the house.     Piedad, pt's phone    [phone]

## 2024-03-08 ENCOUNTER — HOSPITAL ENCOUNTER (INPATIENT)
Facility: HOSPITAL | Age: 69
LOS: 4 days | Discharge: HOME | DRG: 189 | End: 2024-03-12
Attending: EMERGENCY MEDICINE | Admitting: INTERNAL MEDICINE
Payer: MEDICARE

## 2024-03-08 ENCOUNTER — APPOINTMENT (EMERGENCY)
Dept: RADIOLOGY | Facility: HOSPITAL | Age: 69
DRG: 189 | End: 2024-03-08
Payer: MEDICARE

## 2024-03-08 ENCOUNTER — TELEPHONE (OUTPATIENT)
Dept: PALLIATIVE MEDICINE | Facility: CLINIC | Age: 69
End: 2024-03-08

## 2024-03-08 ENCOUNTER — APPOINTMENT (EMERGENCY)
Dept: CT IMAGING | Facility: HOSPITAL | Age: 69
DRG: 189 | End: 2024-03-08
Payer: MEDICARE

## 2024-03-08 ENCOUNTER — APPOINTMENT (INPATIENT)
Dept: CT IMAGING | Facility: HOSPITAL | Age: 69
DRG: 189 | End: 2024-03-08
Attending: INTERNAL MEDICINE
Payer: MEDICARE

## 2024-03-08 DIAGNOSIS — J96.01 ACUTE RESPIRATORY FAILURE WITH HYPOXIA AND HYPERCAPNIA (HCC): Primary | ICD-10-CM

## 2024-03-08 DIAGNOSIS — R06.89 ACUTE RESPIRATORY INSUFFICIENCY: ICD-10-CM

## 2024-03-08 DIAGNOSIS — R64 CACHEXIA (HCC): Primary | ICD-10-CM

## 2024-03-08 DIAGNOSIS — J18.9 PNEUMONIA: ICD-10-CM

## 2024-03-08 DIAGNOSIS — J44.9 CHRONIC OBSTRUCTIVE PULMONARY DISEASE, UNSPECIFIED COPD TYPE (HCC): ICD-10-CM

## 2024-03-08 DIAGNOSIS — E43 SEVERE PROTEIN-CALORIE MALNUTRITION (HCC): ICD-10-CM

## 2024-03-08 DIAGNOSIS — R06.02 SOB (SHORTNESS OF BREATH): ICD-10-CM

## 2024-03-08 DIAGNOSIS — J96.02 ACUTE RESPIRATORY FAILURE WITH HYPOXIA AND HYPERCAPNIA (HCC): Primary | ICD-10-CM

## 2024-03-08 PROBLEM — R06.03 RESPIRATORY DISTRESS: Status: ACTIVE | Noted: 2024-03-08

## 2024-03-08 PROBLEM — R51.9 HEADACHE: Status: ACTIVE | Noted: 2024-03-08

## 2024-03-08 LAB
ALBUMIN SERPL BCP-MCNC: 4 G/DL (ref 3.5–5)
ALP SERPL-CCNC: 58 U/L (ref 34–104)
ALT SERPL W P-5'-P-CCNC: 11 U/L (ref 7–52)
APTT PPP: 28 SECONDS (ref 23–37)
ARTERIAL PATENCY WRIST A: ABNORMAL
AST SERPL W P-5'-P-CCNC: 12 U/L (ref 13–39)
BACTERIA UR QL AUTO: ABNORMAL /HPF
BASE EXCESS BLDA CALC-SCNC: 12 MMOL/L (ref -2–3)
BASOPHILS # BLD AUTO: 0.08 THOUSANDS/ÂΜL (ref 0–0.1)
BASOPHILS NFR BLD AUTO: 1 % (ref 0–1)
BILIRUB SERPL-MCNC: 0.44 MG/DL (ref 0.2–1)
BILIRUB UR QL STRIP: NEGATIVE
BNP SERPL-MCNC: 42 PG/ML (ref 0–100)
BUN SERPL-MCNC: 11 MG/DL (ref 5–25)
CA-I BLD-SCNC: 1.23 MMOL/L (ref 1.12–1.32)
CALCIUM SERPL-MCNC: 9.4 MG/DL (ref 8.4–10.2)
CARDIAC TROPONIN I PNL SERPL HS: 3 NG/L
CHLORIDE SERPL-SCNC: 93 MMOL/L (ref 96–108)
CLARITY UR: CLEAR
CO2 SERPL-SCNC: >45 MMOL/L (ref 21–32)
COLOR UR: YELLOW
CREAT SERPL-MCNC: 0.5 MG/DL (ref 0.6–1.3)
EOSINOPHIL # BLD AUTO: 0.3 THOUSAND/ÂΜL (ref 0–0.61)
EOSINOPHIL NFR BLD AUTO: 4 % (ref 0–6)
ERYTHROCYTE [DISTWIDTH] IN BLOOD BY AUTOMATED COUNT: 12.6 % (ref 11.6–15.1)
FIO2 GAS DIL.REBREATH: 36 L
FLUAV RNA RESP QL NAA+PROBE: NEGATIVE
FLUBV RNA RESP QL NAA+PROBE: NEGATIVE
GFR SERPL CREATININE-BSD FRML MDRD: 110 ML/MIN/1.73SQ M
GLUCOSE SERPL-MCNC: 114 MG/DL (ref 65–140)
GLUCOSE SERPL-MCNC: 219 MG/DL (ref 65–140)
GLUCOSE UR STRIP-MCNC: ABNORMAL MG/DL
HCO3 BLDA-SCNC: 42.2 MMOL/L (ref 22–28)
HCT VFR BLD AUTO: 43.1 % (ref 36.5–49.3)
HCT VFR BLD CALC: 38 % (ref 36.5–49.3)
HGB BLD-MCNC: 13.5 G/DL (ref 12–17)
HGB BLDA-MCNC: 12.9 G/DL (ref 12–17)
HGB UR QL STRIP.AUTO: NEGATIVE
HYALINE CASTS #/AREA URNS LPF: ABNORMAL /LPF
IMM GRANULOCYTES # BLD AUTO: 0.04 THOUSAND/UL (ref 0–0.2)
IMM GRANULOCYTES NFR BLD AUTO: 1 % (ref 0–2)
INR PPP: 0.93 (ref 0.84–1.19)
KETONES UR STRIP-MCNC: ABNORMAL MG/DL
L PNEUMO1 AG UR QL IA.RAPID: NEGATIVE
LACTATE SERPL-SCNC: 0.8 MMOL/L (ref 0.5–2)
LEUKOCYTE ESTERASE UR QL STRIP: NEGATIVE
LYMPHOCYTES # BLD AUTO: 1.41 THOUSANDS/ÂΜL (ref 0.6–4.47)
LYMPHOCYTES NFR BLD AUTO: 17 % (ref 14–44)
MCH RBC QN AUTO: 31.3 PG (ref 26.8–34.3)
MCHC RBC AUTO-ENTMCNC: 31.3 G/DL (ref 31.4–37.4)
MCV RBC AUTO: 100 FL (ref 82–98)
MONOCYTES # BLD AUTO: 0.74 THOUSAND/ÂΜL (ref 0.17–1.22)
MONOCYTES NFR BLD AUTO: 9 % (ref 4–12)
NEUTROPHILS # BLD AUTO: 5.8 THOUSANDS/ÂΜL (ref 1.85–7.62)
NEUTS SEG NFR BLD AUTO: 68 % (ref 43–75)
NITRITE UR QL STRIP: NEGATIVE
NON-SQ EPI CELLS URNS QL MICRO: ABNORMAL /HPF
NRBC BLD AUTO-RTO: 0 /100 WBCS
PCO2 BLD: 45 MMOL/L (ref 21–32)
PCO2 BLD: 90 MM HG (ref 36–44)
PH BLD: 7.28 [PH] (ref 7.35–7.45)
PH UR STRIP.AUTO: 7 [PH]
PLATELET # BLD AUTO: 386 THOUSANDS/UL (ref 149–390)
PMV BLD AUTO: 8.3 FL (ref 8.9–12.7)
PO2 BLD: 103 MM HG (ref 75–129)
POTASSIUM BLD-SCNC: 3.5 MMOL/L (ref 3.5–5.3)
POTASSIUM SERPL-SCNC: 3.5 MMOL/L (ref 3.5–5.3)
PROCALCITONIN SERPL-MCNC: 0.05 NG/ML
PROT SERPL-MCNC: 6.9 G/DL (ref 6.4–8.4)
PROT UR STRIP-MCNC: ABNORMAL MG/DL
PROTHROMBIN TIME: 12.9 SECONDS (ref 11.6–14.5)
RBC # BLD AUTO: 4.32 MILLION/UL (ref 3.88–5.62)
RBC #/AREA URNS AUTO: ABNORMAL /HPF
RSV RNA RESP QL NAA+PROBE: NEGATIVE
S PNEUM AG UR QL: NEGATIVE
SAMPLE SITE: ABNORMAL
SAO2 % BLD FROM PO2: 97 % (ref 60–85)
SARS-COV-2 RNA RESP QL NAA+PROBE: NEGATIVE
SODIUM BLD-SCNC: 139 MMOL/L (ref 136–145)
SODIUM SERPL-SCNC: 139 MMOL/L (ref 135–147)
SP GR UR STRIP.AUTO: 1.01 (ref 1–1.03)
SPECIMEN SOURCE: ABNORMAL
UROBILINOGEN UR STRIP-ACNC: 2 MG/DL
WBC # BLD AUTO: 8.37 THOUSAND/UL (ref 4.31–10.16)
WBC #/AREA URNS AUTO: ABNORMAL /HPF

## 2024-03-08 PROCEDURE — 85730 THROMBOPLASTIN TIME PARTIAL: CPT | Performed by: EMERGENCY MEDICINE

## 2024-03-08 PROCEDURE — 99285 EMERGENCY DEPT VISIT HI MDM: CPT

## 2024-03-08 PROCEDURE — 82330 ASSAY OF CALCIUM: CPT

## 2024-03-08 PROCEDURE — 0241U HB NFCT DS VIR RESP RNA 4 TRGT: CPT | Performed by: EMERGENCY MEDICINE

## 2024-03-08 PROCEDURE — 84484 ASSAY OF TROPONIN QUANT: CPT | Performed by: EMERGENCY MEDICINE

## 2024-03-08 PROCEDURE — 87186 SC STD MICRODIL/AGAR DIL: CPT | Performed by: INTERNAL MEDICINE

## 2024-03-08 PROCEDURE — 96366 THER/PROPH/DIAG IV INF ADDON: CPT

## 2024-03-08 PROCEDURE — 96365 THER/PROPH/DIAG IV INF INIT: CPT

## 2024-03-08 PROCEDURE — 81001 URINALYSIS AUTO W/SCOPE: CPT | Performed by: EMERGENCY MEDICINE

## 2024-03-08 PROCEDURE — 99223 1ST HOSP IP/OBS HIGH 75: CPT | Performed by: PHYSICIAN ASSISTANT

## 2024-03-08 PROCEDURE — 87205 SMEAR GRAM STAIN: CPT | Performed by: INTERNAL MEDICINE

## 2024-03-08 PROCEDURE — 84132 ASSAY OF SERUM POTASSIUM: CPT

## 2024-03-08 PROCEDURE — 93005 ELECTROCARDIOGRAM TRACING: CPT

## 2024-03-08 PROCEDURE — 36600 WITHDRAWAL OF ARTERIAL BLOOD: CPT

## 2024-03-08 PROCEDURE — 70450 CT HEAD/BRAIN W/O DYE: CPT

## 2024-03-08 PROCEDURE — 36415 COLL VENOUS BLD VENIPUNCTURE: CPT | Performed by: EMERGENCY MEDICINE

## 2024-03-08 PROCEDURE — 85025 COMPLETE CBC W/AUTO DIFF WBC: CPT | Performed by: EMERGENCY MEDICINE

## 2024-03-08 PROCEDURE — 83605 ASSAY OF LACTIC ACID: CPT | Performed by: EMERGENCY MEDICINE

## 2024-03-08 PROCEDURE — 87070 CULTURE OTHR SPECIMN AEROBIC: CPT | Performed by: INTERNAL MEDICINE

## 2024-03-08 PROCEDURE — 94760 N-INVAS EAR/PLS OXIMETRY 1: CPT

## 2024-03-08 PROCEDURE — 84145 PROCALCITONIN (PCT): CPT | Performed by: EMERGENCY MEDICINE

## 2024-03-08 PROCEDURE — 71275 CT ANGIOGRAPHY CHEST: CPT

## 2024-03-08 PROCEDURE — 87077 CULTURE AEROBIC IDENTIFY: CPT | Performed by: INTERNAL MEDICINE

## 2024-03-08 PROCEDURE — 80053 COMPREHEN METABOLIC PANEL: CPT | Performed by: EMERGENCY MEDICINE

## 2024-03-08 PROCEDURE — 96367 TX/PROPH/DG ADDL SEQ IV INF: CPT

## 2024-03-08 PROCEDURE — 83880 ASSAY OF NATRIURETIC PEPTIDE: CPT | Performed by: EMERGENCY MEDICINE

## 2024-03-08 PROCEDURE — C9113 INJ PANTOPRAZOLE SODIUM, VIA: HCPCS | Performed by: PHYSICIAN ASSISTANT

## 2024-03-08 PROCEDURE — 82947 ASSAY GLUCOSE BLOOD QUANT: CPT

## 2024-03-08 PROCEDURE — 84295 ASSAY OF SERUM SODIUM: CPT

## 2024-03-08 PROCEDURE — 85014 HEMATOCRIT: CPT

## 2024-03-08 PROCEDURE — 99223 1ST HOSP IP/OBS HIGH 75: CPT | Performed by: INTERNAL MEDICINE

## 2024-03-08 PROCEDURE — 85610 PROTHROMBIN TIME: CPT | Performed by: EMERGENCY MEDICINE

## 2024-03-08 PROCEDURE — 87040 BLOOD CULTURE FOR BACTERIA: CPT | Performed by: EMERGENCY MEDICINE

## 2024-03-08 PROCEDURE — G0316 PR PROLONG INPT EVAL ADD15 M: HCPCS | Performed by: PHYSICIAN ASSISTANT

## 2024-03-08 PROCEDURE — 99285 EMERGENCY DEPT VISIT HI MDM: CPT | Performed by: EMERGENCY MEDICINE

## 2024-03-08 PROCEDURE — 87449 NOS EACH ORGANISM AG IA: CPT | Performed by: INTERNAL MEDICINE

## 2024-03-08 PROCEDURE — 71045 X-RAY EXAM CHEST 1 VIEW: CPT

## 2024-03-08 PROCEDURE — 82803 BLOOD GASES ANY COMBINATION: CPT

## 2024-03-08 PROCEDURE — 94640 AIRWAY INHALATION TREATMENT: CPT

## 2024-03-08 RX ORDER — AZITHROMYCIN 500 MG/1
500 TABLET, FILM COATED ORAL EVERY 24 HOURS
Status: COMPLETED | OUTPATIENT
Start: 2024-03-08 | End: 2024-03-10

## 2024-03-08 RX ORDER — CEFEPIME HYDROCHLORIDE 2 G/50ML
2000 INJECTION, SOLUTION INTRAVENOUS ONCE
Status: COMPLETED | OUTPATIENT
Start: 2024-03-08 | End: 2024-03-08

## 2024-03-08 RX ORDER — LORAZEPAM 2 MG/ML
0.5 INJECTION INTRAMUSCULAR ONCE
Status: COMPLETED | OUTPATIENT
Start: 2024-03-08 | End: 2024-03-08

## 2024-03-08 RX ORDER — BENZONATATE 100 MG/1
100 CAPSULE ORAL 3 TIMES DAILY PRN
Status: DISCONTINUED | OUTPATIENT
Start: 2024-03-08 | End: 2024-03-12 | Stop reason: HOSPADM

## 2024-03-08 RX ORDER — PANTOPRAZOLE SODIUM 40 MG/10ML
40 INJECTION, POWDER, LYOPHILIZED, FOR SOLUTION INTRAVENOUS
Status: DISCONTINUED | OUTPATIENT
Start: 2024-03-08 | End: 2024-03-12 | Stop reason: HOSPADM

## 2024-03-08 RX ORDER — ALBUTEROL SULFATE 2.5 MG/3ML
2.5 SOLUTION RESPIRATORY (INHALATION) EVERY 6 HOURS PRN
Status: DISCONTINUED | OUTPATIENT
Start: 2024-03-08 | End: 2024-03-08 | Stop reason: SDUPTHER

## 2024-03-08 RX ORDER — METHYLPREDNISOLONE SODIUM SUCCINATE 40 MG/ML
40 INJECTION, POWDER, LYOPHILIZED, FOR SOLUTION INTRAMUSCULAR; INTRAVENOUS EVERY 12 HOURS SCHEDULED
Status: DISCONTINUED | OUTPATIENT
Start: 2024-03-08 | End: 2024-03-08

## 2024-03-08 RX ORDER — ONDANSETRON 2 MG/ML
4 INJECTION INTRAMUSCULAR; INTRAVENOUS EVERY 6 HOURS PRN
Status: DISCONTINUED | OUTPATIENT
Start: 2024-03-08 | End: 2024-03-12 | Stop reason: HOSPADM

## 2024-03-08 RX ORDER — BUDESONIDE 0.5 MG/2ML
0.5 INHALANT ORAL
Status: DISCONTINUED | OUTPATIENT
Start: 2024-03-08 | End: 2024-03-12 | Stop reason: HOSPADM

## 2024-03-08 RX ORDER — SENNOSIDES 8.6 MG
17.2 TABLET ORAL
Status: DISCONTINUED | OUTPATIENT
Start: 2024-03-08 | End: 2024-03-12 | Stop reason: HOSPADM

## 2024-03-08 RX ORDER — ZOLPIDEM TARTRATE 5 MG/1
2.5 TABLET ORAL
Status: DISCONTINUED | OUTPATIENT
Start: 2024-03-08 | End: 2024-03-12 | Stop reason: HOSPADM

## 2024-03-08 RX ORDER — HYDROMORPHONE HCL IN WATER/PF 6 MG/30 ML
0.2 PATIENT CONTROLLED ANALGESIA SYRINGE INTRAVENOUS
Status: DISCONTINUED | OUTPATIENT
Start: 2024-03-08 | End: 2024-03-12 | Stop reason: HOSPADM

## 2024-03-08 RX ORDER — ALBUTEROL SULFATE 2.5 MG/3ML
2.5 SOLUTION RESPIRATORY (INHALATION) EVERY 4 HOURS PRN
Status: DISCONTINUED | OUTPATIENT
Start: 2024-03-08 | End: 2024-03-08

## 2024-03-08 RX ORDER — LIDOCAINE 50 MG/G
1 PATCH TOPICAL DAILY
Status: DISCONTINUED | OUTPATIENT
Start: 2024-03-08 | End: 2024-03-11

## 2024-03-08 RX ORDER — ALPRAZOLAM 0.5 MG/1
0.5 TABLET ORAL 2 TIMES DAILY PRN
Status: DISCONTINUED | OUTPATIENT
Start: 2024-03-08 | End: 2024-03-12 | Stop reason: HOSPADM

## 2024-03-08 RX ORDER — LEVALBUTEROL INHALATION SOLUTION 1.25 MG/3ML
1.25 SOLUTION RESPIRATORY (INHALATION) EVERY 6 HOURS PRN
Status: DISCONTINUED | OUTPATIENT
Start: 2024-03-08 | End: 2024-03-09

## 2024-03-08 RX ORDER — LORAZEPAM 2 MG/ML
0.5 INJECTION INTRAMUSCULAR 2 TIMES DAILY PRN
Status: DISCONTINUED | OUTPATIENT
Start: 2024-03-08 | End: 2024-03-12 | Stop reason: HOSPADM

## 2024-03-08 RX ORDER — GABAPENTIN 300 MG/1
600 CAPSULE ORAL
Status: DISCONTINUED | OUTPATIENT
Start: 2024-03-08 | End: 2024-03-12 | Stop reason: HOSPADM

## 2024-03-08 RX ORDER — CEFTRIAXONE 1 G/50ML
1000 INJECTION, SOLUTION INTRAVENOUS EVERY 24 HOURS
Status: DISCONTINUED | OUTPATIENT
Start: 2024-03-08 | End: 2024-03-12 | Stop reason: HOSPADM

## 2024-03-08 RX ORDER — ACETAMINOPHEN 325 MG/1
650 TABLET ORAL EVERY 6 HOURS PRN
Status: DISCONTINUED | OUTPATIENT
Start: 2024-03-08 | End: 2024-03-12 | Stop reason: HOSPADM

## 2024-03-08 RX ORDER — ALBUTEROL SULFATE 90 UG/1
1 AEROSOL, METERED RESPIRATORY (INHALATION) 4 TIMES DAILY
Status: DISCONTINUED | OUTPATIENT
Start: 2024-03-08 | End: 2024-03-08

## 2024-03-08 RX ORDER — MORPHINE SULFATE 15 MG/1
15 TABLET, FILM COATED, EXTENDED RELEASE ORAL EVERY 12 HOURS SCHEDULED
Status: DISCONTINUED | OUTPATIENT
Start: 2024-03-08 | End: 2024-03-12 | Stop reason: HOSPADM

## 2024-03-08 RX ORDER — GUAIFENESIN/DEXTROMETHORPHAN 100-10MG/5
10 SYRUP ORAL EVERY 4 HOURS PRN
Status: DISCONTINUED | OUTPATIENT
Start: 2024-03-08 | End: 2024-03-12 | Stop reason: HOSPADM

## 2024-03-08 RX ORDER — HEPARIN SODIUM 5000 [USP'U]/ML
5000 INJECTION, SOLUTION INTRAVENOUS; SUBCUTANEOUS EVERY 8 HOURS SCHEDULED
Status: DISCONTINUED | OUTPATIENT
Start: 2024-03-08 | End: 2024-03-12 | Stop reason: HOSPADM

## 2024-03-08 RX ORDER — VANCOMYCIN HYDROCHLORIDE 750 MG/150ML
15 INJECTION, SOLUTION INTRAVENOUS ONCE
Status: DISCONTINUED | OUTPATIENT
Start: 2024-03-08 | End: 2024-03-08

## 2024-03-08 RX ORDER — FORMOTEROL FUMARATE DIHYDRATE 20 UG/2ML
20 SOLUTION RESPIRATORY (INHALATION)
Status: DISCONTINUED | OUTPATIENT
Start: 2024-03-08 | End: 2024-03-12 | Stop reason: HOSPADM

## 2024-03-08 RX ORDER — METHYLPREDNISOLONE SODIUM SUCCINATE 40 MG/ML
40 INJECTION, POWDER, LYOPHILIZED, FOR SOLUTION INTRAMUSCULAR; INTRAVENOUS EVERY 8 HOURS SCHEDULED
Status: DISCONTINUED | OUTPATIENT
Start: 2024-03-08 | End: 2024-03-09

## 2024-03-08 RX ADMIN — METHYLPREDNISOLONE SODIUM SUCCINATE 40 MG: 40 INJECTION, POWDER, FOR SOLUTION INTRAMUSCULAR; INTRAVENOUS at 16:17

## 2024-03-08 RX ADMIN — CEFTRIAXONE 1000 MG: 1 INJECTION, SOLUTION INTRAVENOUS at 21:13

## 2024-03-08 RX ADMIN — AZITHROMYCIN 500 MG: 500 TABLET, FILM COATED ORAL at 16:55

## 2024-03-08 RX ADMIN — SODIUM CHLORIDE 500 ML: 0.9 INJECTION, SOLUTION INTRAVENOUS at 12:55

## 2024-03-08 RX ADMIN — CEFEPIME HYDROCHLORIDE 2000 MG: 2 INJECTION, SOLUTION INTRAVENOUS at 12:53

## 2024-03-08 RX ADMIN — METHYLPREDNISOLONE SODIUM SUCCINATE 40 MG: 40 INJECTION, POWDER, FOR SOLUTION INTRAMUSCULAR; INTRAVENOUS at 21:11

## 2024-03-08 RX ADMIN — LORAZEPAM 0.5 MG: 2 INJECTION INTRAMUSCULAR; INTRAVENOUS at 16:55

## 2024-03-08 RX ADMIN — HEPARIN SODIUM 5000 UNITS: 5000 INJECTION, SOLUTION INTRAVENOUS; SUBCUTANEOUS at 21:12

## 2024-03-08 RX ADMIN — BUDESONIDE 0.5 MG: 0.5 INHALANT ORAL at 19:20

## 2024-03-08 RX ADMIN — GABAPENTIN 600 MG: 300 CAPSULE ORAL at 21:12

## 2024-03-08 RX ADMIN — FORMOTEROL FUMARATE DIHYDRATE 20 MCG: 20 SOLUTION RESPIRATORY (INHALATION) at 19:20

## 2024-03-08 RX ADMIN — LIDOCAINE 5% 1 PATCH: 700 PATCH TOPICAL at 16:17

## 2024-03-08 RX ADMIN — VANCOMYCIN HYDROCHLORIDE 1250 MG: 5 INJECTION, POWDER, LYOPHILIZED, FOR SOLUTION INTRAVENOUS at 13:41

## 2024-03-08 RX ADMIN — IOHEXOL 85 ML: 350 INJECTION, SOLUTION INTRAVENOUS at 14:47

## 2024-03-08 RX ADMIN — HYDROMORPHONE HYDROCHLORIDE 0.2 MG: 0.2 INJECTION, SOLUTION INTRAMUSCULAR; INTRAVENOUS; SUBCUTANEOUS at 16:17

## 2024-03-08 RX ADMIN — PANTOPRAZOLE SODIUM 40 MG: 40 INJECTION, POWDER, FOR SOLUTION INTRAVENOUS at 16:17

## 2024-03-08 RX ADMIN — MORPHINE SULFATE 15 MG: 15 TABLET, EXTENDED RELEASE ORAL at 21:12

## 2024-03-08 NOTE — CONSULTS
"      Consultation - Palliative and Supportive Care   Richard Nava 68 y.o. male [id number]    Assessment/Problems actively addressed this visit:  Goals of care counseling  Encounter for palliative and supportive care  Acute respiratory failure with hypoxia   LLL PNA   Pleural effusion  Severe COPD  Gastritis   Chronic opioid dependence   Neoplasm related pain   New onset headaches   Prostate cancer metastatic to the bone  Pulmonary and cancer cachexia   Severe protein calorie malnutrition     PLAN:  1. Goals of care  Discussed GOC at bedside with Rishabh and his sister. We also discussed his symptoms as he has multiple physical complaints (see below).   Rishabh's goals are strictly treatment focused. We discussed hospice care and he is not interested in this. However he is open to further GOC discussions in the coming days. Will need to determine if he is safe to return home alone.    Rishabh confirms that he is DNR/DNI. He has completed ACP documents naming sister \"Piedad\"/Mary as his HCR. He gives us permission to update all of his sisters.   Continue medical management with limit of DNR/DNI.   Palliative care team to follow on 3/11/24 and engage as appropriate.     Regarding his acute complaints today:  He is hesitant to do MRI brain to eval new onset headaches due to claustrophobia; consider re-addressing this with him later. Regardless, we discussed his wishes: he would not want radiation if brain mets is discovered so it would not change treatment plan.   Consider eval for esophagitis. See symptom management below.     He complains of significant L ear/jaw pain. Which maybe related to cancer mets. Otoscope eval appears normal. Consider imaging.   Please eval Rishabh for O2 prior to discharge--he obtained his O2 device from a family member and has never been certified.     2. Symptom management   Ordered IV Protonix for now as he has GI upset/nausea with daily pills. As he as been on steroid, this is concerning " "for gastritis. Will defer further management to primary team.   Will order Ambien 2.5MG QHS as Rishabh complains of insomnia for multiple days. This is refractory to lorazepam and clonazepam. Also failed temazepam. He has tried trazodone in the past but asked to D/C.   Continue gabapentin 300MG BID  Continue MS CONTIN 15MG BID  Will order 0.2MG IV hydromorphone Q3H PRN BT pain.   Will order lidocaine patches for neck/ear.  Bowel regimen  Of note, Rishabh is highly sensitive to medications and had not tolerated steroid well in the past, nor IR opioids such as oxyIR or IR morphine (makes him \"loopy\").      Social support:  Time spent providing supportive listening.     Patient is finding comfort in family support and in good medical care.   I have reviewed the patient's controlled substance dispensing history in the Prescription Drug Monitoring Program in compliance with the [facility] regulations before prescribing any controlled substances.  Last refills  Filled  Written  ID  Drug  QTY  Days  Prescriber  RX #  Dispenser  Refill  Daily Dose*  Pymt Type      02/24/2024 02/23/2024 1 Morphine Sulf Er 15 Mg Tablet 60.00 30  Bul [id number] Pen ([id number]) 0 30.00 MME Private Pay [place]   01/30/2024 01/30/2024 1 Alprazolam 0.5 Mg Tablet 60.00 30  Bul [id number] Pen ([id number]) 0 2.00 LME Medicare [place]   01/30/2024 01/30/2024 1 Clonazepam 0.5 Mg Odt 30.00 30  Bul [id number] Pen (2691) 0 1.00 LME Medicare [place]   01/25/2024 01/25/2024 1 Morphine Sulf Er 15 Mg Tablet 60.00 30  Bul [id number] Pen (0071) 0 30.00 MME Medicare [place]     Decisional apparatus:  Patient is competent on exam today.  If competence is lost, patient's substitute decision maker would default to sister/Piedad by [place] Act 169.  Advance Directive/Living Will/POLST: Sister/Mary \"Piedad\" is HCR.    We appreciate the invitation to be involved in this patient's care.  We will continue to follow throughout this hospitalization.  Please do not hesitate to reach our on call provider through our clinic " answering service at [phone] should you have acute symptom control concerns.    Arleth Scales PA-C  Palliative and Supportive Care  Clinic/Answering Service: [phone]  You can find me on [employer]!     IDENTIFICATION:  Inpatient consult to Palliative Care  Consult performed by: Arleth Scales PA-C  Consult ordered by: Sarah Corcoran DO        Physician Requesting Consult: Sarah Corcoran DO  Reason for Consult / Principal Problem: GOC counseling and SM secondary to shortness of breath.     History of Present Illness:  Richard Nava is a 68 y.o. male who presents with  acute dyspnea.  Rishabh is under the care of Dr. Harden and ISA Zamarripa, for metastatic prostate cancer which was diagnosed early 2022.  He has extensive multifocal osseous metastatic disease including axial and appendicular skeletal system, calvarium, sternum, ribs, right humerus and clavicle, bilateral scapulae, cervical spine, thoracolumbar spine, sacrum, pelvis, and bilateral femurs.  He also has significant COPD which has been worsening recently.    Rishabh follows with [facility] in the OP setting.  He has significant cancer related pain of his neck, back, and hips.  He also has headaches.  Rishabh has had multiple goals of care discussions in the past and has remained strictly treatment focused.  He is also highly sensitive to certain medications and reports side effects to even minuscule doses, albeit inconsistently.  Rishabh had an evaluation with pulmonology in December and did not qualify for oxygen at that time.  Subsequently he had a recent ED presentation to the [facility] for COPD flare.  He was discharged with steroids to home.  He seemingly did not report to the medical team that his oxygen at home had been obtained through a family member and he had never been prescribed this.  He had issues with worsening shortness of breath in the home and he and the family and his family called asking for  portable oxygen tanks however since he had never been prescribed this, oxygen was not able to be delivered through his insurance to his home.  His palliative and supportive care provider spoke with him on 3/8/2024 to his assess his goals of care as he was hesitant to come back to the hospital.  He was clear that he was not ready to focus on his comfort at home and would like to seek medical care in the hospital and therefore an ambulance was summoned.  He was in acute respiratory failure upon presentation requiring 6 L of oxygen.  Palliative and sort port of care was consulted for continuity as well as for ongoing goals of care conversations which Rishabh will certainly need as he and his family question if he is safe to remain home alone at this time without 24/7 care.      Review of Systems:   Review of Systems   Constitutional: Positive for decreased appetite and malaise/fatigue.   Eyes:  Positive for blurred vision.   Respiratory:  Positive for cough, shortness of breath and sleep disturbances due to breathing.    Musculoskeletal:  Positive for arthritis, back pain, muscle weakness, neck pain and stiffness. Negative for falls.   Gastrointestinal:  Positive for nausea and vomiting. Negative for bloating and abdominal pain.   Neurological:  Positive for disturbances in coordination, dizziness, light-headedness and weakness.   Psychiatric/Behavioral:  Negative for depression, hallucinations, hypervigilance, substance abuse and suicidal ideas. The patient has insomnia (significant).        Past Medical History:   Diagnosis Date    Bone cancer (HCC)     Colon polyp     COPD (chronic obstructive pulmonary disease) (HCC)     Emphysema lung (HCC)     Hyperlipidemia     Prostate cancer (HCC)      Past Surgical History:   Procedure Laterality Date    APPENDECTOMY      COLONOSCOPY      IR BIOPSY BONE  12/30/2021    JOINT REPLACEMENT      UPPER GASTROINTESTINAL ENDOSCOPY       Social History     Socioeconomic History     Marital status: Single     Spouse name: Not on file    Number of children: Not on file    Years of education: Not on file    Highest education level: Not on file   Occupational History    Not on file   Tobacco Use    Smoking status: Former     Current packs/day: 0.00     Average packs/day: 1 pack/day for 42.0 years (42.0 ttl pk-yrs)     Types: Cigarettes     Start date:      Quit date: 2012     Years since quittin.1    Smokeless tobacco: Never   Vaping Use    Vaping status: Never Used   Substance and Sexual Activity    Alcohol use: Never    Drug use: Never    Sexual activity: Not Currently   Other Topics Concern    Not on file   Social History Narrative    Not on file     Social Determinants of Health     Financial Resource Strain: Low Risk  (2023)    Overall Financial Resource Strain (CARDIA)     Difficulty of Paying Living Expenses: Not hard at all   Food Insecurity: No Food Insecurity (2023)    Hunger Vital Sign     Worried About Running Out of Food in the Last Year: Never true     Ran Out of Food in the Last Year: Never true   Transportation Needs: No Transportation Needs (2023)    PRAPARE - Transportation     Lack of Transportation (Medical): No     Lack of Transportation (Non-Medical): No   Physical Activity: Not on file   Stress: Not on file   Social Connections: Not on file   Intimate Partner Violence: Not At Risk (2022)    Humiliation, Afraid, Rape, and Kick questionnaire     Fear of Current or Ex-Partner: No     Emotionally Abused: No     Physically Abused: No     Sexually Abused: No   Housing Stability: Low Risk  (2023)    Housing Stability Vital Sign     Unable to Pay for Housing in the Last Year: No     Number of Places Lived in the Last Year: 1     Unstable Housing in the Last Year: No     Family History   Problem Relation Age of Onset    Lung cancer Mother     Breast cancer Sister     Colon polyps Brother     Colon cancer Neg Hx      Medications:  all current active  meds have been reviewed and current meds:   Current Facility-Administered Medications   Medication Dose Route Frequency    HYDROmorphone HCl (DILAUDID) injection 0.2 mg  0.2 mg Intravenous Q3H PRN    lidocaine (LIDODERM) 5 % patch 1 patch  1 patch Topical Daily    pantoprazole (PROTONIX) injection 40 mg  40 mg Intravenous Q24H FANNY    zolpidem (AMBIEN) tablet 2.5 mg  2.5 mg Oral HS PRN       No Known Allergies      Medications    Current Facility-Administered Medications:     cefepime (MAXIPIME) IVPB (premix in dextrose) 2,000 mg 50 mL, 2,000 mg, Intravenous, Once, Sarah Corcoran DO, Last Rate: 100 mL/hr at 03/08/24 1253, 2,000 mg at 03/08/24 1253    sodium chloride 0.9 % bolus 500 mL, 500 mL, Intravenous, Once, Sarah Corcoran DO, Last Rate: 500 mL/hr at 03/08/24 1255, 500 mL at 03/08/24 1255    vancomycin (VANCOCIN) 1250 mg in sodium chloride 0.9% 250 mL IVPB, 25 mg/kg, Intravenous, Once, Sarah Corcoran DO    Current Outpatient Medications:     abiraterone (ZYTIGA) 250 mg tablet, Take 4 tablets (1,000 mg total) by mouth once daily., Disp: 120 tablet, Rfl: 10    albuterol (2.5 mg/3 mL) 0.083 % nebulizer solution, Take 3 mL (2.5 mg total) by nebulization every 6 (six) hours as needed for wheezing or shortness of breath, Disp: 270 mL, Rfl: 1    albuterol (PROVENTIL HFA,VENTOLIN HFA) 90 mcg/act inhaler, TAKE 2 PUFFS BY MOUTH EVERY 4 HOURS AS NEEDED FOR WHEEZE, Disp: 8.5 g, Rfl: 1    albuterol (PROVENTIL HFA,VENTOLIN HFA) 90 mcg/act inhaler, Inhale 2 puffs every 4 (four) hours as needed for wheezing, Disp: 18 g, Rfl: 0    ALPRAZolam (XANAX) 0.5 mg tablet, Take 1 tablet (0.5 mg total) by mouth 2 (two) times a day as needed for anxiety or sleep Provider aware of co-existing clonazepam prescription. Patient is palliative care patient., Disp: 60 tablet, Rfl: 0    atorvastatin (LIPITOR) 40 mg tablet, Take 1 tablet (40 mg total) by mouth daily with dinner, Disp: 30 tablet, Rfl: 0    Budeson-Glycopyrrol-Formoterol  (Breztri Aerosphere) 160-9-4.8 MCG/ACT AERO, Inhale 2 puffs 2 (two) times a day Rinse mouth after use., Disp: 31.1 g, Rfl: 3    budesonide-formoterol (SYMBICORT) 160-4.5 mcg/act inhaler, Inhale 2 puffs (Patient not taking: Reported on 12/20/2023), Disp: , Rfl:     budesonide-formoterol (Symbicort) 160-4.5 mcg/act inhaler, INHALE 2 PUFFS BY MOUTH TWICE A DAY RINSE MOUTH AFTER USE, Disp: 10.2 g, Rfl: 1    clonazePAM (KlonoPIN) 0.5 MG disintegrating tablet, Take 1 tablet (0.5 mg total) by mouth daily at bedtime as needed for anxiety, Disp: 30 tablet, Rfl: 0    gabapentin (NEURONTIN) 300 mg capsule, Take 2 capsules (600 mg total) by mouth daily at bedtime, Disp: 60 capsule, Rfl: 2    lidocaine (LIDODERM) 5 %, Apply 3 patches topically daily Remove & Discard patch within 12 hours or as directed by MD, Disp: 90 patch, Rfl: 0    methylPREDNISolone 4 MG tablet therapy pack, Use as directed on package, Disp: 21 tablet, Rfl: 0    montelukast (SINGULAIR) 10 mg tablet, TAKE 1 TABLET BY MOUTH EVERYDAY AT BEDTIME, Disp: 90 tablet, Rfl: 1    morphine (MS CONTIN) 15 mg 12 hr tablet, Take 1 tablet (15 mg total) by mouth 2 (two) times a day Ongoing therapy Max Daily Amount: 30 mg, Disp: 60 tablet, Rfl: 0    naloxone (NARCAN) 4 mg/0.1 mL nasal spray, Administer 1 spray into a nostril. If no response after 2-3 minutes, give another dose in the other nostril using a new spray. (Patient not taking: Reported on 9/13/2023), Disp: 1 each, Rfl: 0    NON FORMULARY, Medical marijuana (Patient not taking: Reported on 7/13/2023), Disp: , Rfl:     senna (SENOKOT) 8.6 mg, Take 2 tablets (17.2 mg total) by mouth daily at bedtime as needed for constipation (Patient not taking: Reported on 12/12/2023), Disp: 60 tablet, Rfl: 0    traMADol (ULTRAM) 50 mg tablet, Take 0.5 tablets (25 mg total) by mouth every 6 (six) hours as needed (breakthrough pain) (Patient not taking: Reported on 10/26/2023), Disp: 30 tablet, Rfl: 0    Objective  /94 (BP  Location: Left arm)   Pulse 93   Temp 97.6 °F (36.4 °C) (Oral)   Resp (!) 24   SpO2 99%     Physical Exam:   Physical Exam  Vitals and nursing note reviewed. Exam conducted with a chaperone present.   Constitutional:       General: He is in acute distress.      Appearance: He is cachectic. He is ill-appearing.      Interventions: Nasal cannula in place.   HENT:      Head: Normocephalic and atraumatic.   Eyes:      Conjunctiva/sclera: Conjunctivae normal.   Cardiovascular:      Rate and Rhythm: Normal rate.   Pulmonary:      Effort: Pulmonary effort is normal. No respiratory distress.   Musculoskeletal:         General: No swelling.      Cervical back: Neck supple.   Skin:     General: Skin is warm.      Coloration: Skin is pale.   Neurological:      Mental Status: He is alert and oriented to person, place, and time.   Psychiatric:         Mood and Affect: Mood normal.         Behavior: Behavior normal.       Lab Results: I have personally reviewed pertinent labs., CBC:   Lab Results   Component Value Date    WBC 8.37 03/08/2024    HGB 13.5 03/08/2024    HCT 43.1 03/08/2024     (H) 03/08/2024     03/08/2024    RBC 4.32 03/08/2024    MCH 31.3 03/08/2024    MCHC 31.3 (L) 03/08/2024    RDW 12.6 03/08/2024    MPV 8.3 (L) 03/08/2024    NRBC 0 03/08/2024   , CMP:   Lab Results   Component Value Date    SODIUM 139 03/08/2024    K 3.5 03/08/2024    CL 93 (L) 03/08/2024    CO2 >45 (HH) 03/08/2024    BUN 11 03/08/2024    CREATININE 0.50 (L) 03/08/2024    CALCIUM 9.4 03/08/2024    AST 12 (L) 03/08/2024    ALT 11 03/08/2024    ALKPHOS 58 03/08/2024    EGFR 110 03/08/2024     Imaging Studies: I have personally reviewed pertinent reports.  EKG, Pathology, and Other Studies: I have personally reviewed pertinent reports.    Counseling / Coordination of Care  Total floor / unit time spent today 90 minutes. Greater than 50% of total time was spent with the patient and / or family counseling and / or coordination of  "care. A description of the counseling / coordination of care: reviewed chart, reviewed lab values, reviewed imaging, provided medical updates, discussed palliative care and symptom management, discussed hospice care and comfort care, discussed goals of care, discussed code status, discussed advanced directives, assessed POA/HCA, provided supportive listening, provided anticipatory guidance, provided psychosocial and emotional support, assessed competency and decision-making, and facilitated interdisciplinary communication. Reviewed with SHARYN, ED team, RN and CM.    Portions of this document may have been created using dictation software and as such some \"sound alike\" terms may have been generated by the system. Do not hesitate to contact me with any questions or clarifications.    "

## 2024-03-08 NOTE — ASSESSMENT & PLAN NOTE
Malnutrition Findings:                                 BMI Findings:           Body mass index is 16.45 kg/m².     Attrition consult with nutrition supplements

## 2024-03-08 NOTE — ASSESSMENT & PLAN NOTE
pResented with acute onset of shortness of breath  Start Pulmicort, Perforomist, Xopenex, Atrovent  IV steroids every 8  Pulm consult  Antitussives as needed  Start antibiotics pending cultures, Pro-Otoniel  Will need home O2 prior to discharge

## 2024-03-08 NOTE — ASSESSMENT & PLAN NOTE
Patient follows up with palliative as outpatient  Protonix, Ambien, gabapentin, MS Contin, Dilaudid ordered by palliative  Discussed with palliative medicine

## 2024-03-08 NOTE — PLAN OF CARE
Problem: PAIN - ADULT  Goal: Verbalizes/displays adequate comfort level or baseline comfort level  Description: Interventions:  - Encourage patient to monitor pain and request assistance  - Assess pain using appropriate pain scale  - Administer analgesics based on type and severity of pain and evaluate response  - Implement non-pharmacological measures as appropriate and evaluate response  - Consider cultural and social influences on pain and pain management  - Notify physician/advanced practitioner if interventions unsuccessful or patient reports new pain  Outcome: Progressing     Problem: INFECTION - ADULT  Goal: Absence or prevention of progression during hospitalization  Description: INTERVENTIONS:  - Assess and monitor for signs and symptoms of infection  - Monitor lab/diagnostic results  - Monitor all insertion sites, i.e. indwelling lines, tubes, and drains  - Monitor endotracheal if appropriate and nasal secretions for changes in amount and color  - [place] appropriate cooling/warming therapies per order  - Administer medications as ordered  - Instruct and encourage patient and family to use good hand hygiene technique  - Identify and instruct in appropriate isolation precautions for identified infection/condition  Outcome: Progressing     Problem: SAFETY ADULT  Goal: Patient will remain free of falls  Description: INTERVENTIONS:  - Educate patient/family on patient safety including physical limitations  - Instruct patient to call for assistance with activity   - Consult OT/PT to assist with strengthening/mobility   - Keep Call bell within reach  - Keep bed low and locked with side rails adjusted as appropriate  - Keep care items and personal belongings within reach  - Initiate and maintain comfort rounds  - Make Fall Risk Sign visible to staff  - Offer Toileting every x Hours, in advance of need  - Initiate/Maintain xalarm  - Obtain necessary fall risk management equipment: x  - Apply yellow socks and  bracelet for high fall risk patients  - Consider moving patient to room near nurses station  Outcome: Progressing  Goal: Maintain or return to baseline ADL function  Description: INTERVENTIONS:  -  Assess patient's ability to carry out ADLs; assess patient's baseline for ADL function and identify physical deficits which impact ability to perform ADLs (bathing, care of mouth/teeth, toileting, grooming, dressing, etc.)  - Assess/evaluate cause of self-care deficits   - Assess range of motion  - Assess patient's mobility; develop plan if impaired  - Assess patient's need for assistive devices and provide as appropriate  - Encourage maximum independence but intervene and supervise when necessary  - Involve family in performance of ADLs  - Assess for home care needs following discharge   - Consider OT consult to assist with ADL evaluation and planning for discharge  - Provide patient education as appropriate  Outcome: Progressing  Goal: Maintains/Returns to pre admission functional level  Description: INTERVENTIONS:  - Perform AM-PAC 6 Click Basic Mobility/ Daily Activity assessment daily.  - Set and communicate daily mobility goal to care team and patient/family/caregiver.   - Collaborate with rehabilitation services on mobility goals if consulted  - Perform Range of Motion x times a day.  - Reposition patient every x hours.  - Dangle patient x times a day  - Stand patient x times a day  - Ambulate patient x times a day  - Out of bed to chair x times a day   - Out of bed for meals x times a day  - Out of bed for toileting  - Record patient progress and toleration of activity level   Outcome: Progressing     Problem: DISCHARGE PLANNING  Goal: Discharge to home or other facility with appropriate resources  Description: INTERVENTIONS:  - Identify barriers to discharge w/patient and caregiver  - Arrange for needed discharge resources and transportation as appropriate  - Identify discharge learning needs (meds, wound care,  etc.)  - Arrange for interpretive services to assist at discharge as needed  - Refer to Case Management Department for coordinating discharge planning if the patient needs post-hospital services based on physician/advanced practitioner order or complex needs related to functional status, cognitive ability, or social support system  Outcome: Progressing     Problem: Knowledge Deficit  Goal: Patient/family/caregiver demonstrates understanding of disease process, treatment plan, medications, and discharge instructions  Description: Complete learning assessment and assess knowledge base.  Interventions:  - Provide teaching at level of understanding  - Provide teaching via preferred learning methods  Outcome: Progressing     Problem: CARDIOVASCULAR - ADULT  Goal: Maintains optimal cardiac output and hemodynamic stability  Description: INTERVENTIONS:  - Monitor I/O, vital signs and rhythm  - Monitor for S/S and trends of decreased cardiac output  - Administer and titrate ordered vasoactive medications to optimize hemodynamic stability  - Assess quality of pulses, skin color and temperature  - Assess for signs of decreased coronary artery perfusion  - Instruct patient to report change in severity of symptoms  Outcome: Progressing  Goal: Absence of cardiac dysrhythmias or at baseline rhythm  Description: INTERVENTIONS:  - Continuous cardiac monitoring, vital signs, obtain 12 lead EKG if ordered  - Administer antiarrhythmic and heart rate control medications as ordered  - Monitor electrolytes and administer replacement therapy as ordered  Outcome: Progressing     Problem: RESPIRATORY - ADULT  Goal: Achieves optimal ventilation and oxygenation  Description: INTERVENTIONS:  - Assess for changes in respiratory status  - Assess for changes in mentation and behavior  - Position to facilitate oxygenation and minimize respiratory effort  - Oxygen administered by appropriate delivery if ordered  - Initiate smoking cessation  education as indicated  - Encourage broncho-pulmonary hygiene including cough, deep breathe, Incentive Spirometry  - Assess the need for suctioning and aspirate as needed  - Assess and instruct to report SOB or any respiratory difficulty  - Respiratory Therapy support as indicated  Outcome: Progressing     Problem: MUSCULOSKELETAL - ADULT  Goal: Maintain or return mobility to safest level of function  Description: INTERVENTIONS:  - Assess patient's ability to carry out ADLs; assess patient's baseline for ADL function and identify physical deficits which impact ability to perform ADLs (bathing, care of mouth/teeth, toileting, grooming, dressing, etc.)  - Assess/evaluate cause of self-care deficits   - Assess range of motion  - Assess patient's mobility  - Assess patient's need for assistive devices and provide as appropriate  - Encourage maximum independence but intervene and supervise when necessary  - Involve family in performance of ADLs  - Assess for home care needs following discharge   - Consider OT consult to assist with ADL evaluation and planning for discharge  - Provide patient education as appropriate  Outcome: Progressing  Goal: Maintain proper alignment of affected body part  Description: INTERVENTIONS:  - Support, maintain and protect limb and body alignment  - Provide patient/ family with appropriate education  Outcome: Progressing     Problem: Nutrition/Hydration-ADULT  Goal: Nutrient/Hydration intake appropriate for improving, restoring or maintaining nutritional needs  Description: Monitor and assess patient's nutrition/hydration status for malnutrition. Collaborate with interdisciplinary team and initiate plan and interventions as ordered.  Monitor patient's weight and dietary intake as ordered or per policy. Utilize nutrition screening tool and intervene as necessary. Determine patient's food preferences and provide high-protein, high-caloric foods as appropriate.     INTERVENTIONS:  - Monitor  oral intake, urinary output, labs, and treatment plans  - Assess nutrition and hydration status and recommend course of action  - Evaluate amount of meals eaten  - Assist patient with eating if necessary   - Allow adequate time for meals  - Recommend/ encourage appropriate diets, oral nutritional supplements, and vitamin/mineral supplements  - Order, calculate, and assess calorie counts as needed  - Recommend, monitor, and adjust tube feedings and TPN/PPN based on assessed needs  - Assess need for intravenous fluids  - Provide specific nutrition/hydration education as appropriate  - Include patient/family/caregiver in decisions related to nutrition  Outcome: Progressing

## 2024-03-08 NOTE — H&P
[facility]  H&P  Name: Richard Nava 68 y.o. male I MRN: [id number]  Unit/Bed#: -01 I Date of Admission: 3/8/2024   Date of Service: 3/8/2024  Hospital Day: 0      Assessment/Plan   Headache  Assessment & Plan  Patient on my evaluation complaining of headache, neck pain  Will order stat CT head with IV Ativan  If CT head is negative will order pain management    Respiratory distress  Assessment & Plan  Baseline oxygen requirement: 2 to 3 L  Oxygen requirement at time of admission: 6 L- Continue to wean as tolerated  Secondary to: emphysema/bronchitis/pneumonia  Continue to treat underlying cause, see further management below  CT PE study: No PE  Elevated bicarb on BMP, check ABG  If patient is desaturating, having pulmonary distress, may need BiPAP  Home O2 evaluation prior to discharge    Palliative care patient  Assessment & Plan  Patient follows up with palliative as outpatient  Protonix, Ambien, gabapentin, MS Contin, Dilaudid ordered by palliative  Discussed with palliative medicine    Prostate cancer metastatic to bone (HCC)  Assessment & Plan  Follows up with heme-onc and palliative care  Currently on Zytiga, Xgeva      Severe protein-calorie malnutrition (HCC)  Assessment & Plan  Malnutrition Findings:                                 BMI Findings:           Body mass index is 16.45 kg/m².     Attrition consult with nutrition supplements    Chronic obstructive pulmonary disease with acute exacerbation (HCC)  Assessment & Plan  pResented with acute onset of shortness of breath  Start Pulmicort, Perforomist, Xopenex, Atrovent  IV steroids every 8  Pulm consult  Antitussives as needed  Start antibiotics pending cultures, Pro-Otoniel  Will need home O2 prior to discharge         VTE Pharmacologic Prophylaxis:   Moderate Risk (Score 3-4) - Pharmacological DVT Prophylaxis Ordered: heparin.  Code Status: Level 3 - DNAR and DNI   Discussion with family: Updated   "(sister) at bedside.    Anticipated Length of Stay: Patient will be admitted on an inpatient basis with an anticipated length of stay of greater than 2 midnights secondary to distress.    Total Time Spent on Date of Encounter in care of patient: 77 mins. This time was spent on one or more of the following: performing physical exam; counseling and coordination of care; obtaining or reviewing history; documenting in the medical record; reviewing/ordering tests, medications or procedures; communicating with other healthcare professionals and discussing with patient's family/caregivers.    Chief Complaint: respiratory distress    History of Present Illness:  Richard Nava is a 68 y.o. male with a PMH of COPD, hyperlipidemia, metastatic prostate cancer who presents with .  Acute dyspnea for the past few days.  Patient was last seen in the ED on 2/26 and was discharged with doxycycline and prednisone.  Patient presented today to the ED with complaints of dyspnea, cough, increased sputum production requiring 6 L of oxygen.  Has been using 2 to 3 L of oxygen at baseline that was prescribed to his niece.  Patient today stated he does not want to talk to me, and he is in a lot of pain,\" leave him alone\", \" no more testing today\".    In the ED, patient was hypoxic and CTA PE did not show any acute PE.  He will be admitted for respiratory distress        Review of Systems:  Review of Systems   All other systems reviewed and are negative.      Past Medical and Surgical History:   Past Medical History:   Diagnosis Date    Bone cancer (HCC)     Colon polyp     COPD (chronic obstructive pulmonary disease) (HCC)     Emphysema lung (HCC)     Hyperlipidemia     Prostate cancer (HCC)        Past Surgical History:   Procedure Laterality Date    APPENDECTOMY      COLONOSCOPY      IR BIOPSY BONE  12/30/2021    JOINT REPLACEMENT      UPPER GASTROINTESTINAL ENDOSCOPY         Meds/Allergies:  Prior to Admission medications  "   Medication Sig Start Date End Date Taking? Authorizing Provider   abiraterone (ZYTIGA) 250 mg tablet Take 4 tablets (1,000 mg total) by mouth once daily. 4/27/23   Lora Harden, DO   albuterol (2.5 mg/3 mL) 0.083 % nebulizer solution Take 3 mL (2.5 mg total) by nebulization every 6 (six) hours as needed for wheezing or shortness of breath 1/25/24   Elena Clark, DO   albuterol (PROVENTIL HFA,VENTOLIN HFA) 90 mcg/act inhaler TAKE 2 PUFFS BY MOUTH EVERY 4 HOURS AS NEEDED FOR WHEEZE 1/25/24   ISA Contreras   albuterol (PROVENTIL HFA,VENTOLIN HFA) 90 mcg/act inhaler Inhale 2 puffs every 4 (four) hours as needed for wheezing 1/25/24   Kevin Mejia, DO   ALPRAZolam (XANAX) 0.5 mg tablet Take 1 tablet (0.5 mg total) by mouth 2 (two) times a day as needed for anxiety or sleep Provider aware of co-existing clonazepam prescription. Patient is palliative care patient. 1/30/24   Arleth Scales PA-C   atorvastatin (LIPITOR) 40 mg tablet Take 1 tablet (40 mg total) by mouth daily with dinner 2/24/20   Dolly Marie PA-C   Budeson-Glycopyrrol-Formoterol (Breztri Aerosphere) 160-9-4.8 MCG/ACT AERO Inhale 2 puffs 2 (two) times a day Rinse mouth after use. 12/20/23   Musa Fatima MD   budesonide-formoterol (SYMBICORT) 160-4.5 mcg/act inhaler Inhale 2 puffs  Patient not taking: Reported on 12/20/2023    Historical Provider, MD   budesonide-formoterol (Symbicort) 160-4.5 mcg/act inhaler INHALE 2 PUFFS BY MOUTH TWICE A DAY RINSE MOUTH AFTER USE 11/30/23   ISA Peterson   clonazePAM (KlonoPIN) 0.5 MG disintegrating tablet Take 1 tablet (0.5 mg total) by mouth daily at bedtime as needed for anxiety 1/30/24   Arleth Scales PA-C   gabapentin (NEURONTIN) 300 mg capsule Take 2 capsules (600 mg total) by mouth daily at bedtime 2/18/24   Shonna Rodriguez MD   lidocaine (LIDODERM) 5 % Apply 3 patches topically daily Remove & Discard patch within 12 hours or as directed by MD 1/3/23   Arleth  Segundo Scales PA-C   methylPREDNISolone 4 MG tablet therapy pack Use as directed on package 24   Adriane Crandall MD   montelukast (SINGULAIR) 10 mg tablet TAKE 1 TABLET BY MOUTH EVERYDAY AT BEDTIME 23   ISA Peterson   morphine (MS CONTIN) 15 mg 12 hr tablet Take 1 tablet (15 mg total) by mouth 2 (two) times a day Ongoing therapy Max Daily Amount: 30 mg 24   Arleth Scales PA-C   naloxone (NARCAN) 4 mg/0.1 mL nasal spray Administer 1 spray into a nostril. If no response after 2-3 minutes, give another dose in the other nostril using a new spray.  Patient not taking: Reported on 2023   Arleth Scales PA-C   NON FORMULARY Medical marijuana  Patient not taking: Reported on 2023    Historical Provider, MD   senna (SENOKOT) 8.6 mg Take 2 tablets (17.2 mg total) by mouth daily at bedtime as needed for constipation  Patient not taking: Reported on 2023   Arleth Scales PA-C   traMADol (ULTRAM) 50 mg tablet Take 0.5 tablets (25 mg total) by mouth every 6 (six) hours as needed (breakthrough pain)  Patient not taking: Reported on 10/26/2023 10/24/23   Arleth Scales PA-C     I have reviewed home medications with patient personally.    Allergies: No Known Allergies    Social History:  Marital Status: Single   Occupation: None  Patient Pre-hospital Living Situation: Home  Patient Pre-hospital Level of Mobility: walks  Patient Pre-hospital Diet Restrictions: None  Substance Use History:   Social History     Substance and Sexual Activity   Alcohol Use Never     Social History     Tobacco Use   Smoking Status Former    Current packs/day: 0.00    Average packs/day: 1 pack/day for 42.0 years (42.0 ttl pk-yrs)    Types: Cigarettes    Start date:     Quit date: 2012    Years since quittin.1   Smokeless Tobacco Never     Social History     Substance and Sexual Activity   Drug Use Never       Family History:  Family History  "  Problem Relation Age of Onset    Lung cancer Mother     Breast cancer Sister     Colon polyps Brother     Colon cancer Neg Hx        Physical Exam:     Vitals:   Blood Pressure: 137/82 (03/08/24 1544)  Pulse: 87 (03/08/24 1544)  Temperature: 98.4 °F (36.9 °C) (03/08/24 1544)  Temp Source: Oral (03/08/24 1544)  Respirations: 18 (03/08/24 1544)  Height: 5' 7\" (170.2 cm) (03/08/24 1629)  Weight - Scale: 47.6 kg (105 lb) (03/08/24 1629)  SpO2: 98 % (03/08/24 1544)    Physical Exam     Gen.-Patient agitated, stated is in pain and not interested to talk to me  Neck- Supple. No thyromegaly or lymphadenopathy  Lungs-rhonchi, wheeze bilaterally  Heart S1-S2, regular rate and rhythm, no murmurs  Abdomen-soft nontender, no organomegaly. Bowel sounds present  Extremities-no cyanosi,  clubbing or edema  Skin- no rash  Neuro-nonfocal     Additional Data:     Lab Results:  Results from last 7 days   Lab Units 03/08/24  1246   WBC Thousand/uL 8.37   HEMOGLOBIN g/dL 13.5   HEMATOCRIT % 43.1   PLATELETS Thousands/uL 386   NEUTROS PCT % 68   LYMPHS PCT % 17   MONOS PCT % 9   EOS PCT % 4     Results from last 7 days   Lab Units 03/08/24  1246   SODIUM mmol/L 139   POTASSIUM mmol/L 3.5   CHLORIDE mmol/L 93*   CO2 mmol/L >45*   BUN mg/dL 11   CREATININE mg/dL 0.50*   CALCIUM mg/dL 9.4   ALBUMIN g/dL 4.0   TOTAL BILIRUBIN mg/dL 0.44   ALK PHOS U/L 58   ALT U/L 11   AST U/L 12*   GLUCOSE RANDOM mg/dL 219*     Results from last 7 days   Lab Units 03/08/24  1246   INR  0.93             Results from last 7 days   Lab Units 03/08/24  1246   LACTIC ACID mmol/L 0.8   PROCALCITONIN ng/ml 0.05       Lines/Drains:  Invasive Devices       Peripheral Intravenous Line  Duration             Peripheral IV 03/08/24 Left;Ventral (anterior) Forearm <1 day    Peripheral IV 03/08/24 Right Antecubital <1 day                        Imaging: Reviewed radiology reports from this admission including: chest CT scan  CTA ED chest PE Study   Final Result by Josefa" Ashlee Villarreal MD (03/08 0834)      No pulmonary embolus.      Minimal new left lower lobe groundglass opacity, infectious or inflammatory.      Severe emphysema with chronic diffuse bronchial wall thickening compatible with bronchitis.      Redemonstration of extensive blastic bone metastases.            Workstation performed: [id number]         XR chest portable   ED Interpretation by Sarah Corcoran DO (03/08 1244)   Abnormal   LLL consolidation concerning for PNA as interpreted by me independently       Final Result by Armand Price MD (03/08 1402)      Stable nonspecific patchy density in the left lower lobe, atelectasis versus pneumonia, with mildly increased small pleural effusion.            Workstation performed: [id number]         CT head wo contrast    (Results Pending)       EKG and Other Studies Reviewed on Admission:   EKG: NSR. HR  .    ** Please Note: This note has been constructed using a voice recognition system. **

## 2024-03-08 NOTE — ASSESSMENT & PLAN NOTE
Baseline oxygen requirement: 2 to 3 L  Oxygen requirement at time of admission: 6 L- Continue to wean as tolerated  Secondary to: emphysema/bronchitis/pneumonia  Continue to treat underlying cause, see further management below  CT PE study: No PE  Elevated bicarb on BMP, check ABG  If patient is desaturating, having pulmonary distress, may need BiPAP  Home O2 evaluation prior to discharge

## 2024-03-08 NOTE — TELEPHONE ENCOUNTER
Patient's sister Benigno is calling expressing frustration that no one has prescribed Christopher oxygen.  I explained that he did not qualify for O2 at his most recent pulmonology appointment. He has been using O2 obtained through a family member and it does not appear that he has been forthcoming about this to his medical team. I did speak with pulmonology and they would like to see him back for a visit.  His sister states he is too short of breath to come in for a visit.  I recommended ED presentation.    I called Rishabh.  He confirms that is okay for us to be giving his information to his sister, Benigno.  He states he feels terrible and cannot breathe.  He states he cannot get out of his chair and he is worried.  We discussed that he either must come to the hospital or I can send to hospice to see him in the home and they would keep him comfortable as he passes away.  He does not want to enroll with hospice at this time.  He is willing to come to the hospital.  He request that I call an ambulance for him.    I disconnected the call and I called 911. Sister informed of outcome.  She is concerned with the patient living alone or being discharged prematurely.

## 2024-03-08 NOTE — ASSESSMENT & PLAN NOTE
Patient on my evaluation complaining of headache, neck pain  Will order stat CT head with IV Ativan  If CT head is negative will order pain management

## 2024-03-08 NOTE — TELEPHONE ENCOUNTER
Pt's sister Piedad calls back and states she has left several messages about pt's SOB and machine not working properly and is old. She's wondering if an order can be placed for a new O2 machine.

## 2024-03-08 NOTE — ED PROVIDER NOTES
History  Chief Complaint   Patient presents with    Shortness of Breath     Pt coming from home where he wears 2L O2. Pt reports worsen SOB at home oxygen increased to 6L     Patient is a 68 year old male who is brought in by ambulance with SOB.  Patient reports that he wears oxygen as needed between 2 and 3 L via nasal cannula.  He states that over the last 2 days he has developed a cough with brown sputum production, persistently worsening shortness of breath and his oxygen has been desaturating to the 70s. Also feels much weaker and fatigued.  Patient was evaluated in the emergency department on 2/26/2024 and was prescribed doxycycline for pneumonia.          Prior to Admission Medications   Prescriptions Last Dose Informant Patient Reported? Taking?   ALPRAZolam (XANAX) 0.5 mg tablet   No No   Sig: Take 1 tablet (0.5 mg total) by mouth 2 (two) times a day as needed for anxiety or sleep Provider aware of co-existing clonazepam prescription. Patient is palliative care patient.   Budeson-Glycopyrrol-Formoterol (Breztri Aerosphere) 160-9-4.8 MCG/ACT AERO   No No   Sig: Inhale 2 puffs 2 (two) times a day Rinse mouth after use.   NON FORMULARY  Self Yes No   Sig: Medical marijuana   Patient not taking: Reported on 7/13/2023   abiraterone (ZYTIGA) 250 mg tablet  Self No No   Sig: Take 4 tablets (1,000 mg total) by mouth once daily.   albuterol (2.5 mg/3 mL) 0.083 % nebulizer solution   No No   Sig: Take 3 mL (2.5 mg total) by nebulization every 6 (six) hours as needed for wheezing or shortness of breath   albuterol (PROVENTIL HFA,VENTOLIN HFA) 90 mcg/act inhaler   No No   Sig: TAKE 2 PUFFS BY MOUTH EVERY 4 HOURS AS NEEDED FOR WHEEZE   albuterol (PROVENTIL HFA,VENTOLIN HFA) 90 mcg/act inhaler   No No   Sig: Inhale 2 puffs every 4 (four) hours as needed for wheezing   atorvastatin (LIPITOR) 40 mg tablet  Self No No   Sig: Take 1 tablet (40 mg total) by mouth daily with dinner   budesonide-formoterol (SYMBICORT) 160-4.5  mcg/act inhaler  Self Yes No   Sig: Inhale 2 puffs   Patient not taking: Reported on 12/20/2023   budesonide-formoterol (Symbicort) 160-4.5 mcg/act inhaler   No No   Sig: INHALE 2 PUFFS BY MOUTH TWICE A DAY RINSE MOUTH AFTER USE   clonazePAM (KlonoPIN) 0.5 MG disintegrating tablet   No No   Sig: Take 1 tablet (0.5 mg total) by mouth daily at bedtime as needed for anxiety   gabapentin (NEURONTIN) 300 mg capsule   No No   Sig: Take 2 capsules (600 mg total) by mouth daily at bedtime   lidocaine (LIDODERM) 5 %  Self No No   Sig: Apply 3 patches topically daily Remove & Discard patch within 12 hours or as directed by MD   methylPREDNISolone 4 MG tablet therapy pack   No No   Sig: Use as directed on package   montelukast (SINGULAIR) 10 mg tablet  Self No No   Sig: TAKE 1 TABLET BY MOUTH EVERYDAY AT BEDTIME   morphine (MS CONTIN) 15 mg 12 hr tablet   No No   Sig: Take 1 tablet (15 mg total) by mouth 2 (two) times a day Ongoing therapy Max Daily Amount: 30 mg   naloxone (NARCAN) 4 mg/0.1 mL nasal spray  Self No No   Sig: Administer 1 spray into a nostril. If no response after 2-3 minutes, give another dose in the other nostril using a new spray.   Patient not taking: Reported on 9/13/2023   senna (SENOKOT) 8.6 mg  Self No No   Sig: Take 2 tablets (17.2 mg total) by mouth daily at bedtime as needed for constipation   Patient not taking: Reported on 12/12/2023   traMADol (ULTRAM) 50 mg tablet   No No   Sig: Take 0.5 tablets (25 mg total) by mouth every 6 (six) hours as needed (breakthrough pain)   Patient not taking: Reported on 10/26/2023      Facility-Administered Medications: None       Past Medical History:   Diagnosis Date    Bone cancer (HCC)     Colon polyp     COPD (chronic obstructive pulmonary disease) (HCC)     Emphysema lung (HCC)     Hyperlipidemia     Prostate cancer (HCC)        Past Surgical History:   Procedure Laterality Date    APPENDECTOMY      COLONOSCOPY      IR BIOPSY BONE  12/30/2021    JOINT  REPLACEMENT      UPPER GASTROINTESTINAL ENDOSCOPY         Family History   Problem Relation Age of Onset    Lung cancer Mother     Breast cancer Sister     Colon polyps Brother     Colon cancer Neg Hx      I have reviewed and agree with the history as documented.    E-Cigarette/Vaping    E-Cigarette Use Never User      E-Cigarette/Vaping Substances    Nicotine No     THC No     CBD No     Flavoring No     Other No     Unknown No      Social History     Tobacco Use    Smoking status: Former     Current packs/day: 0.00     Average packs/day: 1 pack/day for 42.0 years (42.0 ttl pk-yrs)     Types: Cigarettes     Start date:      Quit date:      Years since quittin.1    Smokeless tobacco: Never   Vaping Use    Vaping status: Never Used   Substance Use Topics    Alcohol use: Never    Drug use: Never       Review of Systems   Constitutional:  Positive for chills and fatigue. Negative for fever.   HENT:  Negative for congestion.    Respiratory:  Positive for cough and shortness of breath.    Cardiovascular:  Negative for chest pain, palpitations and leg swelling.   Gastrointestinal:  Negative for abdominal pain.   Genitourinary:  Negative for dysuria and frequency.       Physical Exam  Physical Exam  Vitals and nursing note reviewed.   Constitutional:       General: He is not in acute distress.     Appearance: Normal appearance. He is cachectic. He is ill-appearing. He is not toxic-appearing or diaphoretic.   HENT:      Head: Normocephalic and atraumatic.      Mouth/Throat:      Mouth: Mucous membranes are moist.   Eyes:      Conjunctiva/sclera: Conjunctivae normal.      Pupils: Pupils are equal, round, and reactive to light.   Cardiovascular:      Rate and Rhythm: Normal rate and regular rhythm.      Pulses: Normal pulses.      Heart sounds: Normal heart sounds. No murmur heard.  Pulmonary:      Effort: Pulmonary effort is normal. Tachypnea present. No accessory muscle usage or respiratory distress.       Breath sounds: No stridor. Rhonchi present. No wheezing or rales.   Chest:      Chest wall: No tenderness.   Abdominal:      General: Bowel sounds are normal. There is no distension.      Palpations: Abdomen is soft.      Tenderness: There is no abdominal tenderness. There is no guarding or rebound.   Musculoskeletal:      Cervical back: Neck supple.      Right lower leg: No edema.      Left lower leg: No edema.   Skin:     General: Skin is warm and dry.   Neurological:      General: No focal deficit present.      Mental Status: He is alert and oriented to person, place, and time. Mental status is at baseline.         Vital Signs  ED Triage Vitals [03/08/24 1222]   Temperature Pulse Respirations Blood Pressure SpO2   97.6 °F (36.4 °C) 93 (!) 24 167/94 99 %      Temp Source Heart Rate Source Patient Position - Orthostatic VS BP Location FiO2 (%)   Oral Monitor Lying Left arm --      Pain Score       No Pain           Vitals:    03/08/24 1222 03/08/24 1400   BP: 167/94 144/83   Pulse: 93 86   Patient Position - Orthostatic VS: Lying          Visual Acuity      ED Medications  Medications   cefepime (MAXIPIME) IVPB (premix in dextrose) 2,000 mg 50 mL (0 mg Intravenous Stopped 3/8/24 1338)   sodium chloride 0.9 % bolus 500 mL (0 mL Intravenous Stopped 3/8/24 1422)   vancomycin (VANCOCIN) 1250 mg in sodium chloride 0.9% 250 mL IVPB (1,250 mg Intravenous New Bag 3/8/24 1341)   iohexol (OMNIPAQUE) 350 MG/ML injection (MULTI-DOSE) 85 mL (85 mL Intravenous Given 3/8/24 1447)       Diagnostic Studies  Results Reviewed       Procedure Component Value Units Date/Time    HS Troponin I 4hr [[id number]]     Lab Status: No result Specimen: Blood     FLU/RSV/COVID - if FLU/RSV clinically relevant [[id number]]  (Normal) Collected: 03/08/24 1246    Lab Status: Final result Specimen: Nares from Nose Updated: 03/08/24 1338     SARS-CoV-2 Negative     INFLUENZA A PCR Negative     INFLUENZA B PCR Negative     RSV PCR Negative    Narrative:       FOR PEDIATRIC PATIENTS - copy/paste COVID Guidelines URL to browser: [email or web address]    SARS-CoV-2 assay is a Nucleic Acid Amplification assay intended for the  qualitative detection of nucleic acid from SARS-CoV-2 in nasopharyngeal  swabs. Results are for the presumptive identification of SARS-CoV-2 RNA.    Positive results are indicative of infection with SARS-CoV-2, the virus  causing COVID-19, but do not rule out bacterial infection or co-infection  with other viruses. Laboratories within the [place] and its  territories are required to report all positive results to the appropriate  public health authorities. Negative results do not preclude SARS-CoV-2  infection and should not be used as the sole basis for treatment or other  patient management decisions. Negative results must be combined with  clinical observations, patient history, and epidemiological information.  This test has not been FDA cleared or approved.    This test has been authorized by FDA under an Emergency Use Authorization  (EUA). This test is only authorized for the duration of time the  declaration that circumstances exist justifying the authorization of the  emergency use of an in vitro diagnostic tests for detection of SARS-CoV-2  virus and/or diagnosis of COVID-19 infection under section 564(b)(1) of  the Act, 21 U.S.C. 360bbb-3(b)(1), unless the authorization is terminated  or revoked sooner. The test has been validated but independent review by FDA  and CLIA is pending.    Test performed using [employer] GeneXpert: This RT-PCR assay targets N2,  a region unique to SARS-CoV-2. A conserved region in the E-gene was chosen  for pan-Sarbecovirus detection which includes SARS-CoV-2.    According to CMS-2020-01-R, this platform meets the definition of high-throughput technology.    Comprehensive metabolic panel [[id number]]  (Abnormal) Collected: 03/08/24 1246    Lab Status:  Final result Specimen: Blood from Arm, Right Updated: 03/08/24 1331     Sodium 139 mmol/L      Potassium 3.5 mmol/L      Chloride 93 mmol/L      CO2 >45 mmol/L      ANION GAP --     BUN 11 mg/dL      Creatinine 0.50 mg/dL      Glucose 219 mg/dL      Calcium 9.4 mg/dL      AST 12 U/L      ALT 11 U/L      Alkaline Phosphatase 58 U/L      Total Protein 6.9 g/dL      Albumin 4.0 g/dL      Total Bilirubin 0.44 mg/dL      eGFR 110 ml/min/1.73sq m     Narrative:      National Kidney Disease Foundation guidelines for Chronic Kidney Disease (CKD):     Stage 1 with normal or high GFR (GFR > 90 mL/min/1.73 square meters)    Stage 2 Mild CKD (GFR = 60-89 mL/min/1.73 square meters)    Stage 3A Moderate CKD (GFR = 45-59 mL/min/1.73 square meters)    Stage 3B Moderate CKD (GFR = 30-44 mL/min/1.73 square meters)    Stage 4 Severe CKD (GFR = 15-29 mL/min/1.73 square meters)    Stage 5 End Stage CKD (GFR <15 mL/min/1.73 square meters)  Note: GFR calculation is accurate only with a steady state creatinine    Lactic acid [[id number]]  (Normal) Collected: 03/08/24 1246    Lab Status: Final result Specimen: Blood from Arm, Right Updated: 03/08/24 1329     LACTIC ACID 0.8 mmol/L     Narrative:      Result may be elevated if tourniquet was used during collection.    Procalcitonin [[id number]]  (Normal) Collected: 03/08/24 1246    Lab Status: Final result Specimen: Blood from Arm, Right Updated: 03/08/24 1325     Procalcitonin 0.05 ng/ml     HS Troponin 0hr (reflex protocol) [[id number]]  (Normal) Collected: 03/08/24 1246    Lab Status: Final result Specimen: Blood from Arm, Right Updated: 03/08/24 1321     hs TnI 0hr 3 ng/L     HS Troponin I 2hr [[id number]]     Lab Status: No result Specimen: Blood     B-Type Natriuretic Peptide(BNP) [[id number]]  (Normal) Collected: 03/08/24 1246    Lab Status: Final result Specimen: Blood from Arm, Right Updated: 03/08/24 1320     BNP 42 pg/mL     Protime-INR [[id number]]  (Normal) Collected: 03/08/24 1246     Lab Status: Final result Specimen: Blood from Arm, Right Updated: 03/08/24 1312     Protime 12.9 seconds      INR 0.93    APTT [[id number]]  (Normal) Collected: 03/08/24 1246    Lab Status: Final result Specimen: Blood from Arm, Right Updated: 03/08/24 1312     PTT 28 seconds     Urine Microscopic [[id number]]  (Abnormal) Collected: 03/08/24 1251    Lab Status: Final result Specimen: Urine, Other Updated: 03/08/24 1311     RBC, UA None Seen /hpf      WBC, UA 0-1 /hpf      Epithelial Cells Occasional /hpf      Bacteria, UA None Seen /hpf      Hyaline Casts, UA 0-1 /lpf     UA w Reflex to Microscopic w Reflex to Culture [[id number]]  (Abnormal) Collected: 03/08/24 1251    Lab Status: Final result Specimen: Urine, Other Updated: 03/08/24 1303     Color, UA Yellow     Clarity, UA Clear     Specific Gravity, UA 1.015     pH, UA 7.0     Leukocytes, UA Negative     Nitrite, UA Negative     Protein, UA Trace mg/dl      Glucose, UA 1000 (1%) mg/dl      Ketones, UA Trace mg/dl      Urobilinogen, UA 2.0 mg/dl      Bilirubin, UA Negative     Occult Blood, UA Negative    CBC and differential [[id number]]  (Abnormal) Collected: 03/08/24 1246    Lab Status: Final result Specimen: Blood from Arm, Right Updated: 03/08/24 1259     WBC 8.37 Thousand/uL      RBC 4.32 Million/uL      Hemoglobin 13.5 g/dL      Hematocrit 43.1 %       fL      MCH 31.3 pg      MCHC 31.3 g/dL      RDW 12.6 %      MPV 8.3 fL      Platelets 386 Thousands/uL      nRBC 0 /100 WBCs      Neutrophils Relative 68 %      Immat GRANS % 1 %      Lymphocytes Relative 17 %      Monocytes Relative 9 %      Eosinophils Relative 4 %      Basophils Relative 1 %      Neutrophils Absolute 5.80 Thousands/µL      Immature Grans Absolute 0.04 Thousand/uL      Lymphocytes Absolute 1.41 Thousands/µL      Monocytes Absolute 0.74 Thousand/µL      Eosinophils Absolute 0.30 Thousand/µL      Basophils Absolute 0.08 Thousands/µL     Blood culture #2 [[id number]] Collected: 03/08/24  1246    Lab Status: In process Specimen: Blood from Arm, Left Updated: 03/08/24 1258    Blood culture #1 [[id number]] Collected: 03/08/24 1246    Lab Status: In process Specimen: Blood from Arm, Right Updated: 03/08/24 1258                   CTA ED chest PE Study   Final Result by Josefa Villarreal MD (03/08 1504)      No pulmonary embolus.      Minimal new left lower lobe groundglass opacity, infectious or inflammatory.      Severe emphysema with chronic diffuse bronchial wall thickening compatible with bronchitis.      Redemonstration of extensive blastic bone metastases.            Workstation performed: [id number]         XR chest portable   ED Interpretation by Sarah Corcoran DO (03/08 1244)   Abnormal   LLL consolidation concerning for PNA as interpreted by me independently       Final Result by Armand Price MD (03/08 1402)      Stable nonspecific patchy density in the left lower lobe, atelectasis versus pneumonia, with mildly increased small pleural effusion.            Workstation performed: [id number]                    Procedures  ECG 12 Lead Documentation Only    Date/Time: 3/8/2024 12:58 PM    Performed by: Sarah Corcoran DO  Authorized by: Sarah Corcoran DO    Indications / Diagnosis:  SOB  ECG reviewed by me, the ED Provider: yes    Patient location:  ED  Previous ECG:     Previous ECG:  Compared to current    Comparison ECG info:  2/26/2024    Similarity:  No change  Interpretation:     Interpretation: normal    Rate:     ECG rate:  88    ECG rate assessment: normal    Rhythm:     Rhythm: sinus rhythm    Ectopy:     Ectopy: none    QRS:     QRS axis:  Right    QRS intervals:  Normal  Conduction:     Conduction: normal    ST segments:     ST segments:  Normal  T waves:     T waves: normal    Comments:      Qtc [phone]           ED Course  ED Course as of 03/08/24 1514   Fri Mar 08, 2024   1305 Discussed case with palliative care, Arleth Scales who follows the patient and she reports that  patient procured his own oxygen machine from his niece and he has not been prescribed O2; his family has been upset that no one ordered oxygen for him however it looks like he did not qualify for it in the past.   1314 After my discussion with Arleth Scales, I took the patient off oxygen to see what his saturations are and when off oxygen for just a few minutes, patient's oxygen saturation dropped to 84%.  He required 4 L oxygen in order to bounce back to over 90%.   1336 Carbon Dioxide(!!): >45  Appears that patient had a Co2 of 41 on 2/26. Currently a bit tachypneic, but does not appear to be in respiratory distress and has normal mentation. Do not believe that he needs bipap at this time, but this will need to be monitored during hospitalization.   1424 Discussed case with hospitalist for admission. Dr. Wilder requesting a CTA PE study be performed prior to admission to ensure that patient does not have a PE as a course of hypoxia in setting of having CA.                                    Wells' Criteria for PE      Flowsheet Row Most Recent Value   Wells' Criteria for PE    Clinical signs and symptoms of DVT 0 Filed at: 03/08/2024 1423   PE is primary diagnosis or equally likely 0 Filed at: 03/08/2024 1423   HR >100 0 Filed at: 03/08/2024 1423   Immobilization at least 3 days or Surgery in the previous 4 weeks 0 Filed at: 03/08/2024 1423   Previous, objectively diagnosed PE or DVT 0 Filed at: 03/08/2024 1423   Hemoptysis 0 Filed at: 03/08/2024 1423   Malignancy with treatment within 6 months or palliative 1 Filed at: 03/08/2024 1423   Wells' Criteria Total 1 Filed at: 03/08/2024 1423                  Medical Decision Making  Assessment and Plan:  Differential includes failure of outpatient therapy/new pneumonia or bacteremia in a immunocompromised patient versus URI versus COPD exacerbation versus CHF exacerbation.  Check labs to evaluate for leukocytosis, anemia, electrolyte abnormalities, kidney and liver  function; chest x-ray to evaluate for pneumonia and pulmonary vascular congestion; EKG/troponin to evaluate for arrhythmia/ischemia.     Review of medical records, typically from oncology note from 1/31/2024 and pulmonology note from 12/20/2023 shows that the patient has a past medical history significant for metastatic prostate cancer status post prior chemotherapy and now Zytiga and low-dose prednisone, protein calorie malnutrition, COPD quiring intermittent oxygen between 2 and 3 L via nasal cannula, prior tobacco dependence.    Amount and/or Complexity of Data Reviewed  Labs: ordered. Decision-making details documented in ED Course.  Radiology: ordered and independent interpretation performed.    Risk  Prescription drug management.  Decision regarding hospitalization.             Disposition  Final diagnoses:   SOB (shortness of breath)   Pneumonia   Chronic obstructive pulmonary disease, unspecified COPD type (HCC)   Acute respiratory failure with hypoxia and hypercapnia (HCC)     Time reflects when diagnosis was documented in both MDM as applicable and the Disposition within this note       Time User Action Codes Description Comment    3/8/2024 12:33 PM Sarah Corcoran Add [R06.02] SOB (shortness of breath)     3/8/2024  1:00 PM Sarah Corcoran Add [J18.9] Pneumonia     3/8/2024  1:08 PM Sarah Corcoran Add [J44.9] Chronic obstructive pulmonary disease, unspecified COPD type (HCC)     3/8/2024  1:08 PM Sarah Corcoran Add [J96.01] Acute respiratory failure with hypoxia (HCC)     3/8/2024  1:35 PM Sarah Corcoran Add [J96.01,  J96.02] Acute respiratory failure with hypoxia and hypercapnia (HCC)     3/8/2024  1:35 PM Sarah Corcoran Remove [J96.01] Acute respiratory failure with hypoxia (HCC)     3/8/2024  1:35 PM Sarah Corcoran Modify [R06.02] SOB (shortness of breath)     3/8/2024  1:35 PM Sarah Corcoran Modify [J96.01,  J96.02] Acute respiratory failure with hypoxia and hypercapnia (HCC)            ED Disposition       ED Disposition   Admit    Condition   Stable    Date/Time   Fri Mar 8, 2024 1233    Comment   Case was discussed with hospitalist and the patient's admission status was agreed to be Admission Status: inpatient status to the service of Dr. Wilder .               Follow-up Information    None         Patient's Medications   Discharge Prescriptions    No medications on file       No discharge procedures on file.    PDMP Review         Value Time User    PDMP Reviewed  Yes 3/8/2024  2:51 PM Arleth Scales PA-C            ED Provider  Electronically Signed by             Sarah Corcoran DO  03/08/24 4804

## 2024-03-09 LAB
ANION GAP SERPL CALCULATED.3IONS-SCNC: 4 MMOL/L
ATRIAL RATE: 88 BPM
BASOPHILS # BLD AUTO: 0.01 THOUSANDS/ÂΜL (ref 0–0.1)
BASOPHILS NFR BLD AUTO: 0 % (ref 0–1)
BUN SERPL-MCNC: 9 MG/DL (ref 5–25)
CALCIUM SERPL-MCNC: 8.7 MG/DL (ref 8.4–10.2)
CHLORIDE SERPL-SCNC: 95 MMOL/L (ref 96–108)
CO2 SERPL-SCNC: 42 MMOL/L (ref 21–32)
CREAT SERPL-MCNC: 0.39 MG/DL (ref 0.6–1.3)
EOSINOPHIL # BLD AUTO: 0 THOUSAND/ÂΜL (ref 0–0.61)
EOSINOPHIL NFR BLD AUTO: 0 % (ref 0–6)
ERYTHROCYTE [DISTWIDTH] IN BLOOD BY AUTOMATED COUNT: 12.5 % (ref 11.6–15.1)
GFR SERPL CREATININE-BSD FRML MDRD: 122 ML/MIN/1.73SQ M
GLUCOSE SERPL-MCNC: 144 MG/DL (ref 65–140)
HCT VFR BLD AUTO: 42.1 % (ref 36.5–49.3)
HGB BLD-MCNC: 12.9 G/DL (ref 12–17)
IMM GRANULOCYTES # BLD AUTO: 0.02 THOUSAND/UL (ref 0–0.2)
IMM GRANULOCYTES NFR BLD AUTO: 0 % (ref 0–2)
LYMPHOCYTES # BLD AUTO: 0.48 THOUSANDS/ÂΜL (ref 0.6–4.47)
LYMPHOCYTES NFR BLD AUTO: 11 % (ref 14–44)
MCH RBC QN AUTO: 30.6 PG (ref 26.8–34.3)
MCHC RBC AUTO-ENTMCNC: 30.6 G/DL (ref 31.4–37.4)
MCV RBC AUTO: 100 FL (ref 82–98)
MONOCYTES # BLD AUTO: 0.1 THOUSAND/ÂΜL (ref 0.17–1.22)
MONOCYTES NFR BLD AUTO: 2 % (ref 4–12)
NEUTROPHILS # BLD AUTO: 3.86 THOUSANDS/ÂΜL (ref 1.85–7.62)
NEUTS SEG NFR BLD AUTO: 87 % (ref 43–75)
NRBC BLD AUTO-RTO: 0 /100 WBCS
P AXIS: 86 DEGREES
PLATELET # BLD AUTO: 359 THOUSANDS/UL (ref 149–390)
PMV BLD AUTO: 8.6 FL (ref 8.9–12.7)
POTASSIUM SERPL-SCNC: 4.1 MMOL/L (ref 3.5–5.3)
PR INTERVAL: 144 MS
PROCALCITONIN SERPL-MCNC: <0.05 NG/ML
QRS AXIS: 112 DEGREES
QRSD INTERVAL: 74 MS
QT INTERVAL: 350 MS
QTC INTERVAL: 423 MS
RBC # BLD AUTO: 4.22 MILLION/UL (ref 3.88–5.62)
SODIUM SERPL-SCNC: 141 MMOL/L (ref 135–147)
T WAVE AXIS: 82 DEGREES
VENTRICULAR RATE: 88 BPM
WBC # BLD AUTO: 4.47 THOUSAND/UL (ref 4.31–10.16)

## 2024-03-09 PROCEDURE — 94640 AIRWAY INHALATION TREATMENT: CPT

## 2024-03-09 PROCEDURE — 84145 PROCALCITONIN (PCT): CPT | Performed by: INTERNAL MEDICINE

## 2024-03-09 PROCEDURE — C9113 INJ PANTOPRAZOLE SODIUM, VIA: HCPCS | Performed by: PHYSICIAN ASSISTANT

## 2024-03-09 PROCEDURE — 99233 SBSQ HOSP IP/OBS HIGH 50: CPT | Performed by: INTERNAL MEDICINE

## 2024-03-09 PROCEDURE — 94760 N-INVAS EAR/PLS OXIMETRY 1: CPT

## 2024-03-09 PROCEDURE — 85025 COMPLETE CBC W/AUTO DIFF WBC: CPT | Performed by: INTERNAL MEDICINE

## 2024-03-09 PROCEDURE — 93010 ELECTROCARDIOGRAM REPORT: CPT | Performed by: INTERNAL MEDICINE

## 2024-03-09 PROCEDURE — 99223 1ST HOSP IP/OBS HIGH 75: CPT | Performed by: INTERNAL MEDICINE

## 2024-03-09 PROCEDURE — 80048 BASIC METABOLIC PNL TOTAL CA: CPT | Performed by: INTERNAL MEDICINE

## 2024-03-09 RX ORDER — LORAZEPAM 2 MG/ML
0.5 INJECTION INTRAMUSCULAR ONCE
Qty: 1 ML | Refills: 0 | Status: COMPLETED | OUTPATIENT
Start: 2024-03-09 | End: 2024-03-10

## 2024-03-09 RX ORDER — METHYLPREDNISOLONE SODIUM SUCCINATE 40 MG/ML
40 INJECTION, POWDER, LYOPHILIZED, FOR SOLUTION INTRAMUSCULAR; INTRAVENOUS EVERY 12 HOURS SCHEDULED
Status: COMPLETED | OUTPATIENT
Start: 2024-03-09 | End: 2024-03-11

## 2024-03-09 RX ORDER — ABIRATERONE ACETATE 250 MG/1
1000 TABLET ORAL DAILY
Status: DISCONTINUED | OUTPATIENT
Start: 2024-03-09 | End: 2024-03-12 | Stop reason: HOSPADM

## 2024-03-09 RX ORDER — LEVALBUTEROL INHALATION SOLUTION 1.25 MG/3ML
1.25 SOLUTION RESPIRATORY (INHALATION)
Status: DISCONTINUED | OUTPATIENT
Start: 2024-03-09 | End: 2024-03-12 | Stop reason: HOSPADM

## 2024-03-09 RX ORDER — ABIRATERONE ACETATE 250 MG/1
4000 TABLET ORAL DAILY
Status: DISCONTINUED | OUTPATIENT
Start: 2024-03-09 | End: 2024-03-09

## 2024-03-09 RX ADMIN — GABAPENTIN 600 MG: 300 CAPSULE ORAL at 21:24

## 2024-03-09 RX ADMIN — HEPARIN SODIUM 5000 UNITS: 5000 INJECTION, SOLUTION INTRAVENOUS; SUBCUTANEOUS at 05:01

## 2024-03-09 RX ADMIN — PANTOPRAZOLE SODIUM 40 MG: 40 INJECTION, POWDER, FOR SOLUTION INTRAVENOUS at 09:18

## 2024-03-09 RX ADMIN — METHYLPREDNISOLONE SODIUM SUCCINATE 40 MG: 40 INJECTION, POWDER, FOR SOLUTION INTRAMUSCULAR; INTRAVENOUS at 21:24

## 2024-03-09 RX ADMIN — ABIRATERONE ACETATE 1000 MG: 250 TABLET ORAL at 15:10

## 2024-03-09 RX ADMIN — HYDROMORPHONE HYDROCHLORIDE 0.2 MG: 0.2 INJECTION, SOLUTION INTRAMUSCULAR; INTRAVENOUS; SUBCUTANEOUS at 08:05

## 2024-03-09 RX ADMIN — FORMOTEROL FUMARATE DIHYDRATE 20 MCG: 20 SOLUTION RESPIRATORY (INHALATION) at 07:37

## 2024-03-09 RX ADMIN — MORPHINE SULFATE 15 MG: 15 TABLET, EXTENDED RELEASE ORAL at 21:24

## 2024-03-09 RX ADMIN — MORPHINE SULFATE 15 MG: 15 TABLET, EXTENDED RELEASE ORAL at 09:18

## 2024-03-09 RX ADMIN — ALPRAZOLAM 0.5 MG: 0.5 TABLET ORAL at 23:52

## 2024-03-09 RX ADMIN — HYDROMORPHONE HYDROCHLORIDE 0.2 MG: 0.2 INJECTION, SOLUTION INTRAMUSCULAR; INTRAVENOUS; SUBCUTANEOUS at 05:00

## 2024-03-09 RX ADMIN — IPRATROPIUM BROMIDE 0.5 MG: 0.5 SOLUTION RESPIRATORY (INHALATION) at 07:37

## 2024-03-09 RX ADMIN — LEVALBUTEROL HYDROCHLORIDE 1.25 MG: 1.25 SOLUTION RESPIRATORY (INHALATION) at 07:37

## 2024-03-09 RX ADMIN — HEPARIN SODIUM 5000 UNITS: 5000 INJECTION, SOLUTION INTRAVENOUS; SUBCUTANEOUS at 21:24

## 2024-03-09 RX ADMIN — BUDESONIDE 0.5 MG: 0.5 INHALANT ORAL at 07:37

## 2024-03-09 RX ADMIN — AZITHROMYCIN 500 MG: 500 TABLET, FILM COATED ORAL at 17:14

## 2024-03-09 RX ADMIN — CEFTRIAXONE 1000 MG: 1 INJECTION, SOLUTION INTRAVENOUS at 21:24

## 2024-03-09 RX ADMIN — METHYLPREDNISOLONE SODIUM SUCCINATE 40 MG: 40 INJECTION, POWDER, FOR SOLUTION INTRAMUSCULAR; INTRAVENOUS at 05:01

## 2024-03-09 RX ADMIN — LIDOCAINE 5% 1 PATCH: 700 PATCH TOPICAL at 09:18

## 2024-03-09 NOTE — ASSESSMENT & PLAN NOTE
pResented with acute onset of shortness of breath  Start Pulmicort, Perforomist, Xopenex, Atrovent  IV steroids every 12  Pulm consult  Antitussives as needed  Start antibiotics pending cultures, Pro-Otoniel  Will need home O2 prior to discharge

## 2024-03-09 NOTE — PROGRESS NOTES
[facility]  Progress Note  Name: Richard Nava I  MRN: [id number]  Unit/Bed#: -01 I Date of Admission: 3/8/2024   Date of Service: 3/9/2024 I Hospital Day: 1    Assessment/Plan   Headache  Assessment & Plan  Patient on my evaluation complaining of headache, neck pain  Will order stat CT head with IV Ativan  If CT head is negative will order pain management    Palliative care patient  Assessment & Plan  Patient follows up with palliative as outpatient  Protonix, Ambien, gabapentin, MS Contin, Dilaudid ordered by palliative  Discussed with palliative medicine    Prostate cancer metastatic to bone (HCC)  Assessment & Plan  Follows up with heme-onc and palliative care  Currently on Zytiga, Xgeva      Severe protein-calorie malnutrition (HCC)  Assessment & Plan  Malnutrition Findings:                                 BMI Findings:           Body mass index is 15.09 kg/m².     Attrition consult with nutrition supplements    Chronic obstructive pulmonary disease with acute exacerbation (HCC)  Assessment & Plan  pResented with acute onset of shortness of breath  Start Pulmicort, Perforomist, Xopenex, Atrovent  IV steroids every 12  Pulm consult  Antitussives as needed  Start antibiotics pending cultures, Pro-Otoniel  Will need home O2 prior to discharge    * Respiratory distress  Assessment & Plan  Baseline oxygen requirement: 2 to 3 L  Oxygen requirement at time of admission: 6 L- Continue to wean as tolerated  Secondary to: emphysema/bronchitis/pneumonia  Continue to treat underlying cause, see further management below  CT PE study: No PE  Elevated bicarb on BMP, check ABG  If patient is desaturating, having pulmonary distress, may need BiPAP  Home O2 evaluation prior to discharge               VTE Pharmacologic Prophylaxis: VTE Score: 7 High Risk (Score >/= 5) - Pharmacological DVT Prophylaxis Ordered: heparin. Sequential Compression Devices Ordered.    Mobility:   Basic Mobility  Inpatient Raw Score: 19  JH-HLM Goal: 6: Walk 10 steps or more  JH-HLM Achieved: 6: Walk 10 steps or more  HLM Goal achieved. Continue to encourage appropriate mobility.    Patient Centered Rounds: I performed bedside rounds with nursing staff today.   Discussions with Specialists or Other Care Team Provider: pulmonology    Education and Discussions with Family / Patient:  patient.     Total Time Spent on Date of Encounter in care of patient: 60 mins. This time was spent on one or more of the following: performing physical exam; counseling and coordination of care; obtaining or reviewing history; documenting in the medical record; reviewing/ordering tests, medications or procedures; communicating with other healthcare professionals and discussing with patient's family/caregivers.    Current Length of Stay: 1 day(s)  Current Patient Status: Inpatient   Certification Statement: The patient will continue to require additional inpatient hospital stay due to copd exacerbation  Discharge Plan: Anticipate discharge in 24-48 hrs to home with home services.    Code Status: Level 3 - DNAR and DNI    Subjective:   no acute overnight events, no fever, stated breathing is better at rest. Gets worse with exertion    Objective:     Vitals:   Temp (24hrs), Av.8 °F (36.6 °C), Min:96 °F (35.6 °C), Max:98.4 °F (36.9 °C)    Temp:  [96 °F (35.6 °C)-98.4 °F (36.9 °C)] 96 °F (35.6 °C)  HR:  [85-94] 86  Resp:  [18-27] 20  BP: (113-167)/(70-94) 135/84  SpO2:  [84 %-99 %] 99 %  Body mass index is 15.09 kg/m².     Input and Output Summary (last 24 hours):     Intake/Output Summary (Last 24 hours) at 3/9/2024 1115  Last data filed at 3/9/2024 0914  Gross per 24 hour   Intake 1200 ml   Output 950 ml   Net 250 ml       Physical Exam:   Physical Exam     Gen.-Patient comfortable   Neck- Supple. No thyromegaly or lymphadenopathy  Lungs-excpiratory wheeze  Heart S1-S2, regular rate and rhythm, no murmurs  Abdomen-soft nontender, no  organomegaly. Bowel sounds present  Extremities-no cyanosi,  clubbing or edema  Skin- no rash  Neuro-nonfocal     Additional Data:     Labs:  Results from last 7 days   Lab Units 03/09/24  0359   WBC Thousand/uL 4.47   HEMOGLOBIN g/dL 12.9   HEMATOCRIT % 42.1   PLATELETS Thousands/uL 359   NEUTROS PCT % 87*   LYMPHS PCT % 11*   MONOS PCT % 2*   EOS PCT % 0     Results from last 7 days   Lab Units 03/09/24  0359 03/08/24  1721 03/08/24  1246   SODIUM mmol/L 141  --  139   POTASSIUM mmol/L 4.1  --  3.5   CHLORIDE mmol/L 95*  --  93*   CO2 mmol/L 42*  --  >45*   CO2, I-STAT   --    < >  --    BUN mg/dL 9  --  11   CREATININE mg/dL 0.39*  --  0.50*   ANION GAP mmol/L 4  --   --    CALCIUM mg/dL 8.7  --  9.4   ALBUMIN g/dL  --   --  4.0   TOTAL BILIRUBIN mg/dL  --   --  0.44   ALK PHOS U/L  --   --  58   ALT U/L  --   --  11   AST U/L  --   --  12*   GLUCOSE RANDOM mg/dL 144*  --  219*    < > = values in this interval not displayed.     Results from last 7 days   Lab Units 03/08/24  1246   INR  0.93             Results from last 7 days   Lab Units 03/09/24  0359 03/08/24  1246   LACTIC ACID mmol/L  --  0.8   PROCALCITONIN ng/ml <0.05 0.05       Lines/Drains:  Invasive Devices       Peripheral Intravenous Line  Duration             Peripheral IV 03/08/24 Left;Ventral (anterior) Forearm <1 day    Peripheral IV 03/08/24 Right Antecubital <1 day                          Imaging: No pertinent imaging reviewed.    Recent Cultures (last 7 days):   Results from last 7 days   Lab Units 03/08/24  2057 03/08/24  1738 03/08/24  1246   BLOOD CULTURE   --   --  Received in Microbiology Lab. Culture in Progress.  Received in Microbiology Lab. Culture in Progress.   GRAM STAIN RESULT  Rare Epithelial cells per low power field  2+ Polys  No organisms seen  --   --    LEGIONELLA URINARY ANTIGEN   --  Negative  --        Last 24 Hours Medication List:   Current Facility-Administered Medications   Medication Dose Route Frequency Provider  Last Rate    abiraterone  1,000 mg Oral Daily Leisa Wilder MD      acetaminophen  650 mg Oral Q6H PRN Leisa Wilder MD      ALPRAZolam  0.5 mg Oral BID PRN Arleth Scales PA-C      azithromycin  500 mg Oral Q24H Leisa Wilder MD      benzonatate  100 mg Oral TID PRN Leisa Wilder MD      budesonide  0.5 mg Nebulization Q12H Leisa Wilder MD      cefTRIAXone  1,000 mg Intravenous Q24H Leisa Wilder MD 1,000 mg (03/08/24 2113)    dextromethorphan-guaiFENesin  10 mL Oral Q4H PRN Leisa Wilder MD      formoterol  20 mcg Nebulization Q12H Leisa Wilder MD      gabapentin  600 mg Oral HS Arleth Scales PA-C      heparin (porcine)  5,000 Units Subcutaneous Q8H FANNY Leisa Wilder MD      HYDROmorphone  0.2 mg Intravenous Q3H PRN Arleth Scales PA-C      ipratropium  0.5 mg Nebulization Q6H PRN Leisa Wilder MD      levalbuterol  1.25 mg Nebulization Q6H PRN Leisa Wilder MD      lidocaine  1 patch Topical Daily Arleth Scales PA-C      LORazepam  0.5 mg Intravenous BID PRN Leisa Wilder MD      LORazepam  0.5 mg Intravenous Once Leisa Wilder MD      methylPREDNISolone sodium succinate  40 mg Intravenous Q12H FANNY Leisa Wilder MD      morphine  15 mg Oral Q12H FANNY Arleth Scales PA-C      ondansetron  4 mg Intravenous Q6H PRN Leisa Wilder MD      pantoprazole  40 mg Intravenous Q24H FANNY Arleth Scales PA-C      senna  17.2 mg Oral HS PRN Arleth Scales PA-C      zolpidem  2.5 mg Oral HS PRN Arleth Scales PA-C          Today, Patient Was Seen By: Leisa Wilder MD    **Please Note: This note may have been constructed using a voice recognition system.**

## 2024-03-09 NOTE — ASSESSMENT & PLAN NOTE
Malnutrition Findings:                                 BMI Findings:           Body mass index is 15.09 kg/m².     Attrition consult with nutrition supplements

## 2024-03-09 NOTE — CONSULTS
"Pulmonary Consultation   Richard Nava 68 y.o. male MRN: [id number]  Unit/Bed#: -01 Encounter: [id number]      Reason for consultation: SOB    Requesting physician: Leisa Wilder MD     Impressions/Recommendations:     Acute hypoxic and acute on chronic hypercapnic respiratory failure-patient is currently requiring 4 L of oxygen.  He previously did not qualify for home O2, but is willing to pay out-of-pocket even if he does not qualify on hospital discharge.  We will need to check room air saturations with ambulation prior to discharge.  He would also likely qualify for BiPAP given degree of hypercapnia, however due to his claustrophobia and headaches, not likely to tolerate.  Hold off on that assessment    Severe emphysema/COPD with acute exacerbation-he still has significant wheezing despite IV steroids.  Continue Pulmicort and Perforomist twice daily in the nebulizer.  I will also schedule Xopenex 3 times a day.    Metastatic prostate cancer-on treatment    D/W Dr. Wilder    Chief Complaint: \"I need oxygen\"    History of Present Illness   HPI:  Richard Nava is a 68 y.o. male who has history of emphysema/COPD, likely severe who is maintained on Symbicort and albuterol as an outpatient.  He was seen on 1 occasion in our office by Dr. Fatima.  Due to formulary change, he was transitioned to Breztri, however after reading the package insert, felt that it was not an appropriate medication for himself and chose to stay on the Symbicort.  He was also upset that Dr. Fatima did not prescribe supplemental oxygen.  Upon review of that office visit, he did not show oxygen desaturation on 6-minute walk test.  Since that time, patient has been using oxygen that he had acquired from a family member.  He presented to the hospital yesterday with increasing cough and shortness of breath.  He has cough with difficulty bringing up his sputum.  He has wheezing which is somewhat improved with albuterol.  Patient " "took prednisone as an outpatient and even at low doses feels a \"whooshing\" in his head and is not willing to take prednisone at doses any higher than 2.5 mg daily.  He has tolerated IV steroids since being here in the hospital.  On presentation, room air saturations were 84%.  He is currently on 4 L/min with saturations in the mid to high 90s.    Review of systems: Otherwise negative    Historical Information   Past Medical History:   Diagnosis Date    Bone cancer (HCC)     Colon polyp     COPD (chronic obstructive pulmonary disease) (HCC)     Emphysema lung (HCC)     Hyperlipidemia     Prostate cancer (HCC)      Past Surgical History:   Procedure Laterality Date    APPENDECTOMY      COLONOSCOPY      IR BIOPSY BONE  12/30/2021    JOINT REPLACEMENT      UPPER GASTROINTESTINAL ENDOSCOPY       Family History   Problem Relation Age of Onset    Lung cancer Mother     Breast cancer Sister     Colon polyps Brother     Colon cancer Neg Hx      Tobacco history: 40-pack-year smoking history, quit 2012    Meds/Allergies   Current Facility-Administered Medications   Medication Dose Route Frequency    acetaminophen (TYLENOL) tablet 650 mg  650 mg Oral Q6H PRN    ALPRAZolam (XANAX) tablet 0.5 mg  0.5 mg Oral BID PRN    azithromycin (ZITHROMAX) tablet 500 mg  500 mg Oral Q24H    benzonatate (TESSALON PERLES) capsule 100 mg  100 mg Oral TID PRN    budesonide (PULMICORT) inhalation solution 0.5 mg  0.5 mg Nebulization Q12H    cefTRIAXone (ROCEPHIN) IVPB (premix in dextrose) 1,000 mg 50 mL  1,000 mg Intravenous Q24H    dextromethorphan-guaiFENesin (ROBITUSSIN DM) oral syrup 10 mL  10 mL Oral Q4H PRN    formoterol (PERFOROMIST) nebulizer solution 20 mcg  20 mcg Nebulization Q12H    gabapentin (NEURONTIN) capsule 600 mg  600 mg Oral HS    heparin (porcine) subcutaneous injection 5,000 Units  5,000 Units Subcutaneous Q8H FANNY    HYDROmorphone HCl (DILAUDID) injection 0.2 mg  0.2 mg Intravenous Q3H PRN    ipratropium (ATROVENT) 0.02 % " "inhalation solution 0.5 mg  0.5 mg Nebulization Q6H PRN    levalbuterol (XOPENEX) inhalation solution 1.25 mg  1.25 mg Nebulization Q6H PRN    lidocaine (LIDODERM) 5 % patch 1 patch  1 patch Topical Daily    LORazepam (ATIVAN) injection 0.5 mg  0.5 mg Intravenous BID PRN    methylPREDNISolone sodium succinate (Solu-MEDROL) injection 40 mg  40 mg Intravenous Q8H FANNY    morphine (MS CONTIN) ER tablet 15 mg  15 mg Oral Q12H FANNY    ondansetron (ZOFRAN) injection 4 mg  4 mg Intravenous Q6H PRN    pantoprazole (PROTONIX) injection 40 mg  40 mg Intravenous Q24H FANNY    senna (SENOKOT) tablet 17.2 mg  17.2 mg Oral HS PRN    zolpidem (AMBIEN) tablet 2.5 mg  2.5 mg Oral HS PRN     No Known Allergies    Vitals: Blood pressure 135/84, pulse 86, temperature (!) 96 °F (35.6 °C), resp. rate 20, height 5' 7\" (1.702 m), weight 43.7 kg (96 lb 5.5 oz), SpO2 99%.,  4 L, Body mass index is 15.09 kg/m².    Intake/Output Summary (Last 24 hours) at 3/9/2024 1017  Last data filed at 3/9/2024 0914  Gross per 24 hour   Intake 1200 ml   Output 950 ml   Net 250 ml       Physical exam:     Physical Exam  Constitutional:       General: He is not in acute distress.     Appearance: Normal appearance.   HENT:      Head: Normocephalic.      Mouth/Throat:      Pharynx: No oropharyngeal exudate.   Eyes:      General: No scleral icterus.  Neck:      Vascular: No JVD.   Cardiovascular:      Rate and Rhythm: Normal rate and regular rhythm.   Pulmonary:      Breath sounds: Wheezing present.      Comments: Prolonged expiratory phase  Musculoskeletal:         General: No deformity.      Cervical back: Neck supple.   Skin:     General: Skin is warm and dry.   Neurological:      Mental Status: He is alert and oriented to person, place, and time.   Psychiatric:         Mood and Affect: Mood normal.         Labs: I have personally reviewed pertinent lab results.    Results from last 7 days   Lab Units 03/09/24  0359 03/08/24  1721 03/08/24  1246   WBC Thousand/uL " 4.47  --  8.37   HEMOGLOBIN g/dL 12.9  --  13.5   I STAT HEMOGLOBIN g/dl  --  12.9  --    HEMATOCRIT % 42.1  --  43.1   HEMATOCRIT, ISTAT %  --  38  --    PLATELETS Thousands/uL 359  --  386         Results from last 7 days   Lab Units 03/09/24  0359 03/08/24  1721 03/08/24  1246   POTASSIUM mmol/L 4.1  --  3.5   CHLORIDE mmol/L 95*  --  93*   CO2 mmol/L 42*  --  >45*   CO2, I-STAT mmol/L  --  45*  --    BUN mg/dL 9  --  11   CREATININE mg/dL 0.39*  --  0.50*   CALCIUM mg/dL 8.7  --  9.4   ALK PHOS U/L  --   --  58   ALT U/L  --   --  11   AST U/L  --   --  12*   GLUCOSE, ISTAT mg/dl  --  114  --      Results from last 7 days   Lab Units 03/08/24  1246   INR  0.93   PTT seconds 28     ABG performed on 36% FiO2 pH and 0.279, pCO2 90, pO2 103    Procalcitonin less than 0.05    Imaging and other studies: I have personally reviewed pertinent reports.   and I have personally reviewed pertinent films in PACS chest CT from 3/8/2024 shows severe emphysema with diffuse bronchial wall thickening.  There is slight left lower lobe groundglass opacity.  There is extensive blastic bone metastases.    Pulmonary function testing: none on file    Code Status: Level 3 - DNAR and DNI    Thank you for allowing us to participate in the care of your patient.    Cris Fong,

## 2024-03-09 NOTE — PLAN OF CARE
Problem: PAIN - ADULT  Goal: Verbalizes/displays adequate comfort level or baseline comfort level  Description: Interventions:  - Encourage patient to monitor pain and request assistance  - Assess pain using appropriate pain scale  - Administer analgesics based on type and severity of pain and evaluate response  - Implement non-pharmacological measures as appropriate and evaluate response  - Consider cultural and social influences on pain and pain management  - Notify physician/advanced practitioner if interventions unsuccessful or patient reports new pain  Outcome: Progressing     Problem: INFECTION - ADULT  Goal: Absence or prevention of progression during hospitalization  Description: INTERVENTIONS:  - Assess and monitor for signs and symptoms of infection  - Monitor lab/diagnostic results  - Monitor all insertion sites, i.e. indwelling lines, tubes, and drains  - Monitor endotracheal if appropriate and nasal secretions for changes in amount and color  - [place] appropriate cooling/warming therapies per order  - Administer medications as ordered  - Instruct and encourage patient and family to use good hand hygiene technique  - Identify and instruct in appropriate isolation precautions for identified infection/condition  Outcome: Progressing     Problem: CARDIOVASCULAR - ADULT  Goal: Maintains optimal cardiac output and hemodynamic stability  Description: INTERVENTIONS:  - Monitor I/O, vital signs and rhythm  - Monitor for S/S and trends of decreased cardiac output  - Administer and titrate ordered vasoactive medications to optimize hemodynamic stability  - Assess quality of pulses, skin color and temperature  - Assess for signs of decreased coronary artery perfusion  - Instruct patient to report change in severity of symptoms  Outcome: Progressing     Problem: RESPIRATORY - ADULT  Goal: Achieves optimal ventilation and oxygenation  Description: INTERVENTIONS:  - Assess for changes in respiratory status  -  Assess for changes in mentation and behavior  - Position to facilitate oxygenation and minimize respiratory effort  - Oxygen administered by appropriate delivery if ordered  - Initiate smoking cessation education as indicated  - Encourage broncho-pulmonary hygiene including cough, deep breathe, Incentive Spirometry  - Assess the need for suctioning and aspirate as needed  - Assess and instruct to report SOB or any respiratory difficulty  - Respiratory Therapy support as indicated  Outcome: Progressing

## 2024-03-09 NOTE — PLAN OF CARE
Problem: PAIN - ADULT  Goal: Verbalizes/displays adequate comfort level or baseline comfort level  Description: Interventions:  - Encourage patient to monitor pain and request assistance  - Assess pain using appropriate pain scale  - Administer analgesics based on type and severity of pain and evaluate response  - Implement non-pharmacological measures as appropriate and evaluate response  - Consider cultural and social influences on pain and pain management  - Notify physician/advanced practitioner if interventions unsuccessful or patient reports new pain  Outcome: Progressing     Problem: INFECTION - ADULT  Goal: Absence or prevention of progression during hospitalization  Description: INTERVENTIONS:  - Assess and monitor for signs and symptoms of infection  - Monitor lab/diagnostic results  - Monitor all insertion sites, i.e. indwelling lines, tubes, and drains  - Monitor endotracheal if appropriate and nasal secretions for changes in amount and color  - [place] appropriate cooling/warming therapies per order  - Administer medications as ordered  - Instruct and encourage patient and family to use good hand hygiene technique  - Identify and instruct in appropriate isolation precautions for identified infection/condition  Outcome: Progressing     Problem: SAFETY ADULT  Goal: Patient will remain free of falls  Description: INTERVENTIONS:  - Educate patient/family on patient safety including physical limitations  - Instruct patient to call for assistance with activity   - Consult OT/PT to assist with strengthening/mobility   - Keep Call bell within reach  - Keep bed low and locked with side rails adjusted as appropriate  - Keep care items and personal belongings within reach  - Initiate and maintain comfort rounds  - Make Fall Risk Sign visible to staff  - Offer Toileting every 3 Hours, in advance of need  - Initiate/Maintain bed alarm  - Obtain necessary fall risk management equipment: alarm, socks  - Apply  yellow socks and bracelet for high fall risk patients  - Consider moving patient to room near nurses station  Outcome: Progressing  Goal: Maintain or return to baseline ADL function  Description: INTERVENTIONS:  -  Assess patient's ability to carry out ADLs; assess patient's baseline for ADL function and identify physical deficits which impact ability to perform ADLs (bathing, care of mouth/teeth, toileting, grooming, dressing, etc.)  - Assess/evaluate cause of self-care deficits   - Assess range of motion  - Assess patient's mobility; develop plan if impaired  - Assess patient's need for assistive devices and provide as appropriate  - Encourage maximum independence but intervene and supervise when necessary  - Involve family in performance of ADLs  - Assess for home care needs following discharge   - Consider OT consult to assist with ADL evaluation and planning for discharge  - Provide patient education as appropriate  Outcome: Progressing  Goal: Maintains/Returns to pre admission functional level  Description: INTERVENTIONS:  - Perform AM-PAC 6 Click Basic Mobility/ Daily Activity assessment daily.  - Set and communicate daily mobility goal to care team and patient/family/caregiver.   - Collaborate with rehabilitation services on mobility goals if consulted  - Perform Range of Motion 3 times a day.  - Reposition patient every 2 hours.  - Dangle patient 3 times a day  - Stand patient 3 times a day  - Ambulate patient 3 times a day  - Out of bed to chair 3 times a day   - Out of bed for meals 3 times a day  - Out of bed for toileting  - Record patient progress and toleration of activity level   Outcome: Progressing     Problem: DISCHARGE PLANNING  Goal: Discharge to home or other facility with appropriate resources  Description: INTERVENTIONS:  - Identify barriers to discharge w/patient and caregiver  - Arrange for needed discharge resources and transportation as appropriate  - Identify discharge learning needs  (meds, wound care, etc.)  - Arrange for interpretive services to assist at discharge as needed  - Refer to Case Management Department for coordinating discharge planning if the patient needs post-hospital services based on physician/advanced practitioner order or complex needs related to functional status, cognitive ability, or social support system  Outcome: Progressing     Problem: Knowledge Deficit  Goal: Patient/family/caregiver demonstrates understanding of disease process, treatment plan, medications, and discharge instructions  Description: Complete learning assessment and assess knowledge base.  Interventions:  - Provide teaching at level of understanding  - Provide teaching via preferred learning methods  Outcome: Progressing     Problem: CARDIOVASCULAR - ADULT  Goal: Maintains optimal cardiac output and hemodynamic stability  Description: INTERVENTIONS:  - Monitor I/O, vital signs and rhythm  - Monitor for S/S and trends of decreased cardiac output  - Administer and titrate ordered vasoactive medications to optimize hemodynamic stability  - Assess quality of pulses, skin color and temperature  - Assess for signs of decreased coronary artery perfusion  - Instruct patient to report change in severity of symptoms  Outcome: Progressing  Goal: Absence of cardiac dysrhythmias or at baseline rhythm  Description: INTERVENTIONS:  - Continuous cardiac monitoring, vital signs, obtain 12 lead EKG if ordered  - Administer antiarrhythmic and heart rate control medications as ordered  - Monitor electrolytes and administer replacement therapy as ordered  Outcome: Progressing

## 2024-03-10 ENCOUNTER — APPOINTMENT (INPATIENT)
Dept: MRI IMAGING | Facility: HOSPITAL | Age: 69
DRG: 189 | End: 2024-03-10
Payer: MEDICARE

## 2024-03-10 LAB
ANION GAP SERPL CALCULATED.3IONS-SCNC: 2 MMOL/L
BASOPHILS # BLD MANUAL: 0 THOUSAND/UL (ref 0–0.1)
BASOPHILS NFR MAR MANUAL: 0 % (ref 0–1)
BUN SERPL-MCNC: 13 MG/DL (ref 5–25)
CALCIUM SERPL-MCNC: 8.6 MG/DL (ref 8.4–10.2)
CHLORIDE SERPL-SCNC: 94 MMOL/L (ref 96–108)
CO2 SERPL-SCNC: 44 MMOL/L (ref 21–32)
CREAT SERPL-MCNC: 0.48 MG/DL (ref 0.6–1.3)
EOSINOPHIL # BLD MANUAL: 0 THOUSAND/UL (ref 0–0.4)
EOSINOPHIL NFR BLD MANUAL: 0 % (ref 0–6)
ERYTHROCYTE [DISTWIDTH] IN BLOOD BY AUTOMATED COUNT: 12.7 % (ref 11.6–15.1)
GFR SERPL CREATININE-BSD FRML MDRD: 112 ML/MIN/1.73SQ M
GLUCOSE SERPL-MCNC: 165 MG/DL (ref 65–140)
HCT VFR BLD AUTO: 40.2 % (ref 36.5–49.3)
HGB BLD-MCNC: 12.8 G/DL (ref 12–17)
LYMPHOCYTES # BLD AUTO: 0.42 THOUSAND/UL (ref 0.6–4.47)
LYMPHOCYTES # BLD AUTO: 4 % (ref 14–44)
MCH RBC QN AUTO: 31.7 PG (ref 26.8–34.3)
MCHC RBC AUTO-ENTMCNC: 31.8 G/DL (ref 31.4–37.4)
MCV RBC AUTO: 100 FL (ref 82–98)
MONOCYTES # BLD AUTO: 0.11 THOUSAND/UL (ref 0–1.22)
MONOCYTES NFR BLD: 1 % (ref 4–12)
NEUTROPHILS # BLD MANUAL: 10.02 THOUSAND/UL (ref 1.85–7.62)
NEUTS SEG NFR BLD AUTO: 95 % (ref 43–75)
PLATELET # BLD AUTO: 340 THOUSANDS/UL (ref 149–390)
PLATELET BLD QL SMEAR: ABNORMAL
PMV BLD AUTO: 8.7 FL (ref 8.9–12.7)
POTASSIUM SERPL-SCNC: 4.1 MMOL/L (ref 3.5–5.3)
RBC # BLD AUTO: 4.04 MILLION/UL (ref 3.88–5.62)
SODIUM SERPL-SCNC: 140 MMOL/L (ref 135–147)
WBC # BLD AUTO: 10.55 THOUSAND/UL (ref 4.31–10.16)

## 2024-03-10 PROCEDURE — 85027 COMPLETE CBC AUTOMATED: CPT | Performed by: INTERNAL MEDICINE

## 2024-03-10 PROCEDURE — 94640 AIRWAY INHALATION TREATMENT: CPT

## 2024-03-10 PROCEDURE — 70553 MRI BRAIN STEM W/O & W/DYE: CPT

## 2024-03-10 PROCEDURE — 85007 BL SMEAR W/DIFF WBC COUNT: CPT | Performed by: INTERNAL MEDICINE

## 2024-03-10 PROCEDURE — A9585 GADOBUTROL INJECTION: HCPCS | Performed by: INTERNAL MEDICINE

## 2024-03-10 PROCEDURE — 80048 BASIC METABOLIC PNL TOTAL CA: CPT | Performed by: INTERNAL MEDICINE

## 2024-03-10 PROCEDURE — C9113 INJ PANTOPRAZOLE SODIUM, VIA: HCPCS | Performed by: PHYSICIAN ASSISTANT

## 2024-03-10 PROCEDURE — 94760 N-INVAS EAR/PLS OXIMETRY 1: CPT

## 2024-03-10 PROCEDURE — 99232 SBSQ HOSP IP/OBS MODERATE 35: CPT | Performed by: INTERNAL MEDICINE

## 2024-03-10 PROCEDURE — 99233 SBSQ HOSP IP/OBS HIGH 50: CPT | Performed by: INTERNAL MEDICINE

## 2024-03-10 RX ORDER — GADOBUTROL 604.72 MG/ML
4 INJECTION INTRAVENOUS
Status: COMPLETED | OUTPATIENT
Start: 2024-03-10 | End: 2024-03-10

## 2024-03-10 RX ADMIN — LEVALBUTEROL HYDROCHLORIDE 1.25 MG: 1.25 SOLUTION RESPIRATORY (INHALATION) at 07:33

## 2024-03-10 RX ADMIN — FORMOTEROL FUMARATE DIHYDRATE 20 MCG: 20 SOLUTION RESPIRATORY (INHALATION) at 07:33

## 2024-03-10 RX ADMIN — IPRATROPIUM BROMIDE 0.5 MG: 0.5 SOLUTION RESPIRATORY (INHALATION) at 19:20

## 2024-03-10 RX ADMIN — LEVALBUTEROL HYDROCHLORIDE 1.25 MG: 1.25 SOLUTION RESPIRATORY (INHALATION) at 13:52

## 2024-03-10 RX ADMIN — METHYLPREDNISOLONE SODIUM SUCCINATE 40 MG: 40 INJECTION, POWDER, FOR SOLUTION INTRAMUSCULAR; INTRAVENOUS at 08:34

## 2024-03-10 RX ADMIN — LEVALBUTEROL HYDROCHLORIDE 1.25 MG: 1.25 SOLUTION RESPIRATORY (INHALATION) at 19:20

## 2024-03-10 RX ADMIN — CEFTRIAXONE 1000 MG: 1 INJECTION, SOLUTION INTRAVENOUS at 22:28

## 2024-03-10 RX ADMIN — MORPHINE SULFATE 15 MG: 15 TABLET, EXTENDED RELEASE ORAL at 22:27

## 2024-03-10 RX ADMIN — AZITHROMYCIN 500 MG: 500 TABLET, FILM COATED ORAL at 17:09

## 2024-03-10 RX ADMIN — GADOBUTROL 4 ML: 604.72 INJECTION INTRAVENOUS at 12:40

## 2024-03-10 RX ADMIN — PANTOPRAZOLE SODIUM 40 MG: 40 INJECTION, POWDER, FOR SOLUTION INTRAVENOUS at 08:34

## 2024-03-10 RX ADMIN — HEPARIN SODIUM 5000 UNITS: 5000 INJECTION, SOLUTION INTRAVENOUS; SUBCUTANEOUS at 06:00

## 2024-03-10 RX ADMIN — ABIRATERONE ACETATE 1000 MG: 250 TABLET ORAL at 06:08

## 2024-03-10 RX ADMIN — GABAPENTIN 600 MG: 300 CAPSULE ORAL at 22:27

## 2024-03-10 RX ADMIN — LIDOCAINE 5% 1 PATCH: 700 PATCH TOPICAL at 08:34

## 2024-03-10 RX ADMIN — BUDESONIDE 0.5 MG: 0.5 INHALANT ORAL at 07:33

## 2024-03-10 RX ADMIN — BUDESONIDE 0.5 MG: 0.5 INHALANT ORAL at 19:20

## 2024-03-10 RX ADMIN — MORPHINE SULFATE 15 MG: 15 TABLET, EXTENDED RELEASE ORAL at 08:34

## 2024-03-10 RX ADMIN — FORMOTEROL FUMARATE DIHYDRATE 20 MCG: 20 SOLUTION RESPIRATORY (INHALATION) at 19:20

## 2024-03-10 RX ADMIN — IPRATROPIUM BROMIDE 0.5 MG: 0.5 SOLUTION RESPIRATORY (INHALATION) at 13:52

## 2024-03-10 RX ADMIN — METHYLPREDNISOLONE SODIUM SUCCINATE 40 MG: 40 INJECTION, POWDER, FOR SOLUTION INTRAMUSCULAR; INTRAVENOUS at 22:27

## 2024-03-10 RX ADMIN — LORAZEPAM 0.5 MG: 2 INJECTION INTRAMUSCULAR; INTRAVENOUS at 11:19

## 2024-03-10 NOTE — ASSESSMENT & PLAN NOTE
Patient on my evaluation complaining of headache, neck pain  Stat CT head negative for acute intracranial process, MRI brain did not show any acute changes, metastasis  Will need CTA

## 2024-03-10 NOTE — ASSESSMENT & PLAN NOTE
Malnutrition Findings:   Adult Malnutrition type: Chronic illness  Adult Degree of Malnutrition: Other severe protein calorie malnutrition  Malnutrition Characteristics: Muscle loss, Fat loss, Weight loss, Inadequate energy                  360 Statement: Severe malnutrition r/t chronic condition, as evidenced by intake meeting <75% estimated needs > 1 month, severe fat and muscle wasting (temples, clavicle, buccal region), and 10.5% wt loss x 2 months (3/10/24: 93#, 1/31/24: 104#). Treatment: PO diet + nutrition supplements    BMI Findings:  Adult BMI Classifications: Underweight < 18.5        Body mass index is 14.67 kg/m².     Attrition consult with nutrition supplements

## 2024-03-10 NOTE — MALNUTRITION/BMI
This medical record reflects one or more clinical indicators suggestive of malnutrition and underweight.    Malnutrition Findings:   Adult Malnutrition type: Chronic illness  Adult Degree of Malnutrition: Other severe protein calorie malnutrition  Malnutrition Characteristics: Muscle loss, Fat loss, Weight loss, Inadequate energy                  360 Statement: Severe malnutrition r/t chronic condition, as evidenced by intake meeting <75% estimated needs > 1 month, severe fat and muscle wasting (temples, clavicle, buccal region), and 10.5% wt loss x 2 months (3/10/24: 93#, 1/31/24: 104#). Treatment: PO diet + nutrition supplements    BMI Findings:  Adult BMI Classifications: Underweight < 18.5        Body mass index is 14.67 kg/m².     See Nutrition note dated 3/10/2024 for additional details.  Completed nutrition assessment is viewable in the nutrition documentation.

## 2024-03-10 NOTE — PLAN OF CARE
Problem: PAIN - ADULT  Goal: Verbalizes/displays adequate comfort level or baseline comfort level  Description: Interventions:  - Encourage patient to monitor pain and request assistance  - Assess pain using appropriate pain scale  - Administer analgesics based on type and severity of pain and evaluate response  - Implement non-pharmacological measures as appropriate and evaluate response  - Consider cultural and social influences on pain and pain management  - Notify physician/advanced practitioner if interventions unsuccessful or patient reports new pain  Outcome: Progressing     Problem: INFECTION - ADULT  Goal: Absence or prevention of progression during hospitalization  Description: INTERVENTIONS:  - Assess and monitor for signs and symptoms of infection  - Monitor lab/diagnostic results  - Monitor all insertion sites, i.e. indwelling lines, tubes, and drains  - Monitor endotracheal if appropriate and nasal secretions for changes in amount and color  - [place] appropriate cooling/warming therapies per order  - Administer medications as ordered  - Instruct and encourage patient and family to use good hand hygiene technique  - Identify and instruct in appropriate isolation precautions for identified infection/condition  Outcome: Progressing     Problem: CARDIOVASCULAR - ADULT  Goal: Maintains optimal cardiac output and hemodynamic stability  Description: INTERVENTIONS:  - Monitor I/O, vital signs and rhythm  - Monitor for S/S and trends of decreased cardiac output  - Administer and titrate ordered vasoactive medications to optimize hemodynamic stability  - Assess quality of pulses, skin color and temperature  - Assess for signs of decreased coronary artery perfusion  - Instruct patient to report change in severity of symptoms  Outcome: Progressing     Problem: RESPIRATORY - ADULT  Goal: Achieves optimal ventilation and oxygenation  Description: INTERVENTIONS:  - Assess for changes in respiratory status  -  Assess for changes in mentation and behavior  - Position to facilitate oxygenation and minimize respiratory effort  - Oxygen administered by appropriate delivery if ordered  - Initiate smoking cessation education as indicated  - Encourage broncho-pulmonary hygiene including cough, deep breathe, Incentive Spirometry  - Assess the need for suctioning and aspirate as needed  - Assess and instruct to report SOB or any respiratory difficulty  - Respiratory Therapy support as indicated  Outcome: Progressing     Problem: MUSCULOSKELETAL - ADULT  Goal: Maintain or return mobility to safest level of function  Description: INTERVENTIONS:  - Assess patient's ability to carry out ADLs; assess patient's baseline for ADL function and identify physical deficits which impact ability to perform ADLs (bathing, care of mouth/teeth, toileting, grooming, dressing, etc.)  - Assess/evaluate cause of self-care deficits   - Assess range of motion  - Assess patient's mobility  - Assess patient's need for assistive devices and provide as appropriate  - Encourage maximum independence but intervene and supervise when necessary  - Involve family in performance of ADLs  - Assess for home care needs following discharge   - Consider OT consult to assist with ADL evaluation and planning for discharge  - Provide patient education as appropriate  Outcome: Progressing

## 2024-03-10 NOTE — PROGRESS NOTES
"Progress Note - Pulmonary   Richard Nava 68 y.o. male MRN: [id number]  Unit/Bed#: -01 Encounter: [id number]      Assessment/Plan:    Acute hypoxic and acute on chronic hypercapnic respiratory failure-currently on 4 L/min.  Titrate to maintain saturations above 88%.  He will need home O2 assessment prior to discharge.  Unlikely to tolerate BiPAP due to claustrophobia and headache    Severe emphysema/COPD with acute exacerbation-continue Solu-Medrol 40 mg every 12 hours.  Of note, patient poorly tolerates prednisone and will be very resistant to transitioning.  Perhaps oral methylprednisolone will be better tolerated when it comes time to taper.  Continue all-nebulized regimen with Pulmicort, Perforomist and Xopenex.  Should have outpatient PFTs    Metastatic prostate cancer-on therapy.      Chief Complaint: \"I feel okay\"    Subjective:   Patient reports that his headache is returning.  Shortness of breath seems to be slightly better.  Still with episodes of cough    Objective:     Vitals: Blood pressure 124/88, pulse 77, temperature 97.6 °F (36.4 °C), resp. rate 17, height 5' 7\" (1.702 m), weight 42.5 kg (93 lb 11.1 oz), SpO2 98%.,  4 L, Body mass index is 14.67 kg/m².      Intake/Output Summary (Last 24 hours) at 3/10/2024 1023  Last data filed at 3/9/2024 2230  Gross per 24 hour   Intake 120 ml   Output 100 ml   Net 20 ml       Physical Exam:      General:  Awake, alert, no distress   HEENT: No scleral icterus, EOMI, moist mucosa    Heart:  Regular rate and rhythm, no murmur   Lungs: Prolonged expiratory phase with bilateral expiratory wheezes   Abdomen: Soft, nontender, normal bowel sounds   Extremities: No clubbing, cyanosis or edema    Labs: I have personally reviewed pertinent lab results.    Results from last 7 days   Lab Units 03/10/24  0359 03/09/24  0359 03/08/24  1721 03/08/24  1246   WBC Thousand/uL 10.55* 4.47  --  8.37   HEMOGLOBIN g/dL 12.8 12.9  --  13.5   I STAT HEMOGLOBIN g/dl  --   -- "  12.9  --    HEMATOCRIT % 40.2 42.1  --  43.1   HEMATOCRIT, ISTAT %  --   --  38  --    PLATELETS Thousands/uL 340 359  --  386         Results from last 7 days   Lab Units 03/10/24  0359 03/09/24  0359 03/08/24  1721 03/08/24  1246   POTASSIUM mmol/L 4.1 4.1  --  3.5   CHLORIDE mmol/L 94* 95*  --  93*   CO2 mmol/L 44* 42*  --  >45*   CO2, I-STAT mmol/L  --   --  45*  --    BUN mg/dL 13 9  --  11   CREATININE mg/dL 0.48* 0.39*  --  0.50*   CALCIUM mg/dL 8.6 8.7  --  9.4   ALK PHOS U/L  --   --   --  58   ALT U/L  --   --   --  11   AST U/L  --   --   --  12*   GLUCOSE, ISTAT mg/dl  --   --  114  --      Results from last 7 days   Lab Units 03/08/24  1246   INR  0.93   PTT seconds 28

## 2024-03-10 NOTE — PLAN OF CARE
Problem: PAIN - ADULT  Goal: Verbalizes/displays adequate comfort level or baseline comfort level  Description: Interventions:  - Encourage patient to monitor pain and request assistance  - Assess pain using appropriate pain scale  - Administer analgesics based on type and severity of pain and evaluate response  - Implement non-pharmacological measures as appropriate and evaluate response  - Consider cultural and social influences on pain and pain management  - Notify physician/advanced practitioner if interventions unsuccessful or patient reports new pain  Outcome: Progressing     Problem: INFECTION - ADULT  Goal: Absence or prevention of progression during hospitalization  Description: INTERVENTIONS:  - Assess and monitor for signs and symptoms of infection  - Monitor lab/diagnostic results  - Monitor all insertion sites, i.e. indwelling lines, tubes, and drains  - Monitor endotracheal if appropriate and nasal secretions for changes in amount and color  - [place] appropriate cooling/warming therapies per order  - Administer medications as ordered  - Instruct and encourage patient and family to use good hand hygiene technique  - Identify and instruct in appropriate isolation precautions for identified infection/condition  Outcome: Progressing     Problem: SAFETY ADULT  Goal: Patient will remain free of falls  Description: INTERVENTIONS:  - Educate patient/family on patient safety including physical limitations  - Instruct patient to call for assistance with activity   - Consult OT/PT to assist with strengthening/mobility   - Keep Call bell within reach  - Keep bed low and locked with side rails adjusted as appropriate  - Keep care items and personal belongings within reach  - Initiate and maintain comfort rounds  - Make Fall Risk Sign visible to staff  - Offer Toileting every 3 Hours, in advance of need  - Initiate/Maintain bed alarm  - Obtain necessary fall risk management equipment: alarm, socks  - Apply  yellow socks and bracelet for high fall risk patients  - Consider moving patient to room near nurses station  Outcome: Progressing  Goal: Maintain or return to baseline ADL function  Description: INTERVENTIONS:  -  Assess patient's ability to carry out ADLs; assess patient's baseline for ADL function and identify physical deficits which impact ability to perform ADLs (bathing, care of mouth/teeth, toileting, grooming, dressing, etc.)  - Assess/evaluate cause of self-care deficits   - Assess range of motion  - Assess patient's mobility; develop plan if impaired  - Assess patient's need for assistive devices and provide as appropriate  - Encourage maximum independence but intervene and supervise when necessary  - Involve family in performance of ADLs  - Assess for home care needs following discharge   - Consider OT consult to assist with ADL evaluation and planning for discharge  - Provide patient education as appropriate  Outcome: Progressing  Goal: Maintains/Returns to pre admission functional level  Description: INTERVENTIONS:  - Perform AM-PAC 6 Click Basic Mobility/ Daily Activity assessment daily.  - Set and communicate daily mobility goal to care team and patient/family/caregiver.   - Collaborate with rehabilitation services on mobility goals if consulted  - Perform Range of Motion 3 times a day.  - Reposition patient every 2 hours.  - Dangle patient 3 times a day  - Stand patient 3 times a day  - Ambulate patient 3 times a day  - Out of bed to chair 3 times a day   - Out of bed for meals 3 times a day  - Out of bed for toileting  - Record patient progress and toleration of activity level   Outcome: Progressing     Problem: DISCHARGE PLANNING  Goal: Discharge to home or other facility with appropriate resources  Description: INTERVENTIONS:  - Identify barriers to discharge w/patient and caregiver  - Arrange for needed discharge resources and transportation as appropriate  - Identify discharge learning needs  (meds, wound care, etc.)  - Arrange for interpretive services to assist at discharge as needed  - Refer to Case Management Department for coordinating discharge planning if the patient needs post-hospital services based on physician/advanced practitioner order or complex needs related to functional status, cognitive ability, or social support system  Outcome: Progressing

## 2024-03-10 NOTE — PROGRESS NOTES
[facility]  Progress Note  Name: Richard Nava I  MRN: [id number]  Unit/Bed#: -01 I Date of Admission: 3/8/2024   Date of Service: 3/10/2024 I Hospital Day: 2    Assessment/Plan   Headache  Assessment & Plan  Patient on my evaluation complaining of headache, neck pain  Stat CT head negative for acute intracranial process, MRI brain did not show any acute changes, metastasis  Will need CTA    Palliative care patient  Assessment & Plan  Patient follows up with palliative as outpatient  Protonix, Ambien, gabapentin, MS Contin, Dilaudid ordered by palliative  Discussed with palliative medicine    Prostate cancer metastatic to bone (HCC)  Assessment & Plan  Follows up with heme-onc and palliative care  Currently on Zytiga, Xgeva      Severe protein-calorie malnutrition (HCC)  Assessment & Plan  Malnutrition Findings:   Adult Malnutrition type: Chronic illness  Adult Degree of Malnutrition: Other severe protein calorie malnutrition  Malnutrition Characteristics: Muscle loss, Fat loss, Weight loss, Inadequate energy                  360 Statement: Severe malnutrition r/t chronic condition, as evidenced by intake meeting <75% estimated needs > 1 month, severe fat and muscle wasting (temples, clavicle, buccal region), and 10.5% wt loss x 2 months (3/10/24: 93#, 1/31/24: 104#). Treatment: PO diet + nutrition supplements    BMI Findings:  Adult BMI Classifications: Underweight < 18.5        Body mass index is 14.67 kg/m².     Attrition consult with nutrition supplements    Chronic obstructive pulmonary disease with acute exacerbation (HCC)  Assessment & Plan  pResented with acute onset of shortness of breath  Start Pulmicort, Perforomist, Xopenex, Atrovent  IV steroids every 12  Pulm consult  Antitussives as needed  Start antibiotics pending cultures, Pro-Otoniel  Will need home O2 prior to discharge    * Respiratory distress  Assessment & Plan  Baseline oxygen requirement: 2 to 3  L  Oxygen requirement at time of admission: 6 L- Continue to wean as tolerated  Secondary to: emphysema/bronchitis/pneumonia  Continue to treat underlying cause, see further management below  CT PE study: No PE  Elevated bicarb on BMP, check ABG  If patient is desaturating, having pulmonary distress, may need BiPAP  Home O2 evaluation prior to discharge               VTE Pharmacologic Prophylaxis: VTE Score: 7 High Risk (Score >/= 5) - Pharmacological DVT Prophylaxis Ordered: heparin. Sequential Compression Devices Ordered.    Mobility:   Basic Mobility Inpatient Raw Score: 19  JH-HLM Goal: 6: Walk 10 steps or more  JH-HLM Achieved: 6: Walk 10 steps or more  HLM Goal achieved. Continue to encourage appropriate mobility.    Patient Centered Rounds: I performed bedside rounds with nursing staff today.   Discussions with Specialists or Other Care Team Provider: Pulmonology    Education and Discussions with Family / Patient: Updated  (sister) via phone.    Total Time Spent on Date of Encounter in care of patient: 60 mins. This time was spent on one or more of the following: performing physical exam; counseling and coordination of care; obtaining or reviewing history; documenting in the medical record; reviewing/ordering tests, medications or procedures; communicating with other healthcare professionals and discussing with patient's family/caregivers.    Current Length of Stay: 2 day(s)  Current Patient Status: Inpatient   Certification Statement: The patient will continue to require additional inpatient hospital stay due to iv steroids  Discharge Plan: Anticipate discharge in 24-48 hrs to home with home services.    Code Status: Level 3 - DNAR and DNI    Subjective:   Seen and examined bedside.  No acute overnight events    Objective:     Vitals:   Temp (24hrs), Av.1 °F (36.7 °C), Min:97.6 °F (36.4 °C), Max:98.6 °F (37 °C)    Temp:  [97.6 °F (36.4 °C)-98.6 °F (37 °C)] 98.6 °F (37 °C)  HR:  [72-98]  98  Resp:  [16-17] 16  BP: (117-124)/(69-88) 118/75  SpO2:  [96 %-99 %] 97 %  Body mass index is 14.67 kg/m².     Input and Output Summary (last 24 hours):     Intake/Output Summary (Last 24 hours) at 3/10/2024 1529  Last data filed at 3/10/2024 1101  Gross per 24 hour   Intake 420 ml   Output 200 ml   Net 220 ml       Physical Exam:   Physical Exam     Gen.-Patient comfortable   Neck- Supple. No thyromegaly or lymphadenopathy  Lungs-wheezing  Heart S1-S2, regular rate and rhythm, no murmurs  Abdomen-soft nontender, no organomegaly. Bowel sounds present  Extremities-no cyanosi,  clubbing or edema  Skin- no rash  Neuro-nonfocal     Additional Data:     Labs:  Results from last 7 days   Lab Units 03/10/24  0359 03/09/24  0359   WBC Thousand/uL 10.55* 4.47   HEMOGLOBIN g/dL 12.8 12.9   HEMATOCRIT % 40.2 42.1   PLATELETS Thousands/uL 340 359   NEUTROS PCT %  --  87*   LYMPHS PCT %  --  11*   LYMPHO PCT % 4*  --    MONOS PCT %  --  2*   MONO PCT % 1*  --    EOS PCT % 0 0     Results from last 7 days   Lab Units 03/10/24  0359 03/08/24  1721 03/08/24  1246   SODIUM mmol/L 140   < > 139   POTASSIUM mmol/L 4.1   < > 3.5   CHLORIDE mmol/L 94*   < > 93*   CO2 mmol/L 44*   < > >45*   CO2, I-STAT   --    < >  --    BUN mg/dL 13   < > 11   CREATININE mg/dL 0.48*   < > 0.50*   ANION GAP mmol/L 2   < >  --    CALCIUM mg/dL 8.6   < > 9.4   ALBUMIN g/dL  --   --  4.0   TOTAL BILIRUBIN mg/dL  --   --  0.44   ALK PHOS U/L  --   --  58   ALT U/L  --   --  11   AST U/L  --   --  12*   GLUCOSE RANDOM mg/dL 165*   < > 219*    < > = values in this interval not displayed.     Results from last 7 days   Lab Units 03/08/24  1246   INR  0.93             Results from last 7 days   Lab Units 03/09/24  0359 03/08/24  1246   LACTIC ACID mmol/L  --  0.8   PROCALCITONIN ng/ml <0.05 0.05       Lines/Drains:  Invasive Devices       Peripheral Intravenous Line  Duration             Peripheral IV 03/08/24 Left;Ventral (anterior) Forearm 2 days     Peripheral IV 03/08/24 Right Antecubital 2 days                          Imaging: No pertinent imaging reviewed.    Recent Cultures (last 7 days):   Results from last 7 days   Lab Units 03/08/24  2057 03/08/24  1738 03/08/24  1246   BLOOD CULTURE   --   --  No Growth at 24 hrs.  No Growth at 24 hrs.   SPUTUM CULTURE  3+ Growth of Pseudomonas aeruginosa*  --   --    GRAM STAIN RESULT  Rare Epithelial cells per low power field  2+ Polys  No organisms seen  --   --    LEGIONELLA URINARY ANTIGEN   --  Negative  --        Last 24 Hours Medication List:   Current Facility-Administered Medications   Medication Dose Route Frequency Provider Last Rate    abiraterone  1,000 mg Oral Daily Leisa Wilder MD      acetaminophen  650 mg Oral Q6H PRN Leisa Wilder MD      ALPRAZolam  0.5 mg Oral BID PRN Arleth Scales PA-C      azithromycin  500 mg Oral Q24H Lesia Wilder MD      benzonatate  100 mg Oral TID PRN Leisa Wilder MD      budesonide  0.5 mg Nebulization Q12H Leisa Wilder MD      cefTRIAXone  1,000 mg Intravenous Q24H Leisa Wilder MD 1,000 mg (03/09/24 2124)    dextromethorphan-guaiFENesin  10 mL Oral Q4H PRN Leisa Wilder MD      formoterol  20 mcg Nebulization Q12H Leisa Wilder MD      gabapentin  600 mg Oral HS Arleth Scales PA-C      heparin (porcine)  5,000 Units Subcutaneous Q8H [facility] Leisa [facility] MD      HYDROmorphone  0.2 mg Intravenous Q3H PRN Arleth Scales PA-C      ipratropium  0.5 mg Nebulization Q6H PRN Leisa Wilder MD      levalbuterol  1.25 mg Nebulization TID Cris Fong DO      lidocaine  1 patch Topical Daily Arleth Scales PA-C      LORazepam  0.5 mg Intravenous BID PRN Leisa Wilder MD      methylPREDNISolone sodium succinate  40 mg Intravenous Q12H [facility] Leisa Wilder MD      morphine  15 mg Oral Q12H [facility] Arleth Scales PA-C      ondansetron  4 mg Intravenous Q6H PRN Leisa Wilder MD       pantoprazole  40 mg Intravenous Q24H [facility] Arleth Scales PA-C      senna  17.2 mg Oral HS PRN Arleth Scales PA-C      zolpidem  2.5 mg Oral HS PRN Arleth Scales PA-C          Today, Patient Was Seen By: Leisa Wilder MD    **Please Note: This note may have been constructed using a voice recognition system.**

## 2024-03-11 LAB
ANION GAP SERPL CALCULATED.3IONS-SCNC: 2 MMOL/L
BACTERIA SPT RESP CULT: ABNORMAL
BUN SERPL-MCNC: 12 MG/DL (ref 5–25)
CALCIUM SERPL-MCNC: 8.5 MG/DL (ref 8.4–10.2)
CHLORIDE SERPL-SCNC: 95 MMOL/L (ref 96–108)
CO2 SERPL-SCNC: 44 MMOL/L (ref 21–32)
CREAT SERPL-MCNC: 0.48 MG/DL (ref 0.6–1.3)
ERYTHROCYTE [DISTWIDTH] IN BLOOD BY AUTOMATED COUNT: 12.7 % (ref 11.6–15.1)
GFR SERPL CREATININE-BSD FRML MDRD: 112 ML/MIN/1.73SQ M
GLUCOSE SERPL-MCNC: 176 MG/DL (ref 65–140)
GRAM STN SPEC: ABNORMAL
HCT VFR BLD AUTO: 37 % (ref 36.5–49.3)
HGB BLD-MCNC: 11.6 G/DL (ref 12–17)
MCH RBC QN AUTO: 31.4 PG (ref 26.8–34.3)
MCHC RBC AUTO-ENTMCNC: 31.4 G/DL (ref 31.4–37.4)
MCV RBC AUTO: 100 FL (ref 82–98)
PLATELET # BLD AUTO: 307 THOUSANDS/UL (ref 149–390)
PMV BLD AUTO: 8.7 FL (ref 8.9–12.7)
POTASSIUM SERPL-SCNC: 4.2 MMOL/L (ref 3.5–5.3)
RBC # BLD AUTO: 3.7 MILLION/UL (ref 3.88–5.62)
SODIUM SERPL-SCNC: 141 MMOL/L (ref 135–147)
WBC # BLD AUTO: 7.32 THOUSAND/UL (ref 4.31–10.16)

## 2024-03-11 PROCEDURE — 85027 COMPLETE CBC AUTOMATED: CPT | Performed by: INTERNAL MEDICINE

## 2024-03-11 PROCEDURE — 94761 N-INVAS EAR/PLS OXIMETRY MLT: CPT

## 2024-03-11 PROCEDURE — 99232 SBSQ HOSP IP/OBS MODERATE 35: CPT | Performed by: INTERNAL MEDICINE

## 2024-03-11 PROCEDURE — 94664 DEMO&/EVAL PT USE INHALER: CPT

## 2024-03-11 PROCEDURE — 80048 BASIC METABOLIC PNL TOTAL CA: CPT | Performed by: INTERNAL MEDICINE

## 2024-03-11 PROCEDURE — 99232 SBSQ HOSP IP/OBS MODERATE 35: CPT | Performed by: SURGERY

## 2024-03-11 PROCEDURE — 94640 AIRWAY INHALATION TREATMENT: CPT

## 2024-03-11 PROCEDURE — 94760 N-INVAS EAR/PLS OXIMETRY 1: CPT

## 2024-03-11 RX ORDER — METHYLPREDNISOLONE 4 MG/1
32 TABLET ORAL
Status: DISCONTINUED | OUTPATIENT
Start: 2024-03-12 | End: 2024-03-12 | Stop reason: HOSPADM

## 2024-03-11 RX ORDER — LIDOCAINE 50 MG/G
1 PATCH TOPICAL DAILY PRN
Status: DISCONTINUED | OUTPATIENT
Start: 2024-03-11 | End: 2024-03-12 | Stop reason: HOSPADM

## 2024-03-11 RX ADMIN — METHYLPREDNISOLONE SODIUM SUCCINATE 40 MG: 40 INJECTION, POWDER, FOR SOLUTION INTRAMUSCULAR; INTRAVENOUS at 22:20

## 2024-03-11 RX ADMIN — FORMOTEROL FUMARATE DIHYDRATE 20 MCG: 20 SOLUTION RESPIRATORY (INHALATION) at 19:18

## 2024-03-11 RX ADMIN — BUDESONIDE 0.5 MG: 0.5 INHALANT ORAL at 19:18

## 2024-03-11 RX ADMIN — LEVALBUTEROL HYDROCHLORIDE 1.25 MG: 1.25 SOLUTION RESPIRATORY (INHALATION) at 13:11

## 2024-03-11 RX ADMIN — IPRATROPIUM BROMIDE 0.5 MG: 0.5 SOLUTION RESPIRATORY (INHALATION) at 19:18

## 2024-03-11 RX ADMIN — IPRATROPIUM BROMIDE 0.5 MG: 0.5 SOLUTION RESPIRATORY (INHALATION) at 07:37

## 2024-03-11 RX ADMIN — ALPRAZOLAM 0.5 MG: 0.5 TABLET ORAL at 22:23

## 2024-03-11 RX ADMIN — MORPHINE SULFATE 15 MG: 15 TABLET, EXTENDED RELEASE ORAL at 09:45

## 2024-03-11 RX ADMIN — ABIRATERONE ACETATE 1000 MG: 250 TABLET ORAL at 05:50

## 2024-03-11 RX ADMIN — ZOLPIDEM TARTRATE 2.5 MG: 5 TABLET ORAL at 01:21

## 2024-03-11 RX ADMIN — BUDESONIDE 0.5 MG: 0.5 INHALANT ORAL at 07:37

## 2024-03-11 RX ADMIN — LEVALBUTEROL HYDROCHLORIDE 1.25 MG: 1.25 SOLUTION RESPIRATORY (INHALATION) at 07:37

## 2024-03-11 RX ADMIN — FORMOTEROL FUMARATE DIHYDRATE 20 MCG: 20 SOLUTION RESPIRATORY (INHALATION) at 07:37

## 2024-03-11 RX ADMIN — ACETAMINOPHEN 650 MG: 325 TABLET, FILM COATED ORAL at 15:29

## 2024-03-11 RX ADMIN — MORPHINE SULFATE 15 MG: 15 TABLET, EXTENDED RELEASE ORAL at 22:22

## 2024-03-11 RX ADMIN — METHYLPREDNISOLONE SODIUM SUCCINATE 40 MG: 40 INJECTION, POWDER, FOR SOLUTION INTRAMUSCULAR; INTRAVENOUS at 09:46

## 2024-03-11 RX ADMIN — CEFTRIAXONE 1000 MG: 1 INJECTION, SOLUTION INTRAVENOUS at 22:22

## 2024-03-11 RX ADMIN — LEVALBUTEROL HYDROCHLORIDE 1.25 MG: 1.25 SOLUTION RESPIRATORY (INHALATION) at 19:18

## 2024-03-11 RX ADMIN — GABAPENTIN 600 MG: 300 CAPSULE ORAL at 22:22

## 2024-03-11 NOTE — CASE MANAGEMENT
Case Management Assessment & Discharge Planning Note    Patient name Richard Nava  Location /-01 MRN [id number]  : 1955 Date 3/11/2024       Current Admission Date: 3/8/2024  Current Admission Diagnosis:Respiratory distress   Patient Active Problem List    Diagnosis Date Noted    Respiratory distress 2024    Headache 2024    COPD exacerbation (HCC) 2023    Low oxygen saturation 2023    Chronic respiratory failure (HCC) 2023    Positive blood culture 2023    Acute respiratory insufficiency 2023    Opioid dependence (HCC) 2023    Neoplasm related pain 2023    Medical marijuana use 2023    Encephalopathy acute 2022    Anorexia 2022    Cachexia (HCC) 2022    Palliative care patient 2022    Prostate cancer metastatic to bone (HCC) 2022    Insomnia 2022    Hot flashes 2022    Left lower quadrant abdominal pain 2021    Personal history of colonic polyps 2021    HLD (hyperlipidemia) 2020    Acute respiratory failure with hypoxia (HCC) 2020    Severe protein-calorie malnutrition (HCC) 2020    Chronic obstructive pulmonary disease with acute exacerbation (HCC) 2020    Acute tracheobronchitis 2020      LOS (days): 3  Geometric Mean LOS (GMLOS) (days):   Days to GMLOS:     OBJECTIVE:    Risk of Unplanned Readmission Score: 28.45      Current admission status: Inpatient    Preferred Pharmacy:   [facility]/pharmacy #[id number] - [place] - [address]  [address]  [facility] [address]  Phone: [phone] Fax: [phone]    [facility] [place]  [address]  [place] [address]  Phone: [phone] Fax: [phone]    Primary Care Provider: ISA Peterson    Primary Insurance: MEDICARE  Secondary Insurance:     ASSESSMENT:  Active Health Care Proxies       [facility]  Care Representative - Sister   Primary Phone: [phone] (Mobile)                 Advance Directives  Does patient have a Health Care POA?: Yes  Does patient currently have a Health Care decision maker?: Yes, please see Health Care Proxy section  Does patient have Advance Directives?: Yes  Advance Directives: Power of  for health care, Living will  Primary Contact: Lowell Mcpherson    Readmission Root Cause  30 Day Readmission: No    Patient Information  Admitted from:: Home  Mental Status: Alert  During Assessment patient was accompanied by: Not accompanied during assessment  Assessment information provided by:: Patient  Primary Caregiver: Self  Support Systems: Family members  County of Residence: [place]  What city do you live in?: [place]  Home entry access options. Select all that apply.: Stairs  Number of steps to enter home.: 10  Do the steps have railings?: Yes  Type of Current Residence: Apartment  Upon entering residence, is there a bedroom on the main floor (no further steps)?: Yes  A bedroom is located on the following floor levels of residence (select all that apply):: Basement  Upon entering residence, is there a bathroom on the main floor (no further steps)?: Yes  Indicate which floors of current residence have a bathroom (select all the apply):: Basement  Number of steps to basement from main floor: 10  Living Arrangements: Lives Alone    Activities of Daily Living Prior to Admission  Functional Status: Independent  Completes ADLs independently?: Yes  Ambulates independently?: Yes  Does patient use assisted devices?: Yes  Assisted Devices (DME) used: Home Oxygen concentrator  DME Company Name (respiratory supplies): indepndently owned  O2 Rate(s): 3-4  Does patient currently own DME?: Yes  What DME does the patient currently own?: Home Oxygen concentrator (pt states he has a old concentrator that he got from family.)  Does patient have a history of Outpatient Therapy (PT/OT)?: No  Does  the patient have a history of Short-Term Rehab?: No  Does patient currently have HHC?: No    Patient Information Continued  Income Source: SSI/SSD  Does patient have prescription coverage?: Yes  Does patient receive dialysis treatments?: No  Does patient have a history of substance abuse?: No  Does patient have a history of Mental Health Diagnosis?: No    Social Determinants of Health (SDOH)      Flowsheet Row Most Recent Value   Housing Stability    In the last 12 months, was there a time when you were not able to pay the mortgage or rent on time? N   In the last 12 months, how many places have you lived? 1   In the last 12 months, was there a time when you did not have a steady place to sleep or slept in a shelter (including now)? N   Transportation Needs    In the past 12 months, has lack of transportation kept you from medical appointments or from getting medications? no   In the past 12 months, has lack of transportation kept you from meetings, work, or from getting things needed for daily living? No   Food Insecurity    Within the past 12 months, you worried that your food would run out before you got the money to buy more. Never true   Within the past 12 months, the food you bought just didn't last and you didn't have money to get more. Never true   Utilities    In the past 12 months has the electric, gas, oil, or water company threatened to shut off services in your home? No            DISCHARGE DETAILS:    Discharge planning discussed with:: pt  Freedom of Choice: Yes     CM contacted family/caregiver?: No- see comments  Were Treatment Team discharge recommendations reviewed with patient/caregiver?: Yes  Did patient/caregiver verbalize understanding of patient care needs?: Yes  Were patient/caregiver advised of the risks associated with not following Treatment Team discharge recommendations?: Yes    Requested Home Health Care         Is the patient interested in HHC at discharge?: No    DME Referral  Provided  Referral made for DME?: Yes  DME referral completed for the following items:: Home Oxygen concentrator, Portable Oxygen tanks  DME Supplier Name:: [employer]    Other Referral/Resources/Interventions Provided:  Interventions: DME    Treatment Team Recommendation: Home  Discharge Destination Plan:: Home  Transport at Discharge : Ride Share     IMM Given (Date):: 03/11/24  IMM Given to:: Patient  Family notified:: Reviewed with pt.  Additional Comments: Cm met with pt and reviewed cm role. Pt lives a basement apartment on his niece-in-laws home. He states there are 10 steps down and he is able to do them. He has a nebulizer and a rescue inhaler. He has no hx of outpt PT, STR, MH or D/A tx hx. He told the oncology social worker in the fall that he was struggling financially due to family issues. He stated to cm that he is making ends meet fine and didnt want to fill out any forms or received any resources beyond what had been presented to him before. Pt reports that he as a old home O2 concentrator given by a family member. Inpatient CM had placed an order wit [employer] for new equipment in APril 2023. He reports he sent it all back due to fees and insurance issues. Cm advised that if he needs the equipment at home as indicated in respiratory home O2 eval, she would need to place an order. He wanted to self pay for a portable concentrator. Cm notified him that the hospital is not able to order those directly from the hospital and he would need to be evaluated at home by [employer]. Cm placed [employer] order on [facility] for home O2 concentrator and portable tank. Portable tank dispensed after approval received from [employer] via [facility]. Pt may need a Lyft ride home tomorrow.

## 2024-03-11 NOTE — PROGRESS NOTES
[facility]  Progress Note  Name: Richard Nava I  MRN: [id number]  Unit/Bed#: -01 I Date of Admission: 3/8/2024   Date of Service: 3/11/2024 I Hospital Day: 3    Assessment/Plan   * Respiratory distress  Assessment & Plan  Baseline oxygen requirement: 2.5 to 3.5 L  Secondary to: emphysema/bronchitis/pneumonia  Continue to treat underlying cause, see further management below  CT PE study: No PE  Currently saturating well with 4 L nasal cannula oxygen.  Wean off as tolerated to his baseline  Home O2 evaluation prior to discharge-qualified for home oxygen    Headache  Assessment & Plan  Patient on my evaluation complaining of headache, neck pain  Stat CT head negative for acute intracranial process, MRI brain did not show any acute changes, metastasis but showed intracranial stenosis and CTA was recommended.  Patient currently without headache.  Refused to have CTA    Palliative care patient  Assessment & Plan  Patient follows up with palliative as outpatient  Protonix, Ambien, gabapentin, MS Contin, Dilaudid ordered by palliative  Discussed with palliative medicine.  Patient will continue level 3 and will follow-up outpatient with palliative      Prostate cancer metastatic to bone (HCC)  Assessment & Plan  Follows up with heme-onc and palliative care  Currently on Zytiga, Xgeva      Severe protein-calorie malnutrition (HCC)  Assessment & Plan  Malnutrition Findings:   Adult Malnutrition type: Chronic illness  Adult Degree of Malnutrition: Other severe protein calorie malnutrition  Malnutrition Characteristics: Muscle loss, Fat loss, Weight loss, Inadequate energy                  360 Statement: Severe malnutrition r/t chronic condition, as evidenced by intake meeting <75% estimated needs > 1 month, severe fat and muscle wasting (temples, clavicle, buccal region), and 10.5% wt loss x 2 months (3/10/24: 93#, 1/31/24: 104#). Treatment: PO diet + nutrition supplements    BMI  Findings:  Adult BMI Classifications: Underweight < 18.5        Body mass index is 14.38 kg/m².     Attrition consult with nutrition supplements    Chronic obstructive pulmonary disease with acute exacerbation (HCC)  Assessment & Plan  pResented with acute onset of shortness of breath  Continue Pulmicort, Perforomist, Xopenex, Atrovent  Discussed with pulmonology.  Per pulmonology patient was switched to oral Solu-Medrol starting tomorrow continue Carlos A on discharge  Qualified for home oxygen  Discharge tomorrow             VTE Pharmacologic Prophylaxis: VTE Score: 7 High Risk (Score >/= 5) - Pharmacological DVT Prophylaxis Ordered: heparin. Sequential Compression Devices Ordered.    Mobility:   Basic Mobility Inpatient Raw Score: 19  JH-HLM Goal: 6: Walk 10 steps or more  JH-HLM Achieved: 8: Walk 250 feet ot more  HLM Goal achieved. Continue to encourage appropriate mobility.    Patient Centered Rounds: I performed bedside rounds with nursing staff today.   Discussions with Specialists or Other Care Team Provider: cm, pt, ot, pulmonology    Education and Discussions with Family / Patient: Patient declined call to .     Total Time Spent on Date of Encounter in care of patient: 45 mins. This time was spent on one or more of the following: performing physical exam; counseling and coordination of care; obtaining or reviewing history; documenting in the medical record; reviewing/ordering tests, medications or procedures; communicating with other healthcare professionals and discussing with patient's family/caregivers.    Current Length of Stay: 3 day(s)  Current Patient Status: Inpatient   Certification Statement: The patient will continue to require additional inpatient hospital stay due to copd exacerbation   Discharge Plan: Anticipate discharge tomorrow to home.    Code Status: Level 3 - DNAR and DNI    Subjective:     Overall improved since admission.    Objective:     Vitals:   Temp (24hrs), Av.4  °F (36.9 °C), Min:98.2 °F (36.8 °C), Max:98.5 °F (36.9 °C)    Temp:  [98.2 °F (36.8 °C)-98.5 °F (36.9 °C)] 98.2 °F (36.8 °C)  HR:  [78-92] 78  Resp:  [18-20] 20  BP: (106-128)/(63-73) 128/65  SpO2:  [80 %-100 %] 96 %  Body mass index is 14.38 kg/m².     Input and Output Summary (last 24 hours):     Intake/Output Summary (Last 24 hours) at 3/11/2024 1818  Last data filed at 3/11/2024 1524  Gross per 24 hour   Intake 778 ml   Output 600 ml   Net 178 ml       Physical Exam:   Physical Exam  Vitals and nursing note reviewed.   Constitutional:       General: He is not in acute distress.     Appearance: He is not diaphoretic.   HENT:      Head: Normocephalic.   Eyes:      General: No scleral icterus.        Right eye: No discharge.         Left eye: No discharge.   Cardiovascular:      Rate and Rhythm: Normal rate and regular rhythm.   Pulmonary:      Effort: Pulmonary effort is normal. No respiratory distress.      Breath sounds: Wheezing present. No rhonchi or rales.   Abdominal:      General: There is no distension.      Palpations: Abdomen is soft.      Tenderness: There is no abdominal tenderness. There is no guarding or rebound.   Musculoskeletal:      Cervical back: Normal range of motion.      Right lower leg: No edema.      Left lower leg: No edema.   Skin:     General: Skin is warm.   Neurological:      Mental Status: He is alert and oriented to person, place, and time.   Psychiatric:         Mood and Affect: Mood normal.         Behavior: Behavior normal.          Additional Data:     Labs:  Results from last 7 days   Lab Units 03/11/24  0432 03/10/24  0359 03/09/24  0359   WBC Thousand/uL 7.32 10.55* 4.47   HEMOGLOBIN g/dL 11.6* 12.8 12.9   HEMATOCRIT % 37.0 40.2 42.1   PLATELETS Thousands/uL 307 340 359   NEUTROS PCT %  --   --  87*   LYMPHS PCT %  --   --  11*   LYMPHO PCT %  --  4*  --    MONOS PCT %  --   --  2*   MONO PCT %  --  1*  --    EOS PCT %  --  0 0     Results from last 7 days   Lab Units  03/11/24  0432 03/08/24  1721 03/08/24  1246   SODIUM mmol/L 141   < > 139   POTASSIUM mmol/L 4.2   < > 3.5   CHLORIDE mmol/L 95*   < > 93*   CO2 mmol/L 44*   < > >45*   CO2, I-STAT   --    < >  --    BUN mg/dL 12   < > 11   CREATININE mg/dL 0.48*   < > 0.50*   ANION GAP mmol/L 2   < >  --    CALCIUM mg/dL 8.5   < > 9.4   ALBUMIN g/dL  --   --  4.0   TOTAL BILIRUBIN mg/dL  --   --  0.44   ALK PHOS U/L  --   --  58   ALT U/L  --   --  11   AST U/L  --   --  12*   GLUCOSE RANDOM mg/dL 176*   < > 219*    < > = values in this interval not displayed.     Results from last 7 days   Lab Units 03/08/24  1246   INR  0.93             Results from last 7 days   Lab Units 03/09/24  0359 03/08/24  1246   LACTIC ACID mmol/L  --  0.8   PROCALCITONIN ng/ml <0.05 0.05       Lines/Drains:  Invasive Devices       Peripheral Intravenous Line  Duration             Peripheral IV 03/08/24 Left;Ventral (anterior) Forearm 3 days    Peripheral IV 03/08/24 Right Antecubital 3 days                          Imaging: Reviewed radiology reports from this admission including: chest xray, chest CT scan, CT head, and MRI brain    Recent Cultures (last 7 days):   Results from last 7 days   Lab Units 03/08/24  2057 03/08/24  1738 03/08/24  1246   BLOOD CULTURE   --   --  No Growth at 48 hrs.  No Growth at 48 hrs.   SPUTUM CULTURE  3+ Growth of Pseudomonas aeruginosa*  --   --    GRAM STAIN RESULT  Rare Epithelial cells per low power field  2+ Polys  No organisms seen  --   --    LEGIONELLA URINARY ANTIGEN   --  Negative  --        Last 24 Hours Medication List:   Current Facility-Administered Medications   Medication Dose Route Frequency Provider Last Rate    abiraterone  1,000 mg Oral Daily Leisa Wilder MD      acetaminophen  650 mg Oral Q6H PRN Leisa Wilder MD      ALPRAZolam  0.5 mg Oral BID PRN Arleth Scales PA-C      benzonatate  100 mg Oral TID PRN Ragarupa Kotala, MD      budesonide  0.5 mg Nebulization Q12H Leisa  MD Tina      cefTRIAXone  1,000 mg Intravenous Q24H Leisa Wilder MD 1,000 mg (03/10/24 2228)    dextromethorphan-guaiFENesin  10 mL Oral Q4H PRN Leisa Wilder MD      formoterol  20 mcg Nebulization Q12H Leisa Wilder MD      gabapentin  600 mg Oral HS Arleth Scales PA-C      heparin (porcine)  5,000 Units Subcutaneous Q8H [facility] Leisa Wilder MD      HYDROmorphone  0.2 mg Intravenous Q3H PRN Arleth Scales PA-C      ipratropium  0.5 mg Nebulization Q6H PRN Leisa Wilder MD      levalbuterol  1.25 mg Nebulization TID Cris Fong,       lidocaine  1 patch Topical Daily PRN Nadia Wilkerson, DO      LORazepam  0.5 mg Intravenous BID PRN Leisa Wilder MD      [START ON 3/12/2024] methylprednisolone  32 mg Oral Daily With Breakfast Mason Hall MD      methylPREDNISolone sodium succinate  40 mg Intravenous Q12H [facility] Mason Hall MD      morphine  15 mg Oral Q12H [facility] Arleth Scales PA-C      ondansetron  4 mg Intravenous Q6H PRN Leisa Wilder MD      pantoprazole  40 mg Intravenous Q24H [facility] Arleth Scales PA-C      senna  17.2 mg Oral HS PRN Arleth Scales PA-C      zolpidem  2.5 mg Oral HS PRN Arleth Scales PA-C          Today, Patient Was Seen By: Rox Harris MD    **Please Note: This note may have been constructed using a voice recognition system.**

## 2024-03-11 NOTE — ASSESSMENT & PLAN NOTE
pResented with acute onset of shortness of breath  Continue Pulmicjudy Perforomist, Xopenex, Atrovent  Discussed with pulmonology.  Per pulmonology patient was switched to oral Solu-Medrol starting tomorrow continue Carlos A on discharge  Qualified for home oxygen  Discharge tomorrow

## 2024-03-11 NOTE — PROGRESS NOTES
"Progress Note - Palliative & Supportive Care  Richard Nava  68 y.o.  male  /-01   MRN: [id number]  Encounter: [id number]     Assessment/Plan:  Acute hypoxic and acute on chronic hypercapnic respiratory failure on 4-5L NC  Severe emphysema, COPD with acute exacerbation  LLL groundglass opacity, infectious vs inflammatory  Metastatic prostate cancer  Bone metastases, extensive, multifocal  Cancer related pain  Headache-resolved  Severe protein calorie malnutrition  Debility, deconditioning  Palliative care patient  Goals of care  -continue current medical care/optimization  -goals clear; level 3, DNAR/DNI, no additional limits on care; not ready for hospice, wants to continue with treatments/hospitalizations; will consider transition to comfort with/without hospice for ongoing decline or abrupt deterioration; discussed criteria for hospice and where delivered; confirmed sister/Mary-\"Piedad\" as HCA  -discussed recent imaging and recommendation for CTA head/neck; patient not interested in having study and feels would not want anything done if offered intervention  -pain regimen/headache   -tylenol 650mg po q6h prn, does not want it scheduled   -morphine ER(MSCONTIN) 15mg po q12h   -lidoderm 5% patch daily over 12 hours christiane    -feels worked but wants it as prn    -please dispense Rx upon discharge    -also available OTC as 4% patch - d/w patient  -bowel regimen   -senna, 2 tabs at bedtime prn- does not want it scheduled  -insomnia   -cont ambien 2.5mg po qhs prn   -please dispense Rx upon discharge  -PT/OT eval pending, eval for safety at home; patient lives in basement apartment, approx 10 steps to get there, family lives above; feels ready/safe to go home; wants to ensure gets home oxygen/portable oxygen  -has outpt f/u scheduled for 3/19/24, will ask office to reschedule at later date, 2 week hospital f/u; patient states has enough MSCONTIN at home, will call office for refill if needed prior to " appointment  -d/w RN  -d/w hospitalist  -will follow intermittently as needed; please tigertext palliative care on-call with questions/concerns    Code status: Level 3, DNAR/DNI    Subjective:  Patient seen and examined. Headache resolved, feels lidoderm patch helped. Pain controlled on current regimen. Clark diet. No n/v. BM yesterday. Ambulating in room independently per patient.     Received:  MSCONTIN 30mg po/last 24h; no prn utilized  1x dose of ambien given at 0121    Last BM: yesterday, 3/10/24    Medications    Current Facility-Administered Medications:     abiraterone (ZYTIGA) tablet 1,000 mg, 1,000 mg, Oral, Daily, Leisa Wilder MD, 1,000 mg at 03/11/24 0550    acetaminophen (TYLENOL) tablet 650 mg, 650 mg, Oral, Q6H PRN, Leisa Wilder MD    ALPRAZolam (XANAX) tablet 0.5 mg, 0.5 mg, Oral, BID PRN, Arleth Scales PA-C, 0.5 mg at 03/09/24 2352    benzonatate (TESSALON PERLES) capsule 100 mg, 100 mg, Oral, TID PRN, Leisa Wilder MD    budesonide (PULMICORT) inhalation solution 0.5 mg, 0.5 mg, Nebulization, Q12H, Leisa Wilder MD, 0.5 mg at 03/11/24 0737    cefTRIAXone (ROCEPHIN) IVPB (premix in dextrose) 1,000 mg 50 mL, 1,000 mg, Intravenous, Q24H, Leisa Wilder MD, Last Rate: 100 mL/hr at 03/10/24 2228, 1,000 mg at 03/10/24 2228    dextromethorphan-guaiFENesin (ROBITUSSIN DM) oral syrup 10 mL, 10 mL, Oral, Q4H PRN, Leisa Wilder MD    formoterol (PERFOROMIST) nebulizer solution 20 mcg, 20 mcg, Nebulization, Q12H, Leisa Wilder MD, 20 mcg at 03/11/24 0737    gabapentin (NEURONTIN) capsule 600 mg, 600 mg, Oral, HS, Arleth Scales PA-C, 600 mg at 03/10/24 2227    heparin (porcine) subcutaneous injection 5,000 Units, 5,000 Units, Subcutaneous, [facility]H [facility], Leisa Wilder MD, 5,000 Units at 03/10/24 0600    HYDROmorphone HCl (DILAUDID) injection 0.2 mg, 0.2 mg, Intravenous, Q3H PRN, Arleth Scales PA-C, 0.2 mg at 03/09/24 0805    ipratropium (ATROVENT) 0.02 %  "inhalation solution 0.5 mg, 0.5 mg, Nebulization, Q6H PRN, Leisa Wilder MD, 0.5 mg at 03/11/24 0737    levalbuterol (XOPENEX) inhalation solution 1.25 mg, 1.25 mg, Nebulization, TID, Cris Fong DO, 1.25 mg at 03/11/24 0737    lidocaine (LIDODERM) 5 % patch 1 patch, 1 patch, Topical, Daily, Arleth Scales PA-C, 1 patch at 03/10/24 0834    LORazepam (ATIVAN) injection 0.5 mg, 0.5 mg, Intravenous, BID PRN, Leisa Wilder MD    methylPREDNISolone sodium succinate (Solu-MEDROL) injection 40 mg, 40 mg, Intravenous, Q12H FANNY, Leisa Wilder MD, 40 mg at 03/10/24 2227    morphine (MS CONTIN) ER tablet 15 mg, 15 mg, Oral, Q12H FANNY, Arleth Scales PA-C, 15 mg at 03/10/24 2227    ondansetron (ZOFRAN) injection 4 mg, 4 mg, Intravenous, Q6H PRN, Leisa Wilder MD    pantoprazole (PROTONIX) injection 40 mg, 40 mg, Intravenous, Q24H FANNY, Arleth Scales PA-C, 40 mg at 03/10/24 0834    senna (SENOKOT) tablet 17.2 mg, 17.2 mg, Oral, HS PRN, Arleth Scales PA-C    zolpidem (AMBIEN) tablet 2.5 mg, 2.5 mg, Oral, HS PRN, Arleth Scales PA-C, 2.5 mg at 03/11/24 0121    Objective:  /73   Pulse 79   Temp 98.5 °F (36.9 °C)   Resp 16   Ht 5' 7\" (1.702 m)   Wt 41.6 kg (91 lb 12.8 oz)   SpO2 98%   BMI 14.38 kg/m²   Physical Exam:  General: chronically ill appearing, cachectic, no distress  Neurological: aaox3  Cardiovascular: reg  Respiratory: normal effort, no distress, on 4L NC  Gastrointestinal: soft, nt, nd  Musculoskeletal: no edema, muscle wasting  Skin: warm, dry  Psychiatric: normal affect, normal mood, judgment/thought content intact    Lab Results: I have personally reviewed pertinent labs., CBC:   Lab Results   Component Value Date    WBC 7.32 03/11/2024    HGB 11.6 (L) 03/11/2024    HCT 37.0 03/11/2024     (H) 03/11/2024     03/11/2024    RBC 3.70 (L) 03/11/2024    MCH 31.4 03/11/2024    MCHC 31.4 03/11/2024    RDW 12.7 03/11/2024    MPV " "8.7 (L) 03/11/2024   , CMP:   Lab Results   Component Value Date    SODIUM 141 03/11/2024    K 4.2 03/11/2024    CL 95 (L) 03/11/2024    CO2 44 (H) 03/11/2024    BUN 12 03/11/2024    CREATININE 0.48 (L) 03/11/2024    CALCIUM 8.5 03/11/2024    EGFR 112 03/11/2024   , PT/PTT:No results found for: \"PT\", \"PTT\"    Counseling / Coordination of Care  Total floor / unit time spent today 45 minutes.  Greater than 50% of total time was spent with the patient and / or family counseling and / or coordinating of care. A description of the counseling / coordination of care: current condition, goals of care, hospice discussion, symptom management, emotional support, hospital f/u.      Nadia Wilkerson, DO  Palliative & Supportive Care   "

## 2024-03-11 NOTE — ASSESSMENT & PLAN NOTE
Malnutrition Findings:   Adult Malnutrition type: Chronic illness  Adult Degree of Malnutrition: Other severe protein calorie malnutrition  Malnutrition Characteristics: Muscle loss, Fat loss, Weight loss, Inadequate energy                  360 Statement: Severe malnutrition r/t chronic condition, as evidenced by intake meeting <75% estimated needs > 1 month, severe fat and muscle wasting (temples, clavicle, buccal region), and 10.5% wt loss x 2 months (3/10/24: 93#, 1/31/24: 104#). Treatment: PO diet + nutrition supplements    BMI Findings:  Adult BMI Classifications: Underweight < 18.5        Body mass index is 14.38 kg/m².     Attrition consult with nutrition supplements

## 2024-03-11 NOTE — RESPIRATORY THERAPY NOTE
RT Protocol Note  Richard Nava 68 y.o. male MRN: [id number]  Unit/Bed#: -01 Encounter: [id number]    Assessment    Principal Problem:    Respiratory distress  Active Problems:    Chronic obstructive pulmonary disease with acute exacerbation (HCC)    Severe protein-calorie malnutrition (HCC)    Prostate cancer metastatic to bone (HCC)    Palliative care patient    Headache      Home Pulmonary Medications:  albuterol       Past Medical History:   Diagnosis Date    Bone cancer (HCC)     Colon polyp     COPD (chronic obstructive pulmonary disease) (HCC)     Emphysema lung (HCC)     Hyperlipidemia     Prostate cancer (HCC)      Social History     Socioeconomic History    Marital status: Single     Spouse name: None    Number of children: None    Years of education: None    Highest education level: None   Occupational History    None   Tobacco Use    Smoking status: Former     Current packs/day: 0.00     Average packs/day: 1 pack/day for 42.0 years (42.0 ttl pk-yrs)     Types: Cigarettes     Start date:      Quit date:      Years since quittin.2    Smokeless tobacco: Never   Vaping Use    Vaping status: Never Used   Substance and Sexual Activity    Alcohol use: Never    Drug use: Never    Sexual activity: Not Currently   Other Topics Concern    None   Social History Narrative    None     Social Determinants of Health     Financial Resource Strain: Low Risk  (2023)    Overall Financial Resource Strain (CARDIA)     Difficulty of Paying Living Expenses: Not hard at all   Food Insecurity: No Food Insecurity (2023)    Hunger Vital Sign     Worried About Running Out of Food in the Last Year: Never true     Ran Out of Food in the Last Year: Never true   Transportation Needs: No Transportation Needs (2023)    PRAPARE - Transportation     Lack of Transportation (Medical): No     Lack of Transportation (Non-Medical): No   Physical Activity: Not on file   Stress: Not on file   Social  "Connections: Not on file   Intimate Partner Violence: Not At Risk (4/22/2022)    Humiliation, Afraid, Rape, and Kick questionnaire     Fear of Current or Ex-Partner: No     Emotionally Abused: No     Physically Abused: No     Sexually Abused: No   Housing Stability: Low Risk  (4/24/2023)    Housing Stability Vital Sign     Unable to Pay for Housing in the Last Year: No     Number of Places Lived in the Last Year: 1     Unstable Housing in the Last Year: No       Subjective         Objective    Physical Exam:        Vitals:  Blood pressure 122/73, pulse 79, temperature 98.5 °F (36.9 °C), resp. rate 16, height 5' 7\" (1.702 m), weight 41.6 kg (91 lb 12.8 oz), SpO2 98%.    Results from last 7 days   Lab Units 03/08/24  1721   MERVIN TEST  Positive Allens Test       Imaging and other studies:           Plan       Airway Clearance Plan: Incentive Spirometer     Resp Comments: (P) ACP d/c'd per protocol   "

## 2024-03-11 NOTE — ASSESSMENT & PLAN NOTE
Patient follows up with palliative as outpatient  Protonix, Ambien, gabapentin, MS Contin, Dilaudid ordered by palliative  Discussed with palliative medicine.  Patient will continue level 3 and will follow-up outpatient with palliative

## 2024-03-11 NOTE — PHYSICAL THERAPY NOTE
PHYSICAL THERAPY SCREEN NOTE      Patient Name: Richard Nava  Today's Date: 3/11/2024       03/11/24 1217   Note Type   Note type Screen   Additional Comments PT orders received, chart review performed. RT documented pt ambulated over 1500 ft independently, pt independently ambulating in room, no concerns about DC home. Pt w/ no acute PT needs, will DC PT       Gwen Urena, PT, DPT

## 2024-03-11 NOTE — PLAN OF CARE
Problem: PAIN - ADULT  Goal: Verbalizes/displays adequate comfort level or baseline comfort level  Description: Interventions:  - Encourage patient to monitor pain and request assistance  - Assess pain using appropriate pain scale  - Administer analgesics based on type and severity of pain and evaluate response  - Implement non-pharmacological measures as appropriate and evaluate response  - Consider cultural and social influences on pain and pain management  - Notify physician/advanced practitioner if interventions unsuccessful or patient reports new pain  Outcome: Progressing     Problem: INFECTION - ADULT  Goal: Absence or prevention of progression during hospitalization  Description: INTERVENTIONS:  - Assess and monitor for signs and symptoms of infection  - Monitor lab/diagnostic results  - Monitor all insertion sites, i.e. indwelling lines, tubes, and drains  - Monitor endotracheal if appropriate and nasal secretions for changes in amount and color  - [place] appropriate cooling/warming therapies per order  - Administer medications as ordered  - Instruct and encourage patient and family to use good hand hygiene technique  - Identify and instruct in appropriate isolation precautions for identified infection/condition  Outcome: Progressing     Problem: SAFETY ADULT  Goal: Patient will remain free of falls  Description: INTERVENTIONS:  - Educate patient/family on patient safety including physical limitations  - Instruct patient to call for assistance with activity   - Consult OT/PT to assist with strengthening/mobility   - Keep Call bell within reach  - Keep bed low and locked with side rails adjusted as appropriate  - Keep care items and personal belongings within reach  - Initiate and maintain comfort rounds  - Make Fall Risk Sign visible to staff  - Offer Toileting every 3 Hours, in advance of need  - Initiate/Maintain bed alarm  - Obtain necessary fall risk management equipment: alarm, socks  - Apply  yellow socks and bracelet for high fall risk patients  - Consider moving patient to room near nurses station  Outcome: Progressing  Goal: Maintain or return to baseline ADL function  Description: INTERVENTIONS:  -  Assess patient's ability to carry out ADLs; assess patient's baseline for ADL function and identify physical deficits which impact ability to perform ADLs (bathing, care of mouth/teeth, toileting, grooming, dressing, etc.)  - Assess/evaluate cause of self-care deficits   - Assess range of motion  - Assess patient's mobility; develop plan if impaired  - Assess patient's need for assistive devices and provide as appropriate  - Encourage maximum independence but intervene and supervise when necessary  - Involve family in performance of ADLs  - Assess for home care needs following discharge   - Consider OT consult to assist with ADL evaluation and planning for discharge  - Provide patient education as appropriate  Outcome: Progressing  Goal: Maintains/Returns to pre admission functional level  Description: INTERVENTIONS:  - Perform AM-PAC 6 Click Basic Mobility/ Daily Activity assessment daily.  - Set and communicate daily mobility goal to care team and patient/family/caregiver.   - Collaborate with rehabilitation services on mobility goals if consulted  - Perform Range of Motion 3 times a day.  - Reposition patient every 2 hours.  - Dangle patient 3 times a day  - Stand patient 3 times a day  - Ambulate patient 3 times a day  - Out of bed to chair 3 times a day   - Out of bed for meals 3 times a day  - Out of bed for toileting  - Record patient progress and toleration of activity level   Outcome: Progressing     Problem: DISCHARGE PLANNING  Goal: Discharge to home or other facility with appropriate resources  Description: INTERVENTIONS:  - Identify barriers to discharge w/patient and caregiver  - Arrange for needed discharge resources and transportation as appropriate  - Identify discharge learning needs  (meds, wound care, etc.)  - Arrange for interpretive services to assist at discharge as needed  - Refer to Case Management Department for coordinating discharge planning if the patient needs post-hospital services based on physician/advanced practitioner order or complex needs related to functional status, cognitive ability, or social support system  Outcome: Progressing     Problem: Knowledge Deficit  Goal: Patient/family/caregiver demonstrates understanding of disease process, treatment plan, medications, and discharge instructions  Description: Complete learning assessment and assess knowledge base.  Interventions:  - Provide teaching at level of understanding  - Provide teaching via preferred learning methods  Outcome: Progressing     Problem: CARDIOVASCULAR - ADULT  Goal: Maintains optimal cardiac output and hemodynamic stability  Description: INTERVENTIONS:  - Monitor I/O, vital signs and rhythm  - Monitor for S/S and trends of decreased cardiac output  - Administer and titrate ordered vasoactive medications to optimize hemodynamic stability  - Assess quality of pulses, skin color and temperature  - Assess for signs of decreased coronary artery perfusion  - Instruct patient to report change in severity of symptoms  Outcome: Progressing  Goal: Absence of cardiac dysrhythmias or at baseline rhythm  Description: INTERVENTIONS:  - Continuous cardiac monitoring, vital signs, obtain 12 lead EKG if ordered  - Administer antiarrhythmic and heart rate control medications as ordered  - Monitor electrolytes and administer replacement therapy as ordered  Outcome: Progressing     Problem: RESPIRATORY - ADULT  Goal: Achieves optimal ventilation and oxygenation  Description: INTERVENTIONS:  - Assess for changes in respiratory status  - Assess for changes in mentation and behavior  - Position to facilitate oxygenation and minimize respiratory effort  - Oxygen administered by appropriate delivery if ordered  - Initiate smoking  cessation education as indicated  - Encourage broncho-pulmonary hygiene including cough, deep breathe, Incentive Spirometry  - Assess the need for suctioning and aspirate as needed  - Assess and instruct to report SOB or any respiratory difficulty  - Respiratory Therapy support as indicated  Outcome: Progressing     Problem: MUSCULOSKELETAL - ADULT  Goal: Maintain or return mobility to safest level of function  Description: INTERVENTIONS:  - Assess patient's ability to carry out ADLs; assess patient's baseline for ADL function and identify physical deficits which impact ability to perform ADLs (bathing, care of mouth/teeth, toileting, grooming, dressing, etc.)  - Assess/evaluate cause of self-care deficits   - Assess range of motion  - Assess patient's mobility  - Assess patient's need for assistive devices and provide as appropriate  - Encourage maximum independence but intervene and supervise when necessary  - Involve family in performance of ADLs  - Assess for home care needs following discharge   - Consider OT consult to assist with ADL evaluation and planning for discharge  - Provide patient education as appropriate  Outcome: Progressing  Goal: Maintain proper alignment of affected body part  Description: INTERVENTIONS:  - Support, maintain and protect limb and body alignment  - Provide patient/ family with appropriate education  Outcome: Progressing     Problem: Nutrition/Hydration-ADULT  Goal: Nutrient/Hydration intake appropriate for improving, restoring or maintaining nutritional needs  Description: Monitor and assess patient's nutrition/hydration status for malnutrition. Collaborate with interdisciplinary team and initiate plan and interventions as ordered.  Monitor patient's weight and dietary intake as ordered or per policy. Utilize nutrition screening tool and intervene as necessary. Determine patient's food preferences and provide high-protein, high-caloric foods as appropriate.     INTERVENTIONS:  -  Monitor oral intake, urinary output, labs, and treatment plans  - Assess nutrition and hydration status and recommend course of action  - Evaluate amount of meals eaten  - Assist patient with eating if necessary   - Allow adequate time for meals  - Recommend/ encourage appropriate diets, oral nutritional supplements, and vitamin/mineral supplements  - Order, calculate, and assess calorie counts as needed  - Recommend, monitor, and adjust tube feedings and TPN/PPN based on assessed needs  - Assess need for intravenous fluids  - Provide specific nutrition/hydration education as appropriate  - Include patient/family/caregiver in decisions related to nutrition  Outcome: Progressing

## 2024-03-11 NOTE — PLAN OF CARE
Problem: PAIN - ADULT  Goal: Verbalizes/displays adequate comfort level or baseline comfort level  Description: Interventions:  - Encourage patient to monitor pain and request assistance  - Assess pain using appropriate pain scale  - Administer analgesics based on type and severity of pain and evaluate response  - Implement non-pharmacological measures as appropriate and evaluate response  - Consider cultural and social influences on pain and pain management  - Notify physician/advanced practitioner if interventions unsuccessful or patient reports new pain  Outcome: Progressing     Problem: INFECTION - ADULT  Goal: Absence or prevention of progression during hospitalization  Description: INTERVENTIONS:  - Assess and monitor for signs and symptoms of infection  - Monitor lab/diagnostic results  - Monitor all insertion sites, i.e. indwelling lines, tubes, and drains  - Monitor endotracheal if appropriate and nasal secretions for changes in amount and color  - [place] appropriate cooling/warming therapies per order  - Administer medications as ordered  - Instruct and encourage patient and family to use good hand hygiene technique  - Identify and instruct in appropriate isolation precautions for identified infection/condition  Outcome: Progressing     Problem: SAFETY ADULT  Goal: Patient will remain free of falls  Description: INTERVENTIONS:  - Educate patient/family on patient safety including physical limitations  - Instruct patient to call for assistance with activity   - Consult OT/PT to assist with strengthening/mobility   - Keep Call bell within reach  - Keep bed low and locked with side rails adjusted as appropriate  - Keep care items and personal belongings within reach  - Initiate and maintain comfort rounds  - Make Fall Risk Sign visible to staff  - Offer Toileting every 3 Hours, in advance of need  - Initiate/Maintain bed alarm  - Obtain necessary fall risk management equipment: alarm, socks  - Apply  yellow socks and bracelet for high fall risk patients  - Consider moving patient to room near nurses station  Outcome: Progressing  Goal: Maintain or return to baseline ADL function  Description: INTERVENTIONS:  -  Assess patient's ability to carry out ADLs; assess patient's baseline for ADL function and identify physical deficits which impact ability to perform ADLs (bathing, care of mouth/teeth, toileting, grooming, dressing, etc.)  - Assess/evaluate cause of self-care deficits   - Assess range of motion  - Assess patient's mobility; develop plan if impaired  - Assess patient's need for assistive devices and provide as appropriate  - Encourage maximum independence but intervene and supervise when necessary  - Involve family in performance of ADLs  - Assess for home care needs following discharge   - Consider OT consult to assist with ADL evaluation and planning for discharge  - Provide patient education as appropriate  Outcome: Progressing  Goal: Maintains/Returns to pre admission functional level  Description: INTERVENTIONS:  - Perform AM-PAC 6 Click Basic Mobility/ Daily Activity assessment daily.  - Set and communicate daily mobility goal to care team and patient/family/caregiver.   - Collaborate with rehabilitation services on mobility goals if consulted  - Perform Range of Motion 3 times a day.  - Reposition patient every 2 hours.  - Dangle patient 3 times a day  - Stand patient 3 times a day  - Ambulate patient 3 times a day  - Out of bed to chair 3 times a day   - Out of bed for meals 3 times a day  - Out of bed for toileting  - Record patient progress and toleration of activity level   Outcome: Progressing     Problem: DISCHARGE PLANNING  Goal: Discharge to home or other facility with appropriate resources  Description: INTERVENTIONS:  - Identify barriers to discharge w/patient and caregiver  - Arrange for needed discharge resources and transportation as appropriate  - Identify discharge learning needs  (meds, wound care, etc.)  - Arrange for interpretive services to assist at discharge as needed  - Refer to Case Management Department for coordinating discharge planning if the patient needs post-hospital services based on physician/advanced practitioner order or complex needs related to functional status, cognitive ability, or social support system  Outcome: Progressing     Problem: Knowledge Deficit  Goal: Patient/family/caregiver demonstrates understanding of disease process, treatment plan, medications, and discharge instructions  Description: Complete learning assessment and assess knowledge base.  Interventions:  - Provide teaching at level of understanding  - Provide teaching via preferred learning methods  Outcome: Progressing     Problem: CARDIOVASCULAR - ADULT  Goal: Maintains optimal cardiac output and hemodynamic stability  Description: INTERVENTIONS:  - Monitor I/O, vital signs and rhythm  - Monitor for S/S and trends of decreased cardiac output  - Administer and titrate ordered vasoactive medications to optimize hemodynamic stability  - Assess quality of pulses, skin color and temperature  - Assess for signs of decreased coronary artery perfusion  - Instruct patient to report change in severity of symptoms  Outcome: Progressing  Goal: Absence of cardiac dysrhythmias or at baseline rhythm  Description: INTERVENTIONS:  - Continuous cardiac monitoring, vital signs, obtain 12 lead EKG if ordered  - Administer antiarrhythmic and heart rate control medications as ordered  - Monitor electrolytes and administer replacement therapy as ordered  Outcome: Progressing     Problem: RESPIRATORY - ADULT  Goal: Achieves optimal ventilation and oxygenation  Description: INTERVENTIONS:  - Assess for changes in respiratory status  - Assess for changes in mentation and behavior  - Position to facilitate oxygenation and minimize respiratory effort  - Oxygen administered by appropriate delivery if ordered  - Initiate smoking  cessation education as indicated  - Encourage broncho-pulmonary hygiene including cough, deep breathe, Incentive Spirometry  - Assess the need for suctioning and aspirate as needed  - Assess and instruct to report SOB or any respiratory difficulty  - Respiratory Therapy support as indicated  Outcome: Progressing     Problem: MUSCULOSKELETAL - ADULT  Goal: Maintain or return mobility to safest level of function  Description: INTERVENTIONS:  - Assess patient's ability to carry out ADLs; assess patient's baseline for ADL function and identify physical deficits which impact ability to perform ADLs (bathing, care of mouth/teeth, toileting, grooming, dressing, etc.)  - Assess/evaluate cause of self-care deficits   - Assess range of motion  - Assess patient's mobility  - Assess patient's need for assistive devices and provide as appropriate  - Encourage maximum independence but intervene and supervise when necessary  - Involve family in performance of ADLs  - Assess for home care needs following discharge   - Consider OT consult to assist with ADL evaluation and planning for discharge  - Provide patient education as appropriate  Outcome: Progressing  Goal: Maintain proper alignment of affected body part  Description: INTERVENTIONS:  - Support, maintain and protect limb and body alignment  - Provide patient/ family with appropriate education  Outcome: Progressing     Problem: Nutrition/Hydration-ADULT  Goal: Nutrient/Hydration intake appropriate for improving, restoring or maintaining nutritional needs  Description: Monitor and assess patient's nutrition/hydration status for malnutrition. Collaborate with interdisciplinary team and initiate plan and interventions as ordered.  Monitor patient's weight and dietary intake as ordered or per policy. Utilize nutrition screening tool and intervene as necessary. Determine patient's food preferences and provide high-protein, high-caloric foods as appropriate.     INTERVENTIONS:  -  Monitor oral intake, urinary output, labs, and treatment plans  - Assess nutrition and hydration status and recommend course of action  - Evaluate amount of meals eaten  - Assist patient with eating if necessary   - Allow adequate time for meals  - Recommend/ encourage appropriate diets, oral nutritional supplements, and vitamin/mineral supplements  - Order, calculate, and assess calorie counts as needed  - Recommend, monitor, and adjust tube feedings and TPN/PPN based on assessed needs  - Assess need for intravenous fluids  - Provide specific nutrition/hydration education as appropriate  - Include patient/family/caregiver in decisions related to nutrition  Outcome: Progressing

## 2024-03-11 NOTE — ASSESSMENT & PLAN NOTE
Patient on my evaluation complaining of headache, neck pain  Stat CT head negative for acute intracranial process, MRI brain did not show any acute changes, metastasis but showed intracranial stenosis and CTA was recommended.  Patient currently without headache.  Refused to have CTA

## 2024-03-11 NOTE — RESPIRATORY THERAPY NOTE
Home Oxygen Qualifying Test     Patient name: Richard Nava        : 1955   Date of Test:  2024  Diagnosis:    Home Oxygen Test:    **Medicare Guidelines require item(s) 1-5 on all ambulatory patients or 1 and 2 on non-ambulatory patients.    1. Baseline SPO2 on Room Air at rest 83 %   If <= 88% on Room Air add O2 via NC to obtain SpO2 >=88%. If LPM needed, document LPM 4 needed to reach =>88%    SPO2 during exertion on Room Air 79  %  During exertion monitor SPO2. If SPO2 increases >=89%, do not add supplemental oxygen    SPO2 on Oxygen at Rest 94 % at 4 LPM    SPO2 during exertion on Oxygen 94 % at 4 LPM    Test performed during exertion activity.      [x]  Supplemental Home Oxygen is indicated.    []  Client does not qualify for home oxygen.    Respiratory Additional Notes- Pt walked >1500 feet, no complaints of sob, pt wears 02 at home per pt    Jenn Hernandez, RT

## 2024-03-11 NOTE — ASSESSMENT & PLAN NOTE
Baseline oxygen requirement: 2.5 to 3.5 L  Secondary to: emphysema/bronchitis/pneumonia  Continue to treat underlying cause, see further management below  CT PE study: No PE  Currently saturating well with 4 L nasal cannula oxygen.  Wean off as tolerated to his baseline  Home O2 evaluation prior to discharge-qualified for home oxygen

## 2024-03-11 NOTE — OCCUPATIONAL THERAPY NOTE
Occupational Therapy Screening note      Patient Name: Richard Nava  Today's Date: 3/11/2024       OT orders received and chart reviewed. Per chart, pt resides at home in basement apartment with family living above and is independent at baseline. Spoke to PT who states pt is currently at baseline for functional mobility.  Recommend pt continue to be OOB for meals, ambulation to/from BR, perform self care tasks, and mobility in hallway with nursing. Pt currently scoring a raw score of 23 on AM-PAC  daily activity inpatient for ADL performance. At this time, OT recommendations at time of discharge are to prior living arrangement. No further acute OT needs identified at this time; please re-consult if OT needs arise during remainder of hospital stay     Mona Evans, OTR/L

## 2024-03-11 NOTE — PROGRESS NOTES
"Progress Note - Pulmonary   Richard Nava 68 y.o. male MRN: [id number]  Unit/Bed#: -01 Encounter: [id number]      Interval History:   Patient states that shortness of breath and cough have improved throughout admission.  No fevers or chills.  Patient still complains of generalized weakness.    Review of Systems:  Full 12 point review of systems negative other than previously mentioned    Objective:   Vitals: Blood pressure 106/63, pulse 87, temperature 98.4 °F (36.9 °C), resp. rate 18, height 5' 7\" (1.702 m), weight 41.6 kg (91 lb 12.8 oz), SpO2 98%., Body mass index is 14.38 kg/m².  No acute distress, + chronically ill appearing  S1-S2  + Forced expiratory wheeze otherwise, no rhonchi, no rales  Soft, nontender, cachectic  Frail extremities, no edema  Awake and alert    Labs: I have personally reviewed pertinent lab results.  Results Reviewed       Procedure Component Value Units Date/Time    Blood culture #1 [[id number]] Collected: 03/08/24 1246    Lab Status: Preliminary result Specimen: Blood from Arm, Right Updated: 03/10/24 2201     Blood Culture No Growth at 48 hrs.    Blood culture #2 [[id number]] Collected: 03/08/24 1246    Lab Status: Preliminary result Specimen: Blood from Arm, Left Updated: 03/10/24 2201     Blood Culture No Growth at 48 hrs.    FLU/RSV/COVID - if FLU/RSV clinically relevant [[id number]]  (Normal) Collected: 03/08/24 1246    Lab Status: Final result Specimen: Nares from Nose Updated: 03/08/24 1338     SARS-CoV-2 Negative     INFLUENZA A PCR Negative     INFLUENZA B PCR Negative     RSV PCR Negative    Narrative:      FOR PEDIATRIC PATIENTS - copy/paste COVID Guidelines URL to browser: [email or web address]    SARS-CoV-2 assay is a Nucleic Acid Amplification assay intended for the  qualitative detection of nucleic acid from SARS-CoV-2 in nasopharyngeal  swabs. Results are for the presumptive identification of SARS-CoV-2 " RNA.    Positive results are indicative of infection with SARS-CoV-2, the virus  causing COVID-19, but do not rule out bacterial infection or co-infection  with other viruses. Laboratories within the [place] and its  territories are required to report all positive results to the appropriate  public health authorities. Negative results do not preclude SARS-CoV-2  infection and should not be used as the sole basis for treatment or other  patient management decisions. Negative results must be combined with  clinical observations, patient history, and epidemiological information.  This test has not been FDA cleared or approved.    This test has been authorized by FDA under an Emergency Use Authorization  (EUA). This test is only authorized for the duration of time the  declaration that circumstances exist justifying the authorization of the  emergency use of an in vitro diagnostic tests for detection of SARS-CoV-2  virus and/or diagnosis of COVID-19 infection under section 564(b)(1) of  the Act, 21 U.S.C. 360bbb-3(b)(1), unless the authorization is terminated  or revoked sooner. The test has been validated but independent review by FDA  and CLIA is pending.    Test performed using [employer]: This RT-PCR assay targets N2,  a region unique to SARS-CoV-2. A conserved region in the E-gene was chosen  for pan-Sarbecovirus detection which includes SARS-CoV-2.    According to CMS-2020-01-R, this platform meets the definition of high-throughput technology.    Comprehensive metabolic panel [[id number]]  (Abnormal) Collected: 03/08/24 1246    Lab Status: Final result Specimen: Blood from Arm, Right Updated: 03/08/24 1331     Sodium 139 mmol/L      Potassium 3.5 mmol/L      Chloride 93 mmol/L      CO2 >45 mmol/L      ANION GAP --     BUN 11 mg/dL      Creatinine 0.50 mg/dL      Glucose 219 mg/dL      Calcium 9.4 mg/dL      AST 12 U/L      ALT 11 U/L      Alkaline Phosphatase 58 U/L      Total Protein 6.9 g/dL       Albumin 4.0 g/dL      Total Bilirubin 0.44 mg/dL      eGFR 110 ml/min/1.73sq m     Narrative:      National Kidney Disease Foundation guidelines for Chronic Kidney Disease (CKD):     Stage 1 with normal or high GFR (GFR > 90 mL/min/1.73 square meters)    Stage 2 Mild CKD (GFR = 60-89 mL/min/1.73 square meters)    Stage 3A Moderate CKD (GFR = 45-59 mL/min/1.73 square meters)    Stage 3B Moderate CKD (GFR = 30-44 mL/min/1.73 square meters)    Stage 4 Severe CKD (GFR = 15-29 mL/min/1.73 square meters)    Stage 5 End Stage CKD (GFR <15 mL/min/1.73 square meters)  Note: GFR calculation is accurate only with a steady state creatinine    Lactic acid [[id number]]  (Normal) Collected: 03/08/24 1246    Lab Status: Final result Specimen: Blood from Arm, Right Updated: 03/08/24 1329     LACTIC ACID 0.8 mmol/L     Narrative:      Result may be elevated if tourniquet was used during collection.    Procalcitonin [[id number]]  (Normal) Collected: 03/08/24 1246    Lab Status: Final result Specimen: Blood from Arm, Right Updated: 03/08/24 1325     Procalcitonin 0.05 ng/ml     HS Troponin 0hr (reflex protocol) [[id number]]  (Normal) Collected: 03/08/24 1246    Lab Status: Final result Specimen: Blood from Arm, Right Updated: 03/08/24 1321     hs TnI 0hr 3 ng/L     B-Type Natriuretic Peptide(BNP) [[id number]]  (Normal) Collected: 03/08/24 1246    Lab Status: Final result Specimen: Blood from Arm, Right Updated: 03/08/24 1320     BNP 42 pg/mL     Protime-INR [[id number]]  (Normal) Collected: 03/08/24 1246    Lab Status: Final result Specimen: Blood from Arm, Right Updated: 03/08/24 1312     Protime 12.9 seconds      INR 0.93    APTT [[id number]]  (Normal) Collected: 03/08/24 1246    Lab Status: Final result Specimen: Blood from Arm, Right Updated: 03/08/24 1312     PTT 28 seconds     Urine Microscopic [[id number]]  (Abnormal) Collected: 03/08/24 1251    Lab Status: Final result Specimen: Urine, Other Updated: 03/08/24 1311     RBC, UA None  Seen /hpf      WBC, UA 0-1 /hpf      Epithelial Cells Occasional /hpf      Bacteria, UA None Seen /hpf      Hyaline Casts, UA 0-1 /lpf     UA w Reflex to Microscopic w Reflex to Culture [[id number]]  (Abnormal) Collected: 03/08/24 1251    Lab Status: Final result Specimen: Urine, Other Updated: 03/08/24 1303     Color, UA Yellow     Clarity, UA Clear     Specific Gravity, UA 1.015     pH, UA 7.0     Leukocytes, UA Negative     Nitrite, UA Negative     Protein, UA Trace mg/dl      Glucose, UA 1000 (1%) mg/dl      Ketones, UA Trace mg/dl      Urobilinogen, UA 2.0 mg/dl      Bilirubin, UA Negative     Occult Blood, UA Negative    CBC and differential [[id number]]  (Abnormal) Collected: 03/08/24 1246    Lab Status: Final result Specimen: Blood from Arm, Right Updated: 03/08/24 1259     WBC 8.37 Thousand/uL      RBC 4.32 Million/uL      Hemoglobin 13.5 g/dL      Hematocrit 43.1 %       fL      MCH 31.3 pg      MCHC 31.3 g/dL      RDW 12.6 %      MPV 8.3 fL      Platelets 386 Thousands/uL      nRBC 0 /100 WBCs      Neutrophils Relative 68 %      Immat GRANS % 1 %      Lymphocytes Relative 17 %      Monocytes Relative 9 %      Eosinophils Relative 4 %      Basophils Relative 1 %      Neutrophils Absolute 5.80 Thousands/µL      Immature Grans Absolute 0.04 Thousand/uL      Lymphocytes Absolute 1.41 Thousands/µL      Monocytes Absolute 0.74 Thousand/µL      Eosinophils Absolute 0.30 Thousand/µL      Basophils Absolute 0.08 Thousands/µL              Current Medications:    Current Facility-Administered Medications:     abiraterone (ZYTIGA) tablet 1,000 mg, 1,000 mg, Oral, Daily, Leisa Wilder MD, 1,000 mg at 03/11/24 0550    acetaminophen (TYLENOL) tablet 650 mg, 650 mg, Oral, Q6H PRN, Leisa Wilder MD    ALPRAZolam (XANAX) tablet 0.5 mg, 0.5 mg, Oral, BID PRN, Arleth Scales PA-C, 0.5 mg at 03/09/24 9592    benzonatate (TESSALON PERLES) capsule 100 mg, 100 mg, Oral, TID PRN, Leisa Wilder MD     budesonide (PULMICORT) inhalation solution 0.5 mg, 0.5 mg, Nebulization, Q12H, Leisa Wilder MD, 0.5 mg at 03/11/24 0737    cefTRIAXone (ROCEPHIN) IVPB (premix in dextrose) 1,000 mg 50 mL, 1,000 mg, Intravenous, Q24H, Leisa Wilder MD, Last Rate: 100 mL/hr at 03/10/24 2228, 1,000 mg at 03/10/24 2228    dextromethorphan-guaiFENesin (ROBITUSSIN DM) oral syrup 10 mL, 10 mL, Oral, Q4H PRN, Leisa Wilder MD    formoterol (PERFOROMIST) nebulizer solution 20 mcg, 20 mcg, Nebulization, Q12H, Leisa Wilder MD, 20 mcg at 03/11/24 0737    gabapentin (NEURONTIN) capsule 600 mg, 600 mg, Oral, HS, Arleth Scales PA-C, 600 mg at 03/10/24 2227    heparin (porcine) subcutaneous injection 5,000 Units, 5,000 Units, Subcutaneous, Q8H FANNY, Leisa Wilder MD, 5,000 Units at 03/10/24 0600    HYDROmorphone HCl (DILAUDID) injection 0.2 mg, 0.2 mg, Intravenous, Q3H PRN, Arleth Scales PA-C, 0.2 mg at 03/09/24 0805    ipratropium (ATROVENT) 0.02 % inhalation solution 0.5 mg, 0.5 mg, Nebulization, Q6H PRN, Leisa Wilder MD, 0.5 mg at 03/11/24 0737    levalbuterol (XOPENEX) inhalation solution 1.25 mg, 1.25 mg, Nebulization, TID, Cris Fong DO, 1.25 mg at 03/11/24 0737    lidocaine (LIDODERM) 5 % patch 1 patch, 1 patch, Topical, Daily PRN, Nadia Wilkerson DO    LORazepam (ATIVAN) injection 0.5 mg, 0.5 mg, Intravenous, BID PRN, Leisa Wilder MD    methylPREDNISolone sodium succinate (Solu-MEDROL) injection 40 mg, 40 mg, Intravenous, Q12H FANNY, Leisa Wilder MD, 40 mg at 03/11/24 0946    morphine (MS CONTIN) ER tablet 15 mg, 15 mg, Oral, Q12H FANNY, Arleth Scales PA-C, 15 mg at 03/11/24 0945    ondansetron (ZOFRAN) injection 4 mg, 4 mg, Intravenous, Q6H PRN, Leisa Wilder MD    pantoprazole (PROTONIX) injection 40 mg, 40 mg, Intravenous, Q24H FANNY, Arleth Scales PA-C, 40 mg at 03/10/24 0834    senna (SENOKOT) tablet 17.2 mg, 17.2 mg, Oral, HS PRN, Arleth Raymond  "DEBI Scales    zolpidem (AMBIEN) tablet 2.5 mg, 2.5 mg, Oral, HS PRN, Arleth Raymond DEBI Scales, 2.5 mg at 03/11/24 0121       Imaging and other studies:   I personally viewed and interpreted the following imaging studies:  CT chest 3/8/2024 shows diffuse centrilobular predominant emphysema, + small left lower lobe groundglass opacification    Assessment:  Acute hypoxic respiratory failure  COPD with acute exacerbation  CT chest abnormality    Plan:  Continue to titrate supplemental oxygen for goal SpO2 of 88% to 92%  Recommend ambulatory pulse ox to evaluate for oxygen requirement prior to discharge  Transition patient to oral Solu-Medrol 32 mg p.o. daily starting tomorrow.  I recommend continuing Solu-Medrol 32 mg p.o. daily for 3 days and decreasing by 8 mg every 3 days until complete  During admission, continue budesonide, Xopenex and formoterol.  Upon discharge, I recommend triple therapy inhaler such as Breztri or Trelegy.  I recommend outpatient pulmonary function test.  Patient to follow-up with Dr. Musa Fatima in outpatient pulmonary medicine.  Pulmonary medicine will continue to follow  I personally discussed this patient in depth with the primary service    Note: Portions of the record may have been created with voice recognition software. Occasional wrong word or \"sound a like\" substitutions may have occurred due to the inherent limitations of voice recognition software. Read the chart carefully and recognize, using context, where substitutions have occurred.     Mason Hall M.D.  [facility]    "

## 2024-03-12 VITALS
TEMPERATURE: 98.8 F | OXYGEN SATURATION: 98 % | BODY MASS INDEX: 13.84 KG/M2 | RESPIRATION RATE: 16 BRPM | SYSTOLIC BLOOD PRESSURE: 132 MMHG | DIASTOLIC BLOOD PRESSURE: 76 MMHG | HEIGHT: 67 IN | HEART RATE: 79 BPM | WEIGHT: 88.18 LBS

## 2024-03-12 PROBLEM — R51.9 HEADACHE: Status: RESOLVED | Noted: 2024-03-08 | Resolved: 2024-03-12

## 2024-03-12 PROBLEM — R06.03 RESPIRATORY DISTRESS: Status: RESOLVED | Noted: 2024-03-08 | Resolved: 2024-03-12

## 2024-03-12 LAB
ANION GAP SERPL CALCULATED.3IONS-SCNC: 3 MMOL/L
BUN SERPL-MCNC: 10 MG/DL (ref 5–25)
CALCIUM SERPL-MCNC: 8.8 MG/DL (ref 8.4–10.2)
CHLORIDE SERPL-SCNC: 96 MMOL/L (ref 96–108)
CO2 SERPL-SCNC: 42 MMOL/L (ref 21–32)
CREAT SERPL-MCNC: 0.44 MG/DL (ref 0.6–1.3)
ERYTHROCYTE [DISTWIDTH] IN BLOOD BY AUTOMATED COUNT: 13 % (ref 11.6–15.1)
GFR SERPL CREATININE-BSD FRML MDRD: 116 ML/MIN/1.73SQ M
GLUCOSE SERPL-MCNC: 155 MG/DL (ref 65–140)
HCT VFR BLD AUTO: 40.1 % (ref 36.5–49.3)
HGB BLD-MCNC: 12.3 G/DL (ref 12–17)
MCH RBC QN AUTO: 30.7 PG (ref 26.8–34.3)
MCHC RBC AUTO-ENTMCNC: 30.7 G/DL (ref 31.4–37.4)
MCV RBC AUTO: 100 FL (ref 82–98)
PLATELET # BLD AUTO: 326 THOUSANDS/UL (ref 149–390)
PMV BLD AUTO: 8.7 FL (ref 8.9–12.7)
POTASSIUM SERPL-SCNC: 4.4 MMOL/L (ref 3.5–5.3)
RBC # BLD AUTO: 4.01 MILLION/UL (ref 3.88–5.62)
SODIUM SERPL-SCNC: 141 MMOL/L (ref 135–147)
WBC # BLD AUTO: 6.12 THOUSAND/UL (ref 4.31–10.16)

## 2024-03-12 PROCEDURE — C9113 INJ PANTOPRAZOLE SODIUM, VIA: HCPCS | Performed by: PHYSICIAN ASSISTANT

## 2024-03-12 PROCEDURE — 99232 SBSQ HOSP IP/OBS MODERATE 35: CPT | Performed by: INTERNAL MEDICINE

## 2024-03-12 PROCEDURE — 85027 COMPLETE CBC AUTOMATED: CPT | Performed by: INTERNAL MEDICINE

## 2024-03-12 PROCEDURE — 94760 N-INVAS EAR/PLS OXIMETRY 1: CPT

## 2024-03-12 PROCEDURE — 80048 BASIC METABOLIC PNL TOTAL CA: CPT | Performed by: INTERNAL MEDICINE

## 2024-03-12 PROCEDURE — 94640 AIRWAY INHALATION TREATMENT: CPT

## 2024-03-12 PROCEDURE — 99239 HOSP IP/OBS DSCHRG MGMT >30: CPT | Performed by: INTERNAL MEDICINE

## 2024-03-12 RX ORDER — BENZONATATE 100 MG/1
100 CAPSULE ORAL 3 TIMES DAILY PRN
Qty: 20 CAPSULE | Refills: 0 | Status: SHIPPED | OUTPATIENT
Start: 2024-03-12

## 2024-03-12 RX ORDER — LEVOFLOXACIN 500 MG/1
500 TABLET, FILM COATED ORAL EVERY 24 HOURS
Qty: 7 TABLET | Refills: 0 | Status: SHIPPED | OUTPATIENT
Start: 2024-03-12 | End: 2024-03-19

## 2024-03-12 RX ORDER — METHYLPREDNISOLONE 16 MG/1
TABLET ORAL
Qty: 9 TABLET | Refills: 0 | Status: SHIPPED | OUTPATIENT
Start: 2024-03-13 | End: 2024-03-21

## 2024-03-12 RX ADMIN — BUDESONIDE 0.5 MG: 0.5 INHALANT ORAL at 07:49

## 2024-03-12 RX ADMIN — PANTOPRAZOLE SODIUM 40 MG: 40 INJECTION, POWDER, FOR SOLUTION INTRAVENOUS at 08:47

## 2024-03-12 RX ADMIN — LEVALBUTEROL HYDROCHLORIDE 1.25 MG: 1.25 SOLUTION RESPIRATORY (INHALATION) at 07:49

## 2024-03-12 RX ADMIN — MORPHINE SULFATE 15 MG: 15 TABLET, EXTENDED RELEASE ORAL at 08:46

## 2024-03-12 RX ADMIN — FORMOTEROL FUMARATE DIHYDRATE 20 MCG: 20 SOLUTION RESPIRATORY (INHALATION) at 07:49

## 2024-03-12 RX ADMIN — METHYLPREDNISOLONE 32 MG: 4 TABLET ORAL at 08:54

## 2024-03-12 RX ADMIN — ABIRATERONE ACETATE 1000 MG: 250 TABLET ORAL at 05:57

## 2024-03-12 NOTE — PLAN OF CARE
Problem: PAIN - ADULT  Goal: Verbalizes/displays adequate comfort level or baseline comfort level  Description: Interventions:  - Encourage patient to monitor pain and request assistance  - Assess pain using appropriate pain scale  - Administer analgesics based on type and severity of pain and evaluate response  - Implement non-pharmacological measures as appropriate and evaluate response  - Consider cultural and social influences on pain and pain management  - Notify physician/advanced practitioner if interventions unsuccessful or patient reports new pain  Outcome: Progressing     Problem: INFECTION - ADULT  Goal: Absence or prevention of progression during hospitalization  Description: INTERVENTIONS:  - Assess and monitor for signs and symptoms of infection  - Monitor lab/diagnostic results  - Monitor all insertion sites, i.e. indwelling lines, tubes, and drains  - Monitor endotracheal if appropriate and nasal secretions for changes in amount and color  - [place] appropriate cooling/warming therapies per order  - Administer medications as ordered  - Instruct and encourage patient and family to use good hand hygiene technique  - Identify and instruct in appropriate isolation precautions for identified infection/condition  Outcome: Progressing     Problem: SAFETY ADULT  Goal: Patient will remain free of falls  Description: INTERVENTIONS:  - Educate patient/family on patient safety including physical limitations  - Instruct patient to call for assistance with activity   - Consult OT/PT to assist with strengthening/mobility   - Keep Call bell within reach  - Keep bed low and locked with side rails adjusted as appropriate  - Keep care items and personal belongings within reach  - Initiate and maintain comfort rounds  - Make Fall Risk Sign visible to staff  - Offer Toileting every 3 Hours, in advance of need  - Initiate/Maintain bed alarm  - Obtain necessary fall risk management equipment: alarm, socks  - Apply  yellow socks and bracelet for high fall risk patients  - Consider moving patient to room near nurses station  Outcome: Progressing  Goal: Maintain or return to baseline ADL function  Description: INTERVENTIONS:  -  Assess patient's ability to carry out ADLs; assess patient's baseline for ADL function and identify physical deficits which impact ability to perform ADLs (bathing, care of mouth/teeth, toileting, grooming, dressing, etc.)  - Assess/evaluate cause of self-care deficits   - Assess range of motion  - Assess patient's mobility; develop plan if impaired  - Assess patient's need for assistive devices and provide as appropriate  - Encourage maximum independence but intervene and supervise when necessary  - Involve family in performance of ADLs  - Assess for home care needs following discharge   - Consider OT consult to assist with ADL evaluation and planning for discharge  - Provide patient education as appropriate  Outcome: Progressing  Goal: Maintains/Returns to pre admission functional level  Description: INTERVENTIONS:  - Perform AM-PAC 6 Click Basic Mobility/ Daily Activity assessment daily.  - Set and communicate daily mobility goal to care team and patient/family/caregiver.   - Collaborate with rehabilitation services on mobility goals if consulted  - Perform Range of Motion 3 times a day.  - Reposition patient every 2 hours.  - Dangle patient 3 times a day  - Stand patient 3 times a day  - Ambulate patient 3 times a day  - Out of bed to chair 3 times a day   - Out of bed for meals 3 times a day  - Out of bed for toileting  - Record patient progress and toleration of activity level   Outcome: Progressing     Problem: DISCHARGE PLANNING  Goal: Discharge to home or other facility with appropriate resources  Description: INTERVENTIONS:  - Identify barriers to discharge w/patient and caregiver  - Arrange for needed discharge resources and transportation as appropriate  - Identify discharge learning needs  (meds, wound care, etc.)  - Arrange for interpretive services to assist at discharge as needed  - Refer to Case Management Department for coordinating discharge planning if the patient needs post-hospital services based on physician/advanced practitioner order or complex needs related to functional status, cognitive ability, or social support system  Outcome: Progressing     Problem: Knowledge Deficit  Goal: Patient/family/caregiver demonstrates understanding of disease process, treatment plan, medications, and discharge instructions  Description: Complete learning assessment and assess knowledge base.  Interventions:  - Provide teaching at level of understanding  - Provide teaching via preferred learning methods  Outcome: Progressing     Problem: CARDIOVASCULAR - ADULT  Goal: Maintains optimal cardiac output and hemodynamic stability  Description: INTERVENTIONS:  - Monitor I/O, vital signs and rhythm  - Monitor for S/S and trends of decreased cardiac output  - Administer and titrate ordered vasoactive medications to optimize hemodynamic stability  - Assess quality of pulses, skin color and temperature  - Assess for signs of decreased coronary artery perfusion  - Instruct patient to report change in severity of symptoms  Outcome: Progressing  Goal: Absence of cardiac dysrhythmias or at baseline rhythm  Description: INTERVENTIONS:  - Continuous cardiac monitoring, vital signs, obtain 12 lead EKG if ordered  - Administer antiarrhythmic and heart rate control medications as ordered  - Monitor electrolytes and administer replacement therapy as ordered  Outcome: Progressing     Problem: RESPIRATORY - ADULT  Goal: Achieves optimal ventilation and oxygenation  Description: INTERVENTIONS:  - Assess for changes in respiratory status  - Assess for changes in mentation and behavior  - Position to facilitate oxygenation and minimize respiratory effort  - Oxygen administered by appropriate delivery if ordered  - Initiate smoking  cessation education as indicated  - Encourage broncho-pulmonary hygiene including cough, deep breathe, Incentive Spirometry  - Assess the need for suctioning and aspirate as needed  - Assess and instruct to report SOB or any respiratory difficulty  - Respiratory Therapy support as indicated  Outcome: Progressing     Problem: MUSCULOSKELETAL - ADULT  Goal: Maintain or return mobility to safest level of function  Description: INTERVENTIONS:  - Assess patient's ability to carry out ADLs; assess patient's baseline for ADL function and identify physical deficits which impact ability to perform ADLs (bathing, care of mouth/teeth, toileting, grooming, dressing, etc.)  - Assess/evaluate cause of self-care deficits   - Assess range of motion  - Assess patient's mobility  - Assess patient's need for assistive devices and provide as appropriate  - Encourage maximum independence but intervene and supervise when necessary  - Involve family in performance of ADLs  - Assess for home care needs following discharge   - Consider OT consult to assist with ADL evaluation and planning for discharge  - Provide patient education as appropriate  Outcome: Progressing  Goal: Maintain proper alignment of affected body part  Description: INTERVENTIONS:  - Support, maintain and protect limb and body alignment  - Provide patient/ family with appropriate education  Outcome: Progressing     Problem: Nutrition/Hydration-ADULT  Goal: Nutrient/Hydration intake appropriate for improving, restoring or maintaining nutritional needs  Description: Monitor and assess patient's nutrition/hydration status for malnutrition. Collaborate with interdisciplinary team and initiate plan and interventions as ordered.  Monitor patient's weight and dietary intake as ordered or per policy. Utilize nutrition screening tool and intervene as necessary. Determine patient's food preferences and provide high-protein, high-caloric foods as appropriate.     INTERVENTIONS:  -  Monitor oral intake, urinary output, labs, and treatment plans  - Assess nutrition and hydration status and recommend course of action  - Evaluate amount of meals eaten  - Assist patient with eating if necessary   - Allow adequate time for meals  - Recommend/ encourage appropriate diets, oral nutritional supplements, and vitamin/mineral supplements  - Order, calculate, and assess calorie counts as needed  - Recommend, monitor, and adjust tube feedings and TPN/PPN based on assessed needs  - Assess need for intravenous fluids  - Provide specific nutrition/hydration education as appropriate  - Include patient/family/caregiver in decisions related to nutrition  Outcome: Progressing     Problem: Prexisting or High Potential for Compromised Skin Integrity  Goal: Skin integrity is maintained or improved  Description: INTERVENTIONS:  - Identify patients at risk for skin breakdown  - Assess and monitor skin integrity  - Assess and monitor nutrition and hydration status  - Monitor labs   - Assess for incontinence   - Turn and reposition patient  - Assist with mobility/ambulation  - Relieve pressure over bony prominences  - Avoid friction and shearing  - Provide appropriate hygiene as needed including keeping skin clean and dry  - Evaluate need for skin moisturizer/barrier cream  - Collaborate with interdisciplinary team   - Patient/family teaching  - Consider wound care consult   Outcome: Progressing

## 2024-03-12 NOTE — PROGRESS NOTES
"Progress Note - Pulmonary   Richard Nava 68 y.o. male MRN: [id number]  Unit/Bed#: -01 Encounter: [id number]    Assessment & Plan:    Acute hypoxic respiratory failure  COPD of unknown severity with acute exacerbation  Abnormal CT chest diffuse centrilobular emphysema, left groundglass opacity    Patient is currently on 2.5 L oxygen saturating at 96%. Titrate oxygen to maintain saturations >89%. He is dependent on 2.5-3.5L at baseline.  Sputum culture positive for Pseudomonas aeruginosa. He will need a 7 day course of levaquin.  Start p.o. Medrol 32 mg today with 8 mg taper every 3 days  Continue Pulmicort/Perforomist nebs, Xopenex nebs 3 times daily  Recommend patient be discharged on triple therapy maintenance inhaler such as Breztri or Trelegy.   Recommend ambulatory pulse ox prior to discharge  He will need pulmonary outpatient follow-up discharge with Dr. Fatima. Recommend completion of PFTs    Chief Complaint:   \"My breathing feels okay\"    Subjective:   Patient is ready to go home. His breathing feels much better. He continues to have an occasional cough. His shortness of breath feels improved.     Objective:     Vitals: Blood pressure 132/76, pulse 79, temperature 98.8 °F (37.1 °C), resp. rate 16, height 5' 7\" (1.702 m), weight 40 kg (88 lb 2.9 oz), SpO2 98%.,Body mass index is 13.81 kg/m².      Intake/Output Summary (Last 24 hours) at 3/12/2024 0819  Last data filed at 3/12/2024 0722  Gross per 24 hour   Intake 358 ml   Output 525 ml   Net -167 ml       Invasive Devices       Peripheral Intravenous Line  Duration             Peripheral IV 03/12/24 Left;Ventral (anterior) Forearm <1 day                    Physical Exam:   General appearance:   Alert and awake, in no acute distress  Head:   Normocephalic, without obvious abnormality, atraumatic  Eyes:   No scleral icterus   Lungs:  Pulmonary effort non labored at rest  Breath sounds: Decreased bilateral lung sounds. No rhonchi. No " wheezes.  Cardiovascular:   Regular rate and rhythm. No murmurs. Capillary refill < 3 seconds.  Abdomen:    No appreciable distension or tenderness  Extremities:   No deformity. No clubbing present. No edema to extremities.   Skin:   Warm and dry. Intact. Color appropriate for ethnicity.  Neurologic:   No acute focal deficits are noted.  Psychiatric:   Normal mood. Answers questions appropriately.        Labs: I have personally reviewed pertinent lab results., CBC:   Lab Results   Component Value Date    WBC 6.12 03/12/2024    HGB 12.3 03/12/2024    HCT 40.1 03/12/2024     (H) 03/12/2024     03/12/2024    RBC 4.01 03/12/2024    MCH 30.7 03/12/2024    MCHC 30.7 (L) 03/12/2024    RDW 13.0 03/12/2024    MPV 8.7 (L) 03/12/2024   , CMP:   Lab Results   Component Value Date    SODIUM 141 03/12/2024    K 4.4 03/12/2024    CL 96 03/12/2024    CO2 42 (H) 03/12/2024    BUN 10 03/12/2024    CREATININE 0.44 (L) 03/12/2024    CALCIUM 8.8 03/12/2024    EGFR 116 03/12/2024     Imaging and other studies: I have personally reviewed pertinent reports.   and I have personally reviewed pertinent films in PACS    XR chest portable, 3/8/2024  Stable nonspecific patchy density in the left lower lobe, atelectasis versus pneumonia, with mildly increased small pleural effusion       CTA chest pe study, 3/8/2024  No pulmonary embolus.  Minimal new left lower lobe groundglass opacity, infectious or inflammatory.  Severe emphysema with chronic diffuse bronchial wall thickening compatible with bronchitis.  Redemonstration of extensive blastic bone metastases.        Zuleyma Francisco, MSN RN FNP-BC  Nurse Practitioner  [facility]

## 2024-03-12 NOTE — PLAN OF CARE
Problem: PAIN - ADULT  Goal: Verbalizes/displays adequate comfort level or baseline comfort level  Description: Interventions:  - Encourage patient to monitor pain and request assistance  - Assess pain using appropriate pain scale  - Administer analgesics based on type and severity of pain and evaluate response  - Implement non-pharmacological measures as appropriate and evaluate response  - Consider cultural and social influences on pain and pain management  - Notify physician/advanced practitioner if interventions unsuccessful or patient reports new pain  Outcome: Progressing     Problem: SAFETY ADULT  Goal: Patient will remain free of falls  Description: INTERVENTIONS:  - Educate patient/family on patient safety including physical limitations  - Instruct patient to call for assistance with activity   - Consult OT/PT to assist with strengthening/mobility   - Keep Call bell within reach  - Keep bed low and locked with side rails adjusted as appropriate  - Keep care items and personal belongings within reach  - Initiate and maintain comfort rounds  - Make Fall Risk Sign visible to staff  - Offer Toileting every 3 Hours, in advance of need  - Initiate/Maintain bed alarm  - Obtain necessary fall risk management equipment: alarm, socks  - Apply yellow socks and bracelet for high fall risk patients  - Consider moving patient to room near nurses station  Outcome: Progressing  Goal: Maintain or return to baseline ADL function  Description: INTERVENTIONS:  -  Assess patient's ability to carry out ADLs; assess patient's baseline for ADL function and identify physical deficits which impact ability to perform ADLs (bathing, care of mouth/teeth, toileting, grooming, dressing, etc.)  - Assess/evaluate cause of self-care deficits   - Assess range of motion  - Assess patient's mobility; develop plan if impaired  - Assess patient's need for assistive devices and provide as appropriate  - Encourage maximum independence but  intervene and supervise when necessary  - Involve family in performance of ADLs  - Assess for home care needs following discharge   - Consider OT consult to assist with ADL evaluation and planning for discharge  - Provide patient education as appropriate  Outcome: Progressing  Goal: Maintains/Returns to pre admission functional level  Description: INTERVENTIONS:  - Perform AM-PAC 6 Click Basic Mobility/ Daily Activity assessment daily.  - Set and communicate daily mobility goal to care team and patient/family/caregiver.   - Collaborate with rehabilitation services on mobility goals if consulted  - Perform Range of Motion 3 times a day.  - Reposition patient every 2 hours.  - Dangle patient 3 times a day  - Stand patient 3 times a day  - Ambulate patient 3 times a day  - Out of bed to chair 3 times a day   - Out of bed for meals 3 times a day  - Out of bed for toileting  - Record patient progress and toleration of activity level   Outcome: Progressing     Problem: RESPIRATORY - ADULT  Goal: Achieves optimal ventilation and oxygenation  Description: INTERVENTIONS:  - Assess for changes in respiratory status  - Assess for changes in mentation and behavior  - Position to facilitate oxygenation and minimize respiratory effort  - Oxygen administered by appropriate delivery if ordered  - Initiate smoking cessation education as indicated  - Encourage broncho-pulmonary hygiene including cough, deep breathe, Incentive Spirometry  - Assess the need for suctioning and aspirate as needed  - Assess and instruct to report SOB or any respiratory difficulty  - Respiratory Therapy support as indicated  Outcome: Progressing

## 2024-03-12 NOTE — CASE MANAGEMENT
Case Management Progress Note    Patient name Richard Nava  Location /-01 MRN [id number]  : 1955 Date 3/12/2024       LOS (days): 4  Geometric Mean LOS (GMLOS) (days): 3.6  Days to GMLOS:-0.5        OBJECTIVE:     Current admission status: Inpatient  Preferred Pharmacy:   [facility]/pharmacy #[id number] - [place] - [address]  [address] [place] [address]  Phone: [phone] Fax: [phone]    [facility] - [place], [place] - [address] [facility]  [address]  [address]  [place] [address]  Phone: [phone] Fax: [phone]    Primary Care Provider: ISA Peterson    Primary Insurance: MEDICARE  Secondary Insurance:     PROGRESS NOTE:    [employer] had cancelled pt's O2 order. Cm called to inquire as reason. They said he had a active O2 order and had concentrator at home. He stated it was sent back. CM spoke with [employer] [facility] supervisor Elise and with pt. He will use tank to get home nad they will send technician out this evening to check the serial number on the home O2 concentrator that pt states is from his niece and not [employer].

## 2024-03-13 ENCOUNTER — TELEPHONE (OUTPATIENT)
Dept: FAMILY MEDICINE CLINIC | Facility: CLINIC | Age: 69
End: 2024-03-13

## 2024-03-13 ENCOUNTER — TRANSITIONAL CARE MANAGEMENT (OUTPATIENT)
Dept: FAMILY MEDICINE CLINIC | Facility: CLINIC | Age: 69
End: 2024-03-13

## 2024-03-13 LAB
BACTERIA BLD CULT: NORMAL
BACTERIA BLD CULT: NORMAL

## 2024-03-13 NOTE — DISCHARGE SUMMARY
[facility]  Discharge- Richard Nava 1955, 68 y.o. male MRN: [id number]  Unit/Bed#: -01 Encounter: [id number]  Primary Care Provider: ISA Peterson   Date and time admitted to hospital: 3/8/2024 12:26 PM    * Respiratory distress  Assessment & Plan  Baseline oxygen requirement: 2.5 to 3.5 L  Secondary to: emphysema/bronchitis/pneumonia  CT PE study: No PE  Currently saturating 96 % with 2 L nasal cannula oxygen.    Home O2 evaluation prior to discharge-qualified for home oxygen    Opioid dependence (HCC)  Assessment & Plan  Due to cancer related pain   Continue morphine 15 mg bid   F/u with palliative     Palliative care patient  Assessment & Plan  Patient follows up with palliative as outpatient  Protonix, Ambien, gabapentin, MS Contin, Dilaudid ordered by palliative  Discussed with palliative medicine.  Patient will continue level 3 and will follow-up outpatient with palliative      Prostate cancer metastatic to bone (HCC)  Assessment & Plan  Follows up with heme-onc and palliative care  Currently on Zytiga, Xgeva      Severe protein-calorie malnutrition (HCC)  Assessment & Plan  Malnutrition Findings:   Adult Malnutrition type: Chronic illness  Adult Degree of Malnutrition: Other severe protein calorie malnutrition  Malnutrition Characteristics: Muscle loss, Fat loss, Weight loss, Inadequate energy                  360 Statement: Severe malnutrition r/t chronic condition, as evidenced by intake meeting <75% estimated needs > 1 month, severe fat and muscle wasting (temples, clavicle, buccal region), and 10.5% wt loss x 2 months (3/10/24: 93#, 1/31/24: 104#). Treatment: PO diet + nutrition supplements    BMI Findings:  Adult BMI Classifications: Underweight < 18.5        Body mass index is 13.81 kg/m².     Attrition consult with nutrition supplements    Chronic obstructive pulmonary disease with acute exacerbation (HCC)  Assessment & Plan  pResented with acute  onset of shortness of breath  Discussed with pulmonology.  Per pulmonology continue oral Solu-Medrol with taper, home inhalers   sputum culture grew pseudomonas - discharged on levaquin 500 mg daily for 7 days   Qualified for home oxygen  Will need pulm f/u soon         Headache-resolved as of 3/12/2024  Assessment & Plan  Patient on my evaluation complaining of headache, neck pain  Stat CT head negative for acute intracranial process, MRI brain did not show any acute changes, metastasis but showed intracranial stenosis and CTA was recommended.  Patient currently without headache.  Refused to have CTA        Medical Problems       Resolved Problems  Date Reviewed: 12/20/2023            Resolved    Headache 3/12/2024     Resolved by  Rox Harris MD        Discharging Physician / Practitioner: Rox Harris MD  PCP: ISA Peterson  Admission Date:   Admission Orders (From admission, onward)       Ordered        03/08/24 1514  INPATIENT ADMISSION  Once                          Discharge Date: 03/12/24    Consultations During Hospital Stay:  Pulmonology, palliative     Procedures Performed:   none    Significant Findings / Test Results:   MRI brain w wo contrast  Result Date: 3/10/2024  Impression: No mass effect, acute intracranial hemorrhage or evidence of recent infarction. Mild chronic microvascular ischemic change. No definite evidence for intracranial metastatic disease. Stable sclerotic calvarial foci consistent with the patient's known history of metastatic disease and without epidural extension. Loss of the normal flow-void within the left internal carotid artery is new since the prior exams and consistent with slow flow secondary to high-grade stenosis or occlusion. CTA of the head and neck is recommended for further evaluation.     CT head wo contrast  Result Date: 3/8/2024  Impression: No acute intracranial abnormality.     CTA E chest PE Study  Result Date: 3/8/2024  Impression: No  "pulmonary embolus. Minimal new left lower lobe groundglass opacity, infectious or inflammatory. Severe emphysema with chronic diffuse bronchial wall thickening compatible with bronchitis. Redemonstration of extensive blastic bone metastases.     XR chest portable  Result Date: 3/8/2024  Impression: Stable nonspecific patchy density in the left lower lobe, atelectasis versus pneumonia, with mildly increased small pleural effusion.      Incidental Findings:   Inone    Test Results Pending at Discharge (will require follow up):   none     Outpatient Tests Requested:  none    Complications:  none    Reason for Admission: shortness of breath     Hospital Course:   Richard Nava is a 68 y.o. male patient who originally presented to the hospital on 3/8/2024 due to shortness of breath. Has chronic respiratory failure and was wearing the oxygen 2-3 L that was his nieces. Was admitted as COPD exacerbation and started on nebulizer and steroids with improvement. Initially required 6 L NC oxygen, however with treatment saturation improved. He did qualified for home oxygen.    Was discharged on  levaquin for 7 days as his sputum culture grew pseudomonas, and prednisone taper. Will need close f/u with pulmonology       Please see above list of diagnoses and related plan for additional information.     Condition at Discharge: good    Discharge Day Visit / Exam:   Subjective:  feeling better, was moving around   Vitals: Blood Pressure: 132/76 (03/12/24 0721)  Pulse: 79 (03/12/24 0721)  Temperature: 98.8 °F (37.1 °C) (03/12/24 0721)  Temp Source: Oral (03/08/24 1544)  Respirations: 16 (03/12/24 0721)  Height: 5' 7\" (170.2 cm) (03/08/24 1629)  Weight - Scale: 40 kg (88 lb 2.9 oz) (03/12/24 0504)  SpO2: 98 % (03/12/24 0721)  Exam:   Physical Exam  Vitals and nursing note reviewed.   Constitutional:       General: He is not in acute distress.     Appearance: He is not diaphoretic.   HENT:      Head: Normocephalic.   Eyes:      " General: No scleral icterus.        Right eye: No discharge.         Left eye: No discharge.   Cardiovascular:      Rate and Rhythm: Normal rate and regular rhythm.   Pulmonary:      Effort: Pulmonary effort is normal. No respiratory distress.      Breath sounds: No wheezing, rhonchi or rales.   Abdominal:      General: There is no distension.      Palpations: Abdomen is soft.      Tenderness: There is no abdominal tenderness. There is no guarding or rebound.   Musculoskeletal:      Cervical back: Normal range of motion.      Right lower leg: No edema.      Left lower leg: No edema.   Skin:     General: Skin is warm.   Neurological:      Mental Status: He is alert and oriented to person, place, and time.   Psychiatric:         Mood and Affect: Mood normal.         Behavior: Behavior normal.         Thought Content: Thought content normal.         Judgment: Judgment normal.          Discussion with Family:  Patient declined call to .     Discharge instructions/Information to patient and family:   See after visit summary for information provided to patient and family.      Provisions for Follow-Up Care:  See after visit summary for information related to follow-up care and any pertinent home health orders.      Mobility at time of Discharge:   Basic Mobility Inpatient Raw Score: 19  JH-HLM Goal: 6: Walk 10 steps or more  JH-HLM Achieved: 3: Sit at edge of bed  HLM Goal achieved. Continue to encourage appropriate mobility.     Disposition:   Home    Planned Readmission: no     Discharge Statement:  I spent 50 minutes discharging the patient. This time was spent on the day of discharge. I had direct contact with the patient on the day of discharge. Greater than 50% of the total time was spent examining patient, answering all patient questions, arranging and discussing plan of care with patient as well as directly providing post-discharge instructions.  Additional time then spent on discharge  activities.    Discharge Medications:  See after visit summary for reconciled discharge medications provided to patient and/or family.      **Please Note: This note may have been constructed using a voice recognition system**

## 2024-03-13 NOTE — ASSESSMENT & PLAN NOTE
Malnutrition Findings:   Adult Malnutrition type: Chronic illness  Adult Degree of Malnutrition: Other severe protein calorie malnutrition  Malnutrition Characteristics: Muscle loss, Fat loss, Weight loss, Inadequate energy                  360 Statement: Severe malnutrition r/t chronic condition, as evidenced by intake meeting <75% estimated needs > 1 month, severe fat and muscle wasting (temples, clavicle, buccal region), and 10.5% wt loss x 2 months (3/10/24: 93#, 1/31/24: 104#). Treatment: PO diet + nutrition supplements    BMI Findings:  Adult BMI Classifications: Underweight < 18.5        Body mass index is 13.81 kg/m².     Attrition consult with nutrition supplements

## 2024-03-13 NOTE — TELEPHONE ENCOUNTER
Left message for TCM call to call us back to schedule an appoinment. I will try to contact patient again later.

## 2024-03-13 NOTE — ASSESSMENT & PLAN NOTE
Baseline oxygen requirement: 2.5 to 3.5 L  Secondary to: emphysema/bronchitis/pneumonia  CT PE study: No PE  Currently saturating 96 % with 2 L nasal cannula oxygen.    Home O2 evaluation prior to discharge-qualified for home oxygen

## 2024-03-13 NOTE — ASSESSMENT & PLAN NOTE
pResented with acute onset of shortness of breath  Discussed with pulmonology.  Per pulmonology continue oral Solu-Medrol with taper, home inhalers   sputum culture grew pseudomonas - discharged on levaquin 500 mg daily for 7 days   Qualified for home oxygen  Will need pulm f/u soon

## 2024-03-18 ENCOUNTER — TELEPHONE (OUTPATIENT)
Dept: PULMONOLOGY | Facility: CLINIC | Age: 69
End: 2024-03-18

## 2024-03-18 NOTE — TELEPHONE ENCOUNTER
Called patient to schedule a HFU Appointment 2Weeks after tentative discharge date of 3/12/2024 and patient informed me that he will call back to reschedule appointment. He is very weak with no strength and needs some time to pull himself together. He just got out of the hospital.

## 2024-03-22 ENCOUNTER — OFFICE VISIT (OUTPATIENT)
Dept: FAMILY MEDICINE CLINIC | Facility: CLINIC | Age: 69
End: 2024-03-22
Payer: MEDICARE

## 2024-03-22 VITALS
HEART RATE: 94 BPM | RESPIRATION RATE: 16 BRPM | HEIGHT: 67 IN | DIASTOLIC BLOOD PRESSURE: 76 MMHG | SYSTOLIC BLOOD PRESSURE: 118 MMHG | BODY MASS INDEX: 15.91 KG/M2 | TEMPERATURE: 99.1 F | OXYGEN SATURATION: 90 % | WEIGHT: 101.4 LBS

## 2024-03-22 DIAGNOSIS — R06.89 ACUTE RESPIRATORY INSUFFICIENCY: ICD-10-CM

## 2024-03-22 DIAGNOSIS — J44.1 CHRONIC OBSTRUCTIVE PULMONARY DISEASE WITH ACUTE EXACERBATION (HCC): ICD-10-CM

## 2024-03-22 DIAGNOSIS — J96.11 CHRONIC RESPIRATORY FAILURE WITH HYPOXIA (HCC): ICD-10-CM

## 2024-03-22 DIAGNOSIS — J96.01 ACUTE RESPIRATORY FAILURE WITH HYPOXIA (HCC): Primary | ICD-10-CM

## 2024-03-22 DIAGNOSIS — C79.51 PROSTATE CANCER METASTATIC TO BONE (HCC): ICD-10-CM

## 2024-03-22 DIAGNOSIS — C61 PROSTATE CANCER METASTATIC TO BONE (HCC): ICD-10-CM

## 2024-03-22 DIAGNOSIS — J18.9 PNEUMONIA DUE TO INFECTIOUS ORGANISM, UNSPECIFIED LATERALITY, UNSPECIFIED PART OF LUNG: ICD-10-CM

## 2024-03-22 PROCEDURE — 99495 TRANSJ CARE MGMT MOD F2F 14D: CPT | Performed by: FAMILY MEDICINE

## 2024-03-22 RX ORDER — LEVOFLOXACIN 500 MG/1
500 TABLET, FILM COATED ORAL EVERY 24 HOURS
Qty: 7 TABLET | Refills: 0 | Status: ON HOLD | OUTPATIENT
Start: 2024-03-22 | End: 2024-03-29

## 2024-03-22 NOTE — PROGRESS NOTES
Assessment/Plan:     Diagnoses and all orders for this visit:    Acute respiratory failure with hypoxia (HCC)  Patient continues oxygen  Acute respiratory insufficiency  This is stable patient follows with pulmonary  Chronic obstructive pulmonary disease with acute exacerbation (HCC)  This is stable  Chronic respiratory failure with hypoxia (HCC)  This is stable  Prostate cancer metastatic to bone (HCC)  This is stable he will continue his current medications  Pneumonia due to infectious organism, unspecified laterality, unspecified part of lung  -     levofloxacin (LEVAQUIN) 500 mg tablet; Take 1 tablet (500 mg total) by mouth every 24 hours for 7 days        Patient continues home oxygen use follow-up with pulmonary  Levaquin prescription given for him to be used if his pneumonia symptoms come back  He can follow-up with me in 3 months or sooner if needed    Subjective:     Chief Complaint   Patient presents with    Transition of Care Management     [facility]chrged from  [facility] repiratory distress        Patient ID: Richard Nava is a 68 y.o. male.    Patient presents today for transition of care  Patient was hospitalized for respiratory failure  Patient is on home oxygen  He would like a prescription for antibiotics due to frequent pneumonias  No other acute complaints today        The following portions of the patient's history were reviewed and updated as appropriate: allergies, current medications, past family history, past medical history, past social history, past surgical history and problem list.    Review of Systems   Constitutional: Negative.    HENT: Negative.     Eyes: Negative.    Respiratory: Negative.     Cardiovascular: Negative.    Gastrointestinal: Negative.    Endocrine: Negative.    Genitourinary: Negative.    Musculoskeletal: Negative.    Skin: Negative.    Allergic/Immunologic: Negative.    Neurological: Negative.    Hematological: Negative.    Psychiatric/Behavioral: Negative.     All  "other systems reviewed and are negative.        Objective:    Vitals:    03/22/24 1122   BP: 118/76   BP Location: Right arm   Patient Position: Sitting   Cuff Size: Standard   Pulse: 94   Resp: 16   Temp: 99.1 °F (37.3 °C)   TempSrc: Tympanic   SpO2: 90%   Weight: 46 kg (101 lb 6.4 oz)   Height: 5' 7\" (1.702 m)          Physical Exam  Vitals and nursing note reviewed.   Constitutional:       Appearance: Normal appearance.   HENT:      Head: Normocephalic.      Right Ear: Tympanic membrane, ear canal and external ear normal.      Left Ear: Tympanic membrane, ear canal and external ear normal.      Nose: Nose normal.      Mouth/Throat:      Mouth: Mucous membranes are moist.   Eyes:      Conjunctiva/sclera: Conjunctivae normal.      Pupils: Pupils are equal, round, and reactive to light.   Cardiovascular:      Rate and Rhythm: Normal rate and regular rhythm.   Pulmonary:      Effort: Pulmonary effort is normal.      Breath sounds: Normal breath sounds.      Comments: Uses portable oxygen  Abdominal:      General: Abdomen is flat. Bowel sounds are normal.      Palpations: Abdomen is soft.   Musculoskeletal:         General: Normal range of motion.   Skin:     General: Skin is warm and dry.   Neurological:      General: No focal deficit present.      Mental Status: He is alert and oriented to person, place, and time. Mental status is at baseline.   Psychiatric:         Mood and Affect: Mood normal.         Behavior: Behavior normal.         Thought Content: Thought content normal.         Judgment: Judgment normal.         "

## 2024-03-26 ENCOUNTER — APPOINTMENT (EMERGENCY)
Dept: RADIOLOGY | Facility: HOSPITAL | Age: 69
DRG: 947 | End: 2024-03-26
Payer: MEDICARE

## 2024-03-26 ENCOUNTER — APPOINTMENT (EMERGENCY)
Dept: CT IMAGING | Facility: HOSPITAL | Age: 69
DRG: 947 | End: 2024-03-26
Payer: MEDICARE

## 2024-03-26 ENCOUNTER — HOSPITAL ENCOUNTER (INPATIENT)
Facility: HOSPITAL | Age: 69
LOS: 6 days | Discharge: HOME HEALTH | DRG: 947 | End: 2024-04-01
Attending: EMERGENCY MEDICINE | Admitting: INTERNAL MEDICINE
Payer: MEDICARE

## 2024-03-26 DIAGNOSIS — Z51.5 PALLIATIVE CARE PATIENT: ICD-10-CM

## 2024-03-26 DIAGNOSIS — J96.22 ACUTE ON CHRONIC RESPIRATORY FAILURE WITH HYPOXIA AND HYPERCAPNIA (HCC): ICD-10-CM

## 2024-03-26 DIAGNOSIS — C79.51 PROSTATE CANCER METASTATIC TO BONE (HCC): ICD-10-CM

## 2024-03-26 DIAGNOSIS — R09.02 HYPOXIA: Primary | ICD-10-CM

## 2024-03-26 DIAGNOSIS — E43 SEVERE PROTEIN-CALORIE MALNUTRITION (HCC): ICD-10-CM

## 2024-03-26 DIAGNOSIS — J96.21 ACUTE ON CHRONIC RESPIRATORY FAILURE WITH HYPOXIA AND HYPERCAPNIA (HCC): ICD-10-CM

## 2024-03-26 DIAGNOSIS — G43.011 INTRACTABLE MIGRAINE WITHOUT AURA AND WITH STATUS MIGRAINOSUS: ICD-10-CM

## 2024-03-26 DIAGNOSIS — J44.1 COPD EXACERBATION (HCC): ICD-10-CM

## 2024-03-26 DIAGNOSIS — R26.2 AMBULATORY DYSFUNCTION: ICD-10-CM

## 2024-03-26 DIAGNOSIS — R63.0 ANOREXIA: ICD-10-CM

## 2024-03-26 DIAGNOSIS — C61 PROSTATE CANCER METASTATIC TO BONE (HCC): ICD-10-CM

## 2024-03-26 DIAGNOSIS — G89.3 NEOPLASM RELATED PAIN: ICD-10-CM

## 2024-03-26 DIAGNOSIS — R64 CACHEXIA (HCC): ICD-10-CM

## 2024-03-26 DIAGNOSIS — E87.6 HYPOKALEMIA: ICD-10-CM

## 2024-03-26 DIAGNOSIS — F11.20 UNCOMPLICATED OPIOID DEPENDENCE (HCC): ICD-10-CM

## 2024-03-26 DIAGNOSIS — J96.11 CHRONIC RESPIRATORY FAILURE WITH HYPOXIA (HCC): ICD-10-CM

## 2024-03-26 PROBLEM — G43.909 MIGRAINE HEADACHE: Status: ACTIVE | Noted: 2024-03-26

## 2024-03-26 LAB
ALBUMIN SERPL BCP-MCNC: 3.8 G/DL (ref 3.5–5)
ALP SERPL-CCNC: 38 U/L (ref 34–104)
ALT SERPL W P-5'-P-CCNC: 11 U/L (ref 7–52)
ANION GAP SERPL CALCULATED.3IONS-SCNC: 4 MMOL/L (ref 4–13)
APTT PPP: 29 SECONDS (ref 23–37)
AST SERPL W P-5'-P-CCNC: 14 U/L (ref 13–39)
BACTERIA UR QL AUTO: NORMAL /HPF
BASOPHILS # BLD AUTO: 0.07 THOUSANDS/ÂΜL (ref 0–0.1)
BASOPHILS NFR BLD AUTO: 1 % (ref 0–1)
BILIRUB SERPL-MCNC: 0.48 MG/DL (ref 0.2–1)
BILIRUB UR QL STRIP: NEGATIVE
BUN SERPL-MCNC: 8 MG/DL (ref 5–25)
CALCIUM SERPL-MCNC: 8.6 MG/DL (ref 8.4–10.2)
CHLORIDE SERPL-SCNC: 99 MMOL/L (ref 96–108)
CLARITY UR: CLEAR
CO2 SERPL-SCNC: 41 MMOL/L (ref 21–32)
COLOR UR: YELLOW
CREAT SERPL-MCNC: 0.35 MG/DL (ref 0.6–1.3)
EOSINOPHIL # BLD AUTO: 0.19 THOUSAND/ÂΜL (ref 0–0.61)
EOSINOPHIL NFR BLD AUTO: 1 % (ref 0–6)
ERYTHROCYTE [DISTWIDTH] IN BLOOD BY AUTOMATED COUNT: 13.6 % (ref 11.6–15.1)
FLUAV RNA RESP QL NAA+PROBE: NEGATIVE
FLUBV RNA RESP QL NAA+PROBE: NEGATIVE
GFR SERPL CREATININE-BSD FRML MDRD: 128 ML/MIN/1.73SQ M
GLUCOSE SERPL-MCNC: 120 MG/DL (ref 65–140)
GLUCOSE UR STRIP-MCNC: ABNORMAL MG/DL
HCT VFR BLD AUTO: 36 % (ref 36.5–49.3)
HGB BLD-MCNC: 11.2 G/DL (ref 12–17)
HGB UR QL STRIP.AUTO: NEGATIVE
IMM GRANULOCYTES # BLD AUTO: 0.09 THOUSAND/UL (ref 0–0.2)
IMM GRANULOCYTES NFR BLD AUTO: 1 % (ref 0–2)
INR PPP: 0.93 (ref 0.84–1.19)
KETONES UR STRIP-MCNC: NEGATIVE MG/DL
LACTATE SERPL-SCNC: 0.9 MMOL/L (ref 0.5–2)
LEUKOCYTE ESTERASE UR QL STRIP: NEGATIVE
LYMPHOCYTES # BLD AUTO: 0.77 THOUSANDS/ÂΜL (ref 0.6–4.47)
LYMPHOCYTES NFR BLD AUTO: 6 % (ref 14–44)
MAGNESIUM SERPL-MCNC: 1.7 MG/DL (ref 1.9–2.7)
MCH RBC QN AUTO: 31.5 PG (ref 26.8–34.3)
MCHC RBC AUTO-ENTMCNC: 31.1 G/DL (ref 31.4–37.4)
MCV RBC AUTO: 101 FL (ref 82–98)
MONOCYTES # BLD AUTO: 0.85 THOUSAND/ÂΜL (ref 0.17–1.22)
MONOCYTES NFR BLD AUTO: 6 % (ref 4–12)
NEUTROPHILS # BLD AUTO: 11.35 THOUSANDS/ÂΜL (ref 1.85–7.62)
NEUTS SEG NFR BLD AUTO: 85 % (ref 43–75)
NITRITE UR QL STRIP: NEGATIVE
NON-SQ EPI CELLS URNS QL MICRO: NORMAL /HPF
NRBC BLD AUTO-RTO: 0 /100 WBCS
PH UR STRIP.AUTO: 8 [PH]
PLATELET # BLD AUTO: 235 THOUSANDS/UL (ref 149–390)
PMV BLD AUTO: 8.3 FL (ref 8.9–12.7)
POTASSIUM SERPL-SCNC: 3.2 MMOL/L (ref 3.5–5.3)
PROCALCITONIN SERPL-MCNC: 0.09 NG/ML
PROT SERPL-MCNC: 6.3 G/DL (ref 6.4–8.4)
PROT UR STRIP-MCNC: ABNORMAL MG/DL
PROTHROMBIN TIME: 12.8 SECONDS (ref 11.6–14.5)
RBC # BLD AUTO: 3.56 MILLION/UL (ref 3.88–5.62)
RBC #/AREA URNS AUTO: NORMAL /HPF
RSV RNA RESP QL NAA+PROBE: NEGATIVE
SARS-COV-2 RNA RESP QL NAA+PROBE: NEGATIVE
SODIUM SERPL-SCNC: 144 MMOL/L (ref 135–147)
SP GR UR STRIP.AUTO: 1.01 (ref 1–1.03)
UROBILINOGEN UR STRIP-ACNC: <2 MG/DL
WBC # BLD AUTO: 13.32 THOUSAND/UL (ref 4.31–10.16)
WBC #/AREA URNS AUTO: NORMAL /HPF

## 2024-03-26 PROCEDURE — 81001 URINALYSIS AUTO W/SCOPE: CPT | Performed by: EMERGENCY MEDICINE

## 2024-03-26 PROCEDURE — 96366 THER/PROPH/DIAG IV INF ADDON: CPT

## 2024-03-26 PROCEDURE — 96365 THER/PROPH/DIAG IV INF INIT: CPT

## 2024-03-26 PROCEDURE — 36415 COLL VENOUS BLD VENIPUNCTURE: CPT | Performed by: EMERGENCY MEDICINE

## 2024-03-26 PROCEDURE — 83605 ASSAY OF LACTIC ACID: CPT | Performed by: EMERGENCY MEDICINE

## 2024-03-26 PROCEDURE — 71045 X-RAY EXAM CHEST 1 VIEW: CPT

## 2024-03-26 PROCEDURE — 80053 COMPREHEN METABOLIC PANEL: CPT | Performed by: EMERGENCY MEDICINE

## 2024-03-26 PROCEDURE — 96368 THER/DIAG CONCURRENT INF: CPT

## 2024-03-26 PROCEDURE — 0241U HB NFCT DS VIR RESP RNA 4 TRGT: CPT

## 2024-03-26 PROCEDURE — 87040 BLOOD CULTURE FOR BACTERIA: CPT | Performed by: EMERGENCY MEDICINE

## 2024-03-26 PROCEDURE — 70450 CT HEAD/BRAIN W/O DYE: CPT

## 2024-03-26 PROCEDURE — 99285 EMERGENCY DEPT VISIT HI MDM: CPT

## 2024-03-26 PROCEDURE — 99223 1ST HOSP IP/OBS HIGH 75: CPT | Performed by: PHYSICIAN ASSISTANT

## 2024-03-26 PROCEDURE — 85730 THROMBOPLASTIN TIME PARTIAL: CPT | Performed by: EMERGENCY MEDICINE

## 2024-03-26 PROCEDURE — 99285 EMERGENCY DEPT VISIT HI MDM: CPT | Performed by: EMERGENCY MEDICINE

## 2024-03-26 PROCEDURE — 96375 TX/PRO/DX INJ NEW DRUG ADDON: CPT

## 2024-03-26 PROCEDURE — 99222 1ST HOSP IP/OBS MODERATE 55: CPT | Performed by: INTERNAL MEDICINE

## 2024-03-26 PROCEDURE — 85025 COMPLETE CBC W/AUTO DIFF WBC: CPT | Performed by: EMERGENCY MEDICINE

## 2024-03-26 PROCEDURE — 93005 ELECTROCARDIOGRAM TRACING: CPT

## 2024-03-26 PROCEDURE — G0316 PR PROLONG INPT EVAL ADD15 M: HCPCS | Performed by: PHYSICIAN ASSISTANT

## 2024-03-26 PROCEDURE — 83735 ASSAY OF MAGNESIUM: CPT | Performed by: EMERGENCY MEDICINE

## 2024-03-26 PROCEDURE — 85610 PROTHROMBIN TIME: CPT | Performed by: EMERGENCY MEDICINE

## 2024-03-26 PROCEDURE — 84145 PROCALCITONIN (PCT): CPT | Performed by: EMERGENCY MEDICINE

## 2024-03-26 PROCEDURE — 96376 TX/PRO/DX INJ SAME DRUG ADON: CPT

## 2024-03-26 RX ORDER — DIPHENHYDRAMINE HYDROCHLORIDE 50 MG/ML
25 INJECTION INTRAMUSCULAR; INTRAVENOUS EVERY 8 HOURS SCHEDULED
Status: DISCONTINUED | OUTPATIENT
Start: 2024-03-26 | End: 2024-03-26

## 2024-03-26 RX ORDER — MORPHINE SULFATE 15 MG/1
15 TABLET, FILM COATED, EXTENDED RELEASE ORAL 2 TIMES DAILY
Status: DISCONTINUED | OUTPATIENT
Start: 2024-03-26 | End: 2024-04-01 | Stop reason: HOSPADM

## 2024-03-26 RX ORDER — ONDANSETRON 2 MG/ML
4 INJECTION INTRAMUSCULAR; INTRAVENOUS ONCE
Status: COMPLETED | OUTPATIENT
Start: 2024-03-26 | End: 2024-03-26

## 2024-03-26 RX ORDER — POTASSIUM CHLORIDE 20 MEQ/1
20 TABLET, EXTENDED RELEASE ORAL ONCE
Status: COMPLETED | OUTPATIENT
Start: 2024-03-26 | End: 2024-03-26

## 2024-03-26 RX ORDER — BISACODYL 10 MG
10 SUPPOSITORY, RECTAL RECTAL DAILY PRN
Status: DISCONTINUED | OUTPATIENT
Start: 2024-03-26 | End: 2024-04-01 | Stop reason: HOSPADM

## 2024-03-26 RX ORDER — LORAZEPAM 2 MG/ML
1 INJECTION INTRAMUSCULAR ONCE
Status: DISCONTINUED | OUTPATIENT
Start: 2024-03-26 | End: 2024-03-26

## 2024-03-26 RX ORDER — MAGNESIUM SULFATE HEPTAHYDRATE 40 MG/ML
2 INJECTION, SOLUTION INTRAVENOUS
Status: DISPENSED | OUTPATIENT
Start: 2024-03-26 | End: 2024-03-29

## 2024-03-26 RX ORDER — POTASSIUM CHLORIDE 14.9 MG/ML
20 INJECTION INTRAVENOUS ONCE
Status: COMPLETED | OUTPATIENT
Start: 2024-03-26 | End: 2024-03-26

## 2024-03-26 RX ORDER — GLYCOPYRROLATE 0.2 MG/ML
0.1 INJECTION INTRAMUSCULAR; INTRAVENOUS EVERY 4 HOURS PRN
Status: DISCONTINUED | OUTPATIENT
Start: 2024-03-26 | End: 2024-03-27

## 2024-03-26 RX ORDER — DIPHENHYDRAMINE HYDROCHLORIDE 50 MG/ML
25 INJECTION INTRAMUSCULAR; INTRAVENOUS EVERY 8 HOURS SCHEDULED
Status: DISCONTINUED | OUTPATIENT
Start: 2024-03-26 | End: 2024-03-27

## 2024-03-26 RX ORDER — DEXAMETHASONE SODIUM PHOSPHATE 4 MG/ML
4 INJECTION, SOLUTION INTRA-ARTICULAR; INTRALESIONAL; INTRAMUSCULAR; INTRAVENOUS; SOFT TISSUE EVERY 6 HOURS SCHEDULED
Status: DISCONTINUED | OUTPATIENT
Start: 2024-03-26 | End: 2024-03-27

## 2024-03-26 RX ORDER — ALBUTEROL SULFATE 2.5 MG/3ML
2.5 SOLUTION RESPIRATORY (INHALATION) EVERY 6 HOURS PRN
Status: DISCONTINUED | OUTPATIENT
Start: 2024-03-26 | End: 2024-04-01 | Stop reason: HOSPADM

## 2024-03-26 RX ORDER — DIPHENHYDRAMINE HYDROCHLORIDE 50 MG/ML
12.5 INJECTION INTRAMUSCULAR; INTRAVENOUS ONCE
Status: COMPLETED | OUTPATIENT
Start: 2024-03-26 | End: 2024-03-26

## 2024-03-26 RX ORDER — METOCLOPRAMIDE HYDROCHLORIDE 5 MG/ML
10 INJECTION INTRAMUSCULAR; INTRAVENOUS EVERY 8 HOURS SCHEDULED
Status: DISCONTINUED | OUTPATIENT
Start: 2024-03-26 | End: 2024-03-26

## 2024-03-26 RX ORDER — LORAZEPAM 2 MG/ML
1.5 INJECTION INTRAMUSCULAR ONCE
Status: COMPLETED | OUTPATIENT
Start: 2024-03-26 | End: 2024-03-26

## 2024-03-26 RX ORDER — SODIUM CHLORIDE FOR INHALATION 3 %
4 VIAL, NEBULIZER (ML) INHALATION 3 TIMES DAILY
Status: DISCONTINUED | OUTPATIENT
Start: 2024-03-26 | End: 2024-03-26

## 2024-03-26 RX ORDER — HYDROMORPHONE HCL/PF 1 MG/ML
0.3 SYRINGE (ML) INJECTION EVERY 2 HOUR PRN
Status: DISCONTINUED | OUTPATIENT
Start: 2024-03-26 | End: 2024-03-27

## 2024-03-26 RX ORDER — SUMATRIPTAN 6 MG/.5ML
6 INJECTION, SOLUTION SUBCUTANEOUS
Status: DISCONTINUED | OUTPATIENT
Start: 2024-03-26 | End: 2024-03-26

## 2024-03-26 RX ORDER — MAGNESIUM SULFATE HEPTAHYDRATE 40 MG/ML
2 INJECTION, SOLUTION INTRAVENOUS ONCE
Status: COMPLETED | OUTPATIENT
Start: 2024-03-26 | End: 2024-03-26

## 2024-03-26 RX ORDER — AZITHROMYCIN 250 MG/1
500 TABLET, FILM COATED ORAL EVERY 24 HOURS
COMMUNITY
End: 2024-04-01

## 2024-03-26 RX ORDER — METOCLOPRAMIDE HYDROCHLORIDE 5 MG/ML
10 INJECTION INTRAMUSCULAR; INTRAVENOUS ONCE
Status: COMPLETED | OUTPATIENT
Start: 2024-03-26 | End: 2024-03-26

## 2024-03-26 RX ORDER — MORPHINE SULFATE 4 MG/ML
4 INJECTION, SOLUTION INTRAMUSCULAR; INTRAVENOUS ONCE
Status: COMPLETED | OUTPATIENT
Start: 2024-03-26 | End: 2024-03-26

## 2024-03-26 RX ORDER — LORAZEPAM 2 MG/ML
1 INJECTION INTRAMUSCULAR EVERY 4 HOURS PRN
Status: DISCONTINUED | OUTPATIENT
Start: 2024-03-26 | End: 2024-03-27

## 2024-03-26 RX ORDER — SODIUM CHLORIDE FOR INHALATION 3 %
4 VIAL, NEBULIZER (ML) INHALATION 3 TIMES DAILY PRN
Status: DISCONTINUED | OUTPATIENT
Start: 2024-03-26 | End: 2024-04-01 | Stop reason: HOSPADM

## 2024-03-26 RX ORDER — GABAPENTIN 300 MG/1
600 CAPSULE ORAL
Status: DISCONTINUED | OUTPATIENT
Start: 2024-03-26 | End: 2024-03-29

## 2024-03-26 RX ORDER — HYDROCODONE POLISTIREX AND CHLORPHENIRAMINE POLISTIREX 10; 8 MG/5ML; MG/5ML
5 SUSPENSION, EXTENDED RELEASE ORAL EVERY 12 HOURS PRN
Status: DISCONTINUED | OUTPATIENT
Start: 2024-03-26 | End: 2024-03-27

## 2024-03-26 RX ADMIN — HYDROMORPHONE HYDROCHLORIDE 0.3 MG: 1 INJECTION, SOLUTION INTRAMUSCULAR; INTRAVENOUS; SUBCUTANEOUS at 21:52

## 2024-03-26 RX ADMIN — MORPHINE SULFATE 4 MG: 4 INJECTION INTRAVENOUS at 13:49

## 2024-03-26 RX ADMIN — LORAZEPAM 1.5 MG: 2 INJECTION INTRAMUSCULAR; INTRAVENOUS at 14:56

## 2024-03-26 RX ADMIN — MAGNESIUM SULFATE HEPTAHYDRATE 2 G: 2 INJECTION, SOLUTION INTRAVENOUS at 08:21

## 2024-03-26 RX ADMIN — GABAPENTIN 600 MG: 300 CAPSULE ORAL at 21:42

## 2024-03-26 RX ADMIN — ONDANSETRON 4 MG: 2 INJECTION INTRAMUSCULAR; INTRAVENOUS at 07:20

## 2024-03-26 RX ADMIN — METOCLOPRAMIDE 10 MG: 5 INJECTION, SOLUTION INTRAMUSCULAR; INTRAVENOUS at 14:54

## 2024-03-26 RX ADMIN — MORPHINE SULFATE 4 MG: 4 INJECTION INTRAVENOUS at 07:19

## 2024-03-26 RX ADMIN — POTASSIUM CHLORIDE 20 MEQ: 1500 TABLET, EXTENDED RELEASE ORAL at 07:53

## 2024-03-26 RX ADMIN — DIPHENHYDRAMINE HYDROCHLORIDE 12.5 MG: 50 INJECTION, SOLUTION INTRAMUSCULAR; INTRAVENOUS at 14:58

## 2024-03-26 RX ADMIN — SODIUM CHLORIDE 1000 ML: 0.9 INJECTION, SOLUTION INTRAVENOUS at 07:53

## 2024-03-26 RX ADMIN — MAGNESIUM SULFATE HEPTAHYDRATE 2 G: 2 INJECTION, SOLUTION INTRAVENOUS at 16:31

## 2024-03-26 RX ADMIN — POTASSIUM CHLORIDE 20 MEQ: 14.9 INJECTION, SOLUTION INTRAVENOUS at 07:54

## 2024-03-26 RX ADMIN — DEXAMETHASONE SODIUM PHOSPHATE 4 MG: 4 INJECTION, SOLUTION INTRAMUSCULAR; INTRAVENOUS at 17:22

## 2024-03-26 RX ADMIN — DIPHENHYDRAMINE HYDROCHLORIDE 25 MG: 50 INJECTION, SOLUTION INTRAMUSCULAR; INTRAVENOUS at 18:43

## 2024-03-26 RX ADMIN — DEXAMETHASONE SODIUM PHOSPHATE 4 MG: 4 INJECTION, SOLUTION INTRAMUSCULAR; INTRAVENOUS at 23:16

## 2024-03-26 NOTE — CONSULTS
Consultation - Palliative and Supportive Care   Richard Nava 68 y.o. male [id number]    Assessment/Problems actively addressed this visit:  Goals of care counseling  Encounter for palliative and supportive care   Intractable migraine  Prostate cancer, widely metastatic to bone  Severe COPD with chronic respiratory failure   Gastritis  Neoplasm related pain  Severe protein calorie malnutrition   Pulmonary and cancer cachexia  QTC prolongation      PLAN:  1. Goals of care  After extensive goals of care discussion today, Rishabh acknowledges that moving forward with hospice is the best next step for him.   However due to extreme weakness and intractable migraine, I worry that he is not safe for discharge to home and requires inpatient admission for symptom management at this time.  He and family are in agreement with admission for symptom control and thereafter arranging hospice disposition.  Currently, Rishabh's preference is to return home with hospice when symptoms are managed, however he acknowledges he does not have support and may be willing to consider moving to a facility closer to to his sisters.  Requested admission for level 4 comfort cares.  Explained that we will not do lab draws, workup, etc. Will d/c cancer tx at this time. He is in agreement.   Hospice consult added. Palliative care will follow.    2. Symptom management   Rishabh's main complaints are significant cancer related pain, migraine, congestion, cough, anxiety, dysgeusia, insomnia, and weakness.   QTC is significantly prolonged, therefore avoiding antipsychotics or other prolonging agents.   Continue Gabapentin 300MG BID  Continue MS CONTIN 15MG BID  Continue Lidocaine patches  Will HOLD home klonopin and xanax as they have not been effective for him.   Will give migraine cocktail with decadron, sumatriptan, magnesium, benadryl.  Tussionex cough medicine BID PRN.   Saline nebs for congestion. Can D/C if no benefit.    Comfort  "medications:  IV hydromorphone 0.3MG Q1H prn severe pain   IV lorazepam 1MG x1 now and every 4H PRN.    Social support:  Time spent providing supportive listening.     Patient is finding comfort in good medical care.               I have reviewed the patient's controlled substance dispensing history in the Prescription Drug Monitoring Program in compliance with the [facility] regulations before prescribing any controlled substances.  Last refills  Filled  Written  ID  Drug  QTY  Days  Prescriber  RX #  Dispenser  Refill  Daily Dose*  Pymt Type      02/24/2024 02/23/2024 1 Morphine Sulf Er 15 Mg Tablet 60.00 30  Bul [id number] Pen (4411) 0 30.00 MME Private Pay [place]   01/30/2024 01/30/2024 1 Alprazolam 0.5 Mg Tablet 60.00 30 Ch Bul [id number] Pen (4411) 0 2.00 LME Medicare [place]   01/30/2024 01/30/2024 1 Clonazepam 0.5 Mg Odt 30.00 30 Ch Bul [id number] Pen (4411) 0 1.00 LME Medicare [place]   01/25/2024 01/25/2024 1 Morphine Sulf Er 15 Mg Tablet 60.00 30 Ch Bul [id number] Pen (4411) 0 30.00 MME Medicare [place]   12/26/2023 12/26/2023 1 Morphine Sulf Er 15 Mg Tablet 60.00 30 Ch Bul [id number] Pen (4411) 0 30.00 MME Medicare [place]     Decisional apparatus:  Patient is competent on exam today.  If competence is lost, patient's substitute decision maker would default to sister \"Piedad\"/Mary by [place] Act 169.  Advance Directive/Living Will/POLST:    We appreciate the invitation to be involved in this patient's care.  We will continue to follow throughout this hospitalization.  Please do not hesitate to reach our on call provider through our clinic answering service at [phone] should you have acute symptom control concerns.    Arleth Scales PA-C  Palliative and Supportive Care  Clinic/Answering Service: [phone]  You can find me on [employer]!     IDENTIFICATION:  Inpatient consult to Palliative Care  Consult performed by: Arleth Scales PA-C  Consult ordered by: Price Hoffmann DO        Physician Requesting Consult: Price" HAYDEN Hoffmann DO  Reason for Consult / Principal Problem: GOC counseling and SM secondary to weakness, FTT, intractable migraine     History of Present Illness:  Richard Nava is a 68 y.o. male who presents with weakness, cough/congestion.  Rishabh is under the care of ISA Cormier for metastatic prostate cancer which was diagnosed in early 2022.  He has extensive multifocal osseous metastatic disease including axial and appendicular skeletal system calvarium, sternum, ribs, right humerus and clavicle, bilateral scapulae, cervical spine, thoracolumbar spine, sacrum, pelvis, and bilateral femurs.  He also has significant COPD which has been recently worsening leading to increased admissions.  Rishabh follows with [facility] in the outpatient setting.  He has significant cancer related pain of his neck, back, and hips as well as headaches.  He has had multiple goals of care discussions in the past and has remained strictly treatment focused.  He is also highly sensitive to certain medications and reports side effects to even minuscule doses albeit inconsistently.  He had recent ED presentation for shortness of breath and was discharged to home however he did not have home oxygen at the time and therefore quickly came back with COPD flare.  He was stabilized throughout that hospital admission and was discharged home with oxygen.  He now presents with increased fatigue and poor p.o. intake as well as intractable migraine.  Palliative and supportive care has been consulted for goals of care conversations and management due to multiple symptoms as above.    Review of Systems:   Review of Systems   Constitutional: Positive for decreased appetite, malaise/fatigue and weight loss.   HENT:  Negative for congestion.    Cardiovascular:  Negative for chest pain, claudication and dyspnea on exertion.   Musculoskeletal:  Positive for arthritis, back pain, muscle weakness, neck pain and stiffness.   Gastrointestinal:   Positive for bloating, abdominal pain and anorexia. Negative for nausea and vomiting.   Genitourinary:  Negative for bladder incontinence and dysuria.   Neurological:  Positive for headaches, light-headedness and weakness.   Psychiatric/Behavioral:  Positive for depression. Negative for altered mental status, hallucinations and hypervigilance. The patient has insomnia and is nervous/anxious.        Past Medical History:   Diagnosis Date    Bone cancer (HCC)     Colon polyp     COPD (chronic obstructive pulmonary disease) (HCC)     Emphysema lung (HCC)     Hyperlipidemia     Prostate cancer (HCC)      Past Surgical History:   Procedure Laterality Date    APPENDECTOMY      COLONOSCOPY      IR BIOPSY BONE  2021    JOINT REPLACEMENT      UPPER GASTROINTESTINAL ENDOSCOPY       Social History     Socioeconomic History    Marital status: Single     Spouse name: Not on file    Number of children: Not on file    Years of education: Not on file    Highest education level: Not on file   Occupational History    Not on file   Tobacco Use    Smoking status: Former     Current packs/day: 0.00     Average packs/day: 1 pack/day for 42.0 years (42.0 ttl pk-yrs)     Types: Cigarettes     Start date:      Quit date:      Years since quittin.2    Smokeless tobacco: Never   Vaping Use    Vaping status: Never Used   Substance and Sexual Activity    Alcohol use: Never    Drug use: Never    Sexual activity: Not Currently   Other Topics Concern    Not on file   Social History Narrative    Not on file     Social Determinants of Health     Financial Resource Strain: Low Risk  (2023)    Overall Financial Resource Strain (CARDIA)     Difficulty of Paying Living Expenses: Not hard at all   Food Insecurity: No Food Insecurity (3/11/2024)    Hunger Vital Sign     Worried About Running Out of Food in the Last Year: Never true     Ran Out of Food in the Last Year: Never true   Transportation Needs: No Transportation Needs  (3/11/2024)    PRAPARE - Transportation     Lack of Transportation (Medical): No     Lack of Transportation (Non-Medical): No   Physical Activity: Not on file   Stress: Not on file   Social Connections: Not on file   Intimate Partner Violence: Not At Risk (4/22/2022)    Humiliation, Afraid, Rape, and Kick questionnaire     Fear of Current or Ex-Partner: No     Emotionally Abused: No     Physically Abused: No     Sexually Abused: No   Housing Stability: Low Risk  (3/11/2024)    Housing Stability Vital Sign     Unable to Pay for Housing in the Last Year: No     Number of Places Lived in the Last Year: 1     Unstable Housing in the Last Year: No     Family History   Problem Relation Age of Onset    Lung cancer Mother     Breast cancer Sister     Colon polyps Brother     Colon cancer Neg Hx        Medications:  all current active meds have been reviewed and current meds:   Current Facility-Administered Medications   Medication Dose Route Frequency    bisacodyl (DULCOLAX) rectal suppository 10 mg  10 mg Rectal Daily PRN    dexamethasone (DECADRON) injection 4 mg  4 mg Intravenous Q6H FANNY    diphenhydrAMINE (BENADRYL) injection 25 mg  25 mg Intravenous Q8H FANNY    glycopyrrolate (ROBINUL) injection 0.1 mg  0.1 mg Intravenous Q4H PRN    Hydrocod Jose-Chlorphe Jose ER (TUSSIONEX) ER suspension 5 mL  5 mL Oral Q12H PRN    HYDROmorphone (DILAUDID) injection 0.3 mg  0.3 mg Intravenous Q2H PRN    LORazepam (ATIVAN) injection 1 mg  1 mg Intravenous Q4H PRN    magnesium sulfate 2 g/50 mL IVPB (premix) 2 g  2 g Intravenous Q24H FANNY    sodium chloride 3 % inhalation solution 4 mL  4 mL Nebulization TID    SUMAtriptan (IMITREX) subcutaneous injection 6 mg  6 mg Subcutaneous Q1H PRN       No Known Allergies      Medications  No current facility-administered medications for this encounter.    Current Outpatient Medications:     abiraterone (ZYTIGA) 250 mg tablet, Take 4 tablets (1,000 mg total) by mouth once daily., Disp: 120 tablet,  Rfl: 10    albuterol (2.5 mg/3 mL) 0.083 % nebulizer solution, Take 3 mL (2.5 mg total) by nebulization every 6 (six) hours as needed for wheezing or shortness of breath, Disp: 270 mL, Rfl: 1    albuterol (PROVENTIL HFA,VENTOLIN HFA) 90 mcg/act inhaler, TAKE 2 PUFFS BY MOUTH EVERY 4 HOURS AS NEEDED FOR WHEEZE, Disp: 8.5 g, Rfl: 1    albuterol (PROVENTIL HFA,VENTOLIN HFA) 90 mcg/act inhaler, Inhale 2 puffs every 4 (four) hours as needed for wheezing, Disp: 18 g, Rfl: 0    ALPRAZolam (XANAX) 0.5 mg tablet, Take 1 tablet (0.5 mg total) by mouth 2 (two) times a day as needed for anxiety or sleep Provider aware of co-existing clonazepam prescription. Patient is palliative care patient. (Patient not taking: Reported on 3/22/2024), Disp: 60 tablet, Rfl: 0    atorvastatin (LIPITOR) 40 mg tablet, Take 1 tablet (40 mg total) by mouth daily with dinner, Disp: 30 tablet, Rfl: 0    benzonatate (TESSALON PERLES) 100 mg capsule, Take 1 capsule (100 mg total) by mouth 3 (three) times a day as needed for cough, Disp: 20 capsule, Rfl: 0    Budeson-Glycopyrrol-Formoterol (Breztri Aerosphere) 160-9-4.8 MCG/ACT AERO, Inhale 2 puffs 2 (two) times a day Rinse mouth after use., Disp: 31.1 g, Rfl: 3    clonazePAM (KlonoPIN) 0.5 MG disintegrating tablet, Take 1 tablet (0.5 mg total) by mouth daily at bedtime as needed for anxiety, Disp: 30 tablet, Rfl: 0    gabapentin (NEURONTIN) 300 mg capsule, Take 2 capsules (600 mg total) by mouth daily at bedtime, Disp: 60 capsule, Rfl: 2    levofloxacin (LEVAQUIN) 500 mg tablet, Take 1 tablet (500 mg total) by mouth every 24 hours for 7 days, Disp: 7 tablet, Rfl: 0    lidocaine (LIDODERM) 5 %, Apply 3 patches topically daily Remove & Discard patch within 12 hours or as directed by MD, Disp: 90 patch, Rfl: 0    montelukast (SINGULAIR) 10 mg tablet, TAKE 1 TABLET BY MOUTH EVERYDAY AT BEDTIME (Patient not taking: Reported on 3/22/2024), Disp: 90 tablet, Rfl: 1    morphine (MS CONTIN) 15 mg 12 hr tablet,  Take 1 tablet (15 mg total) by mouth 2 (two) times a day Ongoing therapy Max Daily Amount: 30 mg, Disp: 60 tablet, Rfl: 0    naloxone (NARCAN) 4 mg/0.1 mL nasal spray, Administer 1 spray into a nostril. If no response after 2-3 minutes, give another dose in the other nostril using a new spray., Disp: 1 each, Rfl: 0    NON FORMULARY, Medical marijuana (Patient not taking: Reported on 7/13/2023), Disp: , Rfl:     senna (SENOKOT) 8.6 mg, Take 2 tablets (17.2 mg total) by mouth daily at bedtime as needed for constipation (Patient not taking: Reported on 3/15/2024), Disp: 60 tablet, Rfl: 0    Objective  /53 (BP Location: Right arm)   Pulse 82   Temp 98.7 °F (37.1 °C) (Temporal)   Resp 18   Wt 46.3 kg (102 lb 1.2 oz)   SpO2 95%   BMI 15.99 kg/m²     Physical Exam:   Physical Exam  Vitals and nursing note reviewed.   Constitutional:       General: He is in acute distress.      Appearance: He is cachectic. He is ill-appearing.   HENT:      Head: Normocephalic.   Eyes:      Conjunctiva/sclera: Conjunctivae normal.   Cardiovascular:      Rate and Rhythm: Normal rate.   Pulmonary:      Effort: Pulmonary effort is normal. No respiratory distress.   Abdominal:      Tenderness: There is no abdominal tenderness.   Musculoskeletal:         General: No swelling.      Cervical back: Neck supple.   Skin:     General: Skin is warm and dry.      Coloration: Skin is pale.   Neurological:      Mental Status: He is alert.   Psychiatric:         Mood and Affect: Mood is anxious.         Behavior: Behavior is cooperative.       Lab Results: I have personally reviewed pertinent labs., CBC:   Lab Results   Component Value Date    WBC 13.32 (H) 03/26/2024    HGB 11.2 (L) 03/26/2024    HCT 36.0 (L) 03/26/2024     (H) 03/26/2024     03/26/2024    RBC 3.56 (L) 03/26/2024    MCH 31.5 03/26/2024    MCHC 31.1 (L) 03/26/2024    RDW 13.6 03/26/2024    MPV 8.3 (L) 03/26/2024    NRBC 0 03/26/2024   , CMP:   Lab Results  "  Component Value Date    SODIUM 144 03/26/2024    K 3.2 (L) 03/26/2024    CL 99 03/26/2024    CO2 41 (H) 03/26/2024    BUN 8 03/26/2024    CREATININE 0.35 (L) 03/26/2024    CALCIUM 8.6 03/26/2024    AST 14 03/26/2024    ALT 11 03/26/2024    ALKPHOS 38 03/26/2024    EGFR 128 03/26/2024     Imaging Studies: I have personally reviewed pertinent reports.  EKG, Pathology, and Other Studies: I have personally reviewed pertinent reports.    Counseling / Coordination of Care  Total floor / unit time spent today 90 minutes. Greater than 50% of total time was spent with the patient and / or family counseling and / or coordination of care. A description of the counseling / coordination of care: reviewed chart, reviewed lab values, reviewed imaging, provided medical updates, discussed palliative care and symptom management, discussed hospice care and comfort care, discussed goals of care, discussed code status, discussed advanced directives, assessed POA/HCA, provided anticipatory guidance, provided psychosocial and emotional support, assessed competency and decision-making, and facilitated interdisciplinary communication. Reviewed with ED physician, SLIM team, RN and CM.    Portions of this document may have been created using dictation software and as such some \"sound alike\" terms may have been generated by the system. Do not hesitate to contact me with any questions or clarifications.    "

## 2024-03-26 NOTE — PLAN OF CARE
Problem: Potential for Falls  Goal: Patient will remain free of falls  Description: INTERVENTIONS:  - Educate patient/family on patient safety including physical limitations  - Instruct patient to call for assistance with activity   - Consult OT/PT to assist with strengthening/mobility   - Keep Call bell within reach  - Keep bed low and locked with side rails adjusted as appropriate  - Keep care items and personal belongings within reach  - Initiate and maintain comfort rounds  - Make Fall Risk Sign visible to staff  - Offer Toileting every 2 Hours, in advance of need  - Initiate/Maintain bed alarm  - Obtain necessary fall risk management equipment: walker  - Apply yellow socks and bracelet for high fall risk patients  - Consider moving patient to room near nurses station  Outcome: Progressing     Problem: Prexisting or High Potential for Compromised Skin Integrity  Goal: Skin integrity is maintained or improved  Description: INTERVENTIONS:  - Identify patients at risk for skin breakdown  - Assess and monitor skin integrity  - Assess and monitor nutrition and hydration status  - Monitor labs   - Assess for incontinence   - Turn and reposition patient  - Assist with mobility/ambulation  - Relieve pressure over bony prominences  - Avoid friction and shearing  - Provide appropriate hygiene as needed including keeping skin clean and dry  - Evaluate need for skin moisturizer/barrier cream  - Collaborate with interdisciplinary team   - Patient/family teaching  - Consider wound care consult   Outcome: Progressing     Problem: DISCHARGE PLANNING  Goal: Discharge to home or other facility with appropriate resources  Description: INTERVENTIONS:  - Identify barriers to discharge w/patient and caregiver  - Arrange for needed discharge resources and transportation as appropriate  - Identify discharge learning needs (meds, wound care, etc.)  - Arrange for interpretive services to assist at discharge as needed  - Refer to Case  Management Department for coordinating discharge planning if the patient needs post-hospital services based on physician/advanced practitioner order or complex needs related to functional status, cognitive ability, or social support system  Outcome: Progressing     Problem: Knowledge Deficit  Goal: Patient/family/caregiver demonstrates understanding of disease process, treatment plan, medications, and discharge instructions  Description: Complete learning assessment and assess knowledge base.  Interventions:  - Provide teaching at level of understanding  - Provide teaching via preferred learning methods  Outcome: Progressing

## 2024-03-26 NOTE — ED PROVIDER NOTES
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  Patient just finished a course of Levaquin and tapered prednisone.  Patient has severe malnutrition, chronic headache, opioid dependence and is a palliative care patient.  States that over the last several days his cough has become nearly constant and nonproductive.  No relief with Tessalon Perles.  This is keeping him up at night.  He has been anorexic and weak.  He said he is unable to get out of his bed today due to weakness and his pulse ox dropping to the 70s.  Patient denies vomiting, diarrhea, bloody stool, change in urination, fever, injury.        Prior to Admission Medications   Prescriptions Last Dose Informant Patient Reported? Taking?   ALPRAZolam (XANAX) 0.5 mg tablet  Self No No   Sig: Take 1 tablet (0.5 mg total) by mouth 2 (two) times a day as needed for anxiety or sleep Provider aware of co-existing clonazepam prescription. Patient is palliative care patient.   Patient not taking: Reported on 3/22/2024   Budeson-Glycopyrrol-Formoterol (Breztri Aerosphere) 160-9-4.8 MCG/ACT AERO  Self No No   Sig: Inhale 2 puffs 2 (two) times a day Rinse mouth after use.   NON FORMULARY  Self Yes No   Sig: Medical marijuana   Patient not taking: Reported on 7/13/2023   abiraterone (ZYTIGA) 250 mg tablet  Self No No   Sig: Take 4 tablets (1,000 mg total) by mouth once daily.   albuterol (2.5 mg/3 mL) 0.083 % nebulizer solution  Self No No   Sig: Take 3 mL (2.5 mg total) by nebulization every 6 (six) hours as needed for wheezing or shortness of breath   albuterol (PROVENTIL HFA,VENTOLIN HFA) 90 mcg/act inhaler  Self No No   Sig: TAKE 2 PUFFS BY MOUTH EVERY 4  HOURS AS NEEDED FOR WHEEZE   albuterol (PROVENTIL HFA,VENTOLIN HFA) 90 mcg/act inhaler  Self No No   Sig: Inhale 2 puffs every 4 (four) hours as needed for wheezing   atorvastatin (LIPITOR) 40 mg tablet  Self No No   Sig: Take 1 tablet (40 mg total) by mouth daily with dinner   azithromycin (ZITHROMAX) 250 mg tablet   Yes Yes   Sig: Take 500 mg by mouth every 24 hours   benzonatate (TESSALON PERLES) 100 mg capsule  Self No No   Sig: Take 1 capsule (100 mg total) by mouth 3 (three) times a day as needed for cough   clonazePAM (KlonoPIN) 0.5 MG disintegrating tablet  Self No No   Sig: Take 1 tablet (0.5 mg total) by mouth daily at bedtime as needed for anxiety   gabapentin (NEURONTIN) 300 mg capsule  Self No No   Sig: Take 2 capsules (600 mg total) by mouth daily at bedtime   levofloxacin (LEVAQUIN) 500 mg tablet   No No   Sig: Take 1 tablet (500 mg total) by mouth every 24 hours for 7 days   lidocaine (LIDODERM) 5 %  Self No No   Sig: Apply 3 patches topically daily Remove & Discard patch within 12 hours or as directed by MD   montelukast (SINGULAIR) 10 mg tablet  Self No No   Sig: TAKE 1 TABLET BY MOUTH EVERYDAY AT BEDTIME   Patient not taking: Reported on 3/22/2024   morphine (MS CONTIN) 15 mg 12 hr tablet  Self No No   Sig: Take 1 tablet (15 mg total) by mouth 2 (two) times a day Ongoing therapy Max Daily Amount: 30 mg   naloxone (NARCAN) 4 mg/0.1 mL nasal spray  Self No No   Sig: Administer 1 spray into a nostril. If no response after 2-3 minutes, give another dose in the other nostril using a new spray.   senna (SENOKOT) 8.6 mg  Self No No   Sig: Take 2 tablets (17.2 mg total) by mouth daily at bedtime as needed for constipation   Patient not taking: Reported on 3/15/2024      Facility-Administered Medications: None       Past Medical History:   Diagnosis Date    Bone cancer (HCC)     Colon polyp     COPD (chronic obstructive pulmonary disease) (HCC)     Emphysema lung (HCC)     Hyperlipidemia     Prostate  cancer (HCC)        Past Surgical History:   Procedure Laterality Date    APPENDECTOMY      COLONOSCOPY      IR BIOPSY BONE  2021    JOINT REPLACEMENT      UPPER GASTROINTESTINAL ENDOSCOPY         Family History   Problem Relation Age of Onset    Lung cancer Mother     Breast cancer Sister     Colon polyps Brother     Colon cancer Neg Hx      I have reviewed and agree with the history as documented.    E-Cigarette/Vaping    E-Cigarette Use Never User      E-Cigarette/Vaping Substances    Nicotine No     THC No     CBD No     Flavoring No     Other No     Unknown No      Social History     Tobacco Use    Smoking status: Former     Current packs/day: 0.00     Average packs/day: 1 pack/day for 42.0 years (42.0 ttl pk-yrs)     Types: Cigarettes     Start date:      Quit date:      Years since quittin.2    Smokeless tobacco: Never   Vaping Use    Vaping status: Never Used   Substance Use Topics    Alcohol use: Never    Drug use: Never       Review of Systems   Constitutional:  Positive for activity change, appetite change and fatigue. Negative for fever.   HENT:  Negative for congestion.    Respiratory:  Positive for cough and shortness of breath.    Cardiovascular:  Negative for palpitations and leg swelling.   Gastrointestinal:  Negative for abdominal pain, blood in stool, diarrhea and vomiting.   Neurological:  Positive for weakness and headaches. Negative for syncope.       Physical Exam  Physical Exam  Constitutional:       General: He is not in acute distress.     Appearance: He is ill-appearing. He is not toxic-appearing or diaphoretic.      Comments: Cachectic   HENT:      Head: Normocephalic and atraumatic.      Mouth/Throat:      Pharynx: No oropharyngeal exudate.      Comments: Dry  Eyes:      Pupils: Pupils are equal, round, and reactive to light.   Neck:      Vascular: No JVD.   Cardiovascular:      Rate and Rhythm: Normal rate and regular rhythm.      Pulses: Normal pulses.      Heart  sounds: Normal heart sounds.   Pulmonary:      Effort: Pulmonary effort is normal.      Breath sounds: Normal breath sounds.   Chest:      Chest wall: No tenderness or crepitus.   Abdominal:      General: Bowel sounds are normal.      Palpations: Abdomen is soft.      Tenderness: There is no abdominal tenderness.   Musculoskeletal:      Right lower leg: No tenderness. No edema.      Left lower leg: No tenderness. No edema.   Lymphadenopathy:      Cervical: No cervical adenopathy.   Skin:     General: Skin is warm and dry.      Capillary Refill: Capillary refill takes less than 2 seconds.   Neurological:      General: No focal deficit present.      Mental Status: He is alert and oriented to person, place, and time.      Cranial Nerves: No cranial nerve deficit.      Motor: No weakness.   Psychiatric:         Mood and Affect: Mood is anxious.      Comments: Appears depressed         Vital Signs  ED Triage Vitals   Temperature Pulse Respirations Blood Pressure SpO2   03/26/24 0653 03/26/24 0653 03/26/24 0653 03/26/24 0653 03/26/24 0653   98.7 °F (37.1 °C) 104 20 (!) 183/87 99 %      Temp Source Heart Rate Source Patient Position - Orthostatic VS BP Location FiO2 (%)   03/26/24 0653 03/26/24 0653 03/26/24 0754 03/26/24 0754 --   Temporal Monitor Sitting Left arm       Pain Score       03/26/24 0719       6           Vitals:    03/26/24 1406 03/26/24 1433 03/26/24 1501 03/26/24 1557   BP: 125/78 111/82 111/82    Pulse: 93 85 92 102   Patient Position - Orthostatic VS: Lying  Lying          Visual Acuity      ED Medications  Medications   albuterol inhalation solution 2.5 mg (has no administration in time range)   gabapentin (NEURONTIN) capsule 600 mg (600 mg Oral Given 3/26/24 2142)   morphine (MS CONTIN) ER tablet 15 mg (15 mg Oral Not Given 3/26/24 1827)   HYDROmorphone (DILAUDID) injection 0.3 mg (0.3 mg Intravenous Given 3/27/24 1136)   LORazepam (ATIVAN) injection 1 mg (has no administration in time range)    bisacodyl (DULCOLAX) rectal suppository 10 mg (has no administration in time range)   glycopyrrolate (ROBINUL) injection 0.1 mg (has no administration in time range)   magnesium sulfate 2 g/50 mL IVPB (premix) 2 g (2 g Intravenous New Bag 3/26/24 1631)   sodium chloride 3 % inhalation solution 4 mL (has no administration in time range)   diphenhydrAMINE (BENADRYL) injection 25 mg (25 mg Intravenous Given 3/27/24 0533)   Hydrocod Jose-Chlorphe Jose ER (TUSSIONEX) ER suspension 5 mL (has no administration in time range)   dexamethasone (DECADRON) injection 2 mg (has no administration in time range)   morphine injection 4 mg (4 mg Intravenous Given 3/26/24 0719)   ondansetron (ZOFRAN) injection 4 mg (4 mg Intravenous Given 3/26/24 0720)   potassium chloride 20 mEq IVPB (premix) (0 mEq Intravenous Stopped 3/26/24 0946)   potassium chloride (Klor-Con M20) CR tablet 20 mEq (20 mEq Oral Given 3/26/24 0753)   sodium chloride 0.9 % bolus 1,000 mL (0 mL Intravenous Stopped 3/26/24 0916)   magnesium sulfate 2 g/50 mL IVPB (premix) 2 g (0 g Intravenous Stopped 3/26/24 0946)   morphine injection 4 mg (4 mg Intravenous Given 3/26/24 1349)   metoclopramide (REGLAN) injection 10 mg (10 mg Intravenous Given 3/26/24 1454)   diphenhydrAMINE (BENADRYL) injection 12.5 mg (12.5 mg Intravenous Given 3/26/24 1458)   LORazepam (ATIVAN) injection 1.5 mg (1.5 mg Intravenous Given 3/26/24 1456)       Diagnostic Studies  Results Reviewed       Procedure Component Value Units Date/Time    Blood culture #1 [[id number]] Collected: 03/26/24 0710    Lab Status: Preliminary result Specimen: Blood from Arm, Right Updated: 03/26/24 1401     Blood Culture Received in Microbiology Lab. Culture in Progress.    Blood culture #2 [[id number]] Collected: 03/26/24 0710    Lab Status: Preliminary result Specimen: Blood from Arm, Right Updated: 03/26/24 1401     Blood Culture Received in Microbiology Lab. Culture in Progress.    FLU/RSV/COVID - if FLU/RSV  clinically relevant [[id number]]  (Normal) Collected: 03/26/24 0742    Lab Status: Final result Specimen: Nares from Nose Updated: 03/26/24 0827     SARS-CoV-2 Negative     INFLUENZA A PCR Negative     INFLUENZA B PCR Negative     RSV PCR Negative    Narrative:      FOR PEDIATRIC PATIENTS - copy/paste COVID Guidelines URL to browser: [email or web address]    SARS-CoV-2 assay is a Nucleic Acid Amplification assay intended for the  qualitative detection of nucleic acid from SARS-CoV-2 in nasopharyngeal  swabs. Results are for the presumptive identification of SARS-CoV-2 RNA.    Positive results are indicative of infection with SARS-CoV-2, the virus  causing COVID-19, but do not rule out bacterial infection or co-infection  with other viruses. Laboratories within the [place] and its  territories are required to report all positive results to the appropriate  public health authorities. Negative results do not preclude SARS-CoV-2  infection and should not be used as the sole basis for treatment or other  patient management decisions. Negative results must be combined with  clinical observations, patient history, and epidemiological information.  This test has not been FDA cleared or approved.    This test has been authorized by FDA under an Emergency Use Authorization  (EUA). This test is only authorized for the duration of time the  declaration that circumstances exist justifying the authorization of the  emergency use of an in vitro diagnostic tests for detection of SARS-CoV-2  virus and/or diagnosis of COVID-19 infection under section 564(b)(1) of  the Act, 21 U.S.C. 360bbb-3(b)(1), unless the authorization is terminated  or revoked sooner. The test has been validated but independent review by FDA  and CLIA is pending.    Test performed using [employer] GeneXpert: This RT-PCR assay targets N2,  a region unique to SARS-CoV-2. A conserved region in the E-gene was  chosen  for pan-Sarbecovirus detection which includes SARS-CoV-2.    According to CMS-2020-01-R, this platform meets the definition of high-throughput technology.    Urine Microscopic [[id number]]  (Normal) Collected: 03/26/24 0742    Lab Status: Final result Specimen: Urine, Clean Catch Updated: 03/26/24 0814     RBC, UA None Seen /hpf      WBC, UA None Seen /hpf      Epithelial Cells None Seen /hpf      Bacteria, UA None Seen /hpf     UA w Reflex to Microscopic w Reflex to Culture [[id number]]  (Abnormal) Collected: 03/26/24 0742    Lab Status: Final result Specimen: Urine, Clean Catch Updated: 03/26/24 0759     Color, UA Yellow     Clarity, UA Clear     Specific Gravity, UA 1.010     pH, UA 8.0     Leukocytes, UA Negative     Nitrite, UA Negative     Protein, UA Trace mg/dl      Glucose,  (3/20%) mg/dl      Ketones, UA Negative mg/dl      Urobilinogen, UA <2.0 mg/dl      Bilirubin, UA Negative     Occult Blood, UA Negative    Magnesium [[id number]]  (Abnormal) Collected: 03/26/24 0710    Lab Status: Final result Specimen: Blood from Arm, Right Updated: 03/26/24 0754     Magnesium 1.7 mg/dL     Procalcitonin [[id number]]  (Normal) Collected: 03/26/24 0710    Lab Status: Final result Specimen: Blood from Arm, Right Updated: 03/26/24 0748     Procalcitonin 0.09 ng/ml     Protime-INR [[id number]]  (Normal) Collected: 03/26/24 0710    Lab Status: Final result Specimen: Blood from Arm, Right Updated: 03/26/24 0743     Protime 12.8 seconds      INR 0.93    APTT [[id number]]  (Normal) Collected: 03/26/24 0710    Lab Status: Final result Specimen: Blood from Arm, Right Updated: 03/26/24 0743     PTT 29 seconds     Lactic acid [[id number]]  (Normal) Collected: 03/26/24 0710    Lab Status: Final result Specimen: Blood from Arm, Right Updated: 03/26/24 0737     LACTIC ACID 0.9 mmol/L     Narrative:      Result may be elevated if tourniquet was used during collection.    Comprehensive metabolic panel [[id number]]  (Abnormal)  Collected: 03/26/24 0710    Lab Status: Final result Specimen: Blood from Arm, Right Updated: 03/26/24 0737     Sodium 144 mmol/L      Potassium 3.2 mmol/L      Chloride 99 mmol/L      CO2 41 mmol/L      ANION GAP 4 mmol/L      BUN 8 mg/dL      Creatinine 0.35 mg/dL      Glucose 120 mg/dL      Calcium 8.6 mg/dL      AST 14 U/L      ALT 11 U/L      Alkaline Phosphatase 38 U/L      Total Protein 6.3 g/dL      Albumin 3.8 g/dL      Total Bilirubin 0.48 mg/dL      eGFR 128 ml/min/1.73sq m     Narrative:      National Kidney Disease Foundation guidelines for Chronic Kidney Disease (CKD):     Stage 1 with normal or high GFR (GFR > 90 mL/min/1.73 square meters)    Stage 2 Mild CKD (GFR = 60-89 mL/min/1.73 square meters)    Stage 3A Moderate CKD (GFR = 45-59 mL/min/1.73 square meters)    Stage 3B Moderate CKD (GFR = 30-44 mL/min/1.73 square meters)    Stage 4 Severe CKD (GFR = 15-29 mL/min/1.73 square meters)    Stage 5 End Stage CKD (GFR <15 mL/min/1.73 square meters)  Note: GFR calculation is accurate only with a steady state creatinine    CBC and differential [[id number]]  (Abnormal) Collected: 03/26/24 0710    Lab Status: Final result Specimen: Blood from Arm, Right Updated: 03/26/24 0722     WBC 13.32 Thousand/uL      RBC 3.56 Million/uL      Hemoglobin 11.2 g/dL      Hematocrit 36.0 %       fL      MCH 31.5 pg      MCHC 31.1 g/dL      RDW 13.6 %      MPV 8.3 fL      Platelets 235 Thousands/uL      nRBC 0 /100 WBCs      Neutrophils Relative 85 %      Immature Grans % 1 %      Lymphocytes Relative 6 %      Monocytes Relative 6 %      Eosinophils Relative 1 %      Basophils Relative 1 %      Neutrophils Absolute 11.35 Thousands/µL      Absolute Immature Grans 0.09 Thousand/uL      Absolute Lymphocytes 0.77 Thousands/µL      Absolute Monocytes 0.85 Thousand/µL      Eosinophils Absolute 0.19 Thousand/µL      Basophils Absolute 0.07 Thousands/µL                    CT head without contrast   Final Result by Rahat BLAIR  MD Russ (03/26 0805)      No acute intracranial abnormality.   Unchanged sclerotic calvarial osseous metastases.               Workstation performed: [id number]         XR chest 1 view portable   ED Interpretation by Price Hoffmann DO (03/26 0729)   No acute cardiopulmonary disease      Final Result by Armand Price MD (03/26 5767)      No acute cardiopulmonary disease.            Workstation performed: [id number]                    Procedures  ECG 12 Lead Documentation Only    Date/Time: 3/26/2024 7:12 AM    Performed by: Price Hoffmann DO  Authorized by: Price Hoffmann DO    ECG reviewed by me, the ED Provider: yes    Patient location:  ED  Previous ECG:     Previous ECG:  Compared to current    Comparison ECG info:  QT interval has lengthened    Similarity:  Changes noted    Comparison to cardiac monitor: Yes    Rate:     ECG rate assessment: normal    Rhythm:     Rhythm: sinus rhythm    Ectopy:     Ectopy: none    QRS:     QRS axis:  Right    QRS intervals:  Normal  Conduction:     Conduction: normal    Q waves:     Q waves:  V2  Other findings:     Other findings: CYRUS    Comments:      Septal infarct, age undetermined, prolonged QT.           ED Course                               SBIRT 22yo+      Flowsheet Row Most Recent Value   Initial Alcohol Screen: US AUDIT-C     1. How often do you have a drink containing alcohol? 0 Filed at: 03/26/2024 0655   2. How many drinks containing alcohol do you have on a typical day you are drinking?  0 Filed at: 03/26/2024 0655   3a. Male UNDER 65: How often do you have five or more drinks on one occasion? 0 Filed at: 03/26/2024 0655   3b. FEMALE Any Age, or MALE 65+: How often do you have 4 or more drinks on one occassion? 0 Filed at: 03/26/2024 0655   Audit-C Score 0 Filed at: 03/26/2024 0655   YOUSIF: How many times in the past year have you...    Used an illegal drug or used a prescription medication for non-medical reasons? Never Filed at: 03/26/2024 0655                       Medical Decision Making  68-year-old male with prostate cancer with bone mets, COPD, malnutrition complaining of paroxysmal cough, generalized pain, headache, insomnia, anorexia.  Apparently has poor care at home per medics.  Concern for exacerbation of COPD, pneumonia, pneumothorax, electrolyte abnormality, acute kidney injury, depression.  Will check labs and imaging.    Results are reassuring.  Patient is able to get out of the stretcher and take a few steps in his room without desaturating.  He does use his oxygen chronically.  I did speak to palliative care.  They will come to consult him this afternoon.    Amount and/or Complexity of Data Reviewed  Independent Historian: EMS  External Data Reviewed: labs, ECG and notes.  Labs: ordered.  Radiology: ordered and independent interpretation performed.  ECG/medicine tests: ordered and independent interpretation performed. Decision-making details documented in ED Course.  Discussion of management or test interpretation with external provider(s): Palliative care AP    Risk  Prescription drug management.  Decision regarding hospitalization.             Disposition  Final diagnoses:   Hypoxia   Hypokalemia   Chronic respiratory failure with hypoxia (HCC)   Uncomplicated opioid dependence (HCC)   Neoplasm related pain   Palliative care patient   Cachexia (HCC)   Anorexia   Prostate cancer metastatic to bone (HCC)   Severe protein-calorie malnutrition (HCC)   Intractable migraine without aura and with status migrainosus     Time reflects when diagnosis was documented in both MDM as applicable and the Disposition within this note       Time User Action Codes Description Comment    3/26/2024  7:43 AM Price Hoffmann [R09.02] Hypoxia     3/26/2024  7:43 AM Price Hoffmann [E87.6] Hypokalemia     3/26/2024 11:47 AM Price Hoffmann [J96.11] Chronic respiratory failure with hypoxia (HCC)     3/26/2024 11:47 AM Price Hoffmann  [F11.20] Uncomplicated opioid dependence (HCC)     3/26/2024 11:47 AM Price Hoffmann Add [G89.3] Neoplasm related pain     3/26/2024 11:47 AM Price Hoffmann Add [Z51.5] Palliative care patient     3/26/2024 11:47 AM Price Hoffmann Add [R64] Cachexia (HCC)     3/26/2024 11:47 AM Price Hoffmann Add [R63.0] Anorexia     3/26/2024 11:47 AM Price Hoffmann Add [C61,  C79.51] Prostate cancer metastatic to bone (HCC)     3/26/2024 11:47 AM Price Hoffmann Add [E43] Severe protein-calorie malnutrition (HCC)     3/26/2024  2:37 PM Price Hoffmann Add [G43.011] Intractable migraine without aura and with status migrainosus           ED Disposition       ED Disposition   Admit    Condition   Stable    Date/Time   Tue Mar 26, 2024 0266    Comment   Case was discussed with Dr. Harris and the patient's admission status was agreed to be Admission Status: inpatient status to the service of Dr. Harris .               Follow-up Information    None         Current Discharge Medication List        CONTINUE these medications which have NOT CHANGED    Details   azithromycin (ZITHROMAX) 250 mg tablet Take 500 mg by mouth every 24 hours      abiraterone (ZYTIGA) 250 mg tablet Take 4 tablets (1,000 mg total) by mouth once daily.  Qty: 120 tablet, Refills: 10    Associated Diagnoses: Prostate cancer metastatic to bone (HCC); Malignant neoplasm of prostate metastatic to bone (HCC)      albuterol (2.5 mg/3 mL) 0.083 % nebulizer solution Take 3 mL (2.5 mg total) by nebulization every 6 (six) hours as needed for wheezing or shortness of breath  Qty: 270 mL, Refills: 1    Associated Diagnoses: Chronic obstructive pulmonary disease, unspecified COPD type (formerly [facility])      !! albuterol (PROVENTIL HFA,VENTOLIN HFA) 90 mcg/act inhaler TAKE 2 PUFFS BY MOUTH EVERY 4 HOURS AS NEEDED FOR WHEEZE  Qty: 8.5 g, Refills: 1    Comments: Substitution to a formulary equivalent within the same pharmaceutical class is authorized.  Associated  Diagnoses: COPD with acute exacerbation (HCC)      !! albuterol (PROVENTIL HFA,VENTOLIN HFA) 90 mcg/act inhaler Inhale 2 puffs every 4 (four) hours as needed for wheezing  Qty: 18 g, Refills: 0    Comments: Substitution to a formulary equivalent within the same pharmaceutical class is authorized.  Associated Diagnoses: COPD with acute exacerbation (HCC)      ALPRAZolam (XANAX) 0.5 mg tablet Take 1 tablet (0.5 mg total) by mouth 2 (two) times a day as needed for anxiety or sleep Provider aware of co-existing clonazepam prescription. Patient is palliative care patient.  Qty: 60 tablet, Refills: 0    Associated Diagnoses: Anxiety; Insomnia; Palliative care patient      atorvastatin (LIPITOR) 40 mg tablet Take 1 tablet (40 mg total) by mouth daily with dinner  Qty: 30 tablet, Refills: 0    Associated Diagnoses: HLD (hyperlipidemia)      benzonatate (TESSALON PERLES) 100 mg capsule Take 1 capsule (100 mg total) by mouth 3 (three) times a day as needed for cough  Qty: 20 capsule, Refills: 0    Associated Diagnoses: Acute respiratory failure with hypoxia and hypercapnia ([facility])      Budeson-Glycopyrrol-Formoterol (Breztri Aerosphere) 160-9-4.8 MCG/ACT AERO Inhale 2 puffs 2 (two) times a day Rinse mouth after use.  Qty: 31.1 g, Refills: 3    Associated Diagnoses: Chronic obstructive pulmonary disease, unspecified COPD type ([facility])      clonazePAM (KlonoPIN) 0.5 MG disintegrating tablet Take 1 tablet (0.5 mg total) by mouth daily at bedtime as needed for anxiety  Qty: 30 tablet, Refills: 0    Comments: Provider aware of co-existing alprazolam prescription. Patient is palliative care patient.  Associated Diagnoses: Anxiety; Insomnia; Palliative care patient      gabapentin (NEURONTIN) 300 mg capsule Take 2 capsules (600 mg total) by mouth daily at bedtime  Qty: 60 capsule, Refills: 2    Associated Diagnoses: Reactive depression      levofloxacin (LEVAQUIN) 500 mg tablet Take 1 tablet (500 mg total) by mouth every 24 hours for 7  days  Qty: 7 tablet, Refills: 0    Associated Diagnoses: Pneumonia due to infectious organism, unspecified laterality, unspecified part of lung      lidocaine (LIDODERM) 5 % Apply 3 patches topically daily Remove & Discard patch within 12 hours or as directed by MD  Qty: 90 patch, Refills: 0    Associated Diagnoses: Prostate cancer metastatic to bone (HCC); Palliative care patient; Cancer-related pain      montelukast (SINGULAIR) 10 mg tablet TAKE 1 TABLET BY MOUTH EVERYDAY AT BEDTIME  Qty: 90 tablet, Refills: 1    Associated Diagnoses: COPD with acute exacerbation ([facility] [facility])      morphine (MS CONTIN) 15 mg 12 hr tablet Take 1 tablet (15 mg total) by mouth 2 (two) times a day Ongoing therapy Max Daily Amount: 30 mg  Qty: 60 tablet, Refills: 0    Comments: Palliative care patient.  Associated Diagnoses: Cancer related pain; Palliative care patient; Prostate cancer metastatic to bone (HCC)      naloxone (NARCAN) 4 mg/0.1 mL nasal spray Administer 1 spray into a nostril. If no response after 2-3 minutes, give another dose in the other nostril using a new spray.  Qty: 1 each, Refills: 0    Associated Diagnoses: Palliative care patient; Opioid dependence in controlled environment ([facility])      NON FORMULARY Medical marijuana      senna (SENOKOT) 8.6 mg Take 2 tablets (17.2 mg total) by mouth daily at bedtime as needed for constipation  Qty: 60 tablet, Refills: 0    Associated Diagnoses: Palliative care patient       !! - Potential duplicate medications found. Please discuss with provider.          No discharge procedures on file.    PDMP Review         Value Time User    PDMP Reviewed  Yes 3/26/2024 11:43 AM Arleth Scales PA-C            ED Provider  Electronically Signed by             Price Hoffmann DO  03/27/24 0742

## 2024-03-26 NOTE — H&P
[facility]  H&P  Name: Richard Nava 68 y.o. male I MRN: [id number]  Unit/Bed#: -01 I Date of Admission: 3/26/2024   Date of Service: 3/26/2024 I Hospital Day: 0      Assessment/Plan   * Prostate cancer metastatic to bone (HCC)  Assessment & Plan  He has significant metastatic disease with cancer related pain  It has been difficult to manage pain at home  He also has had increase cachexia  He follows with palliative care outpt. He has been trying to manage his symptoms at home but no longer is able to  Admitted to the hospital to achieve better pain control and symptom control  He is a level 4 comfort care   His ultimate goal is to return home on hospice. Although this may be difficult to achieve.   Appreciate palliative care recommendations consisting of:  Gabapentin 300MG BID, MS CONTIN 15MG BId,  Lidocaine patches  Currently holding klonopin and xanax as they have not been effective for him.   He will have migraine cocktail with decadron, sumatriptan, magnesium, benadryl.  Tussionex cough medicine BID PRN.   IV hydromorphone 0.3MG Q1H prn severe pain   IV lorazepam 1MG x1 now and every 4H PRN.    Migraine headache  Assessment & Plan  Started on migraine cocktail with decadron, sumatriptan, magnesium, benadryl.      Severe protein-calorie malnutrition (HCC)  Assessment & Plan  Malnutrition Findings:                                 BMI Findings:           Body mass index is 15.99 kg/m².       Chronic obstructive pulmonary disease with acute exacerbation (HCC)  Assessment & Plan  Severe copd with chronic hypoxic respiratory failure  Continue bronchodilators and substitutes for breztri   Continue 3 to 4 liters of oxygen              VTE Prophylaxis:contraindicated as pt is comfort care   Code Status: level 4      Anticipated Length of Stay:  Patient will be admitted on an Inpatient basis with an anticipated length of stay of  greater than 2 midnights.      Chief Complaint:    increased pain from cancer and migraine headache     History of Present Illness:    Richard Nava is a 68 y.o. male with pmhx of metastatic prostate cancer with uncontrolled cancer related pain who follows with palliative care. He has been trying to control pain at home but has been unsuccessful. He was admitted for pain control of cancer related pain and migraine. His ultimate goal is to go home on hospice although may not be able to achieve this goal     Review of Systems:    Review of Systems   Constitutional:  Positive for activity change, appetite change and unexpected weight change.   HENT: Negative.     Eyes: Negative.    Respiratory: Negative.     Cardiovascular: Negative.    Gastrointestinal: Negative.    Endocrine: Negative.    Genitourinary: Negative.    Musculoskeletal:  Positive for back pain and neck pain.   Skin: Negative.    Allergic/Immunologic: Negative.    Neurological:  Positive for headaches.   Hematological: Negative.    Psychiatric/Behavioral: Negative.         Past Medical and Surgical History:     Past Medical History:   Diagnosis Date    Bone cancer (HCC)     Colon polyp     COPD (chronic obstructive pulmonary disease) (HCC)     Emphysema lung (HCC)     Hyperlipidemia     Prostate cancer (HCC)        Past Surgical History:   Procedure Laterality Date    APPENDECTOMY      COLONOSCOPY      IR BIOPSY BONE  12/30/2021    JOINT REPLACEMENT      UPPER GASTROINTESTINAL ENDOSCOPY         Meds/Allergies:    Prior to Admission medications    Medication Sig Start Date End Date Taking? Authorizing Provider   azithromycin (ZITHROMAX) 250 mg tablet Take 500 mg by mouth every 24 hours   Yes Historical Provider, MD   abiraterone (ZYTIGA) 250 mg tablet Take 4 tablets (1,000 mg total) by mouth once daily. 4/27/23   Lora Haredn DO   albuterol (2.5 mg/3 mL) 0.083 % nebulizer solution Take 3 mL (2.5 mg total) by nebulization every 6 (six) hours as needed for wheezing or shortness of breath 1/25/24    Elena Clark DO   albuterol (PROVENTIL HFA,VENTOLIN HFA) 90 mcg/act inhaler TAKE 2 PUFFS BY MOUTH EVERY 4 HOURS AS NEEDED FOR WHEEZE 1/25/24   ISA Contreras   albuterol (PROVENTIL HFA,VENTOLIN HFA) 90 mcg/act inhaler Inhale 2 puffs every 4 (four) hours as needed for wheezing 1/25/24   Kevin Mejia, DO   ALPRAZolam (XANAX) 0.5 mg tablet Take 1 tablet (0.5 mg total) by mouth 2 (two) times a day as needed for anxiety or sleep Provider aware of co-existing clonazepam prescription. Patient is palliative care patient.  Patient not taking: Reported on 3/22/2024 1/30/24   Arleth Scales PA-C   atorvastatin (LIPITOR) 40 mg tablet Take 1 tablet (40 mg total) by mouth daily with dinner 2/24/20   Dolly Marie PA-C   benzonatate (TESSALON PERLES) 100 mg capsule Take 1 capsule (100 mg total) by mouth 3 (three) times a day as needed for cough 3/12/24   Rox Harris MD   Budeson-Glycopyrrol-Formoterol (Breztri Aerosphere) 160-9-4.8 MCG/ACT AERO Inhale 2 puffs 2 (two) times a day Rinse mouth after use. 12/20/23   Musa Fatima MD   clonazePAM (KlonoPIN) 0.5 MG disintegrating tablet Take 1 tablet (0.5 mg total) by mouth daily at bedtime as needed for anxiety 1/30/24   Arleth Scales PA-C   gabapentin (NEURONTIN) 300 mg capsule Take 2 capsules (600 mg total) by mouth daily at bedtime 2/18/24   Shonna Rodriguez MD   levofloxacin (LEVAQUIN) 500 mg tablet Take 1 tablet (500 mg total) by mouth every 24 hours for 7 days 3/22/24 3/29/24  Kevin Mejia DO   lidocaine (LIDODERM) 5 % Apply 3 patches topically daily Remove & Discard patch within 12 hours or as directed by MD 1/3/23   Arleth Scales PA-C   montelukast (SINGULAIR) 10 mg tablet TAKE 1 TABLET BY MOUTH EVERYDAY AT BEDTIME  Patient not taking: Reported on 3/22/2024 4/13/23   ISA Peterson   morphine (MS CONTIN) 15 mg 12 hr tablet Take 1 tablet (15 mg total) by mouth 2 (two) times a day Ongoing therapy Max Daily Amount: 30 mg  24   Arleth Scales PA-C   naloxone (NARCAN) 4 mg/0.1 mL nasal spray Administer 1 spray into a nostril. If no response after 2-3 minutes, give another dose in the other nostril using a new spray. 22   Arleth Scales PA-C   NON FORMULARY Medical marijuana  Patient not taking: Reported on 2023    Historical Provider, MD   senna (SENOKOT) 8.6 mg Take 2 tablets (17.2 mg total) by mouth daily at bedtime as needed for constipation  Patient not taking: Reported on 3/15/2024 5/9/22   Arleth Scales PA-C     I have reviewed home medications with patient personally.    Allergies: No Known Allergies    Social History:     Marital Status: Single     Substance Use History:   Social History     Substance and Sexual Activity   Alcohol Use Never     Social History     Tobacco Use   Smoking Status Former    Current packs/day: 0.00    Average packs/day: 1 pack/day for 42.0 years (42.0 ttl pk-yrs)    Types: Cigarettes    Start date:     Quit date:     Years since quittin.2   Smokeless Tobacco Never     Social History     Substance and Sexual Activity   Drug Use Never       Family History:    non-contributory    Physical Exam:     Vitals:   Blood Pressure: 111/82 (24 1501)  Pulse: 102 (24 1557)  Temperature: 98.7 °F (37.1 °C) (24 0653)  Temp Source: Temporal (24 0653)  Respirations: (!) 27 (24 1557)  Weight - Scale: 46.3 kg (102 lb 1.2 oz) (24 0653)  SpO2: 96 % (24 1501)    Physical Exam  Constitutional:       Comments: Frail    HENT:      Head: Normocephalic and atraumatic.   Eyes:      Extraocular Movements: Extraocular movements intact.      Pupils: Pupils are equal, round, and reactive to light.   Cardiovascular:      Rate and Rhythm: Normal rate and regular rhythm.      Heart sounds: No murmur heard.     No friction rub. No gallop.   Pulmonary:      Effort: Pulmonary effort is normal. No respiratory distress.      Breath sounds:  Normal breath sounds. No wheezing, rhonchi or rales.   Abdominal:      General: Bowel sounds are normal. There is no distension.      Palpations: Abdomen is soft.      Tenderness: There is no abdominal tenderness. There is no guarding or rebound.   Neurological:      Mental Status: He is alert and oriented to person, place, and time.         Additional Data:     Lab Results: I have personally reviewed pertinent reports.      Results from last 7 days   Lab Units 03/26/24  0710   WBC Thousand/uL 13.32*   HEMOGLOBIN g/dL 11.2*   HEMATOCRIT % 36.0*   PLATELETS Thousands/uL 235   NEUTROS PCT % 85*   LYMPHS PCT % 6*   MONOS PCT % 6   EOS PCT % 1     Results from last 7 days   Lab Units 03/26/24  0710   POTASSIUM mmol/L 3.2*   CHLORIDE mmol/L 99   CO2 mmol/L 41*   BUN mg/dL 8   CREATININE mg/dL 0.35*   CALCIUM mg/dL 8.6   ALK PHOS U/L 38   ALT U/L 11   AST U/L 14     Results from last 7 days   Lab Units 03/26/24  0710   INR  0.93       Imaging: I have personally reviewed pertinent reports.      XR chest 1 view portable    Result Date: 3/26/2024  Narrative: XR CHEST PORTABLE INDICATION: dyspnea. COMPARISON: 3/8/2024 FINDINGS: Clear lungs. No pneumothorax or pleural effusion. Stable severe centrilobular emphysema. Normal cardiomediastinal silhouette. Bones are unremarkable for age. Normal upper abdomen.     Impression: No acute cardiopulmonary disease. Workstation performed: [id number]     CT head without contrast    Result Date: 3/26/2024  Narrative: CT BRAIN - WITHOUT CONTRAST INDICATION:   new, severe cephalgia; prostate Ca. COMPARISON: CT head March 8, 2024. Correlation with MR brain March 10, 2024 and PSMA PET/CT January 27, 2023 TECHNIQUE:  CT examination of the brain was performed.  Multiplanar 2D reformatted images were created from the source data. Radiation dose length product (DLP) for this visit:  880 mGy-cm .  This examination, like all CT scans performed in the Critical access hospital, was performed  utilizing techniques to minimize radiation dose exposure, including the use of iterative reconstruction and automated exposure control. IMAGE QUALITY:  Diagnostic. FINDINGS: PARENCHYMA:  No intracranial mass, mass effect or midline shift. No CT signs of acute infarction.  No acute parenchymal hemorrhage. VENTRICLES AND EXTRA-AXIAL SPACES:  Normal for the patient's age. VISUALIZED ORBITS: Post right sided cataract surgery. No acute findings. PARANASAL SINUSES: Normal visualized paranasal sinuses. CALVARIUM AND EXTRACRANIAL SOFT TISSUES: Unchanged sclerotic calvarial lesions (for example series 3, image 37).     Impression: No acute intracranial abnormality. Unchanged sclerotic calvarial osseous metastases. Workstation performed: [id number]         EKG, Pathology, and Other Studies Reviewed on Admission:   EKG: nsr at 99bpm qtc 672    Epic / Care Everywhere Records Reviewed: Yes

## 2024-03-26 NOTE — ED NOTES
Pt ambulated with minimal assistance to bathroom. Pulse ox remained 97-98 percent. Pt reports weakness but steady gait noted. MD made aware     Gracia Ross RN  03/26/24 7018

## 2024-03-27 PROBLEM — G43.909 MIGRAINE HEADACHE: Status: RESOLVED | Noted: 2024-03-26 | Resolved: 2024-03-27

## 2024-03-27 LAB
ALBUMIN SERPL BCP-MCNC: 3.6 G/DL (ref 3.5–5)
ALP SERPL-CCNC: 40 U/L (ref 34–104)
ALT SERPL W P-5'-P-CCNC: 10 U/L (ref 7–52)
ANION GAP SERPL CALCULATED.3IONS-SCNC: 3 MMOL/L (ref 4–13)
AST SERPL W P-5'-P-CCNC: 10 U/L (ref 13–39)
ATRIAL RATE: 99 BPM
BASOPHILS # BLD MANUAL: 0 THOUSAND/UL (ref 0–0.1)
BASOPHILS NFR MAR MANUAL: 0 % (ref 0–1)
BILIRUB SERPL-MCNC: 0.31 MG/DL (ref 0.2–1)
BUN SERPL-MCNC: 14 MG/DL (ref 5–25)
CALCIUM SERPL-MCNC: 8.5 MG/DL (ref 8.4–10.2)
CHLORIDE SERPL-SCNC: 99 MMOL/L (ref 96–108)
CO2 SERPL-SCNC: 37 MMOL/L (ref 21–32)
CREAT SERPL-MCNC: 0.5 MG/DL (ref 0.6–1.3)
EOSINOPHIL # BLD MANUAL: 0 THOUSAND/UL (ref 0–0.4)
EOSINOPHIL NFR BLD MANUAL: 0 % (ref 0–6)
ERYTHROCYTE [DISTWIDTH] IN BLOOD BY AUTOMATED COUNT: 13.3 % (ref 11.6–15.1)
GFR SERPL CREATININE-BSD FRML MDRD: 110 ML/MIN/1.73SQ M
GLUCOSE SERPL-MCNC: 192 MG/DL (ref 65–140)
HCT VFR BLD AUTO: 34.4 % (ref 36.5–49.3)
HGB BLD-MCNC: 10.9 G/DL (ref 12–17)
LYMPHOCYTES # BLD AUTO: 0.42 THOUSAND/UL (ref 0.6–4.47)
LYMPHOCYTES # BLD AUTO: 5 % (ref 14–44)
MCH RBC QN AUTO: 31.5 PG (ref 26.8–34.3)
MCHC RBC AUTO-ENTMCNC: 31.7 G/DL (ref 31.4–37.4)
MCV RBC AUTO: 99 FL (ref 82–98)
MONOCYTES # BLD AUTO: 0.17 THOUSAND/UL (ref 0–1.22)
MONOCYTES NFR BLD: 2 % (ref 4–12)
NEUTROPHILS # BLD MANUAL: 7.75 THOUSAND/UL (ref 1.85–7.62)
NEUTS BAND NFR BLD MANUAL: 2 % (ref 0–8)
NEUTS SEG NFR BLD AUTO: 91 % (ref 43–75)
P AXIS: 83 DEGREES
PLATELET # BLD AUTO: 250 THOUSANDS/UL (ref 149–390)
PLATELET BLD QL SMEAR: ADEQUATE
PMV BLD AUTO: 8.7 FL (ref 8.9–12.7)
POTASSIUM SERPL-SCNC: 4 MMOL/L (ref 3.5–5.3)
PR INTERVAL: 150 MS
PROT SERPL-MCNC: 6.2 G/DL (ref 6.4–8.4)
QRS AXIS: 116 DEGREES
QRSD INTERVAL: 82 MS
QT INTERVAL: 524 MS
QTC INTERVAL: 672 MS
RBC # BLD AUTO: 3.46 MILLION/UL (ref 3.88–5.62)
RBC MORPH BLD: NORMAL
SODIUM SERPL-SCNC: 139 MMOL/L (ref 135–147)
T WAVE AXIS: 79 DEGREES
VENTRICULAR RATE: 99 BPM
WBC # BLD AUTO: 8.33 THOUSAND/UL (ref 4.31–10.16)

## 2024-03-27 PROCEDURE — 99233 SBSQ HOSP IP/OBS HIGH 50: CPT | Performed by: PHYSICIAN ASSISTANT

## 2024-03-27 PROCEDURE — 93010 ELECTROCARDIOGRAM REPORT: CPT | Performed by: INTERNAL MEDICINE

## 2024-03-27 PROCEDURE — 80053 COMPREHEN METABOLIC PANEL: CPT

## 2024-03-27 PROCEDURE — 85027 COMPLETE CBC AUTOMATED: CPT

## 2024-03-27 PROCEDURE — 85007 BL SMEAR W/DIFF WBC COUNT: CPT

## 2024-03-27 PROCEDURE — 99233 SBSQ HOSP IP/OBS HIGH 50: CPT | Performed by: HOSPITALIST

## 2024-03-27 PROCEDURE — 94640 AIRWAY INHALATION TREATMENT: CPT

## 2024-03-27 PROCEDURE — 94760 N-INVAS EAR/PLS OXIMETRY 1: CPT

## 2024-03-27 RX ORDER — HYDROCODONE POLISTIREX AND CHLORPHENIRAMINE POLISTIREX 10; 8 MG/5ML; MG/5ML
5 SUSPENSION, EXTENDED RELEASE ORAL EVERY 12 HOURS SCHEDULED
Status: DISCONTINUED | OUTPATIENT
Start: 2024-03-27 | End: 2024-03-29

## 2024-03-27 RX ORDER — HYDROMORPHONE HCL IN WATER/PF 6 MG/30 ML
0.2 PATIENT CONTROLLED ANALGESIA SYRINGE INTRAVENOUS EVERY 2 HOUR PRN
Status: DISCONTINUED | OUTPATIENT
Start: 2024-03-27 | End: 2024-03-27

## 2024-03-27 RX ORDER — DEXAMETHASONE SODIUM PHOSPHATE 4 MG/ML
2 INJECTION, SOLUTION INTRA-ARTICULAR; INTRALESIONAL; INTRAMUSCULAR; INTRAVENOUS; SOFT TISSUE EVERY 8 HOURS SCHEDULED
Status: DISCONTINUED | OUTPATIENT
Start: 2024-03-27 | End: 2024-03-27

## 2024-03-27 RX ORDER — LEVALBUTEROL INHALATION SOLUTION 0.63 MG/3ML
0.63 SOLUTION RESPIRATORY (INHALATION)
Status: DISCONTINUED | OUTPATIENT
Start: 2024-03-27 | End: 2024-04-01 | Stop reason: HOSPADM

## 2024-03-27 RX ORDER — HEPARIN SODIUM 5000 [USP'U]/ML
5000 INJECTION, SOLUTION INTRAVENOUS; SUBCUTANEOUS EVERY 8 HOURS SCHEDULED
Status: DISCONTINUED | OUTPATIENT
Start: 2024-03-27 | End: 2024-04-01 | Stop reason: HOSPADM

## 2024-03-27 RX ORDER — ZOLPIDEM TARTRATE 5 MG/1
10 TABLET ORAL
Status: DISCONTINUED | OUTPATIENT
Start: 2024-03-27 | End: 2024-04-01 | Stop reason: HOSPADM

## 2024-03-27 RX ORDER — GUAIFENESIN 600 MG/1
1200 TABLET, EXTENDED RELEASE ORAL EVERY 12 HOURS SCHEDULED
Status: DISCONTINUED | OUTPATIENT
Start: 2024-03-27 | End: 2024-04-01 | Stop reason: HOSPADM

## 2024-03-27 RX ORDER — METHYLPREDNISOLONE SODIUM SUCCINATE 40 MG/ML
40 INJECTION, POWDER, LYOPHILIZED, FOR SOLUTION INTRAMUSCULAR; INTRAVENOUS EVERY 12 HOURS SCHEDULED
Status: DISCONTINUED | OUTPATIENT
Start: 2024-03-28 | End: 2024-03-31

## 2024-03-27 RX ORDER — HYDROMORPHONE HCL IN WATER/PF 6 MG/30 ML
0.2 PATIENT CONTROLLED ANALGESIA SYRINGE INTRAVENOUS EVERY 2 HOUR PRN
Status: DISCONTINUED | OUTPATIENT
Start: 2024-03-27 | End: 2024-04-01 | Stop reason: HOSPADM

## 2024-03-27 RX ORDER — DOXYCYCLINE HYCLATE 100 MG/1
100 CAPSULE ORAL EVERY 12 HOURS SCHEDULED
Status: DISCONTINUED | OUTPATIENT
Start: 2024-03-27 | End: 2024-03-28

## 2024-03-27 RX ADMIN — DEXAMETHASONE SODIUM PHOSPHATE 4 MG: 4 INJECTION, SOLUTION INTRAMUSCULAR; INTRAVENOUS at 05:33

## 2024-03-27 RX ADMIN — DOXYCYCLINE 100 MG: 100 CAPSULE ORAL at 15:48

## 2024-03-27 RX ADMIN — IPRATROPIUM BROMIDE 0.5 MG: 0.5 SOLUTION RESPIRATORY (INHALATION) at 19:11

## 2024-03-27 RX ADMIN — GUAIFENESIN 1200 MG: 600 TABLET ORAL at 19:40

## 2024-03-27 RX ADMIN — HYDROCODONE POLISTIREX AND CHLORPHENIRAMINE POLISTIREX 5 ML: 10; 8 SUSPENSION, EXTENDED RELEASE ORAL at 19:40

## 2024-03-27 RX ADMIN — ZOLPIDEM TARTRATE 10 MG: 5 TABLET ORAL at 21:13

## 2024-03-27 RX ADMIN — HYDROMORPHONE HYDROCHLORIDE 0.3 MG: 1 INJECTION, SOLUTION INTRAMUSCULAR; INTRAVENOUS; SUBCUTANEOUS at 04:16

## 2024-03-27 RX ADMIN — HYDROCODONE POLISTIREX AND CHLORPHENIRAMINE POLISTIREX 5 ML: 10; 8 SUSPENSION, EXTENDED RELEASE ORAL at 09:01

## 2024-03-27 RX ADMIN — LEVALBUTEROL HYDROCHLORIDE 0.63 MG: 0.63 SOLUTION RESPIRATORY (INHALATION) at 19:11

## 2024-03-27 RX ADMIN — MORPHINE SULFATE 15 MG: 15 TABLET, EXTENDED RELEASE ORAL at 17:46

## 2024-03-27 RX ADMIN — DIPHENHYDRAMINE HYDROCHLORIDE 25 MG: 50 INJECTION, SOLUTION INTRAMUSCULAR; INTRAVENOUS at 13:16

## 2024-03-27 RX ADMIN — DOXYCYCLINE 100 MG: 100 CAPSULE ORAL at 19:45

## 2024-03-27 RX ADMIN — GUAIFENESIN 1200 MG: 600 TABLET ORAL at 09:01

## 2024-03-27 RX ADMIN — GABAPENTIN 300 MG: 300 CAPSULE ORAL at 19:39

## 2024-03-27 RX ADMIN — DIPHENHYDRAMINE HYDROCHLORIDE 25 MG: 50 INJECTION, SOLUTION INTRAMUSCULAR; INTRAVENOUS at 05:33

## 2024-03-27 RX ADMIN — ALBUTEROL SULFATE 2.5 MG: 2.5 SOLUTION RESPIRATORY (INHALATION) at 09:25

## 2024-03-27 RX ADMIN — MORPHINE SULFATE 15 MG: 15 TABLET, EXTENDED RELEASE ORAL at 09:01

## 2024-03-27 RX ADMIN — DEXAMETHASONE SODIUM PHOSPHATE 2 MG: 4 INJECTION, SOLUTION INTRAMUSCULAR; INTRAVENOUS at 13:16

## 2024-03-27 RX ADMIN — HEPARIN SODIUM 5000 UNITS: 5000 INJECTION, SOLUTION INTRAVENOUS; SUBCUTANEOUS at 19:46

## 2024-03-27 RX ADMIN — SODIUM CHLORIDE SOLN NEBU 3% 4 ML: 3 NEBU SOLN at 09:26

## 2024-03-27 NOTE — PLAN OF CARE
Problem: Potential for Falls  Goal: Patient will remain free of falls  Description: INTERVENTIONS:  - Educate patient/family on patient safety including physical limitations  - Instruct patient to call for assistance with activity   - Consult OT/PT to assist with strengthening/mobility   - Keep Call bell within reach  - Keep bed low and locked with side rails adjusted as appropriate  - Keep care items and personal belongings within reach  - Initiate and maintain comfort rounds  - Make Fall Risk Sign visible to staff  - Offer Toileting every 2 Hours, in advance of need  - Initiate/Maintain bed alarm  - Obtain necessary fall risk management equipment: walker  - Apply yellow socks and bracelet for high fall risk patients  - Consider moving patient to room near nurses station  Outcome: Progressing     Problem: Prexisting or High Potential for Compromised Skin Integrity  Goal: Skin integrity is maintained or improved  Description: INTERVENTIONS:  - Identify patients at risk for skin breakdown  - Assess and monitor skin integrity  - Assess and monitor nutrition and hydration status  - Monitor labs   - Assess for incontinence   - Turn and reposition patient  - Assist with mobility/ambulation  - Relieve pressure over bony prominences  - Avoid friction and shearing  - Provide appropriate hygiene as needed including keeping skin clean and dry  - Evaluate need for skin moisturizer/barrier cream  - Collaborate with interdisciplinary team   - Patient/family teaching  - Consider wound care consult   Outcome: Progressing     Problem: DISCHARGE PLANNING  Goal: Discharge to home or other facility with appropriate resources  Description: INTERVENTIONS:  - Identify barriers to discharge w/patient and caregiver  - Arrange for needed discharge resources and transportation as appropriate  - Identify discharge learning needs (meds, wound care, etc.)  - Arrange for interpretive services to assist at discharge as needed  - Refer to Case  Management Department for coordinating discharge planning if the patient needs post-hospital services based on physician/advanced practitioner order or complex needs related to functional status, cognitive ability, or social support system  Outcome: Progressing

## 2024-03-27 NOTE — PROGRESS NOTES
Attempted to call report to facility, was on hold for 11 mins, no one answered, will attempted to call back later.       Cyril Crowe, RN  10/13/21 0026     [facility]  Progress Note  Name: Richard Nava I  MRN: [id number]  Unit/Bed#: -01 I Date of Admission: 3/26/2024   Date of Service: 3/27/2024 I Hospital Day: 1    Assessment/Plan   * Chronic obstructive pulmonary disease with acute exacerbation (HCC)  Assessment & Plan  Severe copd with chronic hypoxic respiratory failure on minimum of 2L O2 at baseline  Initiated on IV decadron for migraines, will switch to IV solumedrol given persistent wheezing   Will add PO doxy  Pulmonology consult   Resp protocol, IS, nebs   Obtain labs now       Prostate cancer metastatic to bone (HCC)  Assessment & Plan  Patient with metastatic prostate cancer to bone, complicated by poor PO intake + cachexia. Follows with outpatient oncology and palliative care  CT with unchanged known calvarial osseus mets   Maintained outpatient on Zytiga --> hold while admitted and resume as outpatient per oncology     Initially patient was admitted on comfort care with plan for DC to home with home hospice --> patient revoked on 3/27 and elected to continue all medical care with Level 3 DNR/DNI code status     Palliative following   Migraine is resolved   Continue current regimen:  Gabapentin 300MG BID  MS CONTIN 15MG Bid  Lidocaine patches  Tussinex BID  Currently holding klonopin and xanax as they have not been effective for him.   IV hydromorphone 0.3MG Q1H prn severe pain   IV lorazepam 1MG x1 now and every 4H PRN.    Severe protein-calorie malnutrition (HCC)  Assessment & Plan  Malnutrition Findings:   Adult Malnutrition type: Chronic illness  Adult Degree of Malnutrition: Other severe protein calorie malnutrition  Malnutrition Characteristics: Fat loss, Muscle loss                  360 Statement: chronic severe protein calorie malnutrition in the setting of increased energy demands from catabolic illness as evidenced by:  severe body fat depletion - hollow, depressed orbital area;  severe muscle mass  depletion- hollow, depressed temporal area, sunken buccal pads;  BMI 16.   treated with regular diet, honoring food preferences, and recommendation for nutrition supplements.    BMI Findings:  Adult BMI Classifications: Underweight < 18.5        Body mass index is 15.99 kg/m².       Nutrition consult       Migraine headache-resolved as of 3/27/2024  Assessment & Plan  Started on migraine cocktail with decadron, sumatriptan, magnesium, benadryl.  Resolved 3/27                 VTE Pharmacologic Prophylaxis: VTE Score: 7 High Risk (Score >/= 5) - Pharmacological DVT Prophylaxis Ordered: heparin. Sequential Compression Devices Ordered.    Mobility:   Basic Mobility Inpatient Raw Score: 23  JH-HLM Goal: 7: Walk 25 feet or more  JH-HLM Achieved: 7: Walk 25 feet or more  JH-HLM Goal achieved. Continue to encourage appropriate mobility.    Patient Centered Rounds: I performed bedside rounds with nursing staff today.   Discussions with Specialists or Other Care Team Provider: Palliative care     Education and Discussions with Family / Patient: Updated  (sister and niece) at bedside.    Total Time Spent on Date of Encounter in care of patient: 45 mins. This time was spent on one or more of the following: performing physical exam; counseling and coordination of care; obtaining or reviewing history; documenting in the medical record; reviewing/ordering tests, medications or procedures; communicating with other healthcare professionals and discussing with patient's family/caregivers.    Current Length of Stay: 1 day(s)  Current Patient Status: Inpatient   Certification Statement: The patient will continue to require additional inpatient hospital stay due to IV medications   Discharge Plan: Anticipate discharge in 24-48 hrs to discharge location to be determined pending rehab evaluations.    Code Status: Level 3 - DNAR and DNI    Subjective:   Patient notes migraine has resolved. Long discussion at bedside with  palliative care and and niece --> patient states he is no longer interested in hospice whatsoever as he is not willing to stop his chemo medications for cancer treatment. Patient clearly expresses wishes to resume treatment for COPD and supportive care and resume DNR/DNI level 3 resources.     Objective:     Vitals:   No data recorded.    HR:  [] 102  Resp:  [16-27] 27  BP: (111)/(82) 111/82  SpO2:  [96 %] 96 %  Body mass index is 15.99 kg/m².     Input and Output Summary (last 24 hours):     Intake/Output Summary (Last 24 hours) at 3/27/2024 1437  Last data filed at 3/27/2024 0501  Gross per 24 hour   Intake 200 ml   Output 300 ml   Net -100 ml       Physical Exam:   Physical Exam  Vitals and nursing note reviewed.   Constitutional:       General: He is not in acute distress.     Appearance: He is cachectic. He is ill-appearing.   HENT:      Head: Normocephalic and atraumatic.   Eyes:      General:         Right eye: No discharge.         Left eye: No discharge.      Extraocular Movements: Extraocular movements intact.      Conjunctiva/sclera: Conjunctivae normal.   Cardiovascular:      Rate and Rhythm: Normal rate and regular rhythm.      Heart sounds: No murmur heard.  Pulmonary:      Effort: Pulmonary effort is normal. No respiratory distress.      Breath sounds: Wheezing and rhonchi present. No rales.   Abdominal:      General: Bowel sounds are normal. There is no distension.      Palpations: Abdomen is soft.      Tenderness: There is no abdominal tenderness.   Musculoskeletal:      Cervical back: Neck supple.      Right lower leg: No edema.      Left lower leg: No edema.   Skin:     General: Skin is warm and dry.      Capillary Refill: Capillary refill takes less than 2 seconds.   Neurological:      General: No focal deficit present.      Mental Status: He is alert and oriented to person, place, and time. Mental status is at baseline.      Cranial Nerves: No cranial nerve deficit.   Psychiatric:          Mood and Affect: Mood normal.         Behavior: Behavior normal.          Additional Data:     Labs:  Results from last 7 days   Lab Units 03/26/24  0710   WBC Thousand/uL 13.32*   HEMOGLOBIN g/dL 11.2*   HEMATOCRIT % 36.0*   PLATELETS Thousands/uL 235   NEUTROS PCT % 85*   LYMPHS PCT % 6*   MONOS PCT % 6   EOS PCT % 1     Results from last 7 days   Lab Units 03/26/24  0710   SODIUM mmol/L 144   POTASSIUM mmol/L 3.2*   CHLORIDE mmol/L 99   CO2 mmol/L 41*   BUN mg/dL 8   CREATININE mg/dL 0.35*   ANION GAP mmol/L 4   CALCIUM mg/dL 8.6   ALBUMIN g/dL 3.8   TOTAL BILIRUBIN mg/dL 0.48   ALK PHOS U/L 38   ALT U/L 11   AST U/L 14   GLUCOSE RANDOM mg/dL 120     Results from last 7 days   Lab Units 03/26/24  0710   INR  0.93             Results from last 7 days   Lab Units 03/26/24  0710   LACTIC ACID mmol/L 0.9   PROCALCITONIN ng/ml 0.09       Lines/Drains:  Invasive Devices       Peripheral Intravenous Line  Duration             Peripheral IV 03/26/24 Right;Ventral (anterior) Forearm 1 day                          Imaging: Reviewed radiology reports from this admission including: chest xray and CT head    Recent Cultures (last 7 days):   Results from last 7 days   Lab Units 03/26/24  0710   BLOOD CULTURE  No Growth at 24 hrs.  No Growth at 24 hrs.       Last 24 Hours Medication List:   Current Facility-Administered Medications   Medication Dose Route Frequency Provider Last Rate    albuterol  2.5 mg Nebulization Q6H PRN Arleth Scales PA-C      bisacodyl  10 mg Rectal Daily PRN Martha Gaffney DO      doxycycline hyclate  100 mg Oral Q12H FANNY Maxine Venegas PA-C      gabapentin  600 mg Oral HS Arleth Scales PA-C      guaiFENesin  1,200 mg Oral Q12H FANNY Arleth Scales PA-C      heparin (porcine)  5,000 Units Subcutaneous Q8H FANNY Maxine Venegas PA-C      Hydrocod Jose-Chlorphe Jose ER  5 mL Oral Q12H FANNY Arleth Scales PA-C      HYDROmorphone  0.2 mg  Intravenous Q2H PRN Arleth Scales PA-C      ipratropium  0.5 mg Nebulization 4x Daily Maxine Venegas PA-C      levalbuterol  0.63 mg Nebulization TID Maxine Venegas PA-C      magnesium sulfate  2 g Intravenous Q24H FANNY Martha Ambron, DO 2 g (03/26/24 1631)    [START ON 3/28/2024] methylPREDNISolone sodium succinate  40 mg Intravenous Q12H [facility] Maxine Venegas PA-C      morphine  15 mg Oral BID Arleth Scales PA-C      sodium chloride  4 mL Nebulization TID PRN Martha Ambron, DO      zolpidem  10 mg Oral HS Arleth Scales PA-C          Today, Patient Was Seen By: Maxine Venegas PA-C    **Please Note: This note may have been constructed using a voice recognition system.**

## 2024-03-27 NOTE — CASE MANAGEMENT
Case Management Discharge Planning Note    Patient name Richard Nava  Location /-01 MRN [id number]  : 1955 Date 3/27/2024       Current Admission Date: 3/26/2024  Current Admission Diagnosis:Prostate cancer metastatic to bone (HCC)   Patient Active Problem List    Diagnosis Date Noted    Migraine headache 2024    Respiratory distress 2024    COPD exacerbation (HCC) 2023    Low oxygen saturation 2023    Chronic respiratory failure (HCC) 2023    Positive blood culture 2023    Acute respiratory insufficiency 2023    Opioid dependence (HCC) 2023    Neoplasm related pain 2023    Medical marijuana use 2023    Encephalopathy acute 2022    Anorexia 2022    Cachexia (HCC) 2022    Palliative care patient 2022    Prostate cancer metastatic to bone (HCC) 2022    Insomnia 2022    Hot flashes 2022    Left lower quadrant abdominal pain 2021    Personal history of colonic polyps 2021    HLD (hyperlipidemia) 2020    Acute respiratory failure with hypoxia (HCC) 2020    Severe protein-calorie malnutrition (HCC) 2020    Chronic obstructive pulmonary disease with acute exacerbation (HCC) 2020    Acute tracheobronchitis 2020      LOS (days): 1  Geometric Mean LOS (GMLOS) (days): 3.7  Days to GMLOS:2.9     OBJECTIVE:  Risk of Unplanned Readmission Score: 31.52         Current admission status: Inpatient   Preferred Pharmacy:   [facility]/pharmacy #[id number] - [place] [facility] [address] [facility]  [address] [place] [address]  Phone: [phone] Fax: [phone]    [facility] - [place] [facility] [address] [facility] [address]  [facility] [address]  [place] [address]  Phone: [phone] Fax: [phone]    Primary Care Provider: Kevin Mejia DO    Primary Insurance: MEDICARE  Secondary Insurance:     DISCHARGE DETAILS:        Pt to be discharged home  today on hospice. CM updated [facility] via Aidin. CM faxed over eval and tx order to hospice as well.

## 2024-03-27 NOTE — CASE MANAGEMENT
Case Management Discharge Planning Note    Patient name Richard Nava  Location /-01 MRN [id number]  : 1955 Date 3/27/2024       Current Admission Date: 3/26/2024  Current Admission Diagnosis:Prostate cancer metastatic to bone (HCC)   Patient Active Problem List    Diagnosis Date Noted    Migraine headache 2024    Respiratory distress 2024    COPD exacerbation (HCC) 2023    Low oxygen saturation 2023    Chronic respiratory failure (HCC) 2023    Positive blood culture 2023    Acute respiratory insufficiency 2023    Opioid dependence (HCC) 2023    Neoplasm related pain 2023    Medical marijuana use 2023    Encephalopathy acute 2022    Anorexia 2022    Cachexia (HCC) 2022    Palliative care patient 2022    Prostate cancer metastatic to bone (HCC) 2022    Insomnia 2022    Hot flashes 2022    Left lower quadrant abdominal pain 2021    Personal history of colonic polyps 2021    HLD (hyperlipidemia) 2020    Acute respiratory failure with hypoxia (HCC) 2020    Severe protein-calorie malnutrition (HCC) 2020    Chronic obstructive pulmonary disease with acute exacerbation (HCC) 2020    Acute tracheobronchitis 2020      LOS (days): 1  Geometric Mean LOS (GMLOS) (days): 3.7  Days to GMLOS:2.8     OBJECTIVE:  Risk of Unplanned Readmission Score: 31.04         Current admission status: Inpatient   Preferred Pharmacy:   [facility]/pharmacy #[id number] - [place] [address]  [address] [place] [address]  Phone: [phone] Fax: [phone]    [facility] - [place], [place] [facility] [address] [facility] [address] [employer]  [address]  [place] [address]  Phone: [phone] Fax: [phone]    Primary Care Provider: Kevin Mejia DO    Primary Insurance: MEDICARE  Secondary Insurance:     DISCHARGE DETAILS:     CM spoke with patient and he  does not want hospice. He wants to continue his chemo and thinks someone is working on getting the right medications for his emphysema. His niece, Helen, asked that Palliative reach out to her so they can get some things straightened out. CM relayed same to Palliative and provider. CM updated hospice via Aidin.

## 2024-03-27 NOTE — PROGRESS NOTES
Progress Note - Palliative & Supportive Care  Richard Nava  68 y.o.  male  /-01   MRN: [id number]  Encounter: [id number]     Assessment/Problems actively addressed:  Encounter for palliative and supportive care  Goals of care encounter   Intractable migraine  Prostate cancer, widely metastatic to bone  Severe COPD with chronic respiratory failure   Gastritis  Neoplasm related pain  Severe protein calorie malnutrition   Pulmonary and cancer cachexia  QTC prolongation    PLAN:  1. Goals of Care  This morning, Rishabh was agreeable to the prior-dicussed plan of pursuing hospice--HOWEVER--later in the day he began to feel better and has now revoked comfort care and hospice.    Long discussion today at bedside with Dionne Venegas PA-C, Rishabh, and Helen dey.   Rishabh appears competent to make his own decisions, however he seems to be in denial about the severity of his COPD and constitutional/nutritional status.   Continue medical management with Level 3 DNR/DNI status.      2. Symptom management  Will discontinue IV lorazepam and scale back comfort medications.  Continue Tussionex ER Q12H FANNY   Continue mucinex   Continue baseline MS CONTIN 15MG Q12H FANNY  Continue neurontin 600MG QHS  ADD zolpidem 10MG QHS    24 History  Chart reviewed before visit. Patient seen twice today. This afternoon, he does not remember our discussion from yesterday with his sister Rupali. He is indignant and wants to re-start his cancer meds at discharge and wants to focus on strengthening and recovery. Repeat goals of care discussion today.     Decisional apparatus:  Patient is competent on exam today.  If competence is lost, patient's substitute decision maker would default to adult siblings by [place] Act 169.  Advance Directive/Living Will/POLST: Rishabh has documentation on file that names sister Piedad/Mary as HCR.     Review of Systems:   Review of Systems   Constitutional: Positive for decreased appetite, malaise/fatigue,  night sweats and weight loss.   Musculoskeletal:  Positive for arthritis, back pain and muscle weakness.   Gastrointestinal:  Positive for anorexia. Negative for bloating, abdominal pain, nausea and vomiting.   Neurological:  Positive for weakness. Negative for aphonia and difficulty with concentration.   Psychiatric/Behavioral:  Negative for altered mental status, depression, suicidal ideas and thoughts of violence. The patient is nervous/anxious.      Medications    Current Facility-Administered Medications:     albuterol inhalation solution 2.5 mg, 2.5 mg, Nebulization, Q6H PRN, Arleth Scales PA-C    bisacodyl (DULCOLAX) rectal suppository 10 mg, 10 mg, Rectal, Daily PRN, Martha Gaffney DO    dexamethasone (DECADRON) injection 2 mg, 2 mg, Intravenous, Q8H FANNY, Arleth Scales PA-C    diphenhydrAMINE (BENADRYL) injection 25 mg, 25 mg, Intravenous, Q8H FANNY, Martha Gaffney DO, 25 mg at 03/27/24 0533    gabapentin (NEURONTIN) capsule 600 mg, 600 mg, Oral, HS, Arleth Scales PA-C, 600 mg at 03/26/24 2142    glycopyrrolate (ROBINUL) injection 0.1 mg, 0.1 mg, Intravenous, Q4H PRN, Martha Cabanron DO    Hydrocod Jose-Chlorphe Jose ER (TUSSIONEX) ER suspension 5 mL, 5 mL, Oral, Q12H FANNY, Arleth Scales PA-C    HYDROmorphone (DILAUDID) injection 0.3 mg, 0.3 mg, Intravenous, Q2H PRN, Martha Cabanron DO, 0.3 mg at 03/27/24 0416    LORazepam (ATIVAN) injection 1 mg, 1 mg, Intravenous, Q4H PRN, Martha Ambron DO    magnesium sulfate 2 g/50 mL IVPB (premix) 2 g, 2 g, Intravenous, Q24H FANNY, Martha Gaffney DO, Last Rate: 50 mL/hr at 03/26/24 1631, 2 g at 03/26/24 1631    morphine (MS CONTIN) ER tablet 15 mg, 15 mg, Oral, BID, Arleth Scales PA-C    sodium chloride 3 % inhalation solution 4 mL, 4 mL, Nebulization, TID PRN, Martha Gaffney,     Objective  /82   Pulse 102   Temp 98.7 °F (37.1 °C) (Temporal)   Resp (!) 27   Wt 46.3 kg (102 lb 1.2 oz)   SpO2 96%   BMI  "15.99 kg/m²     Physical Exam:   Physical Exam  Vitals and nursing note reviewed.   Constitutional:       General: He is not in acute distress.     Appearance: He is cachectic. He is ill-appearing.      Comments: Extensive fat loss and muscle wasting present    HENT:      Head: Normocephalic and atraumatic.   Eyes:      Conjunctiva/sclera: Conjunctivae normal.   Cardiovascular:      Rate and Rhythm: Normal rate.   Pulmonary:      Effort: Pulmonary effort is normal. No respiratory distress.   Musculoskeletal:         General: No swelling.      Cervical back: Neck supple.   Skin:     General: Skin is warm and dry.      Capillary Refill: Capillary refill takes less than 2 seconds.   Neurological:      Mental Status: He is alert.   Psychiatric:         Mood and Affect: Mood normal. Affect is labile.         Behavior: Behavior is agitated.       Lab Results: I have personally reviewed pertinent labs., CBC: No results found for: \"WBC\", \"HGB\", \"HCT\", \"MCV\", \"PLT\", \"ADJUSTEDWBC\", \"RBC\", \"MCH\", \"MCHC\", \"RDW\", \"MPV\", \"NRBC\", CMP: No results found for: \"SODIUM\", \"K\", \"CL\", \"CO2\", \"ANIONGAP\", \"BUN\", \"CREATININE\", \"GLUCOSE\", \"CALCIUM\", \"AST\", \"ALT\", \"ALKPHOS\", \"PROT\", \"BILITOT\", \"EGFR\"  Imaging Studies: I have personally reviewed pertinent reports.  EKG, Pathology, and Other Studies: I have personally reviewed pertinent reports.    Counseling / Coordination of Care  Total floor / unit time spent today 60 minutes. Greater than 50% of total time was spent with the patient and / or family counseling and / or coordinating of care. A description of the counseling / coordination of care: reviewed chart, reviewed lab values, reviewed imaging, provided medical updates, discussed palliative care and symptom management, discussed hospice care and comfort care, discussed goals of care, discussed code status, discussed advanced directives, provided anticipatory guidance, provided psychosocial and emotional support, assessed competency and " "decision-making, and facilitated interdisciplinary communication. Reviewed with SHARYN bright, RN and CM.    Arleth Scales PA-C  Palliative & Supportive Care    Portions of this document may have been created using dictation software and as such some \"sound alike\" terms may have been generated by the system. Do not hesitate to contact me with any questions or clarifications.    "

## 2024-03-27 NOTE — OCCUPATIONAL THERAPY NOTE
Occupational Therapy Screen Note     Patient Name: Richard Nava  Today's Date: 3/27/2024  Problem List  Principal Problem:    Chronic obstructive pulmonary disease with acute exacerbation (HCC)  Active Problems:    Severe protein-calorie malnutrition (HCC)    Prostate cancer metastatic to bone (HCC)            03/27/24 1445   OT Last Visit   OT Visit Date 03/27/24   Note Type   Note type Screen   Additional Comments OT orders received, chart reviewed. Pt AMPAC score 23 for basic,24 for daily 7 achieving HLM goal of 7. CHRISTIANE Mancia reports pt is indepndent <> BR while managing his own O2 lines. No acute OT needs indicated, DC OT orders.       Stephanie Blackmon, OTR/L

## 2024-03-27 NOTE — PROGRESS NOTES
Patient:    MRN:  [id number]    [place]al[place] Request ID:  [id number]    Level of care reserved:  Hospice    Partner Reserved:  [facility] [place] [address] [phone]    Clinical needs requested:    Geography searched:  [address]    Start of Service:    Request sent:  7:33am EDT on 3/27/2024 by Shakira Solano    Partner reserved:  9:15am EDT on 3/27/2024 by Shakira Solano    Choice list shared:   Render Post-Care Instructions In Note?: yes

## 2024-03-27 NOTE — CASE MANAGEMENT
Case Management Assessment & Discharge Planning Note    Patient name Richard Nava  Location /-01 MRN [id number]  : 1955 Date 3/27/2024       Current Admission Date: 3/26/2024  Current Admission Diagnosis:Prostate cancer metastatic to bone (HCC)   Patient Active Problem List    Diagnosis Date Noted    Migraine headache 2024    Respiratory distress 2024    COPD exacerbation (HCC) 2023    Low oxygen saturation 2023    Chronic respiratory failure (HCC) 2023    Positive blood culture 2023    Acute respiratory insufficiency 2023    Opioid dependence (HCC) 2023    Neoplasm related pain 2023    Medical marijuana use 2023    Encephalopathy acute 2022    Anorexia 2022    Cachexia (HCC) 2022    Palliative care patient 2022    Prostate cancer metastatic to bone (HCC) 2022    Insomnia 2022    Hot flashes 2022    Left lower quadrant abdominal pain 2021    Personal history of colonic polyps 2021    HLD (hyperlipidemia) 2020    Acute respiratory failure with hypoxia (HCC) 2020    Severe protein-calorie malnutrition (HCC) 2020    Chronic obstructive pulmonary disease with acute exacerbation (HCC) 2020    Acute tracheobronchitis 2020      LOS (days): 1  Geometric Mean LOS (GMLOS) (days):   Days to GMLOS:     OBJECTIVE:  PATIENT READMITTED TO HOSPITAL  Risk of Unplanned Readmission Score: 31.52         Current admission status: Inpatient       Preferred Pharmacy:   [facility]/pharmacy #[id number] - [place] - [address] [facility]  [address] [facility] [address]  Phone: [phone] Fax: [phone]    [facility] - [place], [place] [facility] [address] [place]  [facility] [address]  [place] [address]  Phone: [phone] Fax: [phone]    Primary Care Provider: Kevin Mejia DO    Primary Insurance: MEDICARE  Secondary Insurance:      ASSESSMENT:  Active Health Care Proxies       Lowell Mcpherson [facility] Care Representative - Sister   Primary Phone: [phone] (Mobile)                 Advance Directives  Does patient have a Health Care POA?: Yes  Does patient have Advance Directives?: Yes  Advance Directives: Living will, Power of  for health care  Primary Contact: sister Solares         Readmission Root Cause  30 Day Readmission: Yes  Who directed you to return to the hospital?: Self  Did you understand whom to contact if you had questions or problems?: Yes  Did you get your prescriptions before you left the hospital?: No  Reason:: Delivery service not offered  Were you able to get your prescriptions filled when you left the hospital?: Yes  Did you take your medications as prescribed?: Yes  Were you able to get to your follow-up appointments?: Yes  During previous admission, was a post-acute recommendation made?: No  Patient was readmitted due to: SOB, cough    Patient Information  Admitted from:: Home  Mental Status: Alert  During Assessment patient was accompanied by: Not accompanied during assessment  Assessment information provided by:: Patient  Primary Caregiver: Self  Support Systems: Self, Family members  County of Residence: [place]  [facility] do you live in?: [place]  Home entry access options. Select all that apply.: Stairs  Number of steps to enter home.: 10  Do the steps have railings?: Yes  Type of Current Residence: Apartment  Floor Level:  (Basement)  Upon entering residence, is there a bedroom on the main floor (no further steps)?: Yes  Upon entering residence, is there a bathroom on the main floor (no further steps)?: Yes  Living Arrangements: Lives Alone    Activities of Daily Living Prior to Admission  Functional Status: Independent  Completes ADLs independently?: Yes  Ambulates independently?: Yes  Does patient use assisted devices?: Yes  Assisted Devices (DME) used: Home Oxygen concentrator, Portable  Oxygen tanks  DME Company Name (respiratory supplies): [employer]  O2 Rate(s): 3-4L  Does patient currently own DME?: Yes  What DME does the patient currently own?: Home Oxygen concentrator, Portable Oxygen tanks  Does patient have a history of Outpatient Therapy (PT/OT)?: No  Does the patient have a history of Short-Term Rehab?: No  Does patient have a history of HHC?: No  Does patient currently have HHC?: No         Patient Information Continued  Income Source: SSI/SSD  Does patient have prescription coverage?: Yes  Does patient have a history of substance abuse?: No  Does patient have a history of Mental Health Diagnosis?: No         Means of Transportation  Means of Transport to Appts:: Family transport      Social Determinants of Health (SDOH)      Flowsheet Row Most Recent Value   Housing Stability    In the last 12 months, was there a time when you were not able to pay the mortgage or rent on time? N   In the last 12 months, how many places have you lived? 1   In the last 12 months, was there a time when you did not have a steady place to sleep or slept in a shelter (including now)? N   Transportation Needs    In the past 12 months, has lack of transportation kept you from medical appointments or from getting medications? no   In the past 12 months, has lack of transportation kept you from meetings, work, or from getting things needed for daily living? No   Food Insecurity    Within the past 12 months, you worried that your food would run out before you got the money to buy more. Never true   Within the past 12 months, the food you bought just didn't last and you didn't have money to get more. Never true   Utilities    In the past 12 months has the electric, gas, oil, or water company threatened to shut off services in your home? No            DISCHARGE DETAILS:    Discharge planning discussed with:: Patient  Freedom of Choice: Yes     CM contacted family/caregiver?: No- see comments (Pt is alert and  oriented)  Were Treatment Team discharge recommendations reviewed with patient/caregiver?: Yes  Did patient/caregiver verbalize understanding of patient care needs?: Yes  Were patient/caregiver advised of the risks associated with not following Treatment Team discharge recommendations?: Yes         Requested Home Health Care         Is the patient interested in HHC at discharge?: No    DME Referral Provided  Referral made for DME?: No    Other Referral/Resources/Interventions Provided:  Interventions: Hospice         Treatment Team Recommendation: Hospice  Discharge Destination Plan:: Hospice  Transport at Discharge : Family                                      Additional Comments: CM met with pt to discuss role of CM and any needs prior to discharge. Pt lives alone on niece's property in a basement apartment w/ 10 ALBA. Indp PTA. Pt owns/uses home O2 concentrator, portable tanks, and nebulizer. No hx STR/OP PT/HH/DA/MH. Pt was placed on comfort care and CM was consulted for hospice. CM placed referrals in Aidin and provided pt with choice list. Pt selected [facility] and they were reserved in Aidin.

## 2024-03-27 NOTE — PHYSICAL THERAPY NOTE
PHYSICAL THERAPY SCREEN NOTE        Patient Name: Richard Nava  Today's Date: 3/27/2024       03/27/24 1312   Note Type   Note type Screen   Additional Comments PT orders received, chart reviewed. Patient w/ AMPAC of 23, per AUSTINN Gavino pt is indepedently ambulating around room. Patient w/ no acute PT needs, will DC PT       Gwen Urena, PT, DPT

## 2024-03-28 LAB
ANION GAP SERPL CALCULATED.3IONS-SCNC: 4 MMOL/L (ref 4–13)
BASOPHILS # BLD AUTO: 0.01 THOUSANDS/ÂΜL (ref 0–0.1)
BASOPHILS NFR BLD AUTO: 0 % (ref 0–1)
BUN SERPL-MCNC: 11 MG/DL (ref 5–25)
CALCIUM SERPL-MCNC: 8.2 MG/DL (ref 8.4–10.2)
CHLORIDE SERPL-SCNC: 100 MMOL/L (ref 96–108)
CO2 SERPL-SCNC: 36 MMOL/L (ref 21–32)
CREAT SERPL-MCNC: 0.43 MG/DL (ref 0.6–1.3)
EOSINOPHIL # BLD AUTO: 0 THOUSAND/ÂΜL (ref 0–0.61)
EOSINOPHIL NFR BLD AUTO: 0 % (ref 0–6)
ERYTHROCYTE [DISTWIDTH] IN BLOOD BY AUTOMATED COUNT: 13.5 % (ref 11.6–15.1)
GFR SERPL CREATININE-BSD FRML MDRD: 117 ML/MIN/1.73SQ M
GLUCOSE SERPL-MCNC: 151 MG/DL (ref 65–140)
HCT VFR BLD AUTO: 33.6 % (ref 36.5–49.3)
HGB BLD-MCNC: 10.5 G/DL (ref 12–17)
IMM GRANULOCYTES # BLD AUTO: 0.06 THOUSAND/UL (ref 0–0.2)
IMM GRANULOCYTES NFR BLD AUTO: 1 % (ref 0–2)
LYMPHOCYTES # BLD AUTO: 0.58 THOUSANDS/ÂΜL (ref 0.6–4.47)
LYMPHOCYTES NFR BLD AUTO: 6 % (ref 14–44)
MAGNESIUM SERPL-MCNC: 2 MG/DL (ref 1.9–2.7)
MCH RBC QN AUTO: 31.3 PG (ref 26.8–34.3)
MCHC RBC AUTO-ENTMCNC: 31.3 G/DL (ref 31.4–37.4)
MCV RBC AUTO: 100 FL (ref 82–98)
MONOCYTES # BLD AUTO: 0.81 THOUSAND/ÂΜL (ref 0.17–1.22)
MONOCYTES NFR BLD AUTO: 8 % (ref 4–12)
NEUTROPHILS # BLD AUTO: 8.47 THOUSANDS/ÂΜL (ref 1.85–7.62)
NEUTS SEG NFR BLD AUTO: 85 % (ref 43–75)
NRBC BLD AUTO-RTO: 0 /100 WBCS
PLATELET # BLD AUTO: 246 THOUSANDS/UL (ref 149–390)
PMV BLD AUTO: 8.6 FL (ref 8.9–12.7)
POTASSIUM SERPL-SCNC: 3.8 MMOL/L (ref 3.5–5.3)
PROCALCITONIN SERPL-MCNC: 0.1 NG/ML
RBC # BLD AUTO: 3.36 MILLION/UL (ref 3.88–5.62)
SODIUM SERPL-SCNC: 140 MMOL/L (ref 135–147)
WBC # BLD AUTO: 9.93 THOUSAND/UL (ref 4.31–10.16)

## 2024-03-28 PROCEDURE — 99233 SBSQ HOSP IP/OBS HIGH 50: CPT | Performed by: PHYSICIAN ASSISTANT

## 2024-03-28 PROCEDURE — 99223 1ST HOSP IP/OBS HIGH 75: CPT | Performed by: INTERNAL MEDICINE

## 2024-03-28 PROCEDURE — 80048 BASIC METABOLIC PNL TOTAL CA: CPT | Performed by: STUDENT IN AN ORGANIZED HEALTH CARE EDUCATION/TRAINING PROGRAM

## 2024-03-28 PROCEDURE — 85025 COMPLETE CBC W/AUTO DIFF WBC: CPT | Performed by: STUDENT IN AN ORGANIZED HEALTH CARE EDUCATION/TRAINING PROGRAM

## 2024-03-28 PROCEDURE — 83735 ASSAY OF MAGNESIUM: CPT | Performed by: STUDENT IN AN ORGANIZED HEALTH CARE EDUCATION/TRAINING PROGRAM

## 2024-03-28 PROCEDURE — 92610 EVALUATE SWALLOWING FUNCTION: CPT

## 2024-03-28 PROCEDURE — 94760 N-INVAS EAR/PLS OXIMETRY 1: CPT

## 2024-03-28 PROCEDURE — 99233 SBSQ HOSP IP/OBS HIGH 50: CPT | Performed by: HOSPITALIST

## 2024-03-28 PROCEDURE — 93005 ELECTROCARDIOGRAM TRACING: CPT

## 2024-03-28 PROCEDURE — 94640 AIRWAY INHALATION TREATMENT: CPT

## 2024-03-28 PROCEDURE — 84145 PROCALCITONIN (PCT): CPT | Performed by: STUDENT IN AN ORGANIZED HEALTH CARE EDUCATION/TRAINING PROGRAM

## 2024-03-28 RX ORDER — FORMOTEROL FUMARATE DIHYDRATE 20 UG/2ML
20 SOLUTION RESPIRATORY (INHALATION)
Status: DISCONTINUED | OUTPATIENT
Start: 2024-03-28 | End: 2024-03-31

## 2024-03-28 RX ORDER — ONDANSETRON 2 MG/ML
4 INJECTION INTRAMUSCULAR; INTRAVENOUS EVERY 6 HOURS PRN
Status: DISCONTINUED | OUTPATIENT
Start: 2024-03-28 | End: 2024-03-28

## 2024-03-28 RX ORDER — BUDESONIDE 0.5 MG/2ML
0.5 INHALANT ORAL
Status: DISCONTINUED | OUTPATIENT
Start: 2024-03-28 | End: 2024-03-31

## 2024-03-28 RX ORDER — LANOLIN ALCOHOL/MO/W.PET/CERES
6 CREAM (GRAM) TOPICAL
Status: DISCONTINUED | OUTPATIENT
Start: 2024-03-28 | End: 2024-04-01 | Stop reason: HOSPADM

## 2024-03-28 RX ORDER — ABIRATERONE ACETATE 250 MG/1
1000 TABLET ORAL DAILY
Status: DISCONTINUED | OUTPATIENT
Start: 2024-03-29 | End: 2024-04-01 | Stop reason: HOSPADM

## 2024-03-28 RX ORDER — ALPRAZOLAM 0.5 MG/1
0.5 TABLET ORAL 2 TIMES DAILY PRN
Status: DISCONTINUED | OUTPATIENT
Start: 2024-03-28 | End: 2024-04-01 | Stop reason: HOSPADM

## 2024-03-28 RX ADMIN — METHYLPREDNISOLONE SODIUM SUCCINATE 40 MG: 40 INJECTION, POWDER, FOR SOLUTION INTRAMUSCULAR; INTRAVENOUS at 20:46

## 2024-03-28 RX ADMIN — MORPHINE SULFATE 15 MG: 15 TABLET, EXTENDED RELEASE ORAL at 08:01

## 2024-03-28 RX ADMIN — GUAIFENESIN 1200 MG: 600 TABLET ORAL at 08:01

## 2024-03-28 RX ADMIN — LEVALBUTEROL HYDROCHLORIDE 0.63 MG: 0.63 SOLUTION RESPIRATORY (INHALATION) at 19:43

## 2024-03-28 RX ADMIN — IPRATROPIUM BROMIDE 0.5 MG: 0.5 SOLUTION RESPIRATORY (INHALATION) at 19:43

## 2024-03-28 RX ADMIN — MORPHINE SULFATE 15 MG: 15 TABLET, EXTENDED RELEASE ORAL at 17:04

## 2024-03-28 RX ADMIN — LEVALBUTEROL HYDROCHLORIDE 0.63 MG: 0.63 SOLUTION RESPIRATORY (INHALATION) at 07:29

## 2024-03-28 RX ADMIN — IPRATROPIUM BROMIDE 0.5 MG: 0.5 SOLUTION RESPIRATORY (INHALATION) at 07:29

## 2024-03-28 RX ADMIN — HYDROCODONE POLISTIREX AND CHLORPHENIRAMINE POLISTIREX 5 ML: 10; 8 SUSPENSION, EXTENDED RELEASE ORAL at 20:50

## 2024-03-28 RX ADMIN — Medication 6 MG: at 20:55

## 2024-03-28 RX ADMIN — DOXYCYCLINE 100 MG: 100 CAPSULE ORAL at 08:01

## 2024-03-28 RX ADMIN — FORMOTEROL FUMARATE DIHYDRATE 20 MCG: 20 SOLUTION RESPIRATORY (INHALATION) at 19:43

## 2024-03-28 RX ADMIN — GUAIFENESIN 1200 MG: 600 TABLET ORAL at 20:48

## 2024-03-28 RX ADMIN — ALPRAZOLAM 0.5 MG: 0.5 TABLET ORAL at 20:52

## 2024-03-28 RX ADMIN — ALPRAZOLAM 0.5 MG: 0.5 TABLET ORAL at 06:16

## 2024-03-28 RX ADMIN — Medication 6 MG: at 02:42

## 2024-03-28 RX ADMIN — GABAPENTIN 600 MG: 300 CAPSULE ORAL at 17:09

## 2024-03-28 RX ADMIN — MAGNESIUM SULFATE HEPTAHYDRATE 2 G: 2 INJECTION, SOLUTION INTRAVENOUS at 08:01

## 2024-03-28 RX ADMIN — HYDROCODONE POLISTIREX AND CHLORPHENIRAMINE POLISTIREX 5 ML: 10; 8 SUSPENSION, EXTENDED RELEASE ORAL at 08:01

## 2024-03-28 RX ADMIN — METHYLPREDNISOLONE SODIUM SUCCINATE 40 MG: 40 INJECTION, POWDER, FOR SOLUTION INTRAMUSCULAR; INTRAVENOUS at 08:01

## 2024-03-28 RX ADMIN — TRIMETHOBENZAMIDE HYDROCHLORIDE 200 MG: 100 INJECTION INTRAMUSCULAR at 09:32

## 2024-03-28 RX ADMIN — BUDESONIDE 0.5 MG: 0.5 INHALANT ORAL at 19:43

## 2024-03-28 RX ADMIN — ZOLPIDEM TARTRATE 10 MG: 5 TABLET ORAL at 20:53

## 2024-03-28 RX ADMIN — HYDROMORPHONE HYDROCHLORIDE 0.2 MG: 0.2 INJECTION, SOLUTION INTRAMUSCULAR; INTRAVENOUS; SUBCUTANEOUS at 08:27

## 2024-03-28 NOTE — PROGRESS NOTES
Patient:    MRN:  [id number]    Harjinder Request ID:  [id number]    Level of care reserved:  Home Health Agency    [facility] [place] [address] [phone]    Clinical needs requested:    Geography searched:  [address]    Start of Service:    Request sent:  11:55am EDT on 3/28/2024 by Shakira Solano    Partner reserved:  12:15pm EDT on 3/28/2024 by Shakira Solano    Choice list shared:  12:15pm EDT on 3/28/2024 by Shakira Solano

## 2024-03-28 NOTE — NURSING NOTE
Pt let nurse know that his family was bringing his cancer meds from home. Nurse told him to ring when the med was here because the med would need to be sent to the pharm for verification before he can receive from. Pt verbally agreed. Nurse noticed family coming out of the room and went in to see if they brought the med and the pt confirmed the med was here and informed the nurse that he took 4 pills out of the bottle. Nurse informed the night PA about pt taking the med himself. Nurse walked the med to the pharm and informed them that pt took pills out of the bottle just now. PA and pharm aware of situation.

## 2024-03-28 NOTE — PROGRESS NOTES
[facility]  Progress Note  Name: Richard Nava I  MRN: [id number]  Unit/Bed#: -01 I Date of Admission: 3/26/2024   Date of Service: 3/28/2024 I Hospital Day: 2    Assessment/Plan   * Chronic obstructive pulmonary disease with acute exacerbation (HCC)  Assessment & Plan  Severe copd with chronic hypoxic respiratory failure on minimum of 2L O2 at baseline  Pulmonology consult   Continue IV solumedrol 40 mg BID   Wean O2 as able (currently on 3L, baseline is 2L O2)  Resp protocol, IS, nebs       Prostate cancer metastatic to bone (HCC)  Assessment & Plan  Patient with metastatic prostate cancer to bone, complicated by poor PO intake + cachexia. Follows with outpatient oncology and palliative care  CT with unchanged known calvarial osseus mets   Maintained outpatient on Zytiga --> hold while admitted and resume as outpatient per oncology     Initially patient was admitted on comfort care with plan for DC to home with home hospice --> patient revoked on 3/27 and elected to continue all medical care with Level 3 DNR/DNI code status     Palliative following   Migraine is resolved   Continue current regimen:  Gabapentin 300MG BID  MS CONTIN 15MG Bid  Lidocaine patches  Tussinex BID  Currently holding klonopin and xanax as they have not been effective for him.   IV hydromorphone 0.3MG Q1H prn severe pain   IV lorazepam 1MG x1 now and every 4H PRN.    Severe protein-calorie malnutrition (HCC)  Assessment & Plan  Malnutrition Findings:   Adult Malnutrition type: Chronic illness  Adult Degree of Malnutrition: Other severe protein calorie malnutrition  Malnutrition Characteristics: Fat loss, Muscle loss                  360 Statement: chronic severe protein calorie malnutrition in the setting of increased energy demands from catabolic illness as evidenced by:  severe body fat depletion - hollow, depressed orbital area;  severe muscle mass depletion- hollow, depressed temporal area,  sunken buccal pads;  BMI 16.   treated with regular diet, honoring food preferences, and recommendation for nutrition supplements.    BMI Findings:  Adult BMI Classifications: Underweight < 18.5        Body mass index is 15.99 kg/m².       Nutrition consult                  VTE Pharmacologic Prophylaxis: VTE Score: 7 High Risk (Score >/= 5) - Pharmacological DVT Prophylaxis Ordered: heparin. Sequential Compression Devices Ordered.    Mobility:   Basic Mobility Inpatient Raw Score: 23  JH-HLM Goal: 7: Walk 25 feet or more  JH-HLM Achieved: 7: Walk 25 feet or more  JH-HLM Goal achieved. Continue to encourage appropriate mobility.    Patient Centered Rounds: I performed bedside rounds with nursing staff today.   Discussions with Specialists or Other Care Team Provider: Pulm     Education and Discussions with Family / Patient: Updated  (sister) via phone.    Total Time Spent on Date of Encounter in care of patient: 30 mins. This time was spent on one or more of the following: performing physical exam; counseling and coordination of care; obtaining or reviewing history; documenting in the medical record; reviewing/ordering tests, medications or procedures; communicating with other healthcare professionals and discussing with patient's family/caregivers.    Current Length of Stay: 2 day(s)  Current Patient Status: Inpatient   Certification Statement: The patient will continue to require additional inpatient hospital stay due to IV medications   Discharge Plan: Anticipate discharge in 24-48 hrs to home with home services.    Code Status: Level 3 - DNAR and DNI    Subjective:   Patient reports he is overall feeling much better, he notes cough is improved and SOB is improving. No new complaints or pain.     Objective:     Vitals:   Temp (24hrs), Av.9 °F (36.6 °C), Min:97.7 °F (36.5 °C), Max:98.1 °F (36.7 °C)    Temp:  [97.7 °F (36.5 °C)-98.1 °F (36.7 °C)] 97.7 °F (36.5 °C)  HR:  [84] 84  Resp:  [20] 20  BP:  (118-127)/(66-77) 118/66  SpO2:  [92 %-95 %] 92 %  Body mass index is 15.99 kg/m².     Input and Output Summary (last 24 hours):     Intake/Output Summary (Last 24 hours) at 3/28/2024 1228  Last data filed at 3/28/2024 0801  Gross per 24 hour   Intake 150 ml   Output 150 ml   Net 0 ml       Physical Exam:   Physical Exam  Vitals and nursing note reviewed.   Constitutional:       General: He is not in acute distress.     Appearance: He is cachectic. He is ill-appearing.   HENT:      Head: Normocephalic and atraumatic.   Eyes:      General:         Right eye: No discharge.         Left eye: No discharge.      Extraocular Movements: Extraocular movements intact.      Conjunctiva/sclera: Conjunctivae normal.   Cardiovascular:      Rate and Rhythm: Normal rate and regular rhythm.      Heart sounds: No murmur heard.  Pulmonary:      Effort: Pulmonary effort is normal. No respiratory distress.      Breath sounds: Wheezing present. No rhonchi or rales.      Comments: SpO2 stable on 3L  Abdominal:      General: Bowel sounds are normal. There is no distension.      Palpations: Abdomen is soft.      Tenderness: There is no abdominal tenderness.   Musculoskeletal:      Cervical back: Neck supple.      Right lower leg: No edema.      Left lower leg: No edema.   Skin:     General: Skin is warm and dry.      Capillary Refill: Capillary refill takes less than 2 seconds.   Neurological:      General: No focal deficit present.      Mental Status: He is alert and oriented to person, place, and time. Mental status is at baseline.      Cranial Nerves: No cranial nerve deficit.   Psychiatric:         Mood and Affect: Mood normal.         Behavior: Behavior normal.          Additional Data:     Labs:  Results from last 7 days   Lab Units 03/28/24  0247 03/27/24  1603   WBC Thousand/uL 9.93 8.33   HEMOGLOBIN g/dL 10.5* 10.9*   HEMATOCRIT % 33.6* 34.4*   PLATELETS Thousands/uL 246 250   BANDS PCT %  --  2   NEUTROS PCT % 85*  --    LYMPHS  PCT % 6*  --    LYMPHO PCT %  --  5*   MONOS PCT % 8  --    MONO PCT %  --  2*   EOS PCT % 0 0     Results from last 7 days   Lab Units 03/28/24  0247 03/27/24  1603   SODIUM mmol/L 140 139   POTASSIUM mmol/L 3.8 4.0   CHLORIDE mmol/L 100 99   CO2 mmol/L 36* 37*   BUN mg/dL 11 14   CREATININE mg/dL 0.43* 0.50*   ANION GAP mmol/L 4 3*   CALCIUM mg/dL 8.2* 8.5   ALBUMIN g/dL  --  3.6   TOTAL BILIRUBIN mg/dL  --  0.31   ALK PHOS U/L  --  40   ALT U/L  --  10   AST U/L  --  10*   GLUCOSE RANDOM mg/dL 151* 192*     Results from last 7 days   Lab Units 03/26/24  0710   INR  0.93             Results from last 7 days   Lab Units 03/28/24  0247 03/26/24  0710   LACTIC ACID mmol/L  --  0.9   PROCALCITONIN ng/ml 0.10 0.09       Lines/Drains:  Invasive Devices       Peripheral Intravenous Line  Duration             Peripheral IV 03/26/24 Right;Ventral (anterior) Forearm 2 days                          Imaging: No pertinent imaging reviewed.    Recent Cultures (last 7 days):   Results from last 7 days   Lab Units 03/26/24  0710   BLOOD CULTURE  No Growth at 24 hrs.  No Growth at 24 hrs.       Last 24 Hours Medication List:   Current Facility-Administered Medications   Medication Dose Route Frequency Provider Last Rate    albuterol  2.5 mg Nebulization Q6H PRN Arleth Scales PA-C      ALPRAZolam  0.5 mg Oral BID PRN Lora Stanton PA-C      bisacodyl  10 mg Rectal Daily PRN Martha Gaffney DO      budesonide  0.5 mg Nebulization Q12H ISA Araujo      doxycycline hyclate  100 mg Oral Q12H FANNY Maxine Venegas PA-C      formoterol  20 mcg Nebulization Q12H ISA Araujo      gabapentin  600 mg Oral HS Arleth Scales PA-C      guaiFENesin  1,200 mg Oral Q12H [facility] Arleth Scales PA-C      heparin (porcine)  5,000 Units Subcutaneous Q8H [facility] Maxine Venegas PA-C      Hydrocod Jose-Chlorphe Jose ER  5 mL Oral Q12H [facility] Arleth Scales PA-C       HYDROmorphone  0.2 mg Intravenous Q2H PRN Arleth Scales PA-C      ipratropium  0.5 mg Nebulization TID Yanni Cherry,       levalbuterol  0.63 mg Nebulization TID Maxine Venegas PA-C      magnesium sulfate  2 g Intravenous Q24H FANNY Martha Ambron, DO 2 g (03/28/24 0801)    melatonin  6 mg Oral HS Lora Stantno PA-C      methylPREDNISolone sodium succinate  40 mg Intravenous Q12H FANNY Maxine Venegas PA-C      morphine  15 mg Oral BID Arleth Scales PA-C      sodium chloride  4 mL Nebulization TID PRN Martha Ambron, DO      trimethobenzamide  200 mg Intramuscular Q6H PRN Annie Hernandez MD      zolpidem  10 mg Oral HS Arleth Scales PA-C          Today, Patient Was Seen By: Maxine Venegas PA-C    **Please Note: This note may have been constructed using a voice recognition system.**

## 2024-03-28 NOTE — CASE MANAGEMENT
Case Management Discharge Planning Note    Patient name Richard Nava  Location /-01 MRN [id number]  : 1955 Date 3/28/2024       Current Admission Date: 3/26/2024  Current Admission Diagnosis:Chronic obstructive pulmonary disease with acute exacerbation (HCC)   Patient Active Problem List    Diagnosis Date Noted    Respiratory distress 2024    COPD exacerbation (HCC) 2023    Low oxygen saturation 2023    Chronic respiratory failure (HCC) 2023    Positive blood culture 2023    Acute respiratory insufficiency 2023    Opioid dependence (HCC) 2023    Neoplasm related pain 2023    Medical marijuana use 2023    Encephalopathy acute 2022    Anorexia 2022    Cachexia (HCC) 2022    Palliative care patient 2022    Prostate cancer metastatic to bone (HCC) 2022    Insomnia 2022    Hot flashes 2022    Left lower quadrant abdominal pain 2021    Personal history of colonic polyps 2021    HLD (hyperlipidemia) 2020    Acute respiratory failure with hypoxia (HCC) 2020    Severe protein-calorie malnutrition (HCC) 2020    Chronic obstructive pulmonary disease with acute exacerbation (HCC) 2020    Acute tracheobronchitis 2020      LOS (days): 2  Geometric Mean LOS (GMLOS) (days): 3.7  Days to GMLOS:1.8     OBJECTIVE:  Risk of Unplanned Readmission Score: 36.48         Current admission status: Inpatient   Preferred Pharmacy:   [facility]/pharmacy #[id number] - [place] - [address]  [address] [address]  Phone: [phone] Fax: [phone]    [facility] - [place], [place]  [address]  [facility] [address]  [place] [address]  Phone: [phone] Fax: [phone]    Primary Care Provider: Kevin Mejia DO    Primary Insurance: MEDICARE  Secondary Insurance:     DISCHARGE DETAILS:        PT will need SN for COPD  management. Referral made in [facility].                        Requested Home Health Care         Is the patient interested in HHC at discharge?: Yes  Home Health Discipline requested:: Nursing  [facility] Health Agency Name:: [facility]  A External Referral Reason (only applicable if external [facility] name selected): Patient has established relationship with provider  Home Health Follow-Up Provider:: PCP  Home Health Services Needed:: COPD Management  Oxygen LPM Ordered (if applicable based on home health services needed):: 3 LPM  Homebound Criteria Met:: Immunosuppressed  Supporting Clincal Findings:: Limited Endurance, Requires Oxygen         Other Referral/Resources/Interventions Provided:  Interventions: HHC         Treatment Team Recommendation: Home with Home Health Care  Discharge Destination Plan:: Home with Home Health Care

## 2024-03-28 NOTE — SPEECH THERAPY NOTE
Speech Language/Pathology  Speech-Language Pathology Bedside Swallow Evaluation        Patient Name: Richard Nava    Today's Date: 3/28/2024     Problem List  Principal Problem:    Chronic obstructive pulmonary disease with acute exacerbation (HCC)  Active Problems:    Severe protein-calorie malnutrition (HCC)    Prostate cancer metastatic to bone (HCC)         Past Medical History  Past Medical History:   Diagnosis Date    Bone cancer (HCC)     Colon polyp     COPD (chronic obstructive pulmonary disease) (HCC)     Emphysema lung (HCC)     Hyperlipidemia     Prostate cancer (HCC)        Past Surgical History  Past Surgical History:   Procedure Laterality Date    APPENDECTOMY      COLONOSCOPY      IR BIOPSY BONE  12/30/2021    JOINT REPLACEMENT      UPPER GASTROINTESTINAL ENDOSCOPY         Summary:   Pt with limited intake secondary to c/o nausea. At baseline pt reports limited overall PO intake due to need to reduce sugar due to CA dx, poor appetite, medication schedule, and stomach issues associated with prescribed medications needed due to various illnesses. Pt was able to bite through solid (toast) and retrieve bolus from cup. No anterior leakage. Mastication slow and effortful due to lack of dentition. Bolus control and transfer appeared adequate. Swallow initiation appeared prompt. No coughing, throat clearing, change in vocal quality, or change in respiratory status observed post limited PO trials. Pt reports baseline difficulty masticating harder foods due to lack of dentition; pt wishes to remain on regular foods but is able to independently choose foods that are softer in nature. ST to f/u to ensure safety with oral intake and further assess swallow function due to limited PO consumption at time of evaluation.     Recommendations:   Diet: thin liquids and regular  Medications: as tolerated  Oral care: 3x/day with toothbrush and toothpaste  Supervision: set up assistance  Aspiration  Precautions/Compensatory Swallowing Strategies: upright position, fully awake, small bites, and small sips      Current Medical Status:  Pt is a 68 y.o. male who presented to [facility] ED on 2024. PMH of prostate cancer with metastases to bone, COPD with recent exacerbation d/t Pseudomonas infection. Patient has severe malnutrition, chronic headache, opioid dependence and is a palliative care patient. When pt came to the ED, he stated that over the last several days his cough has become nearly constant and nonproductive. He has been anorexic and weak.      Past Medical History:  Please see H&P for details    Relevant Imagin2024 CT Head wo Contrast IMPRESSION:No acute intracranial abnormality. Unchanged sclerotic calvarial osseous metastases  2024 Chest XR IMPRESSION: No acute cardiopulmonary disease.    Social/Education/Vocational Hx:  Pt resides at home independently    Swallow Information:   Current Risks for Dysphagia & Aspiration: PNA, CA  Current Symptoms/Concerns: Patient with metastatic prostate cancer to bone, complicated by poor PO intake + cachexia   Current Diet: thin liquids and regular  Baseline Diet: thin liquids and regular   Pt consumes medications: whole with liquids    Positioning and Respiratory Status:  Position: upright in bed  Respiratory Status: O2 via nasal canula- 3 liters    Oral Integrity:  Mucosa: moist and pink  Dentition:edentulous (pt reports he is unable to get dentures due to old martial arts injury)  Dependent on Oral Care: no    Oral Mechanism Exam:   Face: symmetrical  Lips: WFL  Tongue: WFL  Jaw: adequate ROM  Hard/Soft Palate:normal  Cough:  adequate    Speech Characteristics:  Apraxia of Speech: none  Dysarthria: none  Vocal Quality: WFL    Textures Assessed:  Regular trials of toast with butter were given to the patient during assessment. Clinician observed the following clinical s/s of dysphagia: increased mastication time/effort though  pt edentulous    Liquids Assessed:  Thin liquids via cup. Clinician observed the following clinical s/s of dysphagia:  WFL    Esophageal Concerns  Pt reporting foods feeling stuck (intermittent per pt report)    Strategies Trialed  Postural Techniques:none  Maneuvers:none  Behavioral Strategies: none    Results Reviewed with: patient and RN     Dysphagia Goals:   Long Term Goal:  -Patient will demonstrate safe and effective oral intake (without overt s/s significant oral/pharyngeal dysphagia including s/s penetration or aspiration) for the highest appropriate diet level.     Speech Therapy Prognosis   Prognosis: fair to good     Prognosis Considerations: progressive disease process and respiratory status

## 2024-03-28 NOTE — CASE MANAGEMENT
Case Management Discharge Planning Note    Patient name Richard Nava  Location /-01 MRN [id number]  : 1955 Date 3/28/2024       Current Admission Date: 3/26/2024  Current Admission Diagnosis:Chronic obstructive pulmonary disease with acute exacerbation (HCC)   Patient Active Problem List    Diagnosis Date Noted    Respiratory distress 2024    COPD exacerbation (HCC) 2023    Low oxygen saturation 2023    Chronic respiratory failure (HCC) 2023    Positive blood culture 2023    Acute respiratory insufficiency 2023    Opioid dependence (HCC) 2023    Neoplasm related pain 2023    Medical marijuana use 2023    Encephalopathy acute 2022    Anorexia 2022    Cachexia (HCC) 2022    Palliative care patient 2022    Prostate cancer metastatic to bone (HCC) 2022    Insomnia 2022    Hot flashes 2022    Left lower quadrant abdominal pain 2021    Personal history of colonic polyps 2021    HLD (hyperlipidemia) 2020    Acute respiratory failure with hypoxia (HCC) 2020    Severe protein-calorie malnutrition (HCC) 2020    Chronic obstructive pulmonary disease with acute exacerbation (HCC) 2020    Acute tracheobronchitis 2020      LOS (days): 2  Geometric Mean LOS (GMLOS) (days): 3.7  Days to GMLOS:1.8     OBJECTIVE:  Risk of Unplanned Readmission Score: 37.42         Current admission status: Inpatient   Preferred Pharmacy:   [facility]/pharmacy #[id number] - [place] - [address]  [address] [address]  Phone: [phone] Fax: [phone]    [facility] - [place], [place]  [address] [facility] [address]  [place] [address]  Phone: [phone] Fax: [phone]    Primary Care Provider: Kevin Mejia DO    Primary Insurance: MEDICARE  Secondary Insurance:     DISCHARGE DETAILS:     [facility] accepted [facility] and is  preferred by pt. NARESH reserved [facility] in [facility].                            Requested Home Health Care         Is the patient interested in HHC at discharge?: Yes  Home Health Discipline requested:: Nursing  [facility] Agency Name:: [facility]  A External Referral Reason (only applicable if external [facility] name selected): Patient has established relationship with provider  Home Health Follow-Up Provider:: PCP  Home Health Services Needed:: COPD Management  Oxygen LPM Ordered (if applicable based on home health services needed):: 3 LPM  Homebound Criteria Met:: Immunosuppressed  Supporting Clincal Findings:: Limited Endurance, Requires Oxygen         Other Referral/Resources/Interventions Provided:  Interventions: HHC         Treatment Team Recommendation: Home with Home Health Care  Discharge Destination Plan:: Home with Home Health Care

## 2024-03-28 NOTE — CONSULTS
"Consultation - Pulmonary Medicine   Richard Nava 68 y.o. male MRN: [id number]  Unit/Bed#: -01 Encounter: [id number]      Assessment/Plan:    Acute on chronic hypoxic respiratory failure  COPD of unknown severity with acute exacerbation  Recent pseudomonas infection  Nicotine dependence, in remission    Patient is currently on 3L oxygen saturating at 96%. Titrate oxygen to maintain saturations >89%.  He reports being maintained on 2.5 to 4 L at home.  Home regimen: Breztri, singulair, albuterol neb prn, albuterol inhaler prn  Continue atrovent/xopenex nebs TID, add pulmicort/perforomist nebs q12h  Continue IV solumedrol 40mg q12h as patient continues to have wheezing on exam  Currently on po doxy. May consider d/c abx given negative procalcitonins, nonelevated WBC, afebrile and lack of infectious symptoms   Palliative care is following. Patient recently revoked hospice and comfort care.  Patient is a former smoker, he quit 12 years ago and has a 42 pack year smoking history. Continue complete smoking cessation.    History of Present Illness   Physician Requesting Consult: Annie Hernandez MD  Reason for Consult / Principal Problem: Hypoxia, chronic respiratory failure with hypoxia  Chief Complaint: \"I am coughing a lot\"  HPI: Richard Nava is a 68 y.o.  male medical history severe COPD, chronic hypoxic respiratory failure, prostate cancer with mets to bone, severe malnutrition who presented to [facility] with complaints of increased cough and weakness.      He was recently admitted 3/8-3/12 with a COPD exacerbation, sputum culture found to be positive for Pseudomonas, patient was treated with 7 day course of Levaquin and a prednisone taper, which he reports taking to completion.    He currently reports continued congested productive cough. He is bringing up some white to clear mucus. He denies any fever, states he always has chills due to his chemotherapy.  He " reports having some chest tightness and wheezing, which has improved since admission to the hospital.      Inpatient consult to Pulmonology  Consult performed by: ISA Araujo  Consult ordered by: Maxine Venegas PA-C        Review of Systems   Constitutional:  Positive for chills and fatigue. Negative for fever.   HENT:  Negative for congestion.    Respiratory:  Positive for cough, chest tightness, shortness of breath and wheezing.    Cardiovascular:  Negative for chest pain and palpitations.   Gastrointestinal:  Negative for nausea.   Genitourinary: Negative.    Musculoskeletal: Negative.    Skin: Negative.    Neurological: Negative.    Psychiatric/Behavioral: Negative.         Historical Information   Past Medical History:   Diagnosis Date    Bone cancer (HCC)     Colon polyp     COPD (chronic obstructive pulmonary disease) (HCC)     Emphysema lung (HCC)     Hyperlipidemia     Prostate cancer (HCC)      Past Surgical History:   Procedure Laterality Date    APPENDECTOMY      COLONOSCOPY      IR BIOPSY BONE  2021    JOINT REPLACEMENT      UPPER GASTROINTESTINAL ENDOSCOPY       Social History   Social History     Substance and Sexual Activity   Alcohol Use Never     Social History     Substance and Sexual Activity   Drug Use Never     Social History     Tobacco Use   Smoking Status Former    Current packs/day: 0.00    Average packs/day: 1 pack/day for 42.0 years (42.0 ttl pk-yrs)    Types: Cigarettes    Start date:     Quit date:     Years since quittin.2   Smokeless Tobacco Never     E-Cigarette/Vaping    E-Cigarette Use Never User      E-Cigarette/Vaping Substances    Nicotine No     THC No     CBD No     Flavoring No     Other No     Unknown No        Meds/Allergies   all current active meds have been reviewed and current meds:   Current Facility-Administered Medications   Medication Dose Route Frequency    albuterol inhalation solution 2.5 mg  2.5 mg Nebulization Q6H PRN     ALPRAZolam (XANAX) tablet 0.5 mg  0.5 mg Oral BID PRN    bisacodyl (DULCOLAX) rectal suppository 10 mg  10 mg Rectal Daily PRN    doxycycline hyclate (VIBRAMYCIN) capsule 100 mg  100 mg Oral Q12H [facility]    gabapentin (NEURONTIN) capsule 600 mg  600 mg Oral HS    guaiFENesin (MUCINEX) 12 hr tablet 1,200 mg  1,200 mg Oral Q12H [facility]    heparin (porcine) subcutaneous injection 5,000 Units  5,000 Units Subcutaneous Q8H [facility]    Hydrocod Jose-Chlorphe Jose ER (TUSSIONEX) ER suspension 5 mL  5 mL Oral Q12H [facility]    HYDROmorphone HCl (DILAUDID) injection 0.2 mg  0.2 mg Intravenous Q2H PRN    ipratropium (ATROVENT) 0.02 % inhalation solution 0.5 mg  0.5 mg Nebulization TID    levalbuterol (XOPENEX) inhalation solution 0.63 mg  0.63 mg Nebulization TID    magnesium sulfate 2 g/50 mL IVPB (premix) 2 g  2 g Intravenous Q24H [facility]    melatonin tablet 6 mg  6 mg Oral HS    methylPREDNISolone sodium succinate (Solu-MEDROL) injection 40 mg  40 mg Intravenous Q12H [facility]    morphine (MS CONTIN) ER tablet 15 mg  15 mg Oral BID    sodium chloride 3 % inhalation solution 4 mL  4 mL Nebulization TID PRN    zolpidem (AMBIEN) tablet 10 mg  10 mg Oral HS       No Known Allergies    Objective   Vitals: Blood pressure 127/77, pulse 102, temperature 98.1 °F (36.7 °C), temperature source Temporal, resp. rate (!) 27, weight 46.3 kg (102 lb 1.2 oz), SpO2 95%.3L,Body mass index is 15.99 kg/m².  No intake or output data in the 24 hours ending 03/28/24 0824  Invasive Devices       Peripheral Intravenous Line  Duration             Peripheral IV 03/26/24 Right;Ventral (anterior) Forearm 2 days                    Physical Exam  Vitals and nursing note reviewed.   Constitutional:       Appearance: He is ill-appearing.   HENT:      Head: Normocephalic and atraumatic.      Nose: Nose normal.      Mouth/Throat:      Pharynx: Oropharynx is clear.   Eyes:      Conjunctiva/sclera: Conjunctivae normal.   Cardiovascular:      Rate and Rhythm: Normal rate.   Pulmonary:  "     Effort: Pulmonary effort is normal. No respiratory distress.      Breath sounds: Decreased air movement present. Wheezing present.   Abdominal:      General: Abdomen is flat.   Musculoskeletal:         General: Normal range of motion.      Cervical back: Normal range of motion and neck supple.   Skin:     General: Skin is warm and dry.      Capillary Refill: Capillary refill takes less than 2 seconds.      Coloration: Skin is pale.   Neurological:      General: No focal deficit present.      Mental Status: He is alert and oriented to person, place, and time.   Psychiatric:         Mood and Affect: Mood normal.         Lab Results: I have personally reviewed pertinent lab results., CBC:   Lab Results   Component Value Date    WBC 9.93 03/28/2024    HGB 10.5 (L) 03/28/2024    HCT 33.6 (L) 03/28/2024     (H) 03/28/2024     03/28/2024    RBC 3.36 (L) 03/28/2024    MCH 31.3 03/28/2024    MCHC 31.3 (L) 03/28/2024    RDW 13.5 03/28/2024    MPV 8.6 (L) 03/28/2024    NRBC 0 03/28/2024   , CMP:   Lab Results   Component Value Date    SODIUM 140 03/28/2024    K 3.8 03/28/2024     03/28/2024    CO2 36 (H) 03/28/2024    BUN 11 03/28/2024    CREATININE 0.43 (L) 03/28/2024    CALCIUM 8.2 (L) 03/28/2024    AST 10 (L) 03/27/2024    ALT 10 03/27/2024    ALKPHOS 40 03/27/2024    EGFR 117 03/28/2024       Lactic Acid: 0.9    Procalcitonin: 0.10, 0.09    Flu/COVID/RSV PCR: Negative    Culture Data: Blood Cultures NG24H      Imaging Studies: I have personally reviewed pertinent reports.   and I have personally reviewed pertinent films in PACS     XR chest portable, 3/26/2024  No acute cardiopulmonary disease.       Code Status: Level 3 - DNAR and DNI      Zuleyma Francisco, LIGIA RN FNP-BC  Nurse Practitioner  [facility]       Portions of the record may have been created with voice recognition software.  Occasional wrong word or \"sound a like\" substitutions may have occurred due to " the inherent limitations of voice recognition software.  Read the chart carefully and recognize, using context, where substitutions have occurred.

## 2024-03-28 NOTE — PROGRESS NOTES
Progress Note - Palliative & Supportive Care  Richard Nava  68 y.o.  male  /-01   MRN: [id number]  Encounter: [id number]     Assessment/Problems actively addressed:  Encounter for palliative and supportive care  Goals of care encounter   Intractable migraine  Prostate cancer, widely metastatic to bone  Severe COPD with chronic respiratory failure   Gastritis  Neoplasm related pain  Severe protein calorie malnutrition   Pulmonary and cancer cachexia  QTC prolongation    PLAN:  1. Symptom management  Rishabh has ongoing significant insomnia with limited medical options.  He has prolonged QTc and had poor response (headaches) to clonazepam and Xanax in the past.  Today, he states he believes the headaches are mostly stress related and would like to go back on his Xanax which helped him get an hour of sleep.  He does not want to restart the clonazepam.  Continue xanax 0.5MG PO BID PRN   Continue Tussionex ER Q12H FANNY for cough  Continue Mucinex   Continue baseline MS Contin 15MG Q12H FANNY  Continue Neurontin 600MG QHS  Continue zolpidem 10MG QHS PRN insomnia     2. Goals of care  At this point, Rishabh has completely revoked hospice care and comfort measures.  He would like to continue with disease directed care.  He plans to go home with home health care at discharge. He will have ongoing palliative care follow-up and oncology follow-up.  He is not interested in skilled nursing facility or assisted living although his family has offered to help arrange this for him.    24 History  Chart reviewed before visit.  Patient seen at bedside.  He is calm and in a good mood today.  He states he only got 1 hour of sleep.  He expresses understanding that we have few medical options without significant side effects especially with his prolonged QT.  He would like to continue medical management and he would like to go back on his Xanax.  He plans to go home at discharge and does not think he needs a nursing home  at this point.    Decisional apparatus:  Patient is competent on exam today.  If competence is lost, patient's substitute decision maker would default to sister Piedad/Mary [place].  Advance Directive/Living Will/POLST: yes     Review of Systems:   Review of Systems   Constitutional: Positive for decreased appetite and malaise/fatigue.   Musculoskeletal:  Positive for arthritis and back pain.   Gastrointestinal:  Negative for bloating, abdominal pain, nausea and vomiting.   Neurological:  Positive for weakness. Negative for excessive daytime sleepiness.   Psychiatric/Behavioral:  Negative for hallucinations, substance abuse and suicidal ideas. The patient has insomnia (3+) and is nervous/anxious.      Medications    Current Facility-Administered Medications:     albuterol inhalation solution 2.5 mg, 2.5 mg, Nebulization, Q6H PRN, Arleth Scales PA-C, 2.5 mg at 03/27/24 0925    ALPRAZolam (XANAX) tablet 0.5 mg, 0.5 mg, Oral, BID PRN, Lora Stanton PA-C, 0.5 mg at 03/28/24 0616    bisacodyl (DULCOLAX) rectal suppository 10 mg, 10 mg, Rectal, Daily PRN, Martha Gaffney DO    budesonide (PULMICORT) inhalation solution 0.5 mg, 0.5 mg, Nebulization, Q12H, ISA Araujo    doxycycline hyclate (VIBRAMYCIN) capsule 100 mg, 100 mg, Oral, Q12H [facility], 100 mg at 03/28/24 0801    formoterol (PERFOROMIST) nebulizer solution 20 mcg, 20 mcg, Nebulization, Q12H, ISA Araujo    gabapentin (NEURONTIN) capsule 600 mg, 600 mg, Oral, HS, Arleth Scales PA-C, 300 mg at 03/27/24 1939    guaiFENesin (MUCINEX) 12 hr tablet 1,200 mg, 1,200 mg, Oral, Q12H [facility], 1,200 mg at 03/28/24 0801    heparin (porcine) subcutaneous injection 5,000 Units, 5,000 Units, Subcutaneous, Q8H [facility], 5,000 Units at 03/27/24 1946    Hydrocod Jose-Chlorphe Jose ER (TUSSIONEX) ER suspension 5 mL, 5 mL, Oral, Q12H FANNY,  Arleth Scales PA-C, 5 mL at 03/28/24 0801    HYDROmorphone HCl (DILAUDID) injection 0.2 mg, 0.2 mg, Intravenous, Q2H PRN, Arleth Scales PA-C, 0.2 mg at 03/28/24 0827    ipratropium (ATROVENT) 0.02 % inhalation solution 0.5 mg, 0.5 mg, Nebulization, TID, Yanni Cherry DO, 0.5 mg at 03/28/24 0729    levalbuterol (XOPENEX) inhalation solution 0.63 mg, 0.63 mg, Nebulization, TID, Maxine Venegas PA-C, 0.63 mg at 03/28/24 0729    magnesium sulfate 2 g/50 mL IVPB (premix) 2 g, 2 g, Intravenous, Q24H FANNY, Martha Gaffney DO, Last Rate: 50 mL/hr at 03/28/24 0801, 2 g at 03/28/24 0801    melatonin tablet 6 mg, 6 mg, Oral, HS, Lora Stanton PA-C, 6 mg at 03/28/24 0242    methylPREDNISolone sodium succinate (Solu-MEDROL) injection 40 mg, 40 mg, Intravenous, Q12H FANNY, Maxine Venegas PA-C, 40 mg at 03/28/24 0801    morphine (MS CONTIN) ER tablet 15 mg, 15 mg, Oral, BID, Arleth Scales PA-C, 15 mg at 03/28/24 0801    sodium chloride 3 % inhalation solution 4 mL, 4 mL, Nebulization, TID PRN, Martha Gaffney DO, 4 mL at 03/27/24 0926    trimethobenzamide (TIGAN) IM injection 200 mg, 200 mg, Intramuscular, Q6H PRN, Annie Hernandez MD, 200 mg at 03/28/24 0932    zolpidem (AMBIEN) tablet 10 mg, 10 mg, Oral, HS, Arleth Scales PA-C, 10 mg at 03/27/24 2113    Objective  /66   Pulse 84   Temp 97.7 °F (36.5 °C)   Resp 20   Wt 46.3 kg (102 lb 1.2 oz)   SpO2 92%   BMI 15.99 kg/m²     Physical Exam:   Physical Exam  Vitals and nursing note reviewed.   Constitutional:       General: He is not in acute distress.     Appearance: He is cachectic. He is ill-appearing.      Comments: Fat loss an muscle wasting present.    HENT:      Head: Normocephalic and atraumatic.   Eyes:      Conjunctiva/sclera: Conjunctivae normal.   Cardiovascular:      Rate and Rhythm: Normal rate.   Pulmonary:      Effort: Pulmonary effort is normal. No respiratory distress.    Musculoskeletal:         General: No swelling.      Cervical back: Neck supple.   Skin:     General: Skin is warm and dry.      Capillary Refill: Capillary refill takes less than 2 seconds.      Coloration: Skin is pale.   Neurological:      Mental Status: He is alert and oriented to person, place, and time.   Psychiatric:         Mood and Affect: Mood normal.         Behavior: Behavior normal. Behavior is cooperative.       Lab Results: I have personally reviewed pertinent labs., CBC:   Lab Results   Component Value Date    WBC 9.93 03/28/2024    HGB 10.5 (L) 03/28/2024    HCT 33.6 (L) 03/28/2024     (H) 03/28/2024     03/28/2024    RBC 3.36 (L) 03/28/2024    MCH 31.3 03/28/2024    MCHC 31.3 (L) 03/28/2024    RDW 13.5 03/28/2024    MPV 8.6 (L) 03/28/2024    NRBC 0 03/28/2024   , CMP:   Lab Results   Component Value Date    SODIUM 140 03/28/2024    K 3.8 03/28/2024     03/28/2024    CO2 36 (H) 03/28/2024    BUN 11 03/28/2024    CREATININE 0.43 (L) 03/28/2024    CALCIUM 8.2 (L) 03/28/2024    AST 10 (L) 03/27/2024    ALT 10 03/27/2024    ALKPHOS 40 03/27/2024    EGFR 117 03/28/2024     Imaging Studies: I have personally reviewed pertinent reports.  EKG, Pathology, and Other Studies: I have personally reviewed pertinent reports.    Counseling / Coordination of Care  Total floor / unit time spent today 45 minutes. Greater than 50% of total time was spent with the patient and / or family counseling and / or coordinating of care. A description of the counseling / coordination of care: reviewed chart, reviewed lab values, reviewed imaging, provided medical updates, discussed palliative care and symptom management, discussed hospice care and comfort care, discussed goals of care, discussed code status, provided anticipatory guidance, provided psychosocial and emotional support, assessed competency and decision-making, and facilitated interdisciplinary communication. Reviewed with [facility] team, RN and  "ROMINA Scales PA-C  Palliative & Supportive Care    Portions of this document may have been created using dictation software and as such some \"sound alike\" terms may have been generated by the system. Do not hesitate to contact me with any questions or clarifications.    "

## 2024-03-29 LAB
ANION GAP SERPL CALCULATED.3IONS-SCNC: 4 MMOL/L (ref 4–13)
ATRIAL RATE: 78 BPM
BASOPHILS # BLD AUTO: 0.01 THOUSANDS/ÂΜL (ref 0–0.1)
BASOPHILS NFR BLD AUTO: 0 % (ref 0–1)
BUN SERPL-MCNC: 12 MG/DL (ref 5–25)
CALCIUM SERPL-MCNC: 8.1 MG/DL (ref 8.4–10.2)
CHLORIDE SERPL-SCNC: 100 MMOL/L (ref 96–108)
CO2 SERPL-SCNC: 35 MMOL/L (ref 21–32)
CREAT SERPL-MCNC: 0.47 MG/DL (ref 0.6–1.3)
EOSINOPHIL # BLD AUTO: 0 THOUSAND/ÂΜL (ref 0–0.61)
EOSINOPHIL NFR BLD AUTO: 0 % (ref 0–6)
ERYTHROCYTE [DISTWIDTH] IN BLOOD BY AUTOMATED COUNT: 13.5 % (ref 11.6–15.1)
GFR SERPL CREATININE-BSD FRML MDRD: 113 ML/MIN/1.73SQ M
GLUCOSE SERPL-MCNC: 223 MG/DL (ref 65–140)
HCT VFR BLD AUTO: 34.7 % (ref 36.5–49.3)
HGB BLD-MCNC: 10.8 G/DL (ref 12–17)
IMM GRANULOCYTES # BLD AUTO: 0.06 THOUSAND/UL (ref 0–0.2)
IMM GRANULOCYTES NFR BLD AUTO: 1 % (ref 0–2)
LYMPHOCYTES # BLD AUTO: 0.41 THOUSANDS/ÂΜL (ref 0.6–4.47)
LYMPHOCYTES NFR BLD AUTO: 6 % (ref 14–44)
MAGNESIUM SERPL-MCNC: 2.1 MG/DL (ref 1.9–2.7)
MCH RBC QN AUTO: 31.6 PG (ref 26.8–34.3)
MCHC RBC AUTO-ENTMCNC: 31.1 G/DL (ref 31.4–37.4)
MCV RBC AUTO: 102 FL (ref 82–98)
MONOCYTES # BLD AUTO: 0.29 THOUSAND/ÂΜL (ref 0.17–1.22)
MONOCYTES NFR BLD AUTO: 4 % (ref 4–12)
NEUTROPHILS # BLD AUTO: 6.6 THOUSANDS/ÂΜL (ref 1.85–7.62)
NEUTS SEG NFR BLD AUTO: 89 % (ref 43–75)
NRBC BLD AUTO-RTO: 0 /100 WBCS
P AXIS: 88 DEGREES
PLATELET # BLD AUTO: 251 THOUSANDS/UL (ref 149–390)
PMV BLD AUTO: 8.8 FL (ref 8.9–12.7)
POTASSIUM SERPL-SCNC: 4.2 MMOL/L (ref 3.5–5.3)
PR INTERVAL: 150 MS
QRS AXIS: 104 DEGREES
QRSD INTERVAL: 72 MS
QT INTERVAL: 356 MS
QTC INTERVAL: 405 MS
RBC # BLD AUTO: 3.42 MILLION/UL (ref 3.88–5.62)
SODIUM SERPL-SCNC: 139 MMOL/L (ref 135–147)
T WAVE AXIS: 81 DEGREES
VENTRICULAR RATE: 78 BPM
WBC # BLD AUTO: 7.37 THOUSAND/UL (ref 4.31–10.16)

## 2024-03-29 PROCEDURE — 94640 AIRWAY INHALATION TREATMENT: CPT

## 2024-03-29 PROCEDURE — 99233 SBSQ HOSP IP/OBS HIGH 50: CPT | Performed by: PHYSICIAN ASSISTANT

## 2024-03-29 PROCEDURE — 99233 SBSQ HOSP IP/OBS HIGH 50: CPT

## 2024-03-29 PROCEDURE — 93010 ELECTROCARDIOGRAM REPORT: CPT | Performed by: INTERNAL MEDICINE

## 2024-03-29 PROCEDURE — 94760 N-INVAS EAR/PLS OXIMETRY 1: CPT

## 2024-03-29 PROCEDURE — 83735 ASSAY OF MAGNESIUM: CPT

## 2024-03-29 PROCEDURE — 85025 COMPLETE CBC W/AUTO DIFF WBC: CPT

## 2024-03-29 PROCEDURE — 99232 SBSQ HOSP IP/OBS MODERATE 35: CPT | Performed by: INTERNAL MEDICINE

## 2024-03-29 PROCEDURE — 80048 BASIC METABOLIC PNL TOTAL CA: CPT

## 2024-03-29 RX ORDER — SCOLOPAMINE TRANSDERMAL SYSTEM 1 MG/1
1 PATCH, EXTENDED RELEASE TRANSDERMAL
Status: DISCONTINUED | OUTPATIENT
Start: 2024-03-29 | End: 2024-04-01 | Stop reason: HOSPADM

## 2024-03-29 RX ORDER — HYDROCODONE POLISTIREX AND CHLORPHENIRAMINE POLISTIREX 10; 8 MG/5ML; MG/5ML
5 SUSPENSION, EXTENDED RELEASE ORAL EVERY 12 HOURS PRN
Status: DISCONTINUED | OUTPATIENT
Start: 2024-03-29 | End: 2024-04-01 | Stop reason: HOSPADM

## 2024-03-29 RX ORDER — ONDANSETRON 2 MG/ML
4 INJECTION INTRAMUSCULAR; INTRAVENOUS EVERY 4 HOURS PRN
Status: DISCONTINUED | OUTPATIENT
Start: 2024-03-29 | End: 2024-04-01 | Stop reason: HOSPADM

## 2024-03-29 RX ORDER — GABAPENTIN 300 MG/1
300 CAPSULE ORAL 2 TIMES DAILY
Status: DISCONTINUED | OUTPATIENT
Start: 2024-03-29 | End: 2024-04-01 | Stop reason: HOSPADM

## 2024-03-29 RX ADMIN — LEVALBUTEROL HYDROCHLORIDE 0.63 MG: 0.63 SOLUTION RESPIRATORY (INHALATION) at 13:23

## 2024-03-29 RX ADMIN — GABAPENTIN 300 MG: 300 CAPSULE ORAL at 17:17

## 2024-03-29 RX ADMIN — BUDESONIDE 0.5 MG: 0.5 INHALANT ORAL at 08:41

## 2024-03-29 RX ADMIN — ABIRATERONE ACETATE 1000 MG: 250 TABLET ORAL at 10:24

## 2024-03-29 RX ADMIN — FORMOTEROL FUMARATE DIHYDRATE 20 MCG: 20 SOLUTION RESPIRATORY (INHALATION) at 19:07

## 2024-03-29 RX ADMIN — METHYLPREDNISOLONE SODIUM SUCCINATE 40 MG: 40 INJECTION, POWDER, FOR SOLUTION INTRAMUSCULAR; INTRAVENOUS at 10:24

## 2024-03-29 RX ADMIN — ALPRAZOLAM 0.5 MG: 0.5 TABLET ORAL at 21:59

## 2024-03-29 RX ADMIN — HYDROCODONE POLISTIREX AND CHLORPHENIRAMINE POLISTIREX 5 ML: 10; 8 SUSPENSION, EXTENDED RELEASE ORAL at 10:23

## 2024-03-29 RX ADMIN — GABAPENTIN 300 MG: 300 CAPSULE ORAL at 11:06

## 2024-03-29 RX ADMIN — SCOPALAMINE 1 PATCH: 1 PATCH, EXTENDED RELEASE TRANSDERMAL at 10:23

## 2024-03-29 RX ADMIN — ONDANSETRON 4 MG: 2 INJECTION INTRAMUSCULAR; INTRAVENOUS at 10:24

## 2024-03-29 RX ADMIN — ZOLPIDEM TARTRATE 10 MG: 5 TABLET ORAL at 21:59

## 2024-03-29 RX ADMIN — IPRATROPIUM BROMIDE 0.5 MG: 0.5 SOLUTION RESPIRATORY (INHALATION) at 13:23

## 2024-03-29 RX ADMIN — ALPRAZOLAM 0.5 MG: 0.5 TABLET ORAL at 10:23

## 2024-03-29 RX ADMIN — MORPHINE SULFATE 15 MG: 15 TABLET, EXTENDED RELEASE ORAL at 10:23

## 2024-03-29 RX ADMIN — GUAIFENESIN 1200 MG: 600 TABLET ORAL at 10:23

## 2024-03-29 RX ADMIN — IPRATROPIUM BROMIDE 0.5 MG: 0.5 SOLUTION RESPIRATORY (INHALATION) at 08:41

## 2024-03-29 RX ADMIN — LEVALBUTEROL HYDROCHLORIDE 0.63 MG: 0.63 SOLUTION RESPIRATORY (INHALATION) at 19:07

## 2024-03-29 RX ADMIN — METHYLPREDNISOLONE SODIUM SUCCINATE 40 MG: 40 INJECTION, POWDER, FOR SOLUTION INTRAMUSCULAR; INTRAVENOUS at 21:03

## 2024-03-29 RX ADMIN — BUDESONIDE 0.5 MG: 0.5 INHALANT ORAL at 19:07

## 2024-03-29 RX ADMIN — GUAIFENESIN 1200 MG: 600 TABLET ORAL at 21:03

## 2024-03-29 RX ADMIN — Medication 6 MG: at 21:59

## 2024-03-29 RX ADMIN — LEVALBUTEROL HYDROCHLORIDE 0.63 MG: 0.63 SOLUTION RESPIRATORY (INHALATION) at 08:41

## 2024-03-29 RX ADMIN — MORPHINE SULFATE 15 MG: 15 TABLET, EXTENDED RELEASE ORAL at 17:17

## 2024-03-29 RX ADMIN — FORMOTEROL FUMARATE DIHYDRATE 20 MCG: 20 SOLUTION RESPIRATORY (INHALATION) at 08:41

## 2024-03-29 RX ADMIN — IPRATROPIUM BROMIDE 0.5 MG: 0.5 SOLUTION RESPIRATORY (INHALATION) at 19:07

## 2024-03-29 NOTE — PROGRESS NOTES
[facility]  Progress Note  Name: Richard Nava I  MRN: [id number]  Unit/Bed#: -01 I Date of Admission: 3/26/2024   Date of Service: 3/29/2024 I Hospital Day: 3    Assessment/Plan   * Chronic obstructive pulmonary disease with acute exacerbation (HCC)  Assessment & Plan  Severe copd with chronic hypoxic respiratory failure on minimum of 2L O2 at baseline  Pulmonology consult   Continue IV solumedrol 40 mg BID --> possible de-escalation to PO prednisone tomorrow  Wean O2 as able (currently on 3L, baseline is 2L O2)  Resp protocol, IS, nebs       Prostate cancer metastatic to bone (HCC)  Assessment & Plan  Patient with metastatic prostate cancer to bone, complicated by poor PO intake + cachexia. Follows with outpatient oncology and palliative care  CT with unchanged known calvarial osseus mets   Maintained outpatient on Zytiga --> hold while admitted and resume as outpatient per oncology     Initially patient was admitted on comfort care with plan for DC to home with home hospice --> patient revoked on 3/27 and elected to continue all medical care with Level 3 DNR/DNI code status     Palliative following   Migraine is resolved   Continue current regimen:  Gabapentin 300MG BID  MS CONTIN 15MG Bid  Lidocaine patches  Tussinex BID  Currently holding klonopin and xanax as they have not been effective for him.   IV hydromorphone 0.3MG Q1H prn severe pain   IV lorazepam 1MG x1 now and every 4H PRN.    Severe protein-calorie malnutrition (HCC)  Assessment & Plan  Malnutrition Findings:   Adult Malnutrition type: Chronic illness  Adult Degree of Malnutrition: Other severe protein calorie malnutrition  Malnutrition Characteristics: Fat loss, Muscle loss                  360 Statement: chronic severe protein calorie malnutrition in the setting of increased energy demands from catabolic illness as evidenced by:  severe body fat depletion - hollow, depressed orbital area;  severe muscle  mass depletion- hollow, depressed temporal area, sunken buccal pads;  BMI 16.   treated with regular diet, honoring food preferences, and recommendation for nutrition supplements.    BMI Findings:  Adult BMI Classifications: Underweight < 18.5        Body mass index is 15.99 kg/m².       Nutrition consult                  VTE Pharmacologic Prophylaxis: VTE Score: 7 High Risk (Score >/= 5) - Pharmacological DVT Prophylaxis Ordered: heparin. Sequential Compression Devices Ordered.    Mobility:   Basic Mobility Inpatient Raw Score: 23  JH-HLM Goal: 7: Walk 25 feet or more  JH-HLM Achieved: 7: Walk 25 feet or more  JH-HLM Goal achieved. Continue to encourage appropriate mobility.    Patient Centered Rounds: I performed bedside rounds with nursing staff today.   Discussions with Specialists or Other Care Team Provider: Palliative Care    Education and Discussions with Family / Patient: Updated  (sister) via phone.    Total Time Spent on Date of Encounter in care of patient: 30 mins. This time was spent on one or more of the following: performing physical exam; counseling and coordination of care; obtaining or reviewing history; documenting in the medical record; reviewing/ordering tests, medications or procedures; communicating with other healthcare professionals and discussing with patient's family/caregivers.    Current Length of Stay: 3 day(s)  Current Patient Status: Inpatient   Certification Statement: The patient will continue to require additional inpatient hospital stay due to IV steroids   Discharge Plan: Anticipate discharge in 24-48 hrs to home with home services.    Code Status: Level 3 - DNAR and DNI    Subjective:   Patient notes breathing is improved however feeling very nauseous this morning after eating breakfast. Notes this occurred yesterday as well but improved with meds. He denies any focal pain and has not vomited    Objective:     Vitals:   Temp (24hrs), Av.8 °F (36.6 °C),  Min:97.5 °F (36.4 °C), Max:98.1 °F (36.7 °C)    Temp:  [97.5 °F (36.4 °C)-98.1 °F (36.7 °C)] 98.1 °F (36.7 °C)  HR:  [68-82] 82  Resp:  [19] 19  BP: (111-137)/(64-90) 111/64  SpO2:  [94 %-100 %] 94 %  Body mass index is 15.99 kg/m².     Input and Output Summary (last 24 hours):     Intake/Output Summary (Last 24 hours) at 3/29/2024 1106  Last data filed at 3/29/2024 0601  Gross per 24 hour   Intake 340 ml   Output 1900 ml   Net -1560 ml       Physical Exam:   Physical Exam  Vitals and nursing note reviewed.   Constitutional:       General: He is not in acute distress.     Appearance: He is cachectic. He is ill-appearing.   HENT:      Head: Normocephalic and atraumatic.   Eyes:      General:         Right eye: No discharge.         Left eye: No discharge.      Extraocular Movements: Extraocular movements intact.      Conjunctiva/sclera: Conjunctivae normal.   Cardiovascular:      Rate and Rhythm: Normal rate and regular rhythm.      Heart sounds: No murmur heard.  Pulmonary:      Effort: Pulmonary effort is normal. No respiratory distress.      Breath sounds: Wheezing present. No rhonchi or rales.      Comments: Stable on 3L, persistent exp wheezing   Abdominal:      General: Bowel sounds are normal. There is no distension.      Palpations: Abdomen is soft.      Tenderness: There is no abdominal tenderness.   Musculoskeletal:      Cervical back: Neck supple.      Right lower leg: No edema.      Left lower leg: No edema.   Skin:     General: Skin is warm and dry.      Capillary Refill: Capillary refill takes less than 2 seconds.   Neurological:      General: No focal deficit present.      Mental Status: He is alert and oriented to person, place, and time. Mental status is at baseline.   Psychiatric:         Mood and Affect: Mood normal.         Behavior: Behavior normal.          Additional Data:     Labs:  Results from last 7 days   Lab Units 03/29/24  0459 03/28/24  0247 03/27/24  1603   WBC Thousand/uL 7.37   < >  8.33   HEMOGLOBIN g/dL 10.8*   < > 10.9*   HEMATOCRIT % 34.7*   < > 34.4*   PLATELETS Thousands/uL 251   < > 250   BANDS PCT %  --   --  2   NEUTROS PCT % 89*   < >  --    LYMPHS PCT % 6*   < >  --    LYMPHO PCT %  --   --  5*   MONOS PCT % 4   < >  --    MONO PCT %  --   --  2*   EOS PCT % 0   < > 0    < > = values in this interval not displayed.     Results from last 7 days   Lab Units 03/29/24  0459 03/28/24 0247 03/27/24  1603   SODIUM mmol/L 139   < > 139   POTASSIUM mmol/L 4.2   < > 4.0   CHLORIDE mmol/L 100   < > 99   CO2 mmol/L 35*   < > 37*   BUN mg/dL 12   < > 14   CREATININE mg/dL 0.47*   < > 0.50*   ANION GAP mmol/L 4   < > 3*   CALCIUM mg/dL 8.1*   < > 8.5   ALBUMIN g/dL  --   --  3.6   TOTAL BILIRUBIN mg/dL  --   --  0.31   ALK PHOS U/L  --   --  40   ALT U/L  --   --  10   AST U/L  --   --  10*   GLUCOSE RANDOM mg/dL 223*   < > 192*    < > = values in this interval not displayed.     Results from last 7 days   Lab Units 03/26/24  0710   INR  0.93             Results from last 7 days   Lab Units 03/28/24  0247 03/26/24  0710   LACTIC ACID mmol/L  --  0.9   PROCALCITONIN ng/ml 0.10 0.09       Lines/Drains:  Invasive Devices       Peripheral Intravenous Line  Duration             Peripheral IV 03/26/24 Right;Ventral (anterior) Forearm 3 days                          Imaging: No pertinent imaging reviewed.    Recent Cultures (last 7 days):   Results from last 7 days   Lab Units 03/26/24  0710   BLOOD CULTURE  No Growth at 48 hrs.  No Growth at 48 hrs.       Last 24 Hours Medication List:   Current Facility-Administered Medications   Medication Dose Route Frequency Provider Last Rate    abiraterone  1,000 mg Oral Daily Maxine Venegas PA-C      albuterol  2.5 mg Nebulization Q6H PRN Arleth Scales PA-C      ALPRAZolam  0.5 mg Oral BID PRN Lora Stanton PA-C      bisacodyl  10 mg Rectal Daily PRN Martha Gaffney DO      budesonide  0.5 mg Nebulization Q12H Zuleyma Francisco,  CRNP      formoterol  20 mcg Nebulization Q12H Zuleyma Francisco, BRAULIONP      gabapentin  300 mg Oral BID Maxine Venegas PA-C      guaiFENesin  1,200 mg Oral Q12H [facility] Arleth Scales PA-C      heparin (porcine)  5,000 Units Subcutaneous Q8H [facility] Maxine Venegas PA-C      Hydrocod Jose-Chlorphe Jose ER  5 mL Oral Q12H [facility] Arleth Scales PA-C      HYDROmorphone  0.2 mg Intravenous Q2H PRN Arleth Scales PA-C      ipratropium  0.5 mg Nebulization TID Yanni Cherry DO      levalbuterol  0.63 mg Nebulization TID Maxine Venegas PA-C      melatonin  6 mg Oral HS Lora Stanton PA-C      methylPREDNISolone sodium succinate  40 mg Intravenous Q12H [facility] Maxine Venegas PA-C      morphine  15 mg Oral BID Arleth Scales PA-C      ondansetron  4 mg Intravenous Q4H PRN Maxine Venegas PA-C      scopolamine  1 patch Transdermal Q72H Maxine Venegas PA-C      sodium chloride  4 mL Nebulization TID PRN Martha Gaffney DO      zolpidem  10 mg Oral HS Arleth Scales PA-C          Today, Patient Was Seen By: Maxine Venegas PA-C    **Please Note: This note may have been constructed using a voice recognition system.**

## 2024-03-29 NOTE — PROGRESS NOTES
"Progress Note - Pulmonary   Richard Nava 68 y.o. male MRN: [id number]  Unit/Bed#: -01 Encounter: [id number]    Assessment & Plan:    Acute on chronic hypoxic respiratory failure  COPD of unknown severity with acute exacerbation  Recent pseudomonas infection  Prostate cancer with mets to bone  Nicotine dependence, in remission    Patient is currently on 3L oxygen saturating at 98%. Titrate oxygen to maintain saturations >89%. He reports being maintained on 2.5 to 4 L at home.  Home regimen: Breztri, singulair, albuterol neb prn, albuterol inhaler prn   Continue atrovent/xopenex nebs TID, pulmicort/perforomist nebs q12h, albuterol neb prn  Continue IV solumedrol 40mg q12h, he still has persistent wheezing on exam. If improved tomorrow can decrease to po medrol as patient previously has refused prednisone   Palliative care is following. Patient recently revoked hospice and comfort care.  Patient is a former smoker, he quit 12 years ago and has a 42 pack year smoking history. Continue complete smoking cessation.  He will need outpatient pulmonary follow up with completion of PFTs    Chief Complaint:   \"My stomach is killing me\"    Subjective:   Patient is awake and resting in bed. He states his stomach is bothering him and is very uneasy. He also feels very frustrated and states his hot flashes have returned and he is burning up. He states his breathing feels fine and he does not currently feel wheezy or short of breath.    Objective:     Vitals: Blood pressure 111/64, pulse 82, temperature 98.1 °F (36.7 °C), resp. rate 19, weight 46.3 kg (102 lb 1.2 oz), SpO2 94%.,Body mass index is 15.99 kg/m².      Intake/Output Summary (Last 24 hours) at 3/29/2024 0932  Last data filed at 3/29/2024 0601  Gross per 24 hour   Intake 340 ml   Output 1900 ml   Net -1560 ml       Invasive Devices       Peripheral Intravenous Line  Duration             Peripheral IV 03/26/24 Right;Ventral (anterior) Forearm 3 days         "            Physical Exam:   General appearance:   Alert and awake, in no acute distress. Thin and ill appearing  Head:   Normocephalic, without obvious abnormality, atraumatic  Eyes:   No scleral icterus   Lungs:  Pulmonary effort non labored at rest  Breath sounds: Bilateral expiratory wheezes.  Cardiovascular:   Regular rate and rhythm. No murmurs. Capillary refill < 3 seconds.  Abdomen:    No appreciable distension or tenderness  Extremities:   No deformity. No clubbing present. No edema to extremities.   Skin:   Warm and dry. Intact. Color appropriate for ethnicity.  Neurologic:   No acute focal deficits are noted.  Psychiatric:   Normal mood. Answers questions appropriately.        Labs: I have personally reviewed pertinent lab results., CBC:   Lab Results   Component Value Date    WBC 7.37 03/29/2024    HGB 10.8 (L) 03/29/2024    HCT 34.7 (L) 03/29/2024     (H) 03/29/2024     03/29/2024    RBC 3.42 (L) 03/29/2024    MCH 31.6 03/29/2024    MCHC 31.1 (L) 03/29/2024    RDW 13.5 03/29/2024    MPV 8.8 (L) 03/29/2024    NRBC 0 03/29/2024   , CMP:   Lab Results   Component Value Date    SODIUM 139 03/29/2024    K 4.2 03/29/2024     03/29/2024    CO2 35 (H) 03/29/2024    BUN 12 03/29/2024    CREATININE 0.47 (L) 03/29/2024    CALCIUM 8.1 (L) 03/29/2024    EGFR 113 03/29/2024     Imaging and other studies: I have personally reviewed pertinent reports.   and I have personally reviewed pertinent films in PACS    XR chest portable, 3/26/2024  No acute cardiopulmonary disease      Zuleyma Francisco, MSN RN FNP-BC  Nurse Practitioner  [facility]

## 2024-03-29 NOTE — CASE MANAGEMENT
Case Management Discharge Planning Note    Patient name Richard Nava  Location /-01 MRN [id number]  : 1955 Date 3/29/2024       Current Admission Date: 3/26/2024  Current Admission Diagnosis:Chronic obstructive pulmonary disease with acute exacerbation (HCC)   Patient Active Problem List    Diagnosis Date Noted    Respiratory distress 2024    COPD exacerbation (HCC) 2023    Low oxygen saturation 2023    Chronic respiratory failure (HCC) 2023    Positive blood culture 2023    Acute respiratory insufficiency 2023    Opioid dependence (HCC) 2023    Neoplasm related pain 2023    Medical marijuana use 2023    Encephalopathy acute 2022    Anorexia 2022    Cachexia (HCC) 2022    Palliative care patient 2022    Prostate cancer metastatic to bone (HCC) 2022    Insomnia 2022    Hot flashes 2022    Left lower quadrant abdominal pain 2021    Personal history of colonic polyps 2021    HLD (hyperlipidemia) 2020    Acute respiratory failure with hypoxia (HCC) 2020    Severe protein-calorie malnutrition (HCC) 2020    Chronic obstructive pulmonary disease with acute exacerbation (HCC) 2020    Acute tracheobronchitis 2020      LOS (days): 3  Geometric Mean LOS (GMLOS) (days): 3.7  Days to GMLOS:0.9     OBJECTIVE:  Risk of Unplanned Readmission Score: 37.76         Current admission status: Inpatient   Preferred Pharmacy:   [facility]/pharmacy #[id number] - [place] - [address]  [address] [address]  Phone: [phone] Fax: [phone]    [facility] - [place] [facility] [address] [facility] [address] [facility] [address]  [place] [address]  Phone: [phone] Fax: [phone]    Primary Care Provider: Kevin Mejia DO    Primary Insurance: MEDICARE  Secondary Insurance:     DISCHARGE DETAILS:        IMM reviewed with patient,  patient agrees with discharge determination.       IMM Given (Date):: 03/29/24 (821 am)  IMM Given to:: Patient

## 2024-03-29 NOTE — PROGRESS NOTES
Progress Note - Palliative & Supportive Care  Richard Nava  68 y.o.  male  /-01   MRN: [id number]  Encounter: [id number]     Assessment/Problems actively addressed:  Encounter for palliative and supportive care  Goals of care encounter   Intractable migraine  Prostate cancer, widely metastatic to bone  Severe COPD with chronic respiratory failure   Gastritis  Neoplasm related pain  Severe protein calorie malnutrition   Pulmonary and cancer cachexia  QTC prolongation    PLAN:  1. Symptom management  Rishabh was able to sleep last night and at times during the day today.   Continue Xanax 0.5MG PO BID PRN   Conitnue Tussionex ER Q12H PRN  Contine MS CONTIN 15MG Q12H FANNY   Continue Neurontin 300MG BID  Continue zolpidem 10MG QHS PRN insomnia      2. Goals of care  Rishabh desires to return home with home health care at this time.    24 History  Chart reviewed before visit.  No acute complaints today.  Patient is resting comfortably.    Decisional apparatus:  Patient is competent on exam today.  If competence is lost, patient's substitute decision maker would default to sister/[place] by [place] Act 169.    Review of Systems:   Review of Systems   Constitutional: Negative for decreased appetite and malaise/fatigue.   HENT:  Negative for congestion.    Respiratory:  Negative for cough.    Musculoskeletal:  Negative for arthritis and back pain.   Gastrointestinal:  Negative for bloating, abdominal pain, nausea and vomiting.   Genitourinary:  Negative for bladder incontinence.   Psychiatric/Behavioral:  Negative for altered mental status, hallucinations and hypervigilance.      Medications    Current Facility-Administered Medications:     abiraterone (ZYTIGA) tablet 1,000 mg, 1,000 mg, Oral, Daily, Maxine Venegas PA-C, 1,000 mg at 03/29/24 1024    albuterol inhalation solution 2.5 mg, 2.5 mg, Nebulization, Q6H PRN, Arleth Scales PA-C, 2.5 mg at 03/27/24 0925    ALPRAZolam (XANAX) tablet  0.5 mg, 0.5 mg, Oral, BID PRN, Lora Stanton PA-C, 0.5 mg at 03/29/24 1023    bisacodyl (DULCOLAX) rectal suppository 10 mg, 10 mg, Rectal, Daily PRN, Martha Gaffney DO    budesonide (PULMICORT) inhalation solution 0.5 mg, 0.5 mg, Nebulization, Q12H, ISA Araujo, 0.5 mg at 03/29/24 0841    formoterol (PERFOROMIST) nebulizer solution 20 mcg, 20 mcg, Nebulization, Q12H, Zuleyma Francisco CRNP, 20 mcg at 03/29/24 0841    gabapentin (NEURONTIN) capsule 300 mg, 300 mg, Oral, BID, Maxine Venegas PA-C, 300 mg at 03/29/24 1106    guaiFENesin (MUCINEX) 12 hr tablet 1,200 mg, 1,200 mg, Oral, Q12H FANNY, Arleth Scales PA-C, 1,200 mg at 03/29/24 1023    heparin (porcine) subcutaneous injection 5,000 Units, 5,000 Units, Subcutaneous, Q8H FANNY, Maxine Venegas PA-C, 5,000 Units at 03/27/24 1946    Hydrocod Jose-Chlorphe Jose ER (TUSSIONEX) ER suspension 5 mL, 5 mL, Oral, Q12H FANNY, Arleth Scales PA-C, 5 mL at 03/29/24 1023    HYDROmorphone HCl (DILAUDID) injection 0.2 mg, 0.2 mg, Intravenous, Q2H PRN, Arleth Scales PA-C, 0.2 mg at 03/28/24 0827    ipratropium (ATROVENT) 0.02 % inhalation solution 0.5 mg, 0.5 mg, Nebulization, TID, Yanni Cherry DO, 0.5 mg at 03/29/24 1323    levalbuterol (XOPENEX) inhalation solution 0.63 mg, 0.63 mg, Nebulization, TID, Maxine Venegas PA-C, 0.63 mg at 03/29/24 1323    melatonin tablet 6 mg, 6 mg, Oral, HS, Lora Stanton PA-C, 6 mg at 03/28/24 2055    methylPREDNISolone sodium succinate (Solu-MEDROL) injection 40 mg, 40 mg, Intravenous, Q12H FANNY, Maxine Venegas PA-C, 40 mg at 03/29/24 1024    morphine (MS CONTIN) ER tablet 15 mg, 15 mg, Oral, BID, Arleth Scales PA-C, 15 mg at 03/29/24 1023    ondansetron (ZOFRAN) injection 4 mg, 4 mg, Intravenous, Q4H PRN, Maxine Venegas PA-C, 4 mg at 03/29/24 1024    scopolamine (TRANSDERM-SCOP) 1 mg/3 days TD 72 hr patch 1 patch, 1 patch,  Transdermal, Q72H, Maxnie Venegas PA-C, 1 patch at 03/29/24 1023    sodium chloride 3 % inhalation solution 4 mL, 4 mL, Nebulization, TID PRN, Martha Gaffney, DO, 4 mL at 03/27/24 0926    zolpidem (AMBIEN) tablet 10 mg, 10 mg, Oral, HS, Arleth Raymond DEBI Scales, 10 mg at 03/28/24 2053    Objective  /64   Pulse 82   Temp 98.1 °F (36.7 °C)   Resp 19   Wt 46.3 kg (102 lb 1.2 oz)   SpO2 92%   BMI 15.99 kg/m²     Physical Exam:   Physical Exam  Vitals and nursing note reviewed.   Constitutional:       General: He is not in acute distress.     Appearance: He is cachectic. He is ill-appearing.   HENT:      Head: Normocephalic and atraumatic.   Eyes:      Conjunctiva/sclera: Conjunctivae normal.   Cardiovascular:      Rate and Rhythm: Normal rate.   Pulmonary:      Effort: Pulmonary effort is normal. No respiratory distress.   Musculoskeletal:         General: No swelling.   Skin:     General: Skin is warm.   Neurological:      Mental Status: He is alert.       Lab Results: I have personally reviewed pertinent labs., CBC:   Lab Results   Component Value Date    WBC 7.37 03/29/2024    HGB 10.8 (L) 03/29/2024    HCT 34.7 (L) 03/29/2024     (H) 03/29/2024     03/29/2024    RBC 3.42 (L) 03/29/2024    MCH 31.6 03/29/2024    MCHC 31.1 (L) 03/29/2024    RDW 13.5 03/29/2024    MPV 8.8 (L) 03/29/2024    NRBC 0 03/29/2024   , CMP:   Lab Results   Component Value Date    SODIUM 139 03/29/2024    K 4.2 03/29/2024     03/29/2024    CO2 35 (H) 03/29/2024    BUN 12 03/29/2024    CREATININE 0.47 (L) 03/29/2024    CALCIUM 8.1 (L) 03/29/2024    EGFR 113 03/29/2024     Imaging Studies: I have personally reviewed pertinent reports.  EKG, Pathology, and Other Studies: I have personally reviewed pertinent reports.    Counseling / Coordination of Care  Total floor / unit time spent today 35 minutes. Greater than 50% of total time was spent with the patient and / or family counseling and / or  "coordinating of care. A description of the counseling / coordination of care: reviewed chart, reviewed lab values, reviewed imaging, provided medical updates, discussed palliative care and symptom management, discussed hospice care and comfort care, discussed goals of care, provided supportive listening, provided anticipatory guidance, provided psychosocial and emotional support, assessed competency and decision-making, and facilitated interdisciplinary communication. Reviewed with SLIM team, RN and CM.    Arleth Scales PA-C  Palliative & Supportive Care    Portions of this document may have been created using dictation software and as such some \"sound alike\" terms may have been generated by the system. Do not hesitate to contact me with any questions or clarifications.    "

## 2024-03-30 LAB
ANION GAP SERPL CALCULATED.3IONS-SCNC: 6 MMOL/L (ref 4–13)
BASOPHILS # BLD AUTO: 0 THOUSANDS/ÂΜL (ref 0–0.1)
BASOPHILS NFR BLD AUTO: 0 % (ref 0–1)
BUN SERPL-MCNC: 15 MG/DL (ref 5–25)
CALCIUM SERPL-MCNC: 8.3 MG/DL (ref 8.4–10.2)
CHLORIDE SERPL-SCNC: 101 MMOL/L (ref 96–108)
CO2 SERPL-SCNC: 32 MMOL/L (ref 21–32)
CREAT SERPL-MCNC: 0.45 MG/DL (ref 0.6–1.3)
EOSINOPHIL # BLD AUTO: 0 THOUSAND/ÂΜL (ref 0–0.61)
EOSINOPHIL NFR BLD AUTO: 0 % (ref 0–6)
ERYTHROCYTE [DISTWIDTH] IN BLOOD BY AUTOMATED COUNT: 13.3 % (ref 11.6–15.1)
GFR SERPL CREATININE-BSD FRML MDRD: 115 ML/MIN/1.73SQ M
GLUCOSE SERPL-MCNC: 169 MG/DL (ref 65–140)
HCT VFR BLD AUTO: 33.3 % (ref 36.5–49.3)
HGB BLD-MCNC: 10.5 G/DL (ref 12–17)
IMM GRANULOCYTES # BLD AUTO: 0.05 THOUSAND/UL (ref 0–0.2)
IMM GRANULOCYTES NFR BLD AUTO: 1 % (ref 0–2)
LYMPHOCYTES # BLD AUTO: 0.52 THOUSANDS/ÂΜL (ref 0.6–4.47)
LYMPHOCYTES NFR BLD AUTO: 7 % (ref 14–44)
MCH RBC QN AUTO: 32 PG (ref 26.8–34.3)
MCHC RBC AUTO-ENTMCNC: 31.5 G/DL (ref 31.4–37.4)
MCV RBC AUTO: 102 FL (ref 82–98)
MONOCYTES # BLD AUTO: 0.5 THOUSAND/ÂΜL (ref 0.17–1.22)
MONOCYTES NFR BLD AUTO: 7 % (ref 4–12)
NEUTROPHILS # BLD AUTO: 6.1 THOUSANDS/ÂΜL (ref 1.85–7.62)
NEUTS SEG NFR BLD AUTO: 85 % (ref 43–75)
NRBC BLD AUTO-RTO: 0 /100 WBCS
PLATELET # BLD AUTO: 252 THOUSANDS/UL (ref 149–390)
PMV BLD AUTO: 8.5 FL (ref 8.9–12.7)
POTASSIUM SERPL-SCNC: 4.4 MMOL/L (ref 3.5–5.3)
RBC # BLD AUTO: 3.28 MILLION/UL (ref 3.88–5.62)
SODIUM SERPL-SCNC: 139 MMOL/L (ref 135–147)
WBC # BLD AUTO: 7.17 THOUSAND/UL (ref 4.31–10.16)

## 2024-03-30 PROCEDURE — 80048 BASIC METABOLIC PNL TOTAL CA: CPT | Performed by: HOSPITALIST

## 2024-03-30 PROCEDURE — 85025 COMPLETE CBC W/AUTO DIFF WBC: CPT | Performed by: HOSPITALIST

## 2024-03-30 PROCEDURE — 94760 N-INVAS EAR/PLS OXIMETRY 1: CPT

## 2024-03-30 PROCEDURE — 99232 SBSQ HOSP IP/OBS MODERATE 35: CPT | Performed by: PHYSICIAN ASSISTANT

## 2024-03-30 PROCEDURE — 94640 AIRWAY INHALATION TREATMENT: CPT

## 2024-03-30 RX ADMIN — BUDESONIDE 0.5 MG: 0.5 INHALANT ORAL at 07:51

## 2024-03-30 RX ADMIN — ABIRATERONE ACETATE 1000 MG: 250 TABLET ORAL at 08:39

## 2024-03-30 RX ADMIN — LEVALBUTEROL HYDROCHLORIDE 0.63 MG: 0.63 SOLUTION RESPIRATORY (INHALATION) at 13:01

## 2024-03-30 RX ADMIN — METHYLPREDNISOLONE SODIUM SUCCINATE 40 MG: 40 INJECTION, POWDER, FOR SOLUTION INTRAMUSCULAR; INTRAVENOUS at 20:09

## 2024-03-30 RX ADMIN — FORMOTEROL FUMARATE DIHYDRATE 20 MCG: 20 SOLUTION RESPIRATORY (INHALATION) at 20:33

## 2024-03-30 RX ADMIN — ALPRAZOLAM 0.5 MG: 0.5 TABLET ORAL at 22:12

## 2024-03-30 RX ADMIN — METHYLPREDNISOLONE SODIUM SUCCINATE 40 MG: 40 INJECTION, POWDER, FOR SOLUTION INTRAMUSCULAR; INTRAVENOUS at 08:41

## 2024-03-30 RX ADMIN — MORPHINE SULFATE 15 MG: 15 TABLET, EXTENDED RELEASE ORAL at 08:39

## 2024-03-30 RX ADMIN — LEVALBUTEROL HYDROCHLORIDE 0.63 MG: 0.63 SOLUTION RESPIRATORY (INHALATION) at 07:51

## 2024-03-30 RX ADMIN — GUAIFENESIN 1200 MG: 600 TABLET ORAL at 20:08

## 2024-03-30 RX ADMIN — MORPHINE SULFATE 15 MG: 15 TABLET, EXTENDED RELEASE ORAL at 17:04

## 2024-03-30 RX ADMIN — BUDESONIDE 0.5 MG: 0.5 INHALANT ORAL at 20:33

## 2024-03-30 RX ADMIN — ZOLPIDEM TARTRATE 10 MG: 5 TABLET ORAL at 22:12

## 2024-03-30 RX ADMIN — LEVALBUTEROL HYDROCHLORIDE 0.63 MG: 0.63 SOLUTION RESPIRATORY (INHALATION) at 20:33

## 2024-03-30 RX ADMIN — Medication 6 MG: at 22:12

## 2024-03-30 RX ADMIN — GABAPENTIN 300 MG: 300 CAPSULE ORAL at 08:39

## 2024-03-30 RX ADMIN — FORMOTEROL FUMARATE DIHYDRATE 20 MCG: 20 SOLUTION RESPIRATORY (INHALATION) at 07:51

## 2024-03-30 RX ADMIN — IPRATROPIUM BROMIDE 0.5 MG: 0.5 SOLUTION RESPIRATORY (INHALATION) at 07:51

## 2024-03-30 RX ADMIN — IPRATROPIUM BROMIDE 0.5 MG: 0.5 SOLUTION RESPIRATORY (INHALATION) at 20:33

## 2024-03-30 RX ADMIN — GUAIFENESIN 1200 MG: 600 TABLET ORAL at 08:39

## 2024-03-30 RX ADMIN — GABAPENTIN 300 MG: 300 CAPSULE ORAL at 17:04

## 2024-03-30 RX ADMIN — IPRATROPIUM BROMIDE 0.5 MG: 0.5 SOLUTION RESPIRATORY (INHALATION) at 13:01

## 2024-03-30 NOTE — PROGRESS NOTES
[facility]  Progress Note  Name: Richard Nava I  MRN: [id number]  Unit/Bed#: -01 I Date of Admission: 3/26/2024   Date of Service: 3/30/2024 I Hospital Day: 4    Assessment/Plan   * Chronic obstructive pulmonary disease with acute exacerbation (HCC)  Assessment & Plan  H/o severe COPD w/ chronic hypoxic respiratory failure who provoked hospise; requires 2L via NC at baseline  Pulmonary consulted and following  Patient sounds poorly (scattered rhonchi & exp wheeze) & admits to feeling poorly  Continue IV solumedrol 40 mg BID for an additional day  Hopeful de-escalation to PO Medrol tomorrow (pt refusing Prednisone)   Wean O2 as able (currently on 2.5-3 L, baseline is 2L O2)  Resp protocol, IS, & nebs   Outpt PFT's 4-6 weeks after d/c       Prostate cancer metastatic to bone (HCC)  Assessment & Plan  Current metastatic prostate cancer to bone, complicated by poor PO intake & cachexia.  Known to oncology & palliative care  CT head: unchanged known calvarial osseus mets   Maintained outpatient on Zytiga 1000 mg q day   Admitted on comfort care w/ plan to d/c home w/ home hospice BUT pt revoked hospice on 03/27   Electing to continue all medical care  Level 3 DNR/DNI   Palliative following  Continue current regimen:  Gabapentin 300 mg BID  MS CONTIN 15 mg BID  Lidocaine patches  Tussinex BID  IV hydromorphone 0.3 mg q 1 hr PRN severe pain   IV lorazepam 1 mg q 4 hrs PRN  Currently holding klonopin and xanax as they have not been effective for him       Severe protein-calorie malnutrition (HCC)  Assessment & Plan  Malnutrition Findings:   Adult Malnutrition type: Chronic illness  Adult Degree of Malnutrition: Other severe protein calorie malnutrition  Malnutrition Characteristics: Fat loss, Muscle loss                  360 Statement: chronic severe protein calorie malnutrition in the setting of increased energy demands from catabolic illness as evidenced by:  severe body fat  "depletion - hollow, depressed orbital area;  severe muscle mass depletion- hollow, depressed temporal area, sunken buccal pads;  BMI 16.   treated with regular diet, honoring food preferences, and recommendation for nutrition supplements.    BMI Findings:  Adult BMI Classifications: Underweight < 18.5        Body mass index is 15.99 kg/m².     Nutrition consulted             VTE Pharmacologic Prophylaxis: VTE Score: 7 High Risk (Score >/= 5) - Pharmacological DVT Prophylaxis Ordered: heparin. Sequential Compression Devices Ordered.    Mobility:   Basic Mobility Inpatient Raw Score: 23  JH-HLM Goal: 7: Walk 25 feet or more  JH-HLM Achieved: 7: Walk 25 feet or more  JH-HLM Goal achieved. Continue to encourage appropriate mobility.    Patient Centered Rounds: I performed bedside rounds with nursing staff today.   Discussions with Specialists or Other Care Team Provider: Pulmonary medicine following    Education and Discussions with Family / Patient: Attempted to update  (sister) via phone. Left voicemail.     Total Time Spent on Date of Encounter in care of patient: 40 mins. This time was spent on one or more of the following: performing physical exam; counseling and coordination of care; obtaining or reviewing history; documenting in the medical record; reviewing/ordering tests, medications or procedures; communicating with other healthcare professionals and discussing with patient's family/caregivers.    Current Length of Stay: 4 day(s)  Current Patient Status: Inpatient   Certification Statement: The patient will continue to require additional inpatient hospital stay due to COPD exacerbation  Discharge Plan: Anticipate discharge in 24-48 hrs to home with home services.    Code Status: Level 3 - DNAR and DNI    Subjective:   Patient states \"I feel like death\" &  \"I feel terrible.\"  He ate 70% of his breakfast.  He notes generalized fatigue.  He notes persistent shortness of breath.  No ashu chest pain. "  He denies fever.  Bowel movement 1 to 2 days ago.  He is urinating regularly.    Objective:     Vitals:   Temp (24hrs), Av.6 °F (36.4 °C), Min:97.2 °F (36.2 °C), Max:98.1 °F (36.7 °C)    Temp:  [97.2 °F (36.2 °C)-98.1 °F (36.7 °C)] 97.3 °F (36.3 °C)  HR:  [68-92] 73  Resp:  [16-19] 16  BP: ()/(48-88) 136/88  SpO2:  [96 %-100 %] 100 %  Body mass index is 15.99 kg/m².     Input and Output Summary (last 24 hours):     Intake/Output Summary (Last 24 hours) at 3/30/2024 1339  Last data filed at 3/30/2024 1329  Gross per 24 hour   Intake 1260 ml   Output 1400 ml   Net -140 ml       Physical Exam:   Physical Exam  Constitutional:       Comments: Frail, cachectic, chronically ill in appearance   HENT:      Head: Normocephalic.      Right Ear: External ear normal.      Left Ear: External ear normal.      Nose: Nose normal.      Mouth/Throat:      Mouth: Mucous membranes are dry.      Pharynx: Oropharynx is clear.   Eyes:      Extraocular Movements: Extraocular movements intact.      Conjunctiva/sclera: Conjunctivae normal.   Cardiovascular:      Rate and Rhythm: Normal rate and regular rhythm.      Heart sounds: Normal heart sounds.   Pulmonary:      Effort: No respiratory distress.      Comments: Expiratory rhonchi, scattered; expiratory wheezing with productive upper airway secretions  Abdominal:      General: Abdomen is flat. Bowel sounds are normal.      Palpations: Abdomen is soft.   Musculoskeletal:         General: No swelling. Normal range of motion.      Cervical back: Normal range of motion.   Skin:     General: Skin is warm.      Coloration: Skin is pale.   Neurological:      General: No focal deficit present.      Mental Status: He is alert. Mental status is at baseline.   Psychiatric:         Mood and Affect: Mood normal.         Behavior: Behavior normal.          Additional Data:     Labs:  Results from last 7 days   Lab Units 24  0516 24  0247 24  1603   WBC Thousand/uL 7.17   < >  8.33   HEMOGLOBIN g/dL 10.5*   < > 10.9*   HEMATOCRIT % 33.3*   < > 34.4*   PLATELETS Thousands/uL 252   < > 250   BANDS PCT %  --   --  2   NEUTROS PCT % 85*   < >  --    LYMPHS PCT % 7*   < >  --    LYMPHO PCT %  --   --  5*   MONOS PCT % 7   < >  --    MONO PCT %  --   --  2*   EOS PCT % 0   < > 0    < > = values in this interval not displayed.     Results from last 7 days   Lab Units 03/30/24  0302 03/28/24 0247 03/27/24  1603   SODIUM mmol/L 139   < > 139   POTASSIUM mmol/L 4.4   < > 4.0   CHLORIDE mmol/L 101   < > 99   CO2 mmol/L 32   < > 37*   BUN mg/dL 15   < > 14   CREATININE mg/dL 0.45*   < > 0.50*   ANION GAP mmol/L 6   < > 3*   CALCIUM mg/dL 8.3*   < > 8.5   ALBUMIN g/dL  --   --  3.6   TOTAL BILIRUBIN mg/dL  --   --  0.31   ALK PHOS U/L  --   --  40   ALT U/L  --   --  10   AST U/L  --   --  10*   GLUCOSE RANDOM mg/dL 169*   < > 192*    < > = values in this interval not displayed.     Results from last 7 days   Lab Units 03/26/24  0710   INR  0.93             Results from last 7 days   Lab Units 03/28/24  0247 03/26/24  0710   LACTIC ACID mmol/L  --  0.9   PROCALCITONIN ng/ml 0.10 0.09       Lines/Drains:  Invasive Devices       Peripheral Intravenous Line  Duration             Peripheral IV 03/30/24 Left;Ventral (anterior) Forearm <1 day                          Imaging: Reviewed radiology reports from this admission including: chest xray and CT head    Recent Cultures (last 7 days):   Results from last 7 days   Lab Units 03/26/24  0710   BLOOD CULTURE  No Growth After 4 Days.  No Growth After 4 Days.       Last 24 Hours Medication List:   Current Facility-Administered Medications   Medication Dose Route Frequency Provider Last Rate    abiraterone  1,000 mg Oral Daily Maxine Venegas PA-C      albuterol  2.5 mg Nebulization Q6H PRN Arleth Scales PA-C      ALPRAZolam  0.5 mg Oral BID PRN Lora Stanton PA-C      bisacodyl  10 mg Rectal Daily PRN Martha Gaffney DO       budesonide  0.5 mg Nebulization Q12H ISA Araujo      formoterol  20 mcg Nebulization Q12H ISA Araujo      gabapentin  300 mg Oral BID Maxine Venegas PA-C      guaiFENesin  1,200 mg Oral Q12H [facility] Arleth Scales PA-C      heparin (porcine)  5,000 Units Subcutaneous Q8H [facility] Maxine Venegas PA-C      Hydrocod Jose-Chlorphe Jose ER  5 mL Oral Q12H PRN Arleth Scales PA-C      HYDROmorphone  0.2 mg Intravenous Q2H PRN Arleth Scales PA-C      ipratropium  0.5 mg Nebulization TID Yanni Cherry DO      levalbuterol  0.63 mg Nebulization TID Maxine Venegas PA-C      melatonin  6 mg Oral HS Lora Stanton PA-C      methylPREDNISolone sodium succinate  40 mg Intravenous Q12H [facility] Maxine Venegas PA-C      morphine  15 mg Oral BID Arleth Scales PA-C      ondansetron  4 mg Intravenous Q4H PRN Maxine Venegas PA-C      scopolamine  1 patch Transdermal Q72H Maxine Venegas PA-C      sodium chloride  4 mL Nebulization TID PRN Martha Gaffney DO      zolpidem  10 mg Oral HS Arleth Scales PA-C          Today, Patient Was Seen By: Lora Rao PA-C    **Please Note: This note may have been constructed using a voice recognition system.**

## 2024-03-31 LAB
BACTERIA BLD CULT: NORMAL
BACTERIA BLD CULT: NORMAL

## 2024-03-31 PROCEDURE — 99232 SBSQ HOSP IP/OBS MODERATE 35: CPT | Performed by: INTERNAL MEDICINE

## 2024-03-31 PROCEDURE — 94760 N-INVAS EAR/PLS OXIMETRY 1: CPT

## 2024-03-31 PROCEDURE — 94640 AIRWAY INHALATION TREATMENT: CPT

## 2024-03-31 RX ORDER — METHYLPREDNISOLONE 4 MG/1
32 TABLET ORAL 2 TIMES DAILY
Status: DISCONTINUED | OUTPATIENT
Start: 2024-03-31 | End: 2024-03-31

## 2024-03-31 RX ORDER — METHYLPREDNISOLONE 4 MG/1
32 TABLET ORAL
Status: DISCONTINUED | OUTPATIENT
Start: 2024-03-31 | End: 2024-04-01 | Stop reason: HOSPADM

## 2024-03-31 RX ADMIN — ALPRAZOLAM 0.5 MG: 0.5 TABLET ORAL at 22:24

## 2024-03-31 RX ADMIN — IPRATROPIUM BROMIDE 0.5 MG: 0.5 SOLUTION RESPIRATORY (INHALATION) at 14:38

## 2024-03-31 RX ADMIN — IPRATROPIUM BROMIDE 0.5 MG: 0.5 SOLUTION RESPIRATORY (INHALATION) at 07:56

## 2024-03-31 RX ADMIN — BUDESONIDE 0.5 MG: 0.5 INHALANT ORAL at 07:55

## 2024-03-31 RX ADMIN — ABIRATERONE ACETATE 1000 MG: 250 TABLET ORAL at 08:54

## 2024-03-31 RX ADMIN — METHYLPREDNISOLONE SODIUM SUCCINATE 40 MG: 40 INJECTION, POWDER, FOR SOLUTION INTRAMUSCULAR; INTRAVENOUS at 08:54

## 2024-03-31 RX ADMIN — ZOLPIDEM TARTRATE 10 MG: 5 TABLET ORAL at 22:21

## 2024-03-31 RX ADMIN — FORMOTEROL FUMARATE DIHYDRATE 20 MCG: 20 SOLUTION RESPIRATORY (INHALATION) at 07:56

## 2024-03-31 RX ADMIN — Medication 6 MG: at 22:20

## 2024-03-31 RX ADMIN — MORPHINE SULFATE 15 MG: 15 TABLET, EXTENDED RELEASE ORAL at 17:33

## 2024-03-31 RX ADMIN — GUAIFENESIN 1200 MG: 600 TABLET ORAL at 08:54

## 2024-03-31 RX ADMIN — MORPHINE SULFATE 15 MG: 15 TABLET, EXTENDED RELEASE ORAL at 08:54

## 2024-03-31 RX ADMIN — GUAIFENESIN 1200 MG: 600 TABLET ORAL at 22:21

## 2024-03-31 RX ADMIN — LEVALBUTEROL HYDROCHLORIDE 0.63 MG: 0.63 SOLUTION RESPIRATORY (INHALATION) at 14:38

## 2024-03-31 RX ADMIN — IPRATROPIUM BROMIDE 0.5 MG: 0.5 SOLUTION RESPIRATORY (INHALATION) at 20:02

## 2024-03-31 RX ADMIN — LEVALBUTEROL HYDROCHLORIDE 0.63 MG: 0.63 SOLUTION RESPIRATORY (INHALATION) at 20:02

## 2024-03-31 RX ADMIN — GABAPENTIN 300 MG: 300 CAPSULE ORAL at 17:31

## 2024-03-31 RX ADMIN — GABAPENTIN 300 MG: 300 CAPSULE ORAL at 08:54

## 2024-03-31 RX ADMIN — LEVALBUTEROL HYDROCHLORIDE 0.63 MG: 0.63 SOLUTION RESPIRATORY (INHALATION) at 07:56

## 2024-03-31 NOTE — PROGRESS NOTES
[facility]  Progress Note  Name: Richard Nava I  MRN: [id number]  Unit/Bed#: -01 I Date of Admission: 3/26/2024   Date of Service: 3/31/2024 I Hospital Day: 5    Assessment/Plan   Prostate cancer metastatic to bone (HCC)  Assessment & Plan  Current metastatic prostate cancer to bone, complicated by poor PO intake & cachexia.  Known to oncology & palliative care  CT head: unchanged known calvarial osseus mets   Maintained outpatient on Zytiga 1000 mg q day   Admitted on comfort care w/ plan to d/c home w/ home hospice BUT pt revoked hospice on 03/27   Electing to continue all medical care  Level 3 DNR/DNI   Palliative following  Continue current regimen:  Gabapentin 300 mg BID  MS CONTIN 15 mg BID  Lidocaine patches  Tussinex BID  IV hydromorphone 0.3 mg q 1 hr PRN severe pain   IV lorazepam 1 mg q 4 hrs PRN  Currently holding klonopin and xanax as they have not been effective for him       Severe protein-calorie malnutrition (HCC)  Assessment & Plan  Malnutrition Findings:   Adult Malnutrition type: Chronic illness  Adult Degree of Malnutrition: Other severe protein calorie malnutrition  Malnutrition Characteristics: Fat loss, Muscle loss                  360 Statement: chronic severe protein calorie malnutrition in the setting of increased energy demands from catabolic illness as evidenced by:  severe body fat depletion - hollow, depressed orbital area;  severe muscle mass depletion- hollow, depressed temporal area, sunken buccal pads;  BMI 16.   treated with regular diet, honoring food preferences, and recommendation for nutrition supplements.    BMI Findings:  Adult BMI Classifications: Underweight < 18.5        Body mass index is 15.99 kg/m².     Nutrition consulted      * Chronic obstructive pulmonary disease with acute exacerbation (HCC)  Assessment & Plan  H/o severe COPD w/ chronic hypoxic respiratory failure who provoked hospise; requires 2L via NC at  baseline  Pulmonary consulted and following  Patient sounds poorly (scattered rhonchi & exp wheeze) & admits to feeling poorly  Continue IV solumedrol 40 mg BID for an additional day  de-escalation to PO Medrol today (pt refusing Prednisone)   Wean O2 as able (currently on 2.5-3 L, baseline is 2L O2)  Resp protocol, IS, & nebs   Per pulmonology, can be discharged tomorrow with Medrol taper pack and resume Breztri 2 puff bid  Outpatient follow-up with pulmonology  Outpt PFT's 4-6 weeks after d/c                  VTE Pharmacologic Prophylaxis: VTE Score: 7 High Risk (Score >/= 5) - Pharmacological DVT Prophylaxis Ordered: heparin. Sequential Compression Devices Ordered.    Mobility:   Basic Mobility Inpatient Raw Score: 23  JH-HLM Goal: 7: Walk 25 feet or more  JH-HLM Achieved: 7: Walk 25 feet or more  JH-HLM Goal achieved. Continue to encourage appropriate mobility.    Patient Centered Rounds: I performed bedside rounds with nursing staff today.   Discussions with Specialists or Other Care Team Provider: Pulmonology    Education and Discussions with Family / Patient: Patient declined call to .     Total Time Spent on Date of Encounter in care of patient: 30 mins. This time was spent on one or more of the following: performing physical exam; counseling and coordination of care; obtaining or reviewing history; documenting in the medical record; reviewing/ordering tests, medications or procedures; communicating with other healthcare professionals and discussing with patient's family/caregivers.    Current Length of Stay: 5 day(s)  Current Patient Status: Inpatient   Certification Statement: The patient will continue to require additional inpatient hospital stay due to IV steroids  Discharge Plan: Anticipate discharge tomorrow to home.    Code Status: Level 3 - DNAR and DNI    Subjective:   Patient feels okay, no acute complaints.    Objective:     Vitals:   Temp (24hrs), Av °F (36.7 °C), Min:97.9 °F  (36.6 °C), Max:98.1 °F (36.7 °C)    Temp:  [97.9 °F (36.6 °C)-98.1 °F (36.7 °C)] 98.1 °F (36.7 °C)  HR:  [81-98] 98  Resp:  [17-20] 20  BP: (124-138)/(79-86) 137/79  SpO2:  [96 %-99 %] 96 %  Body mass index is 15.99 kg/m².     Input and Output Summary (last 24 hours):     Intake/Output Summary (Last 24 hours) at 3/31/2024 1738  Last data filed at 3/31/2024 0900  Gross per 24 hour   Intake 431 ml   Output 1175 ml   Net -744 ml       Physical Exam:   Physical Exam  Constitutional:       Comments: Frail, cachectic, chronically ill in appearance   HENT:      Head: Normocephalic.      Right Ear: External ear normal.      Left Ear: External ear normal.      Nose: Nose normal.      Mouth/Throat:      Mouth: Mucous membranes are dry.      Pharynx: Oropharynx is clear.   Eyes:      Extraocular Movements: Extraocular movements intact.      Conjunctiva/sclera: Conjunctivae normal.   Cardiovascular:      Rate and Rhythm: Normal rate and regular rhythm.      Heart sounds: Normal heart sounds.   Pulmonary:      Effort: No respiratory distress.      Comments: Expiratory rhonchi, scattered; expiratory wheezing with productive upper airway secretions  Abdominal:      General: Abdomen is flat. Bowel sounds are normal.      Palpations: Abdomen is soft.   Musculoskeletal:         General: No swelling. Normal range of motion.      Cervical back: Normal range of motion.   Skin:     General: Skin is warm.      Coloration: Skin is pale.   Neurological:      General: No focal deficit present.      Mental Status: He is alert. Mental status is at baseline.   Psychiatric:         Mood and Affect: Mood normal.         Behavior: Behavior normal.          Additional Data:     Labs:  Results from last 7 days   Lab Units 03/30/24  0516 03/28/24  0247 03/27/24  1603   WBC Thousand/uL 7.17   < > 8.33   HEMOGLOBIN g/dL 10.5*   < > 10.9*   HEMATOCRIT % 33.3*   < > 34.4*   PLATELETS Thousands/uL 252   < > 250   BANDS PCT %  --   --  2   NEUTROS PCT %  85*   < >  --    LYMPHS PCT % 7*   < >  --    LYMPHO PCT %  --   --  5*   MONOS PCT % 7   < >  --    MONO PCT %  --   --  2*   EOS PCT % 0   < > 0    < > = values in this interval not displayed.     Results from last 7 days   Lab Units 03/30/24  0302 03/28/24  0247 03/27/24  1603   SODIUM mmol/L 139   < > 139   POTASSIUM mmol/L 4.4   < > 4.0   CHLORIDE mmol/L 101   < > 99   CO2 mmol/L 32   < > 37*   BUN mg/dL 15   < > 14   CREATININE mg/dL 0.45*   < > 0.50*   ANION GAP mmol/L 6   < > 3*   CALCIUM mg/dL 8.3*   < > 8.5   ALBUMIN g/dL  --   --  3.6   TOTAL BILIRUBIN mg/dL  --   --  0.31   ALK PHOS U/L  --   --  40   ALT U/L  --   --  10   AST U/L  --   --  10*   GLUCOSE RANDOM mg/dL 169*   < > 192*    < > = values in this interval not displayed.     Results from last 7 days   Lab Units 03/26/24  0710   INR  0.93             Results from last 7 days   Lab Units 03/28/24  0247 03/26/24  0710   LACTIC ACID mmol/L  --  0.9   PROCALCITONIN ng/ml 0.10 0.09       Lines/Drains:  Invasive Devices       Peripheral Intravenous Line  Duration             Peripheral IV 03/30/24 Right;Ventral (anterior) Forearm <1 day                          Imaging: No pertinent imaging reviewed.    Recent Cultures (last 7 days):   Results from last 7 days   Lab Units 03/26/24  0710   BLOOD CULTURE  No Growth After 5 Days.  No Growth After 5 Days.       Last 24 Hours Medication List:   Current Facility-Administered Medications   Medication Dose Route Frequency Provider Last Rate    abiraterone  1,000 mg Oral Daily Maxine Venegas PA-C      albuterol  2.5 mg Nebulization Q6H PRN Arleth Scales PA-C      ALPRAZolam  0.5 mg Oral BID PRN Lora Stanton PA-C      bisacodyl  10 mg Rectal Daily PRN Martha Gaffney,       gabapentin  300 mg Oral BID Maxine Venegas PA-C      guaiFENesin  1,200 mg Oral Q12H FANNY Scales PA-C      heparin (porcine)  5,000 Units Subcutaneous Q8H FANNY Romero  DEBI Venegas      Hydrocod Jose-Chlorphe Jose ER  5 mL Oral Q12H PRN Arleth Scales PA-C      HYDROmorphone  0.2 mg Intravenous Q2H PRN Arleth Scales PA-C      ipratropium  0.5 mg Nebulization TID Yanni Cherry DO      levalbuterol  0.63 mg Nebulization TID Maxine Venegas PA-C      melatonin  6 mg Oral HS Lora Stanton PA-C      methylprednisolone  32 mg Oral Daily With Breakfast Luis Ford MD      morphine  15 mg Oral BID Arleth Scales PA-C      ondansetron  4 mg Intravenous Q4H PRN Maxine Venegas PA-C      scopolamine  1 patch Transdermal Q72H Maxine Venegas PA-C      sodium chloride  4 mL Nebulization TID PRN Martha Gaffney DO      zolpidem  10 mg Oral HS Arleth Scales PA-C          Today, Patient Was Seen By: Luis Ford MD    **Please Note: This note may have been constructed using a voice recognition system.**

## 2024-03-31 NOTE — PROGRESS NOTES
Progress Note - Pulmonary   Richard Nava 68 y.o. male MRN: [id number]  Unit/Bed#: -01 Encounter: [id number]    Assessment:  Acute on chronic hypoxemic respiratory failure- now at baseline 2-3 lpm of oxygen  COPD with acute exacerbation  COPD - emphysema on imaging. No PFTs on file    Plan:  Wean O2 for SPO2 >88%. Now at baseline O2 requirement  On methylprednisolone 32 mg daily- can order on discharge as a taper pack- intolerant of prednisone  Continue xopenex/Atrovent TID- no need for standing LABA while on standing xopenex  On discharge resume Breztri 2 puffs BID with PRN albuterol inhaler/neb  Needs outpatient follow-up with pulmonary. Follows with Dr Fatima as outpatient.    I will message office to arrange follow-up in the next 2 weeks or so.    Pulmonary will sign off.  Call back with questions.    Discussed with SLIM via [employer]        Subjective:   Patient reports he is feeling a bit better. Dyspnea and cough are improved    Objective:     Vitals: Blood pressure 124/80, pulse 81, temperature 97.9 °F (36.6 °C), resp. rate 17, weight 46.3 kg (102 lb 1.2 oz), SpO2 99%.,Body mass index is 15.99 kg/m².      Intake/Output Summary (Last 24 hours) at 3/31/2024 1338  Last data filed at 3/31/2024 0900  Gross per 24 hour   Intake 657 ml   Output 1375 ml   Net -718 ml       Invasive Devices       Peripheral Intravenous Line  Duration             Peripheral IV 03/30/24 Right;Ventral (anterior) Forearm <1 day                  Vitals:    03/31/24 0713   BP: 124/80   Pulse: 81   Resp:    Temp: 97.9 °F (36.6 °C)   SpO2: 99%     General:  Patient is awake, alert, non-toxic and in no acute respiratory distress. + cachectic.    Eyes: no scleral icterus  Neck: No JVD  CV:  Regular, +S1 and S2, No murmurs, gallops or rubs appreciated  Lungs: Distant breath sounds. No wheeze  Abdomen: Soft, +BS, Non-tender, non-distended  Extremities: No clubbing, cyanosis or edema  Neuro: No focal motor/sensory deficits  Skin:  Warm, dry      Labs: I have personally reviewed pertinent lab results.  Lab Results   Component Value Date    WBC 7.17 03/30/2024    HGB 10.5 (L) 03/30/2024    HCT 33.3 (L) 03/30/2024     (H) 03/30/2024     03/30/2024     Lab Results   Component Value Date    SODIUM 139 03/30/2024    K 4.4 03/30/2024     03/30/2024    CO2 32 03/30/2024    BUN 15 03/30/2024    CREATININE 0.45 (L) 03/30/2024    GLUC 169 (H) 03/30/2024    CALCIUM 8.3 (L) 03/30/2024       Imaging and other studies: I have personally reviewed pertinent reports.   and I have personally reviewed pertinent films in PACS

## 2024-04-01 ENCOUNTER — TRANSITIONAL CARE MANAGEMENT (OUTPATIENT)
Dept: FAMILY MEDICINE CLINIC | Facility: CLINIC | Age: 69
End: 2024-04-01

## 2024-04-01 VITALS
SYSTOLIC BLOOD PRESSURE: 125 MMHG | TEMPERATURE: 99 F | RESPIRATION RATE: 20 BRPM | BODY MASS INDEX: 15.99 KG/M2 | WEIGHT: 102.07 LBS | DIASTOLIC BLOOD PRESSURE: 82 MMHG | HEART RATE: 88 BPM | OXYGEN SATURATION: 92 %

## 2024-04-01 PROBLEM — J96.21 ACUTE ON CHRONIC RESPIRATORY FAILURE WITH HYPOXIA AND HYPERCAPNIA (HCC): Status: ACTIVE | Noted: 2020-02-19

## 2024-04-01 PROBLEM — J96.22 ACUTE ON CHRONIC RESPIRATORY FAILURE WITH HYPOXIA AND HYPERCAPNIA (HCC): Status: ACTIVE | Noted: 2020-02-19

## 2024-04-01 LAB
ANION GAP SERPL CALCULATED.3IONS-SCNC: 3 MMOL/L (ref 4–13)
BASOPHILS # BLD AUTO: 0.02 THOUSANDS/ÂΜL (ref 0–0.1)
BASOPHILS NFR BLD AUTO: 0 % (ref 0–1)
BUN SERPL-MCNC: 14 MG/DL (ref 5–25)
CALCIUM SERPL-MCNC: 8.6 MG/DL (ref 8.4–10.2)
CHLORIDE SERPL-SCNC: 101 MMOL/L (ref 96–108)
CO2 SERPL-SCNC: 38 MMOL/L (ref 21–32)
CREAT SERPL-MCNC: 0.45 MG/DL (ref 0.6–1.3)
EOSINOPHIL # BLD AUTO: 0.15 THOUSAND/ÂΜL (ref 0–0.61)
EOSINOPHIL NFR BLD AUTO: 2 % (ref 0–6)
ERYTHROCYTE [DISTWIDTH] IN BLOOD BY AUTOMATED COUNT: 13.7 % (ref 11.6–15.1)
GFR SERPL CREATININE-BSD FRML MDRD: 115 ML/MIN/1.73SQ M
GLUCOSE SERPL-MCNC: 114 MG/DL (ref 65–140)
HCT VFR BLD AUTO: 37.2 % (ref 36.5–49.3)
HGB BLD-MCNC: 12 G/DL (ref 12–17)
IMM GRANULOCYTES # BLD AUTO: 0.08 THOUSAND/UL (ref 0–0.2)
IMM GRANULOCYTES NFR BLD AUTO: 1 % (ref 0–2)
LYMPHOCYTES # BLD AUTO: 1.61 THOUSANDS/ÂΜL (ref 0.6–4.47)
LYMPHOCYTES NFR BLD AUTO: 17 % (ref 14–44)
MCH RBC QN AUTO: 31.9 PG (ref 26.8–34.3)
MCHC RBC AUTO-ENTMCNC: 32.3 G/DL (ref 31.4–37.4)
MCV RBC AUTO: 99 FL (ref 82–98)
MONOCYTES # BLD AUTO: 0.94 THOUSAND/ÂΜL (ref 0.17–1.22)
MONOCYTES NFR BLD AUTO: 10 % (ref 4–12)
NEUTROPHILS # BLD AUTO: 6.62 THOUSANDS/ÂΜL (ref 1.85–7.62)
NEUTS SEG NFR BLD AUTO: 70 % (ref 43–75)
NRBC BLD AUTO-RTO: 0 /100 WBCS
PLATELET # BLD AUTO: 321 THOUSANDS/UL (ref 149–390)
PMV BLD AUTO: 8.6 FL (ref 8.9–12.7)
POTASSIUM SERPL-SCNC: 3.6 MMOL/L (ref 3.5–5.3)
RBC # BLD AUTO: 3.76 MILLION/UL (ref 3.88–5.62)
SODIUM SERPL-SCNC: 142 MMOL/L (ref 135–147)
WBC # BLD AUTO: 9.42 THOUSAND/UL (ref 4.31–10.16)

## 2024-04-01 PROCEDURE — 94640 AIRWAY INHALATION TREATMENT: CPT

## 2024-04-01 PROCEDURE — 99239 HOSP IP/OBS DSCHRG MGMT >30: CPT | Performed by: STUDENT IN AN ORGANIZED HEALTH CARE EDUCATION/TRAINING PROGRAM

## 2024-04-01 PROCEDURE — 80048 BASIC METABOLIC PNL TOTAL CA: CPT | Performed by: INTERNAL MEDICINE

## 2024-04-01 PROCEDURE — 99233 SBSQ HOSP IP/OBS HIGH 50: CPT | Performed by: FAMILY MEDICINE

## 2024-04-01 PROCEDURE — 94760 N-INVAS EAR/PLS OXIMETRY 1: CPT

## 2024-04-01 PROCEDURE — 85025 COMPLETE CBC W/AUTO DIFF WBC: CPT | Performed by: INTERNAL MEDICINE

## 2024-04-01 RX ORDER — HYDROCODONE POLISTIREX AND CHLORPHENIRAMINE POLISTIREX 10; 8 MG/5ML; MG/5ML
5 SUSPENSION, EXTENDED RELEASE ORAL EVERY 12 HOURS PRN
Qty: 70 ML | Refills: 0 | Status: SHIPPED | OUTPATIENT
Start: 2024-04-01 | End: 2024-04-11

## 2024-04-01 RX ORDER — GUAIFENESIN 1200 MG/1
1200 TABLET, EXTENDED RELEASE ORAL EVERY 12 HOURS SCHEDULED
Qty: 30 TABLET | Refills: 0 | Status: SHIPPED | OUTPATIENT
Start: 2024-04-01 | End: 2024-04-16

## 2024-04-01 RX ORDER — GABAPENTIN 300 MG/1
300 CAPSULE ORAL 2 TIMES DAILY
Start: 2024-04-01 | End: 2024-04-04 | Stop reason: SDUPTHER

## 2024-04-01 RX ORDER — METHYLPREDNISOLONE 4 MG/1
TABLET ORAL
Qty: 1 EACH | Refills: 0 | Status: SHIPPED | OUTPATIENT
Start: 2024-04-01

## 2024-04-01 RX ADMIN — GABAPENTIN 300 MG: 300 CAPSULE ORAL at 08:10

## 2024-04-01 RX ADMIN — GUAIFENESIN 1200 MG: 600 TABLET ORAL at 08:10

## 2024-04-01 RX ADMIN — ABIRATERONE ACETATE 1000 MG: 250 TABLET ORAL at 08:16

## 2024-04-01 RX ADMIN — MORPHINE SULFATE 15 MG: 15 TABLET, EXTENDED RELEASE ORAL at 08:10

## 2024-04-01 RX ADMIN — IPRATROPIUM BROMIDE 0.5 MG: 0.5 SOLUTION RESPIRATORY (INHALATION) at 07:10

## 2024-04-01 RX ADMIN — LEVALBUTEROL HYDROCHLORIDE 0.63 MG: 0.63 SOLUTION RESPIRATORY (INHALATION) at 07:10

## 2024-04-01 RX ADMIN — SCOPALAMINE 1 PATCH: 1 PATCH, EXTENDED RELEASE TRANSDERMAL at 08:12

## 2024-04-01 RX ADMIN — METHYLPREDNISOLONE 32 MG: 4 TABLET ORAL at 08:10

## 2024-04-01 NOTE — DISCHARGE INSTR - AVS FIRST PAGE
You are to follow-up with your PCP next week    You are to follow-up with pulmonology next week    You will be discharged with a steroid taper    You are to resume your brothers Breztri and take 2 puffs twice a day

## 2024-04-01 NOTE — PROGRESS NOTES
Progress Note - Palliative & Supportive Care  Richard Nava  68 y.o.  male  /-01   MRN: [id number]  Encounter: [id number]     Assessment/Problems actively addressed:  Encounter for palliative and supportive care  Goals of care encounter   Intractable migraine - resolved   Prostate cancer, widely metastatic to bone  Severe COPD with chronic respiratory failure   Gastritis  Neoplasm related pain  Severe protein calorie malnutrition   Pulmonary and cancer cachexia  QTC prolongation      PLAN:  1. Symptom management  Apart from stomach upset, Rishabh feels well and wants to go home today.   He does not want anything for stomach upset.   Continue xanax 0.5MG PO BID PRN  Continue tussionex ER Q12H PRN   Continue MS CONTIN 15MG Q12H FANNY  Continue Neurontin 300MG BID  Continue Zolpidem 10mG QHS PRN insomnia     2. Goals of care  Rishabh desires to return home with home health care at this time. He does not want SNF or assisted living facility.     24 History  Chart reviewed before visit.  Patient seen and examined at bedside.  No acute events overnight.  He is content with his current medical regimen and does not want to make any changes.    Decisional apparatus:  Patient is competent on exam today.  If competence is lost, patient's substitute decision maker would default to sister Mary/P[place] by [place] Act 169.  Review of Systems:   Review of Systems   Constitutional: Positive for malaise/fatigue. Negative for decreased appetite.   HENT:  Negative for congestion.    Respiratory:  Negative for cough and shortness of breath.    Musculoskeletal:  Positive for arthritis, back pain, neck pain and stiffness.   Gastrointestinal:  Positive for bloating and anorexia. Negative for constipation, nausea and vomiting.   Psychiatric/Behavioral:  Negative for altered mental status and depression. The patient has insomnia and is nervous/anxious.      Medications    Current Facility-Administered Medications:     abiraterone  (ZYTIGA) tablet 1,000 mg, 1,000 mg, Oral, Daily, Maxine Venegas PA-C, 1,000 mg at 04/01/24 0816    albuterol inhalation solution 2.5 mg, 2.5 mg, Nebulization, Q6H PRN, Arleth Scales PA-C, 2.5 mg at 03/27/24 0925    ALPRAZolam (XANAX) tablet 0.5 mg, 0.5 mg, Oral, BID PRN, Lora Stanton PA-C, 0.5 mg at 03/31/24 2224    bisacodyl (DULCOLAX) rectal suppository 10 mg, 10 mg, Rectal, Daily PRN, Martha Gaffney DO    gabapentin (NEURONTIN) capsule 300 mg, 300 mg, Oral, BID, Maxine Venegas PA-C, 300 mg at 04/01/24 0810    guaiFENesin (MUCINEX) 12 hr tablet 1,200 mg, 1,200 mg, Oral, Q12H FANNY, Arleth Scales PA-C, 1,200 mg at 04/01/24 0810    heparin (porcine) subcutaneous injection 5,000 Units, 5,000 Units, Subcutaneous, Q8H FANNY, Maxine Venegas PA-C, 5,000 Units at 03/27/24 1946    Hydrocod Jose-Chlorphe Jose ER (TUSSIONEX) ER suspension 5 mL, 5 mL, Oral, Q12H PRN, Arleth Scales PA-C    HYDROmorphone HCl (DILAUDID) injection 0.2 mg, 0.2 mg, Intravenous, Q2H PRN, Arleth Scales PA-C, 0.2 mg at 03/28/24 0827    ipratropium (ATROVENT) 0.02 % inhalation solution 0.5 mg, 0.5 mg, Nebulization, TID, Yanni Cherry DO, 0.5 mg at 04/01/24 0710    levalbuterol (XOPENEX) inhalation solution 0.63 mg, 0.63 mg, Nebulization, TID, Maxine Venegas PA-C, 0.63 mg at 04/01/24 0710    melatonin tablet 6 mg, 6 mg, Oral, HS, Lora Stanton PA-C, 6 mg at 03/31/24 2220    methylprednisolone (MEDROL) tablet 32 mg, 32 mg, Oral, Daily With Breakfast, Luis Ford MD, 32 mg at 04/01/24 0810    morphine (MS CONTIN) ER tablet 15 mg, 15 mg, Oral, BID, Arleth Scales PA-C, 15 mg at 04/01/24 0810    ondansetron (ZOFRAN) injection 4 mg, 4 mg, Intravenous, Q4H PRN, Maxine Venegas PA-C, 4 mg at 03/29/24 1024    scopolamine (TRANSDERM-SCOP) 1 mg/3 days TD 72 hr patch 1 patch, 1 patch, Transdermal, Q72H, Maxine Venegas PA-C, 1 patch at  04/01/24 0812    sodium chloride 3 % inhalation solution 4 mL, 4 mL, Nebulization, TID PRN, Martha Gaffney, DO, 4 mL at 03/27/24 0926    zolpidem (AMBIEN) tablet 10 mg, 10 mg, Oral, HS, Arleth Segundo Scales PA-C, 10 mg at 03/31/24 2221    Objective  /82   Pulse 88   Temp 99 °F (37.2 °C)   Resp 20   Wt 46.3 kg (102 lb 1.2 oz)   SpO2 92%   BMI 15.99 kg/m²     Physical Exam:   Physical Exam  Vitals and nursing note reviewed.   Constitutional:       General: He is not in acute distress.     Appearance: He is cachectic. He is ill-appearing.      Comments: Appears chronically ill. Muscle wasting and fat loss present.    HENT:      Head: Normocephalic and atraumatic.   Eyes:      General:         Right eye: No discharge.         Left eye: No discharge.      Conjunctiva/sclera: Conjunctivae normal.   Cardiovascular:      Rate and Rhythm: Normal rate.   Pulmonary:      Effort: Pulmonary effort is normal. No respiratory distress.   Musculoskeletal:      Cervical back: Neck supple.   Skin:     General: Skin is warm.      Coloration: Skin is pale.   Neurological:      Mental Status: He is alert.   Psychiatric:         Mood and Affect: Mood normal.         Behavior: Behavior normal.       Lab Results: I have personally reviewed pertinent labs., CBC:   Lab Results   Component Value Date    WBC 9.42 04/01/2024    HGB 12.0 04/01/2024    HCT 37.2 04/01/2024    MCV 99 (H) 04/01/2024     04/01/2024    RBC 3.76 (L) 04/01/2024    MCH 31.9 04/01/2024    MCHC 32.3 04/01/2024    RDW 13.7 04/01/2024    MPV 8.6 (L) 04/01/2024    NRBC 0 04/01/2024   , CMP:   Lab Results   Component Value Date    SODIUM 142 04/01/2024    K 3.6 04/01/2024     04/01/2024    CO2 38 (H) 04/01/2024    BUN 14 04/01/2024    CREATININE 0.45 (L) 04/01/2024    CALCIUM 8.6 04/01/2024    EGFR 115 04/01/2024     Imaging Studies: I have personally reviewed pertinent reports.  EKG, Pathology, and Other Studies: I have personally reviewed  "pertinent reports.    Counseling / Coordination of Care  Total floor / unit time spent today 45 minutes. Greater than 50% of total time was spent with the patient and / or family counseling and / or coordinating of care. A description of the counseling / coordination of care: reviewed chart, reviewed lab values, reviewed imaging, provided medical updates, discussed palliative care and symptom management, provided anticipatory guidance, provided psychosocial and emotional support, assessed competency and decision-making, and facilitated interdisciplinary communication. Reviewed with SHARYN bright, RN and CM.    Arleth Scales PA-C  Palliative & Supportive Care    Portions of this document may have been created using dictation software and as such some \"sound alike\" terms may have been generated by the system. Do not hesitate to contact me with any questions or clarifications.    "

## 2024-04-01 NOTE — CASE MANAGEMENT
Case Management Discharge Planning Note    Patient name Richard Nava  Location /-01 MRN [id number]  : 1955 Date 2024       Current Admission Date: 3/26/2024  Current Admission Diagnosis:Chronic obstructive pulmonary disease with acute exacerbation (HCC)   Patient Active Problem List    Diagnosis Date Noted    Respiratory distress 2024    COPD exacerbation (HCC) 2023    Low oxygen saturation 2023    Chronic respiratory failure (HCC) 2023    Positive blood culture 2023    Acute respiratory insufficiency 2023    Opioid dependence (HCC) 2023    Neoplasm related pain 2023    Medical marijuana use 2023    Encephalopathy acute 2022    Anorexia 2022    Cachexia (HCC) 2022    Palliative care patient 2022    Prostate cancer metastatic to bone (HCC) 2022    Insomnia 2022    Hot flashes 2022    Left lower quadrant abdominal pain 2021    Personal history of colonic polyps 2021    HLD (hyperlipidemia) 2020    Acute on chronic respiratory failure with hypoxia and hypercapnia (HCC) 2020    Severe protein-calorie malnutrition (HCC) 2020    Chronic obstructive pulmonary disease with acute exacerbation (HCC) 2020    Acute tracheobronchitis 2020      LOS (days): 6  Geometric Mean LOS (GMLOS) (days): 3.7  Days to GMLOS:-2.2     OBJECTIVE:  Risk of Unplanned Readmission Score: 33.03         Current admission status: Inpatient   Preferred Pharmacy:   [facility]/pharmacy #[id number] - [place] - [address]  [address] [place] [address]  Phone: [phone] Fax: [phone]    [facility] - [place], [place] [facility] [address] [facility] [address]  [facility] [address]  [place] [address]  Phone: [phone] Fax: [phone]    Primary Care Provider: Kevin Mejia DO    Primary Insurance: MEDICARE  Secondary Insurance:     DISCHARGE DETAILS:         Patient is in need of Lyft transport home. CM requested Lyft for 130p via [employer].

## 2024-04-01 NOTE — PROGRESS NOTES
"     Problem List Items Addressed This Visit       Severe protein-calorie malnutrition (HCC)    Prostate cancer metastatic to bone (HCC) - Primary     PSA 0.98, down from over 800 upon diagnosis.  Pain is controlled on MS Contin and gabapentin at this time.  ECOG is 0.  Good spirits today.  Does appear cachectic and pale, will continue PSA checks and Lupron going forward every 6 months         Relevant Medications    leuprolide (LUPRON DEPOT 6 MONTH KIT) IM injection kit 45 mg    Other Relevant Orders    PSA Total, Diagnostic    Cachexia (HCC)     Malnutrition Findings:     Due to metastatic prostate cancer.  BMI 15.32                                BMI Findings:           Body mass index is 15.32 kg/m².            Palliative care patient     Previously following with palliative care, will continue to do so, was on hospice care, revoked this opting for active therapies currently         Medical marijuana use     He will continue this for pain control as well as appetite promotion         Opioid dependence (HCC)     Cancer related bone pain, opiate use is appropriate              I have spent a total time of 30 minutes on 04/02/24 in caring for this patient including Diagnostic results, Prognosis, Instructions for management, Patient and family education, Risk factor reductions, Impressions, Counseling / Coordination of care, Documenting in the medical record, Reviewing / ordering tests, medicine, procedures  , and Obtaining or reviewing history  .     Portions of the above record have been created with voice recognition software.  Occasional wrong word or \"sound alike\" substitution may have occurred due to the inherent limitations of voice recognition software.  Read the chart carefully and recognize, using context, where substitution may have occurred.    Assessment and plan:       Please see problem oriented charting for the assessment plan of today's urological complaints      Rahat Elder MD      Chief " Complaint     As listed above      History of Present Illness     Richard Nava is a 68 y.o. man with history as above    ECOG 0-1    The following portions of the patient's history were reviewed and updated as appropriate: allergies, current medications, past family history, past medical history, past social history, past surgical history and problem list.    Detailed Urologic History     - please refer to HPI    Review of Systems     Review of Systems   Constitutional: Negative.    HENT: Negative.     Eyes: Negative.    Respiratory:  Positive for cough (from COPD).    Cardiovascular: Negative.    Gastrointestinal: Negative.    Endocrine: Negative.    Genitourinary: Negative.    Musculoskeletal: Negative.    Skin: Negative.    Allergic/Immunologic: Negative.    Neurological: Negative.    Hematological: Negative.    Psychiatric/Behavioral: Negative.               Allergies     No Known Allergies    Physical Exam     Physical Exam  Vitals and nursing note reviewed.   Constitutional:       General: He is not in acute distress.     Appearance: Normal appearance. He is well-developed. He is ill-appearing. He is not toxic-appearing or diaphoretic.      Comments: Pale     HENT:      Head: Normocephalic and atraumatic.   Eyes:      General: No scleral icterus.  Pulmonary:      Effort: Pulmonary effort is normal. No respiratory distress.      Comments: Increased work of breathing c/w COPD    Abdominal:      General: There is no distension.      Palpations: Abdomen is soft.      Tenderness: There is no abdominal tenderness.   Musculoskeletal:         General: No deformity.      Cervical back: Neck supple.   Skin:     Coloration: Skin is not jaundiced or pale.   Neurological:      Mental Status: He is alert and oriented to person, place, and time. Mental status is at baseline.      Cranial Nerves: No cranial nerve deficit.      Motor: No weakness.      Coordination: Coordination normal.   Psychiatric:         Mood and  "Affect: Mood normal.         Behavior: Behavior normal.         Thought Content: Thought content normal.         Judgment: Judgment normal.             Vital Signs  Vitals:    04/02/24 1131   BP: 116/72   BP Location: Left arm   Patient Position: Sitting   Cuff Size: Adult   Pulse: 104   SpO2: (!) 88%   Weight: 44.4 kg (97 lb 12.8 oz)   Height: 5' 7\" (1.702 m)         Current Medications       Current Outpatient Medications:     abiraterone (ZYTIGA) 250 mg tablet, Take 4 tablets (1,000 mg total) by mouth once daily., Disp: 120 tablet, Rfl: 10    albuterol (2.5 mg/3 mL) 0.083 % nebulizer solution, Take 3 mL (2.5 mg total) by nebulization every 6 (six) hours as needed for wheezing or shortness of breath, Disp: 270 mL, Rfl: 1    albuterol (PROVENTIL HFA,VENTOLIN HFA) 90 mcg/act inhaler, TAKE 2 PUFFS BY MOUTH EVERY 4 HOURS AS NEEDED FOR WHEEZE, Disp: 8.5 g, Rfl: 1    ALPRAZolam (XANAX) 0.5 mg tablet, Take 1 tablet (0.5 mg total) by mouth 2 (two) times a day as needed for anxiety or sleep Provider aware of co-existing clonazepam prescription. Patient is palliative care patient., Disp: 60 tablet, Rfl: 0    atorvastatin (LIPITOR) 40 mg tablet, Take 1 tablet (40 mg total) by mouth daily with dinner, Disp: 30 tablet, Rfl: 0    benzonatate (TESSALON PERLES) 100 mg capsule, Take 1 capsule (100 mg total) by mouth 3 (three) times a day as needed for cough, Disp: 20 capsule, Rfl: 0    Budeson-Glycopyrrol-Formoterol (Breztri Aerosphere) 160-9-4.8 MCG/ACT AERO, Inhale 2 puffs 2 (two) times a day Rinse mouth after use., Disp: 31.1 g, Rfl: 3    clonazePAM (KlonoPIN) 0.5 MG disintegrating tablet, Take 1 tablet (0.5 mg total) by mouth daily at bedtime as needed for anxiety, Disp: 30 tablet, Rfl: 0    gabapentin (NEURONTIN) 300 mg capsule, Take 1 capsule (300 mg total) by mouth 2 (two) times a day, Disp: , Rfl:     guaiFENesin 1200 MG TB12, Take 1 tablet (1,200 mg total) by mouth every 12 (twelve) hours for 15 days, Disp: 30 tablet, " Rfl: 0    Hydrocod Jose-Chlorphe Jose ER (TUSSIONEX) 10-8 mg/5 mL ER suspension, Take 5 mL by mouth every 12 (twelve) hours as needed for cough for up to 10 days Max Daily Amount: 10 mL, Disp: 70 mL, Rfl: 0    lidocaine (LIDODERM) 5 %, Apply 3 patches topically daily Remove & Discard patch within 12 hours or as directed by MD, Disp: 90 patch, Rfl: 0    methylPREDNISolone 4 MG tablet therapy pack, Use as directed on package, Disp: 1 each, Rfl: 0    morphine (MS CONTIN) 15 mg 12 hr tablet, Take 1 tablet (15 mg total) by mouth 2 (two) times a day Ongoing therapy Max Daily Amount: 30 mg, Disp: 60 tablet, Rfl: 0    naloxone (NARCAN) 4 mg/0.1 mL nasal spray, Administer 1 spray into a nostril. If no response after 2-3 minutes, give another dose in the other nostril using a new spray., Disp: 1 each, Rfl: 0    NON FORMULARY, Medical marijuana, Disp: , Rfl:     Current Facility-Administered Medications:     leuprolide (LUPRON DEPOT 6 MONTH KIT) IM injection kit 45 mg, 45 mg, Intramuscular, Once,       Active Problems     Patient Active Problem List   Diagnosis    Chronic obstructive pulmonary disease with acute exacerbation (HCC)    Acute tracheobronchitis    Acute on chronic respiratory failure with hypoxia and hypercapnia (HCC)    Severe protein-calorie malnutrition (HCC)    HLD (hyperlipidemia)    Left lower quadrant abdominal pain    Personal history of colonic polyps    Prostate cancer metastatic to bone (HCC)    Insomnia    Hot flashes    Anorexia    Cachexia (HCC)    Palliative care patient    Encephalopathy acute    Neoplasm related pain    Medical marijuana use    Acute respiratory insufficiency    Opioid dependence (HCC)    Positive blood culture    COPD exacerbation (HCC)    Low oxygen saturation    Chronic respiratory failure (HCC)    Respiratory distress         Past Medical History     Past Medical History:   Diagnosis Date    Bone cancer (HCC)     Colon polyp     COPD (chronic obstructive pulmonary disease)  (HCC)     Emphysema lung (HCC)     Hyperlipidemia     Prostate cancer (HCC)          Surgical History     Past Surgical History:   Procedure Laterality Date    APPENDECTOMY      COLONOSCOPY      IR BIOPSY BONE  2021    JOINT REPLACEMENT      UPPER GASTROINTESTINAL ENDOSCOPY           Family History     Family History   Problem Relation Age of Onset    Lung cancer Mother     Breast cancer Sister     Colon polyps Brother     Colon cancer Neg Hx          Social History     Social History     Social History     Tobacco Use   Smoking Status Former    Current packs/day: 0.00    Average packs/day: 1 pack/day for 42.0 years (42.0 ttl pk-yrs)    Types: Cigarettes    Start date:     Quit date:     Years since quittin.2   Smokeless Tobacco Never         Pertinent Lab Values     Lab Results   Component Value Date    CREATININE 0.45 (L) 2024       Lab Results   Component Value Date    PSA 0.98 2024    PSA 0.78 2023    PSA 0.88 2023

## 2024-04-01 NOTE — DISCHARGE SUMMARY
[facility]  Discharge- Richard Nava 1955, 68 y.o. male MRN: [id number]  Unit/Bed#: -Leah Encounter: [id number]  Primary Care Provider: Kevin Mejia DO   Date and time admitted to hospital: 3/26/2024  6:51 AM    * Chronic obstructive pulmonary disease with acute exacerbation (HCC)  Assessment & Plan  H/o severe COPD w/ chronic hypoxic respiratory failure who provoked hospise; requires 2L via NC at baseline  Pulmonary consulted and following  Patient sounds poorly (scattered rhonchi & exp wheeze) & admits to feeling poorly  Continue IV solumedrol 40 mg BID for an additional day  de-escalation to PO Medrol today (pt refusing Prednisone)   Wean O2 as able (currently on 2.5-3 L, baseline is 2L O2)  Resp protocol, IS, & nebs   discharged with Medrol taper pack and resume Breztri 2 puff bid  Outpatient follow-up with pulmonology  Outpt PFT's 4-6 weeks after d/c       Prostate cancer metastatic to bone (HCC)  Assessment & Plan  Current metastatic prostate cancer to bone, complicated by poor PO intake & cachexia.  Known to oncology & palliative care  CT head: unchanged known calvarial osseus mets   Maintained outpatient on Zytiga 1000 mg q day   Admitted on comfort care w/ plan to d/c home w/ home hospice BUT pt revoked hospice on 03/27   Electing to continue all medical care  Level 3 DNR/DNI   Palliative following  Continue current regimen:  Gabapentin 300 mg BID  MS CONTIN 15 mg BID  Lidocaine patches  Tussinex BID  IV hydromorphone 0.3 mg q 1 hr PRN severe pain   IV lorazepam 1 mg q 4 hrs PRN  Currently holding klonopin and xanax as they have not been effective for him       Severe protein-calorie malnutrition (HCC)  Assessment & Plan  Malnutrition Findings:   Adult Malnutrition type: Chronic illness  Adult Degree of Malnutrition: Other severe protein calorie malnutrition  Malnutrition Characteristics: Fat loss, Muscle loss                  360 Statement: chronic severe protein  calorie malnutrition in the setting of increased energy demands from catabolic illness as evidenced by:  severe body fat depletion - hollow, depressed orbital area;  severe muscle mass depletion- hollow, depressed temporal area, sunken buccal pads;  BMI 16.   treated with regular diet, honoring food preferences, and recommendation for nutrition supplements.    BMI Findings:  Adult BMI Classifications: Underweight < 18.5        Body mass index is 15.99 kg/m².     Nutrition consulted      Acute on chronic respiratory failure with hypoxia and hypercapnia (HCC)  Assessment & Plan  At baseline of 2-3L  Acute phase resolved    Migraine headache-resolved as of 3/27/2024  Assessment & Plan  Started on migraine cocktail with decadron, sumatriptan, magnesium, benadryl.  Resolved 3/27          Medical Problems       Resolved Problems  Date Reviewed: 3/31/2024            Resolved    Migraine headache 3/27/2024     Resolved by  Maxine Venegas PA-C        Discharging Physician / Practitioner: Haley Leo MD  PCP: Kevin Mejia DO  Admission Date:   Admission Orders (From admission, onward)       Ordered        03/26/24 1437  INPATIENT ADMISSION  Once                          Discharge Date: 04/01/24    Consultations During Hospital Stay:  Pulm  Palliative  Hospice  CM  ST  Pt  OT    Procedures Performed:   CT head without contrast   Final Result      No acute intracranial abnormality.   Unchanged sclerotic calvarial osseous metastases.               Workstation performed: [id number]         XR chest 1 view portable   ED Interpretation   No acute cardiopulmonary disease      Final Result      No acute cardiopulmonary disease.            Workstation performed: [id number]               Significant Findings / Test Results:   none    Incidental Findings:   none       Test Results Pending at Discharge (will require follow up):   none     Outpatient Tests Requested:  none    Complications:  none known at this time    Reason  for Admission: Pain due to cancer    Hospital Course:   Richard Nava is a 68 y.o. male patient who originally presented to the hospital on 3/26/2024 due to increased cancer pain and migraine headache.  He was admitted for pain control for cancer and he was to go home on hospice.  Patient was started on a migraine cocktail and IV Decadron was given.  Patient had developed severe COPD with acute on chronic hypoxia and was started on IV Solu-Medrol and doxycycline.  Pulmonology was consulted.  Patient revoked hospice care on 3/27/2024 and continue to like to medical care we have Level 3 DNR/DNI status.  Migraine had resolved.  Palliative care was consulted.  Patient was weaned down on steroids.  Patient oxygen requirements went back to baseline.  He is otherwise remained hemodynamically stable afebrile no acute respiratory distress.  Has been medically cleared for discharge by internal medicine and pulmonology.  He is to follow-up with his PCP and pulmonology.  He will be discharged on a Medrol Dosepak and is to resume his Breztri 2 puffs twice daily      Please see above list of diagnoses and related plan for additional information.     Condition at Discharge: stable    Discharge Day Visit / Exam:   Subjective:  Richard was seen and examined at bedside.  No acute events overnight.  Discussed plan of care.  All questions and concerns were answered and addressed.  Patient is ready to go home.  Vitals: Blood Pressure: 125/82 (04/01/24 0809)  Pulse: 88 (04/01/24 0809)  Temperature: 99 °F (37.2 °C) (03/31/24 1906)  Temp Source: Temporal (03/29/24 1943)  Respirations: 20 (03/31/24 1525)  Weight - Scale: 46.3 kg (102 lb 1.2 oz) (03/26/24 0653)  SpO2: 92 % (04/01/24 0809)  Exam:   Physical Exam  Vitals and nursing note reviewed.   Constitutional:       General: He is not in acute distress.     Appearance: He is ill-appearing (chronically).      Comments: Thin frail cachectic   HENT:      Head: Normocephalic and  atraumatic.   Cardiovascular:      Rate and Rhythm: Normal rate and regular rhythm.      Pulses: Normal pulses.      Heart sounds: Normal heart sounds.   Pulmonary:      Effort: Pulmonary effort is normal.      Breath sounds: Wheezing present.      Comments: 2 to 3 L nasal cannula  Abdominal:      General: Abdomen is flat. Bowel sounds are normal.      Palpations: Abdomen is soft.   Musculoskeletal:      Right lower leg: No edema.      Left lower leg: No edema.   Skin:     General: Skin is warm.   Neurological:      General: No focal deficit present.      Mental Status: He is alert and oriented to person, place, and time.          Discussion with Family: Patient declined call to .     Discharge instructions/Information to patient and family:   See after visit summary for information provided to patient and family.      Provisions for Follow-Up Care:  See after visit summary for information related to follow-up care and any pertinent home health orders.      Mobility at time of Discharge:   Basic Mobility Inpatient Raw Score: 23  JH-HLM Goal: 7: Walk 25 feet or more  JH-HLM Achieved: 7: Walk 25 feet or more  HLM Goal achieved. Continue to encourage appropriate mobility.     Disposition:   Home with VNA Services (Reminder: Complete face to face encounter)    Planned Readmission: no     Discharge Statement:  I spent 40 minutes discharging the patient. This time was spent on the day of discharge. I had direct contact with the patient on the day of discharge. Greater than 50% of the total time was spent examining patient, answering all patient questions, arranging and discussing plan of care with patient as well as directly providing post-discharge instructions.  Additional time then spent on discharge activities.    Discharge Medications:  See after visit summary for reconciled discharge medications provided to patient and/or family.      **Please Note: This note may have been constructed using a voice  recognition system**

## 2024-04-02 ENCOUNTER — CLINICAL SUPPORT (OUTPATIENT)
Dept: UROLOGY | Facility: HOSPITAL | Age: 69
End: 2024-04-02
Payer: MEDICARE

## 2024-04-02 VITALS
DIASTOLIC BLOOD PRESSURE: 72 MMHG | HEART RATE: 104 BPM | SYSTOLIC BLOOD PRESSURE: 116 MMHG | WEIGHT: 97.8 LBS | BODY MASS INDEX: 15.35 KG/M2 | HEIGHT: 67 IN | OXYGEN SATURATION: 88 %

## 2024-04-02 DIAGNOSIS — E43 SEVERE PROTEIN-CALORIE MALNUTRITION (HCC): ICD-10-CM

## 2024-04-02 DIAGNOSIS — Z51.5 PALLIATIVE CARE PATIENT: ICD-10-CM

## 2024-04-02 DIAGNOSIS — Z79.899 MEDICAL MARIJUANA USE: ICD-10-CM

## 2024-04-02 DIAGNOSIS — C61 PROSTATE CANCER METASTATIC TO BONE (HCC): Primary | ICD-10-CM

## 2024-04-02 DIAGNOSIS — R64 CACHEXIA (HCC): ICD-10-CM

## 2024-04-02 DIAGNOSIS — F11.20 UNCOMPLICATED OPIOID DEPENDENCE (HCC): ICD-10-CM

## 2024-04-02 DIAGNOSIS — C79.51 PROSTATE CANCER METASTATIC TO BONE (HCC): Primary | ICD-10-CM

## 2024-04-02 PROCEDURE — 99214 OFFICE O/P EST MOD 30 MIN: CPT | Performed by: UROLOGY

## 2024-04-02 PROCEDURE — 96402 CHEMO HORMON ANTINEOPL SQ/IM: CPT

## 2024-04-02 NOTE — ASSESSMENT & PLAN NOTE
Previously following with palliative care, will continue to do so, was on hospice care, revoked this opting for active therapies currently   mm   In a low-risk patient, no routine follow-up. In a high-risk patient, optional CT at 12 months. Nodule size equals 6-8 mm   In a low-risk patient, CT at 3-6 months, then consider CT at 18-24 months. In a high-risk patient, CT at 3-6 months, then CT at 18-24 months. Nodule size greater than 8 mm   In a low-risk patient, CT at 3-6 months, then consider CT at 18-24 months. In a high-risk patient, CT at 3-6 months, then CT at 18-24 months. - Low risk patients include individuals with minimal or absent history of   smoking and other known risk factors. - High risk patients include individuals with a history or smoking or known   risk factors. Radiology 2017 http://pubs. rsna.org/doi/full/10.1148/radiol. 7845049758         XR CHEST STANDARD (2 VW)   Final Result   No acute process. No results found. PROCEDURES   Unless otherwise noted below, none     Procedures    CRITICAL CARE TIME   N/A    CONSULTS:  None      EMERGENCY DEPARTMENT COURSE and DIFFERENTIAL DIAGNOSIS/MDM:   Vitals:    Vitals:    01/11/20 1719 01/11/20 1807 01/11/20 1907 01/11/20 2112   BP: (!) 126/92 114/79 (!) 124/92 114/76   Pulse: 82 71 65 65   Resp: 16 21 18 16   Temp: 99 °F (37.2 °C)      TempSrc: Oral      SpO2: 99% 99% 100% 94%   Weight: 155 lb (70.3 kg)      Height: 5' 4\" (1.626 m)          Patient was given thefollowing medications:  Medications   ondansetron (ZOFRAN) injection 4 mg (4 mg Intravenous Given 1/11/20 1810)   morphine sulfate (PF) injection 4 mg (4 mg Intravenous Given 1/11/20 1810)   iopamidol (ISOVUE-370) 76 % injection 85 mL (85 mLs Intravenous Given 1/11/20 1951)   ketorolac (TORADOL) injection 15 mg (15 mg Intravenous Given 1/11/20 2148)   predniSONE (DELTASONE) tablet 40 mg (40 mg Oral Given 1/11/20 2148)       Patient brought in today by private vehicle for complaints of left-sided reproducible chest pain.   On exam she is alert oriented afebrile breathing on room air satting 99%.  Appears nontoxic no acute respiratory distress. Old labs and records reviewed. CBC reveals no acute leukocytosis. No electrolyte abnormalities. Troponin is less than 0.01. EKG was reviewed by my attending see note for dictation. Given Zofran and morphine here. HEART score of 1. See description above. Chest x-ray reveals no acute process. D-Dimer is elevated at 420. hCG qualitative is negative. Will be to obtain a CT PE given elevated d-dimer. CT scan reveals no acute PE visualized. Trace left pleural effusion of uncertain etiology. Right-sided pulmonary micronodules. Recommend up in 3 months with a repeat CT scan at that time. Patient was updated on all findings. Patient also given Toradol and prednisone here. Reports pain is much improved. Patient was given follow-up for cardiology and also advised to follow-up with her PCP regarding the pulmonary micronodules. Patient told return immediately to the ER with any new or worsening symptoms and educated on when to return. Patient will be discharged with a short course of steroids, anti-inflammatories, Flexeril. Patient verbalized understanding of this plan and was comfortable and stable at time of discharge. I did feel comfortable sending this patient home with close follow-up instructions and strict return precautions. Patient seen and evaluate by my attending as well. FINAL IMPRESSION      1. Pleurisy    2. Musculoskeletal pain    3. Pulmonary nodules          DISPOSITION/PLAN   DISPOSITION Decision To Discharge 01/11/2020 09:56:00 PM      PATIENT REFERREDTO:  Jina De La Cruz MD  [address] [facility] [address]  [phone]    Schedule an appointment as soon as possible for a visit in 1 week      [facility]  [address] [address]  Schedule an appointment as soon as possible for a visit   As needed, If symptoms worsen    Maddi Vilchis MD  [address] [place] [address].   [facility]

## 2024-04-02 NOTE — ASSESSMENT & PLAN NOTE
Malnutrition Findings:     Due to metastatic prostate cancer.  BMI 15.32                                BMI Findings:           Body mass index is 15.32 kg/m².

## 2024-04-02 NOTE — ASSESSMENT & PLAN NOTE
PSA 0.98, down from over 800 upon diagnosis.  Pain is controlled on MS Contin and gabapentin at this time.  ECOG is 0.  Good spirits today.  Does appear cachectic and pale, will continue PSA checks and Lupron going forward every 6 months

## 2024-04-03 ENCOUNTER — TELEPHONE (OUTPATIENT)
Dept: PALLIATIVE MEDICINE | Facility: CLINIC | Age: 69
End: 2024-04-03

## 2024-04-03 ENCOUNTER — TELEPHONE (OUTPATIENT)
Dept: PULMONOLOGY | Facility: CLINIC | Age: 69
End: 2024-04-03

## 2024-04-03 NOTE — TELEPHONE ENCOUNTER
Left message for him to call back in regards to medication and also make a follow up visit appointment. He called and left message so I was returning his call.

## 2024-04-03 NOTE — TELEPHONE ENCOUNTER
Called patient to schedule a hospital follow-up with either Dr Fatima or an LEANDRA at [facility] in next 1-2 weeks.  COPD exacerbation and left a voicemail message.

## 2024-04-04 DIAGNOSIS — G47.00 INSOMNIA: ICD-10-CM

## 2024-04-04 DIAGNOSIS — C79.51 PROSTATE CANCER METASTATIC TO BONE (HCC): ICD-10-CM

## 2024-04-04 DIAGNOSIS — Z51.5 PALLIATIVE CARE PATIENT: ICD-10-CM

## 2024-04-04 DIAGNOSIS — G89.3 CANCER RELATED PAIN: ICD-10-CM

## 2024-04-04 DIAGNOSIS — F41.9 ANXIETY: ICD-10-CM

## 2024-04-04 DIAGNOSIS — C61 PROSTATE CANCER METASTATIC TO BONE (HCC): ICD-10-CM

## 2024-04-04 RX ORDER — ALPRAZOLAM 0.5 MG/1
0.5 TABLET ORAL 2 TIMES DAILY PRN
Qty: 60 TABLET | Refills: 0 | Status: SHIPPED | OUTPATIENT
Start: 2024-04-04

## 2024-04-04 RX ORDER — GABAPENTIN 300 MG/1
300 CAPSULE ORAL 2 TIMES DAILY
Qty: 60 CAPSULE | Refills: 0 | Status: SHIPPED | OUTPATIENT
Start: 2024-04-04 | End: 2024-05-04

## 2024-04-04 RX ORDER — MORPHINE SULFATE 15 MG/1
15 TABLET, FILM COATED, EXTENDED RELEASE ORAL 2 TIMES DAILY
Qty: 60 TABLET | Refills: 0 | Status: SHIPPED | OUTPATIENT
Start: 2024-04-04

## 2024-04-04 NOTE — TELEPHONE ENCOUNTER
Last appointment: 12/12/23    Next scheduled appointment: 4/24/24    Pharmacy: [facility]       PDMP review:

## 2024-04-09 ENCOUNTER — TELEMEDICINE (OUTPATIENT)
Dept: FAMILY MEDICINE CLINIC | Facility: CLINIC | Age: 69
End: 2024-04-09
Payer: MEDICARE

## 2024-04-09 DIAGNOSIS — C79.51 PROSTATE CANCER METASTATIC TO BONE (HCC): ICD-10-CM

## 2024-04-09 DIAGNOSIS — J44.1 CHRONIC OBSTRUCTIVE PULMONARY DISEASE WITH ACUTE EXACERBATION (HCC): Primary | ICD-10-CM

## 2024-04-09 DIAGNOSIS — C61 PROSTATE CANCER METASTATIC TO BONE (HCC): ICD-10-CM

## 2024-04-09 DIAGNOSIS — J96.01 ACUTE RESPIRATORY FAILURE WITH HYPOXIA AND HYPERCAPNIA (HCC): ICD-10-CM

## 2024-04-09 DIAGNOSIS — J96.02 ACUTE RESPIRATORY FAILURE WITH HYPOXIA AND HYPERCAPNIA (HCC): ICD-10-CM

## 2024-04-09 DIAGNOSIS — C79.51 MALIGNANT NEOPLASM OF PROSTATE METASTATIC TO BONE (HCC): ICD-10-CM

## 2024-04-09 DIAGNOSIS — C61 MALIGNANT NEOPLASM OF PROSTATE METASTATIC TO BONE (HCC): ICD-10-CM

## 2024-04-09 PROCEDURE — 99495 TRANSJ CARE MGMT MOD F2F 14D: CPT | Performed by: FAMILY MEDICINE

## 2024-04-09 RX ORDER — BENZONATATE 100 MG/1
100 CAPSULE ORAL 3 TIMES DAILY PRN
Qty: 30 CAPSULE | Refills: 3 | Status: SHIPPED | OUTPATIENT
Start: 2024-04-09

## 2024-04-09 NOTE — PROGRESS NOTES
Virtual TCM Visit:    Verification of patient location:    Patient is located at Home in the following state in which I hold an active license [place]    Assessment/Plan:          Problem List Items Addressed This Visit          Respiratory    Chronic obstructive pulmonary disease with acute exacerbation (HCC) - Primary    Relevant Medications    benzonatate (TESSALON PERLES) 100 mg capsule       Musculoskeletal and Integument    Prostate cancer metastatic to bone (HCC)     Other Visit Diagnoses       Acute respiratory failure with hypoxia and hypercapnia (HCC)        Relevant Medications    benzonatate (TESSALON PERLES) 100 mg capsule         Patient will need to follow-up with his neurologist as well as his pulmonologist and cancer doctors  Benzonatate refilled per his request  He will continue on his oxygen  He is involved with palliative care  He can follow-up with me as needed    Reason for visit is presents today for transition of care after hospitalization for COPD exacerbation    Encounter provider Kevin Mejia DO     Provider located at [facility]  [address] [place] [address]  [facility] [address] [phone]    Recent Visits  No visits were found meeting these conditions.  Showing recent visits within past 7 days and meeting all other requirements  Today's Visits  Date Type Provider Dept   04/09/24 Telemedicine Kevin Mejia DO [facility]   Showing today's visits and meeting all other requirements  Future Appointments  No visits were found meeting these conditions.  Showing future appointments within next 150 days and meeting all other requirements       After connecting through [employer], the patient was identified by name and date of birth. Marciomargauxcoleen Elijah was informed that this is a telemedicine visit and that the visit is being conducted through the [employer] platform. He agrees to proceed..  My office door was closed. No one else was in the room.  He  acknowledged consent and understanding of privacy and security of the video platform. The patient has agreed to participate and understands they can discontinue the visit at any time.    Patient is aware this is a billable service.    Transitional Care Management Review:  Richard Nava is a 68 y.o. male here for TCM follow up.     During the TCM phone call patient stated:    TCM Call       Date and time call was made  4/1/2024  7:29 PM    Hospital care reviewed  Records reviewed    Patient was hospitialized at  [facility]    Date of Admission  03/26/24    Date of discharge  04/01/24    Diagnosis  Chronic obstructive pulmonary disease with acute exacerbation    Disposition  Home; Home health services    Were the patients medications reviewed and updated  Yes    Current Symptoms  None          TCM Call       Post hospital issues  Reduced activity; Poor ADL (Activities of Daily Living) performance    Should patient be enrolled in anticoag monitoring?  No    Scheduled for follow up?  Yes    Patients specialists  Pulmonlolgist    Other specialists names  Hematology    Did you obtain your prescribed medications  Yes    Do you need help managing your prescriptions or medications  No    Is transportation to your appointment needed  No    I have advised the patient to call PCP with any new or worsening symptoms  Corinne Nielsen, LPN          Subjective:     Patient ID: Richard Nava is a 68 y.o. male.    Patient presents today for transition care after hospitalization  Patient was hospitalized with COPD exacerbation  He is doing okay today his only issue is his portable oxygen does not seem to last him very long and he would like to get a new machine  He is having hard time making doctors appointments due to this  Otherwise no other complaints today      Review of Systems   Constitutional: Negative.    HENT: Negative.     Eyes: Negative.    Respiratory:  Positive for shortness of breath.     Cardiovascular: Negative.    Gastrointestinal: Negative.    Endocrine: Negative.    Genitourinary: Negative.    Musculoskeletal: Negative.    Skin: Negative.    Allergic/Immunologic: Negative.    Neurological: Negative.    Hematological: Negative.    Psychiatric/Behavioral: Negative.     All other systems reviewed and are negative.        Objective:    There were no vitals filed for this visit.    Physical Exam  Vitals and nursing note reviewed.   Constitutional:       Appearance: Normal appearance.   HENT:      Head: Normocephalic.      Right Ear: Tympanic membrane, ear canal and external ear normal.      Left Ear: Tympanic membrane, ear canal and external ear normal.      Nose: Nose normal.      Mouth/Throat:      Mouth: Mucous membranes are moist.   Eyes:      Conjunctiva/sclera: Conjunctivae normal.      Pupils: Pupils are equal, round, and reactive to light.   Cardiovascular:      Rate and Rhythm: Normal rate and regular rhythm.   Pulmonary:      Effort: Pulmonary effort is normal.      Breath sounds: Normal breath sounds.   Abdominal:      General: Abdomen is flat. Bowel sounds are normal.      Palpations: Abdomen is soft.   Musculoskeletal:         General: Normal range of motion.   Skin:     General: Skin is warm and dry.   Neurological:      General: No focal deficit present.      Mental Status: He is alert and oriented to person, place, and time. Mental status is at baseline.   Psychiatric:         Mood and Affect: Mood normal.         Behavior: Behavior normal.         Thought Content: Thought content normal.         Judgment: Judgment normal.             I spent 15 minutes with the patient during this visit.    Kevin Mejia, DO      VIRTUAL VISIT DISCLAIMER    Richard Nava verbally agrees to participate in Virtual Care Services. Pt is aware that Virtual Care Services could be limited without vital signs or the ability to perform a full hands-on physical exam. Richard Nava understands he or  the provider may request at any time to terminate the video visit and request the patient to seek care or treatment in person.

## 2024-04-12 PROBLEM — R06.89 ACUTE RESPIRATORY INSUFFICIENCY: Status: RESOLVED | Noted: 2023-04-24 | Resolved: 2024-04-12

## 2024-04-12 PROBLEM — J20.9 ACUTE TRACHEOBRONCHITIS: Status: RESOLVED | Noted: 2020-02-18 | Resolved: 2024-04-12

## 2024-04-12 PROBLEM — J44.1 COPD EXACERBATION (HCC): Status: RESOLVED | Noted: 2023-12-20 | Resolved: 2024-04-12

## 2024-04-15 ENCOUNTER — TELEPHONE (OUTPATIENT)
Dept: PALLIATIVE MEDICINE | Facility: CLINIC | Age: 69
End: 2024-04-15

## 2024-04-15 NOTE — TELEPHONE ENCOUNTER
"Per VM from pt at 1040 am, \" I took a small dose of Morphing immediate release and it kicked the Head ache. \" Per reports he does not usually take the fast acting Morphine but tried it. Head ache gone. He did not want to go to ED again.     Wanted provider Arleth Scales PA-C to be made aware.   "

## 2024-04-15 NOTE — TELEPHONE ENCOUNTER
"Pt calling office to report a \"really bad headache for the past 2 days\". States \" nothing is helping\" States he has not slept in 2 days.\"  Pt reports taking 1 to 1.5 tablets Xanax  And taking Advil ( ? Dose)  Taking MS Contin as per orders.   Reports pain is frontal.  severe, stating during conversation \" oh my God I'm dying\"    This nurse instructed if pain is intractable he should proceed to nearest ED. Call 911.  Pt verbalizing , slowly, he will see if anyone is in the home that could assist him.   Conversation disorganized . Pt stating he will call back. Call completed.     "

## 2024-04-16 ENCOUNTER — TELEPHONE (OUTPATIENT)
Dept: FAMILY MEDICINE CLINIC | Facility: CLINIC | Age: 69
End: 2024-04-16

## 2024-04-16 DIAGNOSIS — J44.1 COPD EXACERBATION (HCC): Primary | ICD-10-CM

## 2024-04-16 DIAGNOSIS — J44.1 CHRONIC OBSTRUCTIVE PULMONARY DISEASE WITH ACUTE EXACERBATION (HCC): Primary | ICD-10-CM

## 2024-04-16 RX ORDER — ABIRATERONE ACETATE 250 MG/1
TABLET ORAL
Qty: 120 TABLET | Refills: 10 | Status: SHIPPED | OUTPATIENT
Start: 2024-04-16

## 2024-04-16 RX ORDER — DOXYCYCLINE 100 MG/1
100 CAPSULE ORAL 2 TIMES DAILY
Qty: 14 CAPSULE | Refills: 0 | Status: SHIPPED | OUTPATIENT
Start: 2024-04-16 | End: 2024-04-20

## 2024-04-16 RX ORDER — METHYLPREDNISOLONE 4 MG/1
TABLET ORAL
Qty: 1 EACH | Refills: 1 | Status: SHIPPED | OUTPATIENT
Start: 2024-04-16 | End: 2024-04-20

## 2024-04-16 RX ORDER — PREDNISONE 10 MG/1
TABLET ORAL
Qty: 30 TABLET | Refills: 0 | Status: SHIPPED | OUTPATIENT
Start: 2024-04-16 | End: 2024-04-20

## 2024-04-16 NOTE — TELEPHONE ENCOUNTER
"Patient is calling today he has been sick for few days he feels like he can't breath because he is so congested. He is coughing up a lot of phlem.  He said the problem is he is so  congested he can't breath the O2 in. He is at 3.5 liters sometimes 4. He feels like the COPD and emphysema are getting worse. I told him maybe he needs to go to ER. He said he goes there frequently and the ambulance crew is really pissed at him and told him \"he can't keep doing this.\" So he really doesn't want to call 911. He is wondering if he might have pneumonia. He said you gave him a script of Levothoxine and he is wondering if he should start taking it. Patient wants to know what you think he should do. He said he also called palliative care but no one has gotten back to him.    "

## 2024-04-16 NOTE — TELEPHONE ENCOUNTER
Patient called back and said he just remembered he can't take Prednisone, it does bad stuff to him. He said in the past he has taken methylprednisolone.  He isnt sure if you want to order that or if he should just take the antibotic

## 2024-04-16 NOTE — TELEPHONE ENCOUNTER
"Patient calling would like some advise is currently very congested \" I can't get away from my oxygen tank\" he is coughing up phlegm , he states he is afraid he will drown from being this congested . I also suggested patient to contact PCP but he stated he wanted Arleth to be aware .   "

## 2024-04-16 NOTE — TELEPHONE ENCOUNTER
It appears PCP is prescribing ABX for him. He can try Mucinex. If he does not have improvement, he should go to ER.

## 2024-04-17 ENCOUNTER — APPOINTMENT (EMERGENCY)
Dept: RADIOLOGY | Facility: HOSPITAL | Age: 69
DRG: 189 | End: 2024-04-17
Payer: MEDICARE

## 2024-04-17 ENCOUNTER — HOSPITAL ENCOUNTER (INPATIENT)
Facility: HOSPITAL | Age: 69
LOS: 3 days | Discharge: HOME | DRG: 189 | End: 2024-04-20
Attending: EMERGENCY MEDICINE | Admitting: INTERNAL MEDICINE
Payer: MEDICARE

## 2024-04-17 DIAGNOSIS — J44.1 COPD EXACERBATION (HCC): Primary | ICD-10-CM

## 2024-04-17 DIAGNOSIS — R06.03 RESPIRATORY DISTRESS: ICD-10-CM

## 2024-04-17 LAB
ALBUMIN SERPL BCP-MCNC: 4 G/DL (ref 3.5–5)
ALP SERPL-CCNC: 48 U/L (ref 34–104)
ALT SERPL W P-5'-P-CCNC: 10 U/L (ref 7–52)
ANION GAP SERPL CALCULATED.3IONS-SCNC: 1 MMOL/L (ref 4–13)
APTT PPP: 31 SECONDS (ref 23–37)
AST SERPL W P-5'-P-CCNC: 11 U/L (ref 13–39)
BASOPHILS # BLD AUTO: 0.09 THOUSANDS/ÂΜL (ref 0–0.1)
BASOPHILS NFR BLD AUTO: 1 % (ref 0–1)
BILIRUB SERPL-MCNC: 0.5 MG/DL (ref 0.2–1)
BNP SERPL-MCNC: 46 PG/ML (ref 0–100)
BUN SERPL-MCNC: 7 MG/DL (ref 5–25)
CALCIUM SERPL-MCNC: 9.2 MG/DL (ref 8.4–10.2)
CARDIAC TROPONIN I PNL SERPL HS: 4 NG/L
CHLORIDE SERPL-SCNC: 97 MMOL/L (ref 96–108)
CO2 SERPL-SCNC: 43 MMOL/L (ref 21–32)
CREAT SERPL-MCNC: 0.37 MG/DL (ref 0.6–1.3)
EOSINOPHIL # BLD AUTO: 0.2 THOUSAND/ÂΜL (ref 0–0.61)
EOSINOPHIL NFR BLD AUTO: 2 % (ref 0–6)
ERYTHROCYTE [DISTWIDTH] IN BLOOD BY AUTOMATED COUNT: 13.2 % (ref 11.6–15.1)
FLUAV RNA RESP QL NAA+PROBE: NEGATIVE
FLUBV RNA RESP QL NAA+PROBE: NEGATIVE
GFR SERPL CREATININE-BSD FRML MDRD: 125 ML/MIN/1.73SQ M
GLUCOSE SERPL-MCNC: 136 MG/DL (ref 65–140)
HCT VFR BLD AUTO: 38.3 % (ref 36.5–49.3)
HGB BLD-MCNC: 12 G/DL (ref 12–17)
IMM GRANULOCYTES # BLD AUTO: 0.03 THOUSAND/UL (ref 0–0.2)
IMM GRANULOCYTES NFR BLD AUTO: 0 % (ref 0–2)
INR PPP: 0.93 (ref 0.84–1.19)
LACTATE SERPL-SCNC: 0.6 MMOL/L (ref 0.5–2)
LYMPHOCYTES # BLD AUTO: 1.08 THOUSANDS/ÂΜL (ref 0.6–4.47)
LYMPHOCYTES NFR BLD AUTO: 11 % (ref 14–44)
MCH RBC QN AUTO: 31.3 PG (ref 26.8–34.3)
MCHC RBC AUTO-ENTMCNC: 31.3 G/DL (ref 31.4–37.4)
MCV RBC AUTO: 100 FL (ref 82–98)
MONOCYTES # BLD AUTO: 0.75 THOUSAND/ÂΜL (ref 0.17–1.22)
MONOCYTES NFR BLD AUTO: 7 % (ref 4–12)
NEUTROPHILS # BLD AUTO: 8.01 THOUSANDS/ÂΜL (ref 1.85–7.62)
NEUTS SEG NFR BLD AUTO: 79 % (ref 43–75)
NRBC BLD AUTO-RTO: 0 /100 WBCS
PLATELET # BLD AUTO: 306 THOUSANDS/UL (ref 149–390)
PMV BLD AUTO: 8.3 FL (ref 8.9–12.7)
POTASSIUM SERPL-SCNC: 3.6 MMOL/L (ref 3.5–5.3)
PROCALCITONIN SERPL-MCNC: 0.07 NG/ML
PROT SERPL-MCNC: 6.9 G/DL (ref 6.4–8.4)
PROTHROMBIN TIME: 12.8 SECONDS (ref 11.6–14.5)
RBC # BLD AUTO: 3.84 MILLION/UL (ref 3.88–5.62)
RSV RNA RESP QL NAA+PROBE: NEGATIVE
SARS-COV-2 RNA RESP QL NAA+PROBE: NEGATIVE
SODIUM SERPL-SCNC: 141 MMOL/L (ref 135–147)
WBC # BLD AUTO: 10.16 THOUSAND/UL (ref 4.31–10.16)

## 2024-04-17 PROCEDURE — 96367 TX/PROPH/DG ADDL SEQ IV INF: CPT

## 2024-04-17 PROCEDURE — 99285 EMERGENCY DEPT VISIT HI MDM: CPT | Performed by: EMERGENCY MEDICINE

## 2024-04-17 PROCEDURE — 94760 N-INVAS EAR/PLS OXIMETRY 1: CPT

## 2024-04-17 PROCEDURE — 94640 AIRWAY INHALATION TREATMENT: CPT

## 2024-04-17 PROCEDURE — 83880 ASSAY OF NATRIURETIC PEPTIDE: CPT | Performed by: EMERGENCY MEDICINE

## 2024-04-17 PROCEDURE — 85610 PROTHROMBIN TIME: CPT | Performed by: EMERGENCY MEDICINE

## 2024-04-17 PROCEDURE — 99223 1ST HOSP IP/OBS HIGH 75: CPT | Performed by: FAMILY MEDICINE

## 2024-04-17 PROCEDURE — 93005 ELECTROCARDIOGRAM TRACING: CPT

## 2024-04-17 PROCEDURE — 99285 EMERGENCY DEPT VISIT HI MDM: CPT

## 2024-04-17 PROCEDURE — 83605 ASSAY OF LACTIC ACID: CPT | Performed by: EMERGENCY MEDICINE

## 2024-04-17 PROCEDURE — 84145 PROCALCITONIN (PCT): CPT | Performed by: EMERGENCY MEDICINE

## 2024-04-17 PROCEDURE — 94664 DEMO&/EVAL PT USE INHALER: CPT

## 2024-04-17 PROCEDURE — 80053 COMPREHEN METABOLIC PANEL: CPT | Performed by: EMERGENCY MEDICINE

## 2024-04-17 PROCEDURE — 94644 CONT INHLJ TX 1ST HOUR: CPT

## 2024-04-17 PROCEDURE — 96375 TX/PRO/DX INJ NEW DRUG ADDON: CPT

## 2024-04-17 PROCEDURE — 71045 X-RAY EXAM CHEST 1 VIEW: CPT

## 2024-04-17 PROCEDURE — 87040 BLOOD CULTURE FOR BACTERIA: CPT | Performed by: EMERGENCY MEDICINE

## 2024-04-17 PROCEDURE — 85730 THROMBOPLASTIN TIME PARTIAL: CPT | Performed by: EMERGENCY MEDICINE

## 2024-04-17 PROCEDURE — 0241U HB NFCT DS VIR RESP RNA 4 TRGT: CPT | Performed by: EMERGENCY MEDICINE

## 2024-04-17 PROCEDURE — 96365 THER/PROPH/DIAG IV INF INIT: CPT

## 2024-04-17 PROCEDURE — 84484 ASSAY OF TROPONIN QUANT: CPT | Performed by: EMERGENCY MEDICINE

## 2024-04-17 PROCEDURE — 85025 COMPLETE CBC W/AUTO DIFF WBC: CPT | Performed by: EMERGENCY MEDICINE

## 2024-04-17 PROCEDURE — 36415 COLL VENOUS BLD VENIPUNCTURE: CPT | Performed by: EMERGENCY MEDICINE

## 2024-04-17 RX ORDER — METHYLPREDNISOLONE SODIUM SUCCINATE 40 MG/ML
40 INJECTION, POWDER, LYOPHILIZED, FOR SOLUTION INTRAMUSCULAR; INTRAVENOUS EVERY 8 HOURS
Status: DISCONTINUED | OUTPATIENT
Start: 2024-04-17 | End: 2024-04-18

## 2024-04-17 RX ORDER — ACETAMINOPHEN 325 MG/1
650 TABLET ORAL EVERY 6 HOURS PRN
Status: DISCONTINUED | OUTPATIENT
Start: 2024-04-17 | End: 2024-04-20 | Stop reason: HOSPADM

## 2024-04-17 RX ORDER — GABAPENTIN 300 MG/1
300 CAPSULE ORAL 2 TIMES DAILY
Status: DISCONTINUED | OUTPATIENT
Start: 2024-04-17 | End: 2024-04-20 | Stop reason: HOSPADM

## 2024-04-17 RX ORDER — DOXYCYCLINE HYCLATE 100 MG/1
100 CAPSULE ORAL EVERY 12 HOURS
Qty: 10 CAPSULE | Refills: 0 | Status: DISCONTINUED | OUTPATIENT
Start: 2024-04-17 | End: 2024-04-20 | Stop reason: HOSPADM

## 2024-04-17 RX ORDER — GUAIFENESIN 600 MG/1
600 TABLET, EXTENDED RELEASE ORAL EVERY 12 HOURS SCHEDULED
Status: DISCONTINUED | OUTPATIENT
Start: 2024-04-17 | End: 2024-04-20 | Stop reason: HOSPADM

## 2024-04-17 RX ORDER — MAGNESIUM SULFATE HEPTAHYDRATE 40 MG/ML
2 INJECTION, SOLUTION INTRAVENOUS ONCE
Status: COMPLETED | OUTPATIENT
Start: 2024-04-17 | End: 2024-04-17

## 2024-04-17 RX ORDER — METHYLPREDNISOLONE SODIUM SUCCINATE 125 MG/2ML
80 INJECTION, POWDER, LYOPHILIZED, FOR SOLUTION INTRAMUSCULAR; INTRAVENOUS ONCE
Status: COMPLETED | OUTPATIENT
Start: 2024-04-17 | End: 2024-04-17

## 2024-04-17 RX ORDER — ENOXAPARIN SODIUM 100 MG/ML
40 INJECTION SUBCUTANEOUS DAILY
Status: DISCONTINUED | OUTPATIENT
Start: 2024-04-18 | End: 2024-04-20 | Stop reason: HOSPADM

## 2024-04-17 RX ORDER — BENZONATATE 100 MG/1
100 CAPSULE ORAL 3 TIMES DAILY PRN
Status: DISCONTINUED | OUTPATIENT
Start: 2024-04-17 | End: 2024-04-20 | Stop reason: HOSPADM

## 2024-04-17 RX ORDER — ALBUTEROL SULFATE 2.5 MG/3ML
10 SOLUTION RESPIRATORY (INHALATION) ONCE
Status: COMPLETED | OUTPATIENT
Start: 2024-04-17 | End: 2024-04-17

## 2024-04-17 RX ORDER — MORPHINE SULFATE 15 MG/1
15 TABLET, FILM COATED, EXTENDED RELEASE ORAL 2 TIMES DAILY
Status: DISCONTINUED | OUTPATIENT
Start: 2024-04-17 | End: 2024-04-20 | Stop reason: HOSPADM

## 2024-04-17 RX ORDER — METOCLOPRAMIDE HYDROCHLORIDE 5 MG/ML
5 INJECTION INTRAMUSCULAR; INTRAVENOUS ONCE
Status: COMPLETED | OUTPATIENT
Start: 2024-04-17 | End: 2024-04-17

## 2024-04-17 RX ORDER — ATORVASTATIN CALCIUM 40 MG/1
40 TABLET, FILM COATED ORAL
Status: DISCONTINUED | OUTPATIENT
Start: 2024-04-17 | End: 2024-04-20 | Stop reason: HOSPADM

## 2024-04-17 RX ORDER — HYDROCODONE POLISTIREX AND CHLORPHENIRAMINE POLISTIREX 10; 8 MG/5ML; MG/5ML
5 SUSPENSION, EXTENDED RELEASE ORAL EVERY 12 HOURS PRN
Status: DISCONTINUED | OUTPATIENT
Start: 2024-04-17 | End: 2024-04-20 | Stop reason: HOSPADM

## 2024-04-17 RX ORDER — LEVALBUTEROL INHALATION SOLUTION 1.25 MG/3ML
1.25 SOLUTION RESPIRATORY (INHALATION)
Status: DISCONTINUED | OUTPATIENT
Start: 2024-04-17 | End: 2024-04-20 | Stop reason: HOSPADM

## 2024-04-17 RX ORDER — ALPRAZOLAM 0.5 MG/1
0.5 TABLET ORAL 2 TIMES DAILY PRN
Status: DISCONTINUED | OUTPATIENT
Start: 2024-04-17 | End: 2024-04-20 | Stop reason: HOSPADM

## 2024-04-17 RX ORDER — ALBUTEROL SULFATE 2.5 MG/3ML
2.5 SOLUTION RESPIRATORY (INHALATION) EVERY 6 HOURS PRN
Status: DISCONTINUED | OUTPATIENT
Start: 2024-04-17 | End: 2024-04-20 | Stop reason: HOSPADM

## 2024-04-17 RX ORDER — ONDANSETRON 2 MG/ML
4 INJECTION INTRAMUSCULAR; INTRAVENOUS EVERY 6 HOURS PRN
Status: DISCONTINUED | OUTPATIENT
Start: 2024-04-17 | End: 2024-04-20 | Stop reason: HOSPADM

## 2024-04-17 RX ORDER — CALCIUM CARBONATE 500 MG/1
1000 TABLET, CHEWABLE ORAL DAILY PRN
Status: DISCONTINUED | OUTPATIENT
Start: 2024-04-17 | End: 2024-04-20 | Stop reason: HOSPADM

## 2024-04-17 RX ORDER — SODIUM CHLORIDE FOR INHALATION 0.9 %
3 VIAL, NEBULIZER (ML) INHALATION
Status: DISCONTINUED | OUTPATIENT
Start: 2024-04-17 | End: 2024-04-17

## 2024-04-17 RX ORDER — LIDOCAINE 50 MG/G
1 PATCH TOPICAL DAILY
Status: DISCONTINUED | OUTPATIENT
Start: 2024-04-17 | End: 2024-04-20 | Stop reason: HOSPADM

## 2024-04-17 RX ADMIN — AZITHROMYCIN 500 MG: 500 INJECTION, POWDER, LYOPHILIZED, FOR SOLUTION INTRAVENOUS at 09:55

## 2024-04-17 RX ADMIN — METOCLOPRAMIDE 5 MG: 5 INJECTION, SOLUTION INTRAMUSCULAR; INTRAVENOUS at 19:54

## 2024-04-17 RX ADMIN — ALBUTEROL SULFATE 10 MG: 2.5 SOLUTION RESPIRATORY (INHALATION) at 09:11

## 2024-04-17 RX ADMIN — GABAPENTIN 300 MG: 300 CAPSULE ORAL at 19:48

## 2024-04-17 RX ADMIN — MAGNESIUM SULFATE HEPTAHYDRATE 2 G: 2 INJECTION, SOLUTION INTRAVENOUS at 09:11

## 2024-04-17 RX ADMIN — LIDOCAINE 1 PATCH: 50 PATCH TOPICAL at 15:36

## 2024-04-17 RX ADMIN — LEVALBUTEROL HYDROCHLORIDE 1.25 MG: 1.25 SOLUTION RESPIRATORY (INHALATION) at 16:52

## 2024-04-17 RX ADMIN — IPRATROPIUM BROMIDE 0.5 MG: 0.5 SOLUTION RESPIRATORY (INHALATION) at 16:52

## 2024-04-17 RX ADMIN — METHYLPREDNISOLONE SODIUM SUCCINATE 40 MG: 40 INJECTION, POWDER, FOR SOLUTION INTRAMUSCULAR; INTRAVENOUS at 23:26

## 2024-04-17 RX ADMIN — MORPHINE SULFATE 15 MG: 15 TABLET, EXTENDED RELEASE ORAL at 19:48

## 2024-04-17 RX ADMIN — METHYLPREDNISOLONE SODIUM SUCCINATE 80 MG: 125 INJECTION, POWDER, FOR SOLUTION INTRAMUSCULAR; INTRAVENOUS at 09:11

## 2024-04-17 RX ADMIN — ONDANSETRON 4 MG: 2 INJECTION INTRAMUSCULAR; INTRAVENOUS at 17:44

## 2024-04-17 RX ADMIN — METHYLPREDNISOLONE SODIUM SUCCINATE 40 MG: 40 INJECTION, POWDER, FOR SOLUTION INTRAMUSCULAR; INTRAVENOUS at 15:35

## 2024-04-17 RX ADMIN — IPRATROPIUM BROMIDE 0.5 MG: 0.5 SOLUTION RESPIRATORY (INHALATION) at 19:20

## 2024-04-17 RX ADMIN — ALPRAZOLAM 0.5 MG: 0.5 TABLET ORAL at 16:48

## 2024-04-17 RX ADMIN — DOXYCYCLINE 100 MG: 100 CAPSULE ORAL at 15:35

## 2024-04-17 RX ADMIN — ATORVASTATIN CALCIUM 40 MG: 40 TABLET, FILM COATED ORAL at 15:35

## 2024-04-17 RX ADMIN — LEVALBUTEROL HYDROCHLORIDE 1.25 MG: 1.25 SOLUTION RESPIRATORY (INHALATION) at 19:20

## 2024-04-17 RX ADMIN — IPRATROPIUM BROMIDE 1 MG: 0.5 SOLUTION RESPIRATORY (INHALATION) at 09:10

## 2024-04-17 NOTE — H&P
[facility]  H&P  Name: Richard Nava 68 y.o. male I MRN: [id number]  Unit/Bed#: ED 03 I Date of Admission: 4/17/2024   Date of Service: 4/17/2024 I Hospital Day: 0      Assessment/Plan   * Acute on chronic respiratory failure with hypoxia and hypercapnia (HCC)  Assessment & Plan  Patient recently discharged on 4/1 treated for COPD exacerbation presents again with worsening shortness of breath and trouble breathing. patient has severe emphysema and immunocompromise and deconditions given metastatic prostate cancer  Check sputum culture  Will place on doxycycline 100 mg twice daily  IV Solu-Medrol 40 mg every 8 hours, scheduled nebulizers  Supportive care  We will have pulmonology see him again  Monitor hemodyanmics and oxygen requirements   CO2 chronically elevated      COPD (chronic obstructive pulmonary disease) ([facility])  Assessment & Plan  COPD with acute exacerbation  Appreciate pulmonology consult recommendations  He has known severe emphysema  IV steroids, doxycycline and nebulizers  See plan above  Was seen by palliative care last admission and he does not want hospice at this time   Level 1 full code     Opioid dependence (HCC)  Assessment & Plan  Patient maintained on MS Contin 15 mg every 12 hours  PDMP reviewed    Cachexia ([facility])  Assessment & Plan  Malnutrition Findings:                                 BMI Findings:           Body mass index is 15.19 kg/m².   Likely combination of pulmonary cachexia and from metastatic prostate cancer     Insomnia  Assessment & Plan  PDMP reviewed on Xanax 0.5 mg twice daily as needed for anxiety/insomnia    Prostate cancer metastatic to bone (HCC)  Assessment & Plan  Metastatic prostate cancer on Lupron every 6 months  Zytiga 1000 mg daily- nonformulary ordered     HLD (hyperlipidemia)  Assessment & Plan  Continue statin                VTE Pharmacologic Prophylaxis: VTE Score: 7 High Risk (Score >/= 5) - Pharmacological DVT Prophylaxis  "Ordered: enoxaparin (Lovenox). Sequential Compression Devices Ordered.  Code Status: Prior level 1   Discussion with family:  called sister Mary on admission .     Anticipated Length of Stay: Patient will be admitted on an inpatient basis with an anticipated length of stay of greater than 2 midnights secondary to copd exacerbation .    Total Time Spent on Date of Encounter in care of patient: 45 mins. This time was spent on one or more of the following: performing physical exam; counseling and coordination of care; obtaining or reviewing history; documenting in the medical record; reviewing/ordering tests, medications or procedures; communicating with other healthcare professionals and discussing with patient's family/caregivers.    Chief Complaint: SOB, trouble breathing     History of Present Illness:  Richard Nava is a 68 y.o. male with a PMH of severe emphysema, metastatic prostate cancer, chronic opioid dependence, palliative care patient, hyperlipidemia, cachexia who presents with SOB and trouble breathing. He was recently discharge on 4/1/24 for COPD exacerbation. States a couple days ago \"my exacerbation came back\".  He was called in steroids and antibiotics by his PCP yesterday.  He did not start the steroids as he states he is intolerant to prednisone.  He was coughing up sputum clear and now is yellowish \"nasty\" color. He denies any fevers or chills. No chest pain. He did have some nausea and vomiting a couple of days ago. No diarrhea. He has nasal and chest congestion. He normally wears 2.5-3 lpm nasal cannula oxygen has increased his oxygen at home to 3.5-4.5 lpm. He lives with his daughter in law. He does not drive really anymore. He normally walks without assistance. He has had requent frequent admissions. He is followed by palliative care on chronic pain medications but is not ready for hospice per last notes.     Review of Systems:  Review of Systems   Constitutional:  Negative for chills " and fever.   Respiratory:  Positive for cough, shortness of breath and wheezing.    Cardiovascular:  Negative for chest pain, palpitations and leg swelling.   Gastrointestinal:  Positive for nausea and vomiting. Negative for abdominal pain and diarrhea.   Genitourinary:  Negative for difficulty urinating and dysuria.   Musculoskeletal:  Negative for gait problem.   Neurological:  Negative for dizziness, light-headedness and headaches.   All other systems reviewed and are negative.      Past Medical and Surgical History:   Past Medical History:   Diagnosis Date    Bone cancer (HCC)     Colon polyp     COPD (chronic obstructive pulmonary disease) (HCC)     Emphysema lung (HCC)     Hyperlipidemia     Prostate cancer (HCC)        Past Surgical History:   Procedure Laterality Date    APPENDECTOMY      COLONOSCOPY      IR BIOPSY BONE  12/30/2021    JOINT REPLACEMENT      UPPER GASTROINTESTINAL ENDOSCOPY         Meds/Allergies:  Prior to Admission medications    Medication Sig Start Date End Date Taking? Authorizing Provider   abiraterone (ZYTIGA) 250 mg tablet Take 4 tablets (1,000 mg total) by mouth once daily. 4/16/24   ISA Contreras   albuterol (2.5 mg/3 mL) 0.083 % nebulizer solution Take 3 mL (2.5 mg total) by nebulization every 6 (six) hours as needed for wheezing or shortness of breath 1/25/24   Elena Clark DO   albuterol (PROVENTIL HFA,VENTOLIN HFA) 90 mcg/act inhaler TAKE 2 PUFFS BY MOUTH EVERY 4 HOURS AS NEEDED FOR WHEEZE 1/25/24   ISA Contreras   ALPRAZolam (XANAX) 0.5 mg tablet Take 1 tablet (0.5 mg total) by mouth 2 (two) times a day as needed for anxiety or sleep Provider aware of co-existing clonazepam prescription. Patient is palliative care patient. 4/4/24   Arleth Scales PA-C   atorvastatin (LIPITOR) 40 mg tablet Take 1 tablet (40 mg total) by mouth daily with dinner 2/24/20   Dolly Marie PA-C   benzonatate (TESSALON PERLES) 100 mg capsule Take 1 capsule (100 mg  total) by mouth 3 (three) times a day as needed for cough 4/9/24   Kevin Mejia DO   Budeson-Glycopyrrol-Formoterol (Breztri Aerosphere) 160-9-4.8 MCG/ACT AERO Inhale 2 puffs 2 (two) times a day Rinse mouth after use. 12/20/23   Musa Fatima MD   clonazePAM (KlonoPIN) 0.5 MG disintegrating tablet Take 1 tablet (0.5 mg total) by mouth daily at bedtime as needed for anxiety 1/30/24   Arleth Scales PA-C   doxycycline monohydrate (MONODOX) 100 mg capsule Take 1 capsule (100 mg total) by mouth 2 (two) times a day for 7 days 4/16/24 4/23/24  Kevin Mejia DO   gabapentin (NEURONTIN) 300 mg capsule Take 1 capsule (300 mg total) by mouth 2 (two) times a day 4/4/24 5/4/24  Arleth Scales PA-C   guaiFENesin 1200 MG TB12 Take 1 tablet (1,200 mg total) by mouth every 12 (twelve) hours for 15 days 4/1/24 4/16/24  Haley Leo MD   lidocaine (LIDODERM) 5 % Apply 3 patches topically daily Remove & Discard patch within 12 hours or as directed by MD 1/3/23   Arleth Scales PA-C   methylPREDNISolone 4 MG tablet therapy pack Use as directed on package  Patient not taking: Reported on 4/9/2024 4/1/24   Haley Leo MD   methylPREDNISolone 4 MG tablet therapy pack Use as directed on package 4/16/24   Kevin Mejia DO   morphine (MS CONTIN) 15 mg 12 hr tablet Take 1 tablet (15 mg total) by mouth 2 (two) times a day Ongoing therapy Max Daily Amount: 30 mg 4/4/24   Arleth Scales PA-C   naloxone (NARCAN) 4 mg/0.1 mL nasal spray Administer 1 spray into a nostril. If no response after 2-3 minutes, give another dose in the other nostril using a new spray. 6/2/22   Arleth Scales PA-C   NON FORMULARY Medical marijuana    Historical Provider, MD   predniSONE 10 mg tablet 4 for 3 days, 3 for 3 days, 2 for 3 days, 1 for 3 days 4/16/24   Kevin Page, DO     I have reviewed home medications with patient personally.    Allergies: No Known Allergies    Social History:  Marital Status: Single  "  Occupation:   Patient Pre-hospital Living Situation: Home  Patient Pre-hospital Level of Mobility: walks  Patient Pre-hospital Diet Restrictions:   Substance Use History:   Social History     Substance and Sexual Activity   Alcohol Use Never     Social History     Tobacco Use   Smoking Status Former    Current packs/day: 0.00    Average packs/day: 1 pack/day for 42.0 years (42.0 ttl pk-yrs)    Types: Cigarettes    Start date:     Quit date:     Years since quittin.3   Smokeless Tobacco Never     Social History     Substance and Sexual Activity   Drug Use Never       Family History:  Family History   Problem Relation Age of Onset    Lung cancer Mother     Breast cancer Sister     Colon polyps Brother     Colon cancer Neg Hx        Physical Exam:     Vitals:   Blood Pressure: 126/66 (24 1200)  Pulse: 90 (24 1200)  Temperature: 98.4 °F (36.9 °C) (24 0853)  Temp Source: Oral (24 0853)  Respirations: 20 (24 1200)  Height: 5' 7\" (170.2 cm) (24 0853)  Weight - Scale: 44 kg (97 lb) (24 0853)  SpO2: 98 % (24 1200)    Physical Exam  Vitals and nursing note reviewed.   Constitutional:       Appearance: He is ill-appearing.      Comments: Frail, cachectic appearing male, chronically ill appearing    Cardiovascular:      Rate and Rhythm: Normal rate and regular rhythm.   Pulmonary:      Breath sounds: Wheezing and rhonchi present.      Comments: Poor air movement   Abdominal:      General: Bowel sounds are normal. There is no distension.      Palpations: Abdomen is soft.      Tenderness: There is no abdominal tenderness. There is no guarding or rebound.   Musculoskeletal:      Right lower leg: No edema.      Left lower leg: No edema.   Skin:     General: Skin is warm.   Neurological:      Mental Status: He is alert and oriented to person, place, and time.   Psychiatric:         Mood and Affect: Mood normal.          Additional Data:     Lab Results:  Results from " last 7 days   Lab Units 04/17/24  0907   WBC Thousand/uL 10.16   HEMOGLOBIN g/dL 12.0   HEMATOCRIT % 38.3   PLATELETS Thousands/uL 306   SEGS PCT % 79*   LYMPHO PCT % 11*   MONO PCT % 7   EOS PCT % 2     Results from last 7 days   Lab Units 04/17/24  0907   SODIUM mmol/L 141   POTASSIUM mmol/L 3.6   CHLORIDE mmol/L 97   CO2 mmol/L 43*   BUN mg/dL 7   CREATININE mg/dL 0.37*   ANION GAP mmol/L 1*   CALCIUM mg/dL 9.2   ALBUMIN g/dL 4.0   TOTAL BILIRUBIN mg/dL 0.50   ALK PHOS U/L 48   ALT U/L 10   AST U/L 11*   GLUCOSE RANDOM mg/dL 136     Results from last 7 days   Lab Units 04/17/24  0907   INR  0.93             Results from last 7 days   Lab Units 04/17/24  0907   LACTIC ACID mmol/L 0.6   PROCALCITONIN ng/ml 0.07       Lines/Drains:  Invasive Devices       Peripheral Intravenous Line  Duration             Peripheral IV 03/30/24 Right;Ventral (anterior) Forearm 17 days                        Imaging: reviewed CXR awaiting formal read, reviewed prior CTA chest   XR chest portable    (Results Pending)       EKG and Other Studies Reviewed on Admission:   EKG: NSR. HR 78, artifact, poor EKG reuslt .    ** Please Note: This note has been constructed using a voice recognition system. **

## 2024-04-17 NOTE — NURSING NOTE
Pt was questioning medication that he had brought in from home. Spoke to the EMS crew [employer] who confirmed they did not bring any medication with. The crew stated all his medications were in a paper bag next to his cough. Will confirm this with patient.

## 2024-04-17 NOTE — ASSESSMENT & PLAN NOTE
COPD with acute exacerbation  Appreciate pulmonology consult recommendations  He has known severe emphysema  IV steroids, doxycycline and nebulizers  See plan above  Was seen by palliative care last admission and he does not want hospice at this time   Level 1 full code

## 2024-04-17 NOTE — PLAN OF CARE
Problem: INFECTION - ADULT  Goal: Absence or prevention of progression during hospitalization  Description: INTERVENTIONS:  - Assess and monitor for signs and symptoms of infection  - Monitor lab/diagnostic results  - Monitor all insertion sites, i.e. indwelling lines, tubes, and drains  - Monitor endotracheal if appropriate and nasal secretions for changes in amount and color  - [place] appropriate cooling/warming therapies per order  - Administer medications as ordered  - Instruct and encourage patient and family to use good hand hygiene technique  - Identify and instruct in appropriate isolation precautions for identified infection/condition  Outcome: Progressing  Goal: Absence of fever/infection during neutropenic period  Description: INTERVENTIONS:  - Monitor WBC    Outcome: Progressing

## 2024-04-17 NOTE — ASSESSMENT & PLAN NOTE
Patient recently discharged on 4/1 treated for COPD exacerbation presents again with worsening shortness of breath and trouble breathing. patient has severe emphysema and immunocompromise and deconditions given metastatic prostate cancer  Check sputum culture  Will place on doxycycline 100 mg twice daily  IV Solu-Medrol 40 mg every 8 hours, scheduled nebulizers  Supportive care  We will have pulmonology see him again  Monitor hemodyanmics and oxygen requirements   CO2 chronically elevated

## 2024-04-17 NOTE — ASSESSMENT & PLAN NOTE
Malnutrition Findings:                                 BMI Findings:           Body mass index is 15.19 kg/m².   Likely combination of pulmonary cachexia and from metastatic prostate cancer

## 2024-04-18 DIAGNOSIS — C79.51 PROSTATE CANCER METASTATIC TO BONE (HCC): Primary | ICD-10-CM

## 2024-04-18 DIAGNOSIS — C61 PROSTATE CANCER METASTATIC TO BONE (HCC): Primary | ICD-10-CM

## 2024-04-18 LAB
ALBUMIN SERPL BCP-MCNC: 3.9 G/DL (ref 3.5–5)
ALP SERPL-CCNC: 47 U/L (ref 34–104)
ALT SERPL W P-5'-P-CCNC: 8 U/L (ref 7–52)
ANION GAP SERPL CALCULATED.3IONS-SCNC: 4 MMOL/L (ref 4–13)
AST SERPL W P-5'-P-CCNC: 11 U/L (ref 13–39)
ATRIAL RATE: 86 BPM
BASOPHILS # BLD AUTO: 0.01 THOUSANDS/ÂΜL (ref 0–0.1)
BASOPHILS NFR BLD AUTO: 0 % (ref 0–1)
BILIRUB SERPL-MCNC: 0.31 MG/DL (ref 0.2–1)
BUN SERPL-MCNC: 8 MG/DL (ref 5–25)
CALCIUM SERPL-MCNC: 8.3 MG/DL (ref 8.4–10.2)
CHLORIDE SERPL-SCNC: 98 MMOL/L (ref 96–108)
CO2 SERPL-SCNC: 37 MMOL/L (ref 21–32)
CREAT SERPL-MCNC: 0.41 MG/DL (ref 0.6–1.3)
EOSINOPHIL # BLD AUTO: 0 THOUSAND/ÂΜL (ref 0–0.61)
EOSINOPHIL NFR BLD AUTO: 0 % (ref 0–6)
ERYTHROCYTE [DISTWIDTH] IN BLOOD BY AUTOMATED COUNT: 13.2 % (ref 11.6–15.1)
GFR SERPL CREATININE-BSD FRML MDRD: 119 ML/MIN/1.73SQ M
GLUCOSE SERPL-MCNC: 199 MG/DL (ref 65–140)
HCT VFR BLD AUTO: 37.4 % (ref 36.5–49.3)
HGB BLD-MCNC: 11.6 G/DL (ref 12–17)
IMM GRANULOCYTES # BLD AUTO: 0.04 THOUSAND/UL (ref 0–0.2)
IMM GRANULOCYTES NFR BLD AUTO: 1 % (ref 0–2)
LYMPHOCYTES # BLD AUTO: 0.35 THOUSANDS/ÂΜL (ref 0.6–4.47)
LYMPHOCYTES NFR BLD AUTO: 6 % (ref 14–44)
MCH RBC QN AUTO: 31 PG (ref 26.8–34.3)
MCHC RBC AUTO-ENTMCNC: 31 G/DL (ref 31.4–37.4)
MCV RBC AUTO: 100 FL (ref 82–98)
MONOCYTES # BLD AUTO: 0.16 THOUSAND/ÂΜL (ref 0.17–1.22)
MONOCYTES NFR BLD AUTO: 3 % (ref 4–12)
NEUTROPHILS # BLD AUTO: 5.21 THOUSANDS/ÂΜL (ref 1.85–7.62)
NEUTS SEG NFR BLD AUTO: 90 % (ref 43–75)
NRBC BLD AUTO-RTO: 0 /100 WBCS
P AXIS: 81 DEGREES
PLATELET # BLD AUTO: 349 THOUSANDS/UL (ref 149–390)
PLATELET # BLD AUTO: 349 THOUSANDS/UL (ref 149–390)
PLATELET BLD QL SMEAR: ADEQUATE
PMV BLD AUTO: 8.4 FL (ref 8.9–12.7)
PMV BLD AUTO: 8.4 FL (ref 8.9–12.7)
POTASSIUM SERPL-SCNC: 4 MMOL/L (ref 3.5–5.3)
PR INTERVAL: 152 MS
PROCALCITONIN SERPL-MCNC: 0.06 NG/ML
PROT SERPL-MCNC: 6.8 G/DL (ref 6.4–8.4)
QRS AXIS: 120 DEGREES
QRSD INTERVAL: 72 MS
QT INTERVAL: 372 MS
QTC INTERVAL: 445 MS
RBC # BLD AUTO: 3.74 MILLION/UL (ref 3.88–5.62)
RBC MORPH BLD: NORMAL
SODIUM SERPL-SCNC: 139 MMOL/L (ref 135–147)
T WAVE AXIS: 79 DEGREES
VENTRICULAR RATE: 86 BPM
WBC # BLD AUTO: 5.77 THOUSAND/UL (ref 4.31–10.16)

## 2024-04-18 PROCEDURE — 85049 AUTOMATED PLATELET COUNT: CPT | Performed by: FAMILY MEDICINE

## 2024-04-18 PROCEDURE — 84145 PROCALCITONIN (PCT): CPT | Performed by: PHYSICIAN ASSISTANT

## 2024-04-18 PROCEDURE — 87205 SMEAR GRAM STAIN: CPT | Performed by: PHYSICIAN ASSISTANT

## 2024-04-18 PROCEDURE — 97166 OT EVAL MOD COMPLEX 45 MIN: CPT

## 2024-04-18 PROCEDURE — 92610 EVALUATE SWALLOWING FUNCTION: CPT

## 2024-04-18 PROCEDURE — 94640 AIRWAY INHALATION TREATMENT: CPT

## 2024-04-18 PROCEDURE — 85025 COMPLETE CBC W/AUTO DIFF WBC: CPT | Performed by: FAMILY MEDICINE

## 2024-04-18 PROCEDURE — 97163 PT EVAL HIGH COMPLEX 45 MIN: CPT

## 2024-04-18 PROCEDURE — 93010 ELECTROCARDIOGRAM REPORT: CPT | Performed by: INTERNAL MEDICINE

## 2024-04-18 PROCEDURE — 99223 1ST HOSP IP/OBS HIGH 75: CPT | Performed by: INTERNAL MEDICINE

## 2024-04-18 PROCEDURE — 80053 COMPREHEN METABOLIC PANEL: CPT | Performed by: FAMILY MEDICINE

## 2024-04-18 PROCEDURE — 94760 N-INVAS EAR/PLS OXIMETRY 1: CPT

## 2024-04-18 PROCEDURE — 99233 SBSQ HOSP IP/OBS HIGH 50: CPT | Performed by: INTERNAL MEDICINE

## 2024-04-18 RX ORDER — FORMOTEROL FUMARATE DIHYDRATE 20 UG/2ML
20 SOLUTION RESPIRATORY (INHALATION)
Status: DISCONTINUED | OUTPATIENT
Start: 2024-04-18 | End: 2024-04-20 | Stop reason: HOSPADM

## 2024-04-18 RX ORDER — BUDESONIDE 0.5 MG/2ML
0.5 INHALANT ORAL
Status: DISCONTINUED | OUTPATIENT
Start: 2024-04-18 | End: 2024-04-20 | Stop reason: HOSPADM

## 2024-04-18 RX ORDER — METHYLPREDNISOLONE SODIUM SUCCINATE 40 MG/ML
40 INJECTION, POWDER, LYOPHILIZED, FOR SOLUTION INTRAMUSCULAR; INTRAVENOUS EVERY 12 HOURS SCHEDULED
Status: DISCONTINUED | OUTPATIENT
Start: 2024-04-18 | End: 2024-04-19

## 2024-04-18 RX ORDER — FORMOTEROL FUMARATE DIHYDRATE 20 UG/2ML
20 SOLUTION RESPIRATORY (INHALATION)
Status: DISCONTINUED | OUTPATIENT
Start: 2024-04-18 | End: 2024-04-18

## 2024-04-18 RX ORDER — BUDESONIDE 0.5 MG/2ML
0.5 INHALANT ORAL
Status: DISCONTINUED | OUTPATIENT
Start: 2024-04-18 | End: 2024-04-18

## 2024-04-18 RX ADMIN — ONDANSETRON 4 MG: 2 INJECTION INTRAMUSCULAR; INTRAVENOUS at 08:21

## 2024-04-18 RX ADMIN — IPRATROPIUM BROMIDE 0.5 MG: 0.5 SOLUTION RESPIRATORY (INHALATION) at 19:42

## 2024-04-18 RX ADMIN — IPRATROPIUM BROMIDE 0.5 MG: 0.5 SOLUTION RESPIRATORY (INHALATION) at 07:56

## 2024-04-18 RX ADMIN — METHYLPREDNISOLONE SODIUM SUCCINATE 40 MG: 40 INJECTION, POWDER, FOR SOLUTION INTRAMUSCULAR; INTRAVENOUS at 06:07

## 2024-04-18 RX ADMIN — HYDROCODONE POLISTIREX AND CHLORPHENIRAMINE POLISTIREX 5 ML: 10; 8 SUSPENSION, EXTENDED RELEASE ORAL at 08:26

## 2024-04-18 RX ADMIN — LEVALBUTEROL HYDROCHLORIDE 1.25 MG: 1.25 SOLUTION RESPIRATORY (INHALATION) at 07:56

## 2024-04-18 RX ADMIN — GABAPENTIN 300 MG: 300 CAPSULE ORAL at 08:21

## 2024-04-18 RX ADMIN — GUAIFENESIN 600 MG: 600 TABLET ORAL at 08:21

## 2024-04-18 RX ADMIN — ACETAMINOPHEN 650 MG: 325 TABLET, FILM COATED ORAL at 08:24

## 2024-04-18 RX ADMIN — METHYLPREDNISOLONE SODIUM SUCCINATE 40 MG: 40 INJECTION, POWDER, FOR SOLUTION INTRAMUSCULAR; INTRAVENOUS at 20:25

## 2024-04-18 RX ADMIN — MORPHINE SULFATE 15 MG: 15 TABLET, EXTENDED RELEASE ORAL at 08:22

## 2024-04-18 RX ADMIN — BUDESONIDE 0.5 MG: 0.5 INHALANT RESPIRATORY (INHALATION) at 19:42

## 2024-04-18 RX ADMIN — GUAIFENESIN 600 MG: 600 TABLET ORAL at 20:26

## 2024-04-18 RX ADMIN — IPRATROPIUM BROMIDE 0.5 MG: 0.5 SOLUTION RESPIRATORY (INHALATION) at 13:31

## 2024-04-18 RX ADMIN — LEVALBUTEROL HYDROCHLORIDE 1.25 MG: 1.25 SOLUTION RESPIRATORY (INHALATION) at 19:42

## 2024-04-18 RX ADMIN — FORMOTEROL FUMARATE DIHYDRATE 20 MCG: 20 SOLUTION RESPIRATORY (INHALATION) at 19:42

## 2024-04-18 RX ADMIN — ALPRAZOLAM 0.5 MG: 0.5 TABLET ORAL at 23:29

## 2024-04-18 RX ADMIN — DOXYCYCLINE 100 MG: 100 CAPSULE ORAL at 15:14

## 2024-04-18 RX ADMIN — LIDOCAINE 1 PATCH: 50 PATCH TOPICAL at 08:22

## 2024-04-18 RX ADMIN — LEVALBUTEROL HYDROCHLORIDE 1.25 MG: 1.25 SOLUTION RESPIRATORY (INHALATION) at 13:31

## 2024-04-18 RX ADMIN — HYDROCODONE POLISTIREX AND CHLORPHENIRAMINE POLISTIREX 5 ML: 10; 8 SUSPENSION, EXTENDED RELEASE ORAL at 20:37

## 2024-04-18 RX ADMIN — ATORVASTATIN CALCIUM 40 MG: 40 TABLET, FILM COATED ORAL at 17:01

## 2024-04-18 RX ADMIN — DOXYCYCLINE 100 MG: 100 CAPSULE ORAL at 02:46

## 2024-04-18 RX ADMIN — MORPHINE SULFATE 15 MG: 15 TABLET, EXTENDED RELEASE ORAL at 17:01

## 2024-04-18 RX ADMIN — GABAPENTIN 300 MG: 300 CAPSULE ORAL at 17:01

## 2024-04-18 NOTE — PHYSICAL THERAPY NOTE
"                                                                                  PHYSICAL THERAPY EVALUATION NOTE    Patient Name: Richard Nava  Today's Date: 2024    AGE:   68 y.o.  Mrn:   [id number]  ADMIT DX:  Weakness [R53.1]  COPD exacerbation (HCC) [J44.1]    Past Medical History:   Diagnosis Date    Bone cancer (HCC)     Colon polyp     COPD (chronic obstructive pulmonary disease) (HCC)     Emphysema lung (HCC)     Hyperlipidemia     Prostate cancer (HCC)      Length Of Stay: 1  PHYSICAL THERAPY EVALUATION :   Patient's identity confirmed via 2 patient identifiers (full name and ) at start of session       24 0859   PT Last Visit   PT Visit Date 24   Note Type   Note type Evaluation   Pain Assessment   Pain Assessment Tool 0-10   Pain Score No Pain   Restrictions/Precautions   Weight Bearing Precautions Per Order No   Other Precautions Impulsive;O2  (3L NC O2 (chornic))   Home Living   Type of Home Apartment  (basement apartment in family home)   Home Layout One level;Stairs to enter with rails  (flight to access basement apartment)   Home Equipment   (Home O2)   Prior Function   Level of [place] Independent with ADLs;Independent with functional mobility;Independent with IADLS   Lives With Alone  (Family in main part of home)   Receives Help From Family   General   Family/Caregiver Present No   Cognition   Overall Cognitive Status WFL   Arousal/Participation Alert   Attention Within functional limits   Orientation Level Oriented X4   Memory Within functional limits   Following Commands Follows all commands and directions without difficulty   Comments Pt is impulsive at times. Sats maintained >97% on 3L NC O2 throughout session   Subjective   Subjective \"I'm just pretty deconditioned, I stopped boxing in my 40s\"   RLE Assessment   RLE Assessment WFL   Strength RLE   RLE Overall Strength 4+/5   LLE Assessment   LLE Assessment WFL   Strength LLE   LLE Overall Strength 4+/5   Bed " Mobility   Supine to Sit 7  Independent   Transfers   Sit to Stand 7  Independent   Stand to Sit 7  Independent   Toilet transfer 7  Independent   Ambulation/Elevation   Gait pattern Decreased foot clearance   Gait Assistance 7  Independent   Assistive Device None   Distance 60 ft   Ambulation/Elevation Additional Comments Able to safely manage O2 line. At end of session is performing quad stretches in SLS without LOB   Balance   Static Sitting Normal   Dynamic Sitting Normal   Static Standing Normal   Dynamic Standing Normal   Ambulatory Normal   Assessment   Problem List Decreased endurance;Decreased safety awareness;Impaired judgement   Assessment Richard Nava is a 68 y.o. Male who presents [facility] on 4[facility]17/2024 from home w/ c/o SOB and diagnosis of acute on chronic respiratory failure w/ hypoxia and hypercapnia. Orders for PT eval and treat received. Pt presents w/ comorbidities of COPD, HLD, metastatic prostate cancer, palliative care patient. At baseline, pt mobilizes independently w/ no AD, and reports 0 falls in the last 6 months. Upon evaluation, pt presents w/ the following deficits: decreased endurance and impaired cognition . Upon eval, pt requires I for bed mobility, I for transfers, and I for gait. Based on this PT evaluation today, patient's discharge recommendation is for no post-acute rehabilitation needs. Given the above findings from this evaluation, at this time this patient does not require skilled inpatient PT for the remainder of this admission. Will D/C patient from PT caseload, please reconsult if any changes or needs arise.   Goals   Patient Goals none stated   Discharge Recommendation   Rehab Resource Intensity Level, PT No post-acute rehabilitation needs   AM-PAC Basic Mobility Inpatient   Turning in Flat Bed Without Bedrails 4   Lying on Back to Sitting on Edge of Flat Bed Without Bedrails 4   Moving Bed to Chair 4   Standing Up From Chair Using Arms 4   Walk in Room 4   Climb 3-5  Stairs With Railing 3   Basic Mobility Inpatient Raw Score 23   Basic Mobility Standardized Score 50.88   [facility] Highest Level Of Mobility   -HLM Goal 7: Walk 25 feet or more   -HLM Achieved 7: Walk 25 feet or more   End of Consult   Patient Position at End of Consult Supine;All needs within reach         The patient's AM-PAC Basic Mobility Inpatient Short Form Raw Score is 23, Standardized Score is 50.88. A standardized score greater than 38.32 (raw score of 16) suggests the patient may benefit from discharge to home which may not coincide with above PT recommendations. However please refer to therapist recommendation for discharge planning given other factors that may influence destination.      Given the above findings from this evaluation, at this time this patient does not require skilled inpatient PT for the remainder of this admission. Will D/C patient from PT caseload, please reconsult if any changes or needs arise.      Gwen Urena PT, DPT

## 2024-04-18 NOTE — OCCUPATIONAL THERAPY NOTE
"    Occupational Therapy Evaluation     Patient Name: Richard Nava  Today's Date: 4/18/2024  Problem List  Principal Problem:    Acute on chronic respiratory failure with hypoxia and hypercapnia (HCC)  Active Problems:    COPD (chronic obstructive pulmonary disease) (HCC)    HLD (hyperlipidemia)    Prostate cancer metastatic to bone (HCC)    Insomnia    Cachexia (HCC)    Neoplasm related pain    Opioid dependence (HCC)    Past Medical History  Past Medical History:   Diagnosis Date    Bone cancer (HCC)     Colon polyp     COPD (chronic obstructive pulmonary disease) (HCC)     Emphysema lung (HCC)     Hyperlipidemia     Prostate cancer (HCC)      Past Surgical History  Past Surgical History:   Procedure Laterality Date    APPENDECTOMY      COLONOSCOPY      IR BIOPSY BONE  12/30/2021    JOINT REPLACEMENT      UPPER GASTROINTESTINAL ENDOSCOPY             04/18/24 0858   OT Last Visit   OT Visit Date 04/18/24   Note Type   Note type Evaluation   Pain Assessment   Pain Assessment Tool 0-10   Pain Score No Pain   Hospital Pain Intervention(s) Medication (See MAR)   Multiple Pain Sites No   Restrictions/Precautions   Weight Bearing Precautions Per Order No   Other Precautions Impulsive  (O2)   Home Living   Type of Home Apartment  (basement apt of family home)   Home Layout Performs ADLs on one level   Prior Function   Level of [place] Independent with ADLs;Independent with functional mobility;Independent with IADLS   Lives With Alone  (family in main part of home)   Receives Help From Family   Lifestyle   Autonomy lives in basement apt, Indep self help skills, Indep mobility   Reciprocal Relationships supportive family   General   Family/Caregiver Present No   Subjective   Subjective \"I use to be in [employer]\"   ADL   Eating Assistance 7  Independent   Grooming Assistance 7  Independent   UB Bathing Assistance 7  Independent   LB Bathing Assistance 7  Independent   UB Dressing Assistance 7  Independent   LB " Dressing Assistance 7  Independent   Toileting Assistance  7  Independent   Bed Mobility   Supine to Sit 7  Independent   Transfers   Sit to Stand 7  Independent   Stand to Sit 7  Independent   Toilet transfer 7  Independent   RUE Assessment   RUE Assessment WFL   LUE Assessment   LUE Assessment WFL   Cognition   Overall Cognitive Status WFL   Arousal/Participation Alert;Cooperative   Attention Within functional limits   Orientation Level Oriented X4   Memory Within functional limits   Following Commands Follows all commands and directions without difficulty   Comments impulsive at times   Assessment   Assessment Pt is a 68 y.o. male seen for OT evaluation s/p admission to [facility] on [facility]/17[facility]2024 due to SOB. Diagnosed with Acute on chronic respiratory failure with hypoxia and hypercapnia (HCC). Personal and env factors supporting pt at time of IE include age, (I) PLOF, supportive family, and accessible home environment. Pt was living alone in a basement apt of family's home in  at [facility] level for all self help skills, Indep ambulation NO DME prior to admission. Pt presents today at baseline  level of functioning. He was able to complete bed mobility, sit<>stand, functional ambulation and toilet txfers at [facility] level w/o AD. He performs all aspects of toileting at [facility] level. UB/LB ADLs completed at Indep level in sitting/standing. O2 leveol stable t/o session during all functional tasks with supplemental O2L4. Will sign off for OT with no further OT needs after d/c.   Plan   OT Treatment Day 0   OT Frequency Eval only   Discharge Recommendation   Rehab Resource Intensity Level, OT No post-acute rehabilitation needs   AM-PAC Daily Activity Inpatient   Lower Body Dressing 4   Bathing 4   Toileting 4   Upper Body Dressing 4   Grooming 4   Eating 4   Daily Activity Raw Score 24   Daily Activity Standardized Score (Calc for Raw Score >=11) 57.54   AM-PAC Applied Cognition Inpatient   Following a Speech/Presentation 4    Understanding Ordinary Conversation 4   Taking Medications 4   Remembering Where Things Are Placed or Put Away 4   Remembering List of 4-5 Errands 4   Taking Care of Complicated Tasks 4   Applied Cognition Raw Score 24   Applied Cognition Standardized Score 62.21   End of Consult   Education Provided Yes   Patient Position at End of Consult Standing   Nurse Communication Nurse aware of consult     This session, pt required and most appropriately benefited from skilled OT/PT co-treatment due to extensive physical assistance of SKILLED therapists, significant regression from functional baseline, and decreased activity tolerance. OT and PT goals were addressed separately as seen in documentation.

## 2024-04-18 NOTE — PROGRESS NOTES
[facility]  Progress Note  Name: Richard Nava I  MRN: [id number]  Unit/Bed#: -01 I Date of Admission: 4/17/2024   Date of Service: 4/18/2024 I Hospital Day: 1    Assessment/Plan   Opioid dependence (HCC)  Assessment & Plan  Patient maintained on MS Contin 15 mg every 12 hours  PDMP reviewed    Cachexia (HCC)  Assessment & Plan  Malnutrition Findings:                                 BMI Findings:  Adult BMI Classifications: Underweight < 18.5        Body mass index is 15.57 kg/m².   Likely combination of pulmonary cachexia and from metastatic prostate cancer     Insomnia  Assessment & Plan  PDMP reviewed on Xanax 0.5 mg twice daily as needed for anxiety/insomnia    Prostate cancer metastatic to bone (HCC)  Assessment & Plan  Metastatic prostate cancer on Lupron every 6 months  Zytiga 1000 mg daily- nonformulary ordered     HLD (hyperlipidemia)  Assessment & Plan  Continue statin     COPD (chronic obstructive pulmonary disease) ([facility])  Assessment & Plan  COPD with acute exacerbation  Appreciate pulmonology consult recommendations  He has known severe emphysema  IV steroids, doxycycline and nebulizers  See plan above  Was seen by palliative care last admission and he does not want hospice at this time   Level 1 full code     * Acute on chronic respiratory failure with hypoxia and hypercapnia ([facility])  Assessment & Plan  Patient recently discharged on 4/1 treated for COPD exacerbation presents again with worsening shortness of breath and trouble breathing. patient has severe emphysema and immunocompromise and deconditions given metastatic prostate cancer  Check sputum culture  Will place on doxycycline 100 mg twice daily, check procal, sputum Cx  IV Solu-Medrol 40 mg every 12 hours, scheduled nebulizers  Supportive care  Pulm consulted           VTE  Prophylaxis:   Pharmacologic: in place  Mechanical VTE Prophylaxis in Place: Yes    Patient Centered Rounds: I have performed  bedside rounds with nursing staff today.    Discussions with Specialists or Other Care Team Provider: case management    Education and Discussions with Family / Patient: patient    Mobility:   Basic Mobility Inpatient Raw Score: 23  JH-HLM Goal: 7: Walk 25 feet or more  JH-HLM Achieved: 7: Walk 25 feet or more  JH-HLM Goal achieved. Continue to encourage appropriate mobility.    Total Time Spent on Date of Encounter in care of patient: 55 mins. This time was spent on one or more of the following: performing physical exam; counseling and coordination of care; obtaining or reviewing history; documenting in the medical record; reviewing/ordering tests, medications or procedures; communicating with other healthcare professionals and discussing with patient's family/caregivers.      Current Length of Stay: 1 day(s)    Current Patient Status: Inpatient        Code Status: Level 1 - Full Code    Discharge Plan: Pt will require continued inpatient hospitalization.    Subjective:   No acute ovenight events, states mild improvement with cough. Cough was bad before he came in. Breathing appears to be the same    Patient is seen and examined at bedside.  All other ROS are negative.    Objective:     Vitals:   Temp (24hrs), Av.9 °F (36.6 °C), Min:97.7 °F (36.5 °C), Max:98.1 °F (36.7 °C)    Temp:  [97.7 °F (36.5 °C)-98.1 °F (36.7 °C)] 98.1 °F (36.7 °C)  HR:  [80-97] 80  Resp:  [16] 16  BP: (112-138)/(71-81) 120/71  SpO2:  [92 %-100 %] 100 %  Body mass index is 15.57 kg/m².     Input and Output Summary (last 24 hours):       Intake/Output Summary (Last 24 hours) at 2024 1513  Last data filed at 2024 1101  Gross per 24 hour   Intake 840 ml   Output 900 ml   Net -60 ml       Physical Exam:       GEN: No acute distress, comfortable  HEEENT: No JVD, PERRLA, no scleral icterus  RESP: Lungs clear to auscultation bilaterally  CV: RRR, +s1/s2   ABD: SOFT NON TENDER, POSITIVE BOWEL SOUNDS, NO DISTENTION  PSYCH: CALM  NEURO:  Mentation baseline, NO FOCAL DEFICITS  SKIN: NO RASH  EXTREM: NO EDEMA    Additional Data:     Labs:    Results from last 7 days   Lab Units 04/18/24  0250   WBC Thousand/uL 5.77   HEMOGLOBIN g/dL 11.6*   HEMATOCRIT % 37.4   PLATELETS Thousands/uL 349  349   SEGS PCT % 90*   LYMPHO PCT % 6*   MONO PCT % 3*   EOS PCT % 0     Results from last 7 days   Lab Units 04/18/24  0250   SODIUM mmol/L 139   POTASSIUM mmol/L 4.0   CHLORIDE mmol/L 98   CO2 mmol/L 37*   BUN mg/dL 8   CREATININE mg/dL 0.41*   ANION GAP mmol/L 4   CALCIUM mg/dL 8.3*   ALBUMIN g/dL 3.9   TOTAL BILIRUBIN mg/dL 0.31   ALK PHOS U/L 47   ALT U/L 8   AST U/L 11*   GLUCOSE RANDOM mg/dL 199*     Results from last 7 days   Lab Units 04/17/24  0907   INR  0.93             Results from last 7 days   Lab Units 04/18/24  0250 04/17/24  0907   LACTIC ACID mmol/L  --  0.6   PROCALCITONIN ng/ml 0.06 0.07       Lines/Drains:  Invasive Devices       Peripheral Intravenous Line  Duration             Peripheral IV 04/17/24 Right;Ventral (anterior) Forearm 1 day                    Telemetry:        * I Have Reviewed All Lab Data Listed Above.           Imaging:     Results for orders placed during the hospital encounter of 04/17/24    XR chest portable    Narrative  XR CHEST PORTABLE    INDICATION: cough.    COMPARISON: 3/26/2024    FINDINGS:  Emphysema noted.  No segmental or lobar consolidation seen. There seems to be some faint patchy infiltrate laterally in the left mid lung. Atelectasis or perhaps minimal developing pneumonia. Recommend attention on continued follow-up. No pneumothorax or pleural effusion.    Normal cardiomediastinal silhouette.    Numerous small round sclerotic densities are seen in multiple bones. Patient has known blastic metastases based on a prior CT.    Normal upper abdomen.    Impression  Emphysema. Patchy density left midlung field which could be developing pneumonia. Correlate with clinical scenario. Attention on continued follow-up  appropriate. Sclerotic bone lesions noted        Workstation performed: [id number]    Results for orders placed during the hospital encounter of 10/24/23    XR chest pa & lateral    Narrative  CHEST    INDICATION:   sepsis.    COMPARISON:  None    EXAM PERFORMED/VIEWS:  XR CHEST PA & LATERAL The frontal view was performed utilizing dual energy radiographic technique.  Images: 7    FINDINGS:    Cardiomediastinal silhouette appears unremarkable.    The patient has severe emphysema. No pneumothorax or pleural effusion.    Extensive bony metastasis are present.    Impression  No acute cardiopulmonary disease.  Severe emphysema. Extensive bony metastasis.          Workstation performed: [id number]      *I have reviewed all imaging reports listed above      Recent Cultures (last 7 days):     Results from last 7 days   Lab Units 04/17/24  0907   BLOOD CULTURE  No Growth at 24 hrs.  No Growth at 24 hrs.       Last 24 Hours Medication List:   Current Facility-Administered Medications   Medication Dose Route Frequency Provider Last Rate    acetaminophen  650 mg Oral Q6H PRN Maricarmen Oviedo PA-C      albuterol  2.5 mg Nebulization Q6H PRN Maricarmen Oviedo PA-C      ALPRAZolam  0.5 mg Oral BID PRN Maricarmen Oviedo PA-C      atorvastatin  40 mg Oral Daily With Dinner Maricarmen Oviedo PA-C      benzonatate  100 mg Oral TID PRN Maricarmen Oviedo PA-C      budesonide  0.5 mg Nebulization Q12H Leisa Wilder MD      calcium carbonate  1,000 mg Oral Daily PRN Maricarmen Oviedo PA-C      doxycycline hyclate  100 mg Oral Q12H Maricarmen Oviedo PA-C      enoxaparin  40 mg Subcutaneous Daily Maricarmen Oviedo PA-C      formoterol  20 mcg Nebulization Q12H Leisa Wilder MD      gabapentin  300 mg Oral BID Maricarmen Oviedo PA-C      guaiFENesin  600 mg Oral Q12H [facility] Maricarmen Oviedo PA-C      Hydrocod Jose-Chlorphe Jose ER  5 mL Oral Q12H PRN Maricarmen Oviedo PA-C      ipratropium  0.5 mg Nebulization TID Maricarmen Oviedo PA-C       levalbuterol  1.25 mg Nebulization TID Maricarmen Oviedo PA-C      lidocaine  1 patch Topical Daily Maricarmen Oviedo PA-C      methylPREDNISolone sodium succinate  40 mg Intravenous Q12H [facility] ISA Araujo      morphine  15 mg Oral BID Maricarmen Oviedo PA-C      NON FORMULARY  1,000 mg Oral Daily Maricarmen Oviedo PA-C      ondansetron  4 mg Intravenous Q6H PRN Maricarmen Oviedo PA-C          Today, Patient Was Seen By: Leisa Wilder MD    ** Please Note: Dictation voice to text software may have been used in the creation of this document. **

## 2024-04-18 NOTE — CASE MANAGEMENT
"     Case Management Assessment & Discharge Planning Note    Patient name Richard Nava  Location /-01 MRN [id number]  : 1955 Date 2024       Current Admission Date: 2024  Current Admission Diagnosis:Acute on chronic respiratory failure with hypoxia and hypercapnia (HCC)   Patient Active Problem List    Diagnosis Date Noted    Respiratory distress 2024    Low oxygen saturation 2023    Positive blood culture 2023    Opioid dependence (HCC) 2023    Neoplasm related pain 2023    Medical marijuana use 2023    Encephalopathy acute 2022    Anorexia 2022    Cachexia (HCC) 2022    Palliative care patient 2022    Prostate cancer metastatic to bone (HCC) 2022    Insomnia 2022    Hot flashes 2022    Left lower quadrant abdominal pain 2021    Personal history of colonic polyps 2021    HLD (hyperlipidemia) 2020    Acute on chronic respiratory failure with hypoxia and hypercapnia (HCC) 2020    Severe protein-calorie malnutrition (HCC) 2020    COPD (chronic obstructive pulmonary disease) (HCC) 2020      LOS (days): 1  Geometric Mean LOS (GMLOS) (days): 3.6  Days to GMLOS:2.6     OBJECTIVE:  PATIENT READMITTED TO HOSPITAL  Risk of Unplanned Readmission Score: 38.51         Current admission status: Inpatient       Preferred Pharmacy:   [id number] [place] - [address]  [address]  [facility] [address]  Phone: [phone] Fax: [phone]    [facility] - [place], [place] - [address] [facility] [address]  [address]  [place] [address]  Phone: [phone] Fax: [phone]    Primary Care Provider: Kevin Mejia DO    Primary Insurance: MEDICARE  Secondary Insurance:     ASSESSMENT:  Active Health Care Proxies       Mary Underwood(\"JELENA\") Health Care Representative - [facility] Phone: [phone] (Mobile)             "     Advance Directives  Does patient have a Health Care POA?: Yes  Does patient have Advance Directives?: Yes  Advance Directives: Living will, Power of  for health care  Primary Contact: SisterMary         Readmission Root Cause  30 Day Readmission: Yes  Who directed you to return to the hospital?: Self  Did you understand whom to contact if you had questions or problems?: Yes  Did you get your prescriptions before you left the hospital?: No  Reason:: Delivery service not offered  Were you able to get your prescriptions filled when you left the hospital?: Yes  Did you take your medications as prescribed?: Yes  Were you able to get to your follow-up appointments?: Yes  During previous admission, was a post-acute recommendation made?: Yes  What post-acute resources were offered?: [facility]  Patient was readmitted due to: Acute on chronic respiratory failure w/ hypoxia and hypercapnia    Patient Information  Admitted from:: Home  Mental Status: Alert  During Assessment patient was accompanied by: Not accompanied during assessment  Assessment information provided by:: Patient  Primary Caregiver: Self  Support Systems: Self, Family members  County of Residence: [place]  What [facility] do you live in?: [place]  Home entry access options. Select all that apply.: Stairs  Number of steps to enter home.: 10  Do the steps have railings?: Yes  Type of Current Residence: Apartment  Floor Level:  (basement)  Upon entering residence, is there a bedroom on the main floor (no further steps)?: Yes  Upon entering residence, is there a bathroom on the main floor (no further steps)?: Yes  Living Arrangements: Lives w/ Extended Family    Activities of Daily Living Prior to Admission  Functional Status: Independent  Completes ADLs independently?: Yes  Ambulates independently?: Yes  Does patient use assisted devices?: Yes  Assisted Devices (DME) used: Home Oxygen concentrator, Portable Oxygen tanks, Nebulizer  DME Company  Name (respiratory supplies): [employer]  O2 Rate(s): 3-4L  Does patient currently own DME?: Yes  What DME does the patient currently own?: Home Oxygen concentrator, Portable Oxygen tanks, Nebulizer  Does patient have a history of Outpatient Therapy (PT/OT)?: No  Does the patient have a history of Short-Term Rehab?: No  Does patient have a history of HHC?: Yes ([facility])  Does patient currently have HHC?: No         Patient Information Continued  Income Source: SSI/SSD  Does patient have prescription coverage?: Yes  Does patient receive dialysis treatments?: No  Does patient have a history of substance abuse?: No  Does patient have a history of Mental Health Diagnosis?: No         Means of Transportation  Means of Transport to App:: Family transport      Social Determinants of Health (SDOH)      Flowsheet Row Most Recent Value   Housing Stability    In the last 12 months, was there a time when you were not able to pay the mortgage or rent on time? N   In the last 12 months, how many places have you lived? 1   In the last 12 months, was there a time when you did not have a steady place to sleep or slept in a shelter (including now)? N   Transportation Needs    In the past 12 months, has lack of transportation kept you from medical appointments or from getting medications? no   In the past 12 months, has lack of transportation kept you from meetings, work, or from getting things needed for daily living? No   Food Insecurity    Within the past 12 months, you worried that your food would run out before you got the money to buy more. Never true   Within the past 12 months, the food you bought just didn't last and you didn't have money to get more. Never true   Utilities    In the past 12 months has the electric, gas, oil, or water company threatened to shut off services in your home? No            DISCHARGE DETAILS:    Discharge planning discussed with:: Patient  Freedom of Choice: Yes     CM contacted  family/caregiver?: No- see comments (Pt is alert and oriented)  Were Treatment Team discharge recommendations reviewed with patient/caregiver?: Yes  Did patient/caregiver verbalize understanding of patient care needs?: Yes  Were patient/caregiver advised of the risks associated with not following Treatment Team discharge recommendations?: Yes         Requested Home Health Care         Is the patient interested in HHC at discharge?: No    DME Referral Provided  Referral made for DME?: No              Treatment Team Recommendation: Home  Discharge Destination Plan:: Home  Transport at Discharge : Family                                      Additional Comments: CM met with pt to discuss role of CM and any needs prior to discharge. Pt lives w/ extended family in basement apartment w/ 10 [address]. Indp PTA. Owns/uses nebulizer, home O2 concentrator, and portable tanks through [employer]. On 3-4L O2 at baseline. Pt has hx HH through [facility]. No hx OP PT/STR/DA/MH. Pt prefers to use [facility] on [facility]. Family will transport at discharge.

## 2024-04-18 NOTE — ASSESSMENT & PLAN NOTE
Patient recently discharged on 4/1 treated for COPD exacerbation presents again with worsening shortness of breath and trouble breathing. patient has severe emphysema and immunocompromise and deconditions given metastatic prostate cancer  Check sputum culture  Will place on doxycycline 100 mg twice daily, check procal, sputum Cx  IV Solu-Medrol 40 mg every 12 hours, scheduled nebulizers  Supportive care  Pulm consulted

## 2024-04-18 NOTE — CONSULTS
"Consultation - Pulmonary Medicine   Richard Nava 68 y.o. male MRN: [id number]  Unit/Bed#: -01 Encounter: [id number]      Assessment/Plan:    Acute on chronic hypoxic and hypercapnic respiratory failure  Severe COPD/emphysema with acute exacerbation  Prostate cancer with metastasis to bone  Severe malnutrition    Patient is currently on 4L oxygen saturating at 97%. Titrate oxygen to maintain saturations >89%.  He reports being maintained on 2.5 - 4 L at home.  Chest x-ray with continued emphysema and some patchy left midlung density, suggesting possible developing pneumonia.  However patient is infectiously asymptomatic, no leukocytosis, Procal is not elevated.  Monitor WBC curve and procalcitonin.   Can continue po doxy for now  Send sputum culture  Home regimen: Breztri, singulair, albuterol neb prn, albuterol inhaler prn. May consider all nebulized therapy upon discharge  Continue Atrovent/Xopenex nebulizer 3 times daily.  Add Pulmicort and Perforomist nebulizers BID  Decrease IV Solu-Medrol 40 mg to every 12 hours      History of Present Illness   Physician Requesting Consult: Leisa Wilder MD  Reason for Consult / Principal Problem: COPD exacerbation  Chief Complaint: \"I keep coughing\"  HPI: Richard Nava is a 68 y.o.  male with past medical history  severe COPD, chronic hypoxic respiratory failure, prostate cancer with mets to bone, severe malnutrition who presented to [facility] with complaints of productive cough and shortness of breath worsening over the past week.    He reports frequent cough, bringing up thick yellow sputum.  He denies any fevers or chills.  He reports feeling more short of breath and some wheezing. Denies any chest tightness.  He reports having severe frequent headaches when waking up in the mornings. He wears nocturnal and continuous oxygen.    He has been hospitalized 3 times now within the past month and a half.  During his hospital " admission 3/8-3/12 he was admitted for COPD exacerbation and sputum culture currently positive for Pseudomonas. He was treated with 7-day course of Levaquin.       Inpatient consult to Pulmonology  Consult performed by: ISA Araujo  Consult ordered by: Maricarmen Oviedo PA-C          Review of Systems   Constitutional:  Negative for chills and fever.   HENT:  Negative for congestion and rhinorrhea.    Eyes:  Negative for pain and visual disturbance.   Respiratory:  Positive for cough, shortness of breath and wheezing. Negative for chest tightness.    Cardiovascular:  Negative for chest pain and palpitations.   Gastrointestinal:  Negative for abdominal pain and vomiting.   Musculoskeletal:  Negative for arthralgias and back pain.   Skin:  Negative for color change and rash.   Neurological:  Positive for headaches. Negative for light-headedness.   Psychiatric/Behavioral: Negative.     All other systems reviewed and are negative.      Historical Information   Past Medical History:   Diagnosis Date    Bone cancer (HCC)     Colon polyp     COPD (chronic obstructive pulmonary disease) (HCC)     Emphysema lung (HCC)     Hyperlipidemia     Prostate cancer (HCC)      Past Surgical History:   Procedure Laterality Date    APPENDECTOMY      COLONOSCOPY      IR BIOPSY BONE  2021    JOINT REPLACEMENT      UPPER GASTROINTESTINAL ENDOSCOPY       Social History   Social History     Substance and Sexual Activity   Alcohol Use Never     Social History     Substance and Sexual Activity   Drug Use Never     Social History     Tobacco Use   Smoking Status Former    Current packs/day: 0.00    Average packs/day: 1 pack/day for 42.0 years (42.0 ttl pk-yrs)    Types: Cigarettes    Start date:     Quit date:     Years since quittin.3   Smokeless Tobacco Never     E-Cigarette/Vaping    E-Cigarette Use Never User      E-Cigarette/Vaping Substances    Nicotine No     THC No     CBD No     Flavoring No     Other No   "   Unknown No        Meds/Allergies   all current active meds have been reviewed and current meds:   Current Facility-Administered Medications   Medication Dose Route Frequency    acetaminophen (TYLENOL) tablet 650 mg  650 mg Oral Q6H PRN    albuterol inhalation solution 2.5 mg  2.5 mg Nebulization Q6H PRN    ALPRAZolam (XANAX) tablet 0.5 mg  0.5 mg Oral BID PRN    atorvastatin (LIPITOR) tablet 40 mg  40 mg Oral Daily With Dinner    benzonatate (TESSALON PERLES) capsule 100 mg  100 mg Oral TID PRN    calcium carbonate (TUMS) chewable tablet 1,000 mg  1,000 mg Oral Daily PRN    doxycycline hyclate (VIBRAMYCIN) capsule 100 mg  100 mg Oral Q12H    enoxaparin (LOVENOX) subcutaneous injection 40 mg  40 mg Subcutaneous Daily    gabapentin (NEURONTIN) capsule 300 mg  300 mg Oral BID    guaiFENesin (MUCINEX) 12 hr tablet 600 mg  600 mg Oral Q12H FANNY    Hydrocod Jose-Chlorphe Jose ER (TUSSIONEX) ER suspension 5 mL  5 mL Oral Q12H PRN    ipratropium (ATROVENT) 0.02 % inhalation solution 0.5 mg  0.5 mg Nebulization TID    levalbuterol (XOPENEX) inhalation solution 1.25 mg  1.25 mg Nebulization TID    lidocaine (LIDODERM) 5 % patch 1 patch  1 patch Topical Daily    methylPREDNISolone sodium succinate (Solu-MEDROL) injection 40 mg  40 mg Intravenous Q8H    morphine (MS CONTIN) ER tablet 15 mg  15 mg Oral BID    NON FORMULARY  1,000 mg Oral Daily    ondansetron (ZOFRAN) injection 4 mg  4 mg Intravenous Q6H PRN       No Known Allergies    Objective   Vitals: Blood pressure 120/71, pulse 80, temperature 98.1 °F (36.7 °C), resp. rate 16, height 5' 7\" (1.702 m), weight 45.1 kg (99 lb 6.8 oz), SpO2 96%.4L,Body mass index is 15.57 kg/m².    Intake/Output Summary (Last 24 hours) at 4/18/2024 0833  Last data filed at 4/18/2024 0201  Gross per 24 hour   Intake 290 ml   Output 650 ml   Net -360 ml     Invasive Devices       Peripheral Intravenous Line  Duration             Peripheral IV 04/17/24 Right;Ventral (anterior) Forearm 1 day      "               Physical Exam  Vitals and nursing note reviewed.   Constitutional:       General: He is not in acute distress.     Appearance: He is cachectic. He is not ill-appearing.   HENT:      Head: Normocephalic and atraumatic.      Nose: Nose normal.      Mouth/Throat:      Pharynx: Oropharynx is clear.   Eyes:      Conjunctiva/sclera: Conjunctivae normal.   Cardiovascular:      Rate and Rhythm: Normal rate.   Pulmonary:      Effort: Pulmonary effort is normal. No respiratory distress.      Breath sounds: Decreased air movement present. Wheezing present.   Abdominal:      General: Abdomen is flat.   Musculoskeletal:         General: Normal range of motion.      Cervical back: Normal range of motion and neck supple.   Skin:     General: Skin is warm and dry.      Capillary Refill: Capillary refill takes less than 2 seconds.   Neurological:      General: No focal deficit present.      Mental Status: He is alert and oriented to person, place, and time.   Psychiatric:         Mood and Affect: Mood normal.         Lab Results: I have personally reviewed pertinent lab results., BNP:   Lab Results   Component Value Date    BNP 46 04/17/2024   , CBC:   Lab Results   Component Value Date    WBC 5.77 04/18/2024    HGB 11.6 (L) 04/18/2024    HCT 37.4 04/18/2024     (H) 04/18/2024     04/18/2024     04/18/2024    RBC 3.74 (L) 04/18/2024    MCH 31.0 04/18/2024    MCHC 31.0 (L) 04/18/2024    RDW 13.2 04/18/2024    MPV 8.4 (L) 04/18/2024    MPV 8.4 (L) 04/18/2024    NRBC 0 04/18/2024   , CMP:   Lab Results   Component Value Date    SODIUM 139 04/18/2024    K 4.0 04/18/2024    CL 98 04/18/2024    CO2 37 (H) 04/18/2024    BUN 8 04/18/2024    CREATININE 0.41 (L) 04/18/2024    CALCIUM 8.3 (L) 04/18/2024    AST 11 (L) 04/18/2024    ALT 8 04/18/2024    ALKPHOS 47 04/18/2024    EGFR 119 04/18/2024     Lactic Acid: 0.6    Procalcitonin: 0.06, 0.07    Flu/COVID/RSV PCR: Negative    Culture Data: Send sputum, BC in  "process      Imaging Studies: I have personally reviewed pertinent reports.   and I have personally reviewed pertinent films in PACS     XR chest portable, 4/17/2024  Emphysema. Patchy density left midlung field which could be developing pneumonia. Correlate with clinical scenario. Attention on continued follow-up appropriate. Sclerotic bone lesions noted         Code Status: Level 1 - Full Code      LIGIA Watts RN FNP-BC  Nurse Practitioner  [facility]       Portions of the record may have been created with voice recognition software.  Occasional wrong word or \"sound a like\" substitutions may have occurred due to the inherent limitations of voice recognition software.  Read the chart carefully and recognize, using context, where substitutions have occurred.  "

## 2024-04-18 NOTE — SPEECH THERAPY NOTE
Speech Language/Pathology    Speech-Language Pathology Bedside Swallow Evaluation      Patient Name: Richard Nava    Today's Date: 4/18/2024     Problem List  Principal Problem:    Acute on chronic respiratory failure with hypoxia and hypercapnia (HCC)  Active Problems:    COPD (chronic obstructive pulmonary disease) (HCC)    HLD (hyperlipidemia)    Prostate cancer metastatic to bone (HCC)    Insomnia    Cachexia (HCC)    Neoplasm related pain    Opioid dependence (HCC)      Past Medical History  Past Medical History:   Diagnosis Date    Bone cancer (HCC)     Colon polyp     COPD (chronic obstructive pulmonary disease) (HCC)     Emphysema lung (HCC)     Hyperlipidemia     Prostate cancer (HCC)        Past Surgical History  Past Surgical History:   Procedure Laterality Date    APPENDECTOMY      COLONOSCOPY      IR BIOPSY BONE  12/30/2021    JOINT REPLACEMENT      UPPER GASTROINTESTINAL ENDOSCOPY         Summary   Pt presented with functional appearing oral and pharyngeal stage swallowing skills with materials administered today. Pt effectively uses gums and lingual-palatal mashing to break down all material. Bolus formation is organized. Lingual rocking piecemeal transfers though no significant oral residue after mult transfers. Swallows w/ ?able delay and fair rise to palpation. No overt s/s aspiration across trials. Education provided on strategies to optimize swallow safety and s/s dysphagia/aspiration to notify medical team of should they arise.  Today's assessment was limited by limited bolus acceptance 2/2 pt c/o nausea - pt would benefit from ST f/u x1-2 to ensure safety/tolerance across larger sample as able/appropriate.     Risk/s for Aspiration: Respiratory status, medical status      Recommended Diet: regular diet and thin liquids   Recommended Form of Meds: whole with liquid   Aspiration precautions and swallowing strategies: upright posture, only feed when fully alert, slow rate of feeding, and  "small bites/sips  Other Recommendations: Continue frequent oral care        Current Medical Status  Pt is a 68 y.o. male who presented to  [facility]  with a PMH of severe emphysema, metastatic prostate cancer, chronic opioid dependence, palliative care patient, hyperlipidemia, cachexia who presents with SOB and trouble breathing. He was recently discharge on 4/1/24 for COPD exacerbation. States a couple days ago \"my exacerbation came back\".  He was called in steroids and antibiotics by his PCP yesterday.  He did not start the steroids as he states he is intolerant to prednisone.  He was coughing up sputum clear and now is yellowish \"nasty\" color. He denies any fevers or chills. No chest pain. He did have some nausea and vomiting a couple of days ago. No diarrhea. He has nasal and chest congestion. He normally wears 2.5-3 lpm nasal cannula oxygen has increased his oxygen at home to 3.5-4.5 lpm. He lives with his daughter in law. He does not drive really anymore. He normally walks without assistance. He has had requent frequent admissions. He is followed by palliative care on chronic pain medications but is not ready for hospice per last notes.        Current Precautions:      Allergies:  No known food allergies    Past medical history:  Please see H&P for details    Special Studies:  CXR 4/17: Emphysema. Patchy density left midlung field which could be developing pneumonia. Correlate with clinical scenario. Attention on continued follow-up appropriate. Sclerotic bone lesions noted   Social/Education/Vocational Hx:  Pt lives with family    Swallow Information   Current Risks for Dysphagia & Aspiration: known history of dysphagia  Current Symptoms/Concerns: change in respiratory status  Current Diet: regular diet and thin liquids   Baseline Diet: regular diet and thin liquids      Baseline Assessment   Behavior/Cognition: alert  Speech/Language Status: able to participate in conversation and able to follow " "commands  Patient Positioning: upright in bed  Pain Status/Interventions/Response to Interventions:  No report of or nonverbal indications of pain.       Swallow Mechanism Exam  Facial: symmetrical  Labial: WFL  Lingual: WFL  Velum: symmetrical  Mandible: adequate ROM  Dentition: edentulous  Vocal quality:clear/adequate   Respiratory Status: on 4L O2      Consistencies Assessed and Performance   Consistencies Administered: thin liquids, puree, soft solids, and hard solids    Oral Stage: WFL  Mastication was adequate with the materials administered today.  Bolus formation and transfer were functional with no significant oral residue noted.  No overt s/s reduced oral control.    Pharyngeal Stage: minimal  Swallow Mechanics:  Swallowing initiation appeared prompt.  Laryngeal rise was palpated and judged to be within functional limits.  No coughing, throat clearing, change in vocal quality or respiratory status noted today.     Esophageal Concerns: none reported    Strategies and Efficacy: -    Summary and Recommendations (see above)    Results Reviewed with: patient and RN     Treatment Recommended: Yes     Frequency of treatment: Brief f/u x1-2     Patient Stated Goal: \"I don't like how hot it is in here\"     Dysphagia LTG  -Patient will demonstrate safe and effective oral intake (without overt s/s significant oral/pharyngeal dysphagia including s/s penetration or aspiration) for the highest appropriate diet level.       Speech Therapy Prognosis   Prognosis: good    Prognosis Considerations: age, medical status, prior medical history, and respiratory status       "

## 2024-04-18 NOTE — ASSESSMENT & PLAN NOTE
Malnutrition Findings:                                 BMI Findings:  Adult BMI Classifications: Underweight < 18.5        Body mass index is 15.57 kg/m².   Likely combination of pulmonary cachexia and from metastatic prostate cancer

## 2024-04-19 DIAGNOSIS — C61 PROSTATE CANCER METASTATIC TO BONE (HCC): Primary | ICD-10-CM

## 2024-04-19 DIAGNOSIS — C79.51 PROSTATE CANCER METASTATIC TO BONE (HCC): Primary | ICD-10-CM

## 2024-04-19 LAB
ANION GAP SERPL CALCULATED.3IONS-SCNC: 3 MMOL/L (ref 4–13)
BACTERIA SPT RESP CULT: ABNORMAL
BASOPHILS # BLD AUTO: 0.01 THOUSANDS/ÂΜL (ref 0–0.1)
BASOPHILS NFR BLD AUTO: 0 % (ref 0–1)
BUN SERPL-MCNC: 13 MG/DL (ref 5–25)
CALCIUM SERPL-MCNC: 7.9 MG/DL (ref 8.4–10.2)
CHLORIDE SERPL-SCNC: 100 MMOL/L (ref 96–108)
CO2 SERPL-SCNC: 40 MMOL/L (ref 21–32)
CREAT SERPL-MCNC: 0.41 MG/DL (ref 0.6–1.3)
EOSINOPHIL # BLD AUTO: 0 THOUSAND/ÂΜL (ref 0–0.61)
EOSINOPHIL NFR BLD AUTO: 0 % (ref 0–6)
ERYTHROCYTE [DISTWIDTH] IN BLOOD BY AUTOMATED COUNT: 13.3 % (ref 11.6–15.1)
GFR SERPL CREATININE-BSD FRML MDRD: 119 ML/MIN/1.73SQ M
GLUCOSE SERPL-MCNC: 165 MG/DL (ref 65–140)
GRAM STN SPEC: ABNORMAL
HCT VFR BLD AUTO: 35.2 % (ref 36.5–49.3)
HGB BLD-MCNC: 10.8 G/DL (ref 12–17)
IMM GRANULOCYTES # BLD AUTO: 0.05 THOUSAND/UL (ref 0–0.2)
IMM GRANULOCYTES NFR BLD AUTO: 1 % (ref 0–2)
LYMPHOCYTES # BLD AUTO: 0.59 THOUSANDS/ÂΜL (ref 0.6–4.47)
LYMPHOCYTES NFR BLD AUTO: 8 % (ref 14–44)
MCH RBC QN AUTO: 30.9 PG (ref 26.8–34.3)
MCHC RBC AUTO-ENTMCNC: 30.7 G/DL (ref 31.4–37.4)
MCV RBC AUTO: 101 FL (ref 82–98)
MONOCYTES # BLD AUTO: 0.32 THOUSAND/ÂΜL (ref 0.17–1.22)
MONOCYTES NFR BLD AUTO: 4 % (ref 4–12)
NEUTROPHILS # BLD AUTO: 6.67 THOUSANDS/ÂΜL (ref 1.85–7.62)
NEUTS SEG NFR BLD AUTO: 87 % (ref 43–75)
NRBC BLD AUTO-RTO: 0 /100 WBCS
PLATELET # BLD AUTO: 308 THOUSANDS/UL (ref 149–390)
PMV BLD AUTO: 8.5 FL (ref 8.9–12.7)
POTASSIUM SERPL-SCNC: 4 MMOL/L (ref 3.5–5.3)
RBC # BLD AUTO: 3.49 MILLION/UL (ref 3.88–5.62)
SODIUM SERPL-SCNC: 143 MMOL/L (ref 135–147)
WBC # BLD AUTO: 7.64 THOUSAND/UL (ref 4.31–10.16)

## 2024-04-19 PROCEDURE — 94640 AIRWAY INHALATION TREATMENT: CPT

## 2024-04-19 PROCEDURE — 92526 ORAL FUNCTION THERAPY: CPT

## 2024-04-19 PROCEDURE — 99233 SBSQ HOSP IP/OBS HIGH 50: CPT | Performed by: INTERNAL MEDICINE

## 2024-04-19 PROCEDURE — 94760 N-INVAS EAR/PLS OXIMETRY 1: CPT

## 2024-04-19 PROCEDURE — 85025 COMPLETE CBC W/AUTO DIFF WBC: CPT | Performed by: INTERNAL MEDICINE

## 2024-04-19 PROCEDURE — 80048 BASIC METABOLIC PNL TOTAL CA: CPT | Performed by: INTERNAL MEDICINE

## 2024-04-19 RX ORDER — AZITHROMYCIN 500 MG/1
500 TABLET, FILM COATED ORAL EVERY 24 HOURS
Status: DISCONTINUED | OUTPATIENT
Start: 2024-04-19 | End: 2024-04-20

## 2024-04-19 RX ORDER — ABIRATERONE ACETATE 250 MG/1
1000 TABLET ORAL DAILY
Status: DISCONTINUED | OUTPATIENT
Start: 2024-04-19 | End: 2024-04-20 | Stop reason: HOSPADM

## 2024-04-19 RX ORDER — METHYLPREDNISOLONE SODIUM SUCCINATE 40 MG/ML
40 INJECTION, POWDER, LYOPHILIZED, FOR SOLUTION INTRAMUSCULAR; INTRAVENOUS EVERY 12 HOURS SCHEDULED
Status: COMPLETED | OUTPATIENT
Start: 2024-04-19 | End: 2024-04-19

## 2024-04-19 RX ORDER — METHYLPREDNISOLONE 4 MG/1
32 TABLET ORAL DAILY
Qty: 24 TABLET | Refills: 0 | Status: DISCONTINUED | OUTPATIENT
Start: 2024-04-20 | End: 2024-04-20 | Stop reason: HOSPADM

## 2024-04-19 RX ADMIN — MORPHINE SULFATE 15 MG: 15 TABLET, EXTENDED RELEASE ORAL at 17:15

## 2024-04-19 RX ADMIN — LEVALBUTEROL HYDROCHLORIDE 1.25 MG: 1.25 SOLUTION RESPIRATORY (INHALATION) at 07:09

## 2024-04-19 RX ADMIN — METHYLPREDNISOLONE SODIUM SUCCINATE 40 MG: 40 INJECTION, POWDER, FOR SOLUTION INTRAMUSCULAR; INTRAVENOUS at 08:48

## 2024-04-19 RX ADMIN — IPRATROPIUM BROMIDE 0.5 MG: 0.5 SOLUTION RESPIRATORY (INHALATION) at 13:32

## 2024-04-19 RX ADMIN — AZITHROMYCIN 500 MG: 500 TABLET, FILM COATED ORAL at 19:43

## 2024-04-19 RX ADMIN — IPRATROPIUM BROMIDE 0.5 MG: 0.5 SOLUTION RESPIRATORY (INHALATION) at 19:12

## 2024-04-19 RX ADMIN — LEVALBUTEROL HYDROCHLORIDE 1.25 MG: 1.25 SOLUTION RESPIRATORY (INHALATION) at 19:12

## 2024-04-19 RX ADMIN — METHYLPREDNISOLONE SODIUM SUCCINATE 40 MG: 40 INJECTION, POWDER, FOR SOLUTION INTRAMUSCULAR; INTRAVENOUS at 20:41

## 2024-04-19 RX ADMIN — GUAIFENESIN 600 MG: 600 TABLET ORAL at 08:49

## 2024-04-19 RX ADMIN — GABAPENTIN 300 MG: 300 CAPSULE ORAL at 17:15

## 2024-04-19 RX ADMIN — DOXYCYCLINE 100 MG: 100 CAPSULE ORAL at 04:33

## 2024-04-19 RX ADMIN — GABAPENTIN 300 MG: 300 CAPSULE ORAL at 08:49

## 2024-04-19 RX ADMIN — BUDESONIDE 0.5 MG: 0.5 INHALANT RESPIRATORY (INHALATION) at 19:12

## 2024-04-19 RX ADMIN — ABIRATERONE ACETATE 1000 MG: 250 TABLET, FILM COATED ORAL at 11:35

## 2024-04-19 RX ADMIN — LEVALBUTEROL HYDROCHLORIDE 1.25 MG: 1.25 SOLUTION RESPIRATORY (INHALATION) at 13:32

## 2024-04-19 RX ADMIN — BUDESONIDE 0.5 MG: 0.5 INHALANT RESPIRATORY (INHALATION) at 07:09

## 2024-04-19 RX ADMIN — MORPHINE SULFATE 15 MG: 15 TABLET, EXTENDED RELEASE ORAL at 08:49

## 2024-04-19 RX ADMIN — IPRATROPIUM BROMIDE 0.5 MG: 0.5 SOLUTION RESPIRATORY (INHALATION) at 07:09

## 2024-04-19 RX ADMIN — FORMOTEROL FUMARATE DIHYDRATE 20 MCG: 20 SOLUTION RESPIRATORY (INHALATION) at 07:09

## 2024-04-19 RX ADMIN — DOXYCYCLINE 100 MG: 100 CAPSULE ORAL at 15:52

## 2024-04-19 RX ADMIN — GUAIFENESIN 600 MG: 600 TABLET ORAL at 20:41

## 2024-04-19 RX ADMIN — LIDOCAINE 1 PATCH: 50 PATCH TOPICAL at 08:48

## 2024-04-19 RX ADMIN — ATORVASTATIN CALCIUM 40 MG: 40 TABLET, FILM COATED ORAL at 15:52

## 2024-04-19 RX ADMIN — FORMOTEROL FUMARATE DIHYDRATE 20 MCG: 20 SOLUTION RESPIRATORY (INHALATION) at 19:12

## 2024-04-19 NOTE — ASSESSMENT & PLAN NOTE
Patient recently discharged on 4/1 treated for COPD exacerbation presents again with worsening shortness of breath and trouble breathing. patient has severe emphysema and immunocompromise and deconditions given metastatic prostate cancer  Check sputum culture  Will place on doxycycline 100 mg twice daily, check procal, sputum Cx  IV Solu-Medrol 40 mg every 12 hours, scheduled nebulizers, changed to PO steroids  Supportive care  Pulm consulted

## 2024-04-19 NOTE — PROGRESS NOTES
"Progress Note - Pulmonary   Richard Nava 68 y.o. male MRN: [id number]  Unit/Bed#: -01 Encounter: [id number]    Assessment & Plan:    Acute on chronic hypoxic and hypercapnic respiratory failure  Severe COPD/emphysema with acute exacerbation  Prostate cancer with metastasis to bone  Severe malnutrition     Patient is currently on 4L oxygen saturating at 99%. Titrate oxygen to maintain saturations >89%.  He reports being maintained on 2.5 - 4 L at home.  Chest x-ray with continued emphysema and some patchy left midlung density, suggesting possible developing pneumonia.  However patient is infectiously asymptomatic, no leukocytosis, Procal is not elevated.  [id number]. Monitor WBC curve and procalcitonin.   Continue po doxy  Follow up sputum culture  Home regimen: Breztri, singulair, albuterol neb prn, albuterol inhaler prn. May consider all nebulized therapy upon discharge per patient preference  Continue Atrovent/Xopenex nebulizer 3 times daily, Pulmicort and Perforomist nebulizers BID  Continue IV Solu-Medrol 40 mg every 12 hours today, decrease to medrol 32mg tomorrow with 3 day taper. Patient intolerant of prednisone in the past  He will need PFTs and outpatient pulmonary follow up. Patient may be a candidate for chronic azithromycin 250mg 3x weekly upon discharge, given his frequent exacerbations requiring hospitalization  Pulmonary will continue to follow and will see 4/21    Chief Complaint:   \"I am so tired\"    Subjective:   Patient resting in bed.  He reports his breathing still feels the same.  He continues with chest congestion and cough.  He feels exhausted and has not been able to sleep.    Objective:     Vitals: Blood pressure 114/76, pulse 64, temperature 97.5 °F (36.4 °C), resp. rate 16, height 5' 7\" (1.702 m), weight 43.8 kg (96 lb 9 oz), SpO2 100%.,Body mass index is 15.12 kg/m².      Intake/Output Summary (Last 24 hours) at 4/19/2024 0849  Last data filed at 4/19/2024 0101  Gross per 24 " hour   Intake 1080 ml   Output 500 ml   Net 580 ml       Invasive Devices       Peripheral Intravenous Line  Duration             Peripheral IV 04/17/24 Right;Ventral (anterior) Forearm 2 days                    Physical Exam:   General appearance:   Alert and awake, in no acute distress. He is cachectic   Head:   Normocephalic, without obvious abnormality, atraumatic  Eyes:   No scleral icterus   Lungs:  Pulmonary effort nonlabored at rest  Breath sounds: Decreased bilaterally. No wheezes. No rhonchi  Cardiovascular:   Regular rate and rhythm. No murmurs. Capillary refill < 3 seconds.  Abdomen:    No appreciable distension or tenderness  Extremities:   No deformity. No clubbing present. No edema to extremities.   Skin:   Warm and dry. Intact. Color appropriate for ethnicity.  Neurologic:   No acute focal deficits are noted.  Psychiatric:   Normal mood. Answers questions appropriately.        Labs: I have personally reviewed pertinent lab results., CBC:   Lab Results   Component Value Date    WBC 7.64 04/19/2024    HGB 10.8 (L) 04/19/2024    HCT 35.2 (L) 04/19/2024     (H) 04/19/2024     04/19/2024    RBC 3.49 (L) 04/19/2024    MCH 30.9 04/19/2024    MCHC 30.7 (L) 04/19/2024    RDW 13.3 04/19/2024    MPV 8.5 (L) 04/19/2024    NRBC 0 04/19/2024   , CMP:   Lab Results   Component Value Date    SODIUM 143 04/19/2024    K 4.0 04/19/2024     04/19/2024    CO2 40 (H) 04/19/2024    BUN 13 04/19/2024    CREATININE 0.41 (L) 04/19/2024    CALCIUM 7.9 (L) 04/19/2024    EGFR 119 04/19/2024     Imaging and other studies: I have personally reviewed pertinent reports.   and I have personally reviewed pertinent films in PACS    XR chest portable, 4/17/2024  Emphysema. Patchy density left midlung field which could be developing pneumonia. Correlate with clinical scenario. Attention on continued follow-up appropriate. Sclerotic bone lesions noted      Zuleyma Francisco, LIGIA RN FNP-BC  Nurse Practitioner  [facility]

## 2024-04-19 NOTE — SPEECH THERAPY NOTE
"Speech Language/Pathology    Speech/Language Pathology Progress Note    Patient Name: Richard Nava  Today's Date: 4/19/2024     Problem List  Principal Problem:    Acute on chronic respiratory failure with hypoxia and hypercapnia (HCC)  Active Problems:    COPD (chronic obstructive pulmonary disease) (HCC)    HLD (hyperlipidemia)    Prostate cancer metastatic to bone (HCC)    Insomnia    Cachexia (HCC)    Neoplasm related pain    Opioid dependence (HCC)       Past Medical History  Past Medical History:   Diagnosis Date    Bone cancer (HCC)     Colon polyp     COPD (chronic obstructive pulmonary disease) (HCC)     Emphysema lung (HCC)     Hyperlipidemia     Prostate cancer (HCC)         Past Surgical History  Past Surgical History:   Procedure Laterality Date    APPENDECTOMY      COLONOSCOPY      IR BIOPSY BONE  12/30/2021    JOINT REPLACEMENT      UPPER GASTROINTESTINAL ENDOSCOPY           Subjective:  \"My appetite is really coming back to me. It always gets thrown off when I don't sleep.\"     Current Diet:  Regular/thin     Objective:  Pt seen for dx dysphagia tx f/u to ensure PO safety/tolerance. Pt seen w/ regular texture grilled cheese sandwich and thin liquids. Bite strength is adequate for sandwich. Timely/effect mastication, bolus formation, and transfers. No significant residue. Straw sips of thin drawn without difficulty. Swallows suspected fairly prompt. No overt s/s aspiration across meal. Pt denies dysphagia, odynophagia, globus sensation, and/or s/s aspiration. Education reviewed on strategies to optimize swallow safety and s/s dysphagia/aspiration to notify medical team of should they arise. Pt demonstrates understanding and denies questions/concerns at this time.     Assessment:  Pt presents w/ functional appearing oropharyngeal swallow skill.     Plan/Recommendations:  Regular/thin   Frequent/thorough oral care  No further ST needs, please re-consult if medically necessary      "

## 2024-04-19 NOTE — ASSESSMENT & PLAN NOTE
Malnutrition Findings:                                 BMI Findings:  Adult BMI Classifications: Underweight < 18.5        Body mass index is 15.12 kg/m².   Likely combination of pulmonary cachexia and from metastatic prostate cancer

## 2024-04-19 NOTE — PROGRESS NOTES
[facility]  Progress Note  Name: Richard Nava I  MRN: [id number]  Unit/Bed#: -01 I Date of Admission: 4/17/2024   Date of Service: 4/19/2024 I Hospital Day: 2    Assessment/Plan   Opioid dependence (HCC)  Assessment & Plan  Patient maintained on MS Contin 15 mg every 12 hours  PDMP reviewed    Cachexia (HCC)  Assessment & Plan  Malnutrition Findings:                                 BMI Findings:  Adult BMI Classifications: Underweight < 18.5        Body mass index is 15.12 kg/m².   Likely combination of pulmonary cachexia and from metastatic prostate cancer     Insomnia  Assessment & Plan  PDMP reviewed on Xanax 0.5 mg twice daily as needed for anxiety/insomnia    Prostate cancer metastatic to bone (HCC)  Assessment & Plan  Metastatic prostate cancer on Lupron every 6 months  Zytiga 1000 mg daily- nonformulary ordered     HLD (hyperlipidemia)  Assessment & Plan  Continue statin     COPD (chronic obstructive pulmonary disease) ([facility])  Assessment & Plan  COPD with acute exacerbation  Appreciate pulmonology consult recommendations  He has known severe emphysema  IV steroids, doxycycline and nebulizers  See plan above  Was seen by palliative care last admission and he does not want hospice at this time   Level 1 full code     * Acute on chronic respiratory failure with hypoxia and hypercapnia ([facility])  Assessment & Plan  Patient recently discharged on 4/1 treated for COPD exacerbation presents again with worsening shortness of breath and trouble breathing. patient has severe emphysema and immunocompromise and deconditions given metastatic prostate cancer  Check sputum culture  Will place on doxycycline 100 mg twice daily, check procal, sputum Cx  IV Solu-Medrol 40 mg every 12 hours, scheduled nebulizers  Supportive care  Pulm consulted           VTE  Prophylaxis:   Pharmacologic: in place  Mechanical VTE Prophylaxis in Place: Yes    Patient Centered Rounds: I have performed  bedside rounds with nursing staff today.    Discussions with Specialists or Other Care Team Provider: case management    Education and Discussions with Family / Patient: patient    Mobility:   Basic Mobility Inpatient Raw Score: 23  JH-HLM Goal: 7: Walk 25 feet or more  JH-HLM Achieved: 2: Bed activities/Dependent transfer  JH-HLM Goal achieved. Continue to encourage appropriate mobility.    Total Time Spent on Date of Encounter in care of patient: 55 mins. This time was spent on one or more of the following: performing physical exam; counseling and coordination of care; obtaining or reviewing history; documenting in the medical record; reviewing/ordering tests, medications or procedures; communicating with other healthcare professionals and discussing with patient's family/caregivers.      Current Length of Stay: 2 day(s)    Current Patient Status: Inpatient        Code Status: Level 1 - Full Code    Discharge Plan: Pt will require continued inpatient hospitalization.    Subjective:   Patient stated he felt very tired, did not sleep well. Doesn't want to talk much    Patient is seen and examined at bedside.  All other ROS are negative.    Objective:     Vitals:   Temp (24hrs), Av.9 °F (36.6 °C), Min:97.5 °F (36.4 °C), Max:98.1 °F (36.7 °C)    Temp:  [97.5 °F (36.4 °C)-98.1 °F (36.7 °C)] 98.1 °F (36.7 °C)  HR:  [64-97] 70  Resp:  [16] 16  BP: (104-149)/(65-91) 104/65  SpO2:  [99 %-100 %] 100 %  Body mass index is 15.12 kg/m².     Input and Output Summary (last 24 hours):       Intake/Output Summary (Last 24 hours) at 2024 1608  Last data filed at 2024 0101  Gross per 24 hour   Intake 480 ml   Output 250 ml   Net 230 ml       Physical Exam:       GEN: No acute distress, comfortable, cachectic  HEEENT: No JVD, PERRLA, no scleral icterus  RESP: Wheeze  CV: RRR, +s1/s2   ABD: SOFT NON TENDER, POSITIVE BOWEL SOUNDS, NO DISTENTION  PSYCH: CALM  NEURO: Ment[place] baseline, NO FOCAL DEFICITS  SKIN: NO  RASH  EXTREM: NO EDEMA    Additional Data:     Labs:    Results from last 7 days   Lab Units 04/19/24  0439   WBC Thousand/uL 7.64   HEMOGLOBIN g/dL 10.8*   HEMATOCRIT % 35.2*   PLATELETS Thousands/uL 308   SEGS PCT % 87*   LYMPHO PCT % 8*   MONO PCT % 4   EOS PCT % 0     Results from last 7 days   Lab Units 04/19/24  0439 04/18/24  0250   SODIUM mmol/L 143 139   POTASSIUM mmol/L 4.0 4.0   CHLORIDE mmol/L 100 98   CO2 mmol/L 40* 37*   BUN mg/dL 13 8   CREATININE mg/dL 0.41* 0.41*   ANION GAP mmol/L 3* 4   CALCIUM mg/dL 7.9* 8.3*   ALBUMIN g/dL  --  3.9   TOTAL BILIRUBIN mg/dL  --  0.31   ALK PHOS U/L  --  47   ALT U/L  --  8   AST U/L  --  11*   GLUCOSE RANDOM mg/dL 165* 199*     Results from last 7 days   Lab Units 04/17/24  0907   INR  0.93             Results from last 7 days   Lab Units 04/18/24  0250 04/17/24  0907   LACTIC ACID mmol/L  --  0.6   PROCALCITONIN ng/ml 0.06 0.07       Lines/Drains:  Invasive Devices       Peripheral Intravenous Line  Duration             Peripheral IV 04/17/24 Right;Ventral (anterior) Forearm 2 days                    Telemetry:        * I Have Reviewed All Lab Data Listed Above.           Imaging:     Results for orders placed during the hospital encounter of 04/17/24    XR chest portable    Narrative  XR CHEST PORTABLE    INDICATION: cough.    COMPARISON: 3/26/2024    FINDINGS:  Emphysema noted.  No segmental or lobar consolidation seen. There seems to be some faint patchy infiltrate laterally in the left mid lung. Atelectasis or perhaps minimal developing pneumonia. Recommend attention on continued follow-up. No pneumothorax or pleural effusion.    Normal cardiomediastinal silhouette.    Numerous small round sclerotic densities are seen in multiple bones. Patient has known blastic metastases based on a prior CT.    Normal upper abdomen.    Impression  Emphysema. Patchy density left midlung field which could be developing pneumonia. Correlate with clinical scenario. Attention on  continued follow-up appropriate. Sclerotic bone lesions noted        Workstation performed: [id number]    Results for orders placed during the hospital encounter of 10/24/23    XR chest pa & lateral    Narrative  CHEST    INDICATION:   sepsis.    COMPARISON:  None    EXAM PERFORMED/VIEWS:  XR CHEST PA & LATERAL The frontal view was performed utilizing dual energy radiographic technique.  Images: 7    FINDINGS:    Cardiomediastinal silhouette appears unremarkable.    The patient has severe emphysema. No pneumothorax or pleural effusion.    Extensive bony metastasis are present.    Impression  No acute cardiopulmonary disease.  Severe emphysema. Extensive bony metastasis.          Workstation performed: [id number]      *I have reviewed all imaging reports listed above      Recent Cultures (last 7 days):     Results from last 7 days   Lab Units 04/18/24  1545 04/17/24  0907   BLOOD CULTURE   --  No Growth at 48 hrs.  No Growth at 48 hrs.   SPUTUM CULTURE  Test not performed. Suggest repeat specimen.  --    GRAM STAIN RESULT  4+ Epithelial cells per low power field*  1+ Polys*  4+ Gram positive cocci in pairs, chains and clusters*  2+ Gram variable rods*  --        Last 24 Hours Medication List:   Current Facility-Administered Medications   Medication Dose Route Frequency Provider Last Rate    abiraterone  1,000 mg Oral Daily Maricarmen Oviedo PA-C      acetaminophen  650 mg Oral Q6H PRN Maricarmen Oviedo PA-C      albuterol  2.5 mg Nebulization Q6H PRN Maricarmen Oviedo PA-C      ALPRAZolam  0.5 mg Oral BID PRN Maricarmen Oviedo PA-C      atorvastatin  40 mg Oral Daily With Dinner Maricarmen Oviedo PA-C      benzonatate  100 mg Oral TID PRN Maricarmen Oviedo PA-C      budesonide  0.5 mg Nebulization Q12H Leisa Wilder MD      calcium carbonate  1,000 mg Oral Daily PRN Maricarmen Oviedo PA-C      doxycycline hyclate  100 mg Oral Q12H Maricarmen Oviedo PA-C      enoxaparin  40 mg Subcutaneous Daily Maricarmen Oviedo PA-C       formoterol  20 mcg Nebulization Q12H Leisa Wilder MD      gabapentin  300 mg Oral BID Maricarmen Oviedo PA-C      guaiFENesin  600 mg Oral Q12H [facility] Maricarmen Oviedo PA-C      Hydrocod Jose-Chlorphe Jose ER  5 mL Oral Q12H PRN Maricarmen Oviedo PA-C      ipratropium  0.5 mg Nebulization TID Maricarmen Oviedo PA-C      levalbuterol  1.25 mg Nebulization TID Maricarmen Oviedo PA-C      lidocaine  1 patch Topical Daily Maricarmen Oviedo PA-C      [START ON 4/20/2024] methylprednisolone  32 mg Oral Daily ISA Araujo      methylPREDNISolone sodium succinate  40 mg Intravenous Q12H [facility] ISA Araujo      morphine  15 mg Oral BID Maricarmen Oviedo PA-C      ondansetron  4 mg Intravenous Q6H PRN Maricarmen Oviedo PA-C          Today, Patient Was Seen By: Leisa Wilder MD    ** Please Note: Dictation voice to text software may have been used in the creation of this document. **

## 2024-04-19 NOTE — PLAN OF CARE
Problem: PAIN - ADULT  Goal: Verbalizes/displays adequate comfort level or baseline comfort level  Description: Interventions:  - Encourage patient to monitor pain and request assistance  - Assess pain using appropriate pain scale  - Administer analgesics based on type and severity of pain and evaluate response  - Implement non-pharmacological measures as appropriate and evaluate response  - Consider cultural and social influences on pain and pain management  - Notify physician/advanced practitioner if interventions unsuccessful or patient reports new pain  Outcome: Progressing     Problem: INFECTION - ADULT  Goal: Absence or prevention of progression during hospitalization  Description: INTERVENTIONS:  - Assess and monitor for signs and symptoms of infection  - Monitor lab/diagnostic results  - Monitor all insertion sites, i.e. indwelling lines, tubes, and drains  - Monitor endotracheal if appropriate and nasal secretions for changes in amount and color  - [place] appropriate cooling/warming therapies per order  - Administer medications as ordered  - Instruct and encourage patient and family to use good hand hygiene technique  - Identify and instruct in appropriate isolation precautions for identified infection/condition  Outcome: Progressing  Goal: Absence of fever/infection during neutropenic period  Description: INTERVENTIONS:  - Monitor WBC    Outcome: Progressing     Problem: SAFETY ADULT  Goal: Patient will remain free of falls  Description: INTERVENTIONS:  - Educate patient/family on patient safety including physical limitations  - Instruct patient to call for assistance with activity   - Consult OT/PT to assist with strengthening/mobility   - Keep Call bell within reach  - Keep bed low and locked with side rails adjusted as appropriate  - Keep care items and personal belongings within reach  - Initiate and maintain comfort rounds  - Make Fall Risk Sign visible to staff  - Offer Toileting every 2 Hours,  in advance of need  - Initiate/Maintain bed alarm  - Obtain necessary fall risk management equipment:   - Apply yellow socks and bracelet for high fall risk patients  - Consider moving patient to room near nurses station  Outcome: Progressing  Goal: Maintain or return to baseline ADL function  Description: INTERVENTIONS:  -  Assess patient's ability to carry out ADLs; assess patient's baseline for ADL function and identify physical deficits which impact ability to perform ADLs (bathing, care of mouth/teeth, toileting, grooming, dressing, etc.)  - Assess/evaluate cause of self-care deficits   - Assess range of motion  - Assess patient's mobility; develop plan if impaired  - Assess patient's need for assistive devices and provide as appropriate  - Encourage maximum independence but intervene and supervise when necessary  - Involve family in performance of ADLs  - Assess for home care needs following discharge   - Consider OT consult to assist with ADL evaluation and planning for discharge  - Provide patient education as appropriate  Outcome: Progressing  Goal: Maintains/Returns to pre admission functional level  Description: INTERVENTIONS:  - Perform AM-PAC 6 Click Basic Mobility/ Daily Activity assessment daily.  - Set and communicate daily mobility goal to care team and patient/family/caregiver.   - Collaborate with rehabilitation services on mobility goals if consulted  - Perform Range of Motion 2 times a day.  - Reposition patient every 2 hours.  - Dangle patient 2 times a day  - Stand patient 2 times a day  - Ambulate patient 2 times a day  - Out of bed to chair 2 times a day   - Out of bed for meals 2  Problem: Potential for Falls  Goal: Patient will remain free of falls  Description: INTERVENTIONS:  - Educate patient/family on patient safety including physical limitations  - Instruct patient to call for assistance with activity   - Consult OT/PT to assist with strengthening/mobility   - Keep Call bell within  reach  - Keep bed low and locked with side rails adjusted as appropriate  - Keep care items and personal belongings within reach  - Initiate and maintain comfort rounds  - Make Fall Risk Sign visible to staff  - Offer Toileting every 2 Hours, in advance of need  - Initiate/Maintain bed alarm  - Apply yellow socks and bracelet for high fall risk patients  - Consider moving patient to room near nurses station  Outcome: Progressing    times a day  - Out of bed for toileting  - Record patient progress and toleration of activity level   Outcome: Progressing

## 2024-04-19 NOTE — PLAN OF CARE
Problem: Potential for Falls  Goal: Patient will remain free of falls  Description: INTERVENTIONS:  - Educate patient/family on patient safety including physical limitations  - Instruct patient to call for assistance with activity   - Consult OT/PT to assist with strengthening/mobility   - Keep Call bell within reach  - Keep bed low and locked with side rails adjusted as appropriate  - Keep care items and personal belongings within reach  - Initiate and maintain comfort rounds  - Make Fall Risk Sign visible to staff  - Offer Toileting every  Hours, in advance of need  - Initiate/Maintain alarm  - Obtain necessary fall risk management equipment:   - Apply yellow socks and bracelet for high fall risk patients  - Consider moving patient to room near nurses station  Outcome: Progressing     Problem: PAIN - ADULT  Goal: Verbalizes/displays adequate comfort level or baseline comfort level  Description: Interventions:  - Encourage patient to monitor pain and request assistance  - Assess pain using appropriate pain scale  - Administer analgesics based on type and severity of pain and evaluate response  - Implement non-pharmacological measures as appropriate and evaluate response  - Consider cultural and social influences on pain and pain management  - Notify physician/advanced practitioner if interventions unsuccessful or patient reports new pain  Outcome: Progressing     Problem: INFECTION - ADULT  Goal: Absence or prevention of progression during hospitalization  Description: INTERVENTIONS:  - Assess and monitor for signs and symptoms of infection  - Monitor lab/diagnostic results  - Monitor all insertion sites, i.e. indwelling lines, tubes, and drains  - Monitor endotracheal if appropriate and nasal secretions for changes in amount and color  - [place] appropriate cooling/warming therapies per order  - Administer medications as ordered  - Instruct and encourage patient and family to use good hand hygiene technique  -  Identify and instruct in appropriate isolation precautions for identified infection/condition  Outcome: Progressing  Goal: Absence of fever/infection during neutropenic period  Description: INTERVENTIONS:  - Monitor WBC    Outcome: Progressing     Problem: SAFETY ADULT  Goal: Patient will remain free of falls  Description: INTERVENTIONS:  - Educate patient/family on patient safety including physical limitations  - Instruct patient to call for assistance with activity   - Consult OT/PT to assist with strengthening/mobility   - Keep Call bell within reach  - Keep bed low and locked with side rails adjusted as appropriate  - Keep care items and personal belongings within reach  - Initiate and maintain comfort rounds  - Make Fall Risk Sign visible to staff  - Offer Toileting every  Hours, in advance of need  - Initiate/Maintain alarm  - Obtain necessary fall risk management equipment:   - Apply yellow socks and bracelet for high fall risk patients  - Consider moving patient to room near nurses station  Outcome: Progressing  Goal: Maintain or return to baseline ADL function  Description: INTERVENTIONS:  -  Assess patient's ability to carry out ADLs; assess patient's baseline for ADL function and identify physical deficits which impact ability to perform ADLs (bathing, care of mouth/teeth, toileting, grooming, dressing, etc.)  - Assess/evaluate cause of self-care deficits   - Assess range of motion  - Assess patient's mobility; develop plan if impaired  - Assess patient's need for assistive devices and provide as appropriate  - Encourage maximum independence but intervene and supervise when necessary  - Involve family in performance of ADLs  - Assess for home care needs following discharge   - Consider OT consult to assist with ADL evaluation and planning for discharge  - Provide patient education as appropriate  Outcome: Progressing  Goal: Maintains/Returns to pre admission functional level  Description:  INTERVENTIONS:  - Perform AM-PAC 6 Click Basic Mobility/ Daily Activity assessment daily.  - Set and communicate daily mobility goal to care team and patient/family/caregiver.   - Collaborate with rehabilitation services on mobility goals if consulted  - Perform Range of Motion 3 times a day.  - Reposition patient every 2 hours.  - Dangle patient 3 times a day  - Stand patient 3 times a day  - Ambulate patient 3 times a day  - Out of bed to chair 3 times a day   - Out of bed for meals 3 times a day  - Out of bed for toileting  - Record patient progress and toleration of activity level   Outcome: Progressing     Problem: DISCHARGE PLANNING  Goal: Discharge to home or other facility with appropriate resources  Description: INTERVENTIONS:  - Identify barriers to discharge w/patient and caregiver  - Arrange for needed discharge resources and transportation as appropriate  - Identify discharge learning needs (meds, wound care, etc.)  - Arrange for interpretive services to assist at discharge as needed  - Refer to Case Management Department for coordinating discharge planning if the patient needs post-hospital services based on physician/advanced practitioner order or complex needs related to functional status, cognitive ability, or social support system  Outcome: Progressing     Problem: Knowledge Deficit  Goal: Patient/family/caregiver demonstrates understanding of disease process, treatment plan, medications, and discharge instructions  Description: Complete learning assessment and assess knowledge base.  Interventions:  - Provide teaching at level of understanding  - Provide teaching via preferred learning methods  Outcome: Progressing     Problem: Nutrition/Hydration-ADULT  Goal: Nutrient/Hydration intake appropriate for improving, restoring or maintaining nutritional needs  Description: Monitor and assess patient's nutrition/hydration status for malnutrition. Collaborate with interdisciplinary team and initiate plan  and interventions as ordered.  Monitor patient's weight and dietary intake as ordered or per policy. Utilize nutrition screening tool and intervene as necessary. Determine patient's food preferences and provide high-protein, high-caloric foods as appropriate.     INTERVENTIONS:  - Monitor oral intake, urinary output, labs, and treatment plans  - Assess nutrition and hydration status and recommend course of action  - Evaluate amount of meals eaten  - Assist patient with eating if necessary   - Allow adequate time for meals  - Recommend/ encourage appropriate diets, oral nutritional supplements, and vitamin/mineral supplements  - Order, calculate, and assess calorie counts as needed  - Recommend, monitor, and adjust tube feedings and TPN/PPN based on assessed needs  - Assess need for intravenous fluids  - Provide specific nutrition/hydration education as appropriate  - Include patient/family/caregiver in decisions related to nutrition  Outcome: Progressing

## 2024-04-20 VITALS
BODY MASS INDEX: 14.26 KG/M2 | RESPIRATION RATE: 20 BRPM | HEIGHT: 67 IN | SYSTOLIC BLOOD PRESSURE: 116 MMHG | OXYGEN SATURATION: 100 % | HEART RATE: 91 BPM | WEIGHT: 90.83 LBS | DIASTOLIC BLOOD PRESSURE: 68 MMHG | TEMPERATURE: 97.9 F

## 2024-04-20 LAB
ANION GAP SERPL CALCULATED.3IONS-SCNC: 2 MMOL/L (ref 4–13)
BASOPHILS # BLD AUTO: 0.01 THOUSANDS/ÂΜL (ref 0–0.1)
BASOPHILS NFR BLD AUTO: 0 % (ref 0–1)
BUN SERPL-MCNC: 9 MG/DL (ref 5–25)
CALCIUM SERPL-MCNC: 7.8 MG/DL (ref 8.4–10.2)
CHLORIDE SERPL-SCNC: 101 MMOL/L (ref 96–108)
CO2 SERPL-SCNC: 39 MMOL/L (ref 21–32)
CREAT SERPL-MCNC: 0.37 MG/DL (ref 0.6–1.3)
EOSINOPHIL # BLD AUTO: 0 THOUSAND/ÂΜL (ref 0–0.61)
EOSINOPHIL NFR BLD AUTO: 0 % (ref 0–6)
ERYTHROCYTE [DISTWIDTH] IN BLOOD BY AUTOMATED COUNT: 13.5 % (ref 11.6–15.1)
GFR SERPL CREATININE-BSD FRML MDRD: 125 ML/MIN/1.73SQ M
GLUCOSE SERPL-MCNC: 160 MG/DL (ref 65–140)
HCT VFR BLD AUTO: 35.9 % (ref 36.5–49.3)
HGB BLD-MCNC: 11.2 G/DL (ref 12–17)
IMM GRANULOCYTES # BLD AUTO: 0.04 THOUSAND/UL (ref 0–0.2)
IMM GRANULOCYTES NFR BLD AUTO: 1 % (ref 0–2)
LYMPHOCYTES # BLD AUTO: 0.57 THOUSANDS/ÂΜL (ref 0.6–4.47)
LYMPHOCYTES NFR BLD AUTO: 9 % (ref 14–44)
MCH RBC QN AUTO: 31.8 PG (ref 26.8–34.3)
MCHC RBC AUTO-ENTMCNC: 31.2 G/DL (ref 31.4–37.4)
MCV RBC AUTO: 102 FL (ref 82–98)
MONOCYTES # BLD AUTO: 0.15 THOUSAND/ÂΜL (ref 0.17–1.22)
MONOCYTES NFR BLD AUTO: 2 % (ref 4–12)
NEUTROPHILS # BLD AUTO: 5.57 THOUSANDS/ÂΜL (ref 1.85–7.62)
NEUTS SEG NFR BLD AUTO: 88 % (ref 43–75)
NRBC BLD AUTO-RTO: 0 /100 WBCS
PLATELET # BLD AUTO: 295 THOUSANDS/UL (ref 149–390)
PMV BLD AUTO: 8.6 FL (ref 8.9–12.7)
POTASSIUM SERPL-SCNC: 4 MMOL/L (ref 3.5–5.3)
RBC # BLD AUTO: 3.52 MILLION/UL (ref 3.88–5.62)
SODIUM SERPL-SCNC: 142 MMOL/L (ref 135–147)
WBC # BLD AUTO: 6.34 THOUSAND/UL (ref 4.31–10.16)

## 2024-04-20 PROCEDURE — 99239 HOSP IP/OBS DSCHRG MGMT >30: CPT | Performed by: INTERNAL MEDICINE

## 2024-04-20 PROCEDURE — 94760 N-INVAS EAR/PLS OXIMETRY 1: CPT

## 2024-04-20 PROCEDURE — 85025 COMPLETE CBC W/AUTO DIFF WBC: CPT | Performed by: INTERNAL MEDICINE

## 2024-04-20 PROCEDURE — 94640 AIRWAY INHALATION TREATMENT: CPT

## 2024-04-20 PROCEDURE — 80048 BASIC METABOLIC PNL TOTAL CA: CPT | Performed by: INTERNAL MEDICINE

## 2024-04-20 PROCEDURE — 99232 SBSQ HOSP IP/OBS MODERATE 35: CPT | Performed by: INTERNAL MEDICINE

## 2024-04-20 RX ORDER — METHYLPREDNISOLONE 8 MG/1
TABLET ORAL DAILY
Qty: 24 TABLET | Refills: 0 | Status: SHIPPED | OUTPATIENT
Start: 2024-04-21 | End: 2024-05-03

## 2024-04-20 RX ORDER — AZITHROMYCIN 250 MG/1
500 TABLET, FILM COATED ORAL 3 TIMES WEEKLY
Qty: 24 TABLET | Refills: 0 | Status: SHIPPED | OUTPATIENT
Start: 2024-04-22 | End: 2024-05-22

## 2024-04-20 RX ORDER — DOXYCYCLINE HYCLATE 100 MG/1
100 CAPSULE ORAL EVERY 12 HOURS
Qty: 4 CAPSULE | Refills: 0 | Status: SHIPPED | OUTPATIENT
Start: 2024-04-20 | End: 2024-04-22

## 2024-04-20 RX ADMIN — MORPHINE SULFATE 15 MG: 15 TABLET, EXTENDED RELEASE ORAL at 09:10

## 2024-04-20 RX ADMIN — FORMOTEROL FUMARATE DIHYDRATE 20 MCG: 20 SOLUTION RESPIRATORY (INHALATION) at 07:07

## 2024-04-20 RX ADMIN — LEVALBUTEROL HYDROCHLORIDE 1.25 MG: 1.25 SOLUTION RESPIRATORY (INHALATION) at 07:07

## 2024-04-20 RX ADMIN — IPRATROPIUM BROMIDE 0.5 MG: 0.5 SOLUTION RESPIRATORY (INHALATION) at 13:48

## 2024-04-20 RX ADMIN — GABAPENTIN 300 MG: 300 CAPSULE ORAL at 09:10

## 2024-04-20 RX ADMIN — ABIRATERONE ACETATE 1000 MG: 250 TABLET, FILM COATED ORAL at 09:14

## 2024-04-20 RX ADMIN — DOXYCYCLINE 100 MG: 100 CAPSULE ORAL at 15:18

## 2024-04-20 RX ADMIN — METHYLPREDNISOLONE 32 MG: 4 TABLET ORAL at 09:15

## 2024-04-20 RX ADMIN — GUAIFENESIN 600 MG: 600 TABLET ORAL at 09:10

## 2024-04-20 RX ADMIN — IPRATROPIUM BROMIDE 0.5 MG: 0.5 SOLUTION RESPIRATORY (INHALATION) at 07:07

## 2024-04-20 RX ADMIN — BUDESONIDE 0.5 MG: 0.5 INHALANT RESPIRATORY (INHALATION) at 07:07

## 2024-04-20 RX ADMIN — ALPRAZOLAM 0.5 MG: 0.5 TABLET ORAL at 00:39

## 2024-04-20 RX ADMIN — LEVALBUTEROL HYDROCHLORIDE 1.25 MG: 1.25 SOLUTION RESPIRATORY (INHALATION) at 13:48

## 2024-04-20 RX ADMIN — DOXYCYCLINE 100 MG: 100 CAPSULE ORAL at 04:50

## 2024-04-20 NOTE — PROGRESS NOTES
"Progress Note - Pulmonary   Richard Nava 68 y.o. male MRN: [id number]  Unit/Bed#: -01 Encounter: [id number]    Assessment:  Acute on chronic hypoxemic and hypercapnic respiratory failure  Severe COPD/emphysema with acute exacerbation  Prostate cancer with metastases to bone  Severe malnutrition    Plan:  Patient remains on his baseline 3.5 to 4 L nasal cannula with sats in the high 90s  Chest x-ray with continued emphysema, patchy left midlung density  He has no leukocytosis, procalcitonin is normal, blood cultures have been negative, sputum culture contaminated  Continue Xopenex and Atrovent with Pulmicort and Perforomist  Has been switched to Medrol 32 mg with 3-day taper, he has been unable to tolerate prednisone in the past  Home regimen is Breztri, Singulair, albuterol as needed.  May benefit from switch to all nebulized regimen upon discharge  He will need outpatient PFTs and pulmonary follow-up  He has had frequent exacerbations and is requesting a low-dose Medrol at baseline.  Would try other options first as to avoid long-term consequences of steroids, would benefit from azithromycin 3 times per week upon discharge as another option  Would be ok for discharge home today and follow up with us in the office in 1 week.     Subjective:   Patient resting in bed.  Reports overall improvement in his breathing.  Still does feel some chest congestion.    Objective:     Vitals: Blood pressure 116/68, pulse 91, temperature 97.9 °F (36.6 °C), resp. rate 20, height 5' 7\" (1.702 m), weight 41.2 kg (90 lb 13.3 oz), SpO2 99%.,Body mass index is 14.23 kg/m².      Intake/Output Summary (Last 24 hours) at 4/20/2024 1249  Last data filed at 4/20/2024 0901  Gross per 24 hour   Intake 365 ml   Output 175 ml   Net 190 ml       Invasive Devices       Peripheral Intravenous Line  Duration             Peripheral IV 04/17/24 Right;Ventral (anterior) Forearm 3 days                    Physical Exam: /68   Pulse 91  " " Temp 97.9 °F (36.6 °C)   Resp 20   Ht 5' 7\" (1.702 m)   Wt 41.2 kg (90 lb 13.3 oz)   SpO2 99%   BMI 14.23 kg/m²   General appearance: alert and oriented, in no acute distress  Head: Normocephalic, without obvious abnormality, atraumatic  Eyes: negative findings: conjunctivae and sclerae normal  Lungs:  Diminished air movement bilaterally, faint expiratory wheeze  Heart: regular rate and rhythm  Abdomen: normal findings: soft, non-tender  Extremities:  No edema  Skin:  Warm and dry  Neurologic: Mental status: Alert, oriented, thought content appropriate     Labs: I have personally reviewed pertinent lab results., CBC:   Lab Results   Component Value Date    WBC 6.34 04/20/2024    HGB 11.2 (L) 04/20/2024    HCT 35.9 (L) 04/20/2024     (H) 04/20/2024     04/20/2024    RBC 3.52 (L) 04/20/2024    MCH 31.8 04/20/2024    MCHC 31.2 (L) 04/20/2024    RDW 13.5 04/20/2024    MPV 8.6 (L) 04/20/2024    NRBC 0 04/20/2024   , CMP:   Lab Results   Component Value Date    SODIUM 142 04/20/2024    K 4.0 04/20/2024     04/20/2024    CO2 39 (H) 04/20/2024    BUN 9 04/20/2024    CREATININE 0.37 (L) 04/20/2024    CALCIUM 7.8 (L) 04/20/2024    EGFR 125 04/20/2024     Imaging and other studies: I have personally reviewed pertinent reports.   and I have personally reviewed pertinent films in PACS    "

## 2024-04-20 NOTE — PLAN OF CARE
Problem: Potential for Falls  Goal: Patient will remain free of falls  Description: INTERVENTIONS:  - Educate patient/family on patient safety including physical limitations  - Instruct patient to call for assistance with activity   - Consult OT/PT to assist with strengthening/mobility   - Keep Call bell within reach  - Keep bed low and locked with side rails adjusted as appropriate  - Keep care items and personal belongings within reach  - Initiate and maintain comfort rounds  - Make Fall Risk Sign visible to staff  - Offer Toileting every  Hours, in advance of need  - Initiate/Maintain alarm  - Obtain necessary fall risk management equipment:   - Apply yellow socks and bracelet for high fall risk patients  - Consider moving patient to room near nurses station  Outcome: Progressing     Problem: PAIN - ADULT  Goal: Verbalizes/displays adequate comfort level or baseline comfort level  Description: Interventions:  - Encourage patient to monitor pain and request assistance  - Assess pain using appropriate pain scale  - Administer analgesics based on type and severity of pain and evaluate response  - Implement non-pharmacological measures as appropriate and evaluate response  - Consider cultural and social influences on pain and pain management  - Notify physician/advanced practitioner if interventions unsuccessful or patient reports new pain  Outcome: Progressing     Problem: INFECTION - ADULT  Goal: Absence or prevention of progression during hospitalization  Description: INTERVENTIONS:  - Assess and monitor for signs and symptoms of infection  - Monitor lab/diagnostic results  - Monitor all insertion sites, i.e. indwelling lines, tubes, and drains  - Monitor endotracheal if appropriate and nasal secretions for changes in amount and color  - [place] appropriate cooling/warming therapies per order  - Administer medications as ordered  - Instruct and encourage patient and family to use good hand hygiene technique  -  Identify and instruct in appropriate isolation precautions for identified infection/condition  Outcome: Progressing  Goal: Absence of fever/infection during neutropenic period  Description: INTERVENTIONS:  - Monitor WBC    Outcome: Progressing     Problem: SAFETY ADULT  Goal: Patient will remain free of falls  Description: INTERVENTIONS:  - Educate patient/family on patient safety including physical limitations  - Instruct patient to call for assistance with activity   - Consult OT/PT to assist with strengthening/mobility   - Keep Call bell within reach  - Keep bed low and locked with side rails adjusted as appropriate  - Keep care items and personal belongings within reach  - Initiate and maintain comfort rounds  - Make Fall Risk Sign visible to staff  - Offer Toileting every  Hours, in advance of need  - Initiate/Maintain alarm  - Obtain necessary fall risk management equipment:   - Apply yellow socks and bracelet for high fall risk patients  - Consider moving patient to room near nurses station  Outcome: Progressing  Goal: Maintain or return to baseline ADL function  Description: INTERVENTIONS:  -  Assess patient's ability to carry out ADLs; assess patient's baseline for ADL function and identify physical deficits which impact ability to perform ADLs (bathing, care of mouth/teeth, toileting, grooming, dressing, etc.)  - Assess/evaluate cause of self-care deficits   - Assess range of motion  - Assess patient's mobility; develop plan if impaired  - Assess patient's need for assistive devices and provide as appropriate  - Encourage maximum independence but intervene and supervise when necessary  - Involve family in performance of ADLs  - Assess for home care needs following discharge   - Consider OT consult to assist with ADL evaluation and planning for discharge  - Provide patient education as appropriate  Outcome: Progressing  Goal: Maintains/Returns to pre admission functional level  Description:  INTERVENTIONS:  - Perform AM-PAC 6 Click Basic Mobility/ Daily Activity assessment daily.  - Set and communicate daily mobility goal to care team and patient/family/caregiver.   - Collaborate with rehabilitation services on mobility goals if consulted  - Perform Range of Motion 3 times a day.  - Reposition patient every 2 hours.  - Dangle patient 3 times a day  - Stand patient 3 times a day  - Ambulate patient 3 times a day  - Out of bed to chair 3 times a day   - Out of bed for meals 3 times a day  - Out of bed for toileting  - Record patient progress and toleration of activity level   Outcome: Progressing     Problem: DISCHARGE PLANNING  Goal: Discharge to home or other facility with appropriate resources  Description: INTERVENTIONS:  - Identify barriers to discharge w/patient and caregiver  - Arrange for needed discharge resources and transportation as appropriate  - Identify discharge learning needs (meds, wound care, etc.)  - Arrange for interpretive services to assist at discharge as needed  - Refer to Case Management Department for coordinating discharge planning if the patient needs post-hospital services based on physician/advanced practitioner order or complex needs related to functional status, cognitive ability, or social support system  Outcome: Progressing     Problem: Knowledge Deficit  Goal: Patient/family/caregiver demonstrates understanding of disease process, treatment plan, medications, and discharge instructions  Description: Complete learning assessment and assess knowledge base.  Interventions:  - Provide teaching at level of understanding  - Provide teaching via preferred learning methods  Outcome: Progressing     Problem: Nutrition/Hydration-ADULT  Goal: Nutrient/Hydration intake appropriate for improving, restoring or maintaining nutritional needs  Description: Monitor and assess patient's nutrition/hydration status for malnutrition. Collaborate with interdisciplinary team and initiate plan  and interventions as ordered.  Monitor patient's weight and dietary intake as ordered or per policy. Utilize nutrition screening tool and intervene as necessary. Determine patient's food preferences and provide high-protein, high-caloric foods as appropriate.     INTERVENTIONS:  - Monitor oral intake, urinary output, labs, and treatment plans  - Assess nutrition and hydration status and recommend course of action  - Evaluate amount of meals eaten  - Assist patient with eating if necessary   - Allow adequate time for meals  - Recommend/ encourage appropriate diets, oral nutritional supplements, and vitamin/mineral supplements  - Order, calculate, and assess calorie counts as needed  - Recommend, monitor, and adjust tube feedings and TPN/PPN based on assessed needs  - Assess need for intravenous fluids  - Provide specific nutrition/hydration education as appropriate  - Include patient/family/caregiver in decisions related to nutrition  Outcome: Progressing

## 2024-04-20 NOTE — PLAN OF CARE
Problem: Potential for Falls  Goal: Patient will remain free of falls  Description: INTERVENTIONS:  - Educate patient/family on patient safety including physical limitations  - Instruct patient to call for assistance with activity   - Consult OT/PT to assist with strengthening/mobility   - Keep Call bell within reach  - Keep bed low and locked with side rails adjusted as appropriate  - Keep care items and personal belongings within reach  - Initiate and maintain comfort rounds  - Make Fall Risk Sign visible to staff  - Offer Toileting every *** Hours, in advance of need  - Initiate/Maintain ***alarm  - Obtain necessary fall risk management equipment: ***  - Apply yellow socks and bracelet for high fall risk patients  - Consider moving patient to room near nurses station  4/20/2024 1042 by Bing Monroe RN  Outcome: Progressing  4/20/2024 1041 by Bing Monroe RN  Outcome: Progressing     Problem: PAIN - ADULT  Goal: Verbalizes/displays adequate comfort level or baseline comfort level  Description: Interventions:  - Encourage patient to monitor pain and request assistance  - Assess pain using appropriate pain scale  - Administer analgesics based on type and severity of pain and evaluate response  - Implement non-pharmacological measures as appropriate and evaluate response  - Consider cultural and social influences on pain and pain management  - Notify physician/advanced practitioner if interventions unsuccessful or patient reports new pain  4/20/2024 1042 by Bing Monroe RN  Outcome: Progressing  4/20/2024 1041 by Bing Monroe RN  Outcome: Progressing     Problem: INFECTION - ADULT  Goal: Absence or prevention of progression during hospitalization  Description: INTERVENTIONS:  - Assess and monitor for signs and symptoms of infection  - Monitor lab/diagnostic results  - Monitor all insertion sites, i.e. indwelling lines, tubes, and drains  - Monitor endotracheal if appropriate and nasal secretions for changes in  amount and color  - [place] appropriate cooling/warming therapies per order  - Administer medications as ordered  - Instruct and encourage patient and family to use good hand hygiene technique  - Identify and instruct in appropriate isolation precautions for identified infection/condition  4/20/2024 1042 by Bing Monroe RN  Outcome: Progressing  4/20/2024 1041 by Bing Monroe RN  Outcome: Progressing  Goal: Absence of fever/infection during neutropenic period  Description: INTERVENTIONS:  - Monitor WBC    4/20/2024 1042 by Bing Monroe RN  Outcome: Progressing  4/20/2024 1041 by Bing Monroe RN  Outcome: Progressing     Problem: SAFETY ADULT  Goal: Patient will remain free of falls  Description: INTERVENTIONS:  - Educate patient/family on patient safety including physical limitations  - Instruct patient to call for assistance with activity   - Consult OT/PT to assist with strengthening/mobility   - Keep Call bell within reach  - Keep bed low and locked with side rails adjusted as appropriate  - Keep care items and personal belongings within reach  - Initiate and maintain comfort rounds  - Make Fall Risk Sign visible to staff  - Offer Toileting every *** Hours, in advance of need  - Initiate/Maintain ***alarm  - Obtain necessary fall risk management equipment: ***  - Apply yellow socks and bracelet for high fall risk patients  - Consider moving patient to room near nurses station  4/20/2024 1042 by Bing Monroe RN  Outcome: Progressing  4/20/2024 1041 by Bing Monroe RN  Outcome: Progressing  Goal: Maintain or return to baseline ADL function  Description: INTERVENTIONS:  -  Assess patient's ability to carry out ADLs; assess patient's baseline for ADL function and identify physical deficits which impact ability to perform ADLs (bathing, care of mouth/teeth, toileting, grooming, dressing, etc.)  - Assess/evaluate cause of self-care deficits   - Assess range of motion  - Assess patient's mobility; develop plan  if impaired  - Assess patient's need for assistive devices and provide as appropriate  - Encourage maximum independence but intervene and supervise when necessary  - Involve family in performance of ADLs  - Assess for home care needs following discharge   - Consider OT consult to assist with ADL evaluation and planning for discharge  - Provide patient education as appropriate  4/20/2024 1042 by Bing Monroe RN  Outcome: Progressing  4/20/2024 1041 by Bing Monroe RN  Outcome: Progressing  Goal: Maintains/Returns to pre admission functional level  Description: INTERVENTIONS:  - Perform AM-PAC 6 Click Basic Mobility/ Daily Activity assessment daily.  - Set and communicate daily mobility goal to care team and patient/family/caregiver.   - Collaborate with rehabilitation services on mobility goals if consulted  - Perform Range of Motion *** times a day.  - Reposition patient every *** hours.  - Dangle patient *** times a day  - Stand patient *** times a day  - Ambulate patient *** times a day  - Out of bed to chair *** times a day   - Out of bed for meals *** times a day  - Out of bed for toileting  - Record patient progress and toleration of activity level   4/20/2024 1042 by Bing Monroe RN  Outcome: Progressing  4/20/2024 1041 by Bing Monroe RN  Outcome: Progressing     Problem: DISCHARGE PLANNING  Goal: Discharge to home or other facility with appropriate resources  Description: INTERVENTIONS:  - Identify barriers to discharge w/patient and caregiver  - Arrange for needed discharge resources and transportation as appropriate  - Identify discharge learning needs (meds, wound care, etc.)  - Arrange for interpretive services to assist at discharge as needed  - Refer to Case Management Department for coordinating discharge planning if the patient needs post-hospital services based on physician/advanced practitioner order or complex needs related to functional status, cognitive ability, or social support  system  4/20/2024 1042 by Bing Monroe RN  Outcome: Progressing  4/20/2024 1041 by Bing Monroe RN  Outcome: Progressing     Problem: Knowledge Deficit  Goal: Patient/family/caregiver demonstrates understanding of disease process, treatment plan, medications, and discharge instructions  Description: Complete learning assessment and assess knowledge base.  Interventions:  - Provide teaching at level of understanding  - Provide teaching via preferred learning methods  4/20/2024 1042 by Bing Monroe RN  Outcome: Progressing  4/20/2024 1041 by Bing Monroe RN  Outcome: Progressing     Problem: Nutrition/Hydration-ADULT  Goal: Nutrient/Hydration intake appropriate for improving, restoring or maintaining nutritional needs  Description: Monitor and assess patient's nutrition/hydration status for malnutrition. Collaborate with interdisciplinary team and initiate plan and interventions as ordered.  Monitor patient's weight and dietary intake as ordered or per policy. Utilize nutrition screening tool and intervene as necessary. Determine patient's food preferences and provide high-protein, high-caloric foods as appropriate.     INTERVENTIONS:  - Monitor oral intake, urinary output, labs, and treatment plans  - Assess nutrition and hydration status and recommend course of action  - Evaluate amount of meals eaten  - Assist patient with eating if necessary   - Allow adequate time for meals  - Recommend/ encourage appropriate diets, oral nutritional supplements, and vitamin/mineral supplements  - Order, calculate, and assess calorie counts as needed  - Recommend, monitor, and adjust tube feedings and TPN/PPN based on assessed needs  - Assess need for intravenous fluids  - Provide specific nutrition/hydration education as appropriate  - Include patient/family/caregiver in decisions related to nutrition  4/20/2024 1042 by Bing Monroe RN  Outcome: Progressing  4/20/2024 1041 by Bing Monroe RN  Outcome: Progressing

## 2024-04-20 NOTE — PLAN OF CARE
Problem: Potential for Falls  Goal: Patient will remain free of falls  Description: INTERVENTIONS:  - Educate patient/family on patient safety including physical limitations  - Instruct patient to call for assistance with activity   - Consult OT/PT to assist with strengthening/mobility   - Keep Call bell within reach  - Keep bed low and locked with side rails adjusted as appropriate  - Keep care items and personal belongings within reach  - Initiate and maintain comfort rounds  - Make Fall Risk Sign visible to staff  - Offer Toileting every 2 Hours, in advance of need  - Initiate/Maintain alarm  - Obtain necessary fall risk management equipment:   - Apply yellow socks and bracelet for high fall risk patients  - Consider moving patient to room near nurses station  Outcome: Progressing     Problem: PAIN - ADULT  Goal: Verbalizes/displays adequate comfort level or baseline comfort level  Description: Interventions:  - Encourage patient to monitor pain and request assistance  - Assess pain using appropriate pain scale  - Administer analgesics based on type and severity of pain and evaluate response  - Implement non-pharmacological measures as appropriate and evaluate response  - Consider cultural and social influences on pain and pain management  - Notify physician/advanced practitioner if interventions unsuccessful or patient reports new pain  Outcome: Progressing     Problem: INFECTION - ADULT  Goal: Absence or prevention of progression during hospitalization  Description: INTERVENTIONS:  - Assess and monitor for signs and symptoms of infection  - Monitor lab/diagnostic results  - Monitor all insertion sites, i.e. indwelling lines, tubes, and drains  - Monitor endotracheal if appropriate and nasal secretions for changes in amount and color  - [place] appropriate cooling/warming therapies per order  - Administer medications as ordered  - Instruct and encourage patient and family to use good hand hygiene  technique  - Identify and instruct in appropriate isolation precautions for identified infection/condition  Outcome: Progressing  Goal: Absence of fever/infection during neutropenic period  Description: INTERVENTIONS:  - Monitor WBC    Outcome: Progressing     Problem: SAFETY ADULT  Goal: Patient will remain free of falls  Description: INTERVENTIONS:  - Educate patient/family on patient safety including physical limitations  - Instruct patient to call for assistance with activity   - Consult OT/PT to assist with strengthening/mobility   - Keep Call bell within reach  - Keep bed low and locked with side rails adjusted as appropriate  - Keep care items and personal belongings within reach  - Initiate and maintain comfort rounds  - Make Fall Risk Sign visible to staff  - Offer Toileting every 2 Hours, in advance of need  - Initiate/Maintain alarm  - Obtain necessary fall risk management equipment:   - Apply yellow socks and bracelet for high fall risk patients  - Consider moving patient to room near nurses station  Outcome: Progressing  Goal: Maintain or return to baseline ADL function  Description: INTERVENTIONS:  -  Assess patient's ability to carry out ADLs; assess patient's baseline for ADL function and identify physical deficits which impact ability to perform ADLs (bathing, care of mouth/teeth, toileting, grooming, dressing, etc.)  - Assess/evaluate cause of self-care deficits   - Assess range of motion  - Assess patient's mobility; develop plan if impaired  - Assess patient's need for assistive devices and provide as appropriate  - Encourage maximum independence but intervene and supervise when necessary  - Involve family in performance of ADLs  - Assess for home care needs following discharge   - Consider OT consult to assist with ADL evaluation and planning for discharge  - Provide patient education as appropriate  Outcome: Progressing  Goal: Maintains/Returns to pre admission functional level  Description:  INTERVENTIONS:  - Perform AM-PAC 6 Click Basic Mobility/ Daily Activity assessment daily.  - Set and communicate daily mobility goal to care team and patient/family/caregiver.   - Collaborate with rehabilitation services on mobility goals if consulted  - Perform Range of Motion 3 times a day.  - Reposition patient every 2 hours.  - Dangle patient 3 times a day  - Stand patient 3 times a day  - Ambulate patient 3 times a day  - Out of bed to chair 3 times a day   - Out of bed for meals 3 times a day  - Out of bed for toileting  - Record patient progress and toleration of activity level   Outcome: Progressing     Problem: Nutrition/Hydration-ADULT  Goal: Nutrient/Hydration intake appropriate for improving, restoring or maintaining nutritional needs  Description: Monitor and assess patient's nutrition/hydration status for malnutrition. Collaborate with interdisciplinary team and initiate plan and interventions as ordered.  Monitor patient's weight and dietary intake as ordered or per policy. Utilize nutrition screening tool and intervene as necessary. Determine patient's food preferences and provide high-protein, high-caloric foods as appropriate.     INTERVENTIONS:  - Monitor oral intake, urinary output, labs, and treatment plans  - Assess nutrition and hydration status and recommend course of action  - Evaluate amount of meals eaten  - Assist patient with eating if necessary   - Allow adequate time for meals  - Recommend/ encourage appropriate diets, oral nutritional supplements, and vitamin/mineral supplements  - Order, calculate, and assess calorie counts as needed  - Recommend, monitor, and adjust tube feedings and TPN/PPN based on assessed needs  - Assess need for intravenous fluids  - Provide specific nutrition/hydration education as appropriate  - Include patient/family/caregiver in decisions related to nutrition  Outcome: Progressing

## 2024-04-20 NOTE — CASE MANAGEMENT
Case Management Discharge Planning Note    Patient name Richard Nava  Location /-01 MRN [id number]  : 1955 Date 2024       Current Admission Date: 2024  Current Admission Diagnosis:Acute on chronic respiratory failure with hypoxia and hypercapnia (HCC)   Patient Active Problem List    Diagnosis Date Noted    Respiratory distress 2024    Low oxygen saturation 2023    Positive blood culture 2023    Opioid dependence (HCC) 2023    Neoplasm related pain 2023    Medical marijuana use 2023    Encephalopathy acute 2022    Anorexia 2022    Cachexia (HCC) 2022    Palliative care patient 2022    Prostate cancer metastatic to bone (HCC) 2022    Insomnia 2022    Hot flashes 2022    Left lower quadrant abdominal pain 2021    Personal history of colonic polyps 2021    HLD (hyperlipidemia) 2020    Acute on chronic respiratory failure with hypoxia and hypercapnia (HCC) 2020    Severe protein-calorie malnutrition (HCC) 2020    COPD (chronic obstructive pulmonary disease) (HCC) 2020      LOS (days): 3  Geometric Mean LOS (GMLOS) (days): 3.6  Days to GMLOS:0.4     OBJECTIVE:  Risk of Unplanned Readmission Score: 38.82         Current admission status: Inpatient   Preferred Pharmacy:   [facility]/pharmacy #[id number] - [place], [place] - [address] [facility] [address] [address] [facility]  [address] [address]  Phone: [phone] Fax: [phone]    [facility] - [place] [address] [address] [employer] [facility]  [address]  [place] [address]  Phone: [phone] Fax: [phone]    Primary Care Provider: Kevin Mejia DO    Primary Insurance: MEDICARE  Secondary Insurance:     DISCHARGE DETAILS:        Received consult, patient needs Lyft ride home. Met with patient, waiver signed. Request for Lyft placed via roundtrip.. Wait pickup time. Patient's nurse Bing's phone number was added to  roundtrip information on Ly[employer] arrival time.

## 2024-04-22 ENCOUNTER — TRANSITIONAL CARE MANAGEMENT (OUTPATIENT)
Dept: FAMILY MEDICINE CLINIC | Facility: CLINIC | Age: 69
End: 2024-04-22

## 2024-04-22 ENCOUNTER — TELEPHONE (OUTPATIENT)
Dept: PALLIATIVE MEDICINE | Facility: CLINIC | Age: 69
End: 2024-04-22

## 2024-04-22 ENCOUNTER — TELEPHONE (OUTPATIENT)
Dept: PULMONOLOGY | Facility: CLINIC | Age: 69
End: 2024-04-22

## 2024-04-22 ENCOUNTER — HOSPITAL ENCOUNTER (OUTPATIENT)
Dept: INFUSION CENTER | Facility: HOSPITAL | Age: 69
Discharge: HOME | End: 2024-04-22
Attending: INTERNAL MEDICINE

## 2024-04-22 LAB
BACTERIA BLD CULT: NORMAL
BACTERIA BLD CULT: NORMAL

## 2024-04-22 NOTE — TELEPHONE ENCOUNTER
CALLED PATIENT TO SCHEDULE A HFU COPD APPOINTMENT WITH DR. NICHOLE OR LESLIE AT [facility] AND LEFT A VOICEMAIL MESSAGE.

## 2024-04-22 NOTE — TELEPHONE ENCOUNTER
Patient returned call stating that he is not able to make a virtual appt because he does not have the technology to do so. I then offered patient an in person visit but he states that  he cannot do this either because he does not have enough oxygen to drive to [facility] for an appt because his tank does not carry much oxygen.

## 2024-04-23 ENCOUNTER — TELEPHONE (OUTPATIENT)
Age: 69
End: 2024-04-23

## 2024-04-23 ENCOUNTER — TELEPHONE (OUTPATIENT)
Dept: PALLIATIVE MEDICINE | Facility: CLINIC | Age: 69
End: 2024-04-23

## 2024-04-23 NOTE — TELEPHONE ENCOUNTER
Great, thanks. Would love to see Rishabh back in the office for a visit once his O2 is sorted out. I will set a reminder to check on him.

## 2024-04-23 NOTE — DISCHARGE SUMMARY
[facility]  Discharge- Richard Nava 1955, 68 y.o. male MRN: [id number]  Unit/Bed#: -01 Encounter: [id number]  Primary Care Provider: Kevin Mejia DO   Date and time admitted to hospital: 4/17/2024  8:52 AM    Opioid dependence (HCC)  Assessment & Plan  Patient maintained on MS Contin 15 mg every 12 hours  PDMP reviewed    Cachexia (HCC)  Assessment & Plan  Malnutrition Findings:                                 BMI Findings:  Adult BMI Classifications: Underweight < 18.5        Body mass index is 15.12 kg/m².   Likely combination of pulmonary cachexia and from metastatic prostate cancer     Insomnia  Assessment & Plan  PDMP reviewed on Xanax 0.5 mg twice daily as needed for anxiety/insomnia    Prostate cancer metastatic to bone (HCC)  Assessment & Plan  Metastatic prostate cancer on Lupron every 6 months  Zytiga 1000 mg daily- nonformulary ordered     HLD (hyperlipidemia)  Assessment & Plan  Continue statin     COPD (chronic obstructive pulmonary disease) (HCC)  Assessment & Plan  COPD with acute exacerbation  Appreciate pulmonology consult recommendations  He has known severe emphysema  IV steroids, doxycycline and nebulizers  See plan above  Was seen by palliative care last admission and he does not want hospice at this time   Level 1 full code     * Acute on chronic respiratory failure with hypoxia and hypercapnia (HCC)  Assessment & Plan  Patient recently discharged on 4/1 treated for COPD exacerbation presents again with worsening shortness of breath and trouble breathing. patient has severe emphysema and immunocompromise and deconditions given metastatic prostate cancer  Check sputum culture  Will place on doxycycline 100 mg twice daily, check procal, sputum Cx  IV Solu-Medrol 40 mg every 12 hours, scheduled nebulizers, changed to PO steroids  Supportive care  Pulm consulted              Medical Problems       Resolved Problems  Date Reviewed: 4/2/2024   None          Admission Date:   Admission Orders (From admission, onward)       Ordered        04/17/24 1106  INPATIENT ADMISSION  Once                            Admitting Diagnosis: Weakness [R53.1]  COPD exacerbation (HCC) [J44.1]        Procedures Performed: No orders of the defined types were placed in this encounter.      Summary of Hospital Course:68 y.o. male with a PMH of severe emphysema, metastatic prostate cancer, chronic opioid dependence, palliative care patient, hyperlipidemia, cachexia who presents with SOB and trouble breathing.  Patient was admitted and treated for COPD exacerbation with IV steroids, clinically improved, will be dcd with medrol taper dose and needs follow up with pulm as outpatient    Significant Findings, Care, Treatment and Services Provided:   XR chest portable    Result Date: 4/17/2024  Impression: Emphysema. Patchy density left midlung field which could be developing pneumonia. Correlate with clinical scenario. Attention on continued follow-up appropriate. Sclerotic bone lesions noted Workstation performed: [id number]       No Chest XR results available for this patient.      Complications: none    Condition at Discharge: stable         Discharge instructions/Information to patient and family:   See after visit summary for information provided to patient and family.      Provisions for Follow-Up Care:  See after visit summary for information related to follow-up care and any pertinent home health orders.      PCP: Kevin Mejia DO    Disposition: Home    Planned Readmission: No    Discharge Statement   I spent 32 minutes discharging the patient. This time was spent on the day of discharge. I had direct contact with the patient on the day of discharge. Additional documentation is required if more than 30 minutes were spent on discharge.     Discharge Medications:  See after visit summary for reconciled discharge medications provided to patient and family.

## 2024-04-23 NOTE — TELEPHONE ENCOUNTER
Pt called office to cancel appt for 4/24/24. He states he is unable to ambulate into office as his current oxygen supply does not provide enough units of oxygen he needs in order to attend visit. He's wishing to secure a portable unit that will provide him at least 2.5-3.5L.    He plans to call around, but wonders if there is any way we can assist in this process as in placing an orders or providing a resource.    Please advise.  Thank you!

## 2024-04-23 NOTE — TELEPHONE ENCOUNTER
He called got in touch with Dr Romero imaging office will be calling Dr Romero to correct the oxygen machine problem. So you are aware that its being worked on.

## 2024-04-24 ENCOUNTER — TELEPHONE (OUTPATIENT)
Dept: PULMONOLOGY | Facility: CLINIC | Age: 69
End: 2024-04-24

## 2024-04-24 NOTE — TELEPHONE ENCOUNTER
Patient is calling, he has been ensured by [employer] that they can supply him the machine he needs. They have sent a script request for  to sign and send back. This was scanned into his chart as of this morning. He asked if this can please be filled out and sent back to [employer]. Once he receives the [employer], he can then make an appointment to come into the office to be seen. Patient is paying for this out of pocket. He would appreciate the return call with an update. Please advise

## 2024-04-24 NOTE — TELEPHONE ENCOUNTER
RX REQUEST FOR INOGEN ONE PORTABLE OXYGEN CONCENTRATOR    CAN BE FOUND IN PT MYCHART FOR EASY VIEWING.

## 2024-04-24 NOTE — TELEPHONE ENCOUNTER
Dr. Fatima,    Is it ok if I fill out the order and fax to [place] when your in the office?     Thanks

## 2024-04-24 NOTE — TELEPHONE ENCOUNTER
Dennis from [facility] calling to see if another provider can sign off on the release of a portable oxygen machine because they cannot wait until Dr. Fatima returns back to the office. [phone]

## 2024-04-24 NOTE — TELEPHONE ENCOUNTER
I just want to make sure this was addressed.  Forwarding to our pool to make sure he has the appropriate set up and oxygen issues is sorted out

## 2024-04-25 NOTE — TELEPHONE ENCOUNTER
Per Blanca this was completed yesterday. She will have document scanned into chart.     S/w Richard letting him know [employer] order was sent yesterday.

## 2024-04-29 ENCOUNTER — TELEPHONE (OUTPATIENT)
Dept: HEMATOLOGY ONCOLOGY | Facility: CLINIC | Age: 69
End: 2024-04-29

## 2024-04-29 NOTE — TELEPHONE ENCOUNTER
Patient Call    Who are you speaking with? Patient    If it is not the patient, are they listed on an active communication consent form? N/A   What is the reason for this call? He asked if appt tomorrow can be a phone call. Pt hasn't received portable oxygen machine   Does this require a call back? Yes   If a call back is required, please list best call back number [phone]    If a call back is required, advise that a message will be forwarded to their care team and someone will return their call as soon as possible.   Did you relay this information to the patient? Yes

## 2024-05-01 ENCOUNTER — TELEPHONE (OUTPATIENT)
Dept: PALLIATIVE MEDICINE | Facility: CLINIC | Age: 69
End: 2024-05-01

## 2024-05-01 ENCOUNTER — TELEPHONE (OUTPATIENT)
Age: 69
End: 2024-05-01

## 2024-05-01 NOTE — TELEPHONE ENCOUNTER
I received a call from patient's sister/HCR/Benigno Underwood that Rishabh has been admitted at [facility] for another COPD flare.  They are requesting information on how Rishabh can be transferred to [facility] where he is more comfortable.  I recommended they speak with the [facility] physicians about this, as they would be the ones to initiate a transfer. This would also have to be submitted through Rishabh's insurance.     Family is hoping Rishabh will be open to assisted living facility after this.

## 2024-05-01 NOTE — TELEPHONE ENCOUNTER
I returned call to Piedad.   She is asking for a list of skilled nursing facilities that Rishabh could go to.  I recommended they talk with the [facility]  for this list.  I asked her to call back with any questions or concerns.

## 2024-05-02 ENCOUNTER — TELEPHONE (OUTPATIENT)
Age: 69
End: 2024-05-02

## 2024-05-02 DIAGNOSIS — G93.40 ENCEPHALOPATHY ACUTE: ICD-10-CM

## 2024-05-02 DIAGNOSIS — R64 CACHEXIA (HCC): ICD-10-CM

## 2024-05-02 DIAGNOSIS — E43 SEVERE PROTEIN-CALORIE MALNUTRITION (HCC): ICD-10-CM

## 2024-05-02 DIAGNOSIS — J96.21 ACUTE ON CHRONIC RESPIRATORY FAILURE WITH HYPOXIA AND HYPERCAPNIA (HCC): ICD-10-CM

## 2024-05-02 DIAGNOSIS — G89.3 NEOPLASM RELATED PAIN: ICD-10-CM

## 2024-05-02 DIAGNOSIS — C61 PROSTATE CANCER METASTATIC TO BONE (HCC): Primary | ICD-10-CM

## 2024-05-02 DIAGNOSIS — J44.9 CHRONIC OBSTRUCTIVE PULMONARY DISEASE, UNSPECIFIED COPD TYPE (HCC): ICD-10-CM

## 2024-05-02 DIAGNOSIS — C79.51 PROSTATE CANCER METASTATIC TO BONE (HCC): Primary | ICD-10-CM

## 2024-05-02 DIAGNOSIS — J96.22 ACUTE ON CHRONIC RESPIRATORY FAILURE WITH HYPOXIA AND HYPERCAPNIA (HCC): ICD-10-CM

## 2024-05-02 NOTE — TELEPHONE ENCOUNTER
Rishabh from [facility] calling that patient is being release into hospice care . They want to know if Dr. Mejia would be ok following patient on Hospice and if so if he can fax over an order to evaluate and treat to fax : [phone]  Ph: [phone]

## 2024-05-03 ENCOUNTER — TELEPHONE (OUTPATIENT)
Dept: PALLIATIVE MEDICINE | Facility: CLINIC | Age: 69
End: 2024-05-03

## 2024-05-03 ENCOUNTER — TELEPHONE (OUTPATIENT)
Age: 69
End: 2024-05-03

## 2024-05-03 NOTE — TELEPHONE ENCOUNTER
I returned call to Rishabh. He informs me that he has been released from the hospital.  He plans to enroll with hospice short-term however he is clear that he does not align with hospice philosophy of care.   He states he felt somewhat cohered to sign onto hospice by the hospital social workers in order to be discharged from the hospital.     He states he would like to stay in touch with the [facility] providers and if he is not pleased with how [facility] is handling his medications, he will try to return to [facility] to reestablish care with his medical providers.    I reassured him that I would let the team know.  He is welcome to call me with any further questions or concerns.  I also requested that he give my office number to [facility] so they can call me with any questions or concerns.

## 2024-05-03 NOTE — TELEPHONE ENCOUNTER
Pt called to let Dr. Mejia know that he's now in a hospice situation. He wants to let Dr Mejia know that he still wants Dr Mejia as his PCP>.

## 2024-05-03 NOTE — TELEPHONE ENCOUNTER
Patient called and would like you to call him due to some things he needs to go over with you. He is talking about going on hospice for a little but its isnt going to be for long. But he said he would like to talk to you about this its easier for him to explain everything to you. Please Advise

## 2024-05-05 ENCOUNTER — TELEPHONE (OUTPATIENT)
Dept: OTHER | Facility: OTHER | Age: 69
End: 2024-05-05

## 2024-05-05 NOTE — TELEPHONE ENCOUNTER
Patient is calling regarding cancelling an appointment.    Date/Time: 5/6/2024 @2pm    Patient was rescheduled: YES [] NO [x]    Patient requesting call back to reschedule: YES [] NO [x]

## 2024-05-06 ENCOUNTER — TELEPHONE (OUTPATIENT)
Dept: HEMATOLOGY ONCOLOGY | Facility: CLINIC | Age: 69
End: 2024-05-06

## 2024-05-06 ENCOUNTER — TELEPHONE (OUTPATIENT)
Age: 69
End: 2024-05-06

## 2024-05-06 NOTE — TELEPHONE ENCOUNTER
Patient Call    Who are you speaking with? Patient    If it is not the patient, are they listed on an active communication consent form? N/A   What is the reason for this call? Pt needs to work things out with insurance and will call back to schedule   Does this require a call back? N/A   If a call back is required, please list best call back number N/a   If a call back is required, advise that a message will be forwarded to their care team and someone will return their call as soon as possible.   Did you relay this information to the patient? N/A

## 2024-05-06 NOTE — TELEPHONE ENCOUNTER
Patient called the [facility] to follow up on cancelled appt (message was in Inbox)but requested a call back to reschedule.  Appt was neither canceled by [facility] or rescheduled.  Called patient and left a voicemail regarding cancellation, and wanting to reschedule.

## 2024-05-09 ENCOUNTER — TELEPHONE (OUTPATIENT)
Dept: HEMATOLOGY ONCOLOGY | Facility: CLINIC | Age: 69
End: 2024-05-09

## 2024-05-09 NOTE — TELEPHONE ENCOUNTER
Patient called in, stating that for right now he is just taking a break of everything. He is trying to work things out with [facility]  and get a base line to see if things are making any chances for either the good or bad. He is wanting to try this for bout a month or so to see if there is a difference. If things are not changing, he will come back and make an appointment to follow up and continue care.

## 2024-05-10 PROBLEM — R06.03 RESPIRATORY DISTRESS: Status: RESOLVED | Noted: 2024-03-08 | Resolved: 2024-05-10

## 2024-05-10 PROBLEM — R10.32 LEFT LOWER QUADRANT ABDOMINAL PAIN: Status: RESOLVED | Noted: 2021-12-01 | Resolved: 2024-05-10

## 2024-05-10 PROBLEM — J96.11 CHRONIC HYPOXIC RESPIRATORY FAILURE (HCC): Status: ACTIVE | Noted: 2024-05-10

## 2024-05-10 PROBLEM — G62.0 NEUROPATHY DUE TO CHEMOTHERAPEUTIC DRUG (HCC): Status: ACTIVE | Noted: 2024-05-10

## 2024-05-10 PROBLEM — T45.1X5A NEUROPATHY DUE TO CHEMOTHERAPEUTIC DRUG (HCC): Status: ACTIVE | Noted: 2024-05-10

## 2024-05-10 PROBLEM — J43.9 EMPHYSEMA LUNG (HCC): Status: ACTIVE | Noted: 2020-02-18

## 2024-05-10 PROBLEM — J96.11 CHRONIC HYPOXIC RESPIRATORY FAILURE (HCC): Status: ACTIVE | Noted: 2020-02-19

## 2024-05-10 PROBLEM — R78.81 POSITIVE BLOOD CULTURE: Status: RESOLVED | Noted: 2023-04-25 | Resolved: 2024-05-10

## 2024-05-10 PROBLEM — G93.40 ENCEPHALOPATHY ACUTE: Status: RESOLVED | Noted: 2022-05-05 | Resolved: 2024-05-10

## 2024-05-16 ENCOUNTER — TELEPHONE (OUTPATIENT)
Dept: OTHER | Facility: OTHER | Age: 69
End: 2024-05-16

## 2024-05-16 NOTE — TELEPHONE ENCOUNTER
Pt called and reached the after hours service. He states he recently switched to [facility] for about 5 days and he is very unhappy with the care. Pt states they threw all of his medications out and he is most concerned about zytiga--he has been off of it for about 4 days and wants to get back on it. Pt would like to re-establish with  palliative care as soon as possible and get back on his medications.

## 2024-05-17 NOTE — TELEPHONE ENCOUNTER
He should let [facility] know that he wants to sign off of hospice.  We can call him to make a follow-up appointment with me in the clinic.  He should also call his oncologist to let them know he would like to reestablish with them and needs an appointment.

## 2024-05-21 ENCOUNTER — APPOINTMENT (EMERGENCY)
Dept: RADIOLOGY | Facility: HOSPITAL | Age: 69
DRG: 189 | End: 2024-05-21
Payer: MEDICARE

## 2024-05-21 ENCOUNTER — HOSPITAL ENCOUNTER (INPATIENT)
Facility: HOSPITAL | Age: 69
LOS: 7 days | Discharge: HOME | DRG: 189 | End: 2024-05-29
Attending: EMERGENCY MEDICINE | Admitting: HOSPITALIST
Payer: MEDICARE

## 2024-05-21 ENCOUNTER — TELEPHONE (OUTPATIENT)
Dept: PALLIATIVE MEDICINE | Facility: CLINIC | Age: 69
End: 2024-05-21

## 2024-05-21 DIAGNOSIS — C61 MALIGNANT NEOPLASM OF PROSTATE METASTATIC TO BONE (HCC): ICD-10-CM

## 2024-05-21 DIAGNOSIS — J44.1 COPD EXACERBATION (HCC): Primary | ICD-10-CM

## 2024-05-21 DIAGNOSIS — C61 PROSTATE CANCER METASTATIC TO BONE (HCC): ICD-10-CM

## 2024-05-21 DIAGNOSIS — C79.51 PROSTATE CANCER METASTATIC TO BONE (HCC): ICD-10-CM

## 2024-05-21 DIAGNOSIS — Z51.5 PALLIATIVE CARE PATIENT: ICD-10-CM

## 2024-05-21 DIAGNOSIS — K21.9 GASTROESOPHAGEAL REFLUX DISEASE WITHOUT ESOPHAGITIS: ICD-10-CM

## 2024-05-21 DIAGNOSIS — C79.51 MALIGNANT NEOPLASM OF PROSTATE METASTATIC TO BONE (HCC): ICD-10-CM

## 2024-05-21 DIAGNOSIS — J96.11 CHRONIC HYPOXIC RESPIRATORY FAILURE (HCC): ICD-10-CM

## 2024-05-21 LAB
ALBUMIN SERPL BCP-MCNC: 3.9 G/DL (ref 3.5–5)
ALP SERPL-CCNC: 40 U/L (ref 34–104)
ALT SERPL W P-5'-P-CCNC: 13 U/L (ref 7–52)
ANION GAP SERPL CALCULATED.3IONS-SCNC: 2 MMOL/L (ref 4–13)
APTT PPP: 29 SECONDS (ref 23–37)
AST SERPL W P-5'-P-CCNC: 18 U/L (ref 13–39)
BASE EXCESS BLDA CALC-SCNC: 14 MMOL/L (ref -2–3)
BASOPHILS # BLD AUTO: 0.06 THOUSANDS/ÂΜL (ref 0–0.1)
BASOPHILS NFR BLD AUTO: 1 % (ref 0–1)
BILIRUB SERPL-MCNC: 0.25 MG/DL (ref 0.2–1)
BNP SERPL-MCNC: 56 PG/ML (ref 0–100)
BUN SERPL-MCNC: 14 MG/DL (ref 5–25)
CA-I BLD-SCNC: 1.22 MMOL/L (ref 1.12–1.32)
CALCIUM SERPL-MCNC: 9.2 MG/DL (ref 8.4–10.2)
CARDIAC TROPONIN I PNL SERPL HS: 22 NG/L
CHLORIDE SERPL-SCNC: 99 MMOL/L (ref 96–108)
CO2 SERPL-SCNC: 42 MMOL/L (ref 21–32)
CREAT SERPL-MCNC: 0.5 MG/DL (ref 0.6–1.3)
D DIMER PPP FEU-MCNC: 0.4 UG/ML FEU
EOSINOPHIL # BLD AUTO: 0.42 THOUSAND/ÂΜL (ref 0–0.61)
EOSINOPHIL NFR BLD AUTO: 6 % (ref 0–6)
ERYTHROCYTE [DISTWIDTH] IN BLOOD BY AUTOMATED COUNT: 13.8 % (ref 11.6–15.1)
FLUAV RNA RESP QL NAA+PROBE: NEGATIVE
FLUBV RNA RESP QL NAA+PROBE: NEGATIVE
GFR SERPL CREATININE-BSD FRML MDRD: 110 ML/MIN/1.73SQ M
GLUCOSE SERPL-MCNC: 130 MG/DL (ref 65–140)
GLUCOSE SERPL-MCNC: 132 MG/DL (ref 65–140)
HCO3 BLDA-SCNC: 42.5 MMOL/L (ref 24–30)
HCT VFR BLD AUTO: 35.8 % (ref 36.5–49.3)
HCT VFR BLD CALC: 35 % (ref 36.5–49.3)
HGB BLD-MCNC: 11.3 G/DL (ref 12–17)
HGB BLDA-MCNC: 11.9 G/DL (ref 12–17)
IMM GRANULOCYTES # BLD AUTO: 0.03 THOUSAND/UL (ref 0–0.2)
IMM GRANULOCYTES NFR BLD AUTO: 0 % (ref 0–2)
INR PPP: 0.94 (ref 0.84–1.19)
LACTATE SERPL-SCNC: 0.8 MMOL/L (ref 0.5–2)
LYMPHOCYTES # BLD AUTO: 1.21 THOUSANDS/ÂΜL (ref 0.6–4.47)
LYMPHOCYTES NFR BLD AUTO: 18 % (ref 14–44)
MCH RBC QN AUTO: 32.3 PG (ref 26.8–34.3)
MCHC RBC AUTO-ENTMCNC: 31.6 G/DL (ref 31.4–37.4)
MCV RBC AUTO: 102 FL (ref 82–98)
MONOCYTES # BLD AUTO: 0.7 THOUSAND/ÂΜL (ref 0.17–1.22)
MONOCYTES NFR BLD AUTO: 10 % (ref 4–12)
NEUTROPHILS # BLD AUTO: 4.41 THOUSANDS/ÂΜL (ref 1.85–7.62)
NEUTS SEG NFR BLD AUTO: 65 % (ref 43–75)
NRBC BLD AUTO-RTO: 0 /100 WBCS
PCO2 BLD: 45 MMOL/L (ref 21–32)
PCO2 BLD: 72.7 MM HG (ref 42–50)
PH BLD: 7.38 [PH] (ref 7.3–7.4)
PLATELET # BLD AUTO: 287 THOUSANDS/UL (ref 149–390)
PMV BLD AUTO: 8.4 FL (ref 8.9–12.7)
PO2 BLD: 70 MM HG (ref 35–45)
POTASSIUM BLD-SCNC: 4.1 MMOL/L (ref 3.5–5.3)
POTASSIUM SERPL-SCNC: 4.1 MMOL/L (ref 3.5–5.3)
PROCALCITONIN SERPL-MCNC: 0.06 NG/ML
PROT SERPL-MCNC: 6.4 G/DL (ref 6.4–8.4)
PROTHROMBIN TIME: 12.9 SECONDS (ref 11.6–14.5)
RBC # BLD AUTO: 3.5 MILLION/UL (ref 3.88–5.62)
RSV RNA RESP QL NAA+PROBE: NEGATIVE
SAO2 % BLD FROM PO2: 92 % (ref 60–85)
SARS-COV-2 RNA RESP QL NAA+PROBE: NEGATIVE
SODIUM BLD-SCNC: 141 MMOL/L (ref 136–145)
SODIUM SERPL-SCNC: 143 MMOL/L (ref 135–147)
SPECIMEN SOURCE: ABNORMAL
WBC # BLD AUTO: 6.83 THOUSAND/UL (ref 4.31–10.16)

## 2024-05-21 PROCEDURE — 83605 ASSAY OF LACTIC ACID: CPT | Performed by: EMERGENCY MEDICINE

## 2024-05-21 PROCEDURE — 96375 TX/PRO/DX INJ NEW DRUG ADDON: CPT

## 2024-05-21 PROCEDURE — 85379 FIBRIN DEGRADATION QUANT: CPT | Performed by: EMERGENCY MEDICINE

## 2024-05-21 PROCEDURE — 85014 HEMATOCRIT: CPT

## 2024-05-21 PROCEDURE — 85730 THROMBOPLASTIN TIME PARTIAL: CPT | Performed by: EMERGENCY MEDICINE

## 2024-05-21 PROCEDURE — 85610 PROTHROMBIN TIME: CPT | Performed by: EMERGENCY MEDICINE

## 2024-05-21 PROCEDURE — 82947 ASSAY GLUCOSE BLOOD QUANT: CPT

## 2024-05-21 PROCEDURE — 83880 ASSAY OF NATRIURETIC PEPTIDE: CPT | Performed by: EMERGENCY MEDICINE

## 2024-05-21 PROCEDURE — 99285 EMERGENCY DEPT VISIT HI MDM: CPT

## 2024-05-21 PROCEDURE — 85025 COMPLETE CBC W/AUTO DIFF WBC: CPT | Performed by: EMERGENCY MEDICINE

## 2024-05-21 PROCEDURE — 96366 THER/PROPH/DIAG IV INF ADDON: CPT

## 2024-05-21 PROCEDURE — 84132 ASSAY OF SERUM POTASSIUM: CPT

## 2024-05-21 PROCEDURE — 82803 BLOOD GASES ANY COMBINATION: CPT

## 2024-05-21 PROCEDURE — 84145 PROCALCITONIN (PCT): CPT | Performed by: EMERGENCY MEDICINE

## 2024-05-21 PROCEDURE — 84295 ASSAY OF SERUM SODIUM: CPT

## 2024-05-21 PROCEDURE — 36415 COLL VENOUS BLD VENIPUNCTURE: CPT | Performed by: EMERGENCY MEDICINE

## 2024-05-21 PROCEDURE — 93005 ELECTROCARDIOGRAM TRACING: CPT

## 2024-05-21 PROCEDURE — 0241U HB NFCT DS VIR RESP RNA 4 TRGT: CPT | Performed by: EMERGENCY MEDICINE

## 2024-05-21 PROCEDURE — 82330 ASSAY OF CALCIUM: CPT

## 2024-05-21 PROCEDURE — 96365 THER/PROPH/DIAG IV INF INIT: CPT

## 2024-05-21 PROCEDURE — 80053 COMPREHEN METABOLIC PANEL: CPT | Performed by: EMERGENCY MEDICINE

## 2024-05-21 PROCEDURE — 71045 X-RAY EXAM CHEST 1 VIEW: CPT

## 2024-05-21 PROCEDURE — 99285 EMERGENCY DEPT VISIT HI MDM: CPT | Performed by: EMERGENCY MEDICINE

## 2024-05-21 PROCEDURE — 84484 ASSAY OF TROPONIN QUANT: CPT | Performed by: EMERGENCY MEDICINE

## 2024-05-21 PROCEDURE — 94644 CONT INHLJ TX 1ST HOUR: CPT

## 2024-05-21 PROCEDURE — 87040 BLOOD CULTURE FOR BACTERIA: CPT | Performed by: EMERGENCY MEDICINE

## 2024-05-21 RX ORDER — ACETAMINOPHEN 325 MG/1
975 TABLET ORAL ONCE
Status: COMPLETED | OUTPATIENT
Start: 2024-05-21 | End: 2024-05-21

## 2024-05-21 RX ORDER — METHYLPREDNISOLONE SODIUM SUCCINATE 125 MG/2ML
80 INJECTION, POWDER, LYOPHILIZED, FOR SOLUTION INTRAMUSCULAR; INTRAVENOUS ONCE
Status: COMPLETED | OUTPATIENT
Start: 2024-05-21 | End: 2024-05-21

## 2024-05-21 RX ORDER — SODIUM CHLORIDE FOR INHALATION 0.9 %
12 VIAL, NEBULIZER (ML) INHALATION ONCE
Status: COMPLETED | OUTPATIENT
Start: 2024-05-21 | End: 2024-05-21

## 2024-05-21 RX ORDER — MAGNESIUM SULFATE HEPTAHYDRATE 40 MG/ML
2 INJECTION, SOLUTION INTRAVENOUS ONCE
Status: COMPLETED | OUTPATIENT
Start: 2024-05-21 | End: 2024-05-22

## 2024-05-21 RX ORDER — MORPHINE SULFATE 4 MG/ML
4 INJECTION, SOLUTION INTRAMUSCULAR; INTRAVENOUS ONCE
Status: DISCONTINUED | OUTPATIENT
Start: 2024-05-21 | End: 2024-05-22

## 2024-05-21 RX ADMIN — ISODIUM CHLORIDE 12 ML: 0.03 SOLUTION RESPIRATORY (INHALATION) at 22:16

## 2024-05-21 RX ADMIN — ALBUTEROL SULFATE 10 MG: 2.5 SOLUTION RESPIRATORY (INHALATION) at 22:16

## 2024-05-21 RX ADMIN — ACETAMINOPHEN 975 MG: 325 TABLET, FILM COATED ORAL at 22:10

## 2024-05-21 RX ADMIN — METHYLPREDNISOLONE SODIUM SUCCINATE 80 MG: 125 INJECTION, POWDER, FOR SOLUTION INTRAMUSCULAR; INTRAVENOUS at 22:10

## 2024-05-21 RX ADMIN — IPRATROPIUM BROMIDE 1 MG: 0.5 SOLUTION RESPIRATORY (INHALATION) at 22:16

## 2024-05-21 RX ADMIN — MAGNESIUM SULFATE HEPTAHYDRATE 2 G: 2 INJECTION, SOLUTION INTRAVENOUS at 22:11

## 2024-05-22 LAB
2HR DELTA HS TROPONIN: 4 NG/L
ANION GAP SERPL CALCULATED.3IONS-SCNC: 4 MMOL/L (ref 4–13)
BASOPHILS # BLD MANUAL: 0 THOUSAND/UL (ref 0–0.1)
BASOPHILS NFR MAR MANUAL: 0 % (ref 0–1)
BUN SERPL-MCNC: 14 MG/DL (ref 5–25)
CALCIUM SERPL-MCNC: 8.5 MG/DL (ref 8.4–10.2)
CARDIAC TROPONIN I PNL SERPL HS: 26 NG/L
CHLORIDE SERPL-SCNC: 98 MMOL/L (ref 96–108)
CO2 SERPL-SCNC: 38 MMOL/L (ref 21–32)
CREAT SERPL-MCNC: 0.43 MG/DL (ref 0.6–1.3)
EOSINOPHIL # BLD MANUAL: 0 THOUSAND/UL (ref 0–0.4)
EOSINOPHIL NFR BLD MANUAL: 0 % (ref 0–6)
ERYTHROCYTE [DISTWIDTH] IN BLOOD BY AUTOMATED COUNT: 13.6 % (ref 11.6–15.1)
GFR SERPL CREATININE-BSD FRML MDRD: 117 ML/MIN/1.73SQ M
GLUCOSE SERPL-MCNC: 196 MG/DL (ref 65–140)
HCT VFR BLD AUTO: 34.4 % (ref 36.5–49.3)
HGB BLD-MCNC: 10.6 G/DL (ref 12–17)
LYMPHOCYTES # BLD AUTO: 0.69 THOUSAND/UL (ref 0.6–4.47)
LYMPHOCYTES # BLD AUTO: 13 % (ref 14–44)
MAGNESIUM SERPL-MCNC: 1.9 MG/DL (ref 1.9–2.7)
MCH RBC QN AUTO: 31.3 PG (ref 26.8–34.3)
MCHC RBC AUTO-ENTMCNC: 30.8 G/DL (ref 31.4–37.4)
MCV RBC AUTO: 102 FL (ref 82–98)
MONOCYTES # BLD AUTO: 0.05 THOUSAND/UL (ref 0–1.22)
MONOCYTES NFR BLD: 1 % (ref 4–12)
NEUTROPHILS # BLD MANUAL: 4.56 THOUSAND/UL (ref 1.85–7.62)
NEUTS BAND NFR BLD MANUAL: 3 % (ref 0–8)
NEUTS SEG NFR BLD AUTO: 83 % (ref 43–75)
PLATELET # BLD AUTO: 287 THOUSANDS/UL (ref 149–390)
PLATELET BLD QL SMEAR: ADEQUATE
PMV BLD AUTO: 8.5 FL (ref 8.9–12.7)
POTASSIUM SERPL-SCNC: 4 MMOL/L (ref 3.5–5.3)
RBC # BLD AUTO: 3.39 MILLION/UL (ref 3.88–5.62)
SODIUM SERPL-SCNC: 140 MMOL/L (ref 135–147)
WBC # BLD AUTO: 5.3 THOUSAND/UL (ref 4.31–10.16)

## 2024-05-22 PROCEDURE — 94640 AIRWAY INHALATION TREATMENT: CPT

## 2024-05-22 PROCEDURE — 92610 EVALUATE SWALLOWING FUNCTION: CPT

## 2024-05-22 PROCEDURE — 85007 BL SMEAR W/DIFF WBC COUNT: CPT | Performed by: INTERNAL MEDICINE

## 2024-05-22 PROCEDURE — 80048 BASIC METABOLIC PNL TOTAL CA: CPT | Performed by: INTERNAL MEDICINE

## 2024-05-22 PROCEDURE — 84484 ASSAY OF TROPONIN QUANT: CPT | Performed by: EMERGENCY MEDICINE

## 2024-05-22 PROCEDURE — 94760 N-INVAS EAR/PLS OXIMETRY 1: CPT

## 2024-05-22 PROCEDURE — 99223 1ST HOSP IP/OBS HIGH 75: CPT | Performed by: PHYSICIAN ASSISTANT

## 2024-05-22 PROCEDURE — 83735 ASSAY OF MAGNESIUM: CPT | Performed by: INTERNAL MEDICINE

## 2024-05-22 PROCEDURE — 99223 1ST HOSP IP/OBS HIGH 75: CPT | Performed by: INTERNAL MEDICINE

## 2024-05-22 PROCEDURE — 36415 COLL VENOUS BLD VENIPUNCTURE: CPT | Performed by: EMERGENCY MEDICINE

## 2024-05-22 PROCEDURE — 85027 COMPLETE CBC AUTOMATED: CPT | Performed by: INTERNAL MEDICINE

## 2024-05-22 RX ORDER — HEPARIN SODIUM 5000 [USP'U]/ML
5000 INJECTION, SOLUTION INTRAVENOUS; SUBCUTANEOUS EVERY 8 HOURS SCHEDULED
Status: DISCONTINUED | OUTPATIENT
Start: 2024-05-22 | End: 2024-05-29 | Stop reason: HOSPADM

## 2024-05-22 RX ORDER — GUAIFENESIN 600 MG/1
600 TABLET, EXTENDED RELEASE ORAL EVERY 12 HOURS SCHEDULED
Status: DISCONTINUED | OUTPATIENT
Start: 2024-05-22 | End: 2024-05-23

## 2024-05-22 RX ORDER — GABAPENTIN 300 MG/1
300 CAPSULE ORAL 2 TIMES DAILY
Status: DISCONTINUED | OUTPATIENT
Start: 2024-05-22 | End: 2024-05-29 | Stop reason: HOSPADM

## 2024-05-22 RX ORDER — ALBUTEROL SULFATE 2.5 MG/3ML
2.5 SOLUTION RESPIRATORY (INHALATION) EVERY 6 HOURS PRN
Status: DISCONTINUED | OUTPATIENT
Start: 2024-05-22 | End: 2024-05-29 | Stop reason: HOSPADM

## 2024-05-22 RX ORDER — SODIUM CHLORIDE FOR INHALATION 3 %
4 VIAL, NEBULIZER (ML) INHALATION
Status: DISCONTINUED | OUTPATIENT
Start: 2024-05-22 | End: 2024-05-29 | Stop reason: HOSPADM

## 2024-05-22 RX ORDER — ACETAMINOPHEN 325 MG/1
650 TABLET ORAL EVERY 6 HOURS PRN
Status: DISCONTINUED | OUTPATIENT
Start: 2024-05-22 | End: 2024-05-29 | Stop reason: HOSPADM

## 2024-05-22 RX ORDER — LEVALBUTEROL INHALATION SOLUTION 1.25 MG/3ML
1.25 SOLUTION RESPIRATORY (INHALATION)
Status: DISCONTINUED | OUTPATIENT
Start: 2024-05-22 | End: 2024-05-23

## 2024-05-22 RX ORDER — ALBUTEROL SULFATE 90 UG/1
1 AEROSOL, METERED RESPIRATORY (INHALATION) EVERY 4 HOURS PRN
Status: DISCONTINUED | OUTPATIENT
Start: 2024-05-22 | End: 2024-05-29 | Stop reason: HOSPADM

## 2024-05-22 RX ORDER — MORPHINE SULFATE 15 MG/1
15 TABLET, FILM COATED, EXTENDED RELEASE ORAL 2 TIMES DAILY
Status: DISCONTINUED | OUTPATIENT
Start: 2024-05-22 | End: 2024-05-29 | Stop reason: HOSPADM

## 2024-05-22 RX ORDER — METHYLPREDNISOLONE SODIUM SUCCINATE 40 MG/ML
40 INJECTION, POWDER, LYOPHILIZED, FOR SOLUTION INTRAMUSCULAR; INTRAVENOUS EVERY 8 HOURS SCHEDULED
Status: DISCONTINUED | OUTPATIENT
Start: 2024-05-22 | End: 2024-05-22

## 2024-05-22 RX ORDER — ALPRAZOLAM 0.5 MG/1
0.5 TABLET ORAL 2 TIMES DAILY PRN
Status: DISCONTINUED | OUTPATIENT
Start: 2024-05-22 | End: 2024-05-23

## 2024-05-22 RX ORDER — FLUTICASONE PROPIONATE 50 MCG
1 SPRAY, SUSPENSION (ML) NASAL DAILY
Status: DISCONTINUED | OUTPATIENT
Start: 2024-05-22 | End: 2024-05-29 | Stop reason: HOSPADM

## 2024-05-22 RX ORDER — METHYLPREDNISOLONE SODIUM SUCCINATE 40 MG/ML
40 INJECTION, POWDER, LYOPHILIZED, FOR SOLUTION INTRAMUSCULAR; INTRAVENOUS EVERY 12 HOURS SCHEDULED
Status: DISCONTINUED | OUTPATIENT
Start: 2024-05-22 | End: 2024-05-24

## 2024-05-22 RX ORDER — PANTOPRAZOLE SODIUM 40 MG/1
40 TABLET, DELAYED RELEASE ORAL
Status: DISCONTINUED | OUTPATIENT
Start: 2024-05-22 | End: 2024-05-29 | Stop reason: HOSPADM

## 2024-05-22 RX ORDER — BENZONATATE 100 MG/1
100 CAPSULE ORAL 3 TIMES DAILY PRN
Status: DISCONTINUED | OUTPATIENT
Start: 2024-05-22 | End: 2024-05-29 | Stop reason: HOSPADM

## 2024-05-22 RX ORDER — ATORVASTATIN CALCIUM 40 MG/1
40 TABLET, FILM COATED ORAL
Status: DISCONTINUED | OUTPATIENT
Start: 2024-05-22 | End: 2024-05-29 | Stop reason: HOSPADM

## 2024-05-22 RX ORDER — BUDESONIDE 0.5 MG/2ML
0.5 INHALANT ORAL
Status: DISCONTINUED | OUTPATIENT
Start: 2024-05-22 | End: 2024-05-29 | Stop reason: HOSPADM

## 2024-05-22 RX ORDER — BUDESONIDE AND FORMOTEROL FUMARATE DIHYDRATE 160; 4.5 UG/1; UG/1
2 AEROSOL RESPIRATORY (INHALATION) 2 TIMES DAILY
Status: DISCONTINUED | OUTPATIENT
Start: 2024-05-22 | End: 2024-05-22

## 2024-05-22 RX ADMIN — HEPARIN SODIUM 5000 UNITS: 5000 INJECTION INTRAVENOUS; SUBCUTANEOUS at 05:51

## 2024-05-22 RX ADMIN — BENZONATATE 100 MG: 100 CAPSULE ORAL at 01:40

## 2024-05-22 RX ADMIN — IPRATROPIUM BROMIDE 0.5 MG: 0.5 SOLUTION RESPIRATORY (INHALATION) at 19:10

## 2024-05-22 RX ADMIN — ACETAMINOPHEN 650 MG: 325 TABLET, FILM COATED ORAL at 03:49

## 2024-05-22 RX ADMIN — SODIUM CHLORIDE SOLN NEBU 3% 4 ML: 3 NEBU SOLN at 19:10

## 2024-05-22 RX ADMIN — IPRATROPIUM BROMIDE 0.5 MG: 0.5 SOLUTION RESPIRATORY (INHALATION) at 14:10

## 2024-05-22 RX ADMIN — GUAIFENESIN 600 MG: 600 TABLET ORAL at 09:52

## 2024-05-22 RX ADMIN — PANTOPRAZOLE SODIUM 40 MG: 40 TABLET, DELAYED RELEASE ORAL at 08:40

## 2024-05-22 RX ADMIN — ALBUTEROL SULFATE 2.5 MG: 2.5 SOLUTION RESPIRATORY (INHALATION) at 01:40

## 2024-05-22 RX ADMIN — SODIUM CHLORIDE SOLN NEBU 3% 4 ML: 3 NEBU SOLN at 14:12

## 2024-05-22 RX ADMIN — HEPARIN SODIUM 5000 UNITS: 5000 INJECTION INTRAVENOUS; SUBCUTANEOUS at 21:50

## 2024-05-22 RX ADMIN — MORPHINE SULFATE 15 MG: 15 TABLET, EXTENDED RELEASE ORAL at 08:40

## 2024-05-22 RX ADMIN — ALPRAZOLAM 0.5 MG: 0.5 TABLET ORAL at 21:58

## 2024-05-22 RX ADMIN — ALPRAZOLAM 0.5 MG: 0.5 TABLET ORAL at 03:49

## 2024-05-22 RX ADMIN — LEVALBUTEROL HYDROCHLORIDE 1.25 MG: 1.25 SOLUTION RESPIRATORY (INHALATION) at 08:03

## 2024-05-22 RX ADMIN — METHYLPREDNISOLONE SODIUM SUCCINATE 40 MG: 40 INJECTION, POWDER, FOR SOLUTION INTRAMUSCULAR; INTRAVENOUS at 05:51

## 2024-05-22 RX ADMIN — BUDESONIDE 0.5 MG: 0.5 INHALANT RESPIRATORY (INHALATION) at 19:34

## 2024-05-22 RX ADMIN — MORPHINE SULFATE 15 MG: 15 TABLET, EXTENDED RELEASE ORAL at 17:16

## 2024-05-22 RX ADMIN — IPRATROPIUM BROMIDE 0.5 MG: 0.5 SOLUTION RESPIRATORY (INHALATION) at 08:03

## 2024-05-22 RX ADMIN — LEVALBUTEROL HYDROCHLORIDE 1.25 MG: 1.25 SOLUTION RESPIRATORY (INHALATION) at 19:10

## 2024-05-22 RX ADMIN — GABAPENTIN 300 MG: 300 CAPSULE ORAL at 08:40

## 2024-05-22 RX ADMIN — GABAPENTIN 300 MG: 300 CAPSULE ORAL at 17:16

## 2024-05-22 RX ADMIN — ATORVASTATIN CALCIUM 40 MG: 40 TABLET, FILM COATED ORAL at 17:16

## 2024-05-22 RX ADMIN — HEPARIN SODIUM 5000 UNITS: 5000 INJECTION INTRAVENOUS; SUBCUTANEOUS at 15:33

## 2024-05-22 RX ADMIN — AZITHROMYCIN MONOHYDRATE 500 MG: 500 INJECTION, POWDER, LYOPHILIZED, FOR SOLUTION INTRAVENOUS at 09:52

## 2024-05-22 RX ADMIN — LEVALBUTEROL HYDROCHLORIDE 1.25 MG: 1.25 SOLUTION RESPIRATORY (INHALATION) at 14:10

## 2024-05-22 RX ADMIN — METHYLPREDNISOLONE SODIUM SUCCINATE 40 MG: 40 INJECTION, POWDER, FOR SOLUTION INTRAMUSCULAR; INTRAVENOUS at 21:50

## 2024-05-22 NOTE — ED NOTES
Pt states he owes staff an apology for snapping at staff last night. Pt states he was scared and in pain. Pt states the staff was working super hard under rough circumstances     Araceli Jordan RN  05/22/24 [id number]

## 2024-05-22 NOTE — H&P
"[facility]  H&P  Name: Richard Nava 68 y.o. male I MRN: [id number]  Unit/Bed#: ED 07 I Date of Admission: 5/21/2024   Date of Service: 5/22/2024 I Hospital Day: 0      Assessment & Plan   * COPD exacerbation (HCC)  Assessment & Plan  Presents due to shortness of breath.  Called EMS due to distress.  Oxygen machine \"broken\" at home per patient  Per patient had been using up to 6 L of O2 at home.  In the ER able to be weaned down to 2 L.   Wheezing and coughing noted  Does not appear bacterial. Hold off on azithromycin at this time.   Procal and lactic WNL   Afebrile   Home regimen: Breztri  Plan  Albuterol inhaler  Symbicort inhaler  Atrovent/Xopenex 3 times daily  Tessalon Perles  Solu-Medrol 40 mg every 8 hours.  Most likely can be transitioned to prednisone later today  Consult pulmonology    Chronic hypoxic respiratory failure (HCC)  Assessment & Plan  Per prior hospitalization was on 2 to 3 L  Per patient has been using up to 6 L.  However his O2 has been > 95% while at home  Educated the patient that his goal > 88%.  Consult pulmonology    Prostate cancer metastatic to bone (HCC)  Assessment & Plan  Stage 4 metastatic prostate cancer  Changed to hospice about 1 month ago.  Zytiga was discontinued at that time.  Patient has since stopped hospice.  Asking to be restarted on medications  Palliative care consulted.  Goals of care conversation    Opioid dependence (HCC)  Assessment & Plan  Continue home MS Contin 15 mg every 12 hours  PDMP verified  Denies pain    Palliative care patient  Assessment & Plan  Patient refused to continue hospice care with [facility]  Possibly seeking new hospice care through [facility].  However patient thinking he may want to be treatment oriented again  Wants to be a DNR  Consult palliative care    Cachexia (HCC)  Assessment & Plan  Malnutrition Findings:                                 BMI Findings:           There is no height or weight on file to " calculate BMI.     Consult nutrition           VTE Pharmacologic Prophylaxis: VTE Score: 5 High Risk (Score >/= 5) - Pharmacological DVT Prophylaxis Ordered: heparin. Sequential Compression Devices Ordered.  Code Status: Level 3 - DNAR and DNI   Discussion with family: Patient declined call to .     Anticipated Length of Stay: Patient will be admitted on an observation basis with an anticipated length of stay of less than 2 midnights secondary to COPD exacerbation.    Total Time Spent on Date of Encounter in care of patient: 50 mins. This time was spent on one or more of the following: performing physical exam; counseling and coordination of care; obtaining or reviewing history; documenting in the medical record; reviewing/ordering tests, medications or procedures; communicating with other healthcare professionals and discussing with patient's family/caregivers.    Chief Complaint: short of breath    History of Present Illness:  Richard Nava is a 68 y.o. male with a PMH of stage IV prostate cancer, COPD, chronic respiratory failure, cachexia, opioid dependence who presents home via EMS due to shortness of breath.  Per patient he had been feeling short of breath at home and it worsened suddenly which he has attributed to his oxygen compressor.  He feels that his machine is broken.  Received [facility]eb via EMS.  In the ER initially on 6 L nasal cannula but able to be weaned down to 2 L.  Per patient at home he has been using up to 6 L however his oxygen has been at over 95% most of the time.  Denies fevers or chills.     Recent hospital admission for CHF exacerbation.  At that time patient was recommended for hospice.  Patient had been agreeable and it was set up through [facility].  Patient has not been satisfied with their care and has ended it.  Tonight he is stating that he thinks he wants to go back on his medications including his Zytiga for his prostate cancer.  Asking to see palliative care to  discuss his options.  Wants to be a DNR/DNI.     Review of Systems:  Review of Systems   Constitutional:  Negative for fatigue and fever.   HENT:  Negative for sore throat.    Respiratory:  Positive for cough and shortness of breath. Negative for chest tightness.    Cardiovascular:  Negative for chest pain.   Gastrointestinal:  Negative for abdominal distention, abdominal pain, diarrhea, nausea and vomiting.   Genitourinary:  Negative for difficulty urinating.   Musculoskeletal:  Negative for arthralgias.   Neurological:  Negative for weakness and headaches.   Psychiatric/Behavioral:  Negative for agitation and behavioral problems.    All other systems reviewed and are negative.      Past Medical and Surgical History:   Past Medical History:   Diagnosis Date    Bone cancer (HCC)     Colon polyp     COPD (chronic obstructive pulmonary disease) (HCC)     Emphysema lung (HCC)     Encephalopathy acute 05/05/2022    Hyperlipidemia     Left lower quadrant abdominal pain 12/01/2021    Positive blood culture 04/25/2023    Prostate cancer (HCC)     Respiratory distress 03/08/2024       Past Surgical History:   Procedure Laterality Date    APPENDECTOMY      COLONOSCOPY      IR BIOPSY BONE  12/30/2021    JOINT REPLACEMENT      UPPER GASTROINTESTINAL ENDOSCOPY         Meds/Allergies:  Prior to Admission medications    Medication Sig Start Date End Date Taking? Authorizing Provider   abiraterone (ZYTIGA) 250 mg tablet Take 4 tablets (1,000 mg total) by mouth once daily. 4/16/24   ISA Contreras   albuterol (2.5 mg/3 mL) 0.083 % nebulizer solution Take 3 mL (2.5 mg total) by nebulization every 6 (six) hours as needed for wheezing or shortness of breath 1/25/24   Elena Clark DO   albuterol (PROVENTIL HFA,VENTOLIN HFA) 90 mcg/act inhaler TAKE 2 PUFFS BY MOUTH EVERY 4 HOURS AS NEEDED FOR WHEEZE 1/25/24   ISA Contreras   ALPRAZolam (XANAX) 0.5 mg tablet Take 1 tablet (0.5 mg total) by mouth 2 (two) times  a day as needed for anxiety or sleep Provider aware of co-existing clonazepam prescription. Patient is palliative care patient. 4/4/24   Arleth Scales PA-C   atorvastatin (LIPITOR) 40 mg tablet Take 1 tablet (40 mg total) by mouth daily with dinner 2/24/20   Dolly Marie PA-C   azithromycin (ZITHROMAX) 250 mg tablet Take 2 tablets (500 mg total) by mouth 3 (three) times a week 4/22/24 5/22/24  Leisa Wilder MD   benzonatate (TESSALON PERLES) 100 mg capsule Take 1 capsule (100 mg total) by mouth 3 (three) times a day as needed for cough 4/9/24   Kevin Mejia DO   Budeson-Glycopyrrol-Formoterol (Breztri Aerosphere) 160-9-4.8 MCG/ACT AERO Inhale 2 puffs 2 (two) times a day Rinse mouth after use. 12/20/23   Musa Fatima MD   clonazePAM (KlonoPIN) 0.5 MG disintegrating tablet Take 1 tablet (0.5 mg total) by mouth daily at bedtime as needed for anxiety 1/30/24   Arleth Scales PA-C   gabapentin (NEURONTIN) 300 mg capsule Take 1 capsule (300 mg total) by mouth 2 (two) times a day 4/4/24 5/4/24  Arleth Scales PA-C   lidocaine (LIDODERM) 5 % Apply 3 patches topically daily Remove & Discard patch within 12 hours or as directed by MD 1/3/23   Arleth Scales PA-C   morphine (MS CONTIN) 15 mg 12 hr tablet Take 1 tablet (15 mg total) by mouth 2 (two) times a day Ongoing therapy Max Daily Amount: 30 mg 4/4/24   Arleth Scales PA-C   naloxone (NARCAN) 4 mg/0.1 mL nasal spray Administer 1 spray into a nostril. If no response after 2-3 minutes, give another dose in the other nostril using a new spray. 6/2/22   Arleth Scales PA-C   NON FORMULARY Medical marijuana    Historical Provider, MD BLAIR have reviewed home medications with patient personally.    Allergies: No Known Allergies    Social History:  Marital Status: Single   Occupation: Unknown  Patient Pre-hospital Living Situation: Home  Patient Pre-hospital Level of Mobility: walks  Patient Pre-hospital Diet  Restrictions: Regular  Substance Use History:   Social History     Substance and Sexual Activity   Alcohol Use Never     Social History     Tobacco Use   Smoking Status Former    Current packs/day: 0.00    Average packs/day: 1 pack/day for 42.0 years (42.0 ttl pk-yrs)    Types: Cigarettes    Start date:     Quit date:     Years since quittin.3   Smokeless Tobacco Never     Social History     Substance and Sexual Activity   Drug Use Never       Family History:  Family History   Problem Relation Age of Onset    Lung cancer Mother     Breast cancer Sister     Colon polyps Brother     Colon cancer Neg Hx        Physical Exam:     Vitals:   Blood Pressure: 141/72 (24 0300)  Pulse: 99 (24 0300)  Temperature: 98 °F (36.7 °C) (24)  Temp Source: Temporal (24)  Respirations: 20 (24 0300)  SpO2: 97 % (24 030)    Physical Exam  Vitals and nursing note reviewed.   Constitutional:       Appearance: He is cachectic. He is ill-appearing (chronic).      Interventions: Nasal cannula in place.   HENT:      Head: Normocephalic.   Eyes:      Extraocular Movements: Extraocular movements intact.      Pupils: Pupils are equal, round, and reactive to light.   Cardiovascular:      Rate and Rhythm: Normal rate and regular rhythm.      Heart sounds: No murmur heard.     No gallop.   Pulmonary:      Effort: No respiratory distress.      Breath sounds: Wheezing present.   Abdominal:      General: Bowel sounds are normal. There is no distension.      Tenderness: There is no abdominal tenderness.   Musculoskeletal:         General: Normal range of motion.      Cervical back: Normal range of motion.      Right lower leg: No edema.      Left lower leg: No edema.   Skin:     General: Skin is warm.   Neurological:      General: No focal deficit present.      Mental Status: He is alert and oriented to person, place, and time. Mental status is at baseline.   Psychiatric:         Mood and  Affect: Mood normal.         Behavior: Behavior normal.         Thought Content: Thought content normal.          Additional Data:     Lab Results:  Results from last 7 days   Lab Units 05/21/24  2135   WBC Thousand/uL 6.83   HEMOGLOBIN g/dL 11.3*   HEMATOCRIT % 35.8*   PLATELETS Thousands/uL 287   SEGS PCT % 65   LYMPHO PCT % 18   MONO PCT % 10   EOS PCT % 6     Results from last 7 days   Lab Units 05/21/24  2135   SODIUM mmol/L 143   POTASSIUM mmol/L 4.1   CHLORIDE mmol/L 99   CO2 mmol/L 42*   BUN mg/dL 14   CREATININE mg/dL 0.50*   ANION GAP mmol/L 2*   CALCIUM mg/dL 9.2   ALBUMIN g/dL 3.9   TOTAL BILIRUBIN mg/dL 0.25   ALK PHOS U/L 40   ALT U/L 13   AST U/L 18   GLUCOSE RANDOM mg/dL 132     Results from last 7 days   Lab Units 05/21/24  2135   INR  0.94             Results from last 7 days   Lab Units 05/21/24  2146 05/21/24  2135   LACTIC ACID mmol/L 0.8  --    PROCALCITONIN ng/ml  --  0.06       Lines/Drains:  Invasive Devices       Peripheral Intravenous Line  Duration             Peripheral IV 05/21/24 Left Forearm <1 day                        Imaging: Reviewed radiology reports from this admission including: chest xray  XR chest portable   ED Interpretation by Juana Charles DO (05/21 2321)   This study was ordered and independently reviewed by me    No acute findings noted             EKG and Other Studies Reviewed on Admission:   EKG: Sinus Tachycardia. .    ** Please Note: This note has been constructed using a voice recognition system. **

## 2024-05-22 NOTE — CONSULTS
Consultation - Palliative and Supportive Care   Richard Nava 68 y.o. male [id number]    Assessment/Problems actively addressed this visit:  Goals of care counseling  Encounter for palliative and supportive care   Prostate cancer, widely metastatic to bone  Severe COPD with chronic respiratory failure   Neoplasm related pain  Severe protein calorie malnutrition   Pulmonary and cancer cachexia  QTC prolongation     PLAN:  1. Goals of care  Rishabh is decidedly in favor of continuing medical care with the limit of DNR/DNI.  He would like to reestablish with palliative care, pulmonology, and oncology and would like to restart treatment.  He does NOT want hospice currently.   Continue medical optimization. Rishabh has a follow up visit with palliative care next week.     2. Symptom management   Will continue:  MS Contin 15 twice daily  Neurontin 300 twice daily  Alprazolam 0.5 mg twice daily  Further adjustments can be made in the OP setting    Social support:  Time spent providing supportive listening.     Patient is finding comfort validated patient/family experience              I have reviewed the patient's controlled substance dispensing history in the Prescription Drug Monitoring Program in compliance with the [facility] regulations before prescribing any controlled substances.  Last refills  04/04/2024 04/04/2024 1 Morphine Sulf Er 15 Mg Tablet 60.00 30 Ch Bul [id number] Pen (4411) 0 30.00 MME Medicare [place]   04/04/2024 04/04/2024 1 Alprazolam 0.5 Mg Tablet 60.00 30 Ch Bul [id number] Pen (4411) 0 2.00 LME Medicare [place]   05/03/2024 05/03/2024 2 Morphine Sulf Er 15 Mg Tablet 30.00 15 Sherita Con [id number] Enc (0626) 0 30.00 MME Other PA   05/03/2024 05/03/2024 2 Alprazolam 0.5 Mg Tablet 30.00 15 Sherita Con [id number] Enc (0626) 0 2.00 LME Other PA   05/03/2024 05/03/2024 2 Lorazepam 0.5 Mg Tablet 5.00 1 Sherita Con [id number] Enc (0626) 0 2.50 LME Other PA   05/03/2024 05/03/2024 2 Morphine Sulf 100 Mg/5 Ml Conc 15.00 8 Sherita Con [id number]  Enc (0626) 0 37.50 MME Other PA   05/10/2024 05/09/2024 2 Alprazolam 1 Mg Tablet 28.00 14 Sherita Con [id number] Enc (0626) 0 4.00 LME Other PA   05/13/2024 05/03/2024 2 Morphine Sulf Er 15 Mg Tablet 30.00 15 Sherita Con [id number] Enc (2957) 0 30.00 MME Other PA     Decisional apparatus:  Patient is competent on exam today.  If competence is lost, patient's substitute decision maker would default to sister/Piedad by [place] Act 169.  Advance Directive/Living Will/POLST: yes    We appreciate the invitation to be involved in this patient's care.  We will continue to follow throughout this hospitalization.  Please do not hesitate to reach our on call provider through our clinic answering service at [phone] should you have acute symptom control concerns.    Arleth Scales PA-C  Palliative and Supportive Care  Clinic/Answering Service: [phone]  You can find me on "[employer]!     IDENTIFICATION:  Inpatient consult to Palliative Care  Consult performed by: Arleth Scales PA-C  Consult ordered by: Adela Murguia PA-C        Physician Requesting Consult: Brandon Cisneros MD  Reason for Consult / Principal Problem: GOC counseling and SM secondary to severe COPD, prostate cancer    History of Present Illness:  Richard Nava is a 68 y.o. male who presents with shortness of breath.  Rishabh has a past medical history of stage IV prostate cancer, COPD, chronic respiratory failure, cachexia and continuous opioid dependence.  Until recently he has been following with [facility] oncology and palliative care however due to recurrent COPD admissions he did sign onto the [facility] hospice.  He ultimately chose to sign off of hospice and return to [facility] for ongoing medical care.  Palliative and supportive care was consulted to assess patient's goals of care and reestablish him with an appropriate plan of care and follow-up.      Review of Systems:   Review of Systems   Constitutional: Positive for weight gain.  Negative for decreased appetite and malaise/fatigue.   Cardiovascular:  Positive for dyspnea on exertion. Negative for chest pain.   Musculoskeletal:  Positive for arthritis and back pain.   Gastrointestinal:  Negative for bloating, abdominal pain, nausea and vomiting.   Psychiatric/Behavioral:  Negative for altered mental status.        Past Medical History:   Diagnosis Date    Bone cancer (HCC)     Colon polyp     COPD (chronic obstructive pulmonary disease) (HCC)     Emphysema lung (HCC)     Encephalopathy acute 2022    Hyperlipidemia     Left lower quadrant abdominal pain 2021    Positive blood culture 2023    Prostate cancer (HCC)     Respiratory distress 2024     Past Surgical History:   Procedure Laterality Date    APPENDECTOMY      COLONOSCOPY      IR BIOPSY BONE  2021    JOINT REPLACEMENT      UPPER GASTROINTESTINAL ENDOSCOPY       Social History     Socioeconomic History    Marital status: Single     Spouse name: Not on file    Number of children: Not on file    Years of education: Not on file    Highest education level: Not on file   Occupational History    Not on file   Tobacco Use    Smoking status: Former     Current packs/day: 0.00     Average packs/day: 1 pack/day for 42.0 years (42.0 ttl pk-yrs)     Types: Cigarettes     Start date:      Quit date:      Years since quittin.3    Smokeless tobacco: Never   Vaping Use    Vaping status: Never Used   Substance and Sexual Activity    Alcohol use: Never    Drug use: Never    Sexual activity: Not Currently   Other Topics Concern    Not on file   Social History Narrative    Not on file     Social Determinants of Health     Financial Resource Strain: Low Risk  (2023)    Overall Financial Resource Strain (CARDIA)     Difficulty of Paying Living Expenses: Not hard at all   Food Insecurity: No Food Insecurity (2024)    Hunger Vital Sign     Worried About Running Out of Food in the Last Year: Never true     Ran  Out of Food in the Last Year: Never true   Transportation Needs: No Transportation Needs (4/18/2024)    PRAPARE - Transportation     Lack of Transportation (Medical): No     Lack of Transportation (Non-Medical): No   Physical Activity: Not on file   Stress: Not on file   Social Connections: Not on file   Intimate Partner Violence: Not At Risk (4/22/2022)    Humiliation, Afraid, Rape, and Kick questionnaire     Fear of Current or Ex-Partner: No     Emotionally Abused: No     Physically Abused: No     Sexually Abused: No   Housing Stability: High Risk (5/1/2024)    Received from [employer], [employer]    Housing Stability Vital Sign     Unable to Pay for Housing in the Last Year: Not on file     Number of Places Lived in the Last Year: Not on file     Is your current living situation unsteady?   (I.e. Staying with others,  in a hotel, in a shelter, living outside: on the street, on a beach, in a car, abandoned building, bus, train station or in ...: Yes     Family History   Problem Relation Age of Onset    Lung cancer Mother     Breast cancer Sister     Colon polyps Brother     Colon cancer Neg Hx        Medications:  all current active meds have been reviewed and current meds:   Current Facility-Administered Medications   Medication Dose Route Frequency    acetaminophen (TYLENOL) tablet 650 mg  650 mg Oral Q6H PRN    albuterol (PROVENTIL HFA,VENTOLIN HFA) inhaler 1 puff  1 puff Inhalation Q4H PRN    albuterol inhalation solution 2.5 mg  2.5 mg Nebulization Q6H PRN    ALPRAZolam (XANAX) tablet 0.5 mg  0.5 mg Oral BID PRN    atorvastatin (LIPITOR) tablet 40 mg  40 mg Oral Daily With Dinner    azithromycin (ZITHROMAX) 500 mg in sodium chloride 0.9% 250mL IVPB 500 mg  500 mg Intravenous Q24H    benzonatate (TESSALON PERLES) capsule 100 mg  100 mg Oral TID PRN    budesonide (PULMICORT) inhalation solution 0.5 mg  0.5 mg Nebulization Q12H    fluticasone (FLONASE) 50 mcg/act nasal spray 1 spray  1 spray Each Nare Daily     gabapentin (NEURONTIN) capsule 300 mg  300 mg Oral BID    guaiFENesin (MUCINEX) 12 hr tablet 600 mg  600 mg Oral Q12H [facility]    heparin (porcine) subcutaneous injection 5,000 Units  5,000 Units Subcutaneous Q8H [facility]    ipratropium (ATROVENT) 0.02 % inhalation solution 0.5 mg  0.5 mg Nebulization TID    levalbuterol (XOPENEX) inhalation solution 1.25 mg  1.25 mg Nebulization TID    methylPREDNISolone sodium succinate (Solu-MEDROL) injection 40 mg  40 mg Intravenous Q12H [facility]    morphine (MS CONTIN) ER tablet 15 mg  15 mg Oral BID    pantoprazole (PROTONIX) EC tablet 40 mg  40 mg Oral Early Morning    sodium chloride 3 % inhalation solution 4 mL  4 mL Nebulization TID       No Known Allergies      Medications    Current Facility-Administered Medications:     acetaminophen (TYLENOL) tablet 650 mg, 650 mg, Oral, Q6H PRN, Adela Murguia PA-C, 650 mg at 05/22/24 0349    albuterol (PROVENTIL HFA,VENTOLIN HFA) inhaler 1 puff, 1 puff, Inhalation, Q4H PRN, Adela Murguia PA-C    albuterol inhalation solution 2.5 mg, 2.5 mg, Nebulization, Q6H PRN, Adela Murguia PA-C, 2.5 mg at 05/22/24 0140    ALPRAZolam (XANAX) tablet 0.5 mg, 0.5 mg, Oral, BID PRN, Adela Murguia PA-C, 0.5 mg at 05/22/24 0349    atorvastatin (LIPITOR) tablet 40 mg, 40 mg, Oral, Daily With Dinner, Adela Murguia PA-C    azithromycin (ZITHROMAX) 500 mg in sodium chloride 0.9% 250mL IVPB 500 mg, 500 mg, Intravenous, Q24H, Efrain Dudley MD, 500 mg at 05/22/24 0952    benzonatate (TESSALON PERLES) capsule 100 mg, 100 mg, Oral, TID PRN, Adela Murguia PA-C, 100 mg at 05/22/24 0140    budesonide (PULMICORT) inhalation solution 0.5 mg, 0.5 mg, Nebulization, Q12H, Efrain Dudley MD    fluticasone (FLONASE) 50 mcg/act nasal spray 1 spray, 1 spray, Each Nare, Daily, Efrain Dudley MD    gabapentin (NEURONTIN) capsule 300 mg, 300 mg, Oral, BID, Adela Murguia PA-C, 300 mg at 05/22/24 0840    guaiFENesin (MUCINEX) 12 hr tablet 600 mg, 600 mg, Oral, Q12H AFNNY, Efrain Dudley MD, 600 mg at  05/22/24 0952    heparin (porcine) subcutaneous injection 5,000 Units, 5,000 Units, Subcutaneous, Q8H FANNY, Adela Murguia PA-C, 5,000 Units at 05/22/24 0551    ipratropium (ATROVENT) 0.02 % inhalation solution 0.5 mg, 0.5 mg, Nebulization, TID, Adela Murguia PA-C, 0.5 mg at 05/22/24 1410    levalbuterol (XOPENEX) inhalation solution 1.25 mg, 1.25 mg, Nebulization, TID, Adela Murguia PA-C, 1.25 mg at 05/22/24 1410    methylPREDNISolone sodium succinate (Solu-MEDROL) injection 40 mg, 40 mg, Intravenous, Q12H [facility], Efrain Dudley MD    morphine (MS CONTIN) ER tablet 15 mg, 15 mg, Oral, BID, Adela Murguia PA-C, 15 mg at 05/22/24 0840    pantoprazole (PROTONIX) EC tablet 40 mg, 40 mg, Oral, Early Morning, Brandon Cisneros MD, 40 mg at 05/22/24 0840    sodium chloride 3 % inhalation solution 4 mL, 4 mL, Nebulization, TID, Efrain Dudley MD, 4 mL at 05/22/24 1412    Objective  /100   Pulse 99   Temp 98 °F (36.7 °C) (Temporal)   Resp 20   SpO2 97%     Physical Exam:   Physical Exam  Vitals and nursing note reviewed.   Constitutional:       General: He is not in acute distress.     Appearance: He is cachectic. He is ill-appearing.   HENT:      Head: Normocephalic and atraumatic.   Eyes:      General:         Right eye: No discharge.         Left eye: No discharge.      Conjunctiva/sclera: Conjunctivae normal.   Cardiovascular:      Rate and Rhythm: Normal rate.   Pulmonary:      Effort: Pulmonary effort is normal. No respiratory distress.   Musculoskeletal:         General: No swelling.      Cervical back: Neck supple.   Skin:     General: Skin is warm and dry.   Neurological:      Mental Status: He is alert and oriented to person, place, and time. Mental status is at baseline.   Psychiatric:         Mood and Affect: Mood normal.         Behavior: Behavior normal.       Lab Results: I have personally reviewed pertinent labs., CBC:   Lab Results   Component Value Date    WBC 5.30 05/22/2024    HGB 10.6 (L) 05/22/2024    HCT  "34.4 (L) 05/22/2024     (H) 05/22/2024     05/22/2024    RBC 3.39 (L) 05/22/2024    MCH 31.3 05/22/2024    MCHC 30.8 (L) 05/22/2024    RDW 13.6 05/22/2024    MPV 8.5 (L) 05/22/2024    NRBC 0 05/21/2024   , CMP:   Lab Results   Component Value Date    SODIUM 140 05/22/2024    K 4.0 05/22/2024    CL 98 05/22/2024    CO2 38 (H) 05/22/2024    CO2 45 (H) 05/21/2024    BUN 14 05/22/2024    CREATININE 0.43 (L) 05/22/2024    GLUCOSE 130 05/21/2024    CALCIUM 8.5 05/22/2024    AST 18 05/21/2024    ALT 13 05/21/2024    ALKPHOS 40 05/21/2024    EGFR 117 05/22/2024     Imaging Studies: I have personally reviewed pertinent reports.  EKG, Pathology, and Other Studies: I have personally reviewed pertinent reports.    Counseling / Coordination of Care  Total floor / unit time spent today 75 minutes. Greater than 50% of total time was spent with the patient and / or family counseling and / or coordination of care. A description of the counseling / coordination of care: reviewed chart, reviewed lab values, reviewed imaging, provided medical updates, discussed palliative care and symptom management, discussed goals of care, provided supportive listening, provided anticipatory guidance, provided psychosocial and emotional support, assessed competency and decision-making, and facilitated interdisciplinary communication. Reviewed with SLIM team, RN and CM.    Portions of this document may have been created using dictation software and as such some \"sound alike\" terms may have been generated by the system. Do not hesitate to contact me with any questions or clarifications.    "

## 2024-05-22 NOTE — ASSESSMENT & PLAN NOTE
"Presented due to SOB. Per patient oxygen machine \"broken\" at home.   Per patient using 6L of 02 at home  Pt was stable at 95 on 2L while conversing in ED  Will continue on 2L   Wheezing and coughing noted.   Home regamin: Breztri   Plan:  Albuterol inhaler  Symbicort inhaler  Atrovent/Xopenex tid  Benzonatate capsule  Solumedrol 40 mg q 8 hours. Most likely can be transitioned to prednisone later today.   Consult pulmonology   "

## 2024-05-22 NOTE — ASSESSMENT & PLAN NOTE
Malnutrition Findings:       BMI of 14.23  Will contact nutrition about education and appropriate diet                           BMI Findings:           There is no height or weight on file to calculate BMI.

## 2024-05-22 NOTE — ASSESSMENT & PLAN NOTE
Stage 4 metastatic prostate cancer.   Changed to hospice about 1 month ago. Zytiga was discontinued at that time. Patient has since stopped hospice. Asking to be restarted on medication  Palliative care consulted. GOC conversation

## 2024-05-22 NOTE — ASSESSMENT & PLAN NOTE
Pt refused to continue hospice care with [facility]  Possibly seeking new hospice care through [facility]. Patient thinking he may want to be treatment oriented again.   Pt stated he wants a DNR   Consult palliative care

## 2024-05-22 NOTE — ASSESSMENT & PLAN NOTE
Malnutrition Findings:                                 BMI Findings:           There is no height or weight on file to calculate BMI.     Consult nutrition

## 2024-05-22 NOTE — H&P
"Critical access hospital  H&P  Name: Richard Nava 68 y.o. male I MRN: [id number]  Unit/Bed#: ED 07 I Date of Admission: 5/21/2024   Date of Service: 5/22/2024 I Hospital Day: 0      Assessment & Plan   * COPD exacerbation (HCC)  Assessment & Plan  Presented due to SOB. Per patient oxygen machine \"broken\" at home.   Per patient using 6L of 02 at home  Pt was stable at 95 on 2L while conversing in ED  Will continue on 2L   Wheezing and coughing noted.   Home regamin: Breztri   Plan:  Albuterol inhaler  Symbicort inhaler  Atrovent/Xopenex tid  Benzonatate capsule  Solumedrol 40 mg q 8 hours. Most likely can be transitioned to prednisone later today.   Consult pulmonology     Chronic hypoxic respiratory failure (HCC)  Assessment & Plan  Maintained on O2 at home  Per prior hospitalization was on 2-3 L. Per patient has been using up to 6L. However his O2 has been at >95%.   Educated patient that his goal > 88% and not to over-oxygenate himself.   Called ems due to respiratory distress due to broken oxygen machine at home.   Consult with Pulmonology and case management     Opioid dependence (HCC)  Assessment & Plan  Pt is on MS contin 15 mg 12 hr tablet   PDMP verified.   Tolerated well    Palliative care patient  Assessment & Plan  Pt refused to continue hospice care with [facility]  Possibly seeking new hospice care through [facility]. Patient thinking he may want to be treatment oriented again.   Pt stated he wants a DNR   Consult palliative care       Cachexia (HCC)  Assessment & Plan  Malnutrition Findings:       BMI of 14.23  Will contact nutrition about education and appropriate diet                           BMI Findings:           There is no height or weight on file to calculate BMI.       Prostate cancer metastatic to bone (HCC)  Assessment & Plan  Stage 4 metastatic prostate cancer.   Changed to hospice about 1 month ago. Zytiga was discontinued at that time. Patient has since stopped " hospice. Asking to be restarted on medication  Palliative care consulted. GOC conversation          VTE Pharmacologic Prophylaxis:   {VTE Risk:67440}  Code Status: Level 3 - DNAR and DNI   Discussion with family: Patient declined call to .     Anticipated Length of Stay: Patient will be admitted on an observation basis with an anticipated length of stay of less than 2 midnights secondary to COPD Exacerbation.    Total Time Spent on Date of Encounter in care of patient: 60 mins. This time was spent on one or more of the following: performing physical exam; counseling and coordination of care; obtaining or reviewing history; documenting in the medical record; reviewing/ordering tests, medications or procedures; communicating with other healthcare professionals and discussing with patient's family/caregivers.    Chief Complaint: Shortness of Breath x 8 hrs    History of Present Illness:  Richard Nava is a 68 y.o. male with a PMH of COPD, Metastatic Prostate Cancer, who presents with shortness of breath secondary to broken O2 machine along with a headache and chest pain. Pt states he was using 6l of oxygen at home, but felt that his machine was broken. He began feeling shortness of breath this afternoon and called 911. Received DuoNeb treatment on the way to the hospital. Pt remains on 6l initially and brought down to 2L and maintained 95 O2 sat while conversing.        Review of Systems:  Review of Systems    Past Medical and Surgical History:   Past Medical History:   Diagnosis Date    Bone cancer (HCC)     Colon polyp     COPD (chronic obstructive pulmonary disease) (HCC)     Emphysema lung (HCC)     Encephalopathy acute 05/05/2022    Hyperlipidemia     Left lower quadrant abdominal pain 12/01/2021    Positive blood culture 04/25/2023    Prostate cancer (HCC)     Respiratory distress 03/08/2024       Past Surgical History:   Procedure Laterality Date    APPENDECTOMY      COLONOSCOPY      IR  BIOPSY BONE  12/30/2021    JOINT REPLACEMENT      UPPER GASTROINTESTINAL ENDOSCOPY         Meds/Allergies:  Prior to Admission medications    Medication Sig Start Date End Date Taking? Authorizing Provider   abiraterone (ZYTIGA) 250 mg tablet Take 4 tablets (1,000 mg total) by mouth once daily. 4/16/24   ISA Contreras   albuterol (2.5 mg/3 mL) 0.083 % nebulizer solution Take 3 mL (2.5 mg total) by nebulization every 6 (six) hours as needed for wheezing or shortness of breath 1/25/24   Elena Clark DO   albuterol (PROVENTIL HFA,VENTOLIN HFA) 90 mcg/act inhaler TAKE 2 PUFFS BY MOUTH EVERY 4 HOURS AS NEEDED FOR WHEEZE 1/25/24   ISA Contreras   ALPRAZolam (XANAX) 0.5 mg tablet Take 1 tablet (0.5 mg total) by mouth 2 (two) times a day as needed for anxiety or sleep Provider aware of co-existing clonazepam prescription. Patient is palliative care patient. 4/4/24   Arleth Scales PA-C   atorvastatin (LIPITOR) 40 mg tablet Take 1 tablet (40 mg total) by mouth daily with dinner 2/24/20   Dolly Marie PA-C   azithromycin (ZITHROMAX) 250 mg tablet Take 2 tablets (500 mg total) by mouth 3 (three) times a week 4/22/24 5/22/24  Leisa Wilder MD   benzonatate (TESSALON PERLES) 100 mg capsule Take 1 capsule (100 mg total) by mouth 3 (three) times a day as needed for cough 4/9/24   Kevin Mejia DO   Budeson-Glycopyrrol-Formoterol (Breztri Aerosphere) 160-9-4.8 MCG/ACT AERO Inhale 2 puffs 2 (two) times a day Rinse mouth after use. 12/20/23   Musa Fatima MD   clonazePAM (KlonoPIN) 0.5 MG disintegrating tablet Take 1 tablet (0.5 mg total) by mouth daily at bedtime as needed for anxiety 1/30/24   Arleth Scales PA-C   gabapentin (NEURONTIN) 300 mg capsule Take 1 capsule (300 mg total) by mouth 2 (two) times a day 4/4/24 5/4/24  Arleth Scales PA-C   lidocaine (LIDODERM) 5 % Apply 3 patches topically daily Remove & Discard patch within 12 hours or as directed by MD 1/3/23    Arleth Scales PA-C   morphine (MS CONTIN) 15 mg 12 hr tablet Take 1 tablet (15 mg total) by mouth 2 (two) times a day Ongoing therapy Max Daily Amount: 30 mg 24   Arleth Scales PA-C   naloxone (NARCAN) 4 mg/0.1 mL nasal spray Administer 1 spray into a nostril. If no response after 2-3 minutes, give another dose in the other nostril using a new spray. 22   Arleth Scales PA-C   NON FORMULARY Medical marijuana    Historical Provider, MD BLAIR have reviewed home medications with patient personally.    Allergies: No Known Allergies    Social History:  Marital Status: Single   Occupation: Musician   Patient Pre-hospital Living Situation: Alone  Patient Pre-hospital Level of Mobility: walks  Patient Pre-hospital Diet Restrictions: none  Substance Use History:   Social History     Substance and Sexual Activity   Alcohol Use Never     Social History     Tobacco Use   Smoking Status Former    Current packs/day: 0.00    Average packs/day: 1 pack/day for 42.0 years (42.0 ttl pk-yrs)    Types: Cigarettes    Start date:     Quit date:     Years since quittin.3   Smokeless Tobacco Never     Social History     Substance and Sexual Activity   Drug Use Never       Family History:  Family History   Problem Relation Age of Onset    Lung cancer Mother     Breast cancer Sister     Colon polyps Brother     Colon cancer Neg Hx        Physical Exam:     Vitals:   Blood Pressure: 141/68 (24 0000)  Pulse: 100 (24 0000)  Temperature: 98 °F (36.7 °C) (24)  Temp Source: Temporal (24)  Respirations: 20 (24 0000)  SpO2: 94 % (24 0000)    Physical Exam ***    Additional Data:     Lab Results:  Results from last 7 days   Lab Units 24  2135   WBC Thousand/uL 6.83   HEMOGLOBIN g/dL 11.3*   HEMATOCRIT % 35.8*   PLATELETS Thousands/uL 287   SEGS PCT % 65   LYMPHO PCT % 18   MONO PCT % 10   EOS PCT % 6     Results from last 7 days   Lab Units  24  2135   SODIUM mmol/L 143   POTASSIUM mmol/L 4.1   CHLORIDE mmol/L 99   CO2 mmol/L 42*   BUN mg/dL 14   CREATININE mg/dL 0.50*   ANION GAP mmol/L 2*   CALCIUM mg/dL 9.2   ALBUMIN g/dL 3.9   TOTAL BILIRUBIN mg/dL 0.25   ALK PHOS U/L 40   ALT U/L 13   AST U/L 18   GLUCOSE RANDOM mg/dL 132     Results from last 7 days   Lab Units 24  213   INR  0.94             Results from last 7 days   Lab Units 24  2146 24  213   LACTIC ACID mmol/L 0.8  --    PROCALCITONIN ng/ml  --  0.06       Lines/Drains:  Invasive Devices       Peripheral Intravenous Line  Duration             Peripheral IV 24 Left Forearm <1 day                        Imaging: {Radiology Review:84967}  XR chest portable   ED Interpretation by Juana Charles DO ([id number])   This study was ordered and independently reviewed by me    No acute findings noted             EKG and Other Studies Reviewed on Admission:   EKG: {Interpretation of EK}    ** Please Note: This note has been constructed using a voice recognition system. **

## 2024-05-22 NOTE — ASSESSMENT & PLAN NOTE
Maintained on O2 at home  Per prior hospitalization was on 2-3 L. Per patient has been using up to 6L. However his O2 has been at >95%.   Educated patient that his goal > 88% and not to over-oxygenate himself.   Called ems due to respiratory distress due to broken oxygen machine at home.   Consult with Pulmonology and case management

## 2024-05-22 NOTE — SPEECH THERAPY NOTE
Speech Language/Pathology    Speech-Language Pathology Bedside Swallow Evaluation      Patient Name: Richard Nava    Today's Date: 5/22/2024     Problem List  Principal Problem:    COPD exacerbation (HCC)  Active Problems:    Prostate cancer metastatic to bone (HCC)    Cachexia (HCC)    Palliative care patient    Opioid dependence (HCC)    Chronic hypoxic respiratory failure (HCC)      Past Medical History  Past Medical History:   Diagnosis Date    Bone cancer (HCC)     Colon polyp     COPD (chronic obstructive pulmonary disease) (HCC)     Emphysema lung (HCC)     Encephalopathy acute 05/05/2022    Hyperlipidemia     Left lower quadrant abdominal pain 12/01/2021    Positive blood culture 04/25/2023    Prostate cancer (HCC)     Respiratory distress 03/08/2024       Past Surgical History  Past Surgical History:   Procedure Laterality Date    APPENDECTOMY      COLONOSCOPY      IR BIOPSY BONE  12/30/2021    JOINT REPLACEMENT      UPPER GASTROINTESTINAL ENDOSCOPY         Summary   Pt presented with functional appearing oral and pharyngeal stage swallowing skills with materials administered today. Efficient mastication, bolus manipulation and transfer. No oral residue. Swallows suspected fairly prompt. No overt s/s aspiration. Pt denies dysphagia, odynophagia, globus sensation and/or s/s aspiration.     If respiratory status worsens or other medical indicators suggestive of aspiration arise, consider MBS to rule-out. Otherwise, no ST f/u indicated.     Risk for Aspiration: Low risk      Recommended Diet: regular diet and thin liquids   Recommended Form of Meds: whole with liquid   Aspiration precautions and swallowing strategies: upright posture  Other Recommendations: Continue frequent oral care        Current Medical Status  Pt is a 68 y.o. male who presented to  [facility]  with  a PMH of stage IV prostate cancer, COPD, chronic respiratory failure, cachexia, opioid dependence who presents home via  EMS due to shortness of breath.  Per patient he had been feeling short of breath at home and it worsened suddenly which he has attributed to his oxygen compressor.  He feels that his machine is broken.  Received DuoNeb via EMS.  In the ER initially on 6 L nasal cannula but able to be weaned down to 2 L.  Per patient at home he has been using up to 6 L however his oxygen has been at over 95% most of the time.  Denies fevers or chills.      Recent hospital admission for CHF exacerbation.  At that time patient was recommended for hospice.  Patient had been agreeable and it was set up through [facility].  Patient has not been satisfied with their care and has ended it.  Tonight he is stating that he thinks he wants to go back on his medications including his Zytiga for his prostate cancer.  Asking to see palliative care to discuss his options.  Wants to be a DNR/DNI. .    Current Precautions:     Allergies:  No known food allergies    Past medical history:  Please see H&P for details    Special Studies:  CXR 5/21/24: No acute cardiopulmonary disease.     Social/Education/Vocational Hx:  Pt lives alone    Swallow Information   Current Risks for Dysphagia & Aspiration:  respiratory status  Current Symptoms/Concerns: change in respiratory status  Current Diet: regular diet and thin liquids   Baseline Diet: regular diet and thin liquids      Baseline Assessment   Behavior/Cognition: alert  Speech/Language Status: able to participate in conversation and able to follow commands  Patient Positioning: upright in bed  Pain Status/Interventions/Response to Interventions:  No report of or nonverbal indications of pain.       Swallow Mechanism Exam  Facial: symmetrical  Labial: WFL  Lingual: WFL  Velum: symmetrical  Mandible: adequate ROM  Dentition: edentulous  Vocal quality:clear/adequate   Volitional Cough: strong/productive   Respiratory Status:  on 4L O2       Consistencies Assessed and Performance   Consistencies Administered:  thin liquids, puree, soft solids, and hard solids      Oral Stage: WFL  Mastication was adequate with the materials administered today.  Bolus formation and transfer were functional with no significant oral residue noted.  No overt s/s reduced oral control.    Pharyngeal Stage: WFL  Swallow Mechanics:  Swallowing initiation appeared prompt.  Laryngeal rise was palpated and judged to be within functional limits.  No coughing, throat clearing, change in vocal quality or respiratory status noted today.     Esophageal Concerns: none reported    Strategies and Efficacy: -     Summary and Recommendations (see above)    Results Reviewed with: patient and RN     Treatment Recommended: Not at this time unless MBS medically indicated.

## 2024-05-22 NOTE — ED PROVIDER NOTES
History  Chief Complaint   Patient presents with    Shortness of Breath     Pt reports to ed from home from ems, pt repots shortness of breath, pt is supposed to meet with palliative care nurse on the 28th to go back on palliative care, [facility] reports they [facility] an pt had a albuterol treatment before they arrived, pt report he is on 6l chronically at home      68-year-old male with history of severe COPD on 6 L nasal cannula at home presents for evaluation of chest pain with cough as well as a headache today.  States that he thinks that his at home oxygen machine was not working, was very short of breath so called the ambulance, en route received DuoNeb treatment with some improvement.  Currently has a wet cough, no fever, no nausea or vomiting.  Headache is not as bad as his typical migraines, not acute onset with no focal deficits.  No History of DVT or PE        Prior to Admission Medications   Prescriptions Last Dose Informant Patient Reported? Taking?   ALPRAZolam (XANAX) 0.5 mg tablet  Self No No   Sig: Take 1 tablet (0.5 mg total) by mouth 2 (two) times a day as needed for anxiety or sleep Provider aware of co-existing clonazepam prescription. Patient is palliative care patient.   Budeson-Glycopyrrol-Formoterol (Breztri Aerosphere) 160-9-4.8 MCG/ACT AERO  Self No No   Sig: Inhale 2 puffs 2 (two) times a day Rinse mouth after use.   NON FORMULARY  Self Yes No   Sig: Medical marijuana   abiraterone (ZYTIGA) 250 mg tablet  Self No No   Sig: Take 4 tablets (1,000 mg total) by mouth once daily.   albuterol (2.5 mg/3 mL) 0.083 % nebulizer solution  Self No No   Sig: Take 3 mL (2.5 mg total) by nebulization every 6 (six) hours as needed for wheezing or shortness of breath   albuterol (PROVENTIL HFA,VENTOLIN HFA) 90 mcg/act inhaler  Self No No   Sig: TAKE 2 PUFFS BY MOUTH EVERY 4 HOURS AS NEEDED FOR WHEEZE   atorvastatin (LIPITOR) 40 mg tablet  Self No No   Sig: Take 1 tablet (40 mg total) by mouth  daily with dinner   azithromycin (ZITHROMAX) 250 mg tablet  Self No No   Sig: Take 2 tablets (500 mg total) by mouth 3 (three) times a week   benzonatate (TESSALON PERLES) 100 mg capsule  Self No No   Sig: Take 1 capsule (100 mg total) by mouth 3 (three) times a day as needed for cough   clonazePAM (KlonoPIN) 0.5 MG disintegrating tablet  Self No No   Sig: Take 1 tablet (0.5 mg total) by mouth daily at bedtime as needed for anxiety   gabapentin (NEURONTIN) 300 mg capsule  Self No No   Sig: Take 1 capsule (300 mg total) by mouth 2 (two) times a day   lidocaine (LIDODERM) 5 %  Self No No   Sig: Apply 3 patches topically daily Remove & Discard patch within 12 hours or as directed by MD   morphine (MS CONTIN) 15 mg 12 hr tablet  Self No No   Sig: Take 1 tablet (15 mg total) by mouth 2 (two) times a day Ongoing therapy Max Daily Amount: 30 mg   naloxone (NARCAN) 4 mg/0.1 mL nasal spray  Self No No   Sig: Administer 1 spray into a nostril. If no response after 2-3 minutes, give another dose in the other nostril using a new spray.      Facility-Administered Medications: None       Past Medical History:   Diagnosis Date    Bone cancer (HCC)     Colon polyp     COPD (chronic obstructive pulmonary disease) (HCC)     Emphysema lung (HCC)     Encephalopathy acute 05/05/2022    Hyperlipidemia     Left lower quadrant abdominal pain 12/01/2021    Positive blood culture 04/25/2023    Prostate cancer (HCC)     Respiratory distress 03/08/2024       Past Surgical History:   Procedure Laterality Date    APPENDECTOMY      COLONOSCOPY      IR BIOPSY BONE  12/30/2021    JOINT REPLACEMENT      UPPER GASTROINTESTINAL ENDOSCOPY         Family History   Problem Relation Age of Onset    Lung cancer Mother     Breast cancer Sister     Colon polyps Brother     Colon cancer Neg Hx      I have reviewed and agree with the history as documented.    E-Cigarette/Vaping    E-Cigarette Use Never User      E-Cigarette/Vaping Substances    Nicotine No      THC No     CBD No     Flavoring No     Other No     Unknown No      Social History     Tobacco Use    Smoking status: Former     Current packs/day: 0.00     Average packs/day: 1 pack/day for 42.0 years (42.0 ttl pk-yrs)     Types: Cigarettes     Start date:      Quit date:      Years since quittin.3    Smokeless tobacco: Never   Vaping Use    Vaping status: Never Used   Substance Use Topics    Alcohol use: Never    Drug use: Never       Review of Systems   Constitutional:  Negative for appetite change, chills and fever.   HENT:  Negative for rhinorrhea and sore throat.    Eyes:  Negative for photophobia and visual disturbance.   Respiratory:  Positive for cough and shortness of breath.    Cardiovascular:  Positive for chest pain. Negative for palpitations.   Gastrointestinal:  Negative for abdominal pain and diarrhea.   Genitourinary:  Negative for dysuria, frequency and urgency.   Skin:  Negative for rash.   Neurological:  Positive for headaches. Negative for dizziness and weakness.   All other systems reviewed and are negative.      Physical Exam  Physical Exam  Vitals and nursing note reviewed.   Constitutional:       Appearance: He is well-developed.   HENT:      Head: Normocephalic and atraumatic.      Right Ear: External ear normal.      Left Ear: External ear normal.      Mouth/Throat:      Mouth: Oropharynx is clear and moist.   Eyes:      Extraocular Movements: EOM normal.      Conjunctiva/sclera: Conjunctivae normal.      Pupils: Pupils are equal, round, and reactive to light.   Neck:      Vascular: No JVD.      Trachea: No tracheal deviation.   Cardiovascular:      Rate and Rhythm: Normal rate and regular rhythm.      Heart sounds: Normal heart sounds. No murmur heard.     No friction rub. No gallop.   Pulmonary:      Effort: Pulmonary effort is normal. No respiratory distress.      Breath sounds: No stridor. Decreased breath sounds and wheezing present. No rales.   Abdominal:      General:  There is no distension.      Palpations: Abdomen is soft. There is no mass.      Tenderness: There is no abdominal tenderness. There is no guarding or rebound.   Musculoskeletal:         General: Normal range of motion.      Cervical back: Normal range of motion and neck supple.      Right lower leg: Edema present.      Left lower leg: Edema present.      Comments: Bilateral pedal edema   Skin:     General: Skin is warm and dry.      Coloration: Skin is not pale.      Findings: No erythema or rash.   Neurological:      Mental Status: He is alert and oriented to person, place, and time.      Cranial Nerves: No cranial nerve deficit.   Psychiatric:         Mood and Affect: Mood and affect normal.         Vital Signs  ED Triage Vitals [05/21/24 2126]   Temperature Pulse Respirations Blood Pressure SpO2   98 °F (36.7 °C) 102 20 154/81 95 %      Temp Source Heart Rate Source Patient Position - Orthostatic VS BP Location FiO2 (%)   Temporal Monitor -- -- --      Pain Score       --           Vitals:    05/21/24 2126 05/22/24 0000   BP: 154/81 141/68   Pulse: 102 100         Visual Acuity      ED Medications  Medications   albuterol inhalation solution 2.5 mg (2.5 mg Nebulization Given 5/22/24 0140)   albuterol (PROVENTIL HFA,VENTOLIN HFA) inhaler 1 puff (has no administration in time range)   ALPRAZolam (XANAX) tablet 0.5 mg (has no administration in time range)   atorvastatin (LIPITOR) tablet 40 mg (has no administration in time range)   benzonatate (TESSALON PERLES) capsule 100 mg (100 mg Oral Given 5/22/24 0140)   budesonide-formoterol (SYMBICORT) 160-4.5 mcg/act inhaler 2 puff (has no administration in time range)   gabapentin (NEURONTIN) capsule 300 mg (has no administration in time range)   morphine (MS CONTIN) ER tablet 15 mg (has no administration in time range)   acetaminophen (TYLENOL) tablet 650 mg (has no administration in time range)   heparin (porcine) subcutaneous injection 5,000 Units (has no  administration in time range)   ipratropium (ATROVENT) 0.02 % inhalation solution 0.5 mg (has no administration in time range)   levalbuterol (XOPENEX) inhalation solution 1.25 mg (has no administration in time range)   methylPREDNISolone sodium succinate (Solu-MEDROL) injection 40 mg (has no administration in time range)   methylPREDNISolone sodium succinate (Solu-MEDROL) injection 80 mg (80 mg Intravenous Given 5/21/24 2210)   acetaminophen (TYLENOL) tablet 975 mg (975 mg Oral Given 5/21/24 2210)   albuterol inhalation solution 10 mg (10 mg Nebulization Given 5/21/24 2216)   ipratropium (ATROVENT) 0.02 % inhalation solution 1 mg (1 mg Nebulization Given 5/21/24 2216)   sodium chloride 0.9 % inhalation solution 12 mL (12 mL Nebulization Given 5/21/24 2216)   magnesium sulfate 2 g/50 mL IVPB (premix) 2 g (0 g Intravenous Stopped 5/22/24 0001)       Diagnostic Studies  Results Reviewed       Procedure Component Value Units Date/Time    HS Troponin I 2hr [[id number]]  (Normal) Collected: 05/22/24 0010    Lab Status: Final result Specimen: Blood from Arm, Left Updated: 05/22/24 0037     hs TnI 2hr 26 ng/L      Delta 2hr hsTnI 4 ng/L     FLU/RSV/COVID - if FLU/RSV clinically relevant [[id number]]  (Normal) Collected: 05/21/24 2214    Lab Status: Final result Specimen: Nares from Nose Updated: 05/21/24 2308     SARS-CoV-2 Negative     INFLUENZA A PCR Negative     INFLUENZA B PCR Negative     RSV PCR Negative    Narrative:      FOR PEDIATRIC PATIENTS - copy/paste COVID Guidelines URL to browser: [email or web address]    SARS-CoV-2 assay is a Nucleic Acid Amplification assay intended for the  qualitative detection of nucleic acid from SARS-CoV-2 in nasopharyngeal  swabs. Results are for the presumptive identification of SARS-CoV-2 RNA.    Positive results are indicative of infection with SARS-CoV-2, the virus  causing COVID-19, but do not rule out bacterial infection or  co-infection  with other viruses. Laboratories within the [place] and its  territories are required to report all positive results to the appropriate  public health authorities. Negative results do not preclude SARS-CoV-2  infection and should not be used as the sole basis for treatment or other  patient management decisions. Negative results must be combined with  clinical observations, patient history, and epidemiological information.  This test has not been FDA cleared or approved.    This test has been authorized by FDA under an Emergency Use Authorization  (EUA). This test is only authorized for the duration of time the  declaration that circumstances exist justifying the authorization of the  emergency use of an in vitro diagnostic tests for detection of SARS-CoV-2  virus and/or diagnosis of COVID-19 infection under section 564(b)(1) of  the Act, 21 U.S.C. 360bbb-3(b)(1), unless the authorization is terminated  or revoked sooner. The test has been validated but independent review by FDA  and CLIA is pending.    Test performed using [employer] GeneXpert: This RT-PCR assay targets N2,  a region unique to SARS-CoV-2. A conserved region in the E-gene was chosen  for pan-Sarbecovirus detection which includes SARS-CoV-2.    According to CMS-2020-01-R, this platform meets the definition of high-throughput technology.    Procalcitonin [[id number]]  (Normal) Collected: 05/21/24 2135    Lab Status: Final result Specimen: Blood from Arm, Left Updated: 05/21/24 2230     Procalcitonin 0.06 ng/ml     Blood culture [[id number]] Collected: 05/21/24 2219    Lab Status: In process Specimen: Blood from Arm, Right Updated: 05/21/24 2225    B-Type Natriuretic Peptide(BNP) [[id number]]  (Normal) Collected: 05/21/24 2135    Lab Status: Final result Specimen: Blood from Arm, Left Updated: 05/21/24 2224     BNP 56 pg/mL     Lactic acid, plasma (w/reflex if result > 2.0) [[id number]]  (Normal) Collected: 05/21/24 2146    Lab Status:  Final result Specimen: Blood from Arm, Left Updated: 05/21/24 2205     LACTIC ACID 0.8 mmol/L     Narrative:      Result may be elevated if tourniquet was used during collection.    HS Troponin 0hr (reflex protocol) [[id number]]  (Normal) Collected: 05/21/24 2135    Lab Status: Final result Specimen: Blood from Arm, Left Updated: 05/21/24 2203     hs TnI 0hr 22 ng/L     D-dimer, quantitative [[id number]]  (Normal) Collected: 05/21/24 2135    Lab Status: Final result Specimen: Blood from Arm, Left Updated: 05/21/24 2156     D-Dimer, Quant 0.40 ug/ml FEU     Narrative:      In the evaluation for possible pulmonary embolism, in the appropriate (Well's Score of 4 or less) patient, the age adjusted d-dimer cutoff for this patient can be calculated as:    Age x 0.01 (in ug/mL) for Age-adjusted D-dimer exclusion threshold for a patient over 50 years.    Comprehensive metabolic panel [[id number]]  (Abnormal) Collected: 05/21/24 2135    Lab Status: Final result Specimen: Blood from Arm, Left Updated: 05/21/24 2154     Sodium 143 mmol/L      Potassium 4.1 mmol/L      Chloride 99 mmol/L      CO2 42 mmol/L      ANION GAP 2 mmol/L      BUN 14 mg/dL      Creatinine 0.50 mg/dL      Glucose 132 mg/dL      Calcium 9.2 mg/dL      AST 18 U/L      ALT 13 U/L      Alkaline Phosphatase 40 U/L      Total Protein 6.4 g/dL      Albumin 3.9 g/dL      Total Bilirubin 0.25 mg/dL      eGFR 110 ml/min/1.73sq m     Narrative:      National Kidney Disease Foundation guidelines for Chronic Kidney Disease (CKD):     Stage 1 with normal or high GFR (GFR > 90 mL/min/1.73 square meters)    Stage 2 Mild CKD (GFR = 60-89 mL/min/1.73 square meters)    Stage 3A Moderate CKD (GFR = 45-59 mL/min/1.73 square meters)    Stage 3B Moderate CKD (GFR = 30-44 mL/min/1.73 square meters)    Stage 4 Severe CKD (GFR = 15-29 mL/min/1.73 square meters)    Stage 5 End Stage CKD (GFR <15 mL/min/1.73 square meters)  Note: GFR calculation is accurate only with a steady state  creatinine    Protime-INR [[id number]]  (Normal) Collected: 05/21/24 2135    Lab Status: Final result Specimen: Blood from Arm, Left Updated: 05/21/24 2153     Protime 12.9 seconds      INR 0.94    APTT [[id number]]  (Normal) Collected: 05/21/24 2135    Lab Status: Final result Specimen: Blood from Arm, Left Updated: 05/21/24 2153     PTT 29 seconds     Blood culture [[id number]] Collected: 05/21/24 2146    Lab Status: In process Specimen: Blood from Arm, Left Updated: 05/21/24 2149    CBC and differential [[id number]]  (Abnormal) Collected: 05/21/24 2135    Lab Status: Final result Specimen: Blood from Arm, Left Updated: 05/21/24 2140     WBC 6.83 Thousand/uL      RBC 3.50 Million/uL      Hemoglobin 11.3 g/dL      Hematocrit 35.8 %       fL      MCH 32.3 pg      MCHC 31.6 g/dL      RDW 13.8 %      MPV 8.4 fL      Platelets 287 Thousands/uL      nRBC 0 /100 WBCs      Segmented % 65 %      Immature Grans % 0 %      Lymphocytes % 18 %      Monocytes % 10 %      Eosinophils Relative 6 %      Basophils Relative 1 %      Absolute Neutrophils 4.41 Thousands/µL      Absolute Immature Grans 0.03 Thousand/uL      Absolute Lymphocytes 1.21 Thousands/µL      Absolute Monocytes 0.70 Thousand/µL      Eosinophils Absolute 0.42 Thousand/µL      Basophils Absolute 0.06 Thousands/µL     POCT Blood Gas (CG8+) [[id number]]  (Abnormal) Collected: 05/21/24 2134    Lab Status: Final result Specimen: Venous Updated: 05/21/24 2138     ph, Rupert ISTAT 7.375     pCO2, Rupert i-STAT 72.7 mm HG      pO2, Rupert i-STAT 70.0 mm HG      BE, i-STAT 14 mmol/L      HCO3, Rupert i-STAT 42.5 mmol/L      CO2, i-STAT 45 mmol/L      O2 Sat, i-STAT 92 %      SODIUM, I-STAT 141 mmol/l      Potassium, i-STAT 4.1 mmol/L      Calcium, Ionized i-STAT 1.22 mmol/L      Hct, i-STAT 35 %      Hgb, i-STAT 11.9 g/dl      Glucose, i-STAT 130 mg/dl      Specimen Type VENOUS                   XR chest portable   ED Interpretation by Juana Charles DO (05/21 6455)   This study was  ordered and independently reviewed by me    No acute findings noted                    Procedures  Procedures         ED Course  ED Course as of 05/22/24 0244   Tue May 21, 2024   2128 Procedure Note: EKG  Date/Time: 05/21/24 9:28 PM   Performed by: ALEXX RODRIGES  Authorized by: ALEXX RODRIGES  Indications / Diagnosis: SOB  ECG reviewed by me, the ED Provider: yes   The EKG demonstrates:  Rhythm: sinus tach 102  Intervals: normal intervals  Axis: normal axis  QRS/Blocks:normal QRS  ST Changes: No acute ST Changes, no STD/ALBA.       2135 Patient seen by PCP 3/22/2024 on medical record review patient was admitted to the hospital for respiratory failure and pneumonia, discharged on Levaquin, for COPD on chronic oxygen also has metastatic cancer with metastasis to the bone.  Currently stable on 6 L at home   2136 On chart review patient previously was on hospice however would like to reestablish care with oncology and resume treatment for his chronic conditions and his cancer.  Has appointment with palliative care coming up   2227 POCT Blood Gas (CG8+)(!!)  Patient with CO2 retention however compensated with normal pH   2320 Hemoglobin(!): 11.3  baseline   2354 Patient still with poor air intake, wheezing, saturating low 90s on his baseline 6 L with concern for poorly functioning oxygen concentrator at home, will admit for further evaluation and care                               SBIRT 20yo+      Flowsheet Row Most Recent Value   Initial Alcohol Screen: US AUDIT-C     1. How often do you have a drink containing alcohol? 0 Filed at: 05/21/2024 2128   2. How many drinks containing alcohol do you have on a typical day you are drinking?  0 Filed at: 05/21/2024 2128   3a. Male UNDER 65: How often do you have five or more drinks on one occasion? 0 Filed at: 05/21/2024 2128   3b. FEMALE Any Age, or MALE 65+: How often do you have 4 or more drinks on one occassion? 0 Filed at: 05/21/2024 2128   Audit-C Score 0 Filed at: 05/21/2024  2128   YOUSIF: How many times in the past year have you...    Used an illegal drug or used a prescription medication for non-medical reasons? Never Filed at: 05/21/2024 2128                      Medical Decision Making  68-year-old male with shortness of breath, cough, headache, low suspicion for acute intracranial pathology, such as intracranial bleeding, mass lesion, CVA as headache is typical for patient, not worst that he is ever had not acute onset but normal neurologic exam will treat headache symptomatically.  Given worsening respiratory status with chest pain differential diagnosis includes ACS, arrhythmia, pneumothorax, pneumonia, VTE, pulmonary edema    Amount and/or Complexity of Data Reviewed  Labs: ordered. Decision-making details documented in ED Course.  Radiology: ordered and independent interpretation performed.    Risk  OTC drugs.  Prescription drug management.  Decision regarding hospitalization.             Disposition  Final diagnoses:   COPD exacerbation (HCC)     Time reflects when diagnosis was documented in both MDM as applicable and the Disposition within this note       Time User Action Codes Description Comment    5/21/2024 11:22 PM Juana Charles [J44.1] COPD exacerbation (HCC)     5/22/2024  1:03 AM Adela Murguia [J96.11] Chronic hypoxic respiratory failure (HCC)     5/22/2024  1:03 AM Adela Murguia [Z51.5] Palliative care patient           ED Disposition       ED Disposition   Admit    Condition   Stable    Date/Time   Wed May 22, 2024 0008    Comment   Case was discussed with SHARYN and the patient's admission status was agreed to be Admission Status: observation status to the service of Dr. Hernandez .               Follow-up Information    None         Patient's Medications   Discharge Prescriptions    No medications on file       No discharge procedures on file.    PDMP Review         Value Time User    PDMP Reviewed  Yes 5/22/2024 12:39 AM Adela Murguia PA-C            ED  Provider  Electronically Signed by             Juana Charles DO  05/22/24 0241

## 2024-05-22 NOTE — ASSESSMENT & PLAN NOTE
Per prior hospitalization was on 2 to 3 L  Per patient has been using up to 6 L.  However his O2 has been > 95% while at home  Educated the patient that his goal > 88%.  Consult pulmonology

## 2024-05-22 NOTE — ASSESSMENT & PLAN NOTE
"Presents due to shortness of breath.  Called EMS due to distress.  Oxygen machine \"broken\" at home per patient  Per patient had been using up to 6 L of O2 at home.  In the ER able to be weaned down to 2 L.   Wheezing and coughing noted  Does not appear bacterial. Hold off on azithromycin at this time.   Procal and lactic WNL   Afebrile   Home regimen: Breztri  Plan  Albuterol inhaler  Symbicort inhaler  Atrovent/Xopenex 3 times daily  Tessalon Perles  Solu-Medrol 40 mg every 8 hours.  Most likely can be transitioned to prednisone later today  Consult pulmonology  "

## 2024-05-22 NOTE — ASSESSMENT & PLAN NOTE
Patient refused to continue hospice care with [facility]  Possibly seeking new hospice care through [facility]'s.  However patient thinking he may want to be treatment oriented again  Wants to be a DNR  Consult palliative care

## 2024-05-22 NOTE — CASE MANAGEMENT
"     Case Management Assessment & Discharge Planning Note    Patient name Richard Nava  Location /-01 MRN [id number]  : 1955 Date 2024       Current Admission Date: 2024  Current Admission Diagnosis:COPD exacerbation (HCC)   Patient Active Problem List    Diagnosis Date Noted Date Diagnosed    Neuropathy due to chemotherapeutic drug (HCC) 05/10/2024     COPD exacerbation (HCC) 2023     Low oxygen saturation 2023     Opioid dependence (HCC) 2023     Neoplasm related pain 2023     Medical marijuana use 2023     Anorexia 2022     Cachexia (HCC) 2022     Palliative care patient 2022     Prostate cancer metastatic to bone (HCC) 2022     Insomnia 2022     Hot flashes 2022     Personal history of colonic polyps 2021     HLD (hyperlipidemia) 2020     Severe protein-calorie malnutrition (HCC) 2020     Chronic hypoxic respiratory failure (HCC) 2020     Emphysema lung (HCC) 2020       LOS (days): 0  Geometric Mean LOS (GMLOS) (days):   Days to GMLOS:     OBJECTIVE:              Current admission status: Inpatient  Referral Reason: Other, DME ([place], Post acute needs)    Preferred Pharmacy:   [facility]/pharmacy #[id number] - [place], [place] - [address]  [address] [address]  Phone: [phone] Fax: [phone]    [facility] - [address] [place] [employer] [facility] [address]  [place] [address]  Phone: [phone] Fax: [phone]    Primary Care Provider: Kevin Mejia DO    Primary Insurance: MEDICARE  Secondary Insurance:     ASSESSMENT:  Active Health Care Proxies       Mary Underwood(\"JELENA\") Health Care Agent - [facility] Phone: [phone] (Mobile)                 Advance Directives  Does patient have a Health Care POA?: Yes  Does patient have Advance Directives?: Yes  Advance Directives: Power of  for health " care  Primary Contact: Mary         Readmission Root Cause  30 Day Readmission: No    Patient Information  Admitted from:: Home  Mental Status: Alert  During Assessment patient was accompanied by: Not accompanied during assessment  Assessment information provided by:: Sister  Primary Caregiver: Self  Support Systems: Self, Family members  What city do you live in?: [place]  Home entry access options. Select all that apply.: Stairs  Number of steps to enter home.: 10  Do the steps have railings?: Yes  Type of Current Residence: Apartment  Floor Level: 1  Upon entering residence, is there a bedroom on the main floor (no further steps)?: Yes  Upon entering residence, is there a bathroom on the main floor (no further steps)?: Yes  Living Arrangements: Lives w/ Extended Family  Is patient a ?: No    Activities of Daily Living Prior to Admission  Functional Status: Independent  Completes ADLs independently?: Yes  Ambulates independently?: Yes  Does patient use assisted devices?: Yes  Assisted Devices (DME) used: Home Oxygen concentrator, Nebulizer, Portable Oxygen tanks  DME Company Name (respiratory supplies): [employer]  O2 Rate(s): order is for 3-4L uses 6 L  Does patient currently own DME?: Yes  What DME does the patient currently own?: Home Oxygen concentrator, Portable Oxygen tanks, Nebulizer  Does patient have a history of Outpatient Therapy (PT/OT)?: No  Does the patient have a history of Short-Term Rehab?: No  Does patient have a history of HHC?: Yes  Does patient currently have HHC?: No         Patient Information Continued  Income Source: Pension/halfway  Does patient have prescription coverage?: Yes  Does patient have a history of substance abuse?: No  Does patient have a history of Mental Health Diagnosis?: No         Means of Transportation  [facility] Family transport      Social Determinants of Health (SDOH)      Flowsheet Row Most Recent Value   Housing Stability    In the  last 12 months, was there a time when you were not able to pay the mortgage or rent on time? N   In the past 12 months, how many times have you moved where you were living? 1   Transportation Needs    In the past 12 months, has lack of transportation kept you from medical appointments or from getting medications? no   In the past 12 months, has lack of transportation kept you from meetings, work, or from getting things needed for daily living? No   Food Insecurity    Within the past 12 months, you worried that your food would run out before you got the money to buy more. Never true   Within the past 12 months, the food you bought just didn't last and you didn't have money to get more. Never true   Utilities    In the past 12 months has the electric, gas, oil, or water company threatened to shut off services in your home? No            DISCHARGE DETAILS:    Discharge planning discussed with:: patient sister via samanta  Freedom of Choice: Yes  Comments - Freedom of Choice: Discussed Dc planning and role of CM. Discussed concentrator not workign at home. Sister now aware that concentrator is not working. Cm informed her that [employer] needs to be called and that they would have to come out to the home  CM contacted family/caregiver?: Yes  Were Treatment Team discharge recommendations reviewed with patient/caregiver?: Yes  Did patient/caregiver verbalize understanding of patient care needs?: Yes       Contacts  Patient Contacts: Mary Underwood  Contact Method: Phone  Phone Number: [phone]  Reason/Outcome: Discharge Planning, Referral, Emergency Contact    Requested Home Health Care         Is the patient interested in HHC at discharge?: No    DME Referral Provided  Referral made for DME?: No    Other Referral/Resources/Interventions Provided:  Interventions: DME  Referral Comments: Discussed DME issue with family and informed them [employer] needs to be called to come service the equipment            Discharge  Destination Plan:: Home, Home with Home Health Care, Hospice  Transport at Discharge : Family, Ride Share                                      Additional Comments: Cm spoke with pts sister. She reports that pt resides in an apt with [address]. He lives in the basement of extended family. Pts sister is POA. Cm discussed with pts sister that it is noted that pts Home O2 concentrator is broken. She states that she was not aware that his DMe was broken. Cm informed her that [employer] would need to come to the home and look at the concentrator. Cm instructed pts sister to call [employer]. Pt is ordered 3-4 L but reports that he is needing 6L. Pt also has a nebulizer. Pt to meet with Palliative. He has been back and fourth about hospice with previous admissions. Cm following

## 2024-05-22 NOTE — CONSULTS
Consult Note - Pulmonary   Richard Nava 68 y.o. male MRN: [id number]  Unit/Bed#: ED 05 Encounter: [id number]      Reason for consultation: COPD    Requesting physician: Brandon Cisneros    Assessment/Recommendations:   Acute on chronic hypoxic and hypercapnic respiratory failure (uses 2LPM O2 at home)  Severe emphysema/COPD with acute exacerbation  Metastatic prostate cancer  Severe protein and calorie malnutrition    - c/w Xopenex/Atrovent TID  - c/w Pulmicort BID  - start saline nebs and Mucinex  - hold Symbicort - not likely to benefit from inhalers right now while in exacerbation  - home regimen Breztri BID w/ PRN albuterol  - 3 days of azithromycin and then transition to 250mg daily azithromycin for exacerbation prevention;   - decrease methylpred dosing - he should be discharged on a methylprednisolone taper instead of prednisone since he had more anxiety with prednisone  - case management consult - his home concentrator is not working  - Previously he was mentioned to have claustrophobia and headaches and therefore home BIPAP assessment was not done for chronic hypercapnia.      History of Present Illness   HPI:  Richard Nava is a 68 y.o. male with a history of metastatic prostate cancer, likely severe COPD, chronic hypoxic respiratory failure who is presenting for COPD exacerbation.    Hx of likely severe COPD on Symbicort/albuterol. He was seen once in our office by Dr. Fatima and has had multiple admissions here and [place] with COPD exacerbations in the past few months     He was at his nephews house and was around small children and likely contracted a upper respiratory tract infection that likely triggered this exacerbation.  He developed dyspnea, cough, wheezing.  His home concentrator was broken last night.  He called 911 to bring to hospital.      In the ED has received 80mg methylpred, nebulized bronchodilators.  He has been coughing all night.  Currently he is breathing better  and has no conversational dyspnea.  He has difficulty expectoring mucus    He is undergoing palliative care due to hx of metastatic prostate cancer with extensive mets to bones including axial and appendicular skeletal system calvarium, sternum, ribs, right humerus and clavicle, bilateral scapulae, cervical spine, thoracolumbar spine, sacrum, pelvis, and bilateral femurs.     Recently admitted at  and discharged - for COPD exacerbation  Then was admitted to [facility]  for COPD exacerbation    Historical Information   Past Medical History:   Diagnosis Date    Bone cancer (HCC)     Colon polyp     COPD (chronic obstructive pulmonary disease) (HCC)     Emphysema lung (HCC)     Encephalopathy acute 2022    Hyperlipidemia     Left lower quadrant abdominal pain 2021    Positive blood culture 2023    Prostate cancer (HCC)     Respiratory distress 2024     Past Surgical History:   Procedure Laterality Date    APPENDECTOMY      COLONOSCOPY      IR BIOPSY BONE  2021    JOINT REPLACEMENT      UPPER GASTROINTESTINAL ENDOSCOPY       Family History   Problem Relation Age of Onset    Lung cancer Mother     Breast cancer Sister     Colon polyps Brother     Colon cancer Neg Hx      Social History     Socioeconomic History    Marital status: Single     Spouse name: Not on file    Number of children: Not on file    Years of education: Not on file    Highest education level: Not on file   Occupational History    Not on file   Tobacco Use    Smoking status: Former     Current packs/day: 0.00     Average packs/day: 1 pack/day for 42.0 years (42.0 ttl pk-yrs)     Types: Cigarettes     Start date:      Quit date:      Years since quittin.3    Smokeless tobacco: Never   Vaping Use    Vaping status: Never Used   Substance and Sexual Activity    Alcohol use: Never    Drug use: Never    Sexual activity: Not Currently   Other Topics Concern    Not on file   Social History  Narrative    Not on file     Social Determinants of Health     Financial Resource Strain: Low Risk  (5/30/2023)    Overall Financial Resource Strain (CARDIA)     Difficulty of Paying Living Expenses: Not hard at all   Food Insecurity: No Food Insecurity (4/18/2024)    Hunger Vital Sign     Worried About Running Out of Food in the Last Year: Never true     Ran Out of Food in the Last Year: Never true   Transportation Needs: No Transportation Needs (4/18/2024)    PRAPARE - Transportation     Lack of Transportation (Medical): No     Lack of Transportation (Non-Medical): No   Physical Activity: Not on file   Stress: Not on file   Social Connections: Not on file   Intimate Partner Violence: Not At Risk (4/22/2022)    Humiliation, Afraid, Rape, and Kick questionnaire     Fear of Current or Ex-Partner: No     Emotionally Abused: No     Physically Abused: No     Sexually Abused: No   Housing Stability: High Risk (5/1/2024)    [facility], [facility]    Housing Stability Vital Sign     Unable to Pay for Housing in the Last Year: Not on file     Number of Places Lived in the Last Year: Not on file     Is your current living situation unsteady?   (I.e. Staying with others,  in a hotel, in a shelter, living outside: on the street, on a beach, in a car, abandoned building, bus, train station or in ...: Yes       Meds/Allergies   Current Facility-Administered Medications   Medication Dose Route Frequency    acetaminophen (TYLENOL) tablet 650 mg  650 mg Oral Q6H PRN    albuterol (PROVENTIL HFA,VENTOLIN HFA) inhaler 1 puff  1 puff Inhalation Q4H PRN    albuterol inhalation solution 2.5 mg  2.5 mg Nebulization Q6H PRN    ALPRAZolam (XANAX) tablet 0.5 mg  0.5 mg Oral BID PRN    atorvastatin (LIPITOR) tablet 40 mg  40 mg Oral Daily With Dinner    benzonatate (TESSALON PERLES) capsule 100 mg  100 mg Oral TID PRN    budesonide-formoterol (SYMBICORT) 160-4.5 mcg/act inhaler 2 puff  2 puff Inhalation BID    gabapentin  (NEURONTIN) capsule 300 mg  300 mg Oral BID    heparin (porcine) subcutaneous injection 5,000 Units  5,000 Units Subcutaneous Q8H FANNY    ipratropium (ATROVENT) 0.02 % inhalation solution 0.5 mg  0.5 mg Nebulization TID    levalbuterol (XOPENEX) inhalation solution 1.25 mg  1.25 mg Nebulization TID    methylPREDNISolone sodium succinate (Solu-MEDROL) injection 40 mg  40 mg Intravenous Q8H FANNY    morphine (MS CONTIN) ER tablet 15 mg  15 mg Oral BID    pantoprazole (PROTONIX) EC tablet 40 mg  40 mg Oral Early Morning     Not in a hospital admission.  No Known Allergies    Vitals: Vitals personally reviewed  Vitals:    05/22/24 0530 05/22/24 0600 05/22/24 0645 05/22/24 0706   BP: 114/68 115/62 127/71 130/58   BP Location:    Right arm   Pulse: 80 94 81 90   Resp: 22 22 18 18   Temp:       TempSrc:       SpO2: 98% 98% 97% 98%      There is no height or weight on file to calculate BMI.    Intake/Output Summary (Last 24 hours) at 5/22/2024 0839  Last data filed at 5/22/2024 0600  Gross per 24 hour   Intake --   Output 850 ml   Net -850 ml     Invasive Devices       Peripheral Intravenous Line  Duration             Peripheral IV 05/21/24 Left Forearm <1 day                    Physical Exam  General:  thin, frail appearing male sitting in bed no conversational dyspnea  HEET: head NC/AT, neck supple    Cardiovascular:  Regular rhythm, +S1 S2  Respiratory: Diffuse rhonchi and wheezing, not using accessory muscles  Abdomen: Soft, non distended  Extremities: No cyanosis.  no edema  Neuro: no focal deficits  Skin: Warm, dry    Labs: I have personally reviewed pertinent lab results.  Laboratory and Diagnostics  Results from last 7 days   Lab Units 05/22/24  0551 05/21/24  2135 05/21/24  2134   WBC Thousand/uL 5.30 6.83  --    HEMOGLOBIN g/dL 10.6* 11.3*  --    I STAT HEMOGLOBIN g/dl  --   --  11.9*   HEMATOCRIT % 34.4* 35.8*  --    HEMATOCRIT, ISTAT %  --   --  35*   PLATELETS Thousands/uL 287 287  --    SEGS PCT %  --  65  --     BANDS PCT % 3  --   --    MONO PCT % 1* 10  --    EOS PCT % 0 6  --      Results from last 7 days   Lab Units 24  0551 24  2134   SODIUM mmol/L 140 143  --    POTASSIUM mmol/L 4.0 4.1  --    CHLORIDE mmol/L 98 99  --    CO2 mmol/L 38* 42*  --    CO2, I-STAT mmol/L  --   --  45*   ANION GAP mmol/L 4 2*  --    BUN mg/dL 14 14  --    CREATININE mg/dL 0.43* 0.50*  --    CALCIUM mg/dL 8.5 9.2  --    GLUCOSE RANDOM mg/dL 196* 132  --    ALT U/L  --  13  --    AST U/L  --  18  --    ALK PHOS U/L  --  40  --    ALBUMIN g/dL  --  3.9  --    TOTAL BILIRUBIN mg/dL  --  0.25  --      Results from last 7 days   Lab Units 24  0551   MAGNESIUM mg/dL 1.9      Results from last 7 days   Lab Units 24  2135   INR  0.94   PTT seconds 29          Results from last 7 days   Lab Units 24  2146   LACTIC ACID mmol/L 0.8                 Results from last 7 days   Lab Units 24  2135   D-DIMER QUANTITATIVE ug/ml FEU 0.40     Results from last 7 days   Lab Units 24  2135   PROCALCITONIN ng/ml 0.06             Imaging and other studies: I have personally reviewed pertinent films in PACS    24 CXR - hyperinflated, COPD, no consolidation/PTX/effusion  24 CTA Chest  1. No findings of pulmonary embolus.   2. Severe centrilobular emphysema.   3. There are multiple irregular pulmonary nodules in the left lower lobe.  This   is associated with left lower lobe bronchial wall thickening.  These changes   may be infectious, inflammatory, or neoplastic.  Non emergent follow-up or   tissue sampling is recommended.  It is noted that the [employer]ischer Society   Pulmonary nodule guidelines do not apply to patients with known malignancy.   4. Innumerable sclerotic lesions throughout the visualized appendicular and   axial skeleton consistent with patient's history metastatic prostate cancer.     Pulmonary function testin23 6MW  Walked 162m.  SpO2 94%.  Needed no oxygen with ambulation.  "      Code Status: Level 3 - DNAR and DNI    Efrain Dudley MD  Pulmonary, Critical Care and Sleep Medicine  [facility]     Portions of the record may have been created with voice recognition software. Occasional wrong word or \"sound a like\" substitutions may have occurred due to the inherent limitations of voice recognition software. Please read the chart carefully and recognize, using context, where substitutions have occurred.     "

## 2024-05-22 NOTE — ASSESSMENT & PLAN NOTE
Stage 4 metastatic prostate cancer  Changed to hospice about 1 month ago.  Zytiga was discontinued at that time.  Patient has since stopped hospice.  Asking to be restarted on medications  Palliative care consulted.  Goals of care conversation

## 2024-05-23 PROBLEM — R63.6 UNDERWEIGHT: Status: ACTIVE | Noted: 2024-05-23

## 2024-05-23 PROBLEM — J96.21 ACUTE ON CHRONIC RESPIRATORY FAILURE WITH HYPOXIA (HCC): Status: ACTIVE | Noted: 2020-02-19

## 2024-05-23 LAB
ANION GAP SERPL CALCULATED.3IONS-SCNC: 3 MMOL/L (ref 4–13)
ATRIAL RATE: 102 BPM
BASOPHILS # BLD AUTO: 0.01 THOUSANDS/ÂΜL (ref 0–0.1)
BASOPHILS NFR BLD AUTO: 0 % (ref 0–1)
BUN SERPL-MCNC: 16 MG/DL (ref 5–25)
CALCIUM SERPL-MCNC: 8.6 MG/DL (ref 8.4–10.2)
CHLORIDE SERPL-SCNC: 101 MMOL/L (ref 96–108)
CO2 SERPL-SCNC: 38 MMOL/L (ref 21–32)
CREAT SERPL-MCNC: 0.45 MG/DL (ref 0.6–1.3)
EOSINOPHIL # BLD AUTO: 0 THOUSAND/ÂΜL (ref 0–0.61)
EOSINOPHIL NFR BLD AUTO: 0 % (ref 0–6)
ERYTHROCYTE [DISTWIDTH] IN BLOOD BY AUTOMATED COUNT: 13.9 % (ref 11.6–15.1)
GFR SERPL CREATININE-BSD FRML MDRD: 115 ML/MIN/1.73SQ M
GLUCOSE SERPL-MCNC: 154 MG/DL (ref 65–140)
GLUCOSE SERPL-MCNC: 155 MG/DL (ref 65–140)
GLUCOSE SERPL-MCNC: 213 MG/DL (ref 65–140)
HCT VFR BLD AUTO: 35.7 % (ref 36.5–49.3)
HGB BLD-MCNC: 10.7 G/DL (ref 12–17)
IMM GRANULOCYTES # BLD AUTO: 0.05 THOUSAND/UL (ref 0–0.2)
IMM GRANULOCYTES NFR BLD AUTO: 1 % (ref 0–2)
LYMPHOCYTES # BLD AUTO: 0.35 THOUSANDS/ÂΜL (ref 0.6–4.47)
LYMPHOCYTES NFR BLD AUTO: 6 % (ref 14–44)
MCH RBC QN AUTO: 30.7 PG (ref 26.8–34.3)
MCHC RBC AUTO-ENTMCNC: 30 G/DL (ref 31.4–37.4)
MCV RBC AUTO: 103 FL (ref 82–98)
MONOCYTES # BLD AUTO: 0.13 THOUSAND/ÂΜL (ref 0.17–1.22)
MONOCYTES NFR BLD AUTO: 2 % (ref 4–12)
NEUTROPHILS # BLD AUTO: 4.92 THOUSANDS/ÂΜL (ref 1.85–7.62)
NEUTS SEG NFR BLD AUTO: 91 % (ref 43–75)
NRBC BLD AUTO-RTO: 0 /100 WBCS
P AXIS: 89 DEGREES
PLATELET # BLD AUTO: 321 THOUSANDS/UL (ref 149–390)
PLATELET BLD QL SMEAR: ADEQUATE
PMV BLD AUTO: 8.5 FL (ref 8.9–12.7)
POTASSIUM SERPL-SCNC: 4.1 MMOL/L (ref 3.5–5.3)
PR INTERVAL: 142 MS
PROCALCITONIN SERPL-MCNC: 0.06 NG/ML
QRS AXIS: 96 DEGREES
QRSD INTERVAL: 66 MS
QT INTERVAL: 324 MS
QTC INTERVAL: 422 MS
RBC # BLD AUTO: 3.48 MILLION/UL (ref 3.88–5.62)
RBC MORPH BLD: NORMAL
SODIUM SERPL-SCNC: 142 MMOL/L (ref 135–147)
T WAVE AXIS: 81 DEGREES
VENTRICULAR RATE: 102 BPM
WBC # BLD AUTO: 5.46 THOUSAND/UL (ref 4.31–10.16)

## 2024-05-23 PROCEDURE — 99233 SBSQ HOSP IP/OBS HIGH 50: CPT | Performed by: PHYSICIAN ASSISTANT

## 2024-05-23 PROCEDURE — 85025 COMPLETE CBC W/AUTO DIFF WBC: CPT | Performed by: INTERNAL MEDICINE

## 2024-05-23 PROCEDURE — 94664 DEMO&/EVAL PT USE INHALER: CPT

## 2024-05-23 PROCEDURE — 94668 MNPJ CHEST WALL SBSQ: CPT

## 2024-05-23 PROCEDURE — 84145 PROCALCITONIN (PCT): CPT | Performed by: INTERNAL MEDICINE

## 2024-05-23 PROCEDURE — 80048 BASIC METABOLIC PNL TOTAL CA: CPT | Performed by: INTERNAL MEDICINE

## 2024-05-23 PROCEDURE — 94640 AIRWAY INHALATION TREATMENT: CPT

## 2024-05-23 PROCEDURE — 99232 SBSQ HOSP IP/OBS MODERATE 35: CPT | Performed by: INTERNAL MEDICINE

## 2024-05-23 PROCEDURE — 94760 N-INVAS EAR/PLS OXIMETRY 1: CPT

## 2024-05-23 PROCEDURE — 93010 ELECTROCARDIOGRAM REPORT: CPT | Performed by: INTERNAL MEDICINE

## 2024-05-23 PROCEDURE — 82948 REAGENT STRIP/BLOOD GLUCOSE: CPT

## 2024-05-23 RX ORDER — ALPRAZOLAM 0.5 MG/1
1 TABLET ORAL
Status: DISCONTINUED | OUTPATIENT
Start: 2024-05-23 | End: 2024-05-29 | Stop reason: HOSPADM

## 2024-05-23 RX ORDER — INSULIN LISPRO 100 [IU]/ML
1-6 INJECTION, SOLUTION INTRAVENOUS; SUBCUTANEOUS
Status: DISCONTINUED | OUTPATIENT
Start: 2024-05-23 | End: 2024-05-29 | Stop reason: HOSPADM

## 2024-05-23 RX ORDER — LEVALBUTEROL INHALATION SOLUTION 1.25 MG/3ML
1.25 SOLUTION RESPIRATORY (INHALATION)
Status: DISCONTINUED | OUTPATIENT
Start: 2024-05-23 | End: 2024-05-25

## 2024-05-23 RX ORDER — GUAIFENESIN 600 MG/1
600 TABLET, EXTENDED RELEASE ORAL EVERY 12 HOURS PRN
Status: DISCONTINUED | OUTPATIENT
Start: 2024-05-23 | End: 2024-05-29 | Stop reason: HOSPADM

## 2024-05-23 RX ORDER — ALPRAZOLAM 0.5 MG/1
0.5 TABLET ORAL 2 TIMES DAILY PRN
Status: DISCONTINUED | OUTPATIENT
Start: 2024-05-23 | End: 2024-05-29 | Stop reason: HOSPADM

## 2024-05-23 RX ADMIN — SODIUM CHLORIDE SOLN NEBU 3% 4 ML: 3 NEBU SOLN at 13:36

## 2024-05-23 RX ADMIN — MORPHINE SULFATE 15 MG: 15 TABLET, EXTENDED RELEASE ORAL at 08:49

## 2024-05-23 RX ADMIN — MORPHINE SULFATE 15 MG: 15 TABLET, EXTENDED RELEASE ORAL at 18:01

## 2024-05-23 RX ADMIN — SODIUM CHLORIDE SOLN NEBU 3% 4 ML: 3 NEBU SOLN at 07:17

## 2024-05-23 RX ADMIN — AZITHROMYCIN MONOHYDRATE 500 MG: 500 INJECTION, POWDER, LYOPHILIZED, FOR SOLUTION INTRAVENOUS at 08:50

## 2024-05-23 RX ADMIN — LEVALBUTEROL HYDROCHLORIDE 1.25 MG: 1.25 SOLUTION RESPIRATORY (INHALATION) at 19:11

## 2024-05-23 RX ADMIN — GABAPENTIN 300 MG: 300 CAPSULE ORAL at 18:01

## 2024-05-23 RX ADMIN — BUDESONIDE 0.5 MG: 0.5 INHALANT RESPIRATORY (INHALATION) at 19:11

## 2024-05-23 RX ADMIN — IPRATROPIUM BROMIDE 0.5 MG: 0.5 SOLUTION RESPIRATORY (INHALATION) at 19:11

## 2024-05-23 RX ADMIN — HEPARIN SODIUM 5000 UNITS: 5000 INJECTION INTRAVENOUS; SUBCUTANEOUS at 21:39

## 2024-05-23 RX ADMIN — HEPARIN SODIUM 5000 UNITS: 5000 INJECTION INTRAVENOUS; SUBCUTANEOUS at 06:21

## 2024-05-23 RX ADMIN — ALBUTEROL SULFATE 1 PUFF: 90 AEROSOL, METERED RESPIRATORY (INHALATION) at 04:36

## 2024-05-23 RX ADMIN — LEVALBUTEROL HYDROCHLORIDE 1.25 MG: 1.25 SOLUTION RESPIRATORY (INHALATION) at 13:36

## 2024-05-23 RX ADMIN — ATORVASTATIN CALCIUM 40 MG: 40 TABLET, FILM COATED ORAL at 18:01

## 2024-05-23 RX ADMIN — IPRATROPIUM BROMIDE 0.5 MG: 0.5 SOLUTION RESPIRATORY (INHALATION) at 07:17

## 2024-05-23 RX ADMIN — IPRATROPIUM BROMIDE 0.5 MG: 0.5 SOLUTION RESPIRATORY (INHALATION) at 13:36

## 2024-05-23 RX ADMIN — METHYLPREDNISOLONE SODIUM SUCCINATE 40 MG: 40 INJECTION, POWDER, FOR SOLUTION INTRAMUSCULAR; INTRAVENOUS at 21:31

## 2024-05-23 RX ADMIN — METHYLPREDNISOLONE SODIUM SUCCINATE 40 MG: 40 INJECTION, POWDER, FOR SOLUTION INTRAMUSCULAR; INTRAVENOUS at 08:49

## 2024-05-23 RX ADMIN — GABAPENTIN 300 MG: 300 CAPSULE ORAL at 08:49

## 2024-05-23 RX ADMIN — SODIUM CHLORIDE SOLN NEBU 3% 4 ML: 3 NEBU SOLN at 19:11

## 2024-05-23 RX ADMIN — LEVALBUTEROL HYDROCHLORIDE 1.25 MG: 1.25 SOLUTION RESPIRATORY (INHALATION) at 07:17

## 2024-05-23 RX ADMIN — BUDESONIDE 0.5 MG: 0.5 INHALANT RESPIRATORY (INHALATION) at 07:17

## 2024-05-23 NOTE — ASSESSMENT & PLAN NOTE
"Presents due to shortness of breath.  Called EMS due to distress.  Oxygen machine \"broken\" at home per patient  Per patient had been using up to 6 L of O2 at home.  In the ER able to be weaned down to 2 L.   Wheezing and coughing noted  Does not appear bacterial. Hold off on azithromycin at this time.   Procal and lactic WNL   Afebrile   Home regimen: Breztri  Plan  Albuterol inhaler  Symbicort inhaler  Atrovent/Xopenex 3 times daily  Tessalon Perles  Solu-Medrol 40 mg every 12 hours.  Pulmonary follow up  On Zithromax  Pulmonary consult appreciated  I have personally counseled the patient on medication indication, compliance, utilization and side effects. Patient may be discharged home with albuterol  "

## 2024-05-23 NOTE — ASSESSMENT & PLAN NOTE
Patient refused to continue hospice care with [facility]  Possibly seeking new hospice care through [facility]'s.  However patient thinking he may want to be treatment oriented again  Wants to be a DNR  Palliative care on board

## 2024-05-23 NOTE — PROGRESS NOTES
"Formerly [facility]  Progress Note  Name: Richard Nava I  MRN: [id number]  Unit/Bed#: -01 I Date of Admission: 5/21/2024   Date of Service: 5/23/2024 I Hospital Day: 1    Assessment & Plan   Underweight  Assessment & Plan  Underweight POA due to chronic COPD with BMI of 14.78, cachexia, muscle wasting and poor appetite  Nutrition consult and recommendations    Acute on chronic respiratory failure with hypoxia (HCC)  Assessment & Plan  Patient admitted with acute on chronic respiratory failure with hypoxia and requiring 6 L of supplemental oxygen  Per prior hospitalization was on 2 to 3 L  Educated the patient that his goal > 88%.  Pulmonary on board  Wean oxygen as tolerated    Opioid dependence (HCC)  Assessment & Plan  Continue home MS Contin 15 mg every 12 hours  PDMP verified  Denies pain    Palliative care patient  Assessment & Plan  Patient refused to continue hospice care with [facility]  Possibly seeking new hospice care through [facility].  However patient thinking he may want to be treatment oriented again  Wants to be a DNR  Palliative care on board    Cachexia (HCC)  Assessment & Plan  Malnutrition Findings:                                 BMI Findings:           Body mass index is 14.78 kg/m².     Consult nutrition      Prostate cancer metastatic to bone (HCC)  Assessment & Plan  Stage 4 metastatic prostate cancer  Changed to hospice about 1 month ago.  Zytiga was discontinued at that time.  Patient has since stopped hospice.  Asking to be restarted on medications  Palliative care consulted.  Goals of care conversation    * COPD exacerbation (HCC)  Assessment & Plan  Presents due to shortness of breath.  Called EMS due to distress.  Oxygen machine \"broken\" at home per patient  Per patient had been using up to 6 L of O2 at home.  In the ER able to be weaned down to 2 L.   Wheezing and coughing noted  Does not appear bacterial. Hold off on azithromycin at this time.   Procal " "and lactic WNL   Afebrile   Home regimen: Breztri  Plan  Albuterol inhaler  Symbicort inhaler  Atrovent/Xopenex 3 times daily  Tessalon Perles  Solu-Medrol 40 mg every 8 hours.  Decreased to twice daily  On Zithromax  Pulmonary consult appreciated             Labs & Imaging: I have personally reviewed pertinent reports.      VTE Prophylaxis: in place.    Code Status:   Level 3 - DNAR and DNI    Patient Centered Rounds: I have performed bedside rounds with nursing staff today.    Mobility:   Basic Mobility Inpatient Raw Score: 23  JH-HLM Goal: 7: Walk 25 feet or more  JH-HLM Achieved: 6: Walk 10 steps or more  JH-HLM Goal NOT achieved. Continue with multidisciplinary rounding and encourage appropriate mobility to improve upon JH-HLM goals.    Discussions with Specialists or Other Care Team Provider: Pulmonary    Education and Discussions with Family / Patient: Patient declined family update    Total Time Spent on Date of Encounter in care of patient: 35 mins. This time was spent on one or more of the following: performing physical exam; counseling and coordination of care; obtaining or reviewing history; documenting in the medical record; reviewing/ordering tests, medications or procedures; communicating with other healthcare professionals and discussing with patient's family/caregivers.    Current Length of Stay: 1 day(s)    Current Patient Status: Inpatient   Certification Statement: The patient will continue to require additional inpatient hospital stay due to see my assessment and plan.     Subjective:   Patient is seen and examined at bedside.  Has Shortness of breath and wheezing.  Afebrile  All other ROS are negative.    Objective:    Vitals: Blood pressure 138/82, pulse 81, temperature 97.9 °F (36.6 °C), resp. rate 18, height 5' 7\" (1.702 m), weight 42.8 kg (94 lb 5.7 oz), SpO2 100%.,Body mass index is 14.78 kg/m².  SPO2 RA Rest      Flowsheet Row ED to Hosp-Admission (Current) from 5/21/2024 in [facility]" [facility] Med Surg Unit   SpO2 100 %   SpO2 Activity At Rest   O2 Device Nasal cannula   O2 Flow Rate --          I&O:   Intake/Output Summary (Last 24 hours) at 5/23/2024 1203  Last data filed at 5/23/2024 0947  Gross per 24 hour   Intake 1200 ml   Output 150 ml   Net 1050 ml       Physical Exam:    General- Alert, lying comfortably in bed. Not in any acute distress.  Neck- Supple, No JVD  CVS- regular, S1 and S2 normal  Chest- Bilateral Air entry, bilateral wheezing  Abdomen- soft, nontender, not distended, no guarding or rigidity, BS+  Extremities-  No pedal edema, No calf tenderness                         Normal ROM in all extremities.  CNS-   Alert, awake and orientedx3. No focal deficits present.    Invasive Devices       Peripheral Intravenous Line  Duration             Peripheral IV 05/21/24 Left Forearm 1 day                          Social History  reviewed  Family History   Problem Relation Age of Onset    Lung cancer Mother     Breast cancer Sister     Colon polyps Brother     Colon cancer Neg Hx     reviewed    Meds:  Current Facility-Administered Medications   Medication Dose Route Frequency Provider Last Rate Last Admin    acetaminophen (TYLENOL) tablet 650 mg  650 mg Oral Q6H PRN Adela Murguia PA-C   650 mg at 05/22/24 0349    albuterol (PROVENTIL HFA,VENTOLIN HFA) inhaler 1 puff  1 puff Inhalation Q4H PRN Adela Murguia PA-C   1 puff at 05/23/24 0436    albuterol inhalation solution 2.5 mg  2.5 mg Nebulization Q6H PRN Adela Murguia PA-C   2.5 mg at 05/22/24 0140    ALPRAZolam (XANAX) tablet 0.5 mg  0.5 mg Oral BID PRN Adela Murguia PA-C   0.5 mg at 05/22/24 2158    atorvastatin (LIPITOR) tablet 40 mg  40 mg Oral Daily With Dinner Adela Murguia PA-C   40 mg at 05/22/24 1716    azithromycin (ZITHROMAX) 500 mg in sodium chloride 0.9% 250mL IVPB 500 mg  500 mg Intravenous Q24H Efrain Dudley MD   500 mg at 05/23/24 0850    benzonatate (TESSALON PERLES) capsule 100 mg  100 mg Oral TID PRN Adela Murguia PA-C    100 mg at 24 0140    budesonide (PULMICORT) inhalation solution 0.5 mg  0.5 mg Nebulization Q12H Efrain Dudley MD   0.5 mg at 24 0717    fluticasone (FLONASE) 50 mcg/act nasal spray 1 spray  1 spray Each Nare Daily Efrain Dudley MD        gabapentin (NEURONTIN) capsule 300 mg  300 mg Oral BID Adela Murguia PA-C   300 mg at 24 0849    guaiFENesin (MUCINEX) 12 hr tablet 600 mg  600 mg Oral Q12H PRN ISA Araujo        heparin (porcine) subcutaneous injection 5,000 Units  5,000 Units Subcutaneous Q8H [facility] Adela Murguia PA-C   5,000 Units at 24 0621    ipratropium (ATROVENT) 0.02 % inhalation solution 0.5 mg  0.5 mg Nebulization Q6H ISA Araujo        levalbuterol (XOPENEX) inhalation solution 1.25 mg  1.25 mg Nebulization Q6H ISA Araujo        methylPREDNISolone sodium succinate (Solu-MEDROL) injection 40 mg  40 mg Intravenous Q12H [facility] Efrain Dudley MD   40 mg at 24 0849    morphine (MS CONTIN) ER tablet 15 mg  15 mg Oral BID Adela Murguia PA-C   15 mg at 24 0849    pantoprazole (PROTONIX) EC tablet 40 mg  40 mg Oral Early Morning Brandon Cisneros MD   40 mg at 24 0840    sodium chloride 3 % inhalation solution 4 mL  4 mL Nebulization TID Efrain Dudley MD   4 mL at 24 0717        Medications Prior to Admission:     abiraterone (ZYTIGA) 250 mg tablet    albuterol (2.5 mg/3 mL) 0.083 % nebulizer solution    albuterol (PROVENTIL HFA,VENTOLIN HFA) 90 mcg/act inhaler    ALPRAZolam (XANAX) 0.5 mg tablet    benzonatate (TESSALON PERLES) 100 mg capsule    Budeson-Glycopyrrol-Formoterol (Breztri Aerosphere) 160-9-4.8 MCG/ACT AERO    lidocaine (LIDODERM) 5 %    morphine (MS CONTIN) 15 mg 12 hr tablet    atorvastatin (LIPITOR) 40 mg tablet    [] azithromycin (ZITHROMAX) 250 mg tablet    azithromycin (ZITHROMAX) 500 MG tablet    cefpodoxime (VANTIN) 200 mg tablet    clonazePAM (KlonoPIN) 0.5 MG disintegrating tablet    doxycycline hyclate  (VIBRAMYCIN) 100 mg capsule    gabapentin (NEURONTIN) 300 mg capsule    naloxone (NARCAN) 4 mg/0.1 mL nasal spray    NON FORMULARY    Labs:  Results from last 7 days   Lab Units 05/23/24 0403 05/22/24 0551 05/21/24 2135   WBC Thousand/uL 5.46 5.30 6.83   HEMOGLOBIN g/dL 10.7* 10.6* 11.3*   HEMATOCRIT % 35.7* 34.4* 35.8*   PLATELETS Thousands/uL 321 287 287   SEGS PCT % 91*  --  65   LYMPHO PCT % 6* 13* 18   MONO PCT % 2* 1* 10   EOS PCT % 0 0 6     Results from last 7 days   Lab Units 05/23/24 0403 05/22/24 0551 05/21/24 2135 05/21/24 2134   POTASSIUM mmol/L 4.1 4.0 4.1  --    CHLORIDE mmol/L 101 98 99  --    CO2 mmol/L 38* 38* 42*  --    CO2, I-STAT mmol/L  --   --   --  45*   BUN mg/dL 16 14 14  --    CREATININE mg/dL 0.45* 0.43* 0.50*  --    CALCIUM mg/dL 8.6 8.5 9.2  --    ALK PHOS U/L  --   --  40  --    ALT U/L  --   --  13  --    AST U/L  --   --  18  --    GLUCOSE, ISTAT mg/dl  --   --   --  130     Lab Results   Component Value Date    TROPONINI <0.01 04/30/2024    TROPONINI <0.02 02/18/2020    TROPONINI <0.02 02/18/2020    TROPONINI <0.02 02/18/2020     Results from last 7 days   Lab Units 05/21/24 2135   INR  0.94     Lab Results   Component Value Date    BLOODCX No Growth at 24 hrs. 05/21/2024    BLOODCX No Growth at 24 hrs. 05/21/2024    BLOODCX No Growth After 5 Days. 04/17/2024    BLOODCX No Growth After 5 Days. 04/17/2024    SPUTUMCULTUR Test not performed. Suggest repeat specimen. 04/18/2024    SPUTUMCULTUR 3+ Growth of Pseudomonas aeruginosa (A) 03/08/2024    SPUTUMCULTUR 3+ Growth of 02/19/2020    SPUTUMCULTUR  02/19/2020     Commensal respiratory yee only; No significant growth of Staph aureus/MRSA or Pseudomonas aeruginosa.         Imaging:  Results for orders placed during the hospital encounter of 05/21/24    XR chest portable    Narrative  XR CHEST PORTABLE    INDICATION: Acute Resp Failure.    COMPARISON: 4/17/2024    FINDINGS: Monitoring leads and clips project over the  chest.    Clear lungs. Lungs remain hyperinflated indicative of emphysema. No pneumothorax or pleural effusion.    Normal cardiomediastinal silhouette.    Bones are unremarkable for age.    Normal upper abdomen.    Impression  No acute cardiopulmonary disease.        Workstation performed: [id number]    Results for orders placed during the hospital encounter of 10/24/23    XR chest pa & lateral    Narrative  CHEST    INDICATION:   sepsis.    COMPARISON:  None    EXAM PERFORMED/VIEWS:  XR CHEST PA & LATERAL The frontal view was performed utilizing dual energy radiographic technique.  Images: 7    FINDINGS:    Cardiomediastinal silhouette appears unremarkable.    The patient has severe emphysema. No pneumothorax or pleural effusion.    Extensive bony metastasis are present.    Impression  No acute cardiopulmonary disease.  Severe emphysema. Extensive bony metastasis.          Workstation performed: [id number]      Last 24 Hours Medication List:   Current Facility-Administered Medications   Medication Dose Route Frequency Provider Last Rate    acetaminophen  650 mg Oral Q6H PRN Adela Murguia PA-C      albuterol  1 puff Inhalation Q4H PRN Adela Murguia PA-C      albuterol  2.5 mg Nebulization Q6H PRN Adela Murguia PA-C      ALPRAZolam  0.5 mg Oral BID PRN Adela Murguia PA-C      atorvastatin  40 mg Oral Daily With Dinner Adela Murguia PA-C      azithromycin  500 mg Intravenous Q24H Efrain Dudley MD      benzonatate  100 mg Oral TID PRN Adela Murguia PA-C      budesonide  0.5 mg Nebulization Q12H Efrain Dudley MD      fluticasone  1 spray Each Nare Daily Efrain Dudley MD      gabapentin  300 mg Oral BID Adela Murguia PA-C      guaiFENesin  600 mg Oral Q12H PRN ISA Araujo      heparin (porcine)  5,000 Units Subcutaneous Q8H [facility] Adela Murguia PA-C      ipratropium  0.5 mg Nebulization Q6H ISA Araujo      levalbuterol  1.25 mg Nebulization Q6H ISA Araujo      methylPREDNISolone sodium succinate   40 mg Intravenous Q12H [facility] Efrain Dudley MD      morphine  15 mg Oral BID Adela Murguia PA-C      pantoprazole  40 mg Oral Early Morning Brandon Cisneros MD      sodium chloride  4 mL Nebulization TID Efrain Dudley MD          Today, Patient Was Seen By: Brandon Cisneros MD    ** Please Note: Dictation voice to text software may have been used in the creation of this document. **

## 2024-05-23 NOTE — ASSESSMENT & PLAN NOTE
Patient admitted with acute on chronic respiratory failure with hypoxia and requiring 6 L of supplemental oxygen  Per prior hospitalization was on 2 to 3 L  Educated the patient that his goal > 88%.  Pulmonary on board  Wean oxygen as tolerated

## 2024-05-23 NOTE — ASSESSMENT & PLAN NOTE
Malnutrition Findings:                                 BMI Findings:           Body mass index is 14.78 kg/m².     Consult nutrition

## 2024-05-23 NOTE — PROGRESS NOTES
"Progress Note - Pulmonary   Richard Nava 68 y.o. male MRN: [id number]  Unit/Bed#: -01 Encounter: [id number]    Assessment & Plan:    Acute on chronic hypoxic and hypercapnic respiratory failure  Severe emphysema/COPD with acute exacerbation  Metastatic prostate cancer  Severe protein calorie malnutrition    Patient is currently on 4 L oxygen saturating at 99%. I titrated him to 2/5L while in the room. Titrate oxygen to maintain saturations >89%.  He is maintained on 2 L at baseline  Home regimen: Breztri twice daily, albuterol as needed  Continue Pulmicort neb twice daily, increase Atrovent/Xopenex to q6 hours  Continue azithromycin day 2/3, transition to azithromycin 250 mg daily for exacerbation prevention  Pulmonary hygiene: Hypertonic saline nebs, Mucinex prn, OOB as tolerated, cough and deep breathe, add flutter valve and incentive spirometry  Continue IV Solu-Medrol 40 mg every 12 hours, will eventually transition to p.o. methylprednisolone taper as he does not tolerate prednisone well. He would benefit from chronic methylprednisolone to prevent further frequent exacerbations/hospitalizations  He does have new home oxygen concentrator, case management is aware    Chief Complaint:   \"The mucinex makes me so sick\"    Subjective:   Patient resting in bed.  He states he is not tolerating the Mucinex well and it makes him feel very sick.  He continues with frequent congested cough.     Objective:     Vitals: Blood pressure 138/82, pulse 81, temperature 97.9 °F (36.6 °C), resp. rate 18, height 5' 7\" (1.702 m), weight 42.8 kg (94 lb 5.7 oz), SpO2 100%.,Body mass index is 14.78 kg/m².      Intake/Output Summary (Last 24 hours) at 5/23/2024 0830  Last data filed at 5/23/2024 0743  Gross per 24 hour   Intake 960 ml   Output 150 ml   Net 810 ml       Invasive Devices       Peripheral Intravenous Line  Duration             Peripheral IV 05/21/24 Left Forearm 1 day                    Physical Exam:   General " appearance:   Alert and awake, in no acute distress.  Ill-appearing  Head:   Normocephalic, without obvious abnormality, atraumatic  Eyes:   No scleral icterus   Lungs:  Pulmonary effort non labored at reset   Breath sounds: Bilateral diffuse wheezing and rhonchi.  Cardiovascular:   Regular rate and rhythm. No murmurs. Capillary refill < 3 seconds.  Abdomen:    No appreciable distension or tenderness  Extremities:   No deformity. No clubbing present. No edema to extremities.   Skin:   Warm and dry. Intact. Color appropriate for ethnicity.  Neurologic:   No acute focal deficits are noted.  Psychiatric:   Normal mood. Answers questions appropriately.        Labs: CBC:   Lab Results   Component Value Date    WBC 5.46 05/23/2024    HGB 10.7 (L) 05/23/2024    HCT 35.7 (L) 05/23/2024     (H) 05/23/2024     05/23/2024    RBC 3.48 (L) 05/23/2024    MCH 30.7 05/23/2024    MCHC 30.0 (L) 05/23/2024    RDW 13.9 05/23/2024    MPV 8.5 (L) 05/23/2024    NRBC 0 05/23/2024   , CMP:   Lab Results   Component Value Date    SODIUM 142 05/23/2024    K 4.1 05/23/2024     05/23/2024    CO2 38 (H) 05/23/2024    BUN 16 05/23/2024    CREATININE 0.45 (L) 05/23/2024    CALCIUM 8.6 05/23/2024    EGFR 115 05/23/2024     Imaging and other studies: I have personally reviewed pertinent reports.      XR chest portable, 5/21/2024  No acute cardiopulmonary disease    Zuleyma Francisco, MSN RN FNP-BC  Nurse Practitioner  [facility]

## 2024-05-23 NOTE — PROGRESS NOTES
"    Progress Note - Palliative & Supportive Care  Richard Nava  68 y.o.  male  /-01   MRN: [id number]  Encounter: [id number]     Assessment/Problems actively addressed:  Encounter for palliative and supportive care  Goals of care encounter   Prostate cancer, widely metastatic to bone  Severe COPD with chronic respiratory failure   Neoplasm related pain  Severe protein calorie malnutrition   Pulmonary and cancer cachexia  QTC prolongation     PLAN:  1. Symptom management  Will continue:   MS CONTIN 15 twice daily  Neurontin 300 twice daily  Alprazolam 0.5 mg twice daily  Will add alprazolam 1MG QHS  Further adjustments can be made in the OP setting    2. Goals of care  Continue medical care with limitation of DNR/DNI.   Rishabh has revoked hospice. He will continue OP follow up with [facility] and with oncology. I have notified oncology team.  Will follow.     24 History  Chart reviewed before visit. No changes.     Decisional apparatus:  Patient is competent on exam today.  If competence is lost, patient's substitute decision maker would default to Mary \"Piedad\" Yasmine by [place] Act 169.  Advance Directive/Living Will/POLST: documents are on file     Review of Systems:   Review of Systems   Constitutional: Negative for decreased appetite and malaise/fatigue.   HENT:  Negative for congestion.    Respiratory:  Positive for shortness of breath.    Musculoskeletal:  Positive for arthritis and back pain.   Gastrointestinal:  Negative for bloating, abdominal pain, anorexia, nausea and vomiting.   Psychiatric/Behavioral:  Negative for altered mental status, depression, hallucinations and hypervigilance. The patient has insomnia.      Medications    Current Facility-Administered Medications:     acetaminophen (TYLENOL) tablet 650 mg, 650 mg, Oral, Q6H PRN, Adela Murguia PA-C, 650 mg at 05/22/24 0349    albuterol (PROVENTIL HFA,VENTOLIN HFA) inhaler 1 puff, 1 puff, Inhalation, Q4H PRN, Adela Murguia PA-C, 1 puff at " 05/23/24 0436    albuterol inhalation solution 2.5 mg, 2.5 mg, Nebulization, Q6H PRN, Adela Murguia PA-C, 2.5 mg at 05/22/24 0140    ALPRAZolam (XANAX) tablet 0.5 mg, 0.5 mg, Oral, BID PRN, Adela Murguia PA-C, 0.5 mg at 05/22/24 2158    atorvastatin (LIPITOR) tablet 40 mg, 40 mg, Oral, Daily With Dinner, Adela Murguia PA-C, 40 mg at 05/22/24 1716    azithromycin (ZITHROMAX) 500 mg in sodium chloride 0.9% 250mL IVPB 500 mg, 500 mg, Intravenous, Q24H, Efrain Dudley MD, 500 mg at 05/22/24 0952    benzonatate (TESSALON PERLES) capsule 100 mg, 100 mg, Oral, TID PRN, Adela Murguia PA-C, 100 mg at 05/22/24 0140    budesonide (PULMICORT) inhalation solution 0.5 mg, 0.5 mg, Nebulization, Q12H, Efrain Dudley MD, 0.5 mg at 05/23/24 0717    fluticasone (FLONASE) 50 mcg/act nasal spray 1 spray, 1 spray, Each Nare, Daily, Efrain Dudley MD    gabapentin (NEURONTIN) capsule 300 mg, 300 mg, Oral, BID, Adela Murguia PA-C, 300 mg at 05/22/24 1716    guaiFENesin (MUCINEX) 12 hr tablet 600 mg, 600 mg, [facility], [facility], 600 mg at 05/22/24 0952    heparin (porcine) subcutaneous injection 5,000 Units, 5,000 Units, Subcutaneous, Q8H [facility], Adela Murguia PA-C, 5,000 Units at 05/23/24 0621    ipratropium (ATROVENT) 0.02 % inhalation solution 0.5 mg, 0.5 mg, Nebulization, TID, Adela Murguia PA-C, 0.5 mg at 05/23/24 0717    levalbuterol (XOPENEX) inhalation solution 1.25 mg, 1.25 mg, Nebulization, TID, Adela Murguia PA-C, 1.25 mg at 05/23/24 0717    methylPREDNISolone sodium succinate (Solu-MEDROL) injection 40 mg, 40 mg, Intravenous, Q12H [facility], Efrain Dudley MD, 40 mg at 05/22/24 2150    morphine (MS CONTIN) ER tablet 15 mg, 15 mg, Oral, BID, Adela Murguia PA-C, 15 mg at 05/22/24 1716    pantoprazole (PROTONIX) EC tablet 40 mg, 40 mg, Oral, Early Morning, Brandon Cisneros MD, 40 mg at 05/22/24 0840    sodium chloride 3 % inhalation solution 4 mL, 4 mL, Nebulization, TID, Efrain Dudley MD, 4 mL at 05/23/24 0717    Objective  /82    "Pulse 81   Temp 97.9 °F (36.6 °C)   Resp 18   Ht 5' 7\" (1.702 m)   Wt 42.8 kg (94 lb 5.7 oz)   SpO2 100%   BMI 14.78 kg/m²     Physical Exam:   Physical Exam  Vitals and nursing note reviewed. Exam conducted with a chaperone present.   Constitutional:       General: He is not in acute distress.     Appearance: He is cachectic. He is ill-appearing.   HENT:      Head: Normocephalic and atraumatic.   Eyes:      General:         Right eye: No discharge.         Left eye: No discharge.      Conjunctiva/sclera: Conjunctivae normal.   Cardiovascular:      Rate and Rhythm: Normal rate.   Pulmonary:      Effort: Pulmonary effort is normal.   Musculoskeletal:         General: No swelling.      Cervical back: Neck supple.   Skin:     General: Skin is warm.      Coloration: Skin is pale.   Neurological:      Mental Status: He is alert and oriented to person, place, and time. Mental status is at baseline.   Psychiatric:         Mood and Affect: Mood normal.         Behavior: Behavior normal.       Lab Results: I have personally reviewed pertinent labs., CBC:   Lab Results   Component Value Date    WBC 5.46 05/23/2024    HGB 10.7 (L) 05/23/2024    HCT 35.7 (L) 05/23/2024     (H) 05/23/2024     05/23/2024    RBC 3.48 (L) 05/23/2024    MCH 30.7 05/23/2024    MCHC 30.0 (L) 05/23/2024    RDW 13.9 05/23/2024    MPV 8.5 (L) 05/23/2024    NRBC 0 05/23/2024   , CMP:   Lab Results   Component Value Date    SODIUM 142 05/23/2024    K 4.1 05/23/2024     05/23/2024    CO2 38 (H) 05/23/2024    BUN 16 05/23/2024    CREATININE 0.45 (L) 05/23/2024    CALCIUM 8.6 05/23/2024    EGFR 115 05/23/2024     Imaging Studies: I have personally reviewed pertinent reports.  EKG, Pathology, and Other Studies: I have personally reviewed pertinent reports.    Counseling / Coordination of Care  Total floor / unit time spent today 40 minutes. Greater than 50% of total time was spent with the patient and / or family counseling and / or " "coordinating of care. A description of the counseling / coordination of care: reviewed chart, reviewed lab values, reviewed imaging, provided medical updates, discussed palliative care and symptom management, discussed hospice care and comfort care, discussed goals of care, provided supportive listening, provided anticipatory guidance, provided psychosocial and emotional support, assessed competency and decision-making, and facilitated interdisciplinary communication. Reviewed with SLIM team, RN and CM.    Arleth Scales PA-C  Palliative & Supportive Care    Portions of this document may have been created using dictation software and as such some \"sound alike\" terms may have been generated by the system. Do not hesitate to contact me with any questions or clarifications.    "

## 2024-05-23 NOTE — ASSESSMENT & PLAN NOTE
"Presents due to shortness of breath.  Called EMS due to distress.  Oxygen machine \"broken\" at home per patient  Per patient had been using up to 6 L of O2 at home.  In the ER able to be weaned down to 2 L.   Wheezing and coughing noted  Does not appear bacterial. Hold off on azithromycin at this time.   Procal and lactic WNL   Afebrile   Home regimen: Breztri  Plan  Albuterol inhaler  Symbicort inhaler  Atrovent/Xopenex 3 times daily  Tessalon Perles  Solu-Medrol 40 mg every 8 hours.  Decreased to twice daily  On Zithromax  Pulmonary consult appreciated  "

## 2024-05-23 NOTE — ASSESSMENT & PLAN NOTE
Underweight POA due to chronic COPD with BMI of 14.78, cachexia, muscle wasting and poor appetite  Nutrition consult and recommendations

## 2024-05-24 LAB
ANION GAP SERPL CALCULATED.3IONS-SCNC: -2 MMOL/L (ref 4–13)
BASOPHILS # BLD AUTO: 0.01 THOUSANDS/ÂΜL (ref 0–0.1)
BASOPHILS NFR BLD AUTO: 0 % (ref 0–1)
BUN SERPL-MCNC: 14 MG/DL (ref 5–25)
CALCIUM SERPL-MCNC: 8.4 MG/DL (ref 8.4–10.2)
CHLORIDE SERPL-SCNC: 103 MMOL/L (ref 96–108)
CO2 SERPL-SCNC: 42 MMOL/L (ref 21–32)
CREAT SERPL-MCNC: 0.44 MG/DL (ref 0.6–1.3)
EOSINOPHIL # BLD AUTO: 0 THOUSAND/ÂΜL (ref 0–0.61)
EOSINOPHIL NFR BLD AUTO: 0 % (ref 0–6)
ERYTHROCYTE [DISTWIDTH] IN BLOOD BY AUTOMATED COUNT: 13.8 % (ref 11.6–15.1)
GFR SERPL CREATININE-BSD FRML MDRD: 116 ML/MIN/1.73SQ M
GLUCOSE SERPL-MCNC: 141 MG/DL (ref 65–140)
GLUCOSE SERPL-MCNC: 144 MG/DL (ref 65–140)
GLUCOSE SERPL-MCNC: 194 MG/DL (ref 65–140)
GLUCOSE SERPL-MCNC: 198 MG/DL (ref 65–140)
GLUCOSE SERPL-MCNC: 236 MG/DL (ref 65–140)
HCT VFR BLD AUTO: 35.5 % (ref 36.5–49.3)
HGB BLD-MCNC: 10.6 G/DL (ref 12–17)
IMM GRANULOCYTES # BLD AUTO: 0.04 THOUSAND/UL (ref 0–0.2)
IMM GRANULOCYTES NFR BLD AUTO: 1 % (ref 0–2)
LYMPHOCYTES # BLD AUTO: 0.39 THOUSANDS/ÂΜL (ref 0.6–4.47)
LYMPHOCYTES NFR BLD AUTO: 8 % (ref 14–44)
MAGNESIUM SERPL-MCNC: 1.9 MG/DL (ref 1.9–2.7)
MCH RBC QN AUTO: 30.7 PG (ref 26.8–34.3)
MCHC RBC AUTO-ENTMCNC: 29.9 G/DL (ref 31.4–37.4)
MCV RBC AUTO: 103 FL (ref 82–98)
MONOCYTES # BLD AUTO: 0.16 THOUSAND/ÂΜL (ref 0.17–1.22)
MONOCYTES NFR BLD AUTO: 3 % (ref 4–12)
NEUTROPHILS # BLD AUTO: 4.61 THOUSANDS/ÂΜL (ref 1.85–7.62)
NEUTS SEG NFR BLD AUTO: 88 % (ref 43–75)
NRBC BLD AUTO-RTO: 0 /100 WBCS
PLATELET # BLD AUTO: 342 THOUSANDS/UL (ref 149–390)
PMV BLD AUTO: 8.6 FL (ref 8.9–12.7)
POTASSIUM SERPL-SCNC: 4.2 MMOL/L (ref 3.5–5.3)
RBC # BLD AUTO: 3.45 MILLION/UL (ref 3.88–5.62)
SODIUM SERPL-SCNC: 143 MMOL/L (ref 135–147)
WBC # BLD AUTO: 5.21 THOUSAND/UL (ref 4.31–10.16)

## 2024-05-24 PROCEDURE — 94640 AIRWAY INHALATION TREATMENT: CPT

## 2024-05-24 PROCEDURE — 92526 ORAL FUNCTION THERAPY: CPT

## 2024-05-24 PROCEDURE — 99232 SBSQ HOSP IP/OBS MODERATE 35: CPT | Performed by: INTERNAL MEDICINE

## 2024-05-24 PROCEDURE — 94668 MNPJ CHEST WALL SBSQ: CPT

## 2024-05-24 PROCEDURE — 80048 BASIC METABOLIC PNL TOTAL CA: CPT | Performed by: INTERNAL MEDICINE

## 2024-05-24 PROCEDURE — 99233 SBSQ HOSP IP/OBS HIGH 50: CPT | Performed by: PHYSICIAN ASSISTANT

## 2024-05-24 PROCEDURE — 83735 ASSAY OF MAGNESIUM: CPT | Performed by: INTERNAL MEDICINE

## 2024-05-24 PROCEDURE — 82948 REAGENT STRIP/BLOOD GLUCOSE: CPT

## 2024-05-24 PROCEDURE — 85025 COMPLETE CBC W/AUTO DIFF WBC: CPT | Performed by: INTERNAL MEDICINE

## 2024-05-24 PROCEDURE — 94760 N-INVAS EAR/PLS OXIMETRY 1: CPT

## 2024-05-24 RX ORDER — HALOPERIDOL 1 MG/1
1 TABLET ORAL
Status: DISCONTINUED | OUTPATIENT
Start: 2024-05-24 | End: 2024-05-24

## 2024-05-24 RX ORDER — HYDROCODONE POLISTIREX AND CHLORPHENIRAMINE POLISTIREX 10; 8 MG/5ML; MG/5ML
5 SUSPENSION, EXTENDED RELEASE ORAL EVERY 12 HOURS PRN
Status: DISCONTINUED | OUTPATIENT
Start: 2024-05-24 | End: 2024-05-29 | Stop reason: HOSPADM

## 2024-05-24 RX ORDER — METHYLPREDNISOLONE SODIUM SUCCINATE 40 MG/ML
20 INJECTION, POWDER, LYOPHILIZED, FOR SOLUTION INTRAMUSCULAR; INTRAVENOUS EVERY 12 HOURS SCHEDULED
Status: DISCONTINUED | OUTPATIENT
Start: 2024-05-24 | End: 2024-05-28

## 2024-05-24 RX ORDER — HALOPERIDOL 1 MG/1
1 TABLET ORAL ONCE
Status: COMPLETED | OUTPATIENT
Start: 2024-05-24 | End: 2024-05-24

## 2024-05-24 RX ADMIN — METHYLPREDNISOLONE SODIUM SUCCINATE 20 MG: 40 INJECTION, POWDER, FOR SOLUTION INTRAMUSCULAR; INTRAVENOUS at 20:33

## 2024-05-24 RX ADMIN — BUDESONIDE 0.5 MG: 0.5 INHALANT RESPIRATORY (INHALATION) at 07:39

## 2024-05-24 RX ADMIN — ATORVASTATIN CALCIUM 40 MG: 40 TABLET, FILM COATED ORAL at 17:01

## 2024-05-24 RX ADMIN — SODIUM CHLORIDE SOLN NEBU 3% 4 ML: 3 NEBU SOLN at 14:19

## 2024-05-24 RX ADMIN — BUDESONIDE 0.5 MG: 0.5 INHALANT RESPIRATORY (INHALATION) at 19:48

## 2024-05-24 RX ADMIN — SODIUM CHLORIDE SOLN NEBU 3% 4 ML: 3 NEBU SOLN at 19:48

## 2024-05-24 RX ADMIN — FLUTICASONE PROPIONATE 1 SPRAY: 50 SPRAY, METERED NASAL at 08:41

## 2024-05-24 RX ADMIN — MORPHINE SULFATE 15 MG: 15 TABLET, EXTENDED RELEASE ORAL at 17:01

## 2024-05-24 RX ADMIN — MORPHINE SULFATE 15 MG: 15 TABLET, EXTENDED RELEASE ORAL at 08:42

## 2024-05-24 RX ADMIN — LEVALBUTEROL HYDROCHLORIDE 1.25 MG: 1.25 SOLUTION RESPIRATORY (INHALATION) at 19:48

## 2024-05-24 RX ADMIN — METHYLPREDNISOLONE SODIUM SUCCINATE 40 MG: 40 INJECTION, POWDER, FOR SOLUTION INTRAMUSCULAR; INTRAVENOUS at 08:41

## 2024-05-24 RX ADMIN — IPRATROPIUM BROMIDE 0.5 MG: 0.5 SOLUTION RESPIRATORY (INHALATION) at 01:00

## 2024-05-24 RX ADMIN — SODIUM CHLORIDE SOLN NEBU 3% 4 ML: 3 NEBU SOLN at 07:39

## 2024-05-24 RX ADMIN — IPRATROPIUM BROMIDE 0.5 MG: 0.5 SOLUTION RESPIRATORY (INHALATION) at 07:39

## 2024-05-24 RX ADMIN — GABAPENTIN 300 MG: 300 CAPSULE ORAL at 08:42

## 2024-05-24 RX ADMIN — IPRATROPIUM BROMIDE 0.5 MG: 0.5 SOLUTION RESPIRATORY (INHALATION) at 19:48

## 2024-05-24 RX ADMIN — IPRATROPIUM BROMIDE 0.5 MG: 0.5 SOLUTION RESPIRATORY (INHALATION) at 14:19

## 2024-05-24 RX ADMIN — HEPARIN SODIUM 5000 UNITS: 5000 INJECTION INTRAVENOUS; SUBCUTANEOUS at 06:12

## 2024-05-24 RX ADMIN — LEVALBUTEROL HYDROCHLORIDE 1.25 MG: 1.25 SOLUTION RESPIRATORY (INHALATION) at 14:19

## 2024-05-24 RX ADMIN — LEVALBUTEROL HYDROCHLORIDE 1.25 MG: 1.25 SOLUTION RESPIRATORY (INHALATION) at 01:00

## 2024-05-24 RX ADMIN — LEVALBUTEROL HYDROCHLORIDE 1.25 MG: 1.25 SOLUTION RESPIRATORY (INHALATION) at 07:39

## 2024-05-24 RX ADMIN — HALOPERIDOL 1 MG: 1 TABLET ORAL at 10:05

## 2024-05-24 RX ADMIN — ALPRAZOLAM 1 MG: 0.5 TABLET ORAL at 00:08

## 2024-05-24 RX ADMIN — HEPARIN SODIUM 5000 UNITS: 5000 INJECTION INTRAVENOUS; SUBCUTANEOUS at 20:33

## 2024-05-24 RX ADMIN — HEPARIN SODIUM 5000 UNITS: 5000 INJECTION INTRAVENOUS; SUBCUTANEOUS at 14:00

## 2024-05-24 RX ADMIN — AZITHROMYCIN MONOHYDRATE 500 MG: 500 INJECTION, POWDER, LYOPHILIZED, FOR SOLUTION INTRAVENOUS at 08:42

## 2024-05-24 RX ADMIN — GABAPENTIN 300 MG: 300 CAPSULE ORAL at 17:01

## 2024-05-24 RX ADMIN — PANTOPRAZOLE SODIUM 40 MG: 40 TABLET, DELAYED RELEASE ORAL at 06:13

## 2024-05-24 NOTE — SPEECH THERAPY NOTE
"Speech Language/Pathology    Speech/Language Pathology Progress Note    Patient Name: Richard Nava  Today's Date: 5/24/2024     Problem List  Principal Problem:    COPD exacerbation (HCC)  Active Problems:    Prostate cancer metastatic to bone (HCC)    Cachexia (HCC)    Palliative care patient    Opioid dependence (HCC)    Acute on chronic respiratory failure with hypoxia (HCC)    Underweight       Past Medical History  Past Medical History:   Diagnosis Date    Bone cancer (HCC)     Colon polyp     COPD (chronic obstructive pulmonary disease) (HCC)     Emphysema lung (HCC)     Encephalopathy acute 05/05/2022    Hyperlipidemia     Left lower quadrant abdominal pain 12/01/2021    Positive blood culture 04/25/2023    Prostate cancer (HCC)     Respiratory distress 03/08/2024        Past Surgical History  Past Surgical History:   Procedure Laterality Date    APPENDECTOMY      COLONOSCOPY      IR BIOPSY BONE  12/30/2021    JOINT REPLACEMENT      UPPER GASTROINTESTINAL ENDOSCOPY           Subjective:  Pt awake/alert while in bed. Pt reports that he feels \"terrible\" today as he \"didn't sleep last night\".     Objective:  Pt seen for skilled dysphagia follow up session in conjunction with lunch tray. Pt just finished cheeseburger as SLP entered the room. Pt accepted trials of thin liquids and hard solids (chocolate chip cookie) but declined additional PO trials as he reports that he \"needs to stay away from sugar\". Pt was able to bite through solids and retrieve bolus from straw. Mastication functional. Bolus control and transfer appeared adequate. No pocketing/oral residue. Swallows appeared prompt. No coughing, throat clearing, change in respiratory functioning, or change in vocal quality.     Assessment:  Pt currently on 4 liters of supplemental oxygen, same as initial ST evaluation. Respiratory status remains stable. No overt s/s of aspiration given thin liquids and regular solids. "     Plan/Recommendations:  Continue regular solids w/ thin liquids  Medications whole w/ liquids  No further IP ST needs indicated at this time. If respiratory status worsens or other medical indicators suggestive of aspiration arise, re-consult ST and consider MBS to rule-out silent aspiration.

## 2024-05-24 NOTE — ASSESSMENT & PLAN NOTE
Patient refused to continue hospice care with [facility]  Possibly seeking new hospice care through [facility]'s.  However patient thinking he may want to be treatment oriented again  Wants to be a DNR  Palliative care on board

## 2024-05-24 NOTE — ASSESSMENT & PLAN NOTE
Malnutrition Findings:   Adult Malnutrition type: Chronic illness  Adult Degree of Malnutrition: Other severe protein calorie malnutrition  Malnutrition Characteristics: Fat loss, Muscle loss, Inadequate energy, Weight loss                       BMI Findings:  Adult BMI Classifications: Underweight < 18.5        Body mass index is 14.78 kg/m².     Consult nutrition

## 2024-05-24 NOTE — PROGRESS NOTES
"    Progress Note - Palliative & Supportive Care  Richard Nava  68 y.o.  male  /-01   MRN: [id number]  Encounter: [id number]     Assessment/Problems actively addressed:  Encounter for palliative and supportive care  Goals of care encounter   Prostate cancer, widely metastatic to bone  Severe COPD with chronic respiratory failure   Neoplasm related pain  Severe protein calorie malnutrition   Pulmonary and cancer cachexia  QTC prolongation    PLAN:  1. Symptom management  Patient states he does not does not \"feel well\" today.  Did not sleep during the night because he was awakened by staff for necessary medication administration.  Reports hallucination during the night of seeing himself in the room however I suspect he likely saw a nurse in the room.  -Steroid dosing may be contributing to hallucinations as he is highly sensitive to medications  -He is open to Haldol 1MG x 1 to assist with existential distress and hallucination.  Give PO haldol x1 now to assist with restful sleep and existential distress.  Can repeat dosing at night if patient has recurrent hallucination.  Continue MS CONTIN 15 twice daily  Continue Neurontin 300 twice daily  Continue Alprazolam 0.5 mg twice daily AND alprazolam 1MG QHS    If at all possible, please schedule medications/breathing treatments during waking hours.  Continue recommend global delirium precautions including:  Establishment of day/night cycle via lights during the day and blinds open.  Please limit interruptions at night as medically appropriate.  Provide glasses/hearing aids as apprioriate.    Minimize deliriogenic medications as able.   Provide reorientation including date on board and visible clock.   Avoid restraints as able, frequent verbal reorientations or patient care sitter as appropriate.     2. Goals of care  Rishabh is already established with palliative care in the clinic and has an appointment with us for close follow-up on 5/28/2024 if he has " been discharged by then; otherwise it can be rescheduled.  If he is still admitted, palliative care is available for in person support again on 5/28/2024.    24 History  Chart reviewed before visit.  Patient in fetal position in a darkened room this morning holding his head.  He is saying he does not feel well.  He denies dyspnea.  He has mild headache but does not believe it is purely headache.  He is very concerned by hallucination he saw the last night.  I reassured him that there is likely no hidden meaning in his hallucination and is likely because of his medications.    Review of Systems:   Review of Systems   Constitutional: Positive for decreased appetite and malaise/fatigue.   Musculoskeletal:  Positive for arthritis, back pain, neck pain and stiffness.   Gastrointestinal:  Negative for bloating, abdominal pain, nausea and vomiting.   Genitourinary:  Negative for bladder incontinence and decreased libido.   Neurological:  Positive for light-headedness and weakness.   Psychiatric/Behavioral:  Positive for altered mental status and hallucinations. Negative for hypervigilance. The patient has insomnia.      Medications    Current Facility-Administered Medications:     acetaminophen (TYLENOL) tablet 650 mg, 650 mg, Oral, Q6H PRN, Adela Murguia PA-C, 650 mg at 05/22/24 0349    albuterol (PROVENTIL HFA,VENTOLIN HFA) inhaler 1 puff, 1 puff, Inhalation, Q4H PRN, Adela Murguia PA-C, 1 puff at 05/23/24 0436    albuterol inhalation solution 2.5 mg, 2.5 mg, Nebulization, Q6H PRN, Adela Murguia PA-C, 2.5 mg at 05/22/24 0140    ALPRAZolam (XANAX) tablet 1 mg, 1 mg, Oral, HS PRN, 1 mg at 05/24/24 0008 **AND** ALPRAZolam (XANAX) tablet 0.5 mg, 0.5 mg, Oral, BID PRN, Arleth Scales PA-C    atorvastatin (LIPITOR) tablet 40 mg, 40 mg, Oral, Daily With Dinner, Adela Murguia PA-C, 40 mg at 05/23/24 1801    azithromycin (ZITHROMAX) 500 mg in sodium chloride 0.9% 250mL IVPB 500 mg, 500 mg, Intravenous, Q24H, Efrain Dudley MD,  "500 mg at 05/24/24 0842    benzonatate (TESSALON PERLES) capsule 100 mg, 100 mg, Oral, TID PRN, Adela Murguia PA-C, 100 mg at 05/22/24 0140    budesonide (PULMICORT) inhalation solution 0.5 mg, 0.5 mg, Nebulization, Q12H, Efrain Dudley MD, 0.5 mg at 05/24/24 0739    fluticasone (FLONASE) 50 mcg/act nasal spray 1 spray, 1 spray, Each Nare, Daily, Efrain Dudley MD, 1 spray at 05/24/24 0841    gabapentin (NEURONTIN) capsule 300 mg, 300 mg, Oral, BID, Adela Murguia PA-C, 300 mg at 05/24/24 0842    guaiFENesin (MUCINEX) 12 hr tablet 600 mg, 600 mg, Oral, Q12H PRN, ISA Araujo    heparin (porcine) subcutaneous injection 5,000 Units, 5,000 Units, Subcutaneous, Q8H [facility], Adela Murguia PA-C, 5,000 Units at 05/24/24 0612    insulin lispro (HumALOG/ADMELOG) 100 units/mL subcutaneous injection 1-6 Units, 1-6 Units, Subcutaneous, TID AC **AND** Fingerstick Glucose (POCT), , , TID AC, Brandon Cisneros MD    ipratropium (ATROVENT) 0.02 % inhalation solution 0.5 mg, 0.5 mg, Nebulization, Q6H, ISA Araujo, 0.5 mg at 05/24/24 0739    levalbuterol (XOPENEX) inhalation solution 1.25 mg, 1.25 mg, Nebulization, Q6H, ISA Araujo, 1.25 mg at 05/24/24 0739    methylPREDNISolone sodium succinate (Solu-MEDROL) injection 40 mg, 40 mg, Intravenous, Q12H [facility], Efrain Dudley MD, 40 mg at 05/24/24 0841    morphine (MS CONTIN) ER tablet 15 mg, 15 mg, Oral, BID, Adela Murguia PA-C, 15 mg at 05/24/24 0842    pantoprazole (PROTONIX) EC tablet 40 mg, 40 mg, Oral, Early Morning, Brandon Cisneros MD, 40 mg at 05/24/24 0613    sodium chloride 3 % inhalation solution 4 mL, 4 mL, Nebulization, TID, Efrain Dudley MD, 4 mL at 05/24/24 0739    Objective  /75   Pulse 64   Temp 98.1 °F (36.7 °C) (Temporal)   Resp 14   Ht 5' 7\" (1.702 m)   Wt 42.8 kg (94 lb 5.7 oz)   SpO2 98%   BMI 14.78 kg/m²     Physical Exam:   Physical Exam  Vitals and nursing note reviewed.   Constitutional:       General: He is not in acute " distress.     Appearance: He is cachectic. He is ill-appearing.   HENT:      Head: Normocephalic and atraumatic.   Eyes:      Conjunctiva/sclera: Conjunctivae normal.   Cardiovascular:      Rate and Rhythm: Normal rate.   Pulmonary:      Effort: Pulmonary effort is normal. No respiratory distress.   Musculoskeletal:         General: No swelling.      Cervical back: Neck supple.   Skin:     General: Skin is warm and dry.      Capillary Refill: Capillary refill takes less than 2 seconds.      Coloration: Skin is pale.   Neurological:      Mental Status: He is alert.   Psychiatric:         Mood and Affect: Mood is anxious.         Behavior: Behavior is cooperative.         Cognition and Memory: Cognition is not impaired.       Lab Results: I have personally reviewed pertinent labs., CBC:   Lab Results   Component Value Date    WBC 5.21 05/24/2024    HGB 10.6 (L) 05/24/2024    HCT 35.5 (L) 05/24/2024     (H) 05/24/2024     05/24/2024    RBC 3.45 (L) 05/24/2024    MCH 30.7 05/24/2024    MCHC 29.9 (L) 05/24/2024    RDW 13.8 05/24/2024    MPV 8.6 (L) 05/24/2024    NRBC 0 05/24/2024   , CMP:   Lab Results   Component Value Date    SODIUM 143 05/24/2024    K 4.2 05/24/2024     05/24/2024    CO2 42 (H) 05/24/2024    BUN 14 05/24/2024    CREATININE 0.44 (L) 05/24/2024    CALCIUM 8.4 05/24/2024    EGFR 116 05/24/2024     Imaging Studies: I have personally reviewed pertinent reports.  EKG, Pathology, and Other Studies: I have personally reviewed pertinent reports.    Counseling / Coordination of Care  Total floor / unit time spent today 45 minutes. Greater than 50% of total time was spent with the patient and / or family counseling and / or coordinating of care. A description of the counseling / coordination of care: reviewed chart, reviewed lab values, reviewed imaging, provided medical updates, discussed palliative care and symptom management, provided supportive listening, provided anticipatory guidance,  "provided psychosocial and emotional support, assessed competency and decision-making, and facilitated interdisciplinary communication. Reviewed with SLIM, pulm team, RN and CM.    Arleth Scales PA-C  Palliative & Supportive Care    Portions of this document may have been created using dictation software and as such some \"sound alike\" terms may have been generated by the system. Do not hesitate to contact me with any questions or clarifications.    "

## 2024-05-24 NOTE — ASSESSMENT & PLAN NOTE
Patient admitted with acute on chronic respiratory failure with hypoxia and requiring 6 L of supplemental oxygen  Per prior hospitalization was on 2 to 3 L  Educated the patient that his goal > 88%.  Pulmonary on board  Currently on 4 L  Wean oxygen as tolerated

## 2024-05-24 NOTE — PLAN OF CARE
Problem: Nutrition/Hydration-ADULT  Goal: Nutrient/Hydration intake appropriate for improving, restoring or maintaining nutritional needs  Description: Monitor and assess patient's nutrition/hydration status for malnutrition. Collaborate with interdisciplinary team and initiate plan and interventions as ordered.  Monitor patient's weight and dietary intake as ordered or per policy. Utilize nutrition screening tool and intervene as necessary. Determine patient's food preferences and provide high-protein, high-caloric foods as appropriate.     INTERVENTIONS:  - Monitor oral intake, urinary output, labs, and treatment plans  - Assess nutrition and hydration status and recommend course of action  - Evaluate amount of meals eaten  - Assist patient with eating if necessary   - Allow adequate time for meals  - Recommend/ encourage appropriate diets, oral nutritional supplements, and vitamin/mineral supplements  - Order, calculate, and assess calorie counts as needed  - Recommend, monitor, and adjust tube feedings and TPN/PPN based on assessed needs  - Assess need for intravenous fluids  - Provide specific nutrition/hydration education as appropriate  - Include patient/family/caregiver in decisions related to nutrition  Outcome: Progressing     Problem: PAIN - ADULT  Goal: Verbalizes/displays adequate comfort level or baseline comfort level  Description: Interventions:  - Encourage patient to monitor pain and request assistance  - Assess pain using appropriate pain scale  - Administer analgesics based on type and severity of pain and evaluate response  - Implement non-pharmacological measures as appropriate and evaluate response  - Consider cultural and social influences on pain and pain management  - Notify physician/advanced practitioner if interventions unsuccessful or patient reports new pain  Outcome: Progressing     Problem: INFECTION - ADULT  Goal: Absence or prevention of progression during  hospitalization  Description: INTERVENTIONS:  - Assess and monitor for signs and symptoms of infection  - Monitor lab/diagnostic results  - Monitor all insertion sites, i.e. indwelling lines, tubes, and drains  - Monitor endotracheal if appropriate and nasal secretions for changes in amount and color  - [place] appropriate cooling/warming therapies per order  - Administer medications as ordered  - Instruct and encourage patient and family to use good hand hygiene technique  - Identify and instruct in appropriate isolation precautions for identified infection/condition  Outcome: Progressing  Goal: Absence of fever/infection during neutropenic period  Description: INTERVENTIONS:  - Monitor WBC    Outcome: Progressing     Problem: SAFETY ADULT  Goal: Patient will remain free of falls  Description: INTERVENTIONS:  - Educate patient/family on patient safety including physical limitations  - Instruct patient to call for assistance with activity   - Consult OT/PT to assist with strengthening/mobility   - Keep Call bell within reach  - Keep bed low and locked with side rails adjusted as appropriate  - Keep care items and personal belongings within reach  - Initiate and maintain comfort rounds  - Make Fall Risk Sign visible to staff  - Offer Toileting every  Hours, in advance of need  - Initiate/Maintain alarm  - Obtain necessary fall risk management equipment:   - Apply yellow socks and bracelet for high fall risk patients  - Consider moving patient to room near nurses station  Outcome: Progressing  Goal: Maintain or return to baseline ADL function  Description: INTERVENTIONS:  -  Assess patient's ability to carry out ADLs; assess patient's baseline for ADL function and identify physical deficits which impact ability to perform ADLs (bathing, care of mouth/teeth, toileting, grooming, dressing, etc.)  - Assess/evaluate cause of self-care deficits   - Assess range of motion  - Assess patient's mobility; develop plan if  impaired  - Assess patient's need for assistive devices and provide as appropriate  - Encourage maximum independence but intervene and supervise when necessary  - Involve family in performance of ADLs  - Assess for home care needs following discharge   - Consider OT consult to assist with ADL evaluation and planning for discharge  - Provide patient education as appropriate  Outcome: Progressing  Goal: Maintains/Returns to pre admission functional level  Description: INTERVENTIONS:  - Perform AM-PAC 6 Click Basic Mobility/ Daily Activity assessment daily.  - Set and communicate daily mobility goal to care team and patient/family/caregiver.   - Collaborate with rehabilitation services on mobility goals if consulted  - Perform Range of Motion times a day.  - Reposition patient every  hours.  - Dangle patient times a day  - Stand patient  times a day  - Ambulate patient  times a day  - Out of bed to chair  times a day   - Out of bed for hung times a day  - Out of bed for toileting  - Record patient progress and toleration of activity level   Outcome: Progressing     Problem: DISCHARGE PLANNING  Goal: Discharge to home or other facility with appropriate resources  Description: INTERVENTIONS:  - Identify barriers to discharge w/patient and caregiver  - Arrange for needed discharge resources and transportation as appropriate  - Identify discharge learning needs (meds, wound care, etc.)  - Arrange for interpretive services to assist at discharge as needed  - Refer to Case Management Department for coordinating discharge planning if the patient needs post-hospital services based on physician/advanced practitioner order or complex needs related to functional status, cognitive ability, or social support system  Outcome: Progressing     Problem: Knowledge Deficit  Goal: Patient/family/caregiver demonstrates understanding of disease process, treatment plan, medications, and discharge instructions  Description: Complete learning  assessment and assess knowledge base.  Interventions:  - Provide teaching at level of understanding  - Provide teaching via preferred learning methods  Outcome: Progressing

## 2024-05-24 NOTE — NURSING NOTE
Pt. Asleep attempted to wake with voice to take medication. Pt. Noted to started speaking in word salad type fashion. Pt. Further awoken with touch. Pt. Then completely oriented. Pt. Not requesting not to awoken by anything by voice or very light touch if asleep due to history of PTSD. Pt. Not given insuline once arrived on floor as patient asleep and did not respond to voice or very light touch.

## 2024-05-24 NOTE — PROGRESS NOTES
[facility]  Progress Note  Name: Richard Nava I  MRN: [id number]  Unit/Bed#: -01 I Date of Admission: 5/21/2024   Date of Service: 5/24/2024 I Hospital Day: 2    Assessment & Plan   Underweight  Assessment & Plan  Underweight POA due to chronic COPD with BMI of 14.78, cachexia, muscle wasting and poor appetite  Nutrition consult and recommendations    Acute on chronic respiratory failure with hypoxia (HCC)  Assessment & Plan  Patient admitted with acute on chronic respiratory failure with hypoxia and requiring 6 L of supplemental oxygen  Per prior hospitalization was on 2 to 3 L  Educated the patient that his goal > 88%.  Pulmonary on board  Currently on 4 L  Wean oxygen as tolerated    Opioid dependence (HCC)  Assessment & Plan  Continue home MS Contin 15 mg every 12 hours  PDMP verified  Denies pain    Palliative care patient  Assessment & Plan  Patient refused to continue hospice care with [facility]  Possibly seeking new hospice care through [facility].  However patient thinking he may want to be treatment oriented again  Wants to be a DNR  Palliative care on board    Cachexia (HCC)  Assessment & Plan  Malnutrition Findings:   Adult Malnutrition type: Chronic illness  Adult Degree of Malnutrition: Other severe protein calorie malnutrition  Malnutrition Characteristics: Fat loss, Muscle loss, Inadequate energy, Weight loss                       BMI Findings:  Adult BMI Classifications: Underweight < 18.5        Body mass index is 14.78 kg/m².     Consult nutrition      Prostate cancer metastatic to bone (HCC)  Assessment & Plan  Stage 4 metastatic prostate cancer  Changed to hospice about 1 month ago.  Zytiga was discontinued at that time.  Patient has since stopped hospice.  Asking to be restarted on medications  Palliative care consulted.  Goals of care conversation    * COPD exacerbation (HCC)  Assessment & Plan  Presents due to shortness of breath.  Called EMS due to  "distress.  Oxygen machine \"broken\" at home per patient  Per patient had been using up to 6 L of O2 at home.  In the ER able to be weaned down to 2 L.   Wheezing and coughing noted  Does not appear bacterial. Hold off on azithromycin at this time.   Procal and lactic WNL   Afebrile   Home regimen: Breztri  Plan  Albuterol inhaler  Symbicort inhaler  Atrovent/Xopenex 3 times daily  Tessalon Perles  Solu-Medrol 40 mg every 12 hours.  Pulmonary follow up  On Zithromax  Pulmonary consult appreciated  I have personally counseled the patient on medication indication, compliance, utilization and side effects. Patient may be discharged home with albuterol             Labs & Imaging: I have personally reviewed pertinent reports.      VTE Prophylaxis: in place.    Code Status:   Level 3 - DNAR and DNI    Patient Centered Rounds: I have performed bedside rounds with nursing staff today.    Mobility:   Basic Mobility Inpatient Raw Score: 24  JH-HLM Goal: 8: Walk 250 feet or more  JH-HLM Achieved: 6: Walk 10 steps or more  JH-HLM Goal achieved. Continue to encourage appropriate mobility.    Discussions with Specialists or Other Care Team Provider: Pulmonary    Education and Discussions with Family / Patient: Declined Family update    Total Time Spent on Date of Encounter in care of patient: 35 mins. This time was spent on one or more of the following: performing physical exam; counseling and coordination of care; obtaining or reviewing history; documenting in the medical record; reviewing/ordering tests, medications or procedures; communicating with other healthcare professionals and discussing with patient's family/caregivers.    Current Length of Stay: 2 day(s)    Current Patient Status: Inpatient   Certification Statement: The patient will continue to require additional inpatient hospital stay due to see my assessment and plan.     Subjective:   Patient is seen and examined at bedside.  Has shortness of breath.  Afebrile  All " "other ROS are negative.    Objective:    Vitals: Blood pressure 122/75, pulse 64, temperature 98.1 °F (36.7 °C), temperature source Temporal, resp. rate 14, height 5' 7\" (1.702 m), weight 42.8 kg (94 lb 5.7 oz), SpO2 98%.,Body mass index is 14.78 kg/m².  SPO2 RA Rest      Flowsheet Row ED to Hosp-Admission (Current) from 5/21/2024 in [facility] Med Surg Unit   SpO2 98 %   SpO2 Activity At Rest   O2 Device Nasal cannula   O2 Flow Rate --          I&O:   Intake/Output Summary (Last 24 hours) at 5/24/2024 1026  Last data filed at 5/24/2024 0844  Gross per 24 hour   Intake 246 ml   Output --   Net 246 ml       Physical Exam:    General- Alert, lying comfortably in bed. Not in any acute distress.  Neck- Supple, No JVD  CVS- regular, S1 and S2 normal  Chest- Bilateral Air entry, has expiratory wheezing  Abdomen- soft, nontender, not distended, no guarding or rigidity, BS+  Extremities-  No pedal edema, No calf tenderness                         Normal ROM in all extremities.  CNS-   Alert, awake and orientedx3. No focal deficits present.    Invasive Devices       Peripheral Intravenous Line  Duration             Peripheral IV 05/21/24 Left Forearm 2 days                          Social History  reviewed  Family History   Problem Relation Age of Onset    Lung cancer Mother     Breast cancer Sister     Colon polyps Brother     Colon cancer Neg Hx     reviewed    Meds:  Current Facility-Administered Medications   Medication Dose Route Frequency Provider Last Rate Last Admin    acetaminophen (TYLENOL) tablet 650 mg  650 mg Oral Q6H PRN Adela Murguia PA-C   650 mg at 05/22/24 0349    albuterol (PROVENTIL HFA,VENTOLIN HFA) inhaler 1 puff  1 puff Inhalation Q4H PRN Adela Murguia PA-C   1 puff at 05/23/24 0436    albuterol inhalation solution 2.5 mg  2.5 mg Nebulization Q6H PRN Adela Murguia PA-C   2.5 mg at 05/22/24 0140    ALPRAZolam (XANAX) tablet 1 mg  1 mg Oral HS PRN Arleth Scales PA-C   1 mg at " 05/24/24 0008    And    ALPRAZolam (XANAX) tablet 0.5 mg  0.5 mg Oral BID PRN Arleth Scales PA-C        atorvastatin (LIPITOR) tablet 40 mg  40 mg Oral Daily With Dinner Adela Murguia PA-C   40 mg at 05/23/24 1801    benzonatate (TESSALON PERLES) capsule 100 mg  100 mg Oral TID PRN Adela Murguia PA-C   100 mg at 05/22/24 0140    budesonide (PULMICORT) inhalation solution 0.5 mg  0.5 mg Nebulization Q12H Efrain Dudley MD   0.5 mg at 05/24/24 0739    fluticasone (FLONASE) 50 mcg/act nasal spray 1 spray  1 spray Each Nare Daily Efrain Dudley MD   1 spray at 05/24/24 0841    gabapentin (NEURONTIN) capsule 300 mg  300 mg Oral BID Adlea Murguia PA-C   300 mg at 05/24/24 0842    guaiFENesin (MUCINEX) 12 hr tablet 600 mg  600 mg Oral Q12H PRN ISA Araujo        heparin (porcine) subcutaneous injection 5,000 Units  5,000 Units Subcutaneous Q8H [facility] Adela Murguia PA-C   5,000 Units at 05/24/24 0612    insulin lispro (HumALOG/ADMELOG) 100 units/mL subcutaneous injection 1-6 Units  1-6 Units Subcutaneous TID AC Brandon Cisneros MD        ipratropium (ATROVENT) 0.02 % inhalation solution 0.5 mg  0.5 mg Nebulization Q6H ISA Araujo   0.5 mg at 05/24/24 0739    levalbuterol (XOPENEX) inhalation solution 1.25 mg  1.25 mg Nebulization Q6H ISA Araujo   1.25 mg at 05/24/24 0739    methylPREDNISolone sodium succinate (Solu-MEDROL) injection 40 mg  40 mg Intravenous Q12H [facility] Efrain Dudley MD   40 mg at 05/24/24 0841    morphine (MS CONTIN) ER tablet 15 mg  15 mg Oral BID Adela Murguia PA-C   15 mg at 05/24/24 0842    pantoprazole (PROTONIX) EC tablet 40 mg  40 mg Oral Early Morning Brandon Cisneros MD   40 mg at 05/24/24 0613    sodium chloride 3 % inhalation solution 4 mL  4 mL Nebulization TID Efrain Dudley MD   4 mL at 05/24/24 0739        Medications Prior to Admission:     abiraterone (ZYTIGA) 250 mg tablet    albuterol (2.5 mg/3 mL) 0.083 % nebulizer solution    albuterol (PROVENTIL  HFA,VENTOLIN HFA) 90 mcg/act inhaler    ALPRAZolam (XANAX) 0.5 mg tablet    benzonatate (TESSALON PERLES) 100 mg capsule    Budeson-Glycopyrrol-Formoterol (Breztri Aerosphere) 160-9-4.8 MCG/ACT AERO    lidocaine (LIDODERM) 5 %    morphine (MS CONTIN) 15 mg 12 hr tablet    atorvastatin (LIPITOR) 40 mg tablet    [] azithromycin (ZITHROMAX) 250 mg tablet    azithromycin (ZITHROMAX) 500 MG tablet    cefpodoxime (VANTIN) 200 mg tablet    clonazePAM (KlonoPIN) 0.5 MG disintegrating tablet    doxycycline hyclate (VIBRAMYCIN) 100 mg capsule    gabapentin (NEURONTIN) 300 mg capsule    naloxone (NARCAN) 4 mg/0.1 mL nasal spray    NON FORMULARY    Labs:  Results from last 7 days   Lab Units 24   WBC Thousand/uL 5.21 5.46 5.30 6.83   HEMOGLOBIN g/dL 10.6* 10.7* 10.6* 11.3*   HEMATOCRIT % 35.5* 35.7* 34.4* 35.8*   PLATELETS Thousands/uL 342 321 287 287   SEGS PCT % 88* 91*  --  65   LYMPHO PCT % 8* 6* 13* 18   MONO PCT % 3* 2* 1* 10   EOS PCT % 0 0 0 6     Results from last 7 days   Lab Units 24  0403 05/22/24  0551 24   POTASSIUM mmol/L 4.2 4.1 4.0   < > 4.1  --    CHLORIDE mmol/L 103 101 98   < > 99  --    CO2 mmol/L 42* 38* 38*   < > 42*  --    CO2, I-STAT mmol/L  --   --   --   --   --  45*   BUN mg/dL 14 16 14   < > 14  --    CREATININE mg/dL 0.44* 0.45* 0.43*   < > 0.50*  --    CALCIUM mg/dL 8.4 8.6 8.5   < > 9.2  --    ALK PHOS U/L  --   --   --   --  40  --    ALT U/L  --   --   --   --  13  --    AST U/L  --   --   --   --  18  --    GLUCOSE, ISTAT mg/dl  --   --   --   --   --  130    < > = values in this interval not displayed.     Lab Results   Component Value Date    TROPONINI <0.01 2024    TROPONINI <0.02 2020    TROPONINI <0.02 2020    TROPONINI <0.02 2020     Results from last 7 days   Lab Units 24  2135   INR  0.94     Lab Results   Component Value Date     BLOODCX No Growth at 48 hrs. 05/21/2024    BLOODCX No Growth at 48 hrs. 05/21/2024    BLOODCX No Growth After 5 Days. 04/17/2024    BLOODCX No Growth After 5 Days. 04/17/2024    SPUTUMCULTUR Test not performed. Suggest repeat specimen. 04/18/2024    SPUTUMCULTUR 3+ Growth of Pseudomonas aeruginosa (A) 03/08/2024    SPUTUMCULTUR 3+ Growth of 02/19/2020    SPUTUMCULTUR  02/19/2020     Commensal respiratory yee only; No significant growth of Staph aureus/MRSA or Pseudomonas aeruginosa.         Imaging:  Results for orders placed during the hospital encounter of 05/21/24    XR chest portable    Narrative  XR CHEST PORTABLE    INDICATION: Acute Resp Failure.    COMPARISON: 4/17/2024    FINDINGS: Monitoring leads and clips project over the chest.    Clear lungs. Lungs remain hyperinflated indicative of emphysema. No pneumothorax or pleural effusion.    Normal cardiomediastinal silhouette.    Bones are unremarkable for age.    Normal upper abdomen.    Impression  No acute cardiopulmonary disease.        Workstation performed: [id number]    Results for orders placed during the hospital encounter of 10/24/23    XR chest pa & lateral    Narrative  CHEST    INDICATION:   sepsis.    COMPARISON:  None    EXAM PERFORMED/VIEWS:  XR CHEST PA & LATERAL The frontal view was performed utilizing dual energy radiographic technique.  Images: 7    FINDINGS:    Cardiomediastinal silhouette appears unremarkable.    The patient has severe emphysema. No pneumothorax or pleural effusion.    Extensive bony metastasis are present.    Impression  No acute cardiopulmonary disease.  Severe emphysema. Extensive bony metastasis.          Workstation performed: [id number]      Last 24 Hours Medication List:   Current Facility-Administered Medications   Medication Dose Route Frequency Provider Last Rate    acetaminophen  650 mg Oral Q6H PRN Adela Murguia PA-C      albuterol  1 puff Inhalation Q4H PRN Adela Murguia PA-C      albuterol  2.5 mg  Nebulization Q6H PRN Adela Murguia PA-C      ALPRAZolam  1 mg Oral HS PRN Arleth Scales PA-C      And    ALPRAZolam  0.5 mg Oral BID PRN Arleth Scales PA-C      atorvastatin  40 mg Oral Daily With Dinner Adela Murguia PA-C      benzonatate  100 mg Oral TID PRN Adela Murguia PA-C      budesonide  0.5 mg Nebulization Q12H Efrain Dudley MD      fluticasone  1 spray Each Nare Daily Efrain Dudley MD      gabapentin  300 mg Oral BID Adela Murguia PA-C      guaiFENesin  600 mg Oral Q12H PRN ISA Araujo      heparin (porcine)  5,000 Units Subcutaneous Q8H [facility] Adela Murguia PA-C      insulin lispro  1-6 Units Subcutaneous TID AC Brandon Cisneros MD      ipratropium  0.5 mg Nebulization Q6H ISA Araujo      levalbuterol  1.25 mg Nebulization Q6H ISA Araujo      methylPREDNISolone sodium succinate  40 mg Intravenous Q12H [facility] Efrain Dudley MD      morphine  15 mg Oral BID Adela Murguia PA-C      pantoprazole  40 mg Oral Early Morning Brandon Cisneros MD      sodium chloride  4 mL Nebulization TID Efrain Dudley MD          Today, Patient Was Seen By: Brandon Cisneros MD    ** Please Note: Dictation voice to text software may have been used in the creation of this document. **

## 2024-05-24 NOTE — PLAN OF CARE
Problem: Nutrition/Hydration-ADULT  Goal: Nutrient/Hydration intake appropriate for improving, restoring or maintaining nutritional needs  Description: Monitor and assess patient's nutrition/hydration status for malnutrition. Collaborate with interdisciplinary team and initiate plan and interventions as ordered.  Monitor patient's weight and dietary intake as ordered or per policy. Utilize nutrition screening tool and intervene as necessary. Determine patient's food preferences and provide high-protein, high-caloric foods as appropriate.     INTERVENTIONS:  - Monitor oral intake, urinary output, labs, and treatment plans  - Assess nutrition and hydration status and recommend course of action  - Evaluate amount of meals eaten  - Assist patient with eating if necessary   - Allow adequate time for meals  - Recommend/ encourage appropriate diets, oral nutritional supplements, and vitamin/mineral supplements  - Order, calculate, and assess calorie counts as needed  - Recommend, monitor, and adjust tube feedings and TPN/PPN based on assessed needs  - Assess need for intravenous fluids  - Provide specific nutrition/hydration education as appropriate  - Include patient/family/caregiver in decisions related to nutrition  Outcome: Progressing     Problem: PAIN - ADULT  Goal: Verbalizes/displays adequate comfort level or baseline comfort level  Description: Interventions:  - Encourage patient to monitor pain and request assistance  - Assess pain using appropriate pain scale  - Administer analgesics based on type and severity of pain and evaluate response  - Implement non-pharmacological measures as appropriate and evaluate response  - Consider cultural and social influences on pain and pain management  - Notify physician/advanced practitioner if interventions unsuccessful or patient reports new pain  Outcome: Progressing     Problem: INFECTION - ADULT  Goal: Absence or prevention of progression during  hospitalization  Description: INTERVENTIONS:  - Assess and monitor for signs and symptoms of infection  - Monitor lab/diagnostic results  - Monitor all insertion sites, i.e. indwelling lines, tubes, and drains  - Monitor endotracheal if appropriate and nasal secretions for changes in amount and color  - [place] appropriate cooling/warming therapies per order  - Administer medications as ordered  - Instruct and encourage patient and family to use good hand hygiene technique  - Identify and instruct in appropriate isolation precautions for identified infection/condition  Outcome: Progressing  Goal: Absence of fever/infection during neutropenic period  Description: INTERVENTIONS:  - Monitor WBC    Outcome: Progressing     Problem: SAFETY ADULT  Goal: Patient will remain free of falls  Description: INTERVENTIONS:  - Educate patient/family on patient safety including physical limitations  - Instruct patient to call for assistance with activity   - Consult OT/PT to assist with strengthening/mobility   - Keep Call bell within reach  - Keep bed low and locked with side rails adjusted as appropriate  - Keep care items and personal belongings within reach  - Initiate and maintain comfort rounds  - Make Fall Risk Sign visible to staff  - Offer Toileting every 2 Hours, in advance of need  - Initiate/Maintain alarm  - Obtain necessary fall risk management equipment:   - Apply yellow socks and bracelet for high fall risk patients  - Consider moving patient to room near nurses station  Outcome: Progressing  Goal: Maintain or return to baseline ADL function  Description: INTERVENTIONS:  -  Assess patient's ability to carry out ADLs; assess patient's baseline for ADL function and identify physical deficits which impact ability to perform ADLs (bathing, care of mouth/teeth, toileting, grooming, dressing, etc.)  - Assess/evaluate cause of self-care deficits   - Assess range of motion  - Assess patient's mobility; develop plan if  impaired  - Assess patient's need for assistive devices and provide as appropriate  - Encourage maximum independence but intervene and supervise when necessary  - Involve family in performance of ADLs  - Assess for home care needs following discharge   - Consider OT consult to assist with ADL evaluation and planning for discharge  - Provide patient education as appropriate  Outcome: Progressing  Goal: Maintains/Returns to pre admission functional level  Description: INTERVENTIONS:  - Perform AM-PAC 6 Click Basic Mobility/ Daily Activity assessment daily.  - Set and communicate daily mobility goal to care team and patient/family/caregiver.   - Collaborate with rehabilitation services on mobility goals if consulted  - Perform Range of Motion 3 times a day.  - Reposition patient every 2 hours.  - Dangle patient 3 times a day  - Stand patient 3 times a day  - Ambulate patient 3 times a day  - Out of bed to chair 3 times a day   - Out of bed for meals 3 times a day  - Out of bed for toileting  - Record patient progress and toleration of activity level   Outcome: Progressing     Problem: DISCHARGE PLANNING  Goal: Discharge to home or other facility with appropriate resources  Description: INTERVENTIONS:  - Identify barriers to discharge w/patient and caregiver  - Arrange for needed discharge resources and transportation as appropriate  - Identify discharge learning needs (meds, wound care, etc.)  - Arrange for interpretive services to assist at discharge as needed  - Refer to Case Management Department for coordinating discharge planning if the patient needs post-hospital services based on physician/advanced practitioner order or complex needs related to functional status, cognitive ability, or social support system  Outcome: Progressing     Problem: Knowledge Deficit  Goal: Patient/family/caregiver demonstrates understanding of disease process, treatment plan, medications, and discharge instructions  Description:  Complete learning assessment and assess knowledge base.  Interventions:  - Provide teaching at level of understanding  - Provide teaching via preferred learning methods  Outcome: Progressing

## 2024-05-24 NOTE — PROGRESS NOTES
"Progress Note - Pulmonary   Richard Nava 68 y.o. male MRN: [id number]  Unit/Bed#: -01 Encounter: [id number]    Assessment & Plan:    Acute on chronic hypoxic and hypercapnic respiratory failure  Severe emphysema/COPD with acute exacerbation  Metastatic prostate cancer  Severe protein calorie malnutrition     Patient is currently on 5 L oxygen saturating at 99%. I titrated him to 4L while in the room. Titrate oxygen to maintain saturations >89%.  He is maintained on 2 L at baseline  Home regimen: Breztri twice daily, albuterol as needed  Continue Pulmicort neb twice daily, Atrovent/Xopenex q6 hours  Continue azithromycin 500mg day 3/3, transition to azithromycin 250 mg daily for exacerbation prevention  Pulmonary hygiene: Hypertonic saline nebs, Mucinex prn, OOB as tolerated, cough and deep breathe, add flutter valve and incentive spirometry  Decrease IV Solu-Medrol 20 mg every 12 hours, given suspected steroid induced psychosis and sleep disturbance. Anticipate over weekend will transition to p.o. methylprednisolone taper, as he does not tolerate prednisone well. He would benefit from low dose chronic methylprednisolone to prevent further frequent exacerbations/hospitalizations  Pulmonary will continue to follow     Plan of care discussed with palliative care    Chief Complaint:   \"I feel awful\"    Subjective:   Patient resting in bed.  He states he is sleeping very poorly and having hallucinations.  He is still not able to clear mucus, and having intermittent dyspnea with rest.    Objective:     Vitals: Blood pressure 122/75, pulse 64, temperature 98.1 °F (36.7 °C), temperature source Temporal, resp. rate 14, height 5' 7\" (1.702 m), weight 42.8 kg (94 lb 5.7 oz), SpO2 98%.,Body mass index is 14.78 kg/m².      Intake/Output Summary (Last 24 hours) at 5/24/2024 1235  Last data filed at 5/24/2024 0844  Gross per 24 hour   Intake 246 ml   Output --   Net 246 ml       Invasive Devices       Peripheral " Intravenous Line  Duration             Peripheral IV 05/21/24 Left Forearm 2 days                    Physical Exam:   General appearance:   Alert and awake, in no acute distress. Thin, frail and ill appearing  Head:   Normocephalic, without obvious abnormality, atraumatic  Eyes:   No scleral icterus   Lungs:  Pulmonary effort non labored at rest  Breath sounds: Bilateral diffuse wheezes and rhonchi.  Cardiovascular:   Regular rate and rhythm. No murmurs. Capillary refill < 3 seconds.  Abdomen:    No appreciable distension or tenderness  Extremities:   No deformity. No clubbing present. No edema to extremities.   Skin:   Warm and dry. Intact. Color appropriate for ethnicity.  Neurologic:   No acute focal deficits are noted.  Psychiatric:   Normal mood. Answers questions appropriately.        Labs: I have personally reviewed pertinent lab results., CBC:   Lab Results   Component Value Date    WBC 5.21 05/24/2024    HGB 10.6 (L) 05/24/2024    HCT 35.5 (L) 05/24/2024     (H) 05/24/2024     05/24/2024    RBC 3.45 (L) 05/24/2024    MCH 30.7 05/24/2024    MCHC 29.9 (L) 05/24/2024    RDW 13.8 05/24/2024    MPV 8.6 (L) 05/24/2024    NRBC 0 05/24/2024   , CMP:   Lab Results   Component Value Date    SODIUM 143 05/24/2024    K 4.2 05/24/2024     05/24/2024    CO2 42 (H) 05/24/2024    BUN 14 05/24/2024    CREATININE 0.44 (L) 05/24/2024    CALCIUM 8.4 05/24/2024    EGFR 116 05/24/2024     Imaging and other studies: I have personally reviewed pertinent reports.      XR chest portable, 5/21/2024  No acute cardiopulmonary disease     Zuleyma Francisco, LIGIA RN FNP-BC  Nurse Practitioner  [facility]

## 2024-05-24 NOTE — PLAN OF CARE
Problem: Nutrition/Hydration-ADULT  Goal: Nutrient/Hydration intake appropriate for improving, restoring or maintaining nutritional needs  Description: Monitor and assess patient's nutrition/hydration status for malnutrition. Collaborate with interdisciplinary team and initiate plan and interventions as ordered.  Monitor patient's weight and dietary intake as ordered or per policy. Utilize nutrition screening tool and intervene as necessary. Determine patient's food preferences and provide high-protein, high-caloric foods as appropriate.     INTERVENTIONS:  - Monitor oral intake, urinary output, labs, and treatment plans  - Assess nutrition and hydration status and recommend course of action  - Evaluate amount of meals eaten  - Assist patient with eating if necessary   - Allow adequate time for meals  - Recommend/ encourage appropriate diets, oral nutritional supplements, and vitamin/mineral supplements  - Order, calculate, and assess calorie counts as needed  - Recommend, monitor, and adjust tube feedings and TPN/PPN based on assessed needs  - Assess need for intravenous fluids  - Provide specific nutrition/hydration education as appropriate  - Include patient/family/caregiver in decisions related to nutrition  Outcome: Progressing     Problem: PAIN - ADULT  Goal: Verbalizes/displays adequate comfort level or baseline comfort level  Description: Interventions:  - Encourage patient to monitor pain and request assistance  - Assess pain using appropriate pain scale  - Administer analgesics based on type and severity of pain and evaluate response  - Implement non-pharmacological measures as appropriate and evaluate response  - Consider cultural and social influences on pain and pain management  - Notify physician/advanced practitioner if interventions unsuccessful or patient reports new pain  Outcome: Progressing     Problem: INFECTION - ADULT  Goal: Absence or prevention of progression during  hospitalization  Description: INTERVENTIONS:  - Assess and monitor for signs and symptoms of infection  - Monitor lab/diagnostic results  - Monitor all insertion sites, i.e. indwelling lines, tubes, and drains  - Monitor endotracheal if appropriate and nasal secretions for changes in amount and color  - [place] appropriate cooling/warming therapies per order  - Administer medications as ordered  - Instruct and encourage patient and family to use good hand hygiene technique  - Identify and instruct in appropriate isolation precautions for identified infection/condition  Outcome: Progressing  Goal: Absence of fever/infection during neutropenic period  Description: INTERVENTIONS:  - Monitor WBC    Outcome: Progressing

## 2024-05-25 LAB
ANION GAP SERPL CALCULATED.3IONS-SCNC: 2 MMOL/L (ref 4–13)
BASOPHILS # BLD AUTO: 0.01 THOUSANDS/ÂΜL (ref 0–0.1)
BASOPHILS NFR BLD AUTO: 0 % (ref 0–1)
BUN SERPL-MCNC: 13 MG/DL (ref 5–25)
CALCIUM SERPL-MCNC: 8.2 MG/DL (ref 8.4–10.2)
CHLORIDE SERPL-SCNC: 102 MMOL/L (ref 96–108)
CO2 SERPL-SCNC: 38 MMOL/L (ref 21–32)
CREAT SERPL-MCNC: 0.32 MG/DL (ref 0.6–1.3)
EOSINOPHIL # BLD AUTO: 0 THOUSAND/ÂΜL (ref 0–0.61)
EOSINOPHIL NFR BLD AUTO: 0 % (ref 0–6)
ERYTHROCYTE [DISTWIDTH] IN BLOOD BY AUTOMATED COUNT: 13.6 % (ref 11.6–15.1)
GFR SERPL CREATININE-BSD FRML MDRD: 132 ML/MIN/1.73SQ M
GLUCOSE SERPL-MCNC: 115 MG/DL (ref 65–140)
GLUCOSE SERPL-MCNC: 156 MG/DL (ref 65–140)
GLUCOSE SERPL-MCNC: 157 MG/DL (ref 65–140)
GLUCOSE SERPL-MCNC: 171 MG/DL (ref 65–140)
GLUCOSE SERPL-MCNC: 213 MG/DL (ref 65–140)
HCT VFR BLD AUTO: 34 % (ref 36.5–49.3)
HGB BLD-MCNC: 10.6 G/DL (ref 12–17)
IMM GRANULOCYTES # BLD AUTO: 0.06 THOUSAND/UL (ref 0–0.2)
IMM GRANULOCYTES NFR BLD AUTO: 1 % (ref 0–2)
LYMPHOCYTES # BLD AUTO: 0.5 THOUSANDS/ÂΜL (ref 0.6–4.47)
LYMPHOCYTES NFR BLD AUTO: 9 % (ref 14–44)
MCH RBC QN AUTO: 31.5 PG (ref 26.8–34.3)
MCHC RBC AUTO-ENTMCNC: 31.2 G/DL (ref 31.4–37.4)
MCV RBC AUTO: 101 FL (ref 82–98)
MONOCYTES # BLD AUTO: 0.45 THOUSAND/ÂΜL (ref 0.17–1.22)
MONOCYTES NFR BLD AUTO: 8 % (ref 4–12)
NEUTROPHILS # BLD AUTO: 4.58 THOUSANDS/ÂΜL (ref 1.85–7.62)
NEUTS SEG NFR BLD AUTO: 82 % (ref 43–75)
NRBC BLD AUTO-RTO: 0 /100 WBCS
PLATELET # BLD AUTO: 329 THOUSANDS/UL (ref 149–390)
PMV BLD AUTO: 8.4 FL (ref 8.9–12.7)
POTASSIUM SERPL-SCNC: 4 MMOL/L (ref 3.5–5.3)
RBC # BLD AUTO: 3.37 MILLION/UL (ref 3.88–5.62)
SODIUM SERPL-SCNC: 142 MMOL/L (ref 135–147)
WBC # BLD AUTO: 5.6 THOUSAND/UL (ref 4.31–10.16)

## 2024-05-25 PROCEDURE — 82948 REAGENT STRIP/BLOOD GLUCOSE: CPT

## 2024-05-25 PROCEDURE — 80048 BASIC METABOLIC PNL TOTAL CA: CPT | Performed by: INTERNAL MEDICINE

## 2024-05-25 PROCEDURE — 99232 SBSQ HOSP IP/OBS MODERATE 35: CPT | Performed by: INTERNAL MEDICINE

## 2024-05-25 PROCEDURE — 94640 AIRWAY INHALATION TREATMENT: CPT

## 2024-05-25 PROCEDURE — 85025 COMPLETE CBC W/AUTO DIFF WBC: CPT | Performed by: INTERNAL MEDICINE

## 2024-05-25 PROCEDURE — 94760 N-INVAS EAR/PLS OXIMETRY 1: CPT

## 2024-05-25 RX ORDER — LEVALBUTEROL INHALATION SOLUTION 1.25 MG/3ML
1.25 SOLUTION RESPIRATORY (INHALATION)
Status: DISCONTINUED | OUTPATIENT
Start: 2024-05-25 | End: 2024-05-29 | Stop reason: HOSPADM

## 2024-05-25 RX ORDER — LANOLIN ALCOHOL/MO/W.PET/CERES
6 CREAM (GRAM) TOPICAL
Status: DISCONTINUED | OUTPATIENT
Start: 2024-05-25 | End: 2024-05-26

## 2024-05-25 RX ADMIN — LEVALBUTEROL HYDROCHLORIDE 1.25 MG: 1.25 SOLUTION RESPIRATORY (INHALATION) at 07:52

## 2024-05-25 RX ADMIN — LEVALBUTEROL HYDROCHLORIDE 1.25 MG: 1.25 SOLUTION RESPIRATORY (INHALATION) at 01:42

## 2024-05-25 RX ADMIN — SODIUM CHLORIDE SOLN NEBU 3% 4 ML: 3 NEBU SOLN at 07:52

## 2024-05-25 RX ADMIN — IPRATROPIUM BROMIDE 0.5 MG: 0.5 SOLUTION RESPIRATORY (INHALATION) at 14:15

## 2024-05-25 RX ADMIN — GABAPENTIN 300 MG: 300 CAPSULE ORAL at 09:23

## 2024-05-25 RX ADMIN — ALPRAZOLAM 1 MG: 0.5 TABLET ORAL at 21:33

## 2024-05-25 RX ADMIN — LEVALBUTEROL HYDROCHLORIDE 1.25 MG: 1.25 SOLUTION RESPIRATORY (INHALATION) at 14:15

## 2024-05-25 RX ADMIN — Medication 6 MG: at 21:29

## 2024-05-25 RX ADMIN — METHYLPREDNISOLONE SODIUM SUCCINATE 20 MG: 40 INJECTION, POWDER, FOR SOLUTION INTRAMUSCULAR; INTRAVENOUS at 09:22

## 2024-05-25 RX ADMIN — HEPARIN SODIUM 5000 UNITS: 5000 INJECTION INTRAVENOUS; SUBCUTANEOUS at 14:27

## 2024-05-25 RX ADMIN — SODIUM CHLORIDE SOLN NEBU 3% 4 ML: 3 NEBU SOLN at 14:15

## 2024-05-25 RX ADMIN — MORPHINE SULFATE 15 MG: 15 TABLET, EXTENDED RELEASE ORAL at 17:36

## 2024-05-25 RX ADMIN — INSULIN LISPRO 1 UNITS: 100 INJECTION, SOLUTION INTRAVENOUS; SUBCUTANEOUS at 17:38

## 2024-05-25 RX ADMIN — BUDESONIDE 0.5 MG: 0.5 INHALANT RESPIRATORY (INHALATION) at 07:52

## 2024-05-25 RX ADMIN — IPRATROPIUM BROMIDE 0.5 MG: 0.5 SOLUTION RESPIRATORY (INHALATION) at 01:42

## 2024-05-25 RX ADMIN — PANTOPRAZOLE SODIUM 40 MG: 40 TABLET, DELAYED RELEASE ORAL at 05:44

## 2024-05-25 RX ADMIN — METHYLPREDNISOLONE SODIUM SUCCINATE 20 MG: 40 INJECTION, POWDER, FOR SOLUTION INTRAMUSCULAR; INTRAVENOUS at 21:29

## 2024-05-25 RX ADMIN — HEPARIN SODIUM 5000 UNITS: 5000 INJECTION INTRAVENOUS; SUBCUTANEOUS at 05:44

## 2024-05-25 RX ADMIN — ATORVASTATIN CALCIUM 40 MG: 40 TABLET, FILM COATED ORAL at 17:40

## 2024-05-25 RX ADMIN — ALPRAZOLAM 1 MG: 0.5 TABLET ORAL at 01:24

## 2024-05-25 RX ADMIN — MORPHINE SULFATE 15 MG: 15 TABLET, EXTENDED RELEASE ORAL at 09:23

## 2024-05-25 RX ADMIN — IPRATROPIUM BROMIDE 0.5 MG: 0.5 SOLUTION RESPIRATORY (INHALATION) at 07:52

## 2024-05-25 RX ADMIN — GABAPENTIN 300 MG: 300 CAPSULE ORAL at 17:36

## 2024-05-25 NOTE — PLAN OF CARE
Problem: Nutrition/Hydration-ADULT  Goal: Nutrient/Hydration intake appropriate for improving, restoring or maintaining nutritional needs  Description: Monitor and assess patient's nutrition/hydration status for malnutrition. Collaborate with interdisciplinary team and initiate plan and interventions as ordered.  Monitor patient's weight and dietary intake as ordered or per policy. Utilize nutrition screening tool and intervene as necessary. Determine patient's food preferences and provide high-protein, high-caloric foods as appropriate.     INTERVENTIONS:  - Monitor oral intake, urinary output, labs, and treatment plans  - Assess nutrition and hydration status and recommend course of action  - Evaluate amount of meals eaten  - Assist patient with eating if necessary   - Allow adequate time for meals  - Recommend/ encourage appropriate diets, oral nutritional supplements, and vitamin/mineral supplements  - Order, calculate, and assess calorie counts as needed  - Recommend, monitor, and adjust tube feedings and TPN/PPN based on assessed needs  - Assess need for intravenous fluids  - Provide specific nutrition/hydration education as appropriate  - Include patient/family/caregiver in decisions related to nutrition  Outcome: Progressing     Problem: PAIN - ADULT  Goal: Verbalizes/displays adequate comfort level or baseline comfort level  Description: Interventions:  - Encourage patient to monitor pain and request assistance  - Assess pain using appropriate pain scale  - Administer analgesics based on type and severity of pain and evaluate response  - Implement non-pharmacological measures as appropriate and evaluate response  - Consider cultural and social influences on pain and pain management  - Notify physician/advanced practitioner if interventions unsuccessful or patient reports new pain  Outcome: Progressing     Problem: INFECTION - ADULT  Goal: Absence or prevention of progression during  hospitalization  Description: INTERVENTIONS:  - Assess and monitor for signs and symptoms of infection  - Monitor lab/diagnostic results  - Monitor all insertion sites, i.e. indwelling lines, tubes, and drains  - Monitor endotracheal if appropriate and nasal secretions for changes in amount and color  - [place] appropriate cooling/warming therapies per order  - Administer medications as ordered  - Instruct and encourage patient and family to use good hand hygiene technique  - Identify and instruct in appropriate isolation precautions for identified infection/condition  Outcome: Progressing  Goal: Absence of fever/infection during neutropenic period  Description: INTERVENTIONS:  - Monitor WBC    Outcome: Progressing     Problem: SAFETY ADULT  Goal: Patient will remain free of falls  Description: INTERVENTIONS:  - Educate patient/family on patient safety including physical limitations  - Instruct patient to call for assistance with activity   - Consult OT/PT to assist with strengthening/mobility   - Keep Call bell within reach  - Keep bed low and locked with side rails adjusted as appropriate  - Keep care items and personal belongings within reach  - Initiate and maintain comfort rounds  - Make Fall Risk Sign visible to staff  - Offer Toileting every 2 Hours, in advance of need  - Initiate/Maintain bed alarm  - Obtain necessary fall risk management equipment: non skid fottwear  - Apply yellow socks and bracelet for high fall risk patients  - Consider moving patient to room near nurses station  Outcome: Progressing  Goal: Maintain or return to baseline ADL function  Description: INTERVENTIONS:  -  Assess patient's ability to carry out ADLs; assess patient's baseline for ADL function and identify physical deficits which impact ability to perform ADLs (bathing, care of mouth/teeth, toileting, grooming, dressing, etc.)  - Assess/evaluate cause of self-care deficits   - Assess range of motion  - Assess patient's  mobility; develop plan if impaired  - Assess patient's need for assistive devices and provide as appropriate  - Encourage maximum independence but intervene and supervise when necessary  - Involve family in performance of ADLs  - Assess for home care needs following discharge   - Consider OT consult to assist with ADL evaluation and planning for discharge  - Provide patient education as appropriate  Outcome: Progressing  Goal: Maintains/Returns to pre admission functional level  Description: INTERVENTIONS:  - Perform AM-PAC 6 Click Basic Mobility/ Daily Activity assessment daily.  - Set and communicate daily mobility goal to care team and patient/family/caregiver.   - Collaborate with rehabilitation services on mobility goals if consulted  - Perform Range of Motion 2 times a day.  - Reposition patient every 2 hours.  - Dangle patient 2 times a day  - Stand patient 2 times a day  - Ambulate patient 2 times a day  - Out of bed to chair 2 times a day   - Out of bed for meals 2 times a day  - Out of bed for toileting  - Record patient progress and toleration of activity level   Outcome: Progressing     Problem: DISCHARGE PLANNING  Goal: Discharge to home or other facility with appropriate resources  Description: INTERVENTIONS:  - Identify barriers to discharge w/patient and caregiver  - Arrange for needed discharge resources and transportation as appropriate  - Identify discharge learning needs (meds, wound care, etc.)  - Arrange for interpretive services to assist at discharge as needed  - Refer to Case Management Department for coordinating discharge planning if the patient needs post-hospital services based on physician/advanced practitioner order or complex needs related to functional status, cognitive ability, or social support system  Outcome: Progressing     Problem: DISCHARGE PLANNING  Goal: Discharge to home or other facility with appropriate resources  Description: INTERVENTIONS:  - Identify barriers to  discharge w/patient and caregiver  - Arrange for needed discharge resources and transportation as appropriate  - Identify discharge learning needs (meds, wound care, etc.)  - Arrange for interpretive services to assist at discharge as needed  - Refer to Case Management Department for coordinating discharge planning if the patient needs post-hospital services based on physician/advanced practitioner order or complex needs related to functional status, cognitive ability, or social support system  Outcome: Progressing     Problem: Knowledge Deficit  Goal: Patient/family/caregiver demonstrates understanding of disease process, treatment plan, medications, and discharge instructions  Description: Complete learning assessment and assess knowledge base.  Interventions:  - Provide teaching at level of understanding  - Provide teaching via preferred learning methods  Outcome: Progressing

## 2024-05-25 NOTE — PROGRESS NOTES
[facility]  Progress Note  Name: Richard Nava I  MRN: [id number]  Unit/Bed#: -01 I Date of Admission: 5/21/2024   Date of Service: 5/25/2024 I Hospital Day: 3    Assessment & Plan   Underweight  Assessment & Plan  Underweight POA due to chronic COPD with BMI of 14.78, cachexia, muscle wasting and poor appetite  Nutrition consult and recommendations    Acute on chronic respiratory failure with hypoxia (HCC)  Assessment & Plan  Patient admitted with acute on chronic respiratory failure with hypoxia and requiring 6 L of supplemental oxygen  Per prior hospitalization was on 2 to 3 L  Educated the patient that his goal > 88%.  Pulmonary on board  Currently on 4 L  Wean oxygen as tolerated    Opioid dependence (HCC)  Assessment & Plan  Continue home MS Contin 15 mg every 12 hours  PDMP verified  Denies pain    Palliative care patient  Assessment & Plan  Patient refused to continue hospice care with [facility]  Possibly seeking new hosp[facility] care [facility].  However patient thinking he may want to be treatment oriented again  Wants to be a DNR  Palliative care on board    Cachexia (HCC)  Assessment & Plan  Malnutrition Findings:   Adult Malnutrition type: Chronic illness  Adult Degree of Malnutrition: Other severe protein calorie malnutrition  Malnutrition Characteristics: Fat loss, Muscle loss, Inadequate energy, Weight loss                       BMI Findings:  Adult BMI Classifications: Underweight < 18.5        Body mass index is 14.78 kg/m².     Consult nutrition      Prostate cancer metastatic to bone (HCC)  Assessment & Plan  Stage 4 metastatic prostate cancer  Changed to hospice about 1 month ago.  Zytiga was discontinued at that time.  Patient has since stopped hospice.  Asking to be restarted on medications  Palliative care consulted.  Goals of care conversation    Severe protein-calorie malnutrition (HCC)  Assessment & Plan  Malnutrition Findings:   Adult  "Malnutrition type: Chronic illness  Adult Degree of Malnutrition: Other severe protein calorie malnutrition  Malnutrition Characteristics: Fat loss, Muscle loss, Inadequate energy, Weight loss                       BMI Findings:  Adult BMI Classifications: Underweight < 18.5        Body mass index is 14.78 kg/m².   Nutrition consult    * COPD exacerbation (HCC)  Assessment & Plan  Presents due to shortness of breath.  Called EMS due to distress.  Oxygen machine \"broken\" at home per patient  Wheezing and coughing noted  Does not appear bacterial. Hold off on azithromycin at this time.   Procal and lactic WNL   Afebrile   Home regimen: Breztri  Plan  Albuterol inhaler  Symbicort inhaler  Atrovent/Xopenex 3 times daily  Tessalon Perles  Solu-Medrol tapered to 20 mg every 12 hours.  Pulmonary follow up  On Zithromax  Pulmonary consult appreciated  I have personally counseled the patient on medication indication, compliance, utilization and side effects. Patient may be discharged home with albuterol             Labs & Imaging: I have personally reviewed pertinent reports.      VTE Prophylaxis: in place.    Code Status:   Level 3 - DNAR and DNI    Patient Centered Rounds: I have performed bedside rounds with nursing staff today.    Mobility:   Basic Mobility Inpatient Raw Score: 24  JH-HLM Goal: 8: Walk 250 feet or more  JH-HLM Achieved: 7: Walk 25 feet or more  JH-HLM Goal achieved. Continue to encourage appropriate mobility.    Discussions with Specialists or Other Care Team Provider: Pulmonary    Education and Discussions with Family / Patient: Patient refused family update    Total Time Spent on Date of Encounter in care of patient: 35 mins. This time was spent on one or more of the following: performing physical exam; counseling and coordination of care; obtaining or reviewing history; documenting in the medical record; reviewing/ordering tests, medications or procedures; communicating with other healthcare " "professionals and discussing with patient's family/caregivers.    Current Length of Stay: 3 day(s)    Current Patient Status: Inpatient   Certification Statement: The patient will continue to require additional inpatient hospital stay due to see my assessment and plan.     Subjective:   Patient is seen and examined at bedside.  Still states he feels short of breath and has wheezing.  Afebrile  All other ROS are negative.    Objective:    Vitals: Blood pressure 121/83, pulse 64, temperature 98.6 °F (37 °C), resp. rate 14, height 5' 7\" (1.702 m), weight 42.8 kg (94 lb 5.7 oz), SpO2 97%.,Body mass index is 14.78 kg/m².  SPO2 RA Rest      Flowsheet Row ED to Hosp-Admission (Current) from 5/21/2024 in [facility] Med Surg Unit   SpO2 97 %   SpO2 Activity At Rest   O2 Device Nasal cannula   O2 Flow Rate --          I&O:   Intake/Output Summary (Last 24 hours) at 5/25/2024 1032  Last data filed at 5/25/2024 0924  Gross per 24 hour   Intake 340 ml   Output 200 ml   Net 140 ml       Physical Exam:    General- Alert, lying comfortably in bed. Not in any acute distress.  Neck- Supple, No JVD  CVS- regular, S1 and S2 normal  Chest- Bilateral Air entry, has some expiratory wheezing  Abdomen- soft, nontender, not distended, no guarding or rigidity, BS+  Extremities-  No pedal edema, No calf tenderness                         Normal ROM in all extremities.  CNS-   Alert, awake and orientedx3. No focal deficits present.    Invasive Devices       Peripheral Intravenous Line  Duration             Peripheral IV 05/21/24 Left Forearm 3 days                          Social History  reviewed  Family History   Problem Relation Age of Onset    Lung cancer Mother     Breast cancer Sister     Colon polyps Brother     Colon cancer Neg Hx     reviewed    Meds:  Current Facility-Administered Medications   Medication Dose Route Frequency Provider Last Rate Last Admin    acetaminophen (TYLENOL) tablet 650 mg  650 mg Oral Q6H PRN " Adela Murguia PA-C   650 mg at 05/22/24 0349    albuterol (PROVENTIL HFA,VENTOLIN HFA) inhaler 1 puff  1 puff Inhalation Q4H PRN Adela Murguia PA-C   1 puff at 05/23/24 0436    albuterol inhalation solution 2.5 mg  2.5 mg Nebulization Q6H PRN Adela Murguia PA-C   2.5 mg at 05/22/24 0140    ALPRAZolam (XANAX) tablet 1 mg  1 mg Oral HS PRN Arleth Scales PA-C   1 mg at 05/25/24 0124    And    ALPRAZolam (XANAX) tablet 0.5 mg  0.5 mg Oral BID PRN Arleth Scales PA-C        atorvastatin (LIPITOR) tablet 40 mg  40 mg Oral Daily With Dinner Adela Murguia PA-C   40 mg at 05/24/24 1701    benzonatate (TESSALON PERLES) capsule 100 mg  100 mg Oral TID PRN Adela Murguia PA-C   100 mg at 05/22/24 0140    budesonide (PULMICORT) inhalation solution 0.5 mg  0.5 mg Nebulization Q12H Efrain Dudley MD   0.5 mg at 05/25/24 0752    fluticasone (FLONASE) 50 mcg/act nasal spray 1 spray  1 spray Each Nare Daily Efrain Dudley MD   1 spray at 05/24/24 0841    gabapentin (NEURONTIN) capsule 300 mg  300 mg Oral BID Adela Murguia PA-C   300 mg at 05/25/24 0923    guaiFENesin (MUCINEX) 12 hr tablet 600 mg  600 mg Oral Q12H PRN ISA Araujo        heparin (porcine) subcutaneous injection 5,000 Units  5,000 Units Subcutaneous Q8H [facility] Adela Murguia PA-C   5,000 Units at 05/25/24 0544    Hydrocod Jose-Chlorphe Jose ER (TUSSIONEX) ER suspension 5 mL  5 mL Oral Q12H PRN Arleth Scales PA-C        insulin lispro (HumALOG/ADMELOG) 100 units/mL subcutaneous injection 1-6 Units  1-6 Units Subcutaneous TID AC Brandon Cisneros MD        ipratropium (ATROVENT) 0.02 % inhalation solution 0.5 mg  0.5 mg Nebulization Q6H Zuleyma Francisco CRNP   0.5 mg at 05/25/24 0752    levalbuterol (XOPENEX) inhalation solution 1.25 mg  1.25 mg Nebulization Q6H Zuleyma Francisco, CRNP   1.25 mg at 05/25/24 0752    methylPREDNISolone sodium succinate (Solu-MEDROL) injection 20 mg  20 mg Intravenous Q12H [facility] BRAULIO AraujoNP   20 mg  at 24 0922    morphine (MS CONTIN) ER tablet 15 mg  15 mg Oral BID Adela Murguia PA-C   15 mg at 24 0923    pantoprazole (PROTONIX) EC tablet 40 mg  40 mg Oral Early Morning Brandon Cisneros MD   40 mg at 24 0544    sodium chloride 3 % inhalation solution 4 mL  4 mL Nebulization TID Efrain Dudley MD   4 mL at 24 0752        Medications Prior to Admission:     abiraterone (ZYTIGA) 250 mg tablet    albuterol (2.5 mg/3 mL) 0.083 % nebulizer solution    albuterol (PROVENTIL HFA,VENTOLIN HFA) 90 mcg/act inhaler    ALPRAZolam (XANAX) 0.5 mg tablet    benzonatate (TESSALON PERLES) 100 mg capsule    Budeson-Glycopyrrol-Formoterol (Breztri Aerosphere) 160-9-4.8 MCG/ACT AERO    lidocaine (LIDODERM) 5 %    morphine (MS CONTIN) 15 mg 12 hr tablet    atorvastatin (LIPITOR) 40 mg tablet    [] azithromycin (ZITHROMAX) 250 mg tablet    azithromycin (ZITHROMAX) 500 MG tablet    cefpodoxime (VANTIN) 200 mg tablet    clonazePAM (KlonoPIN) 0.5 MG disintegrating tablet    doxycycline hyclate (VIBRAMYCIN) 100 mg capsule    gabapentin (NEURONTIN) 300 mg capsule    naloxone (NARCAN) 4 mg/0.1 mL nasal spray    NON FORMULARY    Labs:  Results from last 7 days   Lab Units 24  0543 24  0343 24  0403   WBC Thousand/uL 5.60 5.21 5.46   HEMOGLOBIN g/dL 10.6* 10.6* 10.7*   HEMATOCRIT % 34.0* 35.5* 35.7*   PLATELETS Thousands/uL 329 342 321   SEGS PCT % 82* 88* 91*   LYMPHO PCT % 9* 8* 6*   MONO PCT % 8 3* 2*   EOS PCT % 0 0 0     Results from last 7 days   Lab Units 24  0543 24  0343 24  0403 24  0551 24  2135 24  2134   POTASSIUM mmol/L 4.0 4.2 4.1   < > 4.1  --    CHLORIDE mmol/L 102 103 101   < > 99  --    CO2 mmol/L 38* 42* 38*   < > 42*  --    CO2, I-STAT mmol/L  --   --   --   --   --  45*   BUN mg/dL 13 14 16   < > 14  --    CREATININE mg/dL 0.32* 0.44* 0.45*   < > 0.50*  --    CALCIUM mg/dL 8.2* 8.4 8.6   < > 9.2  --    ALK PHOS U/L  --   --   --   --  40   --    ALT U/L  --   --   --   --  13  --    AST U/L  --   --   --   --  18  --    GLUCOSE, ISTAT mg/dl  --   --   --   --   --  130    < > = values in this interval not displayed.     Lab Results   Component Value Date    TROPONINI <0.01 04/30/2024    TROPONINI <0.02 02/18/2020    TROPONINI <0.02 02/18/2020    TROPONINI <0.02 02/18/2020     Results from last 7 days   Lab Units 05/21/24  2135   INR  0.94     Lab Results   Component Value Date    BLOODCX No Growth at 72 hrs. 05/21/2024    BLOODCX No Growth at 72 hrs. 05/21/2024    BLOODCX No Growth After 5 Days. 04/17/2024    BLOODCX No Growth After 5 Days. 04/17/2024    SPUTUMCULTUR Test not performed. Suggest repeat specimen. 04/18/2024    SPUTUMCULTUR 3+ Growth of Pseudomonas aeruginosa (A) 03/08/2024    SPUTUMCULTUR 3+ Growth of 02/19/2020    SPUTUMCULTUR  02/19/2020     Commensal respiratory yee only; No significant growth of Staph aureus/MRSA or Pseudomonas aeruginosa.         Imaging:  Results for orders placed during the hospital encounter of 05/21/24    XR chest portable    Narrative  XR CHEST PORTABLE    INDICATION: Acute Resp Failure.    COMPARISON: 4/17/2024    FINDINGS: Monitoring leads and clips project over the chest.    Clear lungs. Lungs remain hyperinflated indicative of emphysema. No pneumothorax or pleural effusion.    Normal cardiomediastinal silhouette.    Bones are unremarkable for age.    Normal upper abdomen.    Impression  No acute cardiopulmonary disease.        Workstation performed: [id number]    Results for orders placed during the hospital encounter of 10/24/23    XR chest pa & lateral    Narrative  CHEST    INDICATION:   sepsis.    COMPARISON:  None    EXAM PERFORMED/VIEWS:  XR CHEST PA & LATERAL The frontal view was performed utilizing dual energy radiographic technique.  Images: 7    FINDINGS:    Cardiomediastinal silhouette appears unremarkable.    The patient has severe emphysema. No pneumothorax or pleural  effusion.    Extensive bony metastasis are present.    Impression  No acute cardiopulmonary disease.  Severe emphysema. Extensive bony metastasis.          Workstation performed: [id number]      Last 24 Hours Medication List:   Current Facility-Administered Medications   Medication Dose Route Frequency Provider Last Rate    acetaminophen  650 mg Oral Q6H PRN Adela Murguia PA-C      albuterol  1 puff Inhalation Q4H PRN Adela Murguia PA-C      albuterol  2.5 mg Nebulization Q6H PRN Adela Murguia PA-C      ALPRAZolam  1 mg Oral HS PRN Arleth Scales PA-C      And    ALPRAZolam  0.5 mg Oral BID PRN Arleth Scales PA-C      atorvastatin  40 mg Oral Daily With Dinner Adela Murguia PA-C      benzonatate  100 mg Oral TID PRN Adela Murguia PA-C      budesonide  0.5 mg Nebulization Q12H Efrain Dudley MD      fluticasone  1 spray Each Nare Daily Efrain Dudley MD      gabapentin  300 mg Oral BID Adela Murguia PA-C      guaiFENesin  600 mg Oral Q12H PRN ISA Araujo      heparin (porcine)  5,000 Units Subcutaneous Q8H [facility] Adela Murguia PA-C      Hydrocod Jose-Chlorphe Jose ER  5 mL Oral Q12H PRN Arleth Scales PA-C      insulin lispro  1-6 Units Subcutaneous TID AC Brandon Cisneros MD      ipratropium  0.5 mg Nebulization Q6H ISA Araujo      levalbuterol  1.25 mg Nebulization Q6H ISA Araujo      methylPREDNISolone sodium succinate  20 mg Intravenous Q12H [facility] ISA Araujo      morphine  15 mg Oral BID Adela Murguia PA-C      pantoprazole  40 mg Oral Early Morning Brandon Cisneros MD      sodium chloride  4 mL Nebulization TID Efrain Dudley MD          Today, Patient Was Seen By: Brandon Cisneros MD    ** Please Note: Dictation voice to text software may have been used in the creation of this document. **

## 2024-05-25 NOTE — ASSESSMENT & PLAN NOTE
Malnutrition Findings:   Adult Malnutrition type: Chronic illness  Adult Degree of Malnutrition: Other severe protein calorie malnutrition  Malnutrition Characteristics: Fat loss, Muscle loss, Inadequate energy, Weight loss                       BMI Findings:  Adult BMI Classifications: Underweight < 18.5        Body mass index is 14.78 kg/m².   Nutrition consult

## 2024-05-25 NOTE — QUICK NOTE
"Patient refuses bed alarm and states \"turn it off I do not want to hear it\". Patient AAOx4. Educated on importance of bed alarm and safety. Pt refusing. TABS continued as well as non slip socks.  "

## 2024-05-25 NOTE — PROGRESS NOTES
"Progress Note - Pulmonary   Richard Nava 68 y.o. male MRN: [id number]  Unit/Bed#: -01 Encounter: [id number]      Assessment:   Acute on chronic hypoxic and hypercapnic respiratory failure   Severe COPD with acute exacerbation  Metastatic prostate cancer  Severe protein calorie malnutrition  Insomnia and anxiety    Plan:   Continue Xopenex and Atrovent TID  Continue Pulmicort BID  Continue methylprednisolone q12.  Will need slow taper and can not use prednisone due to previous reaction  Complete 3 days of Zithromax   He feels that he needs some help with sleep.  He is using Xanax 1mg qHS  We discussed his previous attempts with multiple medications for insomnia including trazodone, melatonin, clonazepam.  Could consider low dose Doxepin to see if there is any benefit.    Consider nasal steroid and antihistamine to help with upper airway congestion.      Subjective:   Mr. Nava states that he does not feel much better.  He still has a lot of congestion.      Objective:   Vitals: Blood pressure 121/83, pulse 64, temperature 98.6 °F (37 °C), resp. rate 14, height 5' 7\" (1.702 m), weight 42.8 kg (94 lb 5.7 oz), SpO2 97%., 4L NC, Body mass index is 14.78 kg/m².      Intake/Output Summary (Last 24 hours) at 5/25/2024 1435  Last data filed at 5/25/2024 0924  Gross per 24 hour   Intake 340 ml   Output 200 ml   Net 140 ml       Physical Exam  Gen: Awake, alert, oriented x 3, no acute distress  HEENT: Mucous membranes moist, no oral lesions, no thrush  NECK: No accessory muscle use, JVP not elevated  Cardiac: Regular, single S1, single S2, no murmurs, no rubs, no gallops  Lungs: Decreased breath sounds, scattered rhonchi or rales.    Abdomen: normoactive bowel sounds, soft nontender, nondistended, no rebound or rigidity, no guarding  Extremities: no cyanosis, no clubbing, no edema    Labs: I have personally reviewed pertinent lab results.  Results from last 7 days   Lab Units 05/25/24  0543 05/24/24  0343 " 05/23/24  0403   WBC Thousand/uL 5.60 5.21 5.46   HEMOGLOBIN g/dL 10.6* 10.6* 10.7*   HEMATOCRIT % 34.0* 35.5* 35.7*   PLATELETS Thousands/uL 329 342 321   SEGS PCT % 82* 88* 91*   MONO PCT % 8 3* 2*   EOS PCT % 0 0 0      Results from last 7 days   Lab Units 05/25/24  0543 05/24/24  0343 05/23/24  0403 05/22/24  0551 05/21/24 2135 05/21/24 2134   POTASSIUM mmol/L 4.0 4.2 4.1   < > 4.1  --    CHLORIDE mmol/L 102 103 101   < > 99  --    CO2 mmol/L 38* 42* 38*   < > 42*  --    CO2, I-STAT mmol/L  --   --   --   --   --  45*   BUN mg/dL 13 14 16   < > 14  --    CREATININE mg/dL 0.32* 0.44* 0.45*   < > 0.50*  --    CALCIUM mg/dL 8.2* 8.4 8.6   < > 9.2  --    ALK PHOS U/L  --   --   --   --  40  --    ALT U/L  --   --   --   --  13  --    AST U/L  --   --   --   --  18  --    GLUCOSE, ISTAT mg/dl  --   --   --   --   --  130    < > = values in this interval not displayed.     Results from last 7 days   Lab Units 05/24/24  0343 05/22/24  0551   MAGNESIUM mg/dL 1.9 1.9          Results from last 7 days   Lab Units 05/21/24  2135   INR  0.94   PTT seconds 29     Results from last 7 days   Lab Units 05/21/24  2146   LACTIC ACID mmol/L 0.8     0   Lab Value Date/Time    TROPONINI <0.01 04/30/2024 1523    TROPONINI <0.02 02/18/2020 1840    TROPONINI <0.02 02/18/2020 1550    TROPONINI <0.02 02/18/2020 0855         Meds/Allergies   Current Facility-Administered Medications   Medication Dose Route Frequency    acetaminophen (TYLENOL) tablet 650 mg  650 mg Oral Q6H PRN    albuterol (PROVENTIL HFA,VENTOLIN HFA) inhaler 1 puff  1 puff Inhalation Q4H PRN    albuterol inhalation solution 2.5 mg  2.5 mg Nebulization Q6H PRN    ALPRAZolam (XANAX) tablet 1 mg  1 mg Oral HS PRN    And    ALPRAZolam (XANAX) tablet 0.5 mg  0.5 mg Oral BID PRN    atorvastatin (LIPITOR) tablet 40 mg  40 mg Oral Daily With Dinner    benzonatate (TESSALON PERLES) capsule 100 mg  100 mg Oral TID PRN    budesonide (PULMICORT) inhalation solution 0.5 mg  0.5 mg  Nebulization Q12H    fluticasone (FLONASE) 50 mcg/act nasal spray 1 spray  1 spray Each Nare Daily    gabapentin (NEURONTIN) capsule 300 mg  300 mg Oral BID    guaiFENesin (MUCINEX) 12 hr tablet 600 mg  600 mg Oral Q12H PRN    heparin (porcine) subcutaneous injection 5,000 Units  5,000 Units Subcutaneous Q8H FANNY    Hydrocod Jose-Chlorphe Jose ER (TUSSIONEX) ER suspension 5 mL  5 mL Oral Q12H PRN    insulin lispro (HumALOG/ADMELOG) 100 units/mL subcutaneous injection 1-6 Units  1-6 Units Subcutaneous TID AC    ipratropium (ATROVENT) 0.02 % inhalation solution 0.5 mg  0.5 mg Nebulization Q6H    levalbuterol (XOPENEX) inhalation solution 1.25 mg  1.25 mg Nebulization Q6H    methylPREDNISolone sodium succinate (Solu-MEDROL) injection 20 mg  20 mg Intravenous Q12H FANNY    morphine (MS CONTIN) ER tablet 15 mg  15 mg Oral BID    pantoprazole (PROTONIX) EC tablet 40 mg  40 mg Oral Early Morning    sodium chloride 3 % inhalation solution 4 mL  4 mL Nebulization TID       Medications Prior to Admission:     abiraterone (ZYTIGA) 250 mg tablet    albuterol (2.5 mg/3 mL) 0.083 % nebulizer solution    albuterol (PROVENTIL HFA,VENTOLIN HFA) 90 mcg/act inhaler    ALPRAZolam (XANAX) 0.5 mg tablet    benzonatate (TESSALON PERLES) 100 mg capsule    Budeson-Glycopyrrol-Formoterol (Breztri Aerosphere) 160-9-4.8 MCG/ACT AERO    lidocaine (LIDODERM) 5 %    morphine (MS CONTIN) 15 mg 12 hr tablet    atorvastatin (LIPITOR) 40 mg tablet    [] azithromycin (ZITHROMAX) 250 mg tablet    azithromycin (ZITHROMAX) 500 MG tablet    cefpodoxime (VANTIN) 200 mg tablet    clonazePAM (KlonoPIN) 0.5 MG disintegrating tablet    doxycycline hyclate (VIBRAMYCIN) 100 mg capsule    gabapentin (NEURONTIN) 300 mg capsule    naloxone (NARCAN) 4 mg/0.1 mL nasal spray    NON FORMULARY      Microbiology:  Lab Results   Component Value Date    BLOODCX No Growth at 72 hrs. 2024    BLOODCX No Growth at 72 hrs. 2024    BLOODCX No Growth After 5 Days.  04/17/2024    BLOODCX No Growth After 5 Days. 04/17/2024    SPUTUMCULTUR Test not performed. Suggest repeat specimen. 04/18/2024    SPUTUMCULTUR 3+ Growth of Pseudomonas aeruginosa (A) 03/08/2024    SPUTUMCULTUR 3+ Growth of 02/19/2020    SPUTUMCULTUR  02/19/2020     Commensal respiratory yee only; No significant growth of Staph aureus/MRSA or Pseudomonas aeruginosa.       Imaging and other studies: I have personally reviewed pertinent reports.    CXR - no acute cardiopulmonary disease    Rafiq Corral DO  [facility]

## 2024-05-25 NOTE — ASSESSMENT & PLAN NOTE
Patient refused to continue hospice care with [facility]  Possibly seeking new hospice care through [facility]'s.  However patient thinking he may want to be treatment oriented again  Wants to be a DNR  Palliative care on board

## 2024-05-25 NOTE — ASSESSMENT & PLAN NOTE
"Presents due to shortness of breath.  Called EMS due to distress.  Oxygen machine \"broken\" at home per patient  Wheezing and coughing noted  Does not appear bacterial. Hold off on azithromycin at this time.   Procal and lactic WNL   Afebrile   Home regimen: Breztri  Plan  Albuterol inhaler  Symbicort inhaler  Atrovent/Xopenex 3 times daily  Tessalon Perles  Solu-Medrol tapered to 20 mg every 12 hours.  Pulmonary follow up  On Zithromax  Pulmonary consult appreciated  I have personally counseled the patient on medication indication, compliance, utilization and side effects. Patient may be discharged home with albuterol  "

## 2024-05-25 NOTE — QUICK NOTE
Patient has been refusing insulin stating he does not need it. Educated on blood sugar and sliding scale. Patients blood sugar this evening 213. Educated patient again and how dose would be 2 units. Patient only allowed this RN to give 1 unit.

## 2024-05-25 NOTE — PLAN OF CARE
Problem: Nutrition/Hydration-ADULT  Goal: Nutrient/Hydration intake appropriate for improving, restoring or maintaining nutritional needs  Description: Monitor and assess patient's nutrition/hydration status for malnutrition. Collaborate with interdisciplinary team and initiate plan and interventions as ordered.  Monitor patient's weight and dietary intake as ordered or per policy. Utilize nutrition screening tool and intervene as necessary. Determine patient's food preferences and provide high-protein, high-caloric foods as appropriate.     INTERVENTIONS:  - Monitor oral intake, urinary output, labs, and treatment plans  - Assess nutrition and hydration status and recommend course of action  - Evaluate amount of meals eaten  - Assist patient with eating if necessary   - Allow adequate time for meals  - Recommend/ encourage appropriate diets, oral nutritional supplements, and vitamin/mineral supplements  - Order, calculate, and assess calorie counts as needed  - Recommend, monitor, and adjust tube feedings and TPN/PPN based on assessed needs  - Assess need for intravenous fluids  - Provide specific nutrition/hydration education as appropriate  - Include patient/family/caregiver in decisions related to nutrition  Outcome: Progressing     Problem: PAIN - ADULT  Goal: Verbalizes/displays adequate comfort level or baseline comfort level  Description: Interventions:  - Encourage patient to monitor pain and request assistance  - Assess pain using appropriate pain scale  - Administer analgesics based on type and severity of pain and evaluate response  - Implement non-pharmacological measures as appropriate and evaluate response  - Consider cultural and social influences on pain and pain management  - Notify physician/advanced practitioner if interventions unsuccessful or patient reports new pain  Outcome: Progressing     Problem: INFECTION - ADULT  Goal: Absence or prevention of progression during  hospitalization  Description: INTERVENTIONS:  - Assess and monitor for signs and symptoms of infection  - Monitor lab/diagnostic results  - Monitor all insertion sites, i.e. indwelling lines, tubes, and drains  - Monitor endotracheal if appropriate and nasal secretions for changes in amount and color  - [place] appropriate cooling/warming therapies per order  - Administer medications as ordered  - Instruct and encourage patient and family to use good hand hygiene technique  - Identify and instruct in appropriate isolation precautions for identified infection/condition  Outcome: Progressing     Problem: Knowledge Deficit  Goal: Patient/family/caregiver demonstrates understanding of disease process, treatment plan, medications, and discharge instructions  Description: Complete learning assessment and assess knowledge base.  Interventions:  - Provide teaching at level of understanding  - Provide teaching via preferred learning methods  Outcome: Progressing     Problem: DISCHARGE PLANNING  Goal: Discharge to home or other facility with appropriate resources  Description: INTERVENTIONS:  - Identify barriers to discharge w/patient and caregiver  - Arrange for needed discharge resources and transportation as appropriate  - Identify discharge learning needs (meds, wound care, etc.)  - Arrange for interpretive services to assist at discharge as needed  - Refer to Case Management Department for coordinating discharge planning if the patient needs post-hospital services based on physician/advanced practitioner order or complex needs related to functional status, cognitive ability, or social support system  Outcome: Progressing

## 2024-05-26 LAB
ANION GAP SERPL CALCULATED.3IONS-SCNC: 2 MMOL/L (ref 4–13)
ANISOCYTOSIS BLD QL SMEAR: PRESENT
BASOPHILS # BLD MANUAL: 0 THOUSAND/UL (ref 0–0.1)
BASOPHILS NFR MAR MANUAL: 0 % (ref 0–1)
BUN SERPL-MCNC: 13 MG/DL (ref 5–25)
CALCIUM SERPL-MCNC: 8.2 MG/DL (ref 8.4–10.2)
CHLORIDE SERPL-SCNC: 100 MMOL/L (ref 96–108)
CO2 SERPL-SCNC: 40 MMOL/L (ref 21–32)
CREAT SERPL-MCNC: 0.35 MG/DL (ref 0.6–1.3)
EOSINOPHIL # BLD MANUAL: 0 THOUSAND/UL (ref 0–0.4)
EOSINOPHIL NFR BLD MANUAL: 0 % (ref 0–6)
ERYTHROCYTE [DISTWIDTH] IN BLOOD BY AUTOMATED COUNT: 13.4 % (ref 11.6–15.1)
GFR SERPL CREATININE-BSD FRML MDRD: 128 ML/MIN/1.73SQ M
GLUCOSE SERPL-MCNC: 147 MG/DL (ref 65–140)
GLUCOSE SERPL-MCNC: 191 MG/DL (ref 65–140)
GLUCOSE SERPL-MCNC: 192 MG/DL (ref 65–140)
GLUCOSE SERPL-MCNC: 194 MG/DL (ref 65–140)
GLUCOSE SERPL-MCNC: 259 MG/DL (ref 65–140)
HCT VFR BLD AUTO: 36.4 % (ref 36.5–49.3)
HGB BLD-MCNC: 11.5 G/DL (ref 12–17)
LYMPHOCYTES # BLD AUTO: 0.45 THOUSAND/UL (ref 0.6–4.47)
LYMPHOCYTES # BLD AUTO: 6 % (ref 14–44)
MCH RBC QN AUTO: 31.6 PG (ref 26.8–34.3)
MCHC RBC AUTO-ENTMCNC: 31.6 G/DL (ref 31.4–37.4)
MCV RBC AUTO: 100 FL (ref 82–98)
MONOCYTES # BLD AUTO: 0.15 THOUSAND/UL (ref 0–1.22)
MONOCYTES NFR BLD: 2 % (ref 4–12)
NEUTROPHILS # BLD MANUAL: 6.87 THOUSAND/UL (ref 1.85–7.62)
NEUTS SEG NFR BLD AUTO: 92 % (ref 43–75)
PLATELET # BLD AUTO: 356 THOUSANDS/UL (ref 149–390)
PLATELET BLD QL SMEAR: ADEQUATE
PMV BLD AUTO: 8.3 FL (ref 8.9–12.7)
POTASSIUM SERPL-SCNC: 4 MMOL/L (ref 3.5–5.3)
RBC # BLD AUTO: 3.64 MILLION/UL (ref 3.88–5.62)
RBC MORPH BLD: PRESENT
SODIUM SERPL-SCNC: 142 MMOL/L (ref 135–147)
WBC # BLD AUTO: 7.47 THOUSAND/UL (ref 4.31–10.16)

## 2024-05-26 PROCEDURE — 99232 SBSQ HOSP IP/OBS MODERATE 35: CPT | Performed by: INTERNAL MEDICINE

## 2024-05-26 PROCEDURE — 82948 REAGENT STRIP/BLOOD GLUCOSE: CPT

## 2024-05-26 PROCEDURE — 94760 N-INVAS EAR/PLS OXIMETRY 1: CPT

## 2024-05-26 PROCEDURE — 85007 BL SMEAR W/DIFF WBC COUNT: CPT | Performed by: INTERNAL MEDICINE

## 2024-05-26 PROCEDURE — 94640 AIRWAY INHALATION TREATMENT: CPT

## 2024-05-26 PROCEDURE — 85027 COMPLETE CBC AUTOMATED: CPT | Performed by: INTERNAL MEDICINE

## 2024-05-26 PROCEDURE — 80048 BASIC METABOLIC PNL TOTAL CA: CPT | Performed by: INTERNAL MEDICINE

## 2024-05-26 RX ORDER — AZITHROMYCIN 500 MG/1
250 TABLET, FILM COATED ORAL 3 TIMES WEEKLY
Status: DISCONTINUED | OUTPATIENT
Start: 2024-05-27 | End: 2024-05-29 | Stop reason: HOSPADM

## 2024-05-26 RX ADMIN — SODIUM CHLORIDE SOLN NEBU 3% 4 ML: 3 NEBU SOLN at 07:56

## 2024-05-26 RX ADMIN — METHYLPREDNISOLONE SODIUM SUCCINATE 20 MG: 40 INJECTION, POWDER, FOR SOLUTION INTRAMUSCULAR; INTRAVENOUS at 08:34

## 2024-05-26 RX ADMIN — LEVALBUTEROL HYDROCHLORIDE 1.25 MG: 1.25 SOLUTION RESPIRATORY (INHALATION) at 19:23

## 2024-05-26 RX ADMIN — SODIUM CHLORIDE SOLN NEBU 3% 4 ML: 3 NEBU SOLN at 13:39

## 2024-05-26 RX ADMIN — BUDESONIDE 0.5 MG: 0.5 INHALANT RESPIRATORY (INHALATION) at 19:24

## 2024-05-26 RX ADMIN — BUDESONIDE 0.5 MG: 0.5 INHALANT RESPIRATORY (INHALATION) at 07:56

## 2024-05-26 RX ADMIN — GABAPENTIN 300 MG: 300 CAPSULE ORAL at 08:35

## 2024-05-26 RX ADMIN — ALPRAZOLAM 0.5 MG: 0.5 TABLET ORAL at 08:40

## 2024-05-26 RX ADMIN — LEVALBUTEROL HYDROCHLORIDE 1.25 MG: 1.25 SOLUTION RESPIRATORY (INHALATION) at 13:39

## 2024-05-26 RX ADMIN — MORPHINE SULFATE 15 MG: 15 TABLET, EXTENDED RELEASE ORAL at 18:01

## 2024-05-26 RX ADMIN — ALPRAZOLAM 1 MG: 0.5 TABLET ORAL at 21:31

## 2024-05-26 RX ADMIN — SODIUM CHLORIDE SOLN NEBU 3% 4 ML: 3 NEBU SOLN at 19:25

## 2024-05-26 RX ADMIN — LEVALBUTEROL HYDROCHLORIDE 1.25 MG: 1.25 SOLUTION RESPIRATORY (INHALATION) at 07:56

## 2024-05-26 RX ADMIN — GABAPENTIN 300 MG: 300 CAPSULE ORAL at 18:01

## 2024-05-26 RX ADMIN — HEPARIN SODIUM 5000 UNITS: 5000 INJECTION INTRAVENOUS; SUBCUTANEOUS at 05:12

## 2024-05-26 RX ADMIN — IPRATROPIUM BROMIDE 0.5 MG: 0.5 SOLUTION RESPIRATORY (INHALATION) at 07:56

## 2024-05-26 RX ADMIN — MORPHINE SULFATE 15 MG: 15 TABLET, EXTENDED RELEASE ORAL at 08:34

## 2024-05-26 RX ADMIN — ATORVASTATIN CALCIUM 40 MG: 40 TABLET, FILM COATED ORAL at 18:01

## 2024-05-26 RX ADMIN — IPRATROPIUM BROMIDE 0.5 MG: 0.5 SOLUTION RESPIRATORY (INHALATION) at 19:23

## 2024-05-26 RX ADMIN — METHYLPREDNISOLONE SODIUM SUCCINATE 20 MG: 40 INJECTION, POWDER, FOR SOLUTION INTRAMUSCULAR; INTRAVENOUS at 21:13

## 2024-05-26 RX ADMIN — IPRATROPIUM BROMIDE 0.5 MG: 0.5 SOLUTION RESPIRATORY (INHALATION) at 13:39

## 2024-05-26 NOTE — QUICK NOTE
"This RN giving morning medications to patient. Patient becomes upset about only \"sleeping for 3 hours during stay\". Offered to promote sleeping by shutting the door, TV, etc but patient became irritable and stated \"I need to be sedated, I cannot do this anymore, I just want to go home\". Educated patient why we cannot just \"sedate\" him. Patient became irritable towards this RN. Told patient I will speak to the doctor on your concern. Patient also stated he is upset because he would like to restart his cancer medication and we are \"taking it away from him\". Notified Dr. Cisneros.  "

## 2024-05-26 NOTE — ASSESSMENT & PLAN NOTE
Patient refused to continue hospice care with [facility]  Possibly seeking new hospice care through [facility]'s.  However patient thinking he may want to be treatment oriented again  Wants to be a DNR  Palliative care on board

## 2024-05-26 NOTE — ASSESSMENT & PLAN NOTE
"Presents due to shortness of breath.  Called EMS due to distress.  Oxygen machine \"broken\" at home per patient  Wheezing and coughing noted  Does not appear bacterial.   Procal and lactic WNL   Afebrile   Home regimen: Breztri  Plan  Albuterol inhaler  Symbicort inhaler  Atrovent/Xopenex 3 times daily  Tessalon Perles  Solu-Medrol tapered to 20 mg every 12 hours.  Pulmonary follow up  Zithromax x 3 days.  On 3 times weekly Zithromax  Pulmonary follow-up  I have personally counseled the patient on medication indication, compliance, utilization and side effects. Patient may be discharged home with albuterol  "

## 2024-05-26 NOTE — PROGRESS NOTES
"Pt noted to be eating % meals per nursing flowsheets. During time of visit today pt did not eat lunch. Says \"You can't expect hospital food to taste good.\" Reports not enjoying the meals, also decreased appetite with sleep disturbances and family stress. Several ensures at bedside. Pt reports they taste chalky \"except the chocolate ones.\" Tasted chocolate ensure max with author, \"That's not half bad!\" Says would be willing to drink it mixed with coffee, will ask dietary to send coffee with ensure max daily at lunch. Pt stating \"I dont particularly like them, but if I had to I'll drink one with the coffee.\" Will d/c ensure plus high PRO, continue with ensure max daily.   "

## 2024-05-26 NOTE — ASSESSMENT & PLAN NOTE
Malnutrition Findings:   Adult Malnutrition type: Chronic illness  Adult Degree of Malnutrition: Other severe protein calorie malnutrition  Malnutrition Characteristics: Fat loss, Muscle loss, Inadequate energy, Weight loss                  360 Statement: Pt presents with severe protein calorie malnutriton as evidenced by BMI 14.78, sunken orbitals, clavicle protrusion, depressed temporals, po intake <75% for greater than 1 month and 4% wt loss in 1 month (5/22/24 95 lbs, 4/18/24 99 lbs). Treat with diet and supplements TID.    BMI Findings:  Adult BMI Classifications: Underweight < 18.5        Body mass index is 14.78 kg/m².   Nutrition consult

## 2024-05-26 NOTE — PROGRESS NOTES
"Progress Note - Pulmonary   Richard Nava 68 y.o. male MRN: [id number]  Unit/Bed#: -01 Encounter: [id number]      Assessment:   Acute on chronic hypoxic and hypercapnic respiratory failure   Severe COPD with acute exacerbation  Metastatic prostate cancer  Severe protein calorie malnutrition  Insomnia and anxiety     Plan:   Continue Xopenex and Atrovent TID  Continue Pulmicort BID  Continue methylprednisolone q12.  Will need slow taper and can not use prednisone due to previous reaction  Complete 3 days of Zithromax.  Agree to start chronic azithromycin to prevent recurring exacerbation.    Due to vivid dreams, would stop melatonin   We discussed his previous attempts with multiple medications for insomnia including trazodone, melatonin, clonazepam.  Could consider low dose Doxepin to see if there is any benefit.    Consider nasal steroid and antihistamine to help with upper airway congestion.         Subjective:   He states that he is sleeping more but has been having vivid dreams.  He is also very worried about his cancer treatment that he stopped for some time.      Objective:   Vitals: Blood pressure 128/74, pulse 89, temperature 98.8 °F (37.1 °C), temperature source Temporal, resp. rate 14, height 5' 7\" (1.702 m), weight 42.8 kg (94 lb 5.7 oz), SpO2 98%., 4L NC, Body mass index is 14.78 kg/m².      Intake/Output Summary (Last 24 hours) at 5/26/2024 1247  Last data filed at 5/26/2024 0517  Gross per 24 hour   Intake --   Output 1150 ml   Net -1150 ml         Physical Exam  Gen: Awake, alert, oriented x 3, no acute distress  HEENT: Mucous membranes moist, no oral lesions, no thrush  NECK: No accessory muscle use, JVP not elevated  Cardiac: Regular, single S1, single S2, no murmurs, no rubs, no gallops  Lungs: Decreased breath sounds.  No wheezing or rhonchi  Abdomen: normoactive bowel sounds, soft nontender, nondistended, no rebound or rigidity, no guarding  Extremities: no cyanosis, no clubbing, no " edema    Labs: I have personally reviewed pertinent lab results.  Results from last 7 days   Lab Units 05/26/24  0505 05/25/24  0543 05/24/24  0343 05/23/24  0403   WBC Thousand/uL 7.47 5.60 5.21 5.46   HEMOGLOBIN g/dL 11.5* 10.6* 10.6* 10.7*   HEMATOCRIT % 36.4* 34.0* 35.5* 35.7*   PLATELETS Thousands/uL 356 329 342 321   SEGS PCT %  --  82* 88* 91*   MONO PCT % 2* 8 3* 2*   EOS PCT % 0 0 0 0      Results from last 7 days   Lab Units 05/26/24  0505 05/25/24  0543 05/24/24  0343 05/22/24  0551 05/21/24 2135 05/21/24 2134   POTASSIUM mmol/L 4.0 4.0 4.2   < > 4.1  --    CHLORIDE mmol/L 100 102 103   < > 99  --    CO2 mmol/L 40* 38* 42*   < > 42*  --    CO2, I-STAT mmol/L  --   --   --   --   --  45*   BUN mg/dL 13 13 14   < > 14  --    CREATININE mg/dL 0.35* 0.32* 0.44*   < > 0.50*  --    CALCIUM mg/dL 8.2* 8.2* 8.4   < > 9.2  --    ALK PHOS U/L  --   --   --   --  40  --    ALT U/L  --   --   --   --  13  --    AST U/L  --   --   --   --  18  --    GLUCOSE, ISTAT mg/dl  --   --   --   --   --  130    < > = values in this interval not displayed.     Results from last 7 days   Lab Units 05/24/24  0343 05/22/24  0551   MAGNESIUM mg/dL 1.9 1.9          Results from last 7 days   Lab Units 05/21/24 2135   INR  0.94   PTT seconds 29     Results from last 7 days   Lab Units 05/21/24  2146   LACTIC ACID mmol/L 0.8     0   Lab Value Date/Time    TROPONINI <0.01 04/30/2024 1523    TROPONINI <0.02 02/18/2020 1840    TROPONINI <0.02 02/18/2020 1550    TROPONINI <0.02 02/18/2020 0855         Meds/Allergies   Current Facility-Administered Medications   Medication Dose Route Frequency    acetaminophen (TYLENOL) tablet 650 mg  650 mg Oral Q6H PRN    albuterol (PROVENTIL HFA,VENTOLIN HFA) inhaler 1 puff  1 puff Inhalation Q4H PRN    albuterol inhalation solution 2.5 mg  2.5 mg Nebulization Q6H PRN    ALPRAZolam (XANAX) tablet 1 mg  1 mg Oral HS PRN    And    ALPRAZolam (XANAX) tablet 0.5 mg  0.5 mg Oral BID PRN    atorvastatin  (LIPITOR) tablet 40 mg  40 mg Oral Daily With Dinner    [START ON 2024] azithromycin (ZITHROMAX) tablet 250 mg  250 mg Oral Once per day on     benzonatate (TESSALON PERLES) capsule 100 mg  100 mg Oral TID PRN    budesonide (PULMICORT) inhalation solution 0.5 mg  0.5 mg Nebulization Q12H    fluticasone (FLONASE) 50 mcg/act nasal spray 1 spray  1 spray Each Nare Daily    gabapentin (NEURONTIN) capsule 300 mg  300 mg Oral BID    guaiFENesin (MUCINEX) 12 hr tablet 600 mg  600 mg Oral Q12H PRN    heparin (porcine) subcutaneous injection 5,000 Units  5,000 Units Subcutaneous Q8H FANNY    Hydrocod Jose-Chlorphe Jose ER (TUSSIONEX) ER suspension 5 mL  5 mL Oral Q12H PRN    insulin lispro (HumALOG/ADMELOG) 100 units/mL subcutaneous injection 1-6 Units  1-6 Units Subcutaneous TID AC    ipratropium (ATROVENT) 0.02 % inhalation solution 0.5 mg  0.5 mg Nebulization TID    levalbuterol (XOPENEX) inhalation solution 1.25 mg  1.25 mg Nebulization TID    methylPREDNISolone sodium succinate (Solu-MEDROL) injection 20 mg  20 mg Intravenous Q12H [facility]    morphine (MS CONTIN) ER tablet 15 mg  15 mg Oral BID    pantoprazole (PROTONIX) EC tablet 40 mg  40 mg Oral Early Morning    sodium chloride 3 % inhalation solution 4 mL  4 mL Nebulization TID       Medications Prior to Admission:     abiraterone (ZYTIGA) 250 mg tablet    albuterol (2.5 mg/3 mL) 0.083 % nebulizer solution    albuterol (PROVENTIL HFA,VENTOLIN HFA) 90 mcg/act inhaler    ALPRAZolam (XANAX) 0.5 mg tablet    benzonatate (TESSALON PERLES) 100 mg capsule    Budeson-Glycopyrrol-Formoterol (Breztri Aerosphere) 160-9-4.8 MCG/ACT AERO    lidocaine (LIDODERM) 5 %    morphine (MS CONTIN) 15 mg 12 hr tablet    atorvastatin (LIPITOR) 40 mg tablet    [] azithromycin (ZITHROMAX) 250 mg tablet    azithromycin (ZITHROMAX) 500 MG tablet    cefpodoxime (VANTIN) 200 mg tablet    clonazePAM (KlonoPIN) 0.5 MG disintegrating tablet    doxycycline hyclate  (VIBRAMYCIN) 100 mg capsule    gabapentin (NEURONTIN) 300 mg capsule    naloxone (NARCAN) 4 mg/0.1 mL nasal spray    NON FORMULARY      Microbiology:  Lab Results   Component Value Date    BLOODCX No Growth After 4 Days. 05/21/2024    BLOODCX No Growth After 4 Days. 05/21/2024    BLOODCX No Growth After 5 Days. 04/17/2024    BLOODCX No Growth After 5 Days. 04/17/2024    SPUTUMCULTUR Test not performed. Suggest repeat specimen. 04/18/2024    SPUTUMCULTUR 3+ Growth of Pseudomonas aeruginosa (A) 03/08/2024    SPUTUMCULTUR 3+ Growth of 02/19/2020    SPUTUMCULTUR  02/19/2020     Commensal respiratory yee only; No significant growth of Staph aureus/MRSA or Pseudomonas aeruginosa.       Imaging and other studies: I have personally reviewed pertinent reports.    None    Rafiq Corral DO  [facility]

## 2024-05-26 NOTE — PROGRESS NOTES
[facility]  Progress Note  Name: Richard Nava I  MRN: [id number]  Unit/Bed#: -01 I Date of Admission: 5/21/2024   Date of Service: 5/26/2024 I Hospital Day: 4    Assessment & Plan   Underweight  Assessment & Plan  Underweight POA due to chronic COPD with BMI of 14.78, cachexia, muscle wasting and poor appetite  Nutrition consult and recommendations    Acute on chronic respiratory failure with hypoxia (HCC)  Assessment & Plan  Patient admitted with acute on chronic respiratory failure with hypoxia and requiring 6 L of supplemental oxygen  Per prior hospitalization was on 2 to 3 L  Educated the patient that his goal > 88%.  Pulmonary on board  Currently on 4 L  Wean oxygen as tolerated    Opioid dependence (HCC)  Assessment & Plan  Continue home MS Contin 15 mg every 12 hours  PDMP verified  Denies pain    Palliative care patient  Assessment & Plan  Patient refused to continue hospice care with [facility]  Possibly seeking new hospice care through [facility].  However patient thinking he may want to be treatment oriented again  Wants to be a DNR  Palliative care on board    Prostate cancer metastatic to bone (HCC)  Assessment & Plan  Stage 4 metastatic prostate cancer  Changed to hospice about 1 month ago.  Zytiga was discontinued at that time.  Patient has since stopped hospice.  Asking to be restarted on medications  Palliative care consulted.  Goals of care conversation    Severe protein-calorie malnutrition (HCC)  Assessment & Plan  Malnutrition Findings:   Adult Malnutrition type: Chronic illness  Adult Degree of Malnutrition: Other severe protein calorie malnutrition  Malnutrition Characteristics: Fat loss, Muscle loss, Inadequate energy, Weight loss                  360 Statement: Pt presents with severe protein calorie malnutriton as evidenced by BMI 14.78, sunken orbitals, clavicle protrusion, depressed temporals, po intake <75% for greater than 1 month and 4% wt loss  "in 1 month (5/22/24 95 lbs, 4/18/24 99 lbs). Treat with diet and supplements TID.    BMI Findings:  Adult BMI Classifications: Underweight < 18.5        Body mass index is 14.78 kg/m².   Nutrition consult    * COPD exacerbation (HCC)  Assessment & Plan  Presents due to shortness of breath.  Called EMS due to distress.  Oxygen machine \"broken\" at home per patient  Wheezing and coughing noted  Does not appear bacterial.   Procal and lactic WNL   Afebrile   Home regimen: Breztri  Plan  Albuterol inhaler  Symbicort inhaler  Atrovent/Xopenex 3 times daily  Tessalon Perles  Solu-Medrol tapered to 20 mg every 12 hours.  Pulmonary follow up  Zithromax x 3 days.  On 3 times weekly Zithromax  Pulmonary follow-up  I have personally counseled the patient on medication indication, compliance, utilization and side effects. Patient may be discharged home with albuterol             Labs & Imaging: I have personally reviewed pertinent reports.      VTE Prophylaxis: in place.    Code Status:   Level 3 - DNAR and DNI    Patient Centered Rounds: I have performed bedside rounds with nursing staff today.    Mobility:   Basic Mobility Inpatient Raw Score: 23  JH-HLM Goal: 7: Walk 25 feet or more  JH-HLM Achieved: 3: Sit at edge of bed  JH-HLM Goal achieved. Continue to encourage appropriate mobility.    Discussions with Specialists or Other Care Team Provider: Pulmonary    Education and Discussions with Family / Patient: Declined family update    Total Time Spent on Date of Encounter in care of patient: 35 mins. This time was spent on one or more of the following: performing physical exam; counseling and coordination of care; obtaining or reviewing history; documenting in the medical record; reviewing/ordering tests, medications or procedures; communicating with other healthcare professionals and discussing with patient's family/caregivers.    Current Length of Stay: 4 day(s)    Current Patient Status: Inpatient   Certification Statement: " "The patient will continue to require additional inpatient hospital stay due to see my assessment and plan.     Subjective:   Patient is seen and examined at bedside.  Patient complaining of shortness of breath and having bad dreams/?  Hallucinations.  Afebrile  All other ROS are negative.    Objective:    Vitals: Blood pressure 128/74, pulse 89, temperature 98.8 °F (37.1 °C), temperature source Temporal, resp. rate 14, height 5' 7\" (1.702 m), weight 42.8 kg (94 lb 5.7 oz), SpO2 98%.,Body mass index is 14.78 kg/m².  SPO2 RA Rest      Flowsheet Row ED to Hosp-Admission (Current) from 5/21/2024 in [facility] Med Surg Unit   SpO2 98 %   SpO2 Activity At Rest   O2 Device Nasal cannula   O2 Flow Rate --          I&O:   Intake/Output Summary (Last 24 hours) at 5/26/2024 1213  Last data filed at 5/26/2024 0517  Gross per 24 hour   Intake --   Output 1150 ml   Net -1150 ml       Physical Exam:    General- Alert, lying comfortably in bed. Not in any acute distress.  Neck- Supple, No JVD  CVS- regular, S1 and S2 normal  Chest- Bilateral Air entry, decreased at bases with some wheezing  Abdomen- soft, nontender, not distended, no guarding or rigidity, BS+  Extremities-  No pedal edema, No calf tenderness                         Normal ROM in all extremities.  CNS-   Alert, awake and orientedx3. No focal deficits present.    Invasive Devices       Peripheral Intravenous Line  Duration             Peripheral IV 05/26/24 Dorsal (posterior);Left Forearm <1 day                          Social History  reviewed  Family History   Problem Relation Age of Onset    Lung cancer Mother     Breast cancer Sister     Colon polyps Brother     Colon cancer Neg Hx     reviewed    Meds:  Current Facility-Administered Medications   Medication Dose Route Frequency Provider Last Rate Last Admin    acetaminophen (TYLENOL) tablet 650 mg  650 mg Oral Q6H PRN Adela Murguia PA-C   650 mg at 05/22/24 0349    albuterol (PROVENTIL " HFA,VENTOLIN HFA) inhaler 1 puff  1 puff Inhalation Q4H PRN Adela Murguia PA-C   1 puff at 05/23/24 0436    albuterol inhalation solution 2.5 mg  2.5 mg Nebulization Q6H PRN Adela Murguia PA-C   2.5 mg at 05/22/24 0140    ALPRAZolam (XANAX) tablet 1 mg  1 mg Oral HS PRN Arleth Scales PA-C   1 mg at 05/25/24 2133    And    ALPRAZolam (XANAX) tablet 0.5 mg  0.5 mg Oral BID PRN Arleth Scales PA-C   0.5 mg at 05/26/24 0840    atorvastatin (LIPITOR) tablet 40 mg  40 mg Oral Daily With Dinner Adela Murguia PA-C   40 mg at 05/25/24 1740    benzonatate (TESSALON PERLES) capsule 100 mg  100 mg Oral TID PRN Adela Murguia PA-C   100 mg at 05/22/24 0140    budesonide (PULMICORT) inhalation solution 0.5 mg  0.5 mg Nebulization Q12H Efrain Dudley MD   0.5 mg at 05/26/24 0756    fluticasone (FLONASE) 50 mcg/act nasal spray 1 spray  1 spray Each Nare Daily Efrain Dudley MD   1 spray at 05/24/24 0841    gabapentin (NEURONTIN) capsule 300 mg  300 mg Oral BID Adela Murguia PA-C   300 mg at 05/26/24 0835    guaiFENesin (MUCINEX) 12 hr tablet 600 mg  600 mg Oral Q12H PRN ISA Araujo        heparin (porcine) subcutaneous injection 5,000 Units  5,000 Units Subcutaneous Q8H [facility] Adela Murguia PA-C   5,000 Units at 05/26/24 0512    Hydrocod Jose-Chlorphe Jose ER (TUSSIONEX) ER suspension 5 mL  5 mL Oral Q12H PRN Arleth Scales PA-C        insulin lispro (HumALOG/ADMELOG) 100 units/mL subcutaneous injection 1-6 Units  1-6 Units Subcutaneous TID AC Brandon Cisneros MD   1 Units at 05/25/24 1738    ipratropium (ATROVENT) 0.02 % inhalation solution 0.5 mg  0.5 mg Nebulization TID Brandon Cisneros MD   0.5 mg at 05/26/24 0751    levalbuterol (XOPENEX) inhalation solution 1.25 mg  1.25 mg Nebulization TID Brandon Cisneros MD   1.25 mg at 05/26/24 8239    melatonin tablet 6 mg  6 mg Oral HS Brandon Cisneros MD   6 mg at 05/25/24 7196    methylPREDNISolone sodium succinate (Solu-MEDROL) injection 20 mg  20 mg  Intravenous Q12H FANNY ISA Araujo   20 mg at 24 0834    morphine (MS CONTIN) ER tablet 15 mg  15 mg Oral BID Adela Murguia PA-C   15 mg at 24 0834    pantoprazole (PROTONIX) EC tablet 40 mg  40 mg Oral Early Morning Brandon Cisneros MD   40 mg at 24 0544    sodium chloride 3 % inhalation solution 4 mL  4 mL Nebulization TID Efrain Dudley MD   4 mL at 24 0756        Medications Prior to Admission:     abiraterone (ZYTIGA) 250 mg tablet    albuterol (2.5 mg/3 mL) 0.083 % nebulizer solution    albuterol (PROVENTIL HFA,VENTOLIN HFA) 90 mcg/act inhaler    ALPRAZolam (XANAX) 0.5 mg tablet    benzonatate (TESSALON PERLES) 100 mg capsule    Budeson-Glycopyrrol-Formoterol (Breztri Aerosphere) 160-9-4.8 MCG/ACT AERO    lidocaine (LIDODERM) 5 %    morphine (MS CONTIN) 15 mg 12 hr tablet    atorvastatin (LIPITOR) 40 mg tablet    [] azithromycin (ZITHROMAX) 250 mg tablet    azithromycin (ZITHROMAX) 500 MG tablet    cefpodoxime (VANTIN) 200 mg tablet    clonazePAM (KlonoPIN) 0.5 MG disintegrating tablet    doxycycline hyclate (VIBRAMYCIN) 100 mg capsule    gabapentin (NEURONTIN) 300 mg capsule    naloxone (NARCAN) 4 mg/0.1 mL nasal spray    NON FORMULARY    Labs:  Results from last 7 days   Lab Units 24  0505 24  0543 24  0343 24  0403   WBC Thousand/uL 7.47 5.60 5.21 5.46   HEMOGLOBIN g/dL 11.5* 10.6* 10.6* 10.7*   HEMATOCRIT % 36.4* 34.0* 35.5* 35.7*   PLATELETS Thousands/uL 356 329 342 321   SEGS PCT %  --  82* 88* 91*   LYMPHO PCT % 6* 9* 8* 6*   MONO PCT % 2* 8 3* 2*   EOS PCT % 0 0 0 0     Results from last 7 days   Lab Units 24  0505 24  0543 24  0343 24  0551 245 24   POTASSIUM mmol/L 4.0 4.0 4.2   < > 4.1  --    CHLORIDE mmol/L 100 102 103   < > 99  --    CO2 mmol/L 40* 38* 42*   < > 42*  --    CO2, I-STAT mmol/L  --   --   --   --   --  45*   BUN mg/dL 13 13 14   < > 14  --    CREATININE mg/dL 0.35* 0.32*  0.44*   < > 0.50*  --    CALCIUM mg/dL 8.2* 8.2* 8.4   < > 9.2  --    ALK PHOS U/L  --   --   --   --  40  --    ALT U/L  --   --   --   --  13  --    AST U/L  --   --   --   --  18  --    GLUCOSE, ISTAT mg/dl  --   --   --   --   --  130    < > = values in this interval not displayed.     Lab Results   Component Value Date    TROPONINI <0.01 04/30/2024    TROPONINI <0.02 02/18/2020    TROPONINI <0.02 02/18/2020    TROPONINI <0.02 02/18/2020     Results from last 7 days   Lab Units 05/21/24  2135   INR  0.94     Lab Results   Component Value Date    BLOODCX No Growth After 4 Days. 05/21/2024    BLOODCX No Growth After 4 Days. 05/21/2024    BLOODCX No Growth After 5 Days. 04/17/2024    BLOODCX No Growth After 5 Days. 04/17/2024    SPUTUMCULTUR Test not performed. Suggest repeat specimen. 04/18/2024    SPUTUMCULTUR 3+ Growth of Pseudomonas aeruginosa (A) 03/08/2024    SPUTUMCULTUR 3+ Growth of 02/19/2020    SPUTUMCULTUR  02/19/2020     Commensal respiratory yee only; No significant growth of Staph aureus/MRSA or Pseudomonas aeruginosa.         Imaging:  Results for orders placed during the hospital encounter of 05/21/24    XR chest portable    Narrative  XR CHEST PORTABLE    INDICATION: Acute Resp Failure.    COMPARISON: 4/17/2024    FINDINGS: Monitoring leads and clips project over the chest.    Clear lungs. Lungs remain hyperinflated indicative of emphysema. No pneumothorax or pleural effusion.    Normal cardiomediastinal silhouette.    Bones are unremarkable for age.    Normal upper abdomen.    Impression  No acute cardiopulmonary disease.        Workstation performed: [id number]    Results for orders placed during the hospital encounter of 10/24/23    XR chest pa & lateral    Narrative  CHEST    INDICATION:   sepsis.    COMPARISON:  None    EXAM PERFORMED/VIEWS:  XR CHEST PA & LATERAL The frontal view was performed utilizing dual energy radiographic technique.  Images: 7    FINDINGS:    Cardiomediastinal  silhouette appears unremarkable.    The patient has severe emphysema. No pneumothorax or pleural effusion.    Extensive bony metastasis are present.    Impression  No acute cardiopulmonary disease.  Severe emphysema. Extensive bony metastasis.          Workstation performed: [id number]      Last 24 Hours Medication List:   Current Facility-Administered Medications   Medication Dose Route Frequency Provider Last Rate    acetaminophen  650 mg Oral Q6H PRN Adela Murguia PA-C      albuterol  1 puff Inhalation Q4H PRN Adela Murguia PA-C      albuterol  2.5 mg Nebulization Q6H PRN Adela Murguia PA-C      ALPRAZolam  1 mg Oral HS PRN Arleth Scales PA-C      And    ALPRAZolam  0.5 mg Oral BID PRN Arleth Scales PA-C      atorvastatin  40 mg Oral Daily With Dinner Adela Murguia PA-C      benzonatate  100 mg Oral TID PRN Adela Murguia PA-C      budesonide  0.5 mg Nebulization Q12H Efrain Dudley MD      fluticasone  1 spray Each Nare Daily Efrain Dudley MD      gabapentin  300 mg Oral BID Adela Murguia PA-C      guaiFENesin  600 mg Oral Q12H PRN ISA Araujo      heparin (porcine)  5,000 Units Subcutaneous Q8H [facility] Adela Murguia PA-C      Hydrocod Jose-Chlorphe Jose ER  5 mL Oral Q12H PRN Arleth Scales PA-C      insulin lispro  1-6 Units Subcutaneous TID AC Brandon Cisneros MD      ipratropium  0.5 mg Nebulization TID Brandon Cisneros MD      levalbuterol  1.25 mg Nebulization TID Brandon Cisneros MD      melatonin  6 mg Oral HS Brandon Cisneros MD      methylPREDNISolone sodium succinate  20 mg Intravenous Q12H [facility] ISA Araujo      morphine  15 mg Oral BID Adela Murguia PA-C      pantoprazole  40 mg Oral Early Morning Brandon Cisneros MD      sodium chloride  4 mL Nebulization TID Efrain Dudley MD          Today, Patient Was Seen By: Brandon Cisneros MD    ** Please Note: Dictation voice to text software may have been used in the creation of this document. **

## 2024-05-26 NOTE — PLAN OF CARE
Problem: Nutrition/Hydration-ADULT  Goal: Nutrient/Hydration intake appropriate for improving, restoring or maintaining nutritional needs  Description: Monitor and assess patient's nutrition/hydration status for malnutrition. Collaborate with interdisciplinary team and initiate plan and interventions as ordered.  Monitor patient's weight and dietary intake as ordered or per policy. Utilize nutrition screening tool and intervene as necessary. Determine patient's food preferences and provide high-protein, high-caloric foods as appropriate.     INTERVENTIONS:  - Monitor oral intake, urinary output, labs, and treatment plans  - Assess nutrition and hydration status and recommend course of action  - Evaluate amount of meals eaten  - Assist patient with eating if necessary   - Allow adequate time for meals  - Recommend/ encourage appropriate diets, oral nutritional supplements, and vitamin/mineral supplements  - Order, calculate, and assess calorie counts as needed  - Recommend, monitor, and adjust tube feedings and TPN/PPN based on assessed needs  - Assess need for intravenous fluids  - Provide specific nutrition/hydration education as appropriate  - Include patient/family/caregiver in decisions related to nutrition  Outcome: Progressing     Problem: PAIN - ADULT  Goal: Verbalizes/displays adequate comfort level or baseline comfort level  Description: Interventions:  - Encourage patient to monitor pain and request assistance  - Assess pain using appropriate pain scale  - Administer analgesics based on type and severity of pain and evaluate response  - Implement non-pharmacological measures as appropriate and evaluate response  - Consider cultural and social influences on pain and pain management  - Notify physician/advanced practitioner if interventions unsuccessful or patient reports new pain  Outcome: Progressing     Problem: INFECTION - ADULT  Goal: Absence or prevention of progression during  hospitalization  Description: INTERVENTIONS:  - Assess and monitor for signs and symptoms of infection  - Monitor lab/diagnostic results  - Monitor all insertion sites, i.e. indwelling lines, tubes, and drains  - Monitor endotracheal if appropriate and nasal secretions for changes in amount and color  - [place] appropriate cooling/warming therapies per order  - Administer medications as ordered  - Instruct and encourage patient and family to use good hand hygiene technique  - Identify and instruct in appropriate isolation precautions for identified infection/condition  Outcome: Progressing     Problem: DISCHARGE PLANNING  Goal: Discharge to home or other facility with appropriate resources  Description: INTERVENTIONS:  - Identify barriers to discharge w/patient and caregiver  - Arrange for needed discharge resources and transportation as appropriate  - Identify discharge learning needs (meds, wound care, etc.)  - Arrange for interpretive services to assist at discharge as needed  - Refer to Case Management Department for coordinating discharge planning if the patient needs post-hospital services based on physician/advanced practitioner order or complex needs related to functional status, cognitive ability, or social support system  Outcome: Progressing

## 2024-05-27 LAB
ANION GAP SERPL CALCULATED.3IONS-SCNC: 3 MMOL/L (ref 4–13)
BACTERIA BLD CULT: NORMAL
BACTERIA BLD CULT: NORMAL
BASOPHILS # BLD AUTO: 0.02 THOUSANDS/ÂΜL (ref 0–0.1)
BASOPHILS NFR BLD AUTO: 0 % (ref 0–1)
BUN SERPL-MCNC: 14 MG/DL (ref 5–25)
CALCIUM SERPL-MCNC: 8.9 MG/DL (ref 8.4–10.2)
CHLORIDE SERPL-SCNC: 97 MMOL/L (ref 96–108)
CO2 SERPL-SCNC: 40 MMOL/L (ref 21–32)
CREAT SERPL-MCNC: 0.48 MG/DL (ref 0.6–1.3)
EOSINOPHIL # BLD AUTO: 0.01 THOUSAND/ÂΜL (ref 0–0.61)
EOSINOPHIL NFR BLD AUTO: 0 % (ref 0–6)
ERYTHROCYTE [DISTWIDTH] IN BLOOD BY AUTOMATED COUNT: 13.5 % (ref 11.6–15.1)
GFR SERPL CREATININE-BSD FRML MDRD: 112 ML/MIN/1.73SQ M
GLUCOSE SERPL-MCNC: 136 MG/DL (ref 65–140)
GLUCOSE SERPL-MCNC: 140 MG/DL (ref 65–140)
GLUCOSE SERPL-MCNC: 196 MG/DL (ref 65–140)
GLUCOSE SERPL-MCNC: 205 MG/DL (ref 65–140)
GLUCOSE SERPL-MCNC: 269 MG/DL (ref 65–140)
HCT VFR BLD AUTO: 41 % (ref 36.5–49.3)
HGB BLD-MCNC: 12.3 G/DL (ref 12–17)
IMM GRANULOCYTES # BLD AUTO: 0.12 THOUSAND/UL (ref 0–0.2)
IMM GRANULOCYTES NFR BLD AUTO: 1 % (ref 0–2)
LYMPHOCYTES # BLD AUTO: 0.67 THOUSANDS/ÂΜL (ref 0.6–4.47)
LYMPHOCYTES NFR BLD AUTO: 7 % (ref 14–44)
MCH RBC QN AUTO: 30.8 PG (ref 26.8–34.3)
MCHC RBC AUTO-ENTMCNC: 30 G/DL (ref 31.4–37.4)
MCV RBC AUTO: 103 FL (ref 82–98)
MONOCYTES # BLD AUTO: 0.65 THOUSAND/ÂΜL (ref 0.17–1.22)
MONOCYTES NFR BLD AUTO: 7 % (ref 4–12)
NEUTROPHILS # BLD AUTO: 8.3 THOUSANDS/ÂΜL (ref 1.85–7.62)
NEUTS SEG NFR BLD AUTO: 85 % (ref 43–75)
NRBC BLD AUTO-RTO: 0 /100 WBCS
PLATELET # BLD AUTO: 377 THOUSANDS/UL (ref 149–390)
PMV BLD AUTO: 8.3 FL (ref 8.9–12.7)
POTASSIUM SERPL-SCNC: 4.5 MMOL/L (ref 3.5–5.3)
RBC # BLD AUTO: 4 MILLION/UL (ref 3.88–5.62)
SODIUM SERPL-SCNC: 140 MMOL/L (ref 135–147)
WBC # BLD AUTO: 9.77 THOUSAND/UL (ref 4.31–10.16)

## 2024-05-27 PROCEDURE — 85025 COMPLETE CBC W/AUTO DIFF WBC: CPT | Performed by: INTERNAL MEDICINE

## 2024-05-27 PROCEDURE — 80048 BASIC METABOLIC PNL TOTAL CA: CPT | Performed by: INTERNAL MEDICINE

## 2024-05-27 PROCEDURE — 99233 SBSQ HOSP IP/OBS HIGH 50: CPT | Performed by: INTERNAL MEDICINE

## 2024-05-27 PROCEDURE — 94760 N-INVAS EAR/PLS OXIMETRY 1: CPT

## 2024-05-27 PROCEDURE — 99232 SBSQ HOSP IP/OBS MODERATE 35: CPT | Performed by: INTERNAL MEDICINE

## 2024-05-27 PROCEDURE — 82948 REAGENT STRIP/BLOOD GLUCOSE: CPT

## 2024-05-27 PROCEDURE — 94640 AIRWAY INHALATION TREATMENT: CPT

## 2024-05-27 RX ADMIN — PANTOPRAZOLE SODIUM 40 MG: 40 TABLET, DELAYED RELEASE ORAL at 05:24

## 2024-05-27 RX ADMIN — ATORVASTATIN CALCIUM 40 MG: 40 TABLET, FILM COATED ORAL at 17:21

## 2024-05-27 RX ADMIN — METHYLPREDNISOLONE SODIUM SUCCINATE 20 MG: 40 INJECTION, POWDER, FOR SOLUTION INTRAMUSCULAR; INTRAVENOUS at 09:05

## 2024-05-27 RX ADMIN — BUDESONIDE 0.5 MG: 0.5 INHALANT RESPIRATORY (INHALATION) at 19:19

## 2024-05-27 RX ADMIN — LEVALBUTEROL HYDROCHLORIDE 1.25 MG: 1.25 SOLUTION RESPIRATORY (INHALATION) at 07:46

## 2024-05-27 RX ADMIN — GABAPENTIN 300 MG: 300 CAPSULE ORAL at 17:21

## 2024-05-27 RX ADMIN — SODIUM CHLORIDE SOLN NEBU 3% 4 ML: 3 NEBU SOLN at 19:19

## 2024-05-27 RX ADMIN — LEVALBUTEROL HYDROCHLORIDE 1.25 MG: 1.25 SOLUTION RESPIRATORY (INHALATION) at 16:10

## 2024-05-27 RX ADMIN — LEVALBUTEROL HYDROCHLORIDE 1.25 MG: 1.25 SOLUTION RESPIRATORY (INHALATION) at 19:19

## 2024-05-27 RX ADMIN — BUDESONIDE 0.5 MG: 0.5 INHALANT RESPIRATORY (INHALATION) at 07:46

## 2024-05-27 RX ADMIN — INSULIN LISPRO 1 UNITS: 100 INJECTION, SOLUTION INTRAVENOUS; SUBCUTANEOUS at 17:22

## 2024-05-27 RX ADMIN — IPRATROPIUM BROMIDE 0.5 MG: 0.5 SOLUTION RESPIRATORY (INHALATION) at 07:46

## 2024-05-27 RX ADMIN — IPRATROPIUM BROMIDE 0.5 MG: 0.5 SOLUTION RESPIRATORY (INHALATION) at 16:10

## 2024-05-27 RX ADMIN — METHYLPREDNISOLONE SODIUM SUCCINATE 20 MG: 40 INJECTION, POWDER, FOR SOLUTION INTRAMUSCULAR; INTRAVENOUS at 21:10

## 2024-05-27 RX ADMIN — ALPRAZOLAM 1 MG: 0.5 TABLET ORAL at 22:43

## 2024-05-27 RX ADMIN — AZITHROMYCIN DIHYDRATE 250 MG: 500 TABLET ORAL at 09:07

## 2024-05-27 RX ADMIN — MORPHINE SULFATE 15 MG: 15 TABLET, EXTENDED RELEASE ORAL at 09:06

## 2024-05-27 RX ADMIN — SODIUM CHLORIDE SOLN NEBU 3% 4 ML: 3 NEBU SOLN at 16:34

## 2024-05-27 RX ADMIN — GABAPENTIN 300 MG: 300 CAPSULE ORAL at 09:07

## 2024-05-27 RX ADMIN — IPRATROPIUM BROMIDE 0.5 MG: 0.5 SOLUTION RESPIRATORY (INHALATION) at 19:19

## 2024-05-27 RX ADMIN — MORPHINE SULFATE 15 MG: 15 TABLET, EXTENDED RELEASE ORAL at 17:21

## 2024-05-27 RX ADMIN — SODIUM CHLORIDE SOLN NEBU 3% 4 ML: 3 NEBU SOLN at 07:46

## 2024-05-27 NOTE — ASSESSMENT & PLAN NOTE
"Presents due to shortness of breath.  Called EMS due to distress.  Oxygen machine \"broken\" at home per patient  Wheezing and coughing noted  Does not appear bacterial.   Procal and lactic WNL   Afebrile   Home regimen: Breztri  Plan  Albuterol inhaler  Symbicort inhaler  Atrovent/Xopenex 3 times daily  Tessalon Perles  Zithromax x 3 days.  On 3 times weekly Zithromax  Pulmonary follow-up    D/w pulm regarding Daily resp as a possbility to prevent COPD exacerbations. Continue Steroid IV Q12 for now  "

## 2024-05-27 NOTE — PLAN OF CARE
Problem: Nutrition/Hydration-ADULT  Goal: Nutrient/Hydration intake appropriate for improving, restoring or maintaining nutritional needs  Description: Monitor and assess patient's nutrition/hydration status for malnutrition. Collaborate with interdisciplinary team and initiate plan and interventions as ordered.  Monitor patient's weight and dietary intake as ordered or per policy. Utilize nutrition screening tool and intervene as necessary. Determine patient's food preferences and provide high-protein, high-caloric foods as appropriate.     INTERVENTIONS:  - Monitor oral intake, urinary output, labs, and treatment plans  - Assess nutrition and hydration status and recommend course of action  - Evaluate amount of meals eaten  - Assist patient with eating if necessary   - Allow adequate time for meals  - Recommend/ encourage appropriate diets, oral nutritional supplements, and vitamin/mineral supplements  - Order, calculate, and assess calorie counts as needed  - Recommend, monitor, and adjust tube feedings and TPN/PPN based on assessed needs  - Assess need for intravenous fluids  - Provide specific nutrition/hydration education as appropriate  - Include patient/family/caregiver in decisions related to nutrition  Outcome: Progressing     Problem: PAIN - ADULT  Goal: Verbalizes/displays adequate comfort level or baseline comfort level  Description: Interventions:  - Encourage patient to monitor pain and request assistance  - Assess pain using appropriate pain scale  - Administer analgesics based on type and severity of pain and evaluate response  - Implement non-pharmacological measures as appropriate and evaluate response  - Consider cultural and social influences on pain and pain management  - Notify physician/advanced practitioner if interventions unsuccessful or patient reports new pain  Outcome: Progressing     Problem: INFECTION - ADULT  Goal: Absence or prevention of progression during  hospitalization  Description: INTERVENTIONS:  - Assess and monitor for signs and symptoms of infection  - Monitor lab/diagnostic results  - Monitor all insertion sites, i.e. indwelling lines, tubes, and drains  - Monitor endotracheal if appropriate and nasal secretions for changes in amount and color  - [place] appropriate cooling/warming therapies per order  - Administer medications as ordered  - Instruct and encourage patient and family to use good hand hygiene technique  - Identify and instruct in appropriate isolation precautions for identified infection/condition  Outcome: Progressing     Problem: DISCHARGE PLANNING  Goal: Discharge to home or other facility with appropriate resources  Description: INTERVENTIONS:  - Identify barriers to discharge w/patient and caregiver  - Arrange for needed discharge resources and transportation as appropriate  - Identify discharge learning needs (meds, wound care, etc.)  - Arrange for interpretive services to assist at discharge as needed  - Refer to Case Management Department for coordinating discharge planning if the patient needs post-hospital services based on physician/advanced practitioner order or complex needs related to functional status, cognitive ability, or social support system  Outcome: Progressing     Problem: Knowledge Deficit  Goal: Patient/family/caregiver demonstrates understanding of disease process, treatment plan, medications, and discharge instructions  Description: Complete learning assessment and assess knowledge base.  Interventions:  - Provide teaching at level of understanding  - Provide teaching via preferred learning methods  Outcome: Progressing

## 2024-05-27 NOTE — PROGRESS NOTES
"Progress Note - Pulmonary   Richard Nava 68 y.o. male MRN: [id number]  Unit/Bed#: -01 Encounter: [id number]      Assessment:   Acute on chronic hypoxic and hypercapnic respiratory failure   Severe COPD with acute exacerbation  Metastatic prostate cancer  Severe protein calorie malnutrition  Insomnia and anxiety     Plan:   The patient would like to discuss his care with oncology, case management and palliative care.  Please reconsult tomorrow   He continues to ask about use of chronic steroids as outpatient to prevent exacerbation.  We briefly discussed risks including fracture, advancement of malignancy along with potential benefits.  Not sure it will be the best action but may need to consider that to prevent recurring exacerbations  Continue Xopenex and Atrovent TID  Continue Pulmicort BID  Continue methylprednisolone q12.  I would not change it at this time as he has persistent wheezing, chest tightness.    Completed 3 days of Zithromax.  Started on chronic azithromycin to prevent recurring exacerbation.    Daliresp can be considered to prevent exacerbation.  Will likely need prior authorization as outpatient.    We discussed his previous attempts with multiple medications for insomnia including trazodone, melatonin, clonazepam.  Could consider low dose Doxepin to see if there is any benefit.    Consider nasal steroid and antihistamine to help with upper airway congestion.      Subjective:   Mrs. Nava feels a bit better today.  He still notes shortness of breath, wheezing and congestion in the upper airway.  He is also very concerned about his malignancy as well.      Objective:   Vitals: Blood pressure 130/76, pulse 66, temperature 97.9 °F (36.6 °C), resp. rate 14, height 5' 7\" (1.702 m), weight 42.8 kg (94 lb 5.7 oz), SpO2 98%., 3L NC, Body mass index is 14.78 kg/m².      Intake/Output Summary (Last 24 hours) at 5/27/2024 1209  Last data filed at 5/27/2024 0858  Gross per 24 hour   Intake 470 " ml   Output 700 ml   Net -230 ml         Physical Exam  Gen: Awake, alert, oriented x 3, no acute distress  HEENT: Mucous membranes moist, no oral lesions, no thrush  NECK: No accessory muscle use, JVP not elevated  Cardiac: Regular, single S1, single S2, no murmurs, no rubs, no gallops  Lungs: Inspiratory and expiratory wheezing.   Upper airway secretions noted.    Abdomen: normoactive bowel sounds, soft nontender, nondistended, no rebound or rigidity, no guarding  Extremities: no cyanosis, no clubbing, no edema    Labs: I have personally reviewed pertinent lab results.  Results from last 7 days   Lab Units 05/27/24  0438 05/26/24  0505 05/25/24  0543 05/24/24  0343   WBC Thousand/uL 9.77 7.47 5.60 5.21   HEMOGLOBIN g/dL 12.3 11.5* 10.6* 10.6*   HEMATOCRIT % 41.0 36.4* 34.0* 35.5*   PLATELETS Thousands/uL 377 356 329 342   SEGS PCT % 85*  --  82* 88*   MONO PCT % 7 2* 8 3*   EOS PCT % 0 0 0 0      Results from last 7 days   Lab Units 05/27/24  0438 05/26/24  0505 05/25/24  0543 05/22/24  0551 05/21/24  2135 05/21/24  2134   POTASSIUM mmol/L 4.5 4.0 4.0   < > 4.1  --    CHLORIDE mmol/L 97 100 102   < > 99  --    CO2 mmol/L 40* 40* 38*   < > 42*  --    CO2, I-STAT mmol/L  --   --   --   --   --  45*   BUN mg/dL 14 13 13   < > 14  --    CREATININE mg/dL 0.48* 0.35* 0.32*   < > 0.50*  --    CALCIUM mg/dL 8.9 8.2* 8.2*   < > 9.2  --    ALK PHOS U/L  --   --   --   --  40  --    ALT U/L  --   --   --   --  13  --    AST U/L  --   --   --   --  18  --    GLUCOSE, ISTAT mg/dl  --   --   --   --   --  130    < > = values in this interval not displayed.     Results from last 7 days   Lab Units 05/24/24  0343 05/22/24  0551   MAGNESIUM mg/dL 1.9 1.9          Results from last 7 days   Lab Units 05/21/24  2135   INR  0.94   PTT seconds 29     Results from last 7 days   Lab Units 05/21/24  2146   LACTIC ACID mmol/L 0.8     0   Lab Value Date/Time    TROPONINI <0.01 04/30/2024 1523    TROPONINI <0.02 02/18/2020 1840    TROPONINI  <0.02 02/18/2020 1550    TROPONINI <0.02 02/18/2020 0855         Meds/Allergies   Current Facility-Administered Medications   Medication Dose Route Frequency    acetaminophen (TYLENOL) tablet 650 mg  650 mg Oral Q6H PRN    albuterol (PROVENTIL HFA,VENTOLIN HFA) inhaler 1 puff  1 puff Inhalation Q4H PRN    albuterol inhalation solution 2.5 mg  2.5 mg Nebulization Q6H PRN    ALPRAZolam (XANAX) tablet 1 mg  1 mg Oral HS PRN    And    ALPRAZolam (XANAX) tablet 0.5 mg  0.5 mg Oral BID PRN    atorvastatin (LIPITOR) tablet 40 mg  40 mg Oral Daily With Dinner    azithromycin (ZITHROMAX) tablet 250 mg  250 mg Oral Once per day on Monday Wednesday Friday    benzonatate (TESSALON PERLES) capsule 100 mg  100 mg Oral TID PRN    budesonide (PULMICORT) inhalation solution 0.5 mg  0.5 mg Nebulization Q12H    fluticasone (FLONASE) 50 mcg/act nasal spray 1 spray  1 spray Each Nare Daily    gabapentin (NEURONTIN) capsule 300 mg  300 mg Oral BID    guaiFENesin (MUCINEX) 12 hr tablet 600 mg  600 mg Oral Q12H PRN    heparin (porcine) subcutaneous injection 5,000 Units  5,000 Units Subcutaneous Q8H FANNY    Hydrocod Jose-Chlorphe Jose ER (TUSSIONEX) ER suspension 5 mL  5 mL Oral Q12H PRN    insulin lispro (HumALOG/ADMELOG) 100 units/mL subcutaneous injection 1-6 Units  1-6 Units Subcutaneous TID AC    ipratropium (ATROVENT) 0.02 % inhalation solution 0.5 mg  0.5 mg Nebulization TID    levalbuterol (XOPENEX) inhalation solution 1.25 mg  1.25 mg Nebulization TID    methylPREDNISolone sodium succinate (Solu-MEDROL) injection 20 mg  20 mg Intravenous Q12H [facility]    morphine (MS CONTIN) ER tablet 15 mg  15 mg Oral BID    pantoprazole (PROTONIX) EC tablet 40 mg  40 mg Oral Early Morning    sodium chloride 3 % inhalation solution 4 mL  4 mL Nebulization TID       Medications Prior to Admission:     abiraterone (ZYTIGA) 250 mg tablet    albuterol (2.5 mg/3 mL) 0.083 % nebulizer solution    albuterol (PROVENTIL HFA,VENTOLIN HFA) 90 mcg/act inhaler     ALPRAZolam (XANAX) 0.5 mg tablet    benzonatate (TESSALON PERLES) 100 mg capsule    Budeson-Glycopyrrol-Formoterol (Breztri Aerosphere) 160-9-4.8 MCG/ACT AERO    lidocaine (LIDODERM) 5 %    morphine (MS CONTIN) 15 mg 12 hr tablet    atorvastatin (LIPITOR) 40 mg tablet    [] azithromycin (ZITHROMAX) 250 mg tablet    azithromycin (ZITHROMAX) 500 MG tablet    cefpodoxime (VANTIN) 200 mg tablet    clonazePAM (KlonoPIN) 0.5 MG disintegrating tablet    doxycycline hyclate (VIBRAMYCIN) 100 mg capsule    gabapentin (NEURONTIN) 300 mg capsule    naloxone (NARCAN) 4 mg/0.1 mL nasal spray    NON FORMULARY      Microbiology:  Lab Results   Component Value Date    BLOODCX No Growth After 5 Days. 2024    BLOODCX No Growth After 5 Days. 2024    BLOODCX No Growth After 5 Days. 2024    BLOODCX No Growth After 5 Days. 2024    SPUTUMCULTUR Test not performed. Suggest repeat specimen. 2024    SPUTUMCULTUR 3+ Growth of Pseudomonas aeruginosa (A) 2024    SPUTUMCULTUR 3+ Growth of 2020    SPUTUMCULTUR  2020     Commensal respiratory yee only; No significant growth of Staph aureus/MRSA or Pseudomonas aeruginosa.       Imaging and other studies: I have personally reviewed pertinent reports.    CXR - no acute cardiopulmonary disease    Rafiq Corral DO  [facility]

## 2024-05-27 NOTE — PROGRESS NOTES
[facility]  Progress Note  Name: Richard Nava I  MRN: [id number]  Unit/Bed#: -01 I Date of Admission: 5/21/2024   Date of Service: 5/27/2024  Hospital Day: 5    Assessment & Plan   Underweight  Assessment & Plan  Underweight POA due to chronic COPD with BMI of 14.78, cachexia, muscle wasting and poor appetite  Nutrition consult and recommendations  Nutirition supplements    Acute on chronic respiratory failure with hypoxia (HCC)  Assessment & Plan  Patient admitted with acute on chronic respiratory failure with hypoxia and requiring 6 L of supplemental oxygen  Per prior hospitalization was on 2 to 3 L  Educated the patient that his goal > 88%.  Pulmonary on board  Currently on 4 L  Wean oxygen as tolerated    Opioid dependence (HCC)  Assessment & Plan  Continue home MS Contin 15 mg every 12 hours  PDMP verified  Denies pain    Palliative care patient  Assessment & Plan  Patient refused to continue hospice care with [facility]  Possibly seeking new hospice care through [facility].  However patient thinking he may want to be treatment oriented again  Wants to be a DNR  Palliative care on board  Will ask palliaitive to see him tomorrow    Prostate cancer metastatic to bone (HCC)  Assessment & Plan  Stage 4 metastatic prostate cancer  Changed to hospice about 1 month ago.  Zytiga was discontinued at that time.  Patient has since stopped hospice.  Asking to be restarted on medications  Palliative care consulted.  Goals of care conversation  Will consult Heme-onc, patient insisting on restarting meds. Counselled him , that it will be heme- onc decision. He stated was not able to get an appointment to talk with heme- onc regarding Rx options      Severe protein-calorie malnutrition (HCC)  Assessment & Plan  Malnutrition Findings:   Adult Malnutrition type: Chronic illness  Adult Degree of Malnutrition: Other severe protein calorie malnutrition  Malnutrition Characteristics: Fat  "loss, Muscle loss, Inadequate energy, Weight loss                  360 Statement: Pt presents with severe protein calorie malnutriton as evidenced by BMI 14.78, sunken orbitals, clavicle protrusion, depressed temporals, po intake <75% for greater than 1 month and 4% wt loss in 1 month (5/22/24 95 lbs, 4/18/24 99 lbs). Treat with diet and supplements TID.    BMI Findings:  Adult BMI Classifications: Underweight < 18.5        Body mass index is 14.78 kg/m².   Nutrition consult    * COPD exacerbation (HCC)  Assessment & Plan  Presents due to shortness of breath.  Called EMS due to distress.  Oxygen machine \"broken\" at home per patient  Wheezing and coughing noted  Does not appear bacterial.   Procal and lactic WNL   Afebrile   Home regimen: Breztri  Plan  Albuterol inhaler  Symbicort inhaler  Atrovent/Xopenex 3 times daily  Tessalon Perles  Zithromax x 3 days.  On 3 times weekly Zithromax  Pulmonary follow-up    D/w pulm regarding Daily resp as a possbility to prevent COPD exacerbations. Continue Steroid IV Q12 for now               VTE Pharmacologic Prophylaxis: VTE Score: 5 High Risk (Score >/= 5) - Pharmacological DVT Prophylaxis Ordered: heparin. Sequential Compression Devices Ordered.    Mobility:   Basic Mobility Inpatient Raw Score: 23  JH-HLM Goal: 7: Walk 25 feet or more  JH-HLM Achieved: 7: Walk 25 feet or more  JH-HLM Goal achieved. Continue to encourage appropriate mobility.    Patient Centered Rounds: I performed bedside rounds with nursing staff today.   Discussions with Specialists or Other Care Team Provider: pulmonology    Education and Discussions with Family / Patient: Updated  (sister) at bedside.    Total Time Spent on Date of Encounter in care of patient: 55 mins. This time was spent on one or more of the following: performing physical exam; counseling and coordination of care; obtaining or reviewing history; documenting in the medical record; reviewing/ordering tests, medications or " procedures; communicating with other healthcare professionals and discussing with patient's family/caregivers.    Current Length of Stay: 5 day(s)  Current Patient Status: Inpatient   Certification Statement: The patient will continue to require additional inpatient hospital stay due to IV solumedrol  Discharge Plan: Anticipate discharge in 48 hrs to home.    Code Status: Level 3 - DNAR and DNI    Subjective:   no acute overnight events   Patient stated he is breathing better.  Has concerns with chemotherapy    Objective:     Vitals:   Temp (24hrs), Av °F (36.7 °C), Min:97.9 °F (36.6 °C), Max:98.1 °F (36.7 °C)    Temp:  [97.9 °F (36.6 °C)-98.1 °F (36.7 °C)] 97.9 °F (36.6 °C)  HR:  [63-68] 66  BP: ()/(54-76) 130/76  SpO2:  [98 %-100 %] 98 %  Body mass index is 14.78 kg/m².     Input and Output Summary (last 24 hours):     Intake/Output Summary (Last 24 hours) at 2024 1319  Last data filed at 2024 1200  Gross per 24 hour   Intake 470 ml   Output 850 ml   Net -380 ml       Physical Exam:   Physical Exam  Constitutional:       Comments: cachectic   HENT:      Head: Normocephalic and atraumatic.      Mouth/Throat:      Mouth: Mucous membranes are moist.      Pharynx: Oropharynx is clear.   Eyes:      General: No scleral icterus.        Right eye: No discharge.         Left eye: No discharge.      Conjunctiva/sclera: Conjunctivae normal.   Cardiovascular:      Rate and Rhythm: Normal rate and regular rhythm.   Pulmonary:      Effort: Pulmonary effort is normal. No respiratory distress.      Breath sounds: Wheezing (expiratory) present.   Abdominal:      General: Bowel sounds are normal.      Palpations: Abdomen is soft.   Skin:     General: Skin is warm and dry.      Capillary Refill: Capillary refill takes 2 to 3 seconds.   Neurological:      Mental Status: He is alert and oriented to person, place, and time.          Additional Data:     Labs:  Results from last 7 days   Lab Units 24  4598  05/23/24  0403 05/22/24  0551   WBC Thousand/uL 9.77   < > 5.30   HEMOGLOBIN g/dL 12.3   < > 10.6*   HEMATOCRIT % 41.0   < > 34.4*   PLATELETS Thousands/uL 377   < > 287   BANDS PCT %  --   --  3   SEGS PCT % 85*   < >  --    LYMPHO PCT % 7*   < > 13*   MONO PCT % 7   < > 1*   EOS PCT % 0   < > 0    < > = values in this interval not displayed.     Results from last 7 days   Lab Units 05/27/24  0438 05/22/24  0551 05/21/24  2135   SODIUM mmol/L 140   < > 143   POTASSIUM mmol/L 4.5   < > 4.1   CHLORIDE mmol/L 97   < > 99   CO2 mmol/L 40*   < > 42*   BUN mg/dL 14   < > 14   CREATININE mg/dL 0.48*   < > 0.50*   ANION GAP mmol/L 3*   < > 2*   CALCIUM mg/dL 8.9   < > 9.2   ALBUMIN g/dL  --   --  3.9   TOTAL BILIRUBIN mg/dL  --   --  0.25   ALK PHOS U/L  --   --  40   ALT U/L  --   --  13   AST U/L  --   --  18   GLUCOSE RANDOM mg/dL 196*   < > 132    < > = values in this interval not displayed.     Results from last 7 days   Lab Units 05/21/24  2135   INR  0.94     Results from last 7 days   Lab Units 05/27/24  1132 05/27/24  0715 05/26/24 2053 05/26/24  1607 05/26/24  1104 05/26/24  0742 05/25/24 2019 05/25/24  1604 05/25/24  1049 05/25/24  0701 05/24/24 2013 05/24/24  1550   POC GLUCOSE mg/dl 136 140 191* 194* 147* 259* 115 213* 157* 156* 198* 236*         Results from last 7 days   Lab Units 05/23/24  0403 05/21/24  2146 05/21/24  2135   LACTIC ACID mmol/L  --  0.8  --    PROCALCITONIN ng/ml 0.06  --  0.06       Lines/Drains:  Invasive Devices       Peripheral Intravenous Line  Duration             Peripheral IV 05/26/24 Dorsal (posterior);Left Forearm 1 day                          Imaging: No pertinent imaging reviewed.    Recent Cultures (last 7 days):   Results from last 7 days   Lab Units 05/21/24  2219 05/21/24  2146   BLOOD CULTURE  No Growth After 5 Days. No Growth After 5 Days.       Last 24 Hours Medication List:   Current Facility-Administered Medications   Medication Dose Route Frequency Provider Last  Rate    acetaminophen  650 mg Oral Q6H PRN Adela Murguia PA-C      albuterol  1 puff Inhalation Q4H PRN Adela Murguia PA-C      albuterol  2.5 mg Nebulization Q6H PRN Adela Murguia PA-C      ALPRAZolam  1 mg Oral HS PRN Arleth Scales PA-C      And    ALPRAZolam  0.5 mg Oral BID PRN Arleth Scales PA-C      atorvastatin  40 mg Oral Daily With Dinner Adela Murguia PA-C      azithromycin  250 mg Oral Once per day on Monday Wednesday Friday Brandon Cisneros MD      benzonatate  100 mg Oral TID PRN Adela Murguia PA-C      budesonide  0.5 mg Nebulization Q12H Efrain Dudley MD      fluticasone  1 spray Each Nare Daily Efrain Dudley MD      gabapentin  300 mg Oral BID Adela Murguia PA-C      guaiFENesin  600 mg Oral Q12H PRN ISA Araujo      heparin (porcine)  5,000 Units Subcutaneous Q8H [facility] Adela Murguia PA-C      Hydrocod Jose-Chlorphe Jose ER  5 mL Oral Q12H PRN Arleth Scales PA-C      insulin lispro  1-6 Units Subcutaneous TID AC Brandon Cisneros MD      ipratropium  0.5 mg Nebulization TID Brandon Cisneros MD      levalbuterol  1.25 mg Nebulization TID Brandon Cisneros MD      methylPREDNISolone sodium succinate  20 mg Intravenous Q12H [facility] ISA Araujo      morphine  15 mg Oral BID Adela Murguia PA-C      pantoprazole  40 mg Oral Early Morning Brandon Cisneros MD      sodium chloride  4 mL Nebulization TID Efrain Dudley MD          Today, Patient Was Seen By: Leisa Wilder MD    **Please Note: This note may have been constructed using a voice recognition system.**

## 2024-05-27 NOTE — ASSESSMENT & PLAN NOTE
Underweight POA due to chronic COPD with BMI of 14.78, cachexia, muscle wasting and poor appetite  Nutrition consult and recommendations  Nutirition supplements

## 2024-05-27 NOTE — ASSESSMENT & PLAN NOTE
Patient refused to continue hospice care with [facility]  Possibly seeking new hospice care through [facility].  However patient thinking he may want to be treatment oriented again  Wants to be a DNR  Palliative care on board  Will ask palliaitive to see him tomorrow

## 2024-05-27 NOTE — PLAN OF CARE
Problem: PAIN - ADULT  Goal: Verbalizes/displays adequate comfort level or baseline comfort level  Description: Interventions:  - Encourage patient to monitor pain and request assistance  - Assess pain using appropriate pain scale  - Administer analgesics based on type and severity of pain and evaluate response  - Implement non-pharmacological measures as appropriate and evaluate response  - Consider cultural and social influences on pain and pain management  - Notify physician/advanced practitioner if interventions unsuccessful or patient reports new pain  Outcome: Progressing     Problem: INFECTION - ADULT  Goal: Absence or prevention of progression during hospitalization  Description: INTERVENTIONS:  - Assess and monitor for signs and symptoms of infection  - Monitor lab/diagnostic results  - Monitor all insertion sites, i.e. indwelling lines, tubes, and drains  - Monitor endotracheal if appropriate and nasal secretions for changes in amount and color  - [place] appropriate cooling/warming therapies per order  - Administer medications as ordered  - Instruct and encourage patient and family to use good hand hygiene technique  - Identify and instruct in appropriate isolation precautions for identified infection/condition  Outcome: Progressing  Goal: Absence of fever/infection during neutropenic period  Description: INTERVENTIONS:  - Monitor WBC    Outcome: Progressing

## 2024-05-27 NOTE — ASSESSMENT & PLAN NOTE
Stage 4 metastatic prostate cancer  Changed to hospice about 1 month ago.  Zytiga was discontinued at that time.  Patient has since stopped hospice.  Asking to be restarted on medications  Palliative care consulted.  Goals of care conversation  Will consult Heme-onc, patient insisting on restarting meds. Counselled him , that it will be heme- onc decision. He stated was not able to get an appointment to talk with heme- onc regarding Rx options

## 2024-05-28 LAB
ANION GAP SERPL CALCULATED.3IONS-SCNC: 3 MMOL/L (ref 4–13)
BASOPHILS # BLD AUTO: 0.02 THOUSANDS/ÂΜL (ref 0–0.1)
BASOPHILS NFR BLD AUTO: 0 % (ref 0–1)
BUN SERPL-MCNC: 12 MG/DL (ref 5–25)
CALCIUM SERPL-MCNC: 8.4 MG/DL (ref 8.4–10.2)
CHLORIDE SERPL-SCNC: 98 MMOL/L (ref 96–108)
CO2 SERPL-SCNC: 38 MMOL/L (ref 21–32)
CREAT SERPL-MCNC: 0.43 MG/DL (ref 0.6–1.3)
EOSINOPHIL # BLD AUTO: 0.01 THOUSAND/ÂΜL (ref 0–0.61)
EOSINOPHIL NFR BLD AUTO: 0 % (ref 0–6)
ERYTHROCYTE [DISTWIDTH] IN BLOOD BY AUTOMATED COUNT: 13.9 % (ref 11.6–15.1)
GFR SERPL CREATININE-BSD FRML MDRD: 117 ML/MIN/1.73SQ M
GLUCOSE SERPL-MCNC: 138 MG/DL (ref 65–140)
GLUCOSE SERPL-MCNC: 145 MG/DL (ref 65–140)
GLUCOSE SERPL-MCNC: 192 MG/DL (ref 65–140)
GLUCOSE SERPL-MCNC: 206 MG/DL (ref 65–140)
GLUCOSE SERPL-MCNC: 252 MG/DL (ref 65–140)
HCT VFR BLD AUTO: 35.9 % (ref 36.5–49.3)
HGB BLD-MCNC: 11.1 G/DL (ref 12–17)
IMM GRANULOCYTES # BLD AUTO: 0.09 THOUSAND/UL (ref 0–0.2)
IMM GRANULOCYTES NFR BLD AUTO: 1 % (ref 0–2)
LYMPHOCYTES # BLD AUTO: 0.34 THOUSANDS/ÂΜL (ref 0.6–4.47)
LYMPHOCYTES NFR BLD AUTO: 5 % (ref 14–44)
MCH RBC QN AUTO: 31.1 PG (ref 26.8–34.3)
MCHC RBC AUTO-ENTMCNC: 30.9 G/DL (ref 31.4–37.4)
MCV RBC AUTO: 101 FL (ref 82–98)
MONOCYTES # BLD AUTO: 0.38 THOUSAND/ÂΜL (ref 0.17–1.22)
MONOCYTES NFR BLD AUTO: 5 % (ref 4–12)
NEUTROPHILS # BLD AUTO: 6.31 THOUSANDS/ÂΜL (ref 1.85–7.62)
NEUTS SEG NFR BLD AUTO: 89 % (ref 43–75)
NRBC BLD AUTO-RTO: 0 /100 WBCS
PLATELET # BLD AUTO: 365 THOUSANDS/UL (ref 149–390)
PMV BLD AUTO: 8.5 FL (ref 8.9–12.7)
POTASSIUM SERPL-SCNC: 4.2 MMOL/L (ref 3.5–5.3)
PSA SERPL-MCNC: 1.87 NG/ML (ref 0–4)
RBC # BLD AUTO: 3.57 MILLION/UL (ref 3.88–5.62)
SODIUM SERPL-SCNC: 139 MMOL/L (ref 135–147)
WBC # BLD AUTO: 7.15 THOUSAND/UL (ref 4.31–10.16)

## 2024-05-28 PROCEDURE — 82948 REAGENT STRIP/BLOOD GLUCOSE: CPT

## 2024-05-28 PROCEDURE — 99223 1ST HOSP IP/OBS HIGH 75: CPT | Performed by: NURSE PRACTITIONER

## 2024-05-28 PROCEDURE — 84153 ASSAY OF PSA TOTAL: CPT | Performed by: INTERNAL MEDICINE

## 2024-05-28 PROCEDURE — 85025 COMPLETE CBC W/AUTO DIFF WBC: CPT | Performed by: INTERNAL MEDICINE

## 2024-05-28 PROCEDURE — 94640 AIRWAY INHALATION TREATMENT: CPT

## 2024-05-28 PROCEDURE — 99233 SBSQ HOSP IP/OBS HIGH 50: CPT | Performed by: INTERNAL MEDICINE

## 2024-05-28 PROCEDURE — 80048 BASIC METABOLIC PNL TOTAL CA: CPT | Performed by: INTERNAL MEDICINE

## 2024-05-28 PROCEDURE — 94760 N-INVAS EAR/PLS OXIMETRY 1: CPT

## 2024-05-28 PROCEDURE — 99232 SBSQ HOSP IP/OBS MODERATE 35: CPT

## 2024-05-28 RX ORDER — METHYLPREDNISOLONE SODIUM SUCCINATE 40 MG/ML
20 INJECTION, POWDER, LYOPHILIZED, FOR SOLUTION INTRAMUSCULAR; INTRAVENOUS EVERY 12 HOURS SCHEDULED
Status: COMPLETED | OUTPATIENT
Start: 2024-05-28 | End: 2024-05-28

## 2024-05-28 RX ORDER — METHYLPREDNISOLONE 4 MG/1
32 TABLET ORAL 2 TIMES DAILY
Status: DISCONTINUED | OUTPATIENT
Start: 2024-05-29 | End: 2024-05-29 | Stop reason: HOSPADM

## 2024-05-28 RX ADMIN — MORPHINE SULFATE 15 MG: 15 TABLET, EXTENDED RELEASE ORAL at 08:51

## 2024-05-28 RX ADMIN — SODIUM CHLORIDE SOLN NEBU 3% 4 ML: 3 NEBU SOLN at 14:10

## 2024-05-28 RX ADMIN — IPRATROPIUM BROMIDE 0.5 MG: 0.5 SOLUTION RESPIRATORY (INHALATION) at 20:27

## 2024-05-28 RX ADMIN — IPRATROPIUM BROMIDE 0.5 MG: 0.5 SOLUTION RESPIRATORY (INHALATION) at 07:27

## 2024-05-28 RX ADMIN — GABAPENTIN 300 MG: 300 CAPSULE ORAL at 17:46

## 2024-05-28 RX ADMIN — ATORVASTATIN CALCIUM 40 MG: 40 TABLET, FILM COATED ORAL at 17:46

## 2024-05-28 RX ADMIN — GABAPENTIN 300 MG: 300 CAPSULE ORAL at 08:51

## 2024-05-28 RX ADMIN — SODIUM CHLORIDE SOLN NEBU 3% 4 ML: 3 NEBU SOLN at 07:27

## 2024-05-28 RX ADMIN — LEVALBUTEROL HYDROCHLORIDE 1.25 MG: 1.25 SOLUTION RESPIRATORY (INHALATION) at 07:27

## 2024-05-28 RX ADMIN — SODIUM CHLORIDE SOLN NEBU 3% 4 ML: 3 NEBU SOLN at 20:27

## 2024-05-28 RX ADMIN — BUDESONIDE 0.5 MG: 0.5 INHALANT RESPIRATORY (INHALATION) at 07:29

## 2024-05-28 RX ADMIN — ALPRAZOLAM 0.5 MG: 0.5 TABLET ORAL at 08:50

## 2024-05-28 RX ADMIN — PANTOPRAZOLE SODIUM 40 MG: 40 TABLET, DELAYED RELEASE ORAL at 05:18

## 2024-05-28 RX ADMIN — BUDESONIDE 0.5 MG: 0.5 INHALANT RESPIRATORY (INHALATION) at 20:27

## 2024-05-28 RX ADMIN — METHYLPREDNISOLONE SODIUM SUCCINATE 20 MG: 40 INJECTION, POWDER, FOR SOLUTION INTRAMUSCULAR; INTRAVENOUS at 21:32

## 2024-05-28 RX ADMIN — LEVALBUTEROL HYDROCHLORIDE 1.25 MG: 1.25 SOLUTION RESPIRATORY (INHALATION) at 14:10

## 2024-05-28 RX ADMIN — IPRATROPIUM BROMIDE 0.5 MG: 0.5 SOLUTION RESPIRATORY (INHALATION) at 14:10

## 2024-05-28 RX ADMIN — INSULIN LISPRO 2 UNITS: 100 INJECTION, SOLUTION INTRAVENOUS; SUBCUTANEOUS at 17:47

## 2024-05-28 RX ADMIN — MORPHINE SULFATE 15 MG: 15 TABLET, EXTENDED RELEASE ORAL at 17:46

## 2024-05-28 RX ADMIN — METHYLPREDNISOLONE SODIUM SUCCINATE 20 MG: 40 INJECTION, POWDER, FOR SOLUTION INTRAMUSCULAR; INTRAVENOUS at 08:51

## 2024-05-28 RX ADMIN — LEVALBUTEROL HYDROCHLORIDE 1.25 MG: 1.25 SOLUTION RESPIRATORY (INHALATION) at 20:27

## 2024-05-28 NOTE — CASE MANAGEMENT
Case Management Discharge Planning Note    Patient name Richard Nava  Location /-01 MRN [id number]  : 1955 Date 2024       Current Admission Date: 2024  Current Admission Diagnosis:COPD exacerbation (HCC)   Patient Active Problem List    Diagnosis Date Noted Date Diagnosed    Underweight 2024     Neuropathy due to chemotherapeutic drug (HCC) 05/10/2024     COPD exacerbation (HCC) 2023     Low oxygen saturation 2023     Opioid dependence (HCC) 2023     Neoplasm related pain 2023     Medical marijuana use 2023     Anorexia 2022     Cachexia (HCC) 2022     Palliative care patient 2022     Prostate cancer metastatic to bone (HCC) 2022     Insomnia 2022     Hot flashes 2022     Personal history of colonic polyps 2021     HLD (hyperlipidemia) 2020     Severe protein-calorie malnutrition (HCC) 2020     Acute on chronic respiratory failure with hypoxia (HCC) 2020     Emphysema lung (HCC) 2020       LOS (days): 6  Geometric Mean LOS (GMLOS) (days): 3.6  Days to GMLOS:-2.3     OBJECTIVE:  Risk of Unplanned Readmission Score: 35.88         Current admission status: Inpatient   Preferred Pharmacy:   [facility]/pharmacy #[id number] - [place], [place] - [address]  [address] [address]  Phone: [phone] Fax: [phone]    [facility] - [address] [place] [facility] [address]  [place] [address]  Phone: [phone] Fax: [phone]    Primary Care Provider: Kevin Mejia DO    Primary Insurance: MEDICARE  Secondary Insurance:     DISCHARGE DETAILS:    IMM Given (Date):: 24  IMM Given to:: Patient  IMM reviewed with patient, patient agrees with discharge determination.       Additional Comments: CM offered HHC; pt declined.

## 2024-05-28 NOTE — ASSESSMENT & PLAN NOTE
Patient refused to continue hospice care with [facility]  Possibly seeking new hospice care through [facility]'s.  However patient thinking he may want to be treatment oriented again  Wants to be a DNR  Palliative care on board

## 2024-05-28 NOTE — CONSULTS
Medical Oncology/Hematology Consult Note  Richard Nava, 68 y.o., 1955  [birth year], [id number]  05/28/24    Assessment and Plan:    1.  Metastatic prostate cancer  Originally Dx 12/2021   PSA at diagnosis 850  Treatment regimen includes Lupron every 6 months, Zytiga plus low-dose prednisone, Xgeva every 3 months  Patient has tolerated this treatment regimen well and his disease has responded nicely.  1/29/24 PSA 0.98    Zytiga and low dose prednisone discontinued last month when patient briefly signed onto hospice  Received Lupron on 4/2/24  Xgeva q 3 months last given 1/31/24 4/30/24 CT CAP demonstrated innumerable sclerotic lesions throughout the visualized osseous structures with an appearance of osseous metastasis and in correlation with his history of metastatic prostate cancer.  No other metastatic disease identified    5/28/24 PSA 1.87    Most recent PSA is up from January.  I suspect this is due to patient's treatment regimen being discontinued when he signed onto hospice.  Patient has discharged himself from hospice services and wishes to resume his cancer therapy.    Patient's Zytiga is nonformulary and will be restarted in the outpatient setting.  He will require steroid with Zytiga to help manage potential side effects.  He has been able to tolerate low-dose prednisone with his Zytiga in the past.    I have arranged for outpatient follow-up with Dr. Alvarez on June 11 at 9 AM.  Will discuss restarting him on his metastatic prostate cancer regimen at this appointment.  Patient is very appreciative      2. Severe COPD  3.  Pulmonary and malignant cachexia  Currently saturating at 99% on 3 L O2.  Appreciate pulmonology and recommendations.        Please do not hesitate to contact me if you have any questions or need additional information. Thank you for this consult.    Ana Ying MSN, CRNP, OCN  Hematology Oncology Nurse Practitioner      Reason for Consultation: Metastatic  Prostate Cancer        History of present illness:   Richard Nava is a 68 y.o. male with a history of severe COPD, pulmonary cachexia who is well-known to medical oncology for his metastatic prostate cancer diagnosed in 12/2021. His treatment regimen for his prostate cancer has included Lupron, Zytiga and prednisone plus Xgeva. His prostate cancer has been under good control on this regimen and his PSA had come down from 7-0.98 when last checked in January 2024.  He has had multiple recent hospitalizations for his severe COPD and during a recent hospitalization at [facility] in April 2024 he was transition to hospice care.  His Zytiga was discontinued at that time.  Patient chose to sign off of hospice and he presented to [facility] on 5/22/24 for treatment of chronic respiratory failure, COPD exacerbation.    He is requesting treatment focused cares and would like to restart his cancer medications.      Oncologic History:    Oncology History Overview Note   12/302021 Iliac Crest, Right, bone lesion:  -Metastatic adenocarcinoma, immunohistochemically consistent with metastatic prostatic adenocarcinoma    1/5/2022 started on Firmagon by Urology    2/2022 started Xtandi     5/2022 hospitalized with acute encephalopathy. Xtandi discontinued and he was switched to Zytiga and prednisone 2.5 mg    Xgeva every 3 months     1/27/23 PSMA PET CT showed stable scattered bony lesions, no evidence of progression       Prostate cancer metastatic to bone (HCC)   12/30/2021 Initial Diagnosis    Prostate cancer metastatic to bone (HCC)                 Interval history:  Patient states his breathing is improved and feels he has less chest congestion today.  He was happy to see me and discuss resuming his cancer therapy.  I did review with him we will get this started in the outpatient setting      Review of Systems   Respiratory:  Positive for shortness of breath.    All other systems reviewed and are negative.    All other  review of systems were negative.    Past medical history:   Past Medical History:   Diagnosis Date    Bone cancer (HCC)     Colon polyp     COPD (chronic obstructive pulmonary disease) (HCC)     Emphysema lung (HCC)     Encephalopathy acute 2022    Hyperlipidemia     Left lower quadrant abdominal pain 2021    Positive blood culture 2023    Prostate cancer (HCC)     Respiratory distress 2024       Past surgical history:   Past Surgical History:   Procedure Laterality Date    APPENDECTOMY      COLONOSCOPY      IR BIOPSY BONE  2021    JOINT REPLACEMENT      UPPER GASTROINTESTINAL ENDOSCOPY         Allergies: No Known Allergies    Home medications:   Medications Prior to Admission:     abiraterone (ZYTIGA) 250 mg tablet    albuterol (2.5 mg/3 mL) 0.083 % nebulizer solution    albuterol (PROVENTIL HFA,VENTOLIN HFA) 90 mcg/act inhaler    ALPRAZolam (XANAX) 0.5 mg tablet    benzonatate (TESSALON PERLES) 100 mg capsule    Budeson-Glycopyrrol-Formoterol (Breztri Aerosphere) 160-9-4.8 MCG/ACT AERO    lidocaine (LIDODERM) 5 %    morphine (MS CONTIN) 15 mg 12 hr tablet    atorvastatin (LIPITOR) 40 mg tablet    [] azithromycin (ZITHROMAX) 250 mg tablet    azithromycin (ZITHROMAX) 500 MG tablet    cefpodoxime (VANTIN) 200 mg tablet    clonazePAM (KlonoPIN) 0.5 MG disintegrating tablet    doxycycline hyclate (VIBRAMYCIN) 100 mg capsule    gabapentin (NEURONTIN) 300 mg capsule    naloxone (NARCAN) 4 mg/0.1 mL nasal spray    NON FORMULARY    Hospital medications:   Current Facility-Administered Medications:     acetaminophen (TYLENOL) tablet 650 mg, 650 mg, Oral, Q6H PRN, Adela Murguia PA-C, 650 mg at 24 0349    albuterol (PROVENTIL HFA,VENTOLIN HFA) inhaler 1 puff, 1 puff, Inhalation, Q4H PRN, Adela Murguia PA-C, 1 puff at 24 0436    albuterol inhalation solution 2.5 mg, 2.5 mg, Nebulization, Q6H PRN, Adela Murguia PA-C, 2.5 mg at 24 0140    ALPRAZolam (XANAX) tablet 1 mg, 1 mg,  Oral, HS PRN, 1 mg at 05/27/24 2243 **AND** ALPRAZolam (XANAX) tablet 0.5 mg, 0.5 mg, Oral, BID PRN, Arleth Scales PA-C, 0.5 mg at 05/26/24 0840    atorvastatin (LIPITOR) tablet 40 mg, 40 mg, Oral, Daily With Dinner, Adela Murguia PA-C, 40 mg at 05/27/24 1721    azithromycin (ZITHROMAX) tablet 250 mg, 250 mg, Oral, Once per day on Monday Wednesday Friday, Brandon Cisneros MD, 250 mg at 05/27/24 0907    benzonatate (TESSALON PERLES) capsule 100 mg, 100 mg, Oral, TID PRN, Adela Murguia PA-C, 100 mg at 05/22/24 0140    budesonide (PULMICORT) inhalation solution 0.5 mg, 0.5 mg, Nebulization, Q12H, Efrain Dudley MD, 0.5 mg at 05/27/24 1919    fluticasone (FLONASE) 50 mcg/act nasal spray 1 spray, 1 spray, Each Nare, Daily, Efrain Dudley MD, 1 spray at 05/24/24 0841    gabapentin (NEURONTIN) capsule 300 mg, 300 mg, Oral, BID, Adela Murguia PA-C, 300 mg at 05/27/24 1721    guaiFENesin (MUCINEX) 12 hr tablet 600 mg, 600 mg, Oral, Q12H PRN, ISA Araujo    heparin (porcine) subcutaneous injection 5,000 Units, 5,000 Units, Subcutaneous, Q8H FANNY, Adela Murguia PA-C, 5,000 Units at 05/26/24 0512    Hydrocod Jose-Chlorphe Jose ER (TUSSIONEX) ER suspension 5 mL, 5 mL, Oral, Q12H PRN, Arleth Scales PA-C    insulin lispro (HumALOG/ADMELOG) 100 units/mL subcutaneous injection 1-6 Units, 1-6 Units, Subcutaneous, TID AC, 1 Units at 05/27/24 1722 **AND** Fingerstick Glucose (POCT), , , TID AC, Brandon Cisneros MD    ipratropium (ATROVENT) 0.02 % inhalation solution 0.5 mg, 0.5 mg, Nebulization, TID, Brandon Cisneros MD, 0.5 mg at 05/28/24 0727    levalbuterol (XOPENEX) inhalation solution 1.25 mg, 1.25 mg, Nebulization, TID, Brandon Cisneros MD, 1.25 mg at 05/28/24 0727    methylPREDNISolone sodium succinate (Solu-MEDROL) injection 20 mg, 20 mg, Intravenous, Q12H FANNY, ISA Araujo, 20 mg at 05/27/24 2110    morphine (MS CONTIN) ER tablet 15 mg, 15 mg, Oral, BID, Adela Murguia PA-C, 15 mg at  24 1721    pantoprazole (PROTONIX) EC tablet 40 mg, 40 mg, Oral, Early Morning, Brandon Cisneros MD, 40 mg at 24 0518    sodium chloride 3 % inhalation solution 4 mL, 4 mL, Nebulization, TID, Efrain Dudley MD, 4 mL at 24 0727    Social history:   Social History     Tobacco Use    Smoking status: Former     Current packs/day: 0.00     Average packs/day: 1 pack/day for 42.0 years (42.0 ttl pk-yrs)     Types: Cigarettes     Start date:      Quit date:      Years since quittin.4    Smokeless tobacco: Never   Vaping Use    Vaping status: Never Used   Substance Use Topics    Alcohol use: Never    Drug use: Never       Family history:   Family History   Problem Relation Age of Onset    Lung cancer Mother     Breast cancer Sister     Colon polyps Brother     Colon cancer Neg Hx        Vitals:  Vitals:    24 0711   BP: 122/67   Pulse: 65   Resp: 19   Temp: 97.9 °F (36.6 °C)   SpO2: 99%       Physical Exam  Constitutional:       General: He is not in acute distress.     Appearance: He is ill-appearing (chronically ill).      Comments: Cachetic    HENT:      Head: Normocephalic and atraumatic.      Mouth/Throat:      Pharynx: No oropharyngeal exudate.   Eyes:      General: No scleral icterus.  Cardiovascular:      Rate and Rhythm: Normal rate and regular rhythm.   Pulmonary:      Effort: Pulmonary effort is normal. No respiratory distress.      Comments: O2 NC  Diminished breath sounds  Abdominal:      General: Abdomen is flat.   Skin:     General: Skin is warm and dry.   Neurological:      General: No focal deficit present.      Mental Status: He is alert and oriented to person, place, and time.   Psychiatric:         Mood and Affect: Mood normal.         Behavior: Behavior normal.         Recent Results (from the past 48 hour(s))   Fingerstick Glucose (POCT)    Collection Time: 24  7:42 AM   Result Value Ref Range    POC Glucose 259 (H) 65 - 140 mg/dl   Fingerstick Glucose (POCT)     Collection Time: 05/26/24 11:04 AM   Result Value Ref Range    POC Glucose 147 (H) 65 - 140 mg/dl   Fingerstick Glucose (POCT)    Collection Time: 05/26/24  4:07 PM   Result Value Ref Range    POC Glucose 194 (H) 65 - 140 mg/dl   Fingerstick Glucose (POCT)    Collection Time: 05/26/24  8:53 PM   Result Value Ref Range    POC Glucose 191 (H) 65 - 140 mg/dl   Basic metabolic panel    Collection Time: 05/27/24  4:38 AM   Result Value Ref Range    Sodium 140 135 - 147 mmol/L    Potassium 4.5 3.5 - 5.3 mmol/L    Chloride 97 96 - 108 mmol/L    CO2 40 (H) 21 - 32 mmol/L    ANION GAP 3 (L) 4 - 13 mmol/L    BUN 14 5 - 25 mg/dL    Creatinine 0.48 (L) 0.60 - 1.30 mg/dL    Glucose 196 (H) 65 - 140 mg/dL    Calcium 8.9 8.4 - 10.2 mg/dL    eGFR 112 ml/min/1.73sq m   CBC and differential    Collection Time: 05/27/24  4:38 AM   Result Value Ref Range    WBC 9.77 4.31 - 10.16 Thousand/uL    RBC 4.00 3.88 - 5.62 Million/uL    Hemoglobin 12.3 12.0 - 17.0 g/dL    Hematocrit 41.0 36.5 - 49.3 %     (H) 82 - 98 fL    MCH 30.8 26.8 - 34.3 pg    MCHC 30.0 (L) 31.4 - 37.4 g/dL    RDW 13.5 11.6 - 15.1 %    MPV 8.3 (L) 8.9 - 12.7 fL    Platelets 377 149 - 390 Thousands/uL    nRBC 0 /100 WBCs    Segmented % 85 (H) 43 - 75 %    Immature Grans % 1 0 - 2 %    Lymphocytes % 7 (L) 14 - 44 %    Monocytes % 7 4 - 12 %    Eosinophils Relative 0 0 - 6 %    Basophils Relative 0 0 - 1 %    Absolute Neutrophils 8.30 (H) 1.85 - 7.62 Thousands/µL    Absolute Immature Grans 0.12 0.00 - 0.20 Thousand/uL    Absolute Lymphocytes 0.67 0.60 - 4.47 Thousands/µL    Absolute Monocytes 0.65 0.17 - 1.22 Thousand/µL    Eosinophils Absolute 0.01 0.00 - 0.61 Thousand/µL    Basophils Absolute 0.02 0.00 - 0.10 Thousands/µL   Fingerstick Glucose (POCT)    Collection Time: 05/27/24  7:15 AM   Result Value Ref Range    POC Glucose 140 65 - 140 mg/dl   Fingerstick Glucose (POCT)    Collection Time: 05/27/24 11:32 AM   Result Value Ref Range    POC Glucose 136 65 - 140  mg/dl   Fingerstick Glucose (POCT)    Collection Time: 05/27/24  4:20 PM   Result Value Ref Range    POC Glucose 269 (H) 65 - 140 mg/dl   Fingerstick Glucose (POCT)    Collection Time: 05/27/24  9:16 PM   Result Value Ref Range    POC Glucose 205 (H) 65 - 140 mg/dl   Basic metabolic panel    Collection Time: 05/28/24  3:58 AM   Result Value Ref Range    Sodium 139 135 - 147 mmol/L    Potassium 4.2 3.5 - 5.3 mmol/L    Chloride 98 96 - 108 mmol/L    CO2 38 (H) 21 - 32 mmol/L    ANION GAP 3 (L) 4 - 13 mmol/L    BUN 12 5 - 25 mg/dL    Creatinine 0.43 (L) 0.60 - 1.30 mg/dL    Glucose 206 (H) 65 - 140 mg/dL    Calcium 8.4 8.4 - 10.2 mg/dL    eGFR 117 ml/min/1.73sq m   CBC and differential    Collection Time: 05/28/24  3:58 AM   Result Value Ref Range    WBC 7.15 4.31 - 10.16 Thousand/uL    RBC 3.57 (L) 3.88 - 5.62 Million/uL    Hemoglobin 11.1 (L) 12.0 - 17.0 g/dL    Hematocrit 35.9 (L) 36.5 - 49.3 %     (H) 82 - 98 fL    MCH 31.1 26.8 - 34.3 pg    MCHC 30.9 (L) 31.4 - 37.4 g/dL    RDW 13.9 11.6 - 15.1 %    MPV 8.5 (L) 8.9 - 12.7 fL    Platelets 365 149 - 390 Thousands/uL    nRBC 0 /100 WBCs    Segmented % 89 (H) 43 - 75 %    Immature Grans % 1 0 - 2 %    Lymphocytes % 5 (L) 14 - 44 %    Monocytes % 5 4 - 12 %    Eosinophils Relative 0 0 - 6 %    Basophils Relative 0 0 - 1 %    Absolute Neutrophils 6.31 1.85 - 7.62 Thousands/µL    Absolute Immature Grans 0.09 0.00 - 0.20 Thousand/uL    Absolute Lymphocytes 0.34 (L) 0.60 - 4.47 Thousands/µL    Absolute Monocytes 0.38 0.17 - 1.22 Thousand/µL    Eosinophils Absolute 0.01 0.00 - 0.61 Thousand/µL    Basophils Absolute 0.02 0.00 - 0.10 Thousands/µL   Fingerstick Glucose (POCT)    Collection Time: 05/28/24  7:10 AM   Result Value Ref Range    POC Glucose 145 (H) 65 - 140 mg/dl       Imaging Studies:   XR chest portable    Result Date: 5/22/2024  Narrative: XR CHEST PORTABLE INDICATION: Acute Resp Failure. COMPARISON: 4/17/2024 FINDINGS: Monitoring leads and clips project  over the chest. Clear lungs. Lungs remain hyperinflated indicative of emphysema. No pneumothorax or pleural effusion. Normal cardiomediastinal silhouette. Bones are unremarkable for age. Normal upper abdomen.     Impression: No acute cardiopulmonary disease. Workstation performed: [id number]     CTA chest pe study    Result Date: 4/30/2024  Narrative: Initial report created on 4/30/2024 9:58:28 PM EDT PROCEDURE INFORMATION: Exam: CTA Chest With Contrast Exam date and time: 4/30/2024 7:05 PM Age: 68 years old Clinical indication: Pulmonary embolism (pe) suspected, high prob. Metastatic prostate cancer TECHNIQUE: Imaging protocol: Computed tomographic angiography of the chest with contrast, including non-contrast images if performed. Exam focused on the arteries. Radiation optimization: All CT scans at this facility use at least one of these dose optimization techniques: automated exposure control; mA and/or kV adjustment per patient size (includes targeted exams where dose is matched to clinical indication); or iterative reconstruction. COMPARISON: DX XR CHEST PA OR AP PORTABLE 4/30/2024 3:23 PM FINDINGS: Pulmonary arteries: No pulmonary arterial enlargement. No pulmonary emboli. Aorta: There is calcification of the aorta and the aortic branches. Lungs: Severe centrilobular emphysema. The lung fields are hyperexpanded. There is enlargement of the trachea. There is diffuse bronchial wall thickening. Bronchial wall thickening is asymmetric and more pronounced involving the left lower lobe. There is severe bilateral apical lung disease. Ground-glass density is seen within the upper lobes, most prominent involving the right apex. Pleural spaces: No pleural effusion. There is bilateral apical pleural thickening. There is bilateral fissural thickening. There are patchy areas of ground-glass opacity also seen within the left lower lobe. There is an irregular pulmonary nodule in the left lower lobe measuring 1.5 cm, image  228 of series 2. There is an irregular pulmonary nodule in the left lower lobe measuring 8.3 mm on image 241 of series 2. There is an irregular pulmonary nodule in the left lower lobe measuring 7.6 mm on image 264 series 2. There is an irregular pulmonary opacity in the left lower lobe measuring 2 cm on image 70 of series 5. There are multiple smaller pulmonary nodules in the left lower lobe.  Heart: No cardiac enlargement.There is coronary artery calcification. Esophagus: The esophagus is patulous. Lymph nodes: There is no suspicious adenopathy. Intraperitoneal space: The study is not tailored for subdiaphragmatic evaluation. Please see the separately dictated CT abdomen pelvis dated 04/30/2024 regarding additional abdominal and pelvis findings. Bones/joints: There are innumerable sclerotic lesions throughout the visualized appendicular and axial skeleton consistent with patient's history of metastatic prostate cancer. There are several ivory vertebra. Osseous lesions are extensive. Soft tissues: Cachexia.    Impression: 1. No findings of pulmonary embolus. 2. Severe centrilobular emphysema. 3. There are multiple irregular pulmonary nodules in the left lower lobe.  This is associated with left lower lobe bronchial wall thickening.  These changes may be infectious, inflammatory, or neoplastic.  Non emergent follow-up or tissue sampling is recommended.  It is noted that the Fleischer Society Pulmonary nodule guidelines do not apply to patients with known malignancy. 4. Innumerable sclerotic lesions throughout the visualized appendicular and axial skeleton consistent with patient's history metastatic prostate cancer. Reference: Maddy H, et al. Guidelines for Management of Incidental Pulmonary Nodules Detected on CT Images: From the Fleischner Society 2017. Radiology. 2017;284(1):228-243. Link: https://doi.org/10.1148/radiol.0670496479 Reference: Hortensia BLAKE, et al. Updated Fleischner Society Guidelines for Managing  Incidental Pulmonary Nodules: Common Questions and Challenging Scenarios. Radiographics. 2018;38(5):3004-3235. Link: https://doi.org/10.1148/rg.[email or web address] Modified: 10/14/2021 COMMENTS: The presence of pulmonary emphysema on CT is an independent risk factor for lung cancer. In the absence of a history or active diagnosis of lung cancer, it is recommended that this patient with emphysema be evaluated for enrollment in a low dose CT lung cancer screening program. THIS DOCUMENT HAS BEEN ELECTRONICALLY SIGNED BY HOLLY ARCINIEGA MD    CTA abdomen pelvis w wo contrast    Result Date: 4/30/2024  Narrative: Initial report created on 4/30/2024 8:42:32 PM EDT PROCEDURE INFORMATION: Exam: CT Abdomen And Pelvis With Contrast Exam date and time: 4/30/2024 7:05 PM Age: 68 years old Clinical indication: Abdominal pain, acute, nonlocalized TECHNIQUE: Imaging protocol: Computed tomography of the abdomen and pelvis with contrast. 3D rendering (Not supervised by radiologist): MIP and/or 3D reconstructed images were created by the technologist. Radiation optimization: All CT scans at this facility use at least one of these dose optimization techniques: automated exposure control; mA and/or kV adjustment per patient size (includes targeted exams where dose is matched to clinical indication); or iterative reconstruction. COMPARISON: CTA CHEST PULMONARY EMBOLISM 4/30/2024 7:05 PM FINDINGS: Limitations: No oral contrast and no intraperitoneal or retroperitoneal fat. Lungs: Please see the separately dictated, preceding CT chest dated 04/30/2024 regarding additional chest and lung findings.   Liver: The liver is unremarkable. Gallbladder and bile ducts: There are no calcified gallstones. Pancreas: The pancreas is unremarkable as visualized. Spleen: No splenomegaly. Adrenal glands: No adrenal gland enlargement. Kidneys and ureters: There are punctate nonobstructing intrarenal calculi on the left. There are numerous small renal hypodensities,  too small to adequately characterize. The ureters are not well visualized due to lack of retroperitoneal fat. Stomach and bowel: Evaluation of the gut is limited without oral contrast. Evaluation of the gut is also limited due to lack of intraperitoneal fat. The gut is poorly evaluated in this patient. Retained fecal matter is seen throughout the colon. The cecum is located low in the right lower quadrant. Appendix: Pelvic location of cecum limits appendiceal visualization. Paucity of intra-abdominal and pelvic mesenteric fat, as well as absence of enteric contrast within distal small bowel loops and cecum, limits confident appendiceal identification. Intraperitoneal space: No ascites. Vasculature: There is calcification of the aorta and the aortic branches. Lymph nodes: There is no bulky lymphadenopathy. Evaluation for lymphadenopathy is limited due to lack of intraperitoneal and retroperitoneal fat. Urinary bladder: Calcific densities within the dependent portion of the bladder may represent bladder wall calcification or layering bladder stones. Reproductive: Unremarkable as visualized. Bones/joints: There are innumerable sclerotic lesions throughout the visualized osseous structures. This has the appearance of osseous metastasis. Cancer history is either not known or not provided. There is an ivory vertebra at T11. There are bilateral pars defects at L5 with grade 2 anterolisthesis. Soft tissues: Cachexia. Clothing artifact. The patient was not placed in a gown for imaging.    Impression: 1.   There are innumerable sclerotic lesions throughout the visualized osseous structures having the appearance of osseous metastasis. Cancer history is either not known or not provided. Close clinical correlation is needed. 2.   Calcific densities within the dependent portion of the bladder may represent bladder wall calcification or layering bladder stones. COMMENTS: Consistent with the American College of Radiology's  Incidental Findings Committee white paper (J Am Federica Radiol 2018): Any incidental renal lesion less than 1 cm or classified as too small to characterize, or any incidental cystic renal lesion characterized as simple-appearing, is likely benign. No follow-up imaging is recommended for these lesions per consensus recommendations based on imaging criteria. THIS DOCUMENT HAS BEEN ELECTRONICALLY SIGNED BY HOLLY ARCINIEGA MD    XR chest portable    Result Date: 4/30/2024  Narrative: History: Shortness of breath. Comments: AP views of the chest were obtained and compared with prior exam dated 10/21/2022. Heart is of normal size. Mediastinum is not widened there is a 1.2 cm questionable nodule in the right upper lobe. Please see key image. Emphysematous changes are suspected as well. There is also a 1.6 cm questionable mass in the peripheral left midlung. Please see key image. Dedicated CT chest would be recommended. Moreover, there is newly seen air underneath the right hemidiaphragm not seen previously. It is uncertain whether this is due to colonic interposition versus free intraperitoneal air. Again CT abdomen and pelvis examination is recommended. Heart and mediastinum are within the limits of normal. There is no pulmonary edema or pneumonia.    Impression: Impression: 1. Findings concerning for bilateral lung nodules. Dedicated CT chest could be obtained for further evaluation. 2. Colonic interposition versus free intraperitoneal air in the right upper quadrant of the abdomen. Dedicated CT abdomen and pelvis could be obtained for further evaluation. 3. Suspected emphysema. Findings and recommendations were called to and acknowledged by Dr. Girard of the ED staff at 3:49 PM on 4/30/2024. Read back verification was obtained. [facility] Dictation By:  PERRI LIGHT MD  This report has been electronically signed by:  PERRI LIGHT MD  4/30/2024 3:52 PM      Counseling / Coordination of Care  Total floor  / unit time spent today 75 minutes. Greater than 50% of total time was spent with the patient and / or family counseling and / or coordination of care.     ISA Contreras    Please note:  This report has been generated by voice recognition software system. Therefore, there may be syntax, spelling and/or grammatical errors.  Please call if you have any questions.

## 2024-05-28 NOTE — ASSESSMENT & PLAN NOTE
"Presents due to shortness of breath.  Called EMS due to distress.  Oxygen machine \"broken\" at home per patient  Wheezing and coughing noted  Does not appear bacterial.   Procal and lactic WNL   Afebrile   Home regimen: Breztri  Plan  Albuterol inhaler  Symbicort inhaler  Atrovent/Xopenex 3 times daily  Tessalon Perles  Zithromax x 3 days.  On 3 times weekly Zithromax  Pulmonary follow-up    D/w pulm regarding Daily resp as a possbility to prevent COPD exacerbations.  Transition to p.o. steroid as per pulm regimen  Will need outpatient pulmonary follow-up  "

## 2024-05-28 NOTE — PLAN OF CARE
Problem: Nutrition/Hydration-ADULT  Goal: Nutrient/Hydration intake appropriate for improving, restoring or maintaining nutritional needs  Description: Monitor and assess patient's nutrition/hydration status for malnutrition. Collaborate with interdisciplinary team and initiate plan and interventions as ordered.  Monitor patient's weight and dietary intake as ordered or per policy. Utilize nutrition screening tool and intervene as necessary. Determine patient's food preferences and provide high-protein, high-caloric foods as appropriate.     INTERVENTIONS:  - Monitor oral intake, urinary output, labs, and treatment plans  - Assess nutrition and hydration status and recommend course of action  - Evaluate amount of meals eaten  - Assist patient with eating if necessary   - Allow adequate time for meals  - Recommend/ encourage appropriate diets, oral nutritional supplements, and vitamin/mineral supplements  - Order, calculate, and assess calorie counts as needed  - Recommend, monitor, and adjust tube feedings and TPN/PPN based on assessed needs  - Assess need for intravenous fluids  - Provide specific nutrition/hydration education as appropriate  - Include patient/family/caregiver in decisions related to nutrition  Outcome: Progressing     Problem: PAIN - ADULT  Goal: Verbalizes/displays adequate comfort level or baseline comfort level  Description: Interventions:  - Encourage patient to monitor pain and request assistance  - Assess pain using appropriate pain scale  - Administer analgesics based on type and severity of pain and evaluate response  - Implement non-pharmacological measures as appropriate and evaluate response  - Consider cultural and social influences on pain and pain management  - Notify physician/advanced practitioner if interventions unsuccessful or patient reports new pain  Outcome: Progressing     Problem: INFECTION - ADULT  Goal: Absence or prevention of progression during  hospitalization  Description: INTERVENTIONS:  - Assess and monitor for signs and symptoms of infection  - Monitor lab/diagnostic results  - Monitor all insertion sites, i.e. indwelling lines, tubes, and drains  - Monitor endotracheal if appropriate and nasal secretions for changes in amount and color  - [place] appropriate cooling/warming therapies per order  - Administer medications as ordered  - Instruct and encourage patient and family to use good hand hygiene technique  - Identify and instruct in appropriate isolation precautions for identified infection/condition  Outcome: Progressing     Problem: DISCHARGE PLANNING  Goal: Discharge to home or other facility with appropriate resources  Description: INTERVENTIONS:  - Identify barriers to discharge w/patient and caregiver  - Arrange for needed discharge resources and transportation as appropriate  - Identify discharge learning needs (meds, wound care, etc.)  - Arrange for interpretive services to assist at discharge as needed  - Refer to Case Management Department for coordinating discharge planning if the patient needs post-hospital services based on physician/advanced practitioner order or complex needs related to functional status, cognitive ability, or social support system  Outcome: Progressing

## 2024-05-28 NOTE — PROGRESS NOTES
"Progress Note - Pulmonary   Richard Nava 68 y.o. male MRN: [id number]  Unit/Bed#: -01 Encounter: [id number]    Assessment & Plan:    Acute on chronic hypoxic and hypercapnic respiratory failure  Severe emphysema/COPD with acute exacerbation  Metastatic prostate cancer  Severe protein calorie malnutrition     Patient is currently on 3L oxygen saturating at 99%. I titrated him to 4L while in the room. Titrate oxygen to maintain saturations >89%.  He is maintained on 2 L at baseline  Home regimen: Breztri twice daily, albuterol as needed  Continue Pulmicort neb twice daily, Atrovent/Xopenex q6 hours  Completed azithromycin 500mg for 3 day course, transition to azithromycin 250 mg daily for exacerbation prevention  Pulmonary hygiene: Hypertonic saline nebs, Mucinex prn, OOB as tolerated, cough and deep breathe, flutter valve and incentive spirometry  Continue IV Solu-Medrol 20 mg every 12 hours. Likely transition to p.o. methylprednisolone taper tomorrow, as he does not tolerate prednisone well. He would benefit from low dose chronic methylprednisolone to prevent further frequent exacerbations/hospitalizations  Could additionally consider daliresp as outpatient for prevention of exacerbations  Pulmonary will continue to follow     Chief Complaint:   \"I can't sleep with this machine beeping\"    Subjective:   Patient is awake in bed. He does report some improvement with his breathing and chest congestion today. He feels he is able to clear mucus more easily, and dyspnea is improving.     Objective:     Vitals: Blood pressure 122/67, pulse 65, temperature 97.9 °F (36.6 °C), resp. rate 19, height 5' 7\" (1.702 m), weight 42.8 kg (94 lb 5.7 oz), SpO2 99%.,Body mass index is 14.78 kg/m².      Intake/Output Summary (Last 24 hours) at 5/28/2024 1050  Last data filed at 5/28/2024 0852  Gross per 24 hour   Intake 480 ml   Output 1350 ml   Net -870 ml       Invasive Devices       Peripheral Intravenous Line  Duration "             Peripheral IV 05/26/24 Dorsal (posterior);Left Forearm 2 days                    Physical Exam:   General appearance:   Alert and awake, in no acute distress. Thin and chronically ill appearing  Head:   Normocephalic, without obvious abnormality, atraumatic  Eyes:   No scleral icterus   Lungs:  Pulmonary effort non labored at rest  Breath sounds: Bilateral wheezes present with diffuse rhonchi.  Cardiovascular:   Regular rate and rhythm. No murmurs. Capillary refill < 3 seconds.  Abdomen:    No appreciable distension or tenderness  Extremities:   No deformity. No clubbing present. No edema to extremities.   Skin:   Warm and dry. Intact. Color appropriate for ethnicity.  Neurologic:   No acute focal deficits are noted.  Psychiatric:   Normal mood. Answers questions appropriately.        Labs: I have personally reviewed pertinent lab results., CBC:   Lab Results   Component Value Date    WBC 7.15 05/28/2024    HGB 11.1 (L) 05/28/2024    HCT 35.9 (L) 05/28/2024     (H) 05/28/2024     05/28/2024    RBC 3.57 (L) 05/28/2024    MCH 31.1 05/28/2024    MCHC 30.9 (L) 05/28/2024    RDW 13.9 05/28/2024    MPV 8.5 (L) 05/28/2024    NRBC 0 05/28/2024   , CMP:   Lab Results   Component Value Date    SODIUM 139 05/28/2024    K 4.2 05/28/2024    CL 98 05/28/2024    CO2 38 (H) 05/28/2024    BUN 12 05/28/2024    CREATININE 0.43 (L) 05/28/2024    CALCIUM 8.4 05/28/2024    EGFR 117 05/28/2024     Imaging and other studies: I have personally reviewed pertinent reports.      XR chest portable, 5/21/2024  No acute cardiopulmonary disease       Zuleyma Francisco, LIGIA RN FNP-BC  Nurse Practitioner  [facility]

## 2024-05-28 NOTE — PLAN OF CARE
Problem: PAIN - ADULT  Goal: Verbalizes/displays adequate comfort level or baseline comfort level  Description: Interventions:  - Encourage patient to monitor pain and request assistance  - Assess pain using appropriate pain scale  - Administer analgesics based on type and severity of pain and evaluate response  - Implement non-pharmacological measures as appropriate and evaluate response  - Consider cultural and social influences on pain and pain management  - Notify physician/advanced practitioner if interventions unsuccessful or patient reports new pain  Outcome: Progressing     Problem: INFECTION - ADULT  Goal: Absence or prevention of progression during hospitalization  Description: INTERVENTIONS:  - Assess and monitor for signs and symptoms of infection  - Monitor lab/diagnostic results  - Monitor all insertion sites, i.e. indwelling lines, tubes, and drains  - Monitor endotracheal if appropriate and nasal secretions for changes in amount and color  - [place] appropriate cooling/warming therapies per order  - Administer medications as ordered  - Instruct and encourage patient and family to use good hand hygiene technique  - Identify and instruct in appropriate isolation precautions for identified infection/condition  Outcome: Progressing  Goal: Absence of fever/infection during neutropenic period  Description: INTERVENTIONS:  - Monitor WBC    Outcome: Progressing

## 2024-05-28 NOTE — ASSESSMENT & PLAN NOTE
Stage 4 metastatic prostate cancer  Changed to hospice about 1 month ago.  Zytiga was discontinued at that time.  Patient has since stopped hospice.  Asking to be restarted on medications  Discussed with hematology oncology, patient will be restarted on Zytiga as an outpatient he has outpatient heme-onc follow-up  PSA is normal

## 2024-05-28 NOTE — PROGRESS NOTES
[facility]  Progress Note  Name: Richard Nava I  MRN: [id number]  Unit/Bed#: -01 I Date of Admission: 5/21/2024   Date of Service: 5/28/2024 I Hospital Day: 6    Assessment & Plan   Underweight  Assessment & Plan  Underweight POA due to chronic COPD with BMI of 14.78, cachexia, muscle wasting and poor appetite  Nutrition consult and recommendations  Nutirition supplements    Acute on chronic respiratory failure with hypoxia (HCC)  Assessment & Plan  Patient admitted with acute on chronic respiratory failure with hypoxia and requiring 6 L of supplemental oxygen  Per prior hospitalization was on 2 to 3 L  Educated the patient that his goal > 88%.  Pulmonary on board  Wean oxygen as tolerated    Opioid dependence (HCC)  Assessment & Plan  Continue home MS Contin 15 mg every 12 hours  PDMP verified  Denies pain    Palliative care patient  Assessment & Plan  Patient refused to continue hospice care with [facility]  Possibly seeking new hospice care through [facility].  However patient thinking he may want to be treatment oriented again  Wants to be a DNR  Palliative care on board      Prostate cancer metastatic to bone (HCC)  Assessment & Plan  Stage 4 metastatic prostate cancer  Changed to hospice about 1 month ago.  Zytiga was discontinued at that time.  Patient has since stopped hospice.  Asking to be restarted on medications  Discussed with hematology oncology, patient will be restarted on Zytiga as an outpatient he has outpatient heme-onc follow-up  PSA is normal      Severe protein-calorie malnutrition (HCC)  Assessment & Plan  Malnutrition Findings:   Adult Malnutrition type: Chronic illness  Adult Degree of Malnutrition: Other severe protein calorie malnutrition  Malnutrition Characteristics: Fat loss, Muscle loss, Inadequate energy, Weight loss                  360 Statement: Pt presents with severe protein calorie malnutriton as evidenced by BMI 14.78, sunken orbitals,  "clavicle protrusion, depressed temporals, po intake <75% for greater than 1 month and 4% wt loss in 1 month (5/22/24 95 lbs, 4/18/24 99 lbs). Treat with diet and supplements TID.    BMI Findings:  Adult BMI Classifications: Underweight < 18.5        Body mass index is 14.78 kg/m².   Nutrition consult    * COPD exacerbation (HCC)  Assessment & Plan  Presents due to shortness of breath.  Called EMS due to distress.  Oxygen machine \"broken\" at home per patient  Wheezing and coughing noted  Does not appear bacterial.   Procal and lactic WNL   Afebrile   Home regimen: Breztri  Plan  Albuterol inhaler  Symbicort inhaler  Atrovent/Xopenex 3 times daily  Tessalon Perles  Zithromax x 3 days.  On 3 times weekly Zithromax  Pulmonary follow-up    D/w pulm regarding Daily resp as a possbility to prevent COPD exacerbations. Continue Steroid IV Q12 for now, trasnition to PO tomorrow               VTE Pharmacologic Prophylaxis: VTE Score: 5 High Risk (Score >/= 5) - Pharmacological DVT Prophylaxis Ordered: heparin. Sequential Compression Devices Ordered.    Mobility:   Basic Mobility Inpatient Raw Score: 23  JH-HLM Goal: 7: Walk 25 feet or more  JH-HLM Achieved: 7: Walk 25 feet or more  JH-HLM Goal achieved. Continue to encourage appropriate mobility.    Patient Centered Rounds: I performed bedside rounds with nursing staff today.   Discussions with Specialists or Other Care Team Provider: pulmonology    Education and Discussions with Family / Patient: Updated  (sister) at bedside.    Total Time Spent on Date of Encounter in care of patient: 55 mins. This time was spent on one or more of the following: performing physical exam; counseling and coordination of care; obtaining or reviewing history; documenting in the medical record; reviewing/ordering tests, medications or procedures; communicating with other healthcare professionals and discussing with patient's family/caregivers.    Current Length of Stay: 6 " day(s)  Current Patient Status: Inpatient   Certification Statement: The patient will continue to require additional inpatient hospital stay due to IV solumedrol  Discharge Plan: Anticipate discharge in 48 hrs to home.    Code Status: Level 3 - DNAR and DNI    Subjective:   no acute overnight events   Patient stated he is breathing better.  Has concerns with chemotherapy    Objective:     Vitals:   Temp (24hrs), Av °F (36.7 °C), Min:96.8 °F (36 °C), Max:98.8 °F (37.1 °C)    Temp:  [96.8 °F (36 °C)-98.8 °F (37.1 °C)] 96.8 °F (36 °C)  HR:  [65-93] 90  Resp:  [16-19] 16  BP: (106-143)/(62-85) 106/68  SpO2:  [97 %-100 %] 99 %  Body mass index is 14.78 kg/m².     Input and Output Summary (last 24 hours):     Intake/Output Summary (Last 24 hours) at 2024 1526  Last data filed at 2024 0852  Gross per 24 hour   Intake 480 ml   Output 1200 ml   Net -720 ml       Physical Exam:   Physical Exam  Constitutional:       Comments: cachectic   HENT:      Head: Normocephalic and atraumatic.      Mouth/Throat:      Mouth: Mucous membranes are moist.      Pharynx: Oropharynx is clear.   Eyes:      General: No scleral icterus.        Right eye: No discharge.         Left eye: No discharge.      Conjunctiva/sclera: Conjunctivae normal.   Cardiovascular:      Rate and Rhythm: Normal rate and regular rhythm.   Pulmonary:      Effort: Pulmonary effort is normal. No respiratory distress.      Breath sounds: Wheezing (expiratory) present.      Comments: Improved compared to yesterday  Abdominal:      General: Bowel sounds are normal.      Palpations: Abdomen is soft.   Skin:     General: Skin is warm and dry.      Capillary Refill: Capillary refill takes 2 to 3 seconds.   Neurological:      Mental Status: He is alert and oriented to person, place, and time.          Additional Data:     Labs:  Results from last 7 days   Lab Units 24  0358 24  0403 24  0551   WBC Thousand/uL 7.15   < > 5.30   HEMOGLOBIN g/dL  11.1*   < > 10.6*   HEMATOCRIT % 35.9*   < > 34.4*   PLATELETS Thousands/uL 365   < > 287   BANDS PCT %  --   --  3   SEGS PCT % 89*   < >  --    LYMPHO PCT % 5*   < > 13*   MONO PCT % 5   < > 1*   EOS PCT % 0   < > 0    < > = values in this interval not displayed.     Results from last 7 days   Lab Units 05/28/24  0358 05/22/24  0551 05/21/24  2135   SODIUM mmol/L 139   < > 143   POTASSIUM mmol/L 4.2   < > 4.1   CHLORIDE mmol/L 98   < > 99   CO2 mmol/L 38*   < > 42*   BUN mg/dL 12   < > 14   CREATININE mg/dL 0.43*   < > 0.50*   ANION GAP mmol/L 3*   < > 2*   CALCIUM mg/dL 8.4   < > 9.2   ALBUMIN g/dL  --   --  3.9   TOTAL BILIRUBIN mg/dL  --   --  0.25   ALK PHOS U/L  --   --  40   ALT U/L  --   --  13   AST U/L  --   --  18   GLUCOSE RANDOM mg/dL 206*   < > 132    < > = values in this interval not displayed.     Results from last 7 days   Lab Units 05/21/24  2135   INR  0.94     Results from last 7 days   Lab Units 05/28/24  1031 05/28/24  0710 05/27/24  2116 05/27/24  1620 05/27/24  1132 05/27/24  0715 05/26/24  2053 05/26/24  1607 05/26/24  1104 05/26/24  0742 05/25/24  2019 05/25/24  1604   POC GLUCOSE mg/dl 138 145* 205* 269* 136 140 191* 194* 147* 259* 115 213*         Results from last 7 days   Lab Units 05/23/24  0403 05/21/24  2146 05/21/24  2135   LACTIC ACID mmol/L  --  0.8  --    PROCALCITONIN ng/ml 0.06  --  0.06       Lines/Drains:  Invasive Devices       Peripheral Intravenous Line  Duration             Peripheral IV 05/26/24 Dorsal (posterior);Left Forearm 2 days                          Imaging: No pertinent imaging reviewed.    Recent Cultures (last 7 days):   Results from last 7 days   Lab Units 05/21/24 2219 05/21/24  2146   BLOOD CULTURE  No Growth After 5 Days. No Growth After 5 Days.       Last 24 Hours Medication List:   Current Facility-Administered Medications   Medication Dose Route Frequency Provider Last Rate    acetaminophen  650 mg Oral Q6H PRN Adela Murguia PA-C      albuterol  1 puff  Inhalation Q4H PRN Adela Murguia PA-C      albuterol  2.5 mg Nebulization Q6H PRN Adela Murguia PA-C      ALPRAZolam  1 mg Oral HS PRN Arleth Scales PA-C      And    ALPRAZolam  0.5 mg Oral BID PRN Arleth Scales PA-C      atorvastatin  40 mg Oral Daily With Dinner Adela Murguia PA-C      azithromycin  250 mg Oral Once per day on Monday Wednesday Friday Brandon Cisneros MD      benzonatate  100 mg Oral TID PRN Adela Murguia PA-C      budesonide  0.5 mg Nebulization Q12H Efrain Dudley MD      fluticasone  1 spray Each Nare Daily Efrain Dudley MD      gabapentin  300 mg Oral BID Adela Murguia PA-C      guaiFENesin  600 mg Oral Q12H PRN ISA Araujo      heparin (porcine)  5,000 Units Subcutaneous Q8H [facility] Adela Murguia PA-C      Hydrocod Jose-Chlorphe Jose ER  5 mL Oral Q12H PRN Arleth Scales PA-C      insulin lispro  1-6 Units Subcutaneous TID AC Brandon Cisneros MD      ipratropium  0.5 mg Nebulization TID Brandon Cisneros MD      levalbuterol  1.25 mg Nebulization TID Brandon Cisneros MD      [START ON 5/29/2024] methylprednisolone  32 mg Oral BID ISA Araujo      methylPREDNISolone sodium succinate  20 mg Intravenous Q12H [facility] ISA Araujo      morphine  15 mg Oral BID Adela Murguia PA-C      pantoprazole  40 mg Oral Early Morning Brandon Cisneros MD      sodium chloride  4 mL Nebulization TID Efrain Dudley MD          Today, Patient Was Seen By: Leisa Wilder MD    **Please Note: This note may have been constructed using a voice recognition system.**

## 2024-05-29 VITALS
SYSTOLIC BLOOD PRESSURE: 112 MMHG | OXYGEN SATURATION: 96 % | WEIGHT: 94.36 LBS | RESPIRATION RATE: 16 BRPM | BODY MASS INDEX: 14.81 KG/M2 | TEMPERATURE: 98.2 F | DIASTOLIC BLOOD PRESSURE: 66 MMHG | HEART RATE: 67 BPM | HEIGHT: 67 IN

## 2024-05-29 PROBLEM — K21.9 GASTROESOPHAGEAL REFLUX DISEASE WITHOUT ESOPHAGITIS: Status: ACTIVE | Noted: 2024-05-29

## 2024-05-29 LAB
ANION GAP SERPL CALCULATED.3IONS-SCNC: 1 MMOL/L (ref 4–13)
BASOPHILS # BLD AUTO: 0.02 THOUSANDS/ÂΜL (ref 0–0.1)
BASOPHILS NFR BLD AUTO: 0 % (ref 0–1)
BUN SERPL-MCNC: 15 MG/DL (ref 5–25)
CALCIUM SERPL-MCNC: 8.8 MG/DL (ref 8.4–10.2)
CHLORIDE SERPL-SCNC: 97 MMOL/L (ref 96–108)
CO2 SERPL-SCNC: 42 MMOL/L (ref 21–32)
CREAT SERPL-MCNC: 0.5 MG/DL (ref 0.6–1.3)
EOSINOPHIL # BLD AUTO: 0 THOUSAND/ÂΜL (ref 0–0.61)
EOSINOPHIL NFR BLD AUTO: 0 % (ref 0–6)
ERYTHROCYTE [DISTWIDTH] IN BLOOD BY AUTOMATED COUNT: 13.9 % (ref 11.6–15.1)
GFR SERPL CREATININE-BSD FRML MDRD: 110 ML/MIN/1.73SQ M
GLUCOSE SERPL-MCNC: 142 MG/DL (ref 65–140)
GLUCOSE SERPL-MCNC: 158 MG/DL (ref 65–140)
GLUCOSE SERPL-MCNC: 201 MG/DL (ref 65–140)
GLUCOSE SERPL-MCNC: 390 MG/DL (ref 65–140)
HCT VFR BLD AUTO: 39.9 % (ref 36.5–49.3)
HGB BLD-MCNC: 12.1 G/DL (ref 12–17)
IMM GRANULOCYTES # BLD AUTO: 0.13 THOUSAND/UL (ref 0–0.2)
IMM GRANULOCYTES NFR BLD AUTO: 1 % (ref 0–2)
LYMPHOCYTES # BLD AUTO: 0.58 THOUSANDS/ÂΜL (ref 0.6–4.47)
LYMPHOCYTES NFR BLD AUTO: 6 % (ref 14–44)
MCH RBC QN AUTO: 31 PG (ref 26.8–34.3)
MCHC RBC AUTO-ENTMCNC: 30.3 G/DL (ref 31.4–37.4)
MCV RBC AUTO: 102 FL (ref 82–98)
MONOCYTES # BLD AUTO: 0.47 THOUSAND/ÂΜL (ref 0.17–1.22)
MONOCYTES NFR BLD AUTO: 5 % (ref 4–12)
NEUTROPHILS # BLD AUTO: 7.92 THOUSANDS/ÂΜL (ref 1.85–7.62)
NEUTS SEG NFR BLD AUTO: 88 % (ref 43–75)
NRBC BLD AUTO-RTO: 0 /100 WBCS
PLATELET # BLD AUTO: 363 THOUSANDS/UL (ref 149–390)
PMV BLD AUTO: 8.2 FL (ref 8.9–12.7)
POTASSIUM SERPL-SCNC: 4.4 MMOL/L (ref 3.5–5.3)
RBC # BLD AUTO: 3.9 MILLION/UL (ref 3.88–5.62)
SODIUM SERPL-SCNC: 140 MMOL/L (ref 135–147)
WBC # BLD AUTO: 9.12 THOUSAND/UL (ref 4.31–10.16)

## 2024-05-29 PROCEDURE — 82948 REAGENT STRIP/BLOOD GLUCOSE: CPT

## 2024-05-29 PROCEDURE — 94760 N-INVAS EAR/PLS OXIMETRY 1: CPT

## 2024-05-29 PROCEDURE — 94640 AIRWAY INHALATION TREATMENT: CPT

## 2024-05-29 PROCEDURE — 80048 BASIC METABOLIC PNL TOTAL CA: CPT | Performed by: INTERNAL MEDICINE

## 2024-05-29 PROCEDURE — 85025 COMPLETE CBC W/AUTO DIFF WBC: CPT | Performed by: INTERNAL MEDICINE

## 2024-05-29 PROCEDURE — 99239 HOSP IP/OBS DSCHRG MGMT >30: CPT | Performed by: INTERNAL MEDICINE

## 2024-05-29 PROCEDURE — 99232 SBSQ HOSP IP/OBS MODERATE 35: CPT | Performed by: INTERNAL MEDICINE

## 2024-05-29 RX ORDER — METHYLPREDNISOLONE 32 MG/1
TABLET ORAL
Refills: 0 | Status: CANCELLED | OUTPATIENT
Start: 2024-05-29

## 2024-05-29 RX ORDER — AZITHROMYCIN 250 MG/1
250 TABLET, FILM COATED ORAL 3 TIMES WEEKLY
Qty: 18 TABLET | Refills: 0 | Status: SHIPPED | OUTPATIENT
Start: 2024-05-29 | End: 2024-07-13

## 2024-05-29 RX ORDER — METHYLPREDNISOLONE 8 MG/1
TABLET ORAL
Qty: 26 TABLET | Refills: 0 | Status: SHIPPED | OUTPATIENT
Start: 2024-05-29 | End: 2024-06-13

## 2024-05-29 RX ORDER — PANTOPRAZOLE SODIUM 40 MG/1
40 TABLET, DELAYED RELEASE ORAL
Qty: 30 TABLET | Refills: 0 | Status: SHIPPED | OUTPATIENT
Start: 2024-05-30 | End: 2024-06-29

## 2024-05-29 RX ADMIN — GABAPENTIN 300 MG: 300 CAPSULE ORAL at 08:22

## 2024-05-29 RX ADMIN — SODIUM CHLORIDE SOLN NEBU 3% 4 ML: 3 NEBU SOLN at 07:27

## 2024-05-29 RX ADMIN — FLUTICASONE PROPIONATE 1 SPRAY: 50 SPRAY, METERED NASAL at 08:23

## 2024-05-29 RX ADMIN — GABAPENTIN 300 MG: 300 CAPSULE ORAL at 17:10

## 2024-05-29 RX ADMIN — INSULIN LISPRO 6 UNITS: 100 INJECTION, SOLUTION INTRAVENOUS; SUBCUTANEOUS at 16:01

## 2024-05-29 RX ADMIN — AZITHROMYCIN DIHYDRATE 250 MG: 500 TABLET ORAL at 08:22

## 2024-05-29 RX ADMIN — MORPHINE SULFATE 15 MG: 15 TABLET, EXTENDED RELEASE ORAL at 08:22

## 2024-05-29 RX ADMIN — SODIUM CHLORIDE SOLN NEBU 3% 4 ML: 3 NEBU SOLN at 13:19

## 2024-05-29 RX ADMIN — MORPHINE SULFATE 15 MG: 15 TABLET, EXTENDED RELEASE ORAL at 17:09

## 2024-05-29 RX ADMIN — METHYLPREDNISOLONE 32 MG: 4 TABLET ORAL at 17:09

## 2024-05-29 RX ADMIN — IPRATROPIUM BROMIDE 0.5 MG: 0.5 SOLUTION RESPIRATORY (INHALATION) at 13:19

## 2024-05-29 RX ADMIN — BUDESONIDE 0.5 MG: 0.5 INHALANT RESPIRATORY (INHALATION) at 07:27

## 2024-05-29 RX ADMIN — LEVALBUTEROL HYDROCHLORIDE 1.25 MG: 1.25 SOLUTION RESPIRATORY (INHALATION) at 07:27

## 2024-05-29 RX ADMIN — ATORVASTATIN CALCIUM 40 MG: 40 TABLET, FILM COATED ORAL at 16:01

## 2024-05-29 RX ADMIN — LEVALBUTEROL HYDROCHLORIDE 1.25 MG: 1.25 SOLUTION RESPIRATORY (INHALATION) at 13:19

## 2024-05-29 RX ADMIN — INSULIN LISPRO 1 UNITS: 100 INJECTION, SOLUTION INTRAVENOUS; SUBCUTANEOUS at 12:38

## 2024-05-29 RX ADMIN — IPRATROPIUM BROMIDE 0.5 MG: 0.5 SOLUTION RESPIRATORY (INHALATION) at 07:27

## 2024-05-29 RX ADMIN — ALPRAZOLAM 1 MG: 0.5 TABLET ORAL at 00:31

## 2024-05-29 RX ADMIN — METHYLPREDNISOLONE 32 MG: 4 TABLET ORAL at 09:17

## 2024-05-29 NOTE — PLAN OF CARE
Problem: Nutrition/Hydration-ADULT  Goal: Nutrient/Hydration intake appropriate for improving, restoring or maintaining nutritional needs  Description: Monitor and assess patient's nutrition/hydration status for malnutrition. Collaborate with interdisciplinary team and initiate plan and interventions as ordered.  Monitor patient's weight and dietary intake as ordered or per policy. Utilize nutrition screening tool and intervene as necessary. Determine patient's food preferences and provide high-protein, high-caloric foods as appropriate.     INTERVENTIONS:  - Monitor oral intake, urinary output, labs, and treatment plans  - Assess nutrition and hydration status and recommend course of action  - Evaluate amount of meals eaten  - Assist patient with eating if necessary   - Allow adequate time for meals  - Recommend/ encourage appropriate diets, oral nutritional supplements, and vitamin/mineral supplements  - Order, calculate, and assess calorie counts as needed  - Recommend, monitor, and adjust tube feedings and TPN/PPN based on assessed needs  - Assess need for intravenous fluids  - Provide specific nutrition/hydration education as appropriate  - Include patient/family/caregiver in decisions related to nutrition  Outcome: Progressing     Problem: PAIN - ADULT  Goal: Verbalizes/displays adequate comfort level or baseline comfort level  Description: Interventions:  - Encourage patient to monitor pain and request assistance  - Assess pain using appropriate pain scale  - Administer analgesics based on type and severity of pain and evaluate response  - Implement non-pharmacological measures as appropriate and evaluate response  - Consider cultural and social influences on pain and pain management  - Notify physician/advanced practitioner if interventions unsuccessful or patient reports new pain  Outcome: Progressing     Problem: INFECTION - ADULT  Goal: Absence or prevention of progression during  hospitalization  Description: INTERVENTIONS:  - Assess and monitor for signs and symptoms of infection  - Monitor lab/diagnostic results  - Monitor all insertion sites, i.e. indwelling lines, tubes, and drains  - Monitor endotracheal if appropriate and nasal secretions for changes in amount and color  - Malo appropriate cooling/warming therapies per order  - Administer medications as ordered  - Instruct and encourage patient and family to use good hand hygiene technique  - Identify and instruct in appropriate isolation precautions for identified infection/condition  Outcome: Progressing  Goal: Absence of fever/infection during neutropenic period  Description: INTERVENTIONS:  - Monitor WBC    Outcome: Progressing     Problem: SAFETY ADULT  Goal: Patient will remain free of falls  Description: INTERVENTIONS:  - Educate patient/family on patient safety including physical limitations  - Instruct patient to call for assistance with activity   - Consult OT/PT to assist with strengthening/mobility   - Keep Call bell within reach  - Keep bed low and locked with side rails adjusted as appropriate  - Keep care items and personal belongings within reach  - Initiate and maintain comfort rounds  - Make Fall Risk Sign visible to staff  - Offer Toileting every 2 Hours, in advance of need  - Initiate/Maintain bed alarm  - Obtain necessary fall risk management equipment  - Apply yellow socks and bracelet for high fall risk patients  - Consider moving patient to room near nurses station  Outcome: Progressing  Goal: Maintain or return to baseline ADL function  Description: INTERVENTIONS:  -  Assess patient's ability to carry out ADLs; assess patient's baseline for ADL function and identify physical deficits which impact ability to perform ADLs (bathing, care of mouth/teeth, toileting, grooming, dressing, etc.)  - Assess/evaluate cause of self-care deficits   - Assess range of motion  - Assess patient's mobility; develop plan if  impaired  - Assess patient's need for assistive devices and provide as appropriate  - Encourage maximum independence but intervene and supervise when necessary  - Involve family in performance of ADLs  - Assess for home care needs following discharge   - Consider OT consult to assist with ADL evaluation and planning for discharge  - Provide patient education as appropriate  Outcome: Progressing  Goal: Maintains/Returns to pre admission functional level  Description: INTERVENTIONS:  - Perform AM-PAC 6 Click Basic Mobility/ Daily Activity assessment daily.  - Set and communicate daily mobility goal to care team and patient/family/caregiver.   - Collaborate with rehabilitation services on mobility goals if consulted  - Perform Range of Motion 3 times a day.  - Reposition patient every 2 hours.  - Dangle patient 3 times a day  - Stand patient 3 times a day  - Ambulate patient 3 times a day  - Out of bed to chair 3 times a day   - Out of bed for meals 3 times a day  - Out of bed for toileting  - Record patient progress and toleration of activity level   Outcome: Progressing     Problem: DISCHARGE PLANNING  Goal: Discharge to home or other facility with appropriate resources  Description: INTERVENTIONS:  - Identify barriers to discharge w/patient and caregiver  - Arrange for needed discharge resources and transportation as appropriate  - Identify discharge learning needs (meds, wound care, etc.)  - Arrange for interpretive services to assist at discharge as needed  - Refer to Case Management Department for coordinating discharge planning if the patient needs post-hospital services based on physician/advanced practitioner order or complex needs related to functional status, cognitive ability, or social support system  Outcome: Progressing     Problem: Knowledge Deficit  Goal: Patient/family/caregiver demonstrates understanding of disease process, treatment plan, medications, and discharge instructions  Description:  Complete learning assessment and assess knowledge base.  Interventions:  - Provide teaching at level of understanding  - Provide teaching via preferred learning methods  Outcome: Progressing     Problem: Prexisting or High Potential for Compromised Skin Integrity  Goal: Skin integrity is maintained or improved  Description: INTERVENTIONS:  - Identify patients at risk for skin breakdown  - Assess and monitor skin integrity  - Assess and monitor nutrition and hydration status  - Monitor labs   - Assess for incontinence   - Turn and reposition patient  - Assist with mobility/ambulation  - Relieve pressure over bony prominences  - Avoid friction and shearing  - Provide appropriate hygiene as needed including keeping skin clean and dry  - Evaluate need for skin moisturizer/barrier cream  - Collaborate with interdisciplinary team   - Patient/family teaching  - Consider wound care consult   Outcome: Progressing     Problem: Potential for Falls  Goal: Patient will remain free of falls  Description: INTERVENTIONS:  - Educate patient/family on patient safety including physical limitations  - Instruct patient to call for assistance with activity   - Consult OT/PT to assist with strengthening/mobility   - Keep Call bell within reach  - Keep bed low and locked with side rails adjusted as appropriate  - Keep care items and personal belongings within reach  - Initiate and maintain comfort rounds  - Make Fall Risk Sign visible to staff  - Offer Toileting every 2 Hours, in advance of need  - Initiate/Maintain bed alarm  - Obtain necessary fall risk management equipment  - Apply yellow socks and bracelet for high fall risk patients  - Consider moving patient to room near nurses station  Outcome: Progressing

## 2024-05-29 NOTE — PROGRESS NOTES
"Progress Note - Pulmonary   Richard Nava 68 y.o. male MRN: [id number]  Unit/Bed#: -01 Encounter: [id number]    Assessment & Plan:    Acute on chronic hypoxic and hypercapnic respiratory failure  Severe emphysema/COPD with acute exacerbation  Metastatic prostate cancer  Severe protein calorie malnutrition     Patient is currently stable on his baseline 2L oxygen  Home regimen: Breztri twice daily, albuterol as needed  Continue Pulmicort neb twice daily, Atrovent/Xopenex q6 hours  Completed azithromycin 500mg for 3 day course, transition to azithromycin 250 mg 3x weekly for exacerbation prevention  Pulmonary hygiene: Hypertonic saline nebs, Mucinex prn, OOB as tolerated, cough and deep breathe, flutter valve and incentive spirometry  Start p.o. methylprednisolone 32mg today with taper every 3 days down to 4mg. He would benefit from low dose chronic methylprednisolone to prevent further frequent exacerbations/hospitalizations. Can be maintained on chronic 4mg medrol daily. He does not tolerate prednisone well.   Could additionally consider daliresp as outpatient for prevention of exacerbations  Pulmonary will continue to follow     Chief Complaint:   \"I'm feeling a little better'    Subjective:   Patient awake sitting up in bed.  He does report shortness of breath and wheezing have improved since admission.  He continues to have some chest congestion but is able to bring up secretions more easily.    Objective:     Vitals: Blood pressure 112/66, pulse 67, temperature 98.2 °F (36.8 °C), resp. rate 16, height 5' 7\" (1.702 m), weight 42.8 kg (94 lb 5.7 oz), SpO2 100%.,Body mass index is 14.78 kg/m².      Intake/Output Summary (Last 24 hours) at 5/29/2024 0842  Last data filed at 5/29/2024 0428  Gross per 24 hour   Intake 240 ml   Output 1250 ml   Net -1010 ml       Invasive Devices       Peripheral Intravenous Line  Duration             Peripheral IV 05/26/24 Dorsal (posterior);Left Forearm 3 days         "            Physical Exam:   General appearance:   Alert and awake, in no acute distress.  Thin and chronically ill-appearing.  Head:   Normocephalic, without obvious abnormality, atraumatic  Eyes:   No scleral icterus   Lungs:  Pulmonary effort nonlabored at rest  Breath sounds: Bilateral wheezes with diffuse rhonchi.  Cardiovascular:   Regular rate and rhythm. No murmurs. Capillary refill < 3 seconds.  Abdomen:    No appreciable distension or tenderness  Extremities:   No deformity. No clubbing present. No edema to extremities.   Skin:   Warm and dry. Intact. Color appropriate for ethnicity.  Neurologic:   No acute focal deficits are noted.  Psychiatric:   Normal mood. Answers questions appropriately.        Labs: I have personally reviewed pertinent lab results., CBC:   Lab Results   Component Value Date    WBC 9.12 05/29/2024    HGB 12.1 05/29/2024    HCT 39.9 05/29/2024     (H) 05/29/2024     05/29/2024    RBC 3.90 05/29/2024    MCH 31.0 05/29/2024    MCHC 30.3 (L) 05/29/2024    RDW 13.9 05/29/2024    MPV 8.2 (L) 05/29/2024    NRBC 0 05/29/2024   , CMP:   Lab Results   Component Value Date    SODIUM 140 05/29/2024    K 4.4 05/29/2024    CL 97 05/29/2024    CO2 42 (H) 05/29/2024    BUN 15 05/29/2024    CREATININE 0.50 (L) 05/29/2024    CALCIUM 8.8 05/29/2024    EGFR 110 05/29/2024     Imaging and other studies: I have personally reviewed pertinent reports.      XR chest portable, 5/21/2024  No acute cardiopulmonary disease        Zuleyma Francisco, MSN RN FNP-BC  Nurse Practitioner  [facility]

## 2024-05-29 NOTE — DISCHARGE SUMMARY
On license of [facility]  Discharge- Richard Nava 1955, 68 y.o. male MRN: [id number]  Unit/Bed#: MS [facility]ole[facility]Laeh Encounter: [id number]  Primary Care Provider: Kevin Mejia DO   Date and time admitted to hospital: 5/21/2024  9:19 PM    Gastroesophageal reflux disease without esophagitis  Assessment & Plan  Will place on PPI given that patient is on steroids    Underweight  Assessment & Plan  Underweight POA due to chronic COPD with BMI of 14.78, cachexia, muscle wasting and poor appetite  Nutrition consult and recommendations  Nutirition supplements    Acute on chronic respiratory failure with hypoxia (HCC)  Assessment & Plan  Patient admitted with acute on chronic respiratory failure with hypoxia and requiring 6 L of supplemental oxygen  Per prior hospitalization was on 2 to 3 L  Educated the patient that his goal > 88%.  Pulmonary on board  Wean oxygen as tolerated    Opioid dependence (HCC)  Assessment & Plan  Continue home MS Contin 15 mg every 12 hours  PDMP verified  Denies pain    Palliative care patient  Assessment & Plan  Patient refused to continue hospice care with [facility]  Possibly seeking new hospice care through [facility].  However patient thinking he may want to be treatment oriented again  Wants to be a DNR  Palliative care on board      Prostate cancer metastatic to bone (HCC)  Assessment & Plan  Stage 4 metastatic prostate cancer  Changed to hospice about 1 month ago.  Zytiga was discontinued at that time.  Patient has since stopped hospice.  Asking to be restarted on medications  Discussed with hematology oncology, patient will be restarted on Zytiga as an outpatient he has outpatient heme-onc follow-up  PSA is normal      Severe protein-calorie malnutrition (HCC)  Assessment & Plan  Malnutrition Findings:   Adult Malnutrition type: Chronic illness  Adult Degree of Malnutrition: Other severe protein calorie malnutrition  Malnutrition Characteristics: Fat loss, Muscle loss,  "Inadequate energy, Weight loss                  360 Statement: Pt presents with severe protein calorie malnutriton as evidenced by BMI 14.78, sunken orbitals, clavicle protrusion, depressed temporals, po intake <75% for greater than 1 month and 4% wt loss in 1 month (5/22/24 95 lbs, 4/18/24 99 lbs). Treat with diet and supplements TID.    BMI Findings:  Adult BMI Classifications: Underweight < 18.5        Body mass index is 14.78 kg/m².   Nutrition consult    * COPD exacerbation (HCC)  Assessment & Plan  Presents due to shortness of breath.  Called EMS due to distress.  Oxygen machine \"broken\" at home per patient  Wheezing and coughing noted  Does not appear bacterial.   Procal and lactic WNL   Afebrile   Home regimen: Breztri  Plan  Albuterol inhaler  Symbicort inhaler  Atrovent/Xopenex 3 times daily  Tessalon Perles  Zithromax x 3 days.  On 3 times weekly Zithromax  Pulmonary follow-up    D/w pulm regarding Daily resp as a possbility to prevent COPD exacerbations.  Transition to p.o. steroid as per pulm regimen  Will need outpatient pulmonary follow-up        Medical Problems       Resolved Problems  Date Reviewed: 5/26/2024   None       Discharging Physician / Practitioner: Leisa Wilder MD  PCP: Kevin Mejia DO  Admission Date:   Admission Orders (From admission, onward)       Ordered        05/22/24 1544  INPATIENT ADMISSION  Once            05/22/24 0011  Place in Observation  Once                          Discharge Date: 05/29/24    Consultations During Hospital Stay:  Pulmonology  Palliative care  Hematology- oncology    Procedures Performed:   none    Significant Findings / Test Results:   XR chest portable    Result Date: 5/22/2024  Impression: No acute cardiopulmonary disease. Workstation performed:     Normal PSA         Reason for Admission:   Chief Complaint   Patient presents with    Shortness of Breath     Pt reports to ed from home from ems, pt repots shortness of breath, pt is supposed to meet " "with palliative care nurse on the 28th to go back on palliative care, ems reports they gave a duo neb in [place] an pt had a albuterol treatment before they arrived, pt report he is on 6l chronically at home         Hospital Course:   Richard Nava is a 68 y.o. male patient who originally presented to the hospital on 5/21/2024 due to COPD exacerbation, acute on chronic respiratory failure with hypoxia  On IV Solu-Medrol and Zithromax, which is transiitoned to PO steroid and Azithromycin three times a week on dc. Follow up with pulmonology as outpatient  Pulmonary was consulted. Patient has history of prostate cancer with mets to the bone. Palliative care on board, patient does not want hospice. Heme- onc was consulted. He will begin Zytega as outpatient            Please see above list of diagnoses and related plan for additional information.     Condition at Discharge: stable    Discharge Day Visit / Exam:   Subjective: No complaints, patient wants to start chemo soon.  Vitals: Blood Pressure: 112/66 (05/29/24 0722)  Pulse: 67 (05/29/24 0722)  Temperature: 98.2 °F (36.8 °C) (05/29/24 0722)  Temp Source: Temporal (05/29/24 0722)  Respirations: 16 (05/29/24 0722)  Height: 5' 7\" (170.2 cm) (05/22/24 1525)  Weight - Scale: 42.8 kg (94 lb 5.7 oz) (05/22/24 1525)  SpO2: 100 % (05/29/24 0729)  Exam:   Physical Exam     Gen.-Patient comfortable   Neck- Supple. No thyromegaly or lymphadenopathy  Lungs-bilateral end expiratory wheeze  Heart S1-S2, regular rate and rhythm, no murmurs  Abdomen-soft nontender, no organomegaly. Bowel sounds present  Extremities-no cyanosi,  clubbing or edema  Skin- no rash  Neuro-nonfocal     Discussion with Family: Updated  (sister) via phone.    Discharge instructions/Information to patient and family:   See after visit summary for information provided to patient and family.      Provisions for Follow-Up Care:  See after visit summary for information related to follow-up care " and any pertinent home health orders.      Mobility at time of Discharge:   Basic Mobility Inpatient Raw Score: 23  JH-HLM Goal: 7: Walk 25 feet or more  JH-HLM Achieved: 7: Walk 25 feet or more  HLM Goal achieved. Continue to encourage appropriate mobility.     Disposition:   Home    Planned Readmission: none     Discharge Statement:  I spent 33 minutes discharging the patient. This time was spent on the day of discharge. I had direct contact with the patient on the day of discharge. Greater than 50% of the total time was spent examining patient, answering all patient questions, arranging and discussing plan of care with patient as well as directly providing post-discharge instructions.  Additional time then spent on discharge activities.    Discharge Medications:  See after visit summary for reconciled discharge medications provided to patient and/or family.      **Please Note: This note may have been constructed using a voice recognition system**

## 2024-05-29 NOTE — CASE MANAGEMENT
Case Management Progress Note    Patient name Richard Nava  Location /-01 MRN [id number]  : 1955 Date 2024       LOS (days): 7  Geometric Mean LOS (GMLOS) (days): 3.6  Days to GMLOS:-3.3        OBJECTIVE:        Current admission status: Inpatient  Preferred Pharmacy:   [facility]/pharmacy #[id number] - [place] - [address]  [address] [place] [address]  Phone: [phone] Fax: [phone]    [facility] - [place], [place] - [address] [facility] [address] [facility]  [address]  [address]  [place] [address]  Phone: [phone] Fax: [phone]    Primary Care Provider: Kevin Mejia DO    Primary Insurance: MEDICARE  Secondary Insurance:     PROGRESS NOTE:    Met with pt to discuss dc planning. CM offered HHC; pt declined. CM informed Dr. Wilder of [facility].

## 2024-05-29 NOTE — PLAN OF CARE
Problem: Nutrition/Hydration-ADULT  Goal: Nutrient/Hydration intake appropriate for improving, restoring or maintaining nutritional needs  Description: Monitor and assess patient's nutrition/hydration status for malnutrition. Collaborate with interdisciplinary team and initiate plan and interventions as ordered.  Monitor patient's weight and dietary intake as ordered or per policy. Utilize nutrition screening tool and intervene as necessary. Determine patient's food preferences and provide high-protein, high-caloric foods as appropriate.     INTERVENTIONS:  - Monitor oral intake, urinary output, labs, and treatment plans  - Assess nutrition and hydration status and recommend course of action  - Evaluate amount of meals eaten  - Assist patient with eating if necessary   - Allow adequate time for meals  - Recommend/ encourage appropriate diets, oral nutritional supplements, and vitamin/mineral supplements  - Order, calculate, and assess calorie counts as needed  - Recommend, monitor, and adjust tube feedings and TPN/PPN based on assessed needs  - Assess need for intravenous fluids  - Provide specific nutrition/hydration education as appropriate  - Include patient/family/caregiver in decisions related to nutrition  Outcome: Progressing     Problem: PAIN - ADULT  Goal: Verbalizes/displays adequate comfort level or baseline comfort level  Description: Interventions:  - Encourage patient to monitor pain and request assistance  - Assess pain using appropriate pain scale  - Administer analgesics based on type and severity of pain and evaluate response  - Implement non-pharmacological measures as appropriate and evaluate response  - Consider cultural and social influences on pain and pain management  - Notify physician/advanced practitioner if interventions unsuccessful or patient reports new pain  Outcome: Progressing     Problem: INFECTION - ADULT  Goal: Absence or prevention of progression during  hospitalization  Description: INTERVENTIONS:  - Assess and monitor for signs and symptoms of infection  - Monitor lab/diagnostic results  - Monitor all insertion sites, i.e. indwelling lines, tubes, and drains  - Monitor endotracheal if appropriate and nasal secretions for changes in amount and color  - [place] appropriate cooling/warming therapies per order  - Administer medications as ordered  - Instruct and encourage patient and family to use good hand hygiene technique  - Identify and instruct in appropriate isolation precautions for identified infection/condition  Outcome: Progressing  Goal: Absence of fever/infection during neutropenic period  Description: INTERVENTIONS:  - Monitor WBC    Outcome: Progressing     Problem: SAFETY ADULT  Goal: Patient will remain free of falls  Description: INTERVENTIONS:  - Educate patient/family on patient safety including physical limitations  - Instruct patient to call for assistance with activity   - Consult OT/PT to assist with strengthening/mobility   - Keep Call bell within reach  - Keep bed low and locked with side rails adjusted as appropriate  - Keep care items and personal belongings within reach  - Initiate and maintain comfort rounds  - Make Fall Risk Sign visible to staff  - Offer Toileting every 2 Hours, in advance of need  - Initiate/Maintain alarm  - Obtain necessary fall risk management equipment:   - Apply yellow socks and bracelet for high fall risk patients  - Consider moving patient to room near nurses station  Outcome: Progressing  Goal: Maintain or return to baseline ADL function  Description: INTERVENTIONS:  -  Assess patient's ability to carry out ADLs; assess patient's baseline for ADL function and identify physical deficits which impact ability to perform ADLs (bathing, care of mouth/teeth, toileting, grooming, dressing, etc.)  - Assess/evaluate cause of self-care deficits   - Assess range of motion  - Assess patient's mobility; develop plan if  impaired  - Assess patient's need for assistive devices and provide as appropriate  - Encourage maximum independence but intervene and supervise when necessary  - Involve family in performance of ADLs  - Assess for home care needs following discharge   - Consider OT consult to assist with ADL evaluation and planning for discharge  - Provide patient education as appropriate  Outcome: Progressing  Goal: Maintains/Returns to pre admission functional level  Description: INTERVENTIONS:  - Perform AM-PAC 6 Click Basic Mobility/ Daily Activity assessment daily.  - Set and communicate daily mobility goal to care team and patient/family/caregiver.   - Collaborate with rehabilitation services on mobility goals if consulted  - Perform Range of Motion 6 times a day.  - Reposition patient every 2 hours.  - Dangle patient 4 times a day  - Stand patient 4 times a day  - Ambulate patient 4 times a day  - Out of bed to chair 3 times a day   - Out of bed for meals 3 times a day  - Out of bed for toileting  - Record patient progress and toleration of activity level   Outcome: Progressing     Problem: DISCHARGE PLANNING  Goal: Discharge to home or other facility with appropriate resources  Description: INTERVENTIONS:  - Identify barriers to discharge w/patient and caregiver  - Arrange for needed discharge resources and transportation as appropriate  - Identify discharge learning needs (meds, wound care, etc.)  - Arrange for interpretive services to assist at discharge as needed  - Refer to Case Management Department for coordinating discharge planning if the patient needs post-hospital services based on physician/advanced practitioner order or complex needs related to functional status, cognitive ability, or social support system  Outcome: Progressing     Problem: Knowledge Deficit  Goal: Patient/family/caregiver demonstrates understanding of disease process, treatment plan, medications, and discharge instructions  Description:  Complete learning assessment and assess knowledge base.  Interventions:  - Provide teaching at level of understanding  - Provide teaching via preferred learning methods  Outcome: Progressing     Problem: Prexisting or High Potential for Compromised Skin Integrity  Goal: Skin integrity is maintained or improved  Description: INTERVENTIONS:  - Identify patients at risk for skin breakdown  - Assess and monitor skin integrity  - Assess and monitor nutrition and hydration status  - Monitor labs   - Assess for incontinence   - Turn and reposition patient  - Assist with mobility/ambulation  - Relieve pressure over bony prominences  - Avoid friction and shearing  - Provide appropriate hygiene as needed including keeping skin clean and dry  - Evaluate need for skin moisturizer/barrier cream  - Collaborate with interdisciplinary team   - Patient/family teaching  - Consider wound care consult   Outcome: Progressing     Problem: Potential for Falls  Goal: Patient will remain free of falls  Description: INTERVENTIONS:  - Educate patient/family on patient safety including physical limitations  - Instruct patient to call for assistance with activity   - Consult OT/PT to assist with strengthening/mobility   - Keep Call bell within reach  - Keep bed low and locked with side rails adjusted as appropriate  - Keep care items and personal belongings within reach  - Initiate and maintain comfort rounds  - Make Fall Risk Sign visible to staff  - Offer Toileting every 2 Hours, in advance of need  - Initiate/Maintain alarm  - Obtain necessary fall risk management equipment:   - Apply yellow socks and bracelet for high fall risk patients  - Consider moving patient to room near nurses station  Outcome: Progressing

## 2024-05-30 ENCOUNTER — TRANSITIONAL CARE MANAGEMENT (OUTPATIENT)
Dept: FAMILY MEDICINE CLINIC | Facility: CLINIC | Age: 69
End: 2024-05-30

## 2024-05-31 ENCOUNTER — TELEPHONE (OUTPATIENT)
Dept: PALLIATIVE CARE | Facility: HOSPITAL | Age: 69
End: 2024-05-31

## 2024-05-31 DIAGNOSIS — Z51.5 PALLIATIVE CARE PATIENT: ICD-10-CM

## 2024-05-31 DIAGNOSIS — F41.9 ANXIETY: ICD-10-CM

## 2024-05-31 DIAGNOSIS — G47.00 INSOMNIA: ICD-10-CM

## 2024-05-31 RX ORDER — ALPRAZOLAM 0.5 MG/1
0.5 TABLET ORAL 2 TIMES DAILY PRN
Qty: 60 TABLET | Refills: 0 | Status: SHIPPED | OUTPATIENT
Start: 2024-05-31 | End: 2024-05-31

## 2024-05-31 RX ORDER — ALPRAZOLAM 0.5 MG/1
0.5 TABLET ORAL 2 TIMES DAILY PRN
Qty: 60 TABLET | Refills: 0 | Status: SHIPPED | OUTPATIENT
Start: 2024-05-31

## 2024-05-31 NOTE — TELEPHONE ENCOUNTER
Received call from patient's sister, Piedad Underwood.     Rishabh acknowledged that he is no longer safe to live alone and his sisters have been able to hire an ambulance to take him from his local residence down to his sister Rupali Edwards in [place].   The address where Rishabh will be staying is:    [address]. (House entrance is off of [address]     Rupali and Piedad will help take care of him.   We will plan to have a virtual visit with Rishabh ASAP to discuss his plan of care moving forward. Will set a reminder for my office to call Piedad on Monday to set up a time.     He will likely use [facility] in [facility] while he is in [place] however sisters are still deciding.

## 2024-06-02 DIAGNOSIS — C61 PROSTATE CANCER METASTATIC TO BONE (HCC): ICD-10-CM

## 2024-06-02 DIAGNOSIS — C79.51 PROSTATE CANCER METASTATIC TO BONE (HCC): ICD-10-CM

## 2024-06-02 DIAGNOSIS — J44.9 CHRONIC OBSTRUCTIVE PULMONARY DISEASE, UNSPECIFIED COPD TYPE (HCC): ICD-10-CM

## 2024-06-02 RX ORDER — ALBUTEROL SULFATE 2.5 MG/3ML
2.5 SOLUTION RESPIRATORY (INHALATION) EVERY 6 HOURS PRN
Qty: 270 ML | Refills: 0 | Status: CANCELLED | OUTPATIENT
Start: 2024-06-02

## 2024-06-02 RX ORDER — GABAPENTIN 300 MG/1
300 CAPSULE ORAL 2 TIMES DAILY
Qty: 60 CAPSULE | Refills: 0 | Status: CANCELLED | OUTPATIENT
Start: 2024-06-02 | End: 2024-07-02

## 2024-06-02 NOTE — TELEPHONE ENCOUNTER
Medication: Albuterol     Dose/Frequency: 90 mcg/act     Quantity: 270 mL    Pharmacy:   [facility]/pharmacy #[id number] - [place] - [address]       Office:   [x] PCP/Provider -   [] Speciality/Provider -     Does the patient have enough for 3 days?   [] Yes   [x] No - Send as HP to POD

## 2024-06-02 NOTE — TELEPHONE ENCOUNTER
Medication: Gabapentin     Dose/Frequency: 300 mg     Quantity: 60    Pharmacy: [facility]/pharmacy #[id number] - [place] - [address]      Office:   [] PCP/Provider -   [x] Speciality/Provider - Palliative [place]     Does the patient have enough for 3 days?   [] Yes   [x] No - Send as HP to POD

## 2024-06-02 NOTE — TELEPHONE ENCOUNTER
Medication: Breztri Aerosphere     Dose/Frequency: 160-9-4.8 mcg/act aero    Quantity: 31.1 g    Pharmacy:   [facility]/pharmacy #[id number] - [place] - [address]       Office:   [] PCP/Provider -   [x] Speciality/Provider - Pulmonary [place]     Does the patient have enough for 3 days?   [] Yes   [x] No - Send as HP to POD

## 2024-06-03 DIAGNOSIS — J44.9 CHRONIC OBSTRUCTIVE PULMONARY DISEASE, UNSPECIFIED COPD TYPE (HCC): ICD-10-CM

## 2024-06-03 DIAGNOSIS — C79.51 PROSTATE CANCER METASTATIC TO BONE (HCC): ICD-10-CM

## 2024-06-03 DIAGNOSIS — C61 PROSTATE CANCER METASTATIC TO BONE (HCC): ICD-10-CM

## 2024-06-03 RX ORDER — ALBUTEROL SULFATE 2.5 MG/3ML
2.5 SOLUTION RESPIRATORY (INHALATION) EVERY 6 HOURS PRN
Qty: 270 ML | Refills: 1 | Status: SHIPPED | OUTPATIENT
Start: 2024-06-03

## 2024-06-03 RX ORDER — GABAPENTIN 300 MG/1
300 CAPSULE ORAL 2 TIMES DAILY
Qty: 60 CAPSULE | Refills: 0 | Status: SHIPPED | OUTPATIENT
Start: 2024-06-03 | End: 2024-07-03

## 2024-06-03 RX ORDER — BUDESONIDE, GLYCOPYRROLATE, AND FORMOTEROL FUMARATE 160; 9; 4.8 UG/1; UG/1; UG/1
2 AEROSOL, METERED RESPIRATORY (INHALATION) 2 TIMES DAILY
Qty: 31.1 G | Refills: 0 | Status: SHIPPED | OUTPATIENT
Start: 2024-06-03 | End: 2024-06-06

## 2024-06-03 NOTE — TELEPHONE ENCOUNTER
Last appointment: 12/12/23    Next scheduled appointment: 6/11/24    Pharm[facility]: [facility]      PDMP review:

## 2024-06-03 NOTE — TELEPHONE ENCOUNTER
Reason for call:   [x] Refill   [] Prior Auth  [] Other:     Office:   [x] PCP/Provider - Elena Clark DO   [] Specialty/Provider -     Medication: albuterol (2.5 mg/3 mL) 0.083 % nebulizer solution     Dose/Frequency: Take 3 mL (2.5 mg total) by nebulization every 6 (six) hours as needed for wheezing or shortness of breath     Quantity: 270 ml    Pharmacy: [facility]/pharmacy #[id number] [address]     Does the patient have enough for 3 days?   [] Yes   [x] No - Send as HP to POD

## 2024-06-05 ENCOUNTER — TELEPHONE (OUTPATIENT)
Dept: HEMATOLOGY ONCOLOGY | Facility: CLINIC | Age: 69
End: 2024-06-05

## 2024-06-05 DIAGNOSIS — C61 PROSTATE CANCER METASTATIC TO BONE (HCC): ICD-10-CM

## 2024-06-05 DIAGNOSIS — C79.51 PROSTATE CANCER METASTATIC TO BONE (HCC): ICD-10-CM

## 2024-06-05 DIAGNOSIS — G89.3 CANCER RELATED PAIN: ICD-10-CM

## 2024-06-05 DIAGNOSIS — Z51.5 PALLIATIVE CARE PATIENT: ICD-10-CM

## 2024-06-05 NOTE — TELEPHONE ENCOUNTER
Patient Call    Who are you speaking with? sister     If it is not the patient, are they listed on an active communication consent form? Yes   What is the reason for this call? Very difficult to transport patient, please advise   Does this require a call back? Yes   If a call back is required, please list [facility] call back number [phone]    If a call back is required, advise that a message will be forwarded to their care team and someone will return their call as soon as possible.   Did you relay this information to the patient? Yes

## 2024-06-05 NOTE — TELEPHONE ENCOUNTER
Spoke to Richard's sister, Piedad.  Richard is living at his other sister's house near [place]. Transportation with an ambulance with cost them ~$600. Richard wants to resume treatment and Piedad has lots of questions regarding side effects and quality of life while taking this medication. I let her know that these are all good questions that Dr. Alvarez will be able to answer during the appointment upcoming appointment on 6/11. She would like to know if this appointment can be made virtual. I let her know I would discuss this with Dr. Alvarez and call her back.

## 2024-06-06 RX ORDER — BUDESONIDE, GLYCOPYRROLATE, AND FORMOTEROL FUMARATE 160; 9; 4.8 UG/1; UG/1; UG/1
AEROSOL, METERED RESPIRATORY (INHALATION)
Qty: 19 G | Refills: 3 | Status: SHIPPED | OUTPATIENT
Start: 2024-06-06

## 2024-06-06 RX ORDER — MORPHINE SULFATE 15 MG/1
15 TABLET, FILM COATED, EXTENDED RELEASE ORAL 2 TIMES DAILY
Qty: 60 TABLET | Refills: 0 | Status: SHIPPED | OUTPATIENT
Start: 2024-06-06

## 2024-06-11 ENCOUNTER — TELEMEDICINE (OUTPATIENT)
Age: 69
End: 2024-06-11
Payer: MEDICARE

## 2024-06-11 ENCOUNTER — TELEPHONE (OUTPATIENT)
Age: 69
End: 2024-06-11

## 2024-06-11 VITALS — OXYGEN SATURATION: 92 % | HEART RATE: 92 BPM

## 2024-06-11 DIAGNOSIS — C79.51 PROSTATE CANCER METASTATIC TO BONE (HCC): ICD-10-CM

## 2024-06-11 DIAGNOSIS — C79.51 PROSTATE CANCER METASTATIC TO BONE (HCC): Primary | ICD-10-CM

## 2024-06-11 DIAGNOSIS — E43 SEVERE PROTEIN-CALORIE MALNUTRITION (HCC): ICD-10-CM

## 2024-06-11 DIAGNOSIS — J44.1 COPD EXACERBATION (HCC): ICD-10-CM

## 2024-06-11 DIAGNOSIS — C61 PROSTATE CANCER METASTATIC TO BONE (HCC): Primary | ICD-10-CM

## 2024-06-11 DIAGNOSIS — C61 PROSTATE CANCER METASTATIC TO BONE (HCC): ICD-10-CM

## 2024-06-11 DIAGNOSIS — R64 CACHEXIA (HCC): ICD-10-CM

## 2024-06-11 DIAGNOSIS — Z51.5 PALLIATIVE CARE PATIENT: ICD-10-CM

## 2024-06-11 DIAGNOSIS — J43.9 PULMONARY EMPHYSEMA, UNSPECIFIED EMPHYSEMA TYPE (HCC): Primary | ICD-10-CM

## 2024-06-11 PROBLEM — T45.1X5A NEUROPATHY DUE TO CHEMOTHERAPEUTIC DRUG (HCC): Status: RESOLVED | Noted: 2024-05-10 | Resolved: 2024-06-11

## 2024-06-11 PROBLEM — G62.0 NEUROPATHY DUE TO CHEMOTHERAPEUTIC DRUG (HCC): Status: RESOLVED | Noted: 2024-05-10 | Resolved: 2024-06-11

## 2024-06-11 PROBLEM — R23.2 HOT FLASHES: Status: RESOLVED | Noted: 2022-03-17 | Resolved: 2024-06-11

## 2024-06-11 PROCEDURE — G2211 COMPLEX E/M VISIT ADD ON: HCPCS | Performed by: INTERNAL MEDICINE

## 2024-06-11 PROCEDURE — G2211 COMPLEX E/M VISIT ADD ON: HCPCS | Performed by: PHYSICIAN ASSISTANT

## 2024-06-11 PROCEDURE — 99204 OFFICE O/P NEW MOD 45 MIN: CPT | Performed by: INTERNAL MEDICINE

## 2024-06-11 PROCEDURE — 99215 OFFICE O/P EST HI 40 MIN: CPT | Performed by: PHYSICIAN ASSISTANT

## 2024-06-11 RX ORDER — ABIRATERONE ACETATE 250 MG/1
TABLET ORAL
COMMUNITY
Start: 2024-06-06

## 2024-06-11 RX ORDER — METHYLPREDNISOLONE 4 MG/1
4 TABLET ORAL DAILY
Qty: 30 TABLET | Refills: 1 | Status: SHIPPED | OUTPATIENT
Start: 2024-06-11

## 2024-06-11 NOTE — ASSESSMENT & PLAN NOTE
Patient has very severe COPD and follows with pulmonology.  He is now on low-dose methylprednisolone chronically hoping to preempt further exacerbations.  Continue oxygen supplementation to keep saturations >88%

## 2024-06-11 NOTE — PROGRESS NOTES
Outpatient Virtual Regular Visit - Follow-up with Palliative and Supportive Care  Richard Nava 68 y.o. male [id number]    1. Pulmonary emphysema, unspecified emphysema type (HCC)  Assessment & Plan:  Patient has very severe COPD and follows with pulmonology.  He is now on low-dose methylprednisolone chronically hoping to preempt further exacerbations.  Continue oxygen supplementation to keep saturations >88%  2. COPD exacerbation (HCC)  -     methylPREDNISolone (MEDROL) 4 mg tablet; Take 1 tablet (4 mg total) by mouth daily  3. Palliative care patient  Assessment & Plan:  Rishabh follows with palliative and supportive care for symptom management.  Goals of care are clear to continue with palliative care for now.  He has been on [facility] hospice in the past however revoked their services.  Pain well-controlled on MS Contin 15 mg BID  Continue Neurontin 300MG BID  Continue alprazolam 0.5 mg BID PRN  Regarding the patient's new onset vivid dreams/hallucinations, this coincides with the timeline of the methylprednisolone however also could be related to hypoxia.  He would like to monitor for now.  ACP - documents are on file. /Piedad is HCA. Rishabh is DNR/DNI.   4. Prostate cancer metastatic to bone (HCC)  Assessment & Plan:  The patient is under the care of Dr. Alvarez however his Zytiga has been discontinued.  Will be reevaluated in 3 months to see if he can restart treatment for prostate cancer.  His cancer has been stable.  5. Severe protein-calorie malnutrition (HCC)  Assessment & Plan:  Continue protein supplements  Malnutrition Findings:                         BMI Findings:           There is no height or weight on file to calculate BMI.       Medications Discontinued During This Encounter   Medication Reason    methylprednisolone (MEDROL) 8 MG tablet Reorder       Richrad Nava was seen today for symptoms and planning cares related to above illnesses.  I have reviewed the patient's controlled  "substance dispensing history in the Prescription Drug Monitoring Program in compliance with the [place] regulations before prescribing any controlled substances.  Filled  Written  ID  Drug  QTY  Days  Prescriber  RX #  Dispenser  Refill  Daily Dose*  Pymt Type      06/06/2024 06/06/2024 1 Morphine Sulf Er 15 Mg Tablet 60.00 30  Bul [id number] Pen (2197) 0 30.00 MME Medicare PA   05/31/2024 05/31/2024 1 Alprazolam 0.5 Mg Tablet 60.00 30  Bul [id number] Pen (1991) 0 2.00 LME Medicare PA   05/13/2024 05/03/2024 2 Morphine Sulf Er 15 Mg Tablet 30.00 15 Sherita Con [id number] Enc (2957) 0 30.00 MME Other PA   05/10/2024 05/09/2024 2 Alprazolam 1 Mg Tablet 28.00 14 Sherita Con [id number] Enc (1126) 0 4.00 LME Other PA       They are invited to continue to follow with us.  If there are questions or concerns, please contact us through our clinic/answering service 24 hours a day, seven days a week.    Arleth Scales PA-C  [facility] Palliative [facility] Supportive [facility]  [phone]      Visit Information    Accompanied By: sister/Mary/\"Brittni\"     Source of History: Self    History Limitations: None    Intake:    Reason for virtual visit is symptom follow up.  The patient is unable to visit the clinic safely due to severe COPD with hypoxia.    After connecting through [employer], the patient was identified by name and date of birth. Richard Nava or their agent was informed that this was a telemedicine visit and that the visit is being conducted through the [facility] platform. He agrees to proceed..  My office door was closed. No one else was in the room.  He acknowledged consent and understanding of privacy and security of the video platform. The patient or their agent has agreed to participate and understands they can discontinue the visit at any time.     Narrative and Interval History      Richard Nava is a 68 y.o. male who presents in follow up of symptoms related to stage IV prostate cancer and severe COPD.  " Pertinent issues include: symptom management, pain, neoplasm related, depression or anxiety, assessment of goals of care, disease process education and discussion of prognosis, advance care planning.    Rishabh has a past medical history of stage IV prostate cancer, severe COPD, chronic respiratory failure, and cachexia with continuous opioid dependence due to cancer related pain.  He had been following with [facility] oncology and palliative care until recently however he did sign onto [facility] hospice care due to recurrent COPD exacerbations.  Ultimately, he chose to sign off of hospice and return to [facility] for ongoing medical care and is reestablishing with palliative care and oncology today.  He was seen by Dr. Alvarez who recommended he not resume his Zytiga at this time as his cancer is overall stable and his respiratory issues are his main concern.  He then presents today 6/11/2024 virtually with his sister Gabriela to follow-up on his symptoms with palliative and supportive care.  He does report hallucinations which seem to have coincided with higher steroid dosing in the hospital and have persisted.  None of his other medications have changed at this time.  Hallucinations could also be related to hypoxia as he is currently sitting on the couch without his oxygen on and his oxygen is only in the 80s.  He showed me how to put his oxygen back on and his numbers gradually came up to the 90s.  I encouraged him to wear oxygen to keep his number greater than 88%.  Otherwise, he has no uncontrolled symptoms or complaints.  Will follow closely with Rishabh.  Anticipate seeing him again in the clinic in 3 to 4 weeks.      Past Medical History:   Diagnosis Date    Bone cancer (HCC)     Colon polyp     COPD (chronic obstructive pulmonary disease) (HCC)     Emphysema lung (HCC)     Encephalopathy acute 05/05/2022    Hyperlipidemia     Left lower quadrant abdominal pain 12/01/2021    Positive blood culture  04/25/2023    Prostate cancer (HCC)     Respiratory distress 03/08/2024     Past Surgical History:   Procedure Laterality Date    APPENDECTOMY      COLONOSCOPY      IR BIOPSY BONE  12/30/2021    JOINT REPLACEMENT      UPPER GASTROINTESTINAL ENDOSCOPY       Current Outpatient Medications   Medication Sig Dispense Refill    methylPREDNISolone (MEDROL) 4 mg tablet Take 1 tablet (4 mg total) by mouth daily 30 tablet 1    abiraterone (ZYTIGA) 250 mg tablet       albuterol (2.5 mg/3 mL) 0.083 % nebulizer solution Take 3 mL (2.5 mg total) by nebulization every 6 (six) hours as needed for wheezing or shortness of breath 270 mL 1    albuterol (2.5 mg/3 mL) 0.083 % nebulizer solution Take 3 mL (2.5 mg total) by nebulization every 6 (six) hours as needed for wheezing or shortness of breath 270 mL 1    albuterol (PROVENTIL HFA,VENTOLIN HFA) 90 mcg/act inhaler TAKE 2 PUFFS BY MOUTH EVERY 4 HOURS AS NEEDED FOR WHEEZE 8.5 g 1    ALPRAZolam (XANAX) 0.5 mg tablet Take 1 tablet (0.5 mg total) by mouth 2 (two) times a day as needed for anxiety or sleep Provider aware of co-existing clonazepam prescription. Patient is palliative care patient. 60 tablet 0    azithromycin (ZITHROMAX) 250 mg tablet Take 1 tablet (250 mg total) by mouth 3 (three) times a week 18 tablet 0    benzonatate (TESSALON PERLES) 100 mg capsule Take 1 capsule (100 mg total) by mouth 3 (three) times a day as needed for cough 30 capsule 3    Budeson-Glycopyrrol-Formoterol (Breztri Aerosphere) 160-9-4.8 MCG/ACT AERO INHALE 2 PUFFS BY MOUTH 2 TIMES A DAY RINSE MOUTH AFTER USE. 19 g 3    gabapentin (NEURONTIN) 300 mg capsule Take 1 capsule (300 mg total) by mouth 2 (two) times a day 60 capsule 0    lidocaine (LIDODERM) 5 % Apply 3 patches topically daily Remove & Discard patch within 12 hours or as directed by MD Reed patch 0    morphine (MS CONTIN) 15 mg 12 hr tablet Take 1 tablet (15 mg total) by mouth 2 (two) times a day Ongoing therapy Max Daily Amount: 30 mg 60 tablet  0    naloxone (NARCAN) 4 mg/0.1 mL nasal spray Administer 1 spray into a nostril. If no response after 2-3 minutes, give another dose in the other nostril using a new spray. 1 each 0    NON FORMULARY Medical marijuana      pantoprazole (PROTONIX) 40 mg tablet Take 1 tablet (40 mg total) by mouth daily in the early morning 30 tablet 0     No current facility-administered medications for this visit.      No Known Allergies    Video Exam  Vitals:    06/11/24 1514   Pulse: 92   SpO2: 92%     Physical Exam  Vitals and nursing note reviewed. Exam conducted with a chaperone present.   Constitutional:       General: He is not in acute distress.     Appearance: He is cachectic. He is ill-appearing.   HENT:      Head: Normocephalic and atraumatic.   Eyes:      Conjunctiva/sclera: Conjunctivae normal.   Cardiovascular:      Rate and Rhythm: Normal rate.   Pulmonary:      Effort: Pulmonary effort is normal. No respiratory distress.   Musculoskeletal:         General: No swelling.      Cervical back: Neck supple.   Skin:     General: Skin is warm and dry.      Coloration: Skin is pale.   Neurological:      Mental Status: He is alert and oriented to person, place, and time. Mental status is at baseline.   Psychiatric:         Mood and Affect: Mood normal.         Behavior: Behavior normal.       I have spent a total time of 45 minutes on 06/11/24 in caring for this patient including Diagnostic results, Prognosis, Risks and benefits of tx options, Counseling / Coordination of care, Documenting in the medical record, Reviewing / ordering tests, medicine, procedures  , Obtaining or reviewing history  , and Communicating with other healthcare professionals .   Visit was conducted by VIDEO TELECOMMUNICATIONS, face to face with Richard Nava and his sister/Mary.    Encounter provider Arleth Scales PA-C, located at:  PALLIATIVE CARE [facility]  [address]  [place]  [place]  [place] [address]  [phone]    Recent Visits  No visits were found meeting these conditions.  Showing recent visits within past 7 days and meeting all other requirements  Today's Visits  Date Type Provider Dept   06/11/24 Telemedicine Arleth Scales PA-C  Palliative [facility]   Showing today's visits and meeting all other requirements  Future Appointments  No visits were found meeting these conditions.  Showing future appointments within next 150 days and meeting all other requirements       VIRTUAL VISIT DISCLAIMER    Richard Nava or their agent has consented to an online visit or consultation.  They understands that the online visit is based solely on information provided by the patient or agent, and that, in the absence of a face-to-face physical evaluation by the physician, the diagnosis they receive is both limited and provisional in terms of accuracy and completeness.  This is not intended to replace a full medical face-to-face evaluation by the physician. Richard Nava or their agent understands and accepts these terms.  The patient or their agent was informed this is a billable service.

## 2024-06-11 NOTE — ASSESSMENT & PLAN NOTE
The patient is under the care of Dr. Alvarez however his Zytiga has been discontinued.  Will be reevaluated in 3 months to see if he can restart treatment for prostate cancer.  His cancer has been stable.

## 2024-06-11 NOTE — TELEPHONE ENCOUNTER
Called and set up FU appt following virtual visit today.  Scheduled for 9/11 at 10 am.  Mailed paperwork.

## 2024-06-11 NOTE — ASSESSMENT & PLAN NOTE
Rishabh follows with palliative and supportive care for symptom management.  Goals of care are clear to continue with palliative care for now.  He has been on [facility] hospice in the past however revoked their services.  Pain well-controlled on MS Contin 15 mg BID  Continue Neurontin 300MG BID  Continue alprazolam 0.5 mg BID PRN  Regarding the patient's new onset vivid dreams/hallucinations, this coincides with the timeline of the methylprednisolone however also could be related to hypoxia.  He would like to monitor for now.  ACP - documents are on file. Sister/Piedad is HCA. Rishabh is DNR/DNI.

## 2024-06-11 NOTE — ASSESSMENT & PLAN NOTE
Continue protein supplements  Malnutrition Findings:                         BMI Findings:           There is no height or weight on file to calculate BMI.

## 2024-06-11 NOTE — PROGRESS NOTES
[facility] HEMATOLOGY ONCOLOGY SPECIALISTS [facility]  [address] [facility] [address]   FIRST FLO[address]  [place] [address]  [phone]     Telemedicine consent     Provider: Gautam Alvarez MD  Provider located at [facility] ONC [facility] HEMATOLOGY ONCOLOGY SPECIALISTS [facility]  [address]   FIRST [address]  [place] [address]  [phone]     Patient located in the state of [place].  The patient was identified by name and date of birth. The patient was informed that this is a telemedicine visit and that the visit is being conducted through the Epic Embedded platform. Patient agrees to proceed.  My office door was closed. No one else was in the room.  Patient acknowledged consent and understanding of privacy and security of the video platform. The patient has agreed to participate and understands they can discontinue the visit at any time.     Patient is also aware this is a billable service.     Date of Visit: 6/11/2024  Name: Richard Nava   YOB: 1955     Assessment/Plan  In summary, Richard Nava is a 68 y.o. male with severe COPD and chronic respiratory failure with metastatic prostate cancer.  Treatment for his prostate cancer has included Lupron, Zytiga and prednisone plus Xgeva. His prostate cancer had been under good control on this regimen and his PSA decreased from 850 at diagnosis to around 0.8-0.9.  He subsequently opted to go on hospice, and treatment for his prostate cancer was on hold.  His most recent PSA from 8/28/2024 was slightly up to 1.87.  He received his last Lupron injection on April 2, 2024.    Although his PSA is slightly elevated at this time, I think his bigger issue is his underlying chronic respiratory failure.  He has had multiple admissions secondary to this.  I do not necessarily think that his prostate cancer is the long term issue here.  We discussed options including holding of cancer directed treatment, continuing on  Lupron alone, or reinstating both Lupron and Zytiga/prednisone.  After discussing pros and cons, he has opted to discontinue Zytiga/prednisone for now.  I will plan to see him back in 3 months in person with labs prior to that visit.  We will evaluate and see how is he is doing off the Zytiga, and if there has been any improvement in some of his symptoms and cachexia.  He has already received his Lupron injection in April.  We will continue to hold Xgeva at this time.    All the patient's sisters questions were answered to their apparent satisfaction.  Patient has been strongly urged to call with any questions       Oncology History Overview Note   12/302021 Iliac Crest, Right, bone lesion:  -Metastatic adenocarcinoma, immunohistochemically consistent with metastatic prostatic adenocarcinoma    1/5/2022 started on Firmagon by Urology    2/2022 started Xtandi     5/2022 hospitalized with acute encephalopathy. Xtandi discontinued and he was switched to Zytiga and prednisone 2.5 mg    Xgeva every 3 months     1/27/23 PSMA PET CT showed stable scattered bony lesions, no evidence of progression       Prostate cancer metastatic to bone (HCC)   12/30/2021 Initial Diagnosis    Prostate cancer metastatic to bone (HCC)        Cancer Staging   No matching staging information was found for the patient.     Treatment Details   Treatment goal [No plan goal]   Plan Name DENOSUMAB (XGEVA)   Status Active   Start Date 5/24/2022   End Date Until discontinued   Provider ISA Contreras   Chemotherapy denosumab (XGEVA), 120 mg, Subcutaneous, Once, 1 of 1 cycle  Administration: 120 mg (5/24/2022), 120 mg (8/16/2022), 120 mg (11/8/2022), 120 mg (1/30/2023), 120 mg (4/28/2023), 120 mg (7/24/2023), 120 mg (10/26/2023), 120 mg (1/31/2024)          HISTORY OF PRESENT ILLNESS:   Richard Nava is a 68 y.o. male who I am meeting for the first time for management of his prostate cancer.  Patient previously followed with Dr. Gibbons.   He presented with prostate cancer with metastasis to the bones in January 2022 that was bx confirmed.  His PSA at presentation was 850.  He was started on ADT.  He was briefly on Xtandi, and this was discontinued due to an admission with altered mental status, although Xtandi was not thought to be the cause for this. He then was switched over to Zytiga, and also received Xgeva and Lupron.  His PSA has had good response on this, and has been less than 1 since January 2023.  Patient was then enrolled in hospice briefly around April 2024, and Zytiga was discontinued.  He has had multiple admissions secondary to COPD exacerbation. He then withdrew from hospice. He is currently on 3L of oxygen, and his BMI is only 14.8 at the time of d/c in May. He was briefly staying with one of his sisters in [place].  Since then, apparently he has gained some strength and about 6 pounds.  He is now back to living by himself.       Subjective  Patient seen with his sister today.  Patient states that he is able to take care of himself.  Sister is concerned about him living independently.  Patient states that his breathing still remains a concern.  He had many questions regarding his COPD and steroids.  He has now been back on the Zytiga.  His sister thinks that he has been having poor appetite when he is on the Zytiga.  His energy level is also poor while he is on this.  She is questioning whether he can come off the Zytiga briefly.      REVIEW OF SYSTEMS:    14 point review of systems otherwise  negative      Current Outpatient Medications:     ALPRAZolam (XANAX) 0.5 mg tablet, Take 1 tablet (0.5 mg total) by mouth 2 (two) times a day as needed for anxiety or sleep Provider aware of co-existing clonazepam prescription. Patient is palliative care patient., Disp: 60 tablet, Rfl: 0    albuterol (2.5 mg/3 mL) 0.083 % nebulizer solution, Take 3 mL (2.5 mg total) by nebulization every 6 (six) hours as needed for wheezing or shortness of  breath, Disp: 270 mL, Rfl: 1    albuterol (2.5 mg/3 mL) 0.083 % nebulizer solution, Take 3 mL (2.5 mg total) by nebulization every 6 (six) hours as needed for wheezing or shortness of breath, Disp: 270 mL, Rfl: 1    albuterol (PROVENTIL HFA,VENTOLIN HFA) 90 mcg/act inhaler, TAKE 2 PUFFS BY MOUTH EVERY 4 HOURS AS NEEDED FOR WHEEZE, Disp: 8.5 g, Rfl: 1    atorvastatin (LIPITOR) 40 mg tablet, Take 1 tablet (40 mg total) by mouth daily with dinner (Patient not taking: Reported on 5/22/2024), Disp: 30 tablet, Rfl: 0    azithromycin (ZITHROMAX) 250 mg tablet, Take 1 tablet (250 mg total) by mouth 3 (three) times a week, Disp: 18 tablet, Rfl: 0    benzonatate (TESSALON PERLES) 100 mg capsule, Take 1 capsule (100 mg total) by mouth 3 (three) times a day as needed for cough, Disp: 30 capsule, Rfl: 3    Budeson-Glycopyrrol-Formoterol (Breztri Aerosphere) 160-9-4.8 MCG/ACT AERO, INHALE 2 PUFFS BY MOUTH 2 TIMES A DAY RINSE MOUTH AFTER USE., Disp: 19 g, Rfl: 3    gabapentin (NEURONTIN) 300 mg capsule, Take 1 capsule (300 mg total) by mouth 2 (two) times a day, Disp: 60 capsule, Rfl: 0    lidocaine (LIDODERM) 5 %, Apply 3 patches topically daily Remove & Discard patch within 12 hours or as directed by MD, Disp: 90 patch, Rfl: 0    methylprednisolone (MEDROL) 8 MG tablet, Take 4 tablets (32 mg total) by mouth daily with breakfast for 3 days, THEN 2 tablets (16 mg total) daily with breakfast for 3 days, THEN 1 tablet (8 mg total) daily with breakfast for 3 days, THEN 1 tablet (8 mg total) daily with breakfast for 3 days, THEN 0.5 tablets (4 mg total) daily with breakfast for 3 days., Disp: 26 tablet, Rfl: 0    morphine (MS CONTIN) 15 mg 12 hr tablet, Take 1 tablet (15 mg total) by mouth 2 (two) times a day Ongoing therapy Max Daily Amount: 30 mg, Disp: 60 tablet, Rfl: 0    naloxone (NARCAN) 4 mg/0.1 mL nasal spray, Administer 1 spray into a nostril. If no response after 2-3 minutes, give another dose in the other nostril using a  new spray., Disp: 1 each, Rfl: 0    NON FORMULARY, Medical marijuana, Disp: , Rfl:     pantoprazole (PROTONIX) 40 mg tablet, Take 1 tablet (40 mg total) by mouth daily in the early morning, Disp: 30 tablet, Rfl: 0     No Known Allergies     ACTIVE PROBLEMS:  Patient Active Problem List   Diagnosis    Emphysema lung (HCC)    Severe protein-calorie malnutrition (HCC)    HLD (hyperlipidemia)    Personal history of colonic polyps    Prostate cancer metastatic to bone (HCC)    Insomnia    Hot flashes    Anorexia    Cachexia (HCC)    Palliative care patient    Neoplasm related pain    Medical marijuana use    Opioid dependence (HCC)    COPD exacerbation (HCC)    Low oxygen saturation    Acute on chronic respiratory failure with hypoxia (HCC)    Neuropathy due to chemotherapeutic drug (HCC)    Underweight    Gastroesophageal reflux disease without esophagitis          Past Medical History:   Diagnosis Date    Bone cancer (HCC)     Colon polyp     COPD (chronic obstructive pulmonary disease) (HCC)     Emphysema lung (HCC)     Encephalopathy acute 2022    Hyperlipidemia     Left lower quadrant abdominal pain 2021    Positive blood culture 2023    Prostate cancer (HCC)     Respiratory distress 2024        Past Surgical History:   Procedure Laterality Date    APPENDECTOMY      COLONOSCOPY      IR BIOPSY BONE  2021    JOINT REPLACEMENT      UPPER GASTROINTESTINAL ENDOSCOPY          Social History     Socioeconomic History    Marital status: Single     Spouse name: Not on file    Number of children: Not on file    Years of education: Not on file    Highest education level: Not on file   Occupational History    Not on file   Tobacco Use    Smoking status: Former     Current packs/day: 0.00     Average packs/day: 1 pack/day for 42.0 years (42.0 ttl pk-yrs)     Types: Cigarettes     Start date:      Quit date:      Years since quittin.4    Smokeless tobacco: Never   Vaping Use    Vaping  status: Never Used   Substance and Sexual Activity    Alcohol use: Never    Drug use: Never    Sexual activity: Not Currently   Other Topics Concern    Not on file   Social History Narrative    Not on file     Social Determinants of Health     Financial Resource Strain: Low Risk  (5/30/2023)    Overall Financial Resource Strain (CARDIA)     Difficulty of Paying Living Expenses: Not hard at all   Food Insecurity: No Food Insecurity (5/22/2024)    Hunger Vital Sign     Worried About Running Out of Food in the Last Year: Never true     Ran Out of Food in the Last Year: Never true   Transportation Needs: No Transportation Needs (5/22/2024)    PRAPARE - Transportation     Lack of Transportation (Medical): No     Lack of Transportation (Non-Medical): No   Physical Activity: Not on file   Stress: Not on file   Social Connections: Not on file   Intimate Partner Violence: Not At Risk (4/22/2022)    Humiliation, Afraid, Rape, and Kick questionnaire     Fear of Current or Ex-Partner: No     Emotionally Abused: No     Physically Abused: No     Sexually Abused: No   Housing Stability: Unknown (5/22/2024)    Housing Stability Vital Sign     Unable to Pay for Housing in the Last Year: No     Number of Times Moved in the Last Year: 1     Homeless in the Last Year: Not on file   Recent Concern: Housing Stability - High Risk (5/1/2024)    Received from [employer] [place] [email or web address]    Housing Stability Vital Sign     Unable to Pay for Housing in the Last Year: Not on file     Number of Places Lived in the Last Year: Not on file     Is your current living situation unsteady?   (I.e. Staying with others,  in a hotel, in a shelter, living outside: on the street, on a beach, in a car, abandoned building, bus, train station or in ...: Yes        Family History   Problem Relation Age of Onset    Lung cancer Mother     Breast cancer Sister     Colon polyps Brother     Colon cancer Neg Hx         Objective          I reviewed lab data in the  chart as noted above.  Additional labs as noted below    WBC   Date Value Ref Range Status   05/29/2024 9.12 4.31 - 10.16 Thousand/uL Final   05/28/2024 7.15 4.31 - 10.16 Thousand/uL Final   05/27/2024 9.77 4.31 - 10.16 Thousand/uL Final     Hemoglobin   Date Value Ref Range Status   05/29/2024 12.1 12.0 - 17.0 g/dL Final   05/28/2024 11.1 (L) 12.0 - 17.0 g/dL Final   05/27/2024 12.3 12.0 - 17.0 g/dL Final     Platelets   Date Value Ref Range Status   05/29/2024 363 149 - 390 Thousands/uL Final   05/28/2024 365 149 - 390 Thousands/uL Final   05/27/2024 377 149 - 390 Thousands/uL Final     MCV   Date Value Ref Range Status   05/29/2024 102 (H) 82 - 98 fL Final   05/28/2024 101 (H) 82 - 98 fL Final   05/27/2024 103 (H) 82 - 98 fL Final      Potassium   Date Value Ref Range Status   05/29/2024 4.4 3.5 - 5.3 mmol/L Final   05/28/2024 4.2 3.5 - 5.3 mmol/L Final   05/27/2024 4.5 3.5 - 5.3 mmol/L Final   05/02/2024 4.1 3.5 - 5.1 mmol/L Final   05/01/2024 3.9 3.5 - 5.1 mmol/L Final   04/30/2024 4.4 3.5 - 5.1 mmol/L Final     Chloride   Date Value Ref Range Status   05/29/2024 97 96 - 108 mmol/L Final   05/28/2024 98 96 - 108 mmol/L Final   05/27/2024 97 96 - 108 mmol/L Final     CARBON DIOXIDE   Date Value Ref Range Status   05/02/2024 34.8 (H) 23.0 - 31.0 mmol/L Final   05/01/2024 34.3 (H) 23.0 - 31.0 mmol/L Final   04/30/2024 40.2 (HH) 23.0 - 31.0 mmol/L Final     CO2   Date Value Ref Range Status   05/29/2024 42 (H) 21 - 32 mmol/L Final   05/28/2024 38 (H) 21 - 32 mmol/L Final   05/27/2024 40 (H) 21 - 32 mmol/L Final     CO2, i-STAT   Date Value Ref Range Status   05/21/2024 45 (H) 21 - 32 mmol/L Final     BUN   Date Value Ref Range Status   05/29/2024 15 5 - 25 mg/dL Final   05/28/2024 12 5 - 25 mg/dL Final   05/27/2024 14 5 - 25 mg/dL Final   05/02/2024 16 8 - 26 mg/dL Final   05/01/2024 12 8 - 26 mg/dL Final   04/30/2024 11 8 - 26 mg/dL Final     Creatinine   Date Value Ref Range Status   05/29/2024 0.50 (L) 0.60 -  1.30 mg/dL Final     Comment:     Standardized to IDMS reference method   05/28/2024 0.43 (L) 0.60 - 1.30 mg/dL Final     Comment:     Standardized to IDMS reference method   05/27/2024 0.48 (L) 0.60 - 1.30 mg/dL Final     Comment:     Standardized to IDMS reference method   05/02/2024 0.60 (L) 0.72 - 1.25 mg/dL Final   05/01/2024 0.56 (L) 0.72 - 1.25 mg/dL Final   04/30/2024 0.61 (L) 0.72 - 1.25 mg/dL Final     Glucose   Date Value Ref Range Status   05/29/2024 201 (H) 65 - 140 mg/dL Final     Comment:     If the patient is fasting, the ADA then defines impaired fasting glucose as > 100 mg/dL and diabetes as > or equal to 123 mg/dL.   05/28/2024 206 (H) 65 - 140 mg/dL Final     Comment:     If the patient is fasting, the ADA then defines impaired fasting glucose as > 100 mg/dL and diabetes as > or equal to 123 mg/dL.   05/27/2024 196 (H) 65 - 140 mg/dL Final     Comment:     If the patient is fasting, the ADA then defines impaired fasting glucose as > 100 mg/dL and diabetes as > or equal to 123 mg/dL.   05/02/2024 186 (H) 70 - 105 mg/dL Final   05/01/2024 180 (H) 70 - 105 mg/dL Final   04/30/2024 142 (H) 70 - 105 mg/dL Final     Calcium   Date Value Ref Range Status   05/29/2024 8.8 8.4 - 10.2 mg/dL Final   05/28/2024 8.4 8.4 - 10.2 mg/dL Final   05/27/2024 8.9 8.4 - 10.2 mg/dL Final   05/02/2024 8.6 (L) 8.8 - 10.0 mg/dL Final   05/01/2024 9.2 8.8 - 10.0 mg/dL Final   04/30/2024 10.0 8.8 - 10.0 mg/dL Final     Albumin   Date Value Ref Range Status   05/21/2024 3.9 3.5 - 5.0 g/dL Final   04/18/2024 3.9 3.5 - 5.0 g/dL Final   04/17/2024 4.0 3.5 - 5.0 g/dL Final     ALBUMIN   Date Value Ref Range Status   05/02/2024 3.4 3.2 - 4.6 g/dL Final   05/01/2024 3.7 3.2 - 4.6 g/dL Final   10/21/2022 3.1 (L) 3.4 - 5.0 g/dL Final     Total Bilirubin   Date Value Ref Range Status   05/21/2024 0.25 0.20 - 1.00 mg/dL Final     Comment:     Use of this assay is not recommended for patients undergoing treatment with eltrombopag due  to the potential for falsely elevated results.  N-acetyl-p-benzoquinone imine (metabolite of Acetaminophen) will generate erroneously low results in samples for patients that have taken an overdose of Acetaminophen.   05/02/2024 0.4 0.2 - 1.0 mg/dL Final   05/01/2024 0.2 0.2 - 1.0 mg/dL Final   04/18/2024 0.31 0.20 - 1.00 mg/dL Final     Comment:     Use of this assay is not recommended for patients undergoing treatment with eltrombopag due to the potential for falsely elevated results.  N-acetyl-p-benzoquinone imine (metabolite of Acetaminophen) will generate erroneously low results in samples for patients that have taken an overdose of Acetaminophen.   04/17/2024 0.50 0.20 - 1.00 mg/dL Final     Comment:     Use of this assay is not recommended for patients undergoing treatment with eltrombopag due to the potential for falsely elevated results.  N-acetyl-p-benzoquinone imine (metabolite of Acetaminophen) will generate erroneously low results in samples for patients that have taken an overdose of Acetaminophen.   10/21/2022 0.8 0.2 - 1.0 mg/dL Final     Alkaline Phosphatase   Date Value Ref Range Status   05/21/2024 40 34 - 104 U/L Final   05/02/2024 45 40 - 150 IU/L Final   05/01/2024 56 40 - 150 IU/L Final   04/18/2024 47 34 - 104 U/L Final   04/17/2024 48 34 - 104 U/L Final   10/21/2022 64 46 - 116 IU/L Final     AST   Date Value Ref Range Status   05/21/2024 18 13 - 39 U/L Final   05/02/2024 10 5 - 34 IU/L Final   05/01/2024 12 5 - 34 IU/L Final   04/18/2024 11 (L) 13 - 39 U/L Final   04/17/2024 11 (L) 13 - 39 U/L Final   10/21/2022 28 15 - 37 IU/L Final     ALT   Date Value Ref Range Status   05/21/2024 13 7 - 52 U/L Final     Comment:     Specimen collection should occur prior to Sulfasalazine administration due to the potential for falsely depressed results.    05/02/2024 12 0 - 55 IU/L Final   05/01/2024 13 0 - 55 IU/L Final   04/18/2024 8 7 - 52 U/L Final     Comment:     Specimen collection should occur  "prior to Sulfasalazine administration due to the potential for falsely depressed results.    04/17/2024 10 7 - 52 U/L Final     Comment:     Specimen collection should occur prior to Sulfasalazine administration due to the potential for falsely depressed results.    10/21/2022 17 12 - 78 IU/L Final      No results found for: \"LDH\"  No results found for: \"TSH\"  No results found for: \"Q1TPCIF\"   No results found for: \"FREET4\"      RECENT IMAGING:  Procedure: XR chest portable    Result Date: 5/22/2024  Narrative: XR CHEST PORTABLE INDICATION: Acute Resp Failure. COMPARISON: 4/17/2024 FINDINGS: Monitoring leads and clips project over the chest. Clear lungs. Lungs remain hyperinflated indicative of emphysema. No pneumothorax or pleural effusion. Normal cardiomediastinal silhouette. Bones are unremarkable for age. Normal upper abdomen.     Impression: No acute cardiopulmonary disease. Workstation performed: [id number]       Total minutes spent reviewing EMR, seeing patient in clinic visit, documenting visit, placing treatment orders, and communicating with other medical providers: 45    Portions of the record may have been created with voice recognition software.  Occasional wrong word or \"sound a like\" substitutions may have occurred due to the inherent limitations of voice recognition software.  Read the chart carefully and recognize, using context, where substitutions have occurred.    "

## 2024-06-20 DIAGNOSIS — G47.00 INSOMNIA: ICD-10-CM

## 2024-06-20 DIAGNOSIS — F41.9 ANXIETY: ICD-10-CM

## 2024-06-20 DIAGNOSIS — Z51.5 PALLIATIVE CARE PATIENT: ICD-10-CM

## 2024-06-20 RX ORDER — ALPRAZOLAM 0.5 MG/1
0.5 TABLET ORAL 2 TIMES DAILY PRN
Qty: 60 TABLET | Refills: 0 | Status: SHIPPED | OUTPATIENT
Start: 2024-06-20

## 2024-06-20 NOTE — TELEPHONE ENCOUNTER
Last appointment: 6/11/24    Next scheduled appointment: 7/3/24    Pharmacy: [facility]    Patient reports he had approximately 12-15 pills left and returned from a vacation. He accidentally put his medication through the laundry (was on carry on bag). Please advise if an early refill is possible.

## 2024-07-03 ENCOUNTER — OFFICE VISIT (OUTPATIENT)
Age: 69
End: 2024-07-03
Payer: MEDICARE

## 2024-07-03 ENCOUNTER — CLINICAL SUPPORT (OUTPATIENT)
Age: 69
End: 2024-07-03
Payer: MEDICARE

## 2024-07-03 VITALS
BODY MASS INDEX: 14.78 KG/M2 | RESPIRATION RATE: 22 BRPM | OXYGEN SATURATION: 90 % | TEMPERATURE: 98.3 F | DIASTOLIC BLOOD PRESSURE: 78 MMHG | HEIGHT: 67 IN | HEART RATE: 111 BPM | SYSTOLIC BLOOD PRESSURE: 122 MMHG

## 2024-07-03 DIAGNOSIS — Z51.5 PALLIATIVE CARE PATIENT: ICD-10-CM

## 2024-07-03 DIAGNOSIS — Z71.89 COUNSELING AND COORDINATION OF CARE: Primary | ICD-10-CM

## 2024-07-03 DIAGNOSIS — E43 SEVERE PROTEIN-CALORIE MALNUTRITION (HCC): ICD-10-CM

## 2024-07-03 DIAGNOSIS — C79.51 PROSTATE CANCER METASTATIC TO BONE (HCC): ICD-10-CM

## 2024-07-03 DIAGNOSIS — C61 PROSTATE CANCER METASTATIC TO BONE (HCC): ICD-10-CM

## 2024-07-03 DIAGNOSIS — J43.9 PULMONARY EMPHYSEMA, UNSPECIFIED EMPHYSEMA TYPE (HCC): Primary | ICD-10-CM

## 2024-07-03 PROCEDURE — 99215 OFFICE O/P EST HI 40 MIN: CPT | Performed by: PHYSICIAN ASSISTANT

## 2024-07-03 PROCEDURE — G2211 COMPLEX E/M VISIT ADD ON: HCPCS | Performed by: PHYSICIAN ASSISTANT

## 2024-07-03 PROCEDURE — RECHECK

## 2024-07-03 NOTE — ASSESSMENT & PLAN NOTE
The patient is under the care of Dr. Alvarez however his Zytiga has been discontinued.  Dr. Alvarez will reevaluate in 2 to 3 months if he is able to restart treatment.  However given his ongoing decline, this is unlikely.

## 2024-07-03 NOTE — ASSESSMENT & PLAN NOTE
Rishabh continues to follow with palliative and supportive care for symptom management.  His quality of life is limited by his severe COPD.  We discussed goals of care again today and I suggested he consider a home palliative care program or hospice.  He does not want either and prefers to continue following with me.  However he would like to cut today's visit short because he feels so anxious and worked up about being here.  His pain is well-controlled on MS Contin 15 mg BID  Continue Neurontin 300MG BID  Continue alprazolam 0.5 mg BID PRN  ACP - documents are on file. Sister/Piedad is HCA. Rishabh is DNR/DNI.   RTC - 1 month. Patient MUST use [employer] or [employer]. I offered virtual however he does not have VV capabilities.

## 2024-07-03 NOTE — ASSESSMENT & PLAN NOTE
The patient is extremely cachectic and frail with muscle wasting and fat loss present.  Continue protein drinks.  Malnutrition Findings:                                 BMI Findings:           Body mass index is 14.78 kg/m².

## 2024-07-03 NOTE — ASSESSMENT & PLAN NOTE
The patient has very severe COPD and follows with pulmonology.  He has had multiple repeat admissions.  He was even been enrolled with hospice for short time but revoked this.  Today, he was dyspneic after ambulating from his car to the waiting room.  For some reason he turned off his oxygen to conserve battery life of his concentrator and he dropped into the 80s however his oxygen gradually recovered when this was turned back on.  Continue low-dose methylprednisolone chronically hoping to preempt further exacerbations.  Continue oxygen supplementation to keep saturations >88%

## 2024-07-03 NOTE — PROGRESS NOTES
Palliative Supportive Care  met with patient to continue to provide emotional support and guidance.      Updated biopsychosocial information:    Pt follows with palliative and supportive care for symptom management.     Pt arrived at the clinic alone and having a hard time breathing. Pt reported turning off oxygen due to needing to save the battery life which dropped oxygen levels down to 80s. Oxygen was gradually  recovered when pt turned back on.    It has been recommended by PA-C and PSC team that pt should utilize reliable transportation to and from appointments and continue with virtual visits.     PA-C also recommends that the pt should consider a home palliative care program or hospice. pt does not want either and prefers to continue following with [facility].      Resources provided:  Supportive Listening   Emotional Support and validation of feelings and concerns     Areas that need future follow-up include:  Follow up on upcoming appointments  Monitor pain and request assistance  Maintain a daily routine        I have spent 30 minutes with Patient today in which greater than 50% of this time was spent in counseling/coordination of care     Palliative  will follow-up as requested by patient, family, and primary team. Please contact with any specific requests

## 2024-07-03 NOTE — PROGRESS NOTES
Ambulatory Visit  Name: Richard Nava      : 1955      MRN: [id number]  Encounter Provider: Arleth Scales PA-C  Encounter Date: 7/3/2024   Encounter department: [facility] [place]    Assessment & Plan   1. Pulmonary emphysema, unspecified emphysema type (HCC)  Assessment & Plan:  The patient has very severe COPD and follows with pulmonology.  He has had multiple repeat admissions.  He was even been enrolled with hospice for short time but revoked this.  Today, he was dyspneic after ambulating from his car to the waiting room.  For some reason he turned off his oxygen to conserve battery life of his concentrator and he dropped into the 80s however his oxygen gradually recovered when this was turned back on.  Continue low-dose methylprednisolone chronically hoping to preempt further exacerbations.  Continue oxygen supplementation to keep saturations >88%  2. Palliative care patient  Assessment & Plan:  Rishabh continues to follow with palliative and supportive care for symptom management.  His quality of life is limited by his severe COPD.  We discussed goals of care again today and I suggested he consider a home palliative care program or hospice.  He does not want either and prefers to continue following with me.  However he would like to cut today's visit short because he feels so anxious and worked up about being here.  His pain is well-controlled on MS Contin 15 mg BID  Continue Neurontin 300MG BID  Continue alprazolam 0.5 mg BID PRN  ACP - documents are on file. Sister/Piedad is HCA. Rishabh is DNR/DNI.   RTC - 1 month. Patient MUST use [employer] or [employer]. I offered virtual however he does not have VV capabilities.   3. Prostate cancer metastatic to bone (HCC)  Assessment & Plan:  The patient is under the care of Dr. Alvarez however his Zytiga has been discontinued.  Dr. Alvarez will reevaluate in 2 to 3 months if he is able to restart treatment.  However given his ongoing decline,  this is unlikely.  4. Severe protein-calorie malnutrition (HCC)  Assessment & Plan:  The patient is extremely cachectic and frail with muscle wasting and fat loss present.  Continue protein drinks.  Malnutrition Findings:                                 BMI Findings:           Body mass index is 14.78 kg/m².       PDMP Review: I have reviewed the patient's controlled substance dispensing history in the Prescription Drug Monitoring Program in compliance with the [facility] regulations before prescribing any controlled substances.  Filled  Written  ID  Drug  QTY  Days  Prescriber  RX #  Dispenser  Refill  Daily Dose*  Pymt Type      06/21/2024 06/20/2024 1 Alprazolam 0.5 Mg Tablet 60.00 30  Bul [id number] Pen (4411) 0 2.00 LME Private Pay PA   06/06/2024 06/06/2024 1 Morphine Sulf Er 15 Mg Tablet 60.00 30  Bul [id number] Pen (3597) 0 30.00 MME Medicare PA   05/31/2024 05/31/2024 1 Alprazolam 0.5 Mg Tablet 60.00 30  Bul [id number] Pen (4411) 0 2.00 LME Medicare PA   05/13/2024 05/03/2024 2 Morphine Sulf Er 15 Mg Tablet 30.00 15 Sherita Con [id number] Enc (2957) 0 30.00 MME Other PA   05/10/2024 05/09/2024 2 Alprazolam 1 Mg Tablet 28.00 14 Sherita Con [id number] Enc (0605) 0 4.00 LME Other PA     History of Present Illness     Richard Nava is a 68 y.o. male who presents for symptom follow-up related to stage IV prostate cancer and severe COPD. Pertinent issues include: symptom management, pain, neoplasm related, depression or anxiety, assessment of goals of care, disease process education and discussion of prognosis, advance care planning.     Rishabh has a past medical history of stage IV prostate cancer, severe COPD, chronic respiratory failure, and cachexia with continuous opioid dependence due to cancer related pain.  He had been following with [facility] oncology and palliative care until recently however he did sign onto [facility] hospice care due to recurrent COPD exacerbations.  Ultimately, he chose to sign off of hospice and  return to [facility] for ongoing medical care and is reestablishing with palliative care and oncology today.  He was seen by Dr. Alvarez who recommended he not resume his Zytiga at this time as his cancer is overall stable and his respiratory issues are his main concern.      He presents today for symptom follow-up 7/3/2024.  Our last visit was done virtually with his sister/Piedad present however he presents alone today.  He drove himself and he became dyspneic/hypoxic walking from his car to the front door.  He also briefly turned off his oxygen during this time to try to conserve the battery so he is in the 80s on the pulse oximeter when he arrives and he needs to be put in a wheelchair.  When his oxygen is turned up, he recovers into the 90s and he looks more comfortable.  The patient is alert and oriented.  We reeducated him about safety and not turning off his oxygen.  I recommended that he does not drive especially if he runs out of battery for his oxygen and future appointments should be done through [facility] or through [employer] and he can let us know and we will help set this up for him.  He agrees.  He is also worked up because of this incident and would like to cut our visit short today.  He does not want to make any changes to his medications.  We did briefly discuss his goals of care and he does not want a home palliative care program and he certainly does not want hospice at this time.  He would like to continue following with palliative care at [facility].    Of note, the patient and his family have had multiple goals of care conversations with palliative care in the past and he was briefly on hospice care but revoked this unilaterally.  Family are also [place] in [place] and Rishabh prefers to live alone.    Review of Systems        Objective     /78 (BP Location: Left arm, Patient Position: Sitting, Cuff Size: Standard)   Pulse (!) 111   Temp 98.3  "°F (36.8 °C) (Temporal)   Resp 22   Ht 5' 7\" (1.702 m)   SpO2 90%   BMI 14.78 kg/m²     Physical Exam  Vitals and nursing note reviewed. Exam conducted with a chaperone present.   Constitutional:       General: He is not in acute distress.     Appearance: He is cachectic. He is ill-appearing.      Comments: Extremely frail and ill appearing with significant fat loss and muscle wasting   HENT:      Head: Normocephalic and atraumatic.   Eyes:      Conjunctiva/sclera: Conjunctivae normal.   Cardiovascular:      Rate and Rhythm: Normal rate and regular rhythm.      Heart sounds: No murmur heard.  Pulmonary:      Effort: Respiratory distress (mild distress at first which resolves when O2 is turned on) present.      Breath sounds: Normal breath sounds.   Musculoskeletal:         General: No swelling.      Cervical back: Neck supple.   Skin:     General: Skin is warm.      Coloration: Skin is pale.   Neurological:      Mental Status: He is alert and oriented to person, place, and time. Mental status is at baseline.   Psychiatric:         Mood and Affect: Mood normal.         Cognition and Memory: Cognition is not impaired.         Judgment: Judgment is not inappropriate.       Recent labs:  Lab Results   Component Value Date/Time    SODIUM 140 05/29/2024 04:37 AM    SODIUM 140 05/02/2024 07:34 AM    K 4.4 05/29/2024 04:37 AM    K 4.1 05/02/2024 07:34 AM    BUN 15 05/29/2024 04:37 AM    BUN 16 05/02/2024 07:34 AM    CREATININE 0.50 (L) 05/29/2024 04:37 AM    CREATININE 0.60 (L) 05/02/2024 07:34 AM    GLUC 201 (H) 05/29/2024 04:37 AM    GLUC 186 (H) 05/02/2024 07:34 AM    CALCIUM 8.8 05/29/2024 04:37 AM    CALCIUM 8.6 (L) 05/02/2024 07:34 AM    AST 18 05/21/2024 09:35 PM    AST 10 05/02/2024 07:34 AM    ALT 13 05/21/2024 09:35 PM    ALT 12 05/02/2024 07:34 AM    ALB 3.9 05/21/2024 09:35 PM    ALB 3.4 05/02/2024 07:34 AM    TP 6.4 05/21/2024 09:35 PM    TP 5.6 (L) 05/02/2024 07:34 AM    EGFR 110 05/29/2024 04:37 AM    " EGFR 104.5 05/02/2024 07:34 AM    EGFR 133.59 05/02/2024 07:34 AM     Lab Results   Component Value Date/Time    HGB 12.1 05/29/2024 04:37 AM    WBC 9.12 05/29/2024 04:37 AM     05/29/2024 04:37 AM    INR 0.94 05/21/2024 09:35 PM    PTT 29 05/21/2024 09:35 PM     Lab Results   Component Value Date/Time    CQC3OAKMWKVD 1.140 05/05/2022 02:19 PM       Recent Imaging:  Procedure: XR chest portable    Result Date: 5/22/2024  Narrative: XR CHEST PORTABLE INDICATION: Acute Resp Failure. COMPARISON: 4/17/2024 FINDINGS: Monitoring leads and clips project over the chest. Clear lungs. Lungs remain hyperinflated indicative of emphysema. No pneumothorax or pleural effusion. Normal cardiomediastinal silhouette. Bones are unremarkable for age. Normal upper abdomen.     Impression: No acute cardiopulmonary disease. Workstation performed: [id number]     Procedure: CTA CHEST PE STUDY    Result Date: 4/30/2024  Narrative: Initial report created on 4/30/2024 9:58:28 PM EDT PROCEDURE INFORMATION: Exam: CTA Chest With Contrast Exam date and time: 4/30/2024 7:05 PM Age: 68 years old Clinical indication: Pulmonary embolism (pe) suspected, high prob. Metastatic prostate cancer TECHNIQUE: Imaging protocol: Computed tomographic angiography of the chest with contrast, including non-contrast images if performed. Exam focused on the arteries. Radiation optimization: All CT scans at this facility use at least one of these dose optimization techniques: automated exposure control; mA and/or kV adjustment per patient size (includes targeted exams where dose is matched to clinical indication); or iterative reconstruction. COMPARISON: DX XR CHEST PA OR AP PORTABLE 4/30/2024 3:23 PM FINDINGS: Pulmonary arteries: No pulmonary arterial enlargement. No pulmonary emboli. Aorta: There is calcification of the aorta and the aortic branches. Lungs: Severe centrilobular emphysema. The lung fields are hyperexpanded. There is enlargement of the trachea.  There is diffuse bronchial wall thickening. Bronchial wall thickening is asymmetric and more pronounced involving the left lower lobe. There is severe bilateral apical lung disease. Ground-glass density is seen within the upper lobes, most prominent involving the right apex. Pleural spaces: No pleural effusion. There is bilateral apical pleural thickening. There is bilateral fissural thickening. There are patchy areas of ground-glass opacity also seen within the left lower lobe. There is an irregular pulmonary nodule in the left lower lobe measuring 1.5 cm, image 228 of series 2. There is an irregular pulmonary nodule in the left lower lobe measuring 8.3 mm on image 241 of series 2. There is an irregular pulmonary nodule in the left lower lobe measuring 7.6 mm on image 264 series 2. There is an irregular pulmonary opacity in the left lower lobe measuring 2 cm on image 70 of series 5. There are multiple smaller pulmonary nodules in the left lower lobe.  Heart: No cardiac enlargement.There is coronary artery calcification. Esophagus: The esophagus is patulous. Lymph nodes: There is no suspicious adenopathy. Intraperitoneal space: The study is not tailored for subdiaphragmatic evaluation. Please see the separately dictated CT abdomen pelvis dated 04/30/2024 regarding additional abdominal and pelvis findings. Bones/joints: There are innumerable sclerotic lesions throughout the visualized appendicular and axial skeleton consistent with patient's history of metastatic prostate cancer. There are several ivory vertebra. Osseous lesions are extensive. Soft tissues: Cachexia.    Impression: 1. No findings of pulmonary embolus. 2. Severe centrilobular emphysema. 3. There are multiple irregular pulmonary nodules in the left lower lobe.  This is associated with left lower lobe bronchial wall thickening.  These changes may be infectious, inflammatory, or neoplastic.  Non emergent follow-up or tissue sampling is recommended.  It  is noted that the Fleischer Society Pulmonary nodule guidelines do not apply to patients with known malignancy. 4. Innumerable sclerotic lesions throughout the visualized appendicular and axial skeleton consistent with patient's history metastatic prostate cancer. Reference: Maddy VALENTIN et al. Guidelines for Management of Incidental Pulmonary Nodules Detected on CT Images: From the Fleischner Society 2017. Radiology. 2017;284(1):228-243. Link: https://doi.org/10.1148/radiol.5542285495 Reference: Hortensia BLAKE et al. Updated Fleischner Society Guidelines for Managing Incidental Pulmonary Nodules: Common Questions and Challenging Scenarios. Radiographics. 2018;38(5):2477-3805. Link: https://doi.org/10.1148/rg.8578043909 Modified: 10/14/2021 COMMENTS: The presence of pulmonary emphysema on CT is an independent risk factor for lung cancer. In the absence of a history or active diagnosis of lung cancer, it is recommended that this patient with emphysema be evaluated for enrollment in a low dose CT lung cancer screening program. THIS DOCUMENT HAS BEEN ELECTRONICALLY SIGNED BY HOLLY ARCINIEGA MD    Procedure: CTA ABDOMEN PELVIS W WO CONTRAST    Result Date: 4/30/2024  Narrative: Initial report created on 4/30/2024 8:42:32 PM EDT PROCEDURE INFORMATION: Exam: CT Abdomen And Pelvis With Contrast Exam date and time: 4/30/2024 7:05 PM Age: 68 years old Clinical indication: Abdominal pain, acute, nonlocalized TECHNIQUE: Imaging protocol: Computed tomography of the abdomen and pelvis with contrast. 3D rendering (Not supervised by radiologist): MIP and/or 3D reconstructed images were created by the technologist. Radiation optimization: All CT scans at this facility use at least one of these dose optimization techniques: automated exposure control; mA and/or kV adjustment per patient size (includes targeted exams where dose is matched to clinical indication); or iterative reconstruction. COMPARISON: CTA CHEST PULMONARY EMBOLISM 4/30/2024  7:05 PM FINDINGS: Limitations: No oral contrast and no intraperitoneal or retroperitoneal fat. Lungs: Please see the separately dictated, preceding CT chest dated 04/30/2024 regarding additional chest and lung findings.   Liver: The liver is unremarkable. Gallbladder and bile ducts: There are no calcified gallstones. Pancreas: The pancreas is unremarkable as visualized. Spleen: No splenomegaly. Adrenal glands: No adrenal gland enlargement. Kidneys and ureters: There are punctate nonobstructing intrarenal calculi on the left. There are numerous small renal hypodensities, too small to adequately characterize. The ureters are not well visualized due to lack of retroperitoneal fat. Stomach and bowel: Evaluation of the gut is limited without oral contrast. Evaluation of the gut is also limited due to lack of intraperitoneal fat. The gut is poorly evaluated in this patient. Retained fecal matter is seen throughout the colon. The cecum is located low in the right lower quadrant. Appendix: Pelvic location of cecum limits appendiceal visualization. Paucity of intra-abdominal and pelvic mesenteric fat, as well as absence of enteric contrast within distal small bowel loops and cecum, limits confident appendiceal identification. Intraperitoneal space: No ascites. Vasculature: There is calcification of the aorta and the aortic branches. Lymph nodes: There is no bulky lymphadenopathy. Evaluation for lymphadenopathy is limited due to lack of intraperitoneal and retroperitoneal fat. Urinary bladder: Calcific densities within the dependent portion of the bladder may represent bladder wall calcification or layering bladder stones. Reproductive: Unremarkable as visualized. Bones/joints: There are innumerable sclerotic lesions throughout the visualized osseous structures. This has the appearance of osseous metastasis. Cancer history is either not known or not provided. There is an ivory vertebra at T11. There are bilateral pars defects  at L5 with grade 2 anterolisthesis. Soft tissues: Cachexia. Clothing artifact. The patient was not placed in a gown for imaging.    Impression: 1.   There are innumerable sclerotic lesions throughout the visualized osseous structures having the appearance of osseous metastasis. Cancer history is either not known or not provided. Close clinical correlation is needed. 2.   Calcific densities within the dependent portion of the bladder may represent bladder wall calcification or layering bladder stones. COMMENTS: Consistent with the American College of Radiology's Incidental Findings Committee white paper (J Am Federica Radiol 2018): Any incidental renal lesion less than 1 cm or classified as too small to characterize, or any incidental cystic renal lesion characterized as simple-appearing, is likely benign. No follow-up imaging is recommended for these lesions per consensus recommendations based on imaging criteria. THIS DOCUMENT HAS BEEN ELECTRONICALLY SIGNED BY HOLLY ARCINIEGA MD    Procedure: XR CHEST PORTABLE    Result Date: 4/30/2024  Narrative: History: Shortness of breath. Comments: AP views of the chest were obtained and compared with prior exam dated 10/21/2022. Heart is of normal size. Mediastinum is not widened there is a 1.2 cm questionable nodule in the right upper lobe. Please see key image. Emphysematous changes are suspected as well. There is also a 1.6 cm questionable mass in the peripheral left midlung. Please see key image. Dedicated CT chest would be recommended. Moreover, there is newly seen air underneath the right hemidiaphragm not seen previously. It is uncertain whether this is due to colonic interposition versus free intraperitoneal air. Again CT abdomen and pelvis examination is recommended. Heart and mediastinum are within the limits of normal. There is no pulmonary edema or pneumonia.    Impression: Impression: 1. Findings concerning for bilateral lung nodules. Dedicated CT chest could be  obtained for further evaluation. 2. Colonic interposition versus free intraperitoneal air in the right upper quadrant of the abdomen. Dedicated CT abdomen and pelvis could be obtained for further evaluation. 3. Suspected emphysema. Findings and recommendations were called to and acknowledged by Dr. Girard of the ED staff at 3:49 PM on 4/30/2024. Read back verification was obtained. [facility] Dictation By:  PERRI LIGHT MD  This report has been electronically signed by:  PERRI LIGHT MD  4/30/2024 3:52 PM    Procedure: XR chest portable    Result Date: 4/17/2024  Narrative: XR CHEST PORTABLE INDICATION: cough. COMPARISON: 3/26/2024 FINDINGS: Emphysema noted. No segmental or lobar consolidation seen. There seems to be some faint patchy infiltrate laterally in the left mid lung. Atelectasis or perhaps minimal developing pneumonia. Recommend attention on continued follow-up. No pneumothorax or pleural effusion. Normal cardiomediastinal silhouette. Numerous small round sclerotic densities are seen in multiple bones. Patient has known blastic metastases based on a prior CT. Normal upper abdomen.     Impression: Emphysema. Patchy density left midlung field which could be developing pneumonia. Correlate with clinical scenario. Attention on continued follow-up appropriate. Sclerotic bone lesions noted Workstation performed: [id number]     Procedure: XR chest 1 view portable    Result Date: 3/26/2024  Narrative: XR CHEST PORTABLE INDICATION: dyspnea. COMPARISON: 3/8/2024 FINDINGS: Clear lungs. No pneumothorax or pleural effusion. Stable severe centrilobular emphysema. Normal cardiomediastinal silhouette. Bones are unremarkable for age. Normal upper abdomen.     Impression: No acute cardiopulmonary disease. Workstation performed: [id number]     Procedure: CT head without contrast    Result Date: 3/26/2024  Narrative: CT BRAIN - WITHOUT CONTRAST INDICATION:   new, severe cephalgia; prostate Ca. COMPARISON:  CT head March 8, 2024. Correlation with MR brain March 10, 2024 and [facility] PET/CT January 27, 2023 TECHNIQUE:  CT examination of the brain was performed.  Multiplanar 2D reformatted images were created from the source data. Radiation dose length product (DLP) for this visit:  880 mGy-cm .  This examination, like all CT scans performed in the [facility], was performed utilizing techniques to minimize radiation dose exposure, including the use of iterative reconstruction and automated exposure control. IMAGE QUALITY:  Diagnostic. FINDINGS: PARENCHYMA:  No intracranial mass, mass effect or midline shift. No CT signs of acute infarction.  No acute parenchymal hemorrhage. VENTRICLES AND EXTRA-AXIAL SPACES:  Normal for the patient's age. VISUALIZED ORBITS: Post right sided cataract surgery. No acute findings. PARANASAL SINUSES: Normal visualized paranasal sinuses. CALVARIUM AND EXTRACRANIAL SOFT TISSUES: Unchanged sclerotic calvarial lesions (for example series 3, image 37).     Impression: No acute intracranial abnormality. Unchanged sclerotic calvarial osseous metastases. Workstation performed: [id number]     Procedure: MRI brain w wo contrast    Result Date: 3/10/2024  Narrative: MRI BRAIN WITH AND WITHOUT CONTRAST INDICATION: brain- r/o metastais. COMPARISON: 5/7/2022 and CTA from 5/5/2022. TECHNIQUE: Multiplanar, multisequence imaging of the brain was performed before and after gadolinium administration. IV Contrast:  4 mL of Gadobutrol injection (SINGLE-DOSE) IMAGE QUALITY:   Diagnostic. FINDINGS: BRAIN PARENCHYMA:  There is no discrete mass, mass effect or midline shift. There is no intracranial hemorrhage.  Normal posterior fossa.  Diffusion imaging is unremarkable. There are no white matter changes in the cerebral hemispheres. Postcontrast imaging of the brain demonstrates no abnormal enhancement. VENTRICLES:  Normal for the patient's age. SELLA AND PITUITARY GLAND:  Normal. ORBITS: Status post  right-sided cataract surgery PARANASAL SINUSES:  Normal. VASCULATURE: Normal flow void within the left internal carotid artery consistent with slow flow or occlusion. Findings are new since prior exam. CALVARIUM AND SKULL BASE: Stable small sclerotic foci within the bony calvarium consistent with the patient's known history of metastatic disease. No epidural extension. EXTRACRANIAL SOFT TISSUES:  Normal.     Impression: No mass effect, acute intracranial hemorrhage or evidence of recent infarction. Mild chronic microvascular ischemic change. No definite evidence for intracranial metastatic disease. Stable sclerotic calvarial foci consistent with the patient's known history of metastatic disease and without epidural extension. Loss of the normal flow-void within the left internal carotid artery is new since the prior exams and consistent with slow flow secondary to high-grade stenosis or occlusion. CTA of the head and neck is recommended for further evaluation. I personally discussed this study with BRIANDA SALINAS on 3/10/2024 2:16 PM. Workstation performed: [id number]     Procedure: CT head wo contrast    Result Date: 3/8/2024  Narrative: CT BRAIN - WITHOUT CONTRAST INDICATION:   heaDACHE. COMPARISON:  None. TECHNIQUE:  CT examination of the brain was performed.  Multiplanar 2D reformatted images were created from the source data. Radiation dose length product (DLP) for this visit:  1064.94 mGy-cm .  This examination, like all CT scans performed in the [facility], was performed utilizing techniques to minimize radiation dose exposure, including the use of iterative reconstruction and automated exposure control. IMAGE QUALITY:  Diagnostic. FINDINGS: PARENCHYMA:  No intracranial mass, mass effect or midline shift. No CT signs of acute infarction.  No acute parenchymal hemorrhage. VENTRICLES AND EXTRA-AXIAL SPACES:  Normal for the patient's age. There is a cavum septum pellucidum. VISUALIZED ORBITS:  Normal visualized orbits. PARANASAL SINUSES: Normal visualized paranasal sinuses. CALVARIUM AND EXTRACRANIAL SOFT TISSUES:  Normal.     Impression: No acute intracranial abnormality. Workstation performed: [id number]     Procedure: CTA ED chest PE Study    Result Date: 3/8/2024  Narrative: CTA - CHEST WITH IV CONTRAST - PULMONARY ANGIOGRAM INDICATION:   hypoxia, h/o CA. Per my review of the medical record, history of prostate cancer with bone metastases. Former smoker with COPD. COMPARISON: Chest radiograph from today, chest CT 10/24/2023, PET/CT 1/27/2023. TECHNIQUE: CT angiogram timed for optimal opacification of the pulmonary arteries.  Axial, sagittal, and coronal 2D reformats created from source data.  Coronal 3D MIP postprocessing on the acquisition scanner. Radiation dose length product (DLP):  162.25 mGy-cm .  Radiation dose exposure minimized using iterative reconstruction and automated exposure control. IV Contrast:  85 mL of iohexol (OMNIPAQUE) FINDINGS: PULMONARY ARTERIES:  No pulmonary embolus. LUNGS: New small groundglass opacity in the left lower lobe (3/179). Additional peripheral groundglass opacity in the left lower lobe is stable and likely due to benign scar (3/187). Stable 1 cm nodule/scar in the lateral basal left lower lobe (3/222). Severe emphysema. AIRWAYS: No significant filling defects. Stable diffuse bronchial wall thickening. PLEURA:  Unremarkable. HEART/GREAT VESSELS: Normal heart size. Mild coronary artery calcification indicating atherosclerotic heart disease. MEDIASTINUM AND KELLY:  Unremarkable. CHEST WALL AND LOWER NECK: Subcentimeter thyroid nodule. No follow-up needed. UPPER ABDOMEN: Bilateral renal cysts. Punctate nonobstructing bilateral renal calcification. OSSEOUS STRUCTURES: Extensive blastic bone metastases.     Impression: No pulmonary embolus. Minimal new left lower lobe groundglass opacity, infectious or inflammatory. Severe emphysema with chronic diffuse bronchial wall  thickening compatible with bronchitis. Redemonstration of extensive blastic bone metastases. Workstation performed: [id number]     Procedure: XR chest portable    Result Date: 3/8/2024  Narrative: XR CHEST PORTABLE INDICATION: cough. COMPARISON: 2/26/2024 FINDINGS: There is stable patchy density in the left lower lobe. There is severe centrilobular emphysema. There is a small pleural effusion, increased from the prior study. Normal cardiomediastinal silhouette. Bones are unremarkable for age. Normal upper abdomen.     Impression: Stable nonspecific patchy density in the left lower lobe, atelectasis versus pneumonia, with mildly increased small pleural effusion. Workstation performed: [id number]        Administrative Statements   I have spent a total time of 45 minutes in caring for this patient on the day of the visit/encounter including Diagnostic results, Prognosis, Risks and benefits of tx options, Instructions for management, Patient and family education, Importance of tx compliance, Risk factor reductions, Impressions, Documenting in the medical record, Reviewing / ordering tests, medicine, procedures  , Obtaining or reviewing history  , and Communicating with other healthcare professionals . Topics discussed with the patient / family include symptom assessment and management, medication review, medication adjustment, psychosocial support, goals of care, supportive listening, and anticipatory guidance.

## 2024-07-09 DIAGNOSIS — Z51.5 PALLIATIVE CARE PATIENT: ICD-10-CM

## 2024-07-09 DIAGNOSIS — C61 PROSTATE CANCER METASTATIC TO BONE (HCC): ICD-10-CM

## 2024-07-09 DIAGNOSIS — J44.1 COPD EXACERBATION (HCC): ICD-10-CM

## 2024-07-09 DIAGNOSIS — G89.3 CANCER RELATED PAIN: ICD-10-CM

## 2024-07-09 DIAGNOSIS — C79.51 PROSTATE CANCER METASTATIC TO BONE (HCC): ICD-10-CM

## 2024-07-09 RX ORDER — MORPHINE SULFATE 15 MG/1
15 TABLET, FILM COATED, EXTENDED RELEASE ORAL 2 TIMES DAILY
Qty: 60 TABLET | Refills: 0 | Status: SHIPPED | OUTPATIENT
Start: 2024-07-09

## 2024-07-09 RX ORDER — METHYLPREDNISOLONE 4 MG/1
4 TABLET ORAL DAILY
Qty: 30 TABLET | Refills: 1 | Status: SHIPPED | OUTPATIENT
Start: 2024-07-09

## 2024-07-09 RX ORDER — GABAPENTIN 300 MG/1
300 CAPSULE ORAL 2 TIMES DAILY
Qty: 60 CAPSULE | Refills: 2 | Status: SHIPPED | OUTPATIENT
Start: 2024-07-09 | End: 2024-10-07

## 2024-07-11 ENCOUNTER — TELEPHONE (OUTPATIENT)
Age: 69
End: 2024-07-11

## 2024-07-11 DIAGNOSIS — F41.9 ANXIETY: ICD-10-CM

## 2024-07-11 DIAGNOSIS — Z51.5 PALLIATIVE CARE PATIENT: ICD-10-CM

## 2024-07-11 DIAGNOSIS — G47.00 INSOMNIA: ICD-10-CM

## 2024-07-11 RX ORDER — ALPRAZOLAM 0.5 MG/1
0.5 TABLET ORAL 3 TIMES DAILY PRN
Qty: 90 TABLET | Refills: 0 | Status: SHIPPED | OUTPATIENT
Start: 2024-07-11 | End: 2024-07-16 | Stop reason: SDUPTHER

## 2024-07-11 RX ORDER — ALPRAZOLAM 0.5 MG/1
0.5 TABLET ORAL 2 TIMES DAILY PRN
Qty: 60 TABLET | Refills: 0 | Status: SHIPPED | OUTPATIENT
Start: 2024-07-11 | End: 2024-07-11

## 2024-07-11 NOTE — TELEPHONE ENCOUNTER
I went back and edited the prescription to reflect a dose change to allow Rishabh to have the Xanax 3 times daily.  Rishabh should let us know immediately if he is taking the medication more than as prescribed--he did not mention this to me our last visit.  I asked the pharmacy to fill it today however this is at their discretion.

## 2024-07-11 NOTE — TELEPHONE ENCOUNTER
Received a phone call from patient.  Patient stated that he had requested a refill for Xanax but it may not be filled until 7/18.  Patient stated when he was hospitalized, he was given another medication to help sleep but cannot remember the name of the medication.  As per medication list, he was given melatonin.  Patient stated that this medication is available OTC and he would like to get it.  Patient stated that he has been taking Xanax more than two times a day and this is why he is out.  Patient stated that he is very anxious and is unable to sleep and will not be able to fall asleep without medication.  Please advise and call patient back if any changes are made with medications.

## 2024-07-16 ENCOUNTER — TELEPHONE (OUTPATIENT)
Age: 69
End: 2024-07-16

## 2024-07-16 RX ORDER — ALPRAZOLAM 0.5 MG/1
0.5 TABLET ORAL 3 TIMES DAILY PRN
Qty: 90 TABLET | Refills: 0 | Status: SHIPPED | OUTPATIENT
Start: 2024-07-16

## 2024-07-16 NOTE — TELEPHONE ENCOUNTER
I had already notified the pharmacy requesting an early fill for Joey Averynax, however this is at their discretion.  I resent the prescription now to reiterate early fill.  I did try to call the pharmacy to speak with the pharmacist however their answering machine system would not allow me to do so, therefore a left a message and asked them to call our office with further issues filling the prescription.    I wholeheartedly agree with recommendation to present to ER, which patient refused.

## 2024-07-20 ENCOUNTER — TELEPHONE (OUTPATIENT)
Dept: OTHER | Facility: OTHER | Age: 69
End: 2024-07-20

## 2024-07-20 NOTE — TELEPHONE ENCOUNTER
Paged on call regarding PT's methylprednisolone (MEDROL) 4 mg tablet, not lasting long enough and he is wondering if he can take it again today.

## 2024-07-20 NOTE — TELEPHONE ENCOUNTER
Called patient. He asks to take an extra dose of methylprednisolone once time, today, to better be able to participate in a social engagement tonight with less respiratory compromise, to engage in conversation, etc.    Understandable and reasonable to do take an extra 4mg today, as an exception.    I encouraged patient to set up a follow up visit with his Pulmonologist for management of his very severe COPD; he last saw Pulmonology in the hospital in 05/2024 and has no follow up scheduled.    Also, no follow up currently scheduled w/ Palliative.    Per chart review patient has not wished to re-enroll w/ hospice and remains disease-focused.

## 2024-07-26 ENCOUNTER — TELEPHONE (OUTPATIENT)
Age: 69
End: 2024-07-26

## 2024-07-26 NOTE — TELEPHONE ENCOUNTER
Pt is calling his palliative care provider said he shouldn't drive, I asked pt if the office suggested Lyft or star transport he stated no they did not but he needs a ride. Is there anyway to get pt set up for his apt he has one at [facility] 7/30/24 @ 11:30am in [place]?

## 2024-07-26 NOTE — TELEPHONE ENCOUNTER
Patient calling in to request transportation be set up for his upcoming appt with Dr Scales on 7/30/24, he is unable to secure a ride to the appt and would like a call back to discuss his options as he knows the appt is necessary. Best # [phone]

## 2024-07-29 ENCOUNTER — TELEPHONE (OUTPATIENT)
Age: 69
End: 2024-07-29

## 2024-07-29 NOTE — TELEPHONE ENCOUNTER
Patient sister calling in, would like to inform providers that the patient is now at her house and is not in a state to go to an in-person appt, she would like a call back to discuss options, she is not on the consent form.    Best number for sister: [phone]

## 2024-07-29 NOTE — TELEPHONE ENCOUNTER
Patients sister called in, stating she had called over the weekend and left a message asking for a call back from the office. She said she has a lot of questions to ask about his care. She is not on the consent form

## 2024-07-30 ENCOUNTER — TELEPHONE (OUTPATIENT)
Age: 69
End: 2024-07-30

## 2024-07-30 ENCOUNTER — CLINICAL SUPPORT (OUTPATIENT)
Age: 69
End: 2024-07-30
Payer: MEDICARE

## 2024-07-30 ENCOUNTER — TELEMEDICINE (OUTPATIENT)
Age: 69
End: 2024-07-30
Payer: MEDICARE

## 2024-07-30 DIAGNOSIS — C61 PROSTATE CANCER METASTATIC TO BONE (HCC): ICD-10-CM

## 2024-07-30 DIAGNOSIS — G89.3 CANCER RELATED PAIN: ICD-10-CM

## 2024-07-30 DIAGNOSIS — C79.51 PROSTATE CANCER METASTATIC TO BONE (HCC): ICD-10-CM

## 2024-07-30 DIAGNOSIS — G47.00 INSOMNIA: ICD-10-CM

## 2024-07-30 DIAGNOSIS — E43 SEVERE PROTEIN-CALORIE MALNUTRITION (HCC): ICD-10-CM

## 2024-07-30 DIAGNOSIS — Z71.89 COUNSELING AND COORDINATION OF CARE: Primary | ICD-10-CM

## 2024-07-30 DIAGNOSIS — J43.9 PULMONARY EMPHYSEMA, UNSPECIFIED EMPHYSEMA TYPE (HCC): Primary | ICD-10-CM

## 2024-07-30 DIAGNOSIS — F41.9 ANXIETY: ICD-10-CM

## 2024-07-30 DIAGNOSIS — Z51.5 PALLIATIVE CARE PATIENT: ICD-10-CM

## 2024-07-30 PROCEDURE — 99215 OFFICE O/P EST HI 40 MIN: CPT | Performed by: PHYSICIAN ASSISTANT

## 2024-07-30 PROCEDURE — G2211 COMPLEX E/M VISIT ADD ON: HCPCS | Performed by: PHYSICIAN ASSISTANT

## 2024-07-30 RX ORDER — TRAZODONE HYDROCHLORIDE 50 MG/1
50 TABLET ORAL
Qty: 30 TABLET | Refills: 0 | Status: SHIPPED | OUTPATIENT
Start: 2024-07-30 | End: 2024-08-01 | Stop reason: ALTCHOICE

## 2024-07-30 RX ORDER — DIAZEPAM 2 MG/1
2 TABLET ORAL EVERY 6 HOURS PRN
Qty: 90 TABLET | Refills: 0 | Status: SHIPPED | OUTPATIENT
Start: 2024-07-30

## 2024-07-30 RX ORDER — MORPHINE SULFATE 15 MG/1
15 TABLET, FILM COATED, EXTENDED RELEASE ORAL 2 TIMES DAILY
Qty: 60 TABLET | Refills: 0 | Status: SHIPPED | OUTPATIENT
Start: 2024-07-30

## 2024-07-30 NOTE — ASSESSMENT & PLAN NOTE
Rishabh continues to follow with palliative and supportive care for symptom management.  His quality of life is limited by his severe COPD.  Currently continuing with palliative care.  Can rediscuss hospice care in the future.  Symptoms:  His pain is well-controlled on MS Contin 15 mg BID  Start senna daily. Can take with Miralax.   Continue Neurontin 300MG BID  Will discontinue alprazolam 0.5 mg BID PRN--the patient says he is no longer taking it as it has been ineffective.  Will initiate Valium 2 mg Q6H PRN anxiety.   Will initiate trazodone 50MG QHS PRN insomnia as this is the patient's main complaint today.   ACP - documents are on file. /Piedad is HCA. Rishabh is DNR/DNI.   RTC - 1 - 2 weeks. /Piedad can facilitate VV.

## 2024-07-30 NOTE — PROGRESS NOTES
Palliative Supportive Care  met with patient and sister (Mary) to continue to provide emotional support and guidance.      Updated biopsychosocial information relevant to support:    Identified areas of need include:     Pt continues to follow with palliative and supportive care for symptom management. His quality of life is limited by his severe COPD. Goals of care and symptom management were discussed during this visit. Pt is currently staying with his sister “Mary” in [place].    Pt reports pain is stable. However, pt is struggling with sleeping, and having bowel movements. Pt reports not sleeping well for 3 days. DEBI provided pt with volume, and trazodone to help with sleeping, and Senna to help relieve bowels. SW provided pt and family with Supportive Listening, Emotional Support and validation of feelings and concerns.       Areas that need future follow-up include:  Continue to follow up on upcoming appointments  Continue with current pain regimen   Monitor pain and request assistance as needed  Maintain a daily routine       I have spent 30 minutes with Patient today in which greater than 50% of this time was spent in counseling/coordination of care     Palliative  will follow-up as requested by patient, family, and primary team.  Please contact with any specific requests

## 2024-07-30 NOTE — PROGRESS NOTES
Outpatient Virtual Regular Visit - Follow-up with Palliative and Supportive Care  Richard Nava 69 y.o. male [id number]    1. Pulmonary emphysema, unspecified emphysema type (HCC)  Assessment & Plan:  The patient has very severe COPD and follows with pulmonology.  He has had multiple repeat admissions.  He was even been enrolled with hospice for short time but revoked this.  The patient is very dyspneic with minimal ambulation.  He is at his sister/Piedad's house currently.  He is no longer taking the low-dose methylprednisolone as he believes it is leading to insomnia  Continue oxygen supplementation to keep saturations >88%  Orders:  -     diazepam (VALIUM) 2 mg tablet; Take 1 tablet (2 mg total) by mouth every 6 (six) hours as needed for anxiety  2. Palliative care patient  Assessment & Plan:  Rishabh continues to follow with palliative and supportive care for symptom management.  His quality of life is limited by his severe COPD.  Currently continuing with palliative care.  Can rediscuss hospice care in the future.  Symptoms:  His pain is well-controlled on MS Contin 15 mg BID  Start senna daily. Can take with Miralax.   Continue Neurontin 300MG BID  Will discontinue alprazolam 0.5 mg BID PRN--the patient says he is no longer taking it as it has been ineffective.  Will initiate Valium 2 mg Q6H PRN anxiety.   Will initiate trazodone 50MG QHS PRN insomnia as this is the patient's main complaint today.   ACP - documents are on file. /Piedad is HCA. Rishabh is DNR/DNI.   RTC - 1 - 2 weeks. /Piedad can facilitate VV.   Orders:  -     diazepam (VALIUM) 2 mg tablet; Take 1 tablet (2 mg total) by mouth every 6 (six) hours as needed for anxiety  -     morphine (MS CONTIN) 15 mg 12 hr tablet; Take 1 tablet (15 mg total) by mouth 2 (two) times a day Ongoing therapy Max Daily Amount: 30 mg  3. Prostate cancer metastatic to bone (HCC)  Assessment & Plan:  The patient is under the care of Dr. Alvarez however his  Zytiga has been discontinued.  Dr. Alvarez will reevaluate in 2 to 3 months if he is able to restart treatment.  However given his ongoing decline, this is unlikely.  He is now unable to transport to the clinic as he lives in [facility] with his sister.  Orders:  -     morphine (MS CONTIN) 15 mg 12 hr tablet; Take 1 tablet (15 mg total) by mouth 2 (two) times a day Ongoing therapy Max Daily Amount: 30 mg  4. Severe protein-calorie malnutrition (HCC)  Assessment & Plan:  The patient is extremely cachectic and frail with muscle wasting and fat loss present.  Continue protein drinks.  Malnutrition Findings:                                 BMI Findings:           There is no height or weight on file to calculate BMI.     5. Anxiety  -     diazepam (VALIUM) 2 mg tablet; Take 1 tablet (2 mg total) by mouth every 6 (six) hours as needed for anxiety  6. Insomnia  -     traZODone (DESYREL) 50 mg tablet; Take 1 tablet (50 mg total) by mouth daily at bedtime as needed for sleep  7. Cancer related pain  -     morphine (MS CONTIN) 15 mg 12 hr tablet; Take 1 tablet (15 mg total) by mouth 2 (two) times a day Ongoing therapy Max Daily Amount: 30 mg    Medications Discontinued During This Encounter   Medication Reason    ALPRAZolam (XANAX) 0.5 mg tablet Alternate therapy    morphine (MS CONTIN) 15 mg 12 hr tablet Reorder       Richard Nava was seen today for symptoms and planning cares related to above illnesses.  I have reviewed the patient's controlled substance dispensing history in the Prescription Drug Monitoring Program in compliance with the [facility] regulations before prescribing any controlled substances.  Filled  Written  ID  Drug  QTY  Days  Prescriber  RX #  Dispenser  Refill  Daily Dose*  Pymt Type      07/16/2024 07/16/2024 1 Alprazolam 0.5 Mg Tablet 90.00 30 Ch Bul [id number] Pen (4411) 0 3.00 LME Medicare [place]   07/09/2024 07/09/2024 1 Morphine Sulf Er 15 Mg Tablet 60.00 30 Ch Bul [id number] Pen (4411) 0 30.00 MME Medicare [place]    06/21/2024 06/20/2024 1 Alprazolam 0.5 Mg Tablet 60.00 30  Bul [id number] Pen (441) 0 2.00 LME Private Pay [place]   06/06/2024 06/06/2024 1 Morphine Sulf Er 15 Mg Tablet 60.00 30  Bul [id number] Pen (3596) 0 30.00 MME Medicare [place]   05/31/2024 05/31/2024 1 Alprazolam 0.5 Mg Tablet 60.00 30  Bul [id number] Pen (4411) 0 2.00 LME [place]   05/13/2024 05/03/2024 2 Morphine Sulf Er 15 Mg Tablet 30.00 15 Sherita Con [id number] Enc (2954) 0 30.00 MME Other [place]     They are invited to continue to follow with us.  If there are questions or concerns, please contact us through our clinic/answering service 24 hours a day, seven days a week.    Arleth Scales PA-C  [facility]  [phone]    Visit Information    Accompanied By: sister/Piedad     Source of History: Self, Family member    History Limitations: None    Intake:    Reason for virtual visit is symptom follow up.  The patient is unable to visit the clinic safely due to progressive cancer with emphysema and severe respiratory disease, inability to drive or withstand car ride.    After connecting through [employer], the patient was identified by name and date of birth. Richard Nava or their agent was informed that this was a telemedicine visit and that the visit is being conducted through the [employer] platform. He agrees to proceed..  My office door was closed. No one else was in the room.  He acknowledged consent and understanding of privacy and security of the video platform. The patient or their agent has agreed to participate and understands they can discontinue the visit at any time.     Narrative and Interval History      Richard Nava is a 69 y.o. male who presents in follow up of symptoms related to stage IV prostate cancer and severe COPD.  Pertinent issues include: symptom management, pain, neoplasm related, breathlessness, depression or anxiety, nausea, fatigue, assessment of goals of care, disease process education  and discussion of prognosis, advance care planning     Rishabh has a past medical history of stage IV prostate cancer, severe COPD, chronic respiratory failure, and cachexia with continuous opioid dependence due to cancer related pain.  He had been following with [facility] oncology and palliative care until recently however he did sign onto [facility] hospice care due to recurrent COPD exacerbations.  Ultimately, he chose to sign off of hospice and return to [facility] for ongoing medical care and is reestablishing with palliative care and oncology today.  He was seen by Dr. Alvarez who recommended he not resume his Zytiga at this time as his cancer is overall stable and his respiratory issues are his main concern.      Today, 7/30/2024, Rishabh presents virtually with his sister/Piedad.  He was unable to take care of himself and is now staying at her house in [place] likely long-term.  Intractable insomnia is his worst complaint today.  He has stopped taking the steroid because he was concerned this was leading to insomnia.  He is also stopped taking his Xanax because he did not notice this is doing anything for him.  We discussed symptom management as above.  I would like to follow-up with him very closely to ensure he has good symptom management moving forward.     Of note, the patient and his family have had multiple goals of care conversations with palliative care in the past and he was briefly on hospice care but he revoked this unilaterally without discussion with is family.     Past Medical History:   Diagnosis Date    Bone cancer (HCC)     Colon polyp     COPD (chronic obstructive pulmonary disease) (HCC)     Emphysema lung (HCC)     Encephalopathy acute 05/05/2022    Hyperlipidemia     Left lower quadrant abdominal pain 12/01/2021    Positive blood culture 04/25/2023    Prostate cancer (HCC)     Respiratory distress 03/08/2024     Past Surgical History:   Procedure Laterality Date    APPENDECTOMY       COLONOSCOPY      IR BIOPSY BONE  12/30/2021    JOINT REPLACEMENT      UPPER GASTROINTESTINAL ENDOSCOPY       Current Outpatient Medications   Medication Sig Dispense Refill    diazepam (VALIUM) 2 mg tablet Take 1 tablet (2 mg total) by mouth every 6 (six) hours as needed for anxiety 90 tablet 0    morphine (MS CONTIN) 15 mg 12 hr tablet Take 1 tablet (15 mg total) by mouth 2 (two) times a day Ongoing therapy Max Daily Amount: 30 mg 60 tablet 0    traZODone (DESYREL) 50 mg tablet Take 1 tablet (50 mg total) by mouth daily at bedtime as needed for sleep 30 tablet 0    abiraterone (ZYTIGA) 250 mg tablet       albuterol (2.5 mg/3 mL) 0.083 % nebulizer solution Take 3 mL (2.5 mg total) by nebulization every 6 (six) hours as needed for wheezing or shortness of breath 270 mL 1    albuterol (2.5 mg/3 mL) 0.083 % nebulizer solution Take 3 mL (2.5 mg total) by nebulization every 6 (six) hours as needed for wheezing or shortness of breath 270 mL 1    albuterol (PROVENTIL HFA,VENTOLIN HFA) 90 mcg/act inhaler TAKE 2 PUFFS BY MOUTH EVERY 4 HOURS AS NEEDED FOR WHEEZE 8.5 g 1    benzonatate (TESSALON PERLES) 100 mg capsule Take 1 capsule (100 mg total) by mouth 3 (three) times a day as needed for cough 30 capsule 3    Budeson-Glycopyrrol-Formoterol (Breztri Aerosphere) 160-9-4.8 MCG/ACT AERO INHALE 2 PUFFS BY MOUTH 2 TIMES A DAY RINSE MOUTH AFTER USE. 19 g 3    gabapentin (NEURONTIN) 300 mg capsule Take 1 capsule (300 mg total) by mouth 2 (two) times a day 60 capsule 2    lidocaine (LIDODERM) 5 % Apply 3 patches topically daily Remove & Discard patch within 12 hours or as directed by MD 90 patch 0    methylprednisolone (MEDROL) 4 mg tablet Take 1 tablet (4 mg total) by mouth daily 30 tablet 1    naloxone (NARCAN) 4 mg/0.1 mL nasal spray Administer 1 spray into a nostril. If no response after 2-3 minutes, give another dose in the other nostril using a new spray. 1 each 0    NON FORMULARY Medical marijuana      pantoprazole  (PROTONIX) 40 mg tablet Take 1 tablet (40 mg total) by mouth daily in the early morning 30 tablet 0     No current facility-administered medications for this visit.      No Known Allergies      Video Exam  There were no vitals filed for this visit.  Physical Exam  Vitals and nursing note reviewed. Exam conducted with a chaperone present.   Constitutional:       General: He is not in acute distress.     Appearance: He is cachectic. He is ill-appearing.      Interventions: Nasal cannula in place.      Comments: Pleasant, fully conversational.  Chronically ill appearing.   HENT:      Head: Normocephalic and atraumatic.   Eyes:      Conjunctiva/sclera: Conjunctivae normal.   Musculoskeletal:         General: No swelling.      Cervical back: Neck supple.   Skin:     General: Skin is warm.      Coloration: Skin is pale.   Neurological:      Mental Status: He is alert.   Psychiatric:         Mood and Affect: Mood normal.       I have spent a total time of 45 minutes on 07/30/24 in caring for this patient including Diagnostic results, Prognosis, Risks and benefits of tx options, Instructions for management, Patient and family education, Counseling / Coordination of care, Documenting in the medical record, Reviewing / ordering tests, medicine, procedures  , Obtaining or reviewing history  , and Communicating with other healthcare professionals .     Visit was conducted by VIDEO TELECOMMUNICATIONS, face to face with Richard Nava and his family.    Encounter provider Arleth Scales PA-C, located at:  P[facility] [address]  [place] [facility] [place] [address] [phone]    Recent Visits  Date Type Provider Dept   07/26/24 Telephone Cee Friedman [facility] [place]   Showing recent visits within past 7 days and meeting all other requirements  Today's Visits  Date Type Provider Dept   07/30/24 Telephone Vijaya Mccarty [facility] [place]   07/30/24 Telemedicine Arleth Scales PA-C  [facility] [place]   Showing today's visits and meeting all other requirements  Future Appointments  No visits were found meeting these conditions.  Showing future appointments within next 150 days and meeting all other requirements       VIRTUAL VISIT DISCLAIMER    Richard Elijah or their agent has consented to an online visit or consultation.  They understands that the online visit is based solely on information provided by the patient or agent, and that, in the absence of a face-to-face physical evaluation by the physician, the diagnosis they receive is both limited and provisional in terms of accuracy and completeness.  This is not intended to replace a full medical face-to-face evaluation by the physician. Richard Nava or their agent understands and accepts these terms.  The patient or their agent was informed this is a billable service.

## 2024-07-30 NOTE — ASSESSMENT & PLAN NOTE
The patient is extremely cachectic and frail with muscle wasting and fat loss present.  Continue protein drinks.  Malnutrition Findings:                                 BMI Findings:           There is no height or weight on file to calculate BMI.

## 2024-07-30 NOTE — ASSESSMENT & PLAN NOTE
The patient has very severe COPD and follows with pulmonology.  He has had multiple repeat admissions.  He was even been enrolled with hospice for short time but revoked this.  The patient is very dyspneic with minimal ambulation.  He is at his sister/Piedad's house currently.  He is no longer taking the low-dose methylprednisolone as he believes it is leading to insomnia  Continue oxygen supplementation to keep saturations >88%

## 2024-07-30 NOTE — ASSESSMENT & PLAN NOTE
The patient is under the care of Dr. Alvarez however his Zytiga has been discontinued.  Dr. Alvarez will reevaluate in 2 to 3 months if he is able to restart treatment.  However given his ongoing decline, this is unlikely.  He is now unable to transport to the clinic as he lives in [facility] with his sister.

## 2024-08-02 ENCOUNTER — TELEPHONE (OUTPATIENT)
Age: 69
End: 2024-08-02

## 2024-08-02 DIAGNOSIS — G47.00 INSOMNIA, UNSPECIFIED TYPE: ICD-10-CM

## 2024-08-02 DIAGNOSIS — Z51.5 PALLIATIVE CARE PATIENT: ICD-10-CM

## 2024-08-02 NOTE — TELEPHONE ENCOUNTER
Received a phone call from patient's sister, Piedad.  Piedad stated that Arleth Scales prescribed Suvorexant.  Prescription was sent to pharmacy but pharmacy does not have medication.  Piedad stated that she will call around to other pharmacies to see where medication is available and then will call back to request prescription be sent to that pharmacy.  Piedad stated that patient needs this to be filled today, as he has not slept and will not be able to tolerate another sleepless night.

## 2024-08-02 NOTE — TELEPHONE ENCOUNTER
Medication routed to:  [facility] - [address] [facility] [address]

## 2024-08-02 NOTE — TELEPHONE ENCOUNTER
Spoke with pts Sister Piedad , sister went to pharmacy on [facility] - [address] [place] [address] ([phone] ) medication unavailable not sure when will be available .   Can you please send medication back to [facility] - [address] [facility] [address] . As this is the closest to her and this is where she would like it to go . She is aware this pharmacy does not have medication in stock but she is willing to wait for medication to be available .

## 2024-08-02 NOTE — TELEPHONE ENCOUNTER
Patient requesting pharmacy change from [facility] to [facility] in [place] due to previous pharmacy not having medication in stock.

## 2024-08-02 NOTE — TELEPHONE ENCOUNTER
Sister of patient calling.    Need refill for:    Suvorexant 5 MG TABS     Only pharmacy that has it in stock is:      [phone]     Yes...

## 2024-08-02 NOTE — TELEPHONE ENCOUNTER
Patient's sister, Piedad called back and stated that [facility] at #[phone] has medication available.  Please send prescription to this pharmacy today.

## 2024-08-02 NOTE — TELEPHONE ENCOUNTER
If we don't hear back from the pharmacy, could you call sister/ to confirm which pharmacy she wants? Because she told us a different pharmacy this morning. [facility] [address] I will do that but she said they did not have the medication this morning.

## 2024-08-02 NOTE — TELEPHONE ENCOUNTER
Patient's sister, Piedad, calling in. Could not give her any information and she was aware as she was in the car and could not get phone number, she stated she just wanted to leave a message    She stated Richard was reaching near the end of his life and would appreciate a call back to discuss what help she can receive in detail (hospice coming to the house, etc). Also how best to support Marciostanley at this time.     Best call back #[phone]

## 2024-08-02 NOTE — TELEPHONE ENCOUNTER
Before I re-route this prescription for third time, can we please call the above pharmacy [address] [address] ([phone]) to confirm they truly have the medication?  I appreciate that the family would like this medication today but they have given us conflicting information which has delayed the patient's care.

## 2024-08-03 ENCOUNTER — TELEPHONE (OUTPATIENT)
Dept: OTHER | Facility: OTHER | Age: 69
End: 2024-08-03

## 2024-08-03 DIAGNOSIS — J44.9 COPD, VERY SEVERE (HCC): ICD-10-CM

## 2024-08-03 DIAGNOSIS — C79.51 PROSTATE CANCER METASTATIC TO BONE (HCC): Primary | ICD-10-CM

## 2024-08-03 DIAGNOSIS — C61 PROSTATE CANCER METASTATIC TO BONE (HCC): Primary | ICD-10-CM

## 2024-08-03 DIAGNOSIS — J44.1 COPD EXACERBATION (HCC): ICD-10-CM

## 2024-08-03 RX ORDER — MORPHINE SULFATE 15 MG/1
TABLET ORAL
Qty: 30 TABLET | Refills: 0 | Status: SHIPPED | OUTPATIENT
Start: 2024-08-03

## 2024-08-03 NOTE — TELEPHONE ENCOUNTER
Jes called to let on call provider know that pt will be seen by hospice today. Paged on call via sc.

## 2024-08-03 NOTE — TELEPHONE ENCOUNTER
Caregiver is attempting to arrange home hospice. Reports [facility] is declining and needs services.    On call provider paged.

## 2024-08-03 NOTE — TELEPHONE ENCOUNTER
Spoke with sister, Piedad, who relays that patient's dyspnea and anxiety have been increasing. Overall, she and Isaicoleen are requesting hospice care. They prefer [facility]. Called personally and confirmed they will send RN to the house today to start services. Ambulatory order placed with dx of very severe COPD and metastatic prostate cancer to bone. In the interim prescribed MSIR 7.5-15mg PO Q4H PRN dyspnea or pain. Richard and Piedad hoping to avoid presentation to ER even for comfort measures. Encouraged Piedad to call back with questions or concerns prior to patient being enrolled on hospice. Total time spent: 45 minutes

## 2024-08-06 ENCOUNTER — TELEPHONE (OUTPATIENT)
Age: 69
End: 2024-08-06

## 2024-08-06 DIAGNOSIS — J44.9 CHRONIC OBSTRUCTIVE PULMONARY DISEASE, UNSPECIFIED COPD TYPE (HCC): ICD-10-CM

## 2024-08-06 RX ORDER — BUDESONIDE, GLYCOPYRROLATE, AND FORMOTEROL FUMARATE 160; 9; 4.8 UG/1; UG/1; UG/1
AEROSOL, METERED RESPIRATORY (INHALATION)
Qty: 19 G | Refills: 3 | Status: CANCELLED | OUTPATIENT
Start: 2024-08-06

## 2024-08-06 NOTE — TELEPHONE ENCOUNTER
Mariana calls from [facility] and is wodnering if we have a Elena in our office to go over chart notes and see if we refill pt's neb solution.informed her we do not. She verbalizes understanding

## 2024-08-06 NOTE — TELEPHONE ENCOUNTER
Spoke with patient who requested refills for albuterol nebulizer solution and Breztri rescue inhaler. I let him know I will pend refills to palliative for signature however the Breztri may need to go through pulmonology. Pt verbalized understanding and in agreement with plan. Confirmed [facility] pharmacy in [place] is preferred.

## 2024-08-07 DIAGNOSIS — J44.9 CHRONIC OBSTRUCTIVE PULMONARY DISEASE, UNSPECIFIED COPD TYPE (HCC): ICD-10-CM

## 2024-08-07 RX ORDER — BUDESONIDE, GLYCOPYRROLATE, AND FORMOTEROL FUMARATE 160; 9; 4.8 UG/1; UG/1; UG/1
AEROSOL, METERED RESPIRATORY (INHALATION)
Qty: 10.7 G | Refills: 5 | Status: SHIPPED | OUTPATIENT
Start: 2024-08-07

## 2024-08-07 RX ORDER — ALBUTEROL SULFATE 2.5 MG/3ML
2.5 SOLUTION RESPIRATORY (INHALATION) EVERY 6 HOURS PRN
Qty: 270 ML | Refills: 1 | Status: SHIPPED | OUTPATIENT
Start: 2024-08-07

## 2024-08-16 ENCOUNTER — TELEPHONE (OUTPATIENT)
Age: 69
End: 2024-08-16

## 2024-08-16 NOTE — TELEPHONE ENCOUNTER
I called Richard regarding an appointment that they have scheduled with Dr. Alvarez scheduled on  9/10/24      I left a voicemail explaining to patient that this appointment will need to be rescheduled due to a change in the providers schedule. Patient was advised to call [place] to reschedule.  Another attempt will be made to reschedule      Patient can be scheduled for a virtual appointment with Dr Alvarez on 9/10/24 at the [facility].  If patient would like to be seen in person, please schedule patient for Dr Alvarez's next available at [facility]. (End of September/beginning of October)

## 2024-08-19 NOTE — TELEPHONE ENCOUNTER
I called Richard regarding an appointment that they have scheduled with Dr. Alvarez scheduled on  9/10/24       I left a voicemail explaining to patient that this appointment will need to be rescheduled due to a change in the providers schedule. Patient was advised to call [facility] [place] to reschedule.  Another attempt will be made to reschedule        Patient can be scheduled for a virtual appointment with Dr Alvarez on 9/10/24 at the [facility].  If patient would like to be seen in person, please schedule patient for Dr Alvarez's next available at [facility]. (End of September/beginning of October)

## 2024-08-20 NOTE — PLAN OF CARE
Problem: Nutrition/Hydration-ADULT  Goal: Nutrient/Hydration intake appropriate for improving, restoring or maintaining nutritional needs  Description: Monitor and assess patient's nutrition/hydration status for malnutrition. Collaborate with interdisciplinary team and initiate plan and interventions as ordered.  Monitor patient's weight and dietary intake as ordered or per policy. Utilize nutrition screening tool and intervene as necessary. Determine patient's food preferences and provide high-protein, high-caloric foods as appropriate.     INTERVENTIONS:  - Monitor oral intake, urinary output, labs, and treatment plans  - Assess nutrition and hydration status and recommend course of action  - Evaluate amount of meals eaten  - Assist patient with eating if necessary   - Allow adequate time for meals  - Recommend/ encourage appropriate diets, oral nutritional supplements, and vitamin/mineral supplements  - Order, calculate, and assess calorie counts as needed  - Recommend, monitor, and adjust tube feedings and TPN/PPN based on assessed needs  - Assess need for intravenous fluids  - Provide specific nutrition/hydration education as appropriate  - Include patient/family/caregiver in decisions related to nutrition  Outcome: Progressing     Problem: PAIN - ADULT  Goal: Verbalizes/displays adequate comfort level or baseline comfort level  Description: Interventions:  - Encourage patient to monitor pain and request assistance  - Assess pain using appropriate pain scale  - Administer analgesics based on type and severity of pain and evaluate response  - Implement non-pharmacological measures as appropriate and evaluate response  - Consider cultural and social influences on pain and pain management  - Notify physician/advanced practitioner if interventions unsuccessful or patient reports new pain  Outcome: Progressing     Problem: INFECTION - ADULT  Goal: Absence or prevention of progression during  Physical Therapy    Physical Therapy Treatment    Patient Name: Obdulia Washington  MRN: [id number]  Today's Date: 8/20/2024  Visit #13  Time Calculation  Start Time: 0827  Stop Time: 0914  Time Calculation (min): 47 min     PT Therapeutic Procedures Time Entry  Therapeutic Exercise Time Entry: 47        Payor: MEDICARE / Plan: MEDICARE PART A AND B / Product Type: *No Product type* /   Referred by:Johnny Arora  Current Problem  Problem List Items Addressed This Visit             ICD-10-CM    Chronic bilateral low back pain M54.50, G89.29        Subjective   Pt reports she is doing OK  Pain: Better  Pt has been compliant with HEP Yes      Objective  No objective measures assessed this visit    Assessment:  Pt is progressing as expected towards goals. Doing very well.  Progressing towards and Ind HEP  This session exercises were progressed (p) or added (NEW) as documented in the treatment section.  The pt required min to mod cues and demonstration to complete exercises with proper form.    Pt responded to all activities without adverse effects.    Pt continues to lack strength necessary for the completion of baseline ADLs without pain.  Pt will benefit from further therapy to continue to progress towards meeting functional and impairment goals.    Plan:  Continue to progress treatment to improve strength, range of motion, joint mobility, pain, and flexibility impairments which are impacting patient's function.    Treatments:  Visit # 13    EVAL 6/25/24   Re-Check 7/24/24  Auth not req, BMN, medicare      TherEx:  Stepper 5 min   IB 1x1 min   HSS 2x30 seconds   Shuttle 6 bands, 3x10   SL Shuttle 4 bands, 3x10 (B)   Seated Ball Roll Out 3 min ea Fwd, Side-Side   STS 1 AE, 3x10   Side Stepping Green, 2 laps  Monster walks Green, 2 laps   Stand Hip ABD/Ext/Flx Green, 2x10 ea (B)   Stand Hip ADD Green, 2x10 (B)   RB 2x1 min   Slow March on AE 1 min   Tandem airex 1 min  Anti-Rotation Red, 5/5, 2 min (B) HELD  Goals:  Active        PT Problem       AURELIO 15% or less (Progressing)       Start:  06/25/24    Expected End:  08/24/24            Pt will demo improved MMT to at least 4/5 on 0-5 point scale in BLE for improved strength and stability, and improved ease with transfers, lifting/carrying, and proper mechanics with ADL's & IADL's.   (Progressing)       Start:  06/25/24    Expected End:  08/24/24            Patient will be able to lift 10 lbs from floor to waist without pain and good mechanics to allow for return to PLOF and ability to complete ADLs and HHCs without restriction.   (Progressing)       Start:  06/25/24    Expected End:  08/24/24            Patient will demonstrate improved standing tolerance to 60+ mins, independently, with good mechanics, and without assistance to allow for improved safety and function with ADLs, HHCs, and all functional mobility.    (Progressing)       Start:  06/25/24    Expected End:  08/24/24            Patient will demonstrate improved sitting tolerance to 60+ mins, independently, with maintenance of proper posture, and without pain during or with return to standing to improve ability to complete ADLs and HHCs at prior level  (Progressing)       Start:  06/25/24    Expected End:  08/24/24            Patient will be able to tolerate continuous ambulation for at least 60+ minutes or greater without pain or limitation from prior level of function to improve safety and function with community mobility.   (Progressing)       Start:  06/25/24    Expected End:  08/24/24                hospitalization  Description: INTERVENTIONS:  - Assess and monitor for signs and symptoms of infection  - Monitor lab/diagnostic results  - Monitor all insertion sites, i.e. indwelling lines, tubes, and drains  - Monitor endotracheal if appropriate and nasal secretions for changes in amount and color  - [place] appropriate cooling/warming therapies per order  - Administer medications as ordered  - Instruct and encourage patient and family to use good hand hygiene technique  - Identify and instruct in appropriate isolation precautions for identified infection/condition  Outcome: Progressing  Goal: Absence of fever/infection during neutropenic period  Description: INTERVENTIONS:  - Monitor WBC    Outcome: Progressing     Problem: SAFETY ADULT  Goal: Patient will remain free of falls  Description: INTERVENTIONS:  - Educate patient/family on patient safety including physical limitations  - Instruct patient to call for assistance with activity   - Consult OT/PT to assist with strengthening/mobility   - Keep Call bell within reach  - Keep bed low and locked with side rails adjusted as appropriate  - Keep care items and personal belongings within reach  - Initiate and maintain comfort rounds  - Make Fall Risk Sign visible to staff  - Offer Toileting every 2 Hours, in advance of need  - Initiate/Maintain alarm  - Obtain necessary fall risk management equipment  - Apply yellow socks and bracelet for high fall risk patients  - Consider moving patient to room near nurses station  Outcome: Progressing  Goal: Maintain or return to baseline ADL function  Description: INTERVENTIONS:  -  Assess patient's ability to carry out ADLs; assess patient's baseline for ADL function and identify physical deficits which impact ability to perform ADLs (bathing, care of mouth/teeth, toileting, grooming, dressing, etc.)  - Assess/evaluate cause of self-care deficits   - Assess range of motion  - Assess patient's mobility; develop plan if  impaired  - Assess patient's need for assistive devices and provide as appropriate  - Encourage maximum independence but intervene and supervise when necessary  - Involve family in performance of ADLs  - Assess for home care needs following discharge   - Consider OT consult to assist with ADL evaluation and planning for discharge  - Provide patient education as appropriate  Outcome: Progressing  Goal: Maintains/Returns to pre admission functional level  Description: INTERVENTIONS:  - Perform AM-PAC 6 Click Basic Mobility/ Daily Activity assessment daily.  - Set and communicate daily mobility goal to care team and patient/family/caregiver.   - Collaborate with rehabilitation services on mobility goals if consulted  - Perform Range of Motion 2 times a day.  - Reposition patient every 2 hours.  - Dangle patient 2 times a day  - Stand patient 2 times a day  - Ambulate patient 2 times a day  - Out of bed to chair 2 times a day   - Out of bed for meals 2 times a day  - Out of bed for toileting  - Record patient progress and toleration of activity level   Outcome: Progressing     Problem: DISCHARGE PLANNING  Goal: Discharge to home or other facility with appropriate resources  Description: INTERVENTIONS:  - Identify barriers to discharge w/patient and caregiver  - Arrange for needed discharge resources and transportation as appropriate  - Identify discharge learning needs (meds, wound care, etc.)  - Arrange for interpretive services to assist at discharge as needed  - Refer to Case Management Department for coordinating discharge planning if the patient needs post-hospital services based on physician/advanced practitioner order or complex needs related to functional status, cognitive ability, or social support system  Outcome: Progressing     Problem: Knowledge Deficit  Goal: Patient/family/caregiver demonstrates understanding of disease process, treatment plan, medications, and discharge instructions  Description:  Complete learning assessment and assess knowledge base.  Interventions:  - Provide teaching at level of understanding  - Provide teaching via preferred learning methods  Outcome: Progressing

## 2024-09-06 ENCOUNTER — PATIENT OUTREACH (OUTPATIENT)
Dept: CASE MANAGEMENT | Facility: HOSPITAL | Age: 69
End: 2024-09-06

## 2024-09-06 ENCOUNTER — TELEPHONE (OUTPATIENT)
Dept: HEMATOLOGY ONCOLOGY | Facility: CLINIC | Age: 69
End: 2024-09-06

## 2024-09-06 NOTE — PROGRESS NOTES
Pt is being followed by [facility].  LSW will plan to close the case.  If the pt has any oncology social work needs moving forward, another order can be placed at that time.   Clothing

## 2024-09-22 ENCOUNTER — TELEPHONE (OUTPATIENT)
Dept: OTHER | Facility: OTHER | Age: 69
End: 2024-09-22

## 2024-09-22 NOTE — TELEPHONE ENCOUNTER
"Pt stated, \" I would like to speak to an on call provider, I am in a nursing home and I do not have a bed or oxygen so that I can return home. Can I have some assistance.\"      Paged on call VIA EPIC.    Pt also stated, \" I would like to speak   Arleth Scales PA-C in regards to my situation.\"     On call Provider spoke with pt and reached out to provider Arleth Scales PA-C.      "

## 2024-09-23 ENCOUNTER — TELEPHONE (OUTPATIENT)
Age: 69
End: 2024-09-23

## 2024-09-23 NOTE — TELEPHONE ENCOUNTER
I am sending to the  team for assistance. Please call Rishabh and his sister Mary to confirm the patient's plan to sign off of hospice. Please direct Rishabh and Mary to speak with his hospice team directly if he wants to sign off.

## 2024-09-23 NOTE — TELEPHONE ENCOUNTER
Pt called and he wanted to let Arleth know he will no longer be on hospice he doesn't want it anymore and when he is out of rehab he will call to see her again

## 2024-09-25 ENCOUNTER — TELEPHONE (OUTPATIENT)
Age: 69
End: 2024-09-25

## 2024-09-25 NOTE — TELEPHONE ENCOUNTER
Patient's sister calling back stating patient is at [facility] and wants to go home tomorrow, but can not be released without oxygen.  Patient's sister stated patient was on hospice services until he fell. He was taken off of hospice and had his oxygen equipment picked up.    Sister asking is script can be written for oxygen  and be delivered tomorrow    Please call sister Benigno [phone] as soon as possible

## 2024-09-25 NOTE — TELEPHONE ENCOUNTER
Pt calling as he has an urgent request for Dr. Fatima. He [facility] has been diagnosed with stage 4 cancer and stage 4 emphysema.    He said they will not let him leave unless his doctor places a script for oxygen and a bed. He requests this to be done asap as he really wants to go home. The script can be faxed to his , Kaia     [address], [facility]  Office: [phone]   Cell: [phone]  Fax: [phone]

## 2024-12-11 NOTE — ASSESSMENT & PLAN NOTE
At baseline of 2-3L  Acute phase resolved  
Current metastatic prostate cancer to bone, complicated by poor PO intake & cachexia.  Known to oncology & palliative care  CT head: unchanged known calvarial osseus mets   Maintained outpatient on Zytiga 1000 mg q day   Admitted on comfort care w/ plan to d/c home w/ home hospice BUT pt revoked hospice on 03/27   Electing to continue all medical care  Level 3 DNR/DNI   Palliative following  Continue current regimen:  Gabapentin 300 mg BID  MS CONTIN 15 mg BID  Lidocaine patches  Tussinex BID  IV hydromorphone 0.3 mg q 1 hr PRN severe pain   IV lorazepam 1 mg q 4 hrs PRN  Currently holding klonopin and xanax as they have not been effective for him     
H/o severe COPD w/ chronic hypoxic respiratory failure who provoked hospise; requires 2L via NC at baseline  Pulmonary consulted and following  Patient sounds poorly (scattered rhonchi & exp wheeze) & admits to feeling poorly  Continue IV solumedrol 40 mg BID for an additional day  Hopeful de-escalation to PO Medrol tomorrow (pt refusing Prednisone)   Wean O2 as able (currently on 2.5-3 L, baseline is 2L O2)  Resp protocol, IS, & nebs   Outpt PFT's 4-6 weeks after d/c     
H/o severe COPD w/ chronic hypoxic respiratory failure who provoked hospise; requires 2L via NC at baseline  Pulmonary consulted and following  Patient sounds poorly (scattered rhonchi & exp wheeze) & admits to feeling poorly  Continue IV solumedrol 40 mg BID for an additional day  de-escalation to PO Medrol today (pt refusing Prednisone)   Wean O2 as able (currently on 2.5-3 L, baseline is 2L O2)  Resp protocol, IS, & nebs   Per pulmonology, can be discharged tomorrow with Medrol taper pack and resume Breztri 2 puff bid  Outpatient follow-up with pulmonology  Outpt PFT's 4-6 weeks after d/c     
H/o severe COPD w/ chronic hypoxic respiratory failure who provoked hospise; requires 2L via NC at baseline  Pulmonary consulted and following  Patient sounds poorly (scattered rhonchi & exp wheeze) & admits to feeling poorly  Continue IV solumedrol 40 mg BID for an additional day  de-escalation to PO Medrol today (pt refusing Prednisone)   Wean O2 as able (currently on 2.5-3 L, baseline is 2L O2)  Resp protocol, IS, & nebs   discharged with Medrol taper pack and resume Breztri 2 puff bid  Outpatient follow-up with pulmonology  Outpt PFT's 4-6 weeks after d/c     
He has significant metastatic disease with cancer related pain  It has been difficult to manage pain at home  He also has had increase cachexia  He follows with palliative care outpt. He has been trying to manage his symptoms at home but no longer is able to  Admitted to the hospital to achieve better pain control and symptom control  He is a level 4 comfort care   His ultimate goal is to return home on hospice. Although this may be difficult to achieve.   Appreciate palliative care recommendations consisting of:  Gabapentin 300MG BID, MS CONTIN 15MG BId,  Lidocaine patches  Currently holding klonopin and xanax as they have not been effective for him.   He will have migraine cocktail with decadron, sumatriptan, magnesium, benadryl.  Tussionex cough medicine BID PRN.   IV hydromorphone 0.3MG Q1H prn severe pain   IV lorazepam 1MG x1 now and every 4H PRN.  
Malnutrition Findings:                                 BMI Findings:           Body mass index is 15.99 kg/m².     
Malnutrition Findings:   Adult Malnutrition type: Chronic illness  Adult Degree of Malnutrition: Other severe protein calorie malnutrition  Malnutrition Characteristics: Fat loss, Muscle loss                  360 Statement: chronic severe protein calorie malnutrition in the setting of increased energy demands from catabolic illness as evidenced by:  severe body fat depletion - hollow, depressed orbital area;  severe muscle mass depletion- hollow, depressed temporal area, sunken buccal pads;  BMI 16.   treated with regular diet, honoring food preferences, and recommendation for nutrition supplements.    BMI Findings:  Adult BMI Classifications: Underweight < 18.5        Body mass index is 15.99 kg/m².       Nutrition consult     
Malnutrition Findings:   Adult Malnutrition type: Chronic illness  Adult Degree of Malnutrition: Other severe protein calorie malnutrition  Malnutrition Characteristics: Fat loss, Muscle loss                  360 Statement: chronic severe protein calorie malnutrition in the setting of increased energy demands from catabolic illness as evidenced by:  severe body fat depletion - hollow, depressed orbital area;  severe muscle mass depletion- hollow, depressed temporal area, sunken buccal pads;  BMI 16.   treated with regular diet, honoring food preferences, and recommendation for nutrition supplements.    BMI Findings:  Adult BMI Classifications: Underweight < 18.5        Body mass index is 15.99 kg/m².     Nutrition consulted    
Patient with metastatic prostate cancer to bone, complicated by poor PO intake + cachexia. Follows with outpatient oncology and palliative care  [facility] with unchanged known calvarial osseus mets   Maintained outpatient on Zytiga --> hold while admitted and resume as outpatient per oncology     Initially patient was admitted on comfort care with plan for DC to home with home hospice --> patient revoked on 3/27 and elected to continue all medical care with Level 3 DNR/DNI code status     Palliative following   Migraine is resolved   Continue current regimen:  Gabapentin 300MG BID  MS CONTIN 15MG Bid  Lidocaine patches  Tussinex BID  Currently holding klonopin and xanax as they have not been effective for him.   IV hydromorphone 0.3MG Q1H prn severe pain   IV lorazepam 1MG x1 now and every 4H PRN.  
Patient with metastatic prostate cancer to bone, complicated by poor PO intake + cachexia. Follows with outpatient oncology and palliative care  [facility] with unchanged known calvarial osseus mets   Maintained outpatient on Zytiga --> hold while admitted and resume as outpatient per oncology     Initially patient was admitted on comfort care with plan for DC to home with home hospice --> patient revoked on 3/27 and elected to continue all medical care with Level 3 DNR/DNI code status     Palliative following   Migraine is resolved   Continue current regimen:  Gabapentin 300MG BID  MS CONTIN 15MG Bid  Lidocaine patches  Tussinex BID  Currently holding klonopin and xanax as they have not been effective for him.   IV hydromorphone 0.3MG Q1H prn severe pain   IV lorazepam 1MG x1 now and every 4H PRN.  
Patient with metastatic prostate cancer to bone, complicated by poor PO intake + cachexia. Follows with outpatient oncology and palliative care  [facility] with unchanged known calvarial osseus mets   Maintained outpatient on Zytiga --> hold while admitted and resume as outpatient per oncology     Initially patient was admitted on comfort care with plan for DC to home with home hospice --> patient revoked on 3/27 and elected to continue all medical care with Level 3 DNR/DNI code status     Palliative following   Migraine is resolved   Continue current regimen:  Gabapentin 300MG BID  MS CONTIN 15MG Bid  Lidocaine patches  Tussinex BID  Currently holding klonopin and xanax as they have not been effective for him.   IV hydromorphone 0.3MG Q1H prn severe pain   IV lorazepam 1MG x1 now and every 4H PRN.  
Severe copd with chronic hypoxic respiratory failure  Continue bronchodilators and substitutes for breztri   Continue 3 to 4 liters of oxygen     
Severe copd with chronic hypoxic respiratory failure on minimum of 2L O2 at baseline  Initiated on IV decadron for migraines, will switch to IV solumedrol given persistent wheezing   Will add PO doxy  Pulmonology consult   Resp protocol, IS, nebs   Obtain labs now     
Severe copd with chronic hypoxic respiratory failure on minimum of 2L O2 at baseline  Pulmonology consult   Continue IV solumedrol 40 mg BID   Wean O2 as able (currently on 3L, baseline is 2L O2)  Resp protocol, IS, nebs     
Severe copd with chronic hypoxic respiratory failure on minimum of 2L O2 at baseline  Pulmonology consult   Continue IV solumedrol 40 mg BID --> possible de-escalation to PO prednisone tomorrow  Wean O2 as able (currently on 3L, baseline is 2L O2)  Resp protocol, IS, nebs     
Started on migraine cocktail with decadron, sumatriptan, magnesium, benadryl.    
Started on migraine cocktail with decadron, sumatriptan, magnesium, benadryl.  Resolved 3/27    
Started on migraine cocktail with decadron, sumatriptan, magnesium, benadryl.  Resolved 3/27    
for po intake

## 2025-04-14 NOTE — PROGRESS NOTES
Returned call to MARCELINA! Regino Rishabh's sister  Per Stephane Cockayne is interested in a hospital bed at home  We discussed Rishabh's safety  Will see if we can arrange a sooner appointment to talk about what to expect in the near future as he is currently interested in remaining in his home  Placed order for hospital bed  Opt out